# Patient Record
Sex: FEMALE | Race: WHITE | Employment: OTHER | URBAN - NONMETROPOLITAN AREA
[De-identification: names, ages, dates, MRNs, and addresses within clinical notes are randomized per-mention and may not be internally consistent; named-entity substitution may affect disease eponyms.]

---

## 2017-01-03 ENCOUNTER — OFFICE VISIT (OUTPATIENT)
Dept: ONCOLOGY | Age: 82
End: 2017-01-03

## 2017-01-03 VITALS
TEMPERATURE: 98.6 F | OXYGEN SATURATION: 94 % | HEIGHT: 57 IN | HEART RATE: 110 BPM | RESPIRATION RATE: 18 BRPM | SYSTOLIC BLOOD PRESSURE: 140 MMHG | DIASTOLIC BLOOD PRESSURE: 65 MMHG | BODY MASS INDEX: 30.94 KG/M2 | WEIGHT: 143.4 LBS

## 2017-01-03 DIAGNOSIS — C78.00 BLADDER CANCER METASTASIZED TO LUNG (HCC): Primary | ICD-10-CM

## 2017-01-03 DIAGNOSIS — Z51.11 ENCOUNTER FOR CHEMOTHERAPY MANAGEMENT: ICD-10-CM

## 2017-01-03 DIAGNOSIS — C67.9 BLADDER CANCER METASTASIZED TO LUNG (HCC): Primary | ICD-10-CM

## 2017-01-03 DIAGNOSIS — R30.9 PAINFUL URINATION: ICD-10-CM

## 2017-01-03 DIAGNOSIS — D50.0 IRON DEFICIENCY ANEMIA DUE TO CHRONIC BLOOD LOSS: ICD-10-CM

## 2017-01-03 PROCEDURE — 99214 OFFICE O/P EST MOD 30 MIN: CPT | Performed by: INTERNAL MEDICINE

## 2017-01-04 ENCOUNTER — TELEPHONE (OUTPATIENT)
Dept: FAMILY MEDICINE CLINIC | Age: 82
End: 2017-01-04

## 2017-01-04 RX ORDER — ESCITALOPRAM OXALATE 5 MG/1
5 TABLET ORAL DAILY
Qty: 30 TABLET | Refills: 2 | Status: SHIPPED | OUTPATIENT
Start: 2017-01-04 | End: 2017-04-11 | Stop reason: SDUPTHER

## 2017-01-09 DIAGNOSIS — E87.1 HYPONATREMIA: ICD-10-CM

## 2017-01-09 DIAGNOSIS — C67.9 BLADDER CANCER METASTASIZED TO LUNG (HCC): ICD-10-CM

## 2017-01-09 DIAGNOSIS — C78.00 BLADDER CANCER METASTASIZED TO LUNG (HCC): ICD-10-CM

## 2017-01-10 RX ORDER — ONDANSETRON 4 MG/1
TABLET, FILM COATED ORAL
Qty: 30 TABLET | Refills: 3 | Status: SHIPPED | OUTPATIENT
Start: 2017-01-10 | End: 2017-08-22 | Stop reason: SDUPTHER

## 2017-01-12 ENCOUNTER — PROCEDURE VISIT (OUTPATIENT)
Dept: UROLOGY | Age: 82
End: 2017-01-12

## 2017-01-12 VITALS
HEIGHT: 58 IN | DIASTOLIC BLOOD PRESSURE: 68 MMHG | SYSTOLIC BLOOD PRESSURE: 140 MMHG | BODY MASS INDEX: 29.6 KG/M2 | WEIGHT: 141 LBS

## 2017-01-12 DIAGNOSIS — C67.9 MALIGNANT NEOPLASM OF URINARY BLADDER, UNSPECIFIED SITE (HCC): Primary | ICD-10-CM

## 2017-01-12 LAB
BILIRUBIN URINE: NEGATIVE
BLOOD URINE, POC: ABNORMAL
CHARACTER, URINE: CLEAR
COLOR, URINE: YELLOW
GLUCOSE URINE: NEGATIVE MG/DL
KETONES, URINE: NEGATIVE
LEUKOCYTE CLUMPS, URINE: ABNORMAL
NITRITE, URINE: NEGATIVE
PH, URINE: 6
PROTEIN, URINE: 30 MG/DL
SPECIFIC GRAVITY, URINE: 1.01 (ref 1–1.03)
UROBILINOGEN, URINE: 0.2 EU/DL

## 2017-01-12 PROCEDURE — 52310 CYSTOSCOPY AND TREATMENT: CPT | Performed by: UROLOGY

## 2017-01-12 PROCEDURE — 99999 PR OFFICE/OUTPT VISIT,PROCEDURE ONLY: CPT | Performed by: UROLOGY

## 2017-01-12 PROCEDURE — 81003 URINALYSIS AUTO W/O SCOPE: CPT | Performed by: UROLOGY

## 2017-01-16 ENCOUNTER — CARE COORDINATION (OUTPATIENT)
Dept: CARE COORDINATION | Age: 82
End: 2017-01-16

## 2017-01-16 ENCOUNTER — TELEPHONE (OUTPATIENT)
Dept: FAMILY MEDICINE CLINIC | Age: 82
End: 2017-01-16

## 2017-01-16 RX ORDER — FERROUS SULFATE 325(65) MG
TABLET ORAL
Qty: 30 TABLET | Refills: 3 | Status: SHIPPED | OUTPATIENT
Start: 2017-01-16 | End: 2017-05-10 | Stop reason: SDUPTHER

## 2017-01-17 DIAGNOSIS — C78.00 BLADDER CANCER METASTASIZED TO LUNG (HCC): ICD-10-CM

## 2017-01-17 DIAGNOSIS — M15.9 GENERALIZED OA: ICD-10-CM

## 2017-01-17 DIAGNOSIS — C67.9 BLADDER CANCER METASTASIZED TO LUNG (HCC): ICD-10-CM

## 2017-01-17 DIAGNOSIS — M16.9 OSTEOARTHROSIS, HIP: ICD-10-CM

## 2017-01-17 RX ORDER — TRAMADOL HYDROCHLORIDE 50 MG/1
50 TABLET ORAL EVERY 6 HOURS PRN
Qty: 120 TABLET | Refills: 5 | Status: SHIPPED | OUTPATIENT
Start: 2017-01-17 | End: 2017-04-17 | Stop reason: ALTCHOICE

## 2017-01-17 RX ORDER — HYDROCODONE BITARTRATE AND ACETAMINOPHEN 5; 325 MG/1; MG/1
1 TABLET ORAL EVERY 6 HOURS PRN
Qty: 120 TABLET | Refills: 0 | Status: SHIPPED | OUTPATIENT
Start: 2017-01-17 | End: 2017-01-30 | Stop reason: SDUPTHER

## 2017-01-23 ENCOUNTER — TELEPHONE (OUTPATIENT)
Dept: FAMILY MEDICINE CLINIC | Age: 82
End: 2017-01-23

## 2017-01-23 DIAGNOSIS — S62.101S CLOSED FRACTURE OF RIGHT WRIST, SEQUELA: ICD-10-CM

## 2017-01-23 DIAGNOSIS — R53.81 PHYSICAL DECONDITIONING: Primary | ICD-10-CM

## 2017-01-23 PROBLEM — S62.101A CLOSED FRACTURE OF RIGHT WRIST: Status: ACTIVE | Noted: 2017-01-23

## 2017-01-30 DIAGNOSIS — C67.9 BLADDER CANCER METASTASIZED TO LUNG (HCC): ICD-10-CM

## 2017-01-30 DIAGNOSIS — C78.00 BLADDER CANCER METASTASIZED TO LUNG (HCC): ICD-10-CM

## 2017-01-30 DIAGNOSIS — M15.9 GENERALIZED OA: ICD-10-CM

## 2017-01-30 DIAGNOSIS — M16.9 OSTEOARTHROSIS, HIP: ICD-10-CM

## 2017-01-30 RX ORDER — HYDROCODONE BITARTRATE AND ACETAMINOPHEN 5; 325 MG/1; MG/1
1 TABLET ORAL EVERY 6 HOURS PRN
Qty: 120 TABLET | Refills: 0 | Status: SHIPPED | OUTPATIENT
Start: 2017-01-30 | End: 2017-03-20 | Stop reason: SDUPTHER

## 2017-02-06 ENCOUNTER — PROCEDURE VISIT (OUTPATIENT)
Dept: UROLOGY | Age: 82
End: 2017-02-06

## 2017-02-06 VITALS
SYSTOLIC BLOOD PRESSURE: 110 MMHG | WEIGHT: 141 LBS | DIASTOLIC BLOOD PRESSURE: 64 MMHG | HEIGHT: 57 IN | BODY MASS INDEX: 30.42 KG/M2

## 2017-02-06 DIAGNOSIS — Z85.51 HISTORY OF BLADDER CANCER: Primary | ICD-10-CM

## 2017-02-06 LAB
BILIRUBIN URINE: NEGATIVE
BLOOD URINE, POC: NORMAL
CHARACTER, URINE: CLEAR
COLOR, URINE: YELLOW
GLUCOSE URINE: NEGATIVE MG/DL
KETONES, URINE: NEGATIVE
LEUKOCYTE CLUMPS, URINE: NORMAL
NITRITE, URINE: NEGATIVE
PH, URINE: 6.5
PROTEIN, URINE: NEGATIVE MG/DL
SPECIFIC GRAVITY, URINE: 1.01 (ref 1–1.03)
UROBILINOGEN, URINE: 0.2 EU/DL

## 2017-02-06 PROCEDURE — 81003 URINALYSIS AUTO W/O SCOPE: CPT | Performed by: UROLOGY

## 2017-02-06 PROCEDURE — 52310 CYSTOSCOPY AND TREATMENT: CPT | Performed by: UROLOGY

## 2017-02-06 PROCEDURE — 99999 PR OFFICE/OUTPT VISIT,PROCEDURE ONLY: CPT | Performed by: UROLOGY

## 2017-02-06 RX ORDER — DIPHENOXYLATE HYDROCHLORIDE AND ATROPINE SULFATE 2.5; .025 MG/1; MG/1
TABLET ORAL
Qty: 30 TABLET | Refills: 0 | Status: SHIPPED | OUTPATIENT
Start: 2017-02-06 | End: 2017-08-02 | Stop reason: SDUPTHER

## 2017-02-06 RX ORDER — CEPHALEXIN 500 MG/1
CAPSULE ORAL
COMMUNITY
Start: 2017-01-31 | End: 2017-02-09

## 2017-02-09 ENCOUNTER — OFFICE VISIT (OUTPATIENT)
Dept: CARDIOLOGY | Age: 82
End: 2017-02-09

## 2017-02-09 VITALS
SYSTOLIC BLOOD PRESSURE: 140 MMHG | HEIGHT: 57 IN | HEART RATE: 64 BPM | BODY MASS INDEX: 29.86 KG/M2 | DIASTOLIC BLOOD PRESSURE: 70 MMHG | WEIGHT: 138.4 LBS

## 2017-02-09 DIAGNOSIS — I25.10 CORONARY ARTERY DISEASE INVOLVING NATIVE CORONARY ARTERY OF NATIVE HEART WITHOUT ANGINA PECTORIS: ICD-10-CM

## 2017-02-09 DIAGNOSIS — I50.22 CHRONIC SYSTOLIC CHF (CONGESTIVE HEART FAILURE) (HCC): ICD-10-CM

## 2017-02-09 DIAGNOSIS — I10 ESSENTIAL HYPERTENSION: ICD-10-CM

## 2017-02-09 DIAGNOSIS — E78.00 HYPERCHOLESTEREMIA: ICD-10-CM

## 2017-02-09 DIAGNOSIS — I42.9 CARDIOMYOPATHY (HCC): Primary | ICD-10-CM

## 2017-02-09 DIAGNOSIS — E78.5 DYSLIPIDEMIA: ICD-10-CM

## 2017-02-09 DIAGNOSIS — Z95.820 S/P ANGIOPLASTY WITH STENT: ICD-10-CM

## 2017-02-09 PROCEDURE — 99213 OFFICE O/P EST LOW 20 MIN: CPT | Performed by: INTERNAL MEDICINE

## 2017-02-10 ENCOUNTER — TELEPHONE (OUTPATIENT)
Dept: CARDIOLOGY | Age: 82
End: 2017-02-10

## 2017-02-15 ENCOUNTER — CARE COORDINATION (OUTPATIENT)
Dept: CARE COORDINATION | Age: 82
End: 2017-02-15

## 2017-03-03 DIAGNOSIS — G25.81 RESTLESS LEGS SYNDROME (RLS): ICD-10-CM

## 2017-03-03 RX ORDER — ROPINIROLE 1 MG/1
TABLET, FILM COATED ORAL
Qty: 120 TABLET | Refills: 3 | Status: SHIPPED | OUTPATIENT
Start: 2017-03-03 | End: 2017-04-17 | Stop reason: SDUPTHER

## 2017-03-09 DIAGNOSIS — G25.81 RESTLESS LEGS SYNDROME (RLS): ICD-10-CM

## 2017-03-09 RX ORDER — ROPINIROLE 0.5 MG/1
TABLET, FILM COATED ORAL
Qty: 180 TABLET | Refills: 2 | Status: SHIPPED | OUTPATIENT
Start: 2017-03-09 | End: 2017-04-17

## 2017-03-20 DIAGNOSIS — C78.00 BLADDER CANCER METASTASIZED TO LUNG (HCC): ICD-10-CM

## 2017-03-20 DIAGNOSIS — C67.9 BLADDER CANCER METASTASIZED TO LUNG (HCC): ICD-10-CM

## 2017-03-20 DIAGNOSIS — M15.9 GENERALIZED OA: ICD-10-CM

## 2017-03-20 DIAGNOSIS — M16.9 OSTEOARTHROSIS, HIP: ICD-10-CM

## 2017-03-20 RX ORDER — HYDROCODONE BITARTRATE AND ACETAMINOPHEN 5; 325 MG/1; MG/1
1 TABLET ORAL EVERY 6 HOURS PRN
Qty: 120 TABLET | Refills: 0 | Status: SHIPPED | OUTPATIENT
Start: 2017-03-20 | End: 2017-04-21 | Stop reason: SDUPTHER

## 2017-03-22 ENCOUNTER — CARE COORDINATION (OUTPATIENT)
Dept: CARE COORDINATION | Age: 82
End: 2017-03-22

## 2017-03-24 ENCOUNTER — OFFICE VISIT (OUTPATIENT)
Dept: FAMILY MEDICINE CLINIC | Age: 82
End: 2017-03-24

## 2017-03-24 VITALS
HEIGHT: 57 IN | DIASTOLIC BLOOD PRESSURE: 60 MMHG | TEMPERATURE: 98 F | RESPIRATION RATE: 14 BRPM | SYSTOLIC BLOOD PRESSURE: 118 MMHG | HEART RATE: 86 BPM | BODY MASS INDEX: 31.8 KG/M2 | WEIGHT: 147.4 LBS | OXYGEN SATURATION: 98 %

## 2017-03-24 DIAGNOSIS — G25.81 RESTLESS LEGS SYNDROME (RLS): ICD-10-CM

## 2017-03-24 DIAGNOSIS — S62.101D CLOSED FRACTURE OF RIGHT WRIST, WITH ROUTINE HEALING, SUBSEQUENT ENCOUNTER: ICD-10-CM

## 2017-03-24 DIAGNOSIS — I25.118 CORONARY ARTERY DISEASE OF NATIVE ARTERY OF NATIVE HEART WITH STABLE ANGINA PECTORIS (HCC): ICD-10-CM

## 2017-03-24 DIAGNOSIS — J30.1 SEASONAL ALLERGIC RHINITIS DUE TO POLLEN: ICD-10-CM

## 2017-03-24 DIAGNOSIS — F41.9 ANXIETY: ICD-10-CM

## 2017-03-24 DIAGNOSIS — E87.1 HYPONATREMIA: ICD-10-CM

## 2017-03-24 DIAGNOSIS — K26.9 DUODENAL ULCER: ICD-10-CM

## 2017-03-24 DIAGNOSIS — I50.22 CHRONIC SYSTOLIC CHF (CONGESTIVE HEART FAILURE) (HCC): ICD-10-CM

## 2017-03-24 DIAGNOSIS — H81.13 BPV (BENIGN POSITIONAL VERTIGO), BILATERAL: ICD-10-CM

## 2017-03-24 DIAGNOSIS — E22.2 SIADH (SYNDROME OF INAPPROPRIATE ADH PRODUCTION) (HCC): Primary | ICD-10-CM

## 2017-03-24 DIAGNOSIS — Z51.81 MEDICATION MONITORING ENCOUNTER: ICD-10-CM

## 2017-03-24 DIAGNOSIS — I10 ESSENTIAL HYPERTENSION: ICD-10-CM

## 2017-03-24 DIAGNOSIS — M15.9 GENERALIZED OA: ICD-10-CM

## 2017-03-24 PROCEDURE — 3288F FALL RISK ASSESSMENT DOCD: CPT | Performed by: FAMILY MEDICINE

## 2017-03-24 PROCEDURE — G8510 SCR DEP NEG, NO PLAN REQD: HCPCS | Performed by: FAMILY MEDICINE

## 2017-03-24 PROCEDURE — 99214 OFFICE O/P EST MOD 30 MIN: CPT | Performed by: FAMILY MEDICINE

## 2017-03-24 RX ORDER — ZAFIRLUKAST 20 MG/1
TABLET, FILM COATED ORAL
Qty: 180 TABLET | Refills: 2 | Status: SHIPPED | OUTPATIENT
Start: 2017-03-24 | End: 2017-10-02 | Stop reason: SDUPTHER

## 2017-03-24 RX ORDER — MECLIZINE HYDROCHLORIDE 25 MG/1
25 TABLET ORAL 3 TIMES DAILY PRN
Qty: 270 TABLET | Refills: 3 | Status: SHIPPED | OUTPATIENT
Start: 2017-03-24 | End: 2018-01-01 | Stop reason: SDUPTHER

## 2017-03-24 RX ORDER — PANTOPRAZOLE SODIUM 40 MG/1
TABLET, DELAYED RELEASE ORAL
Qty: 60 TABLET | Refills: 11 | Status: SHIPPED | OUTPATIENT
Start: 2017-03-24 | End: 2018-01-01 | Stop reason: SDUPTHER

## 2017-03-24 ASSESSMENT — ENCOUNTER SYMPTOMS
RESPIRATORY NEGATIVE: 1
NAUSEA: 0
BACK PAIN: 1
ABDOMINAL DISTENTION: 0
EYES NEGATIVE: 1
COUGH: 0
ABDOMINAL PAIN: 0
SHORTNESS OF BREATH: 0
GASTROINTESTINAL NEGATIVE: 1

## 2017-03-24 ASSESSMENT — PATIENT HEALTH QUESTIONNAIRE - PHQ9
1. LITTLE INTEREST OR PLEASURE IN DOING THINGS: 0
2. FEELING DOWN, DEPRESSED OR HOPELESS: 0
SUM OF ALL RESPONSES TO PHQ QUESTIONS 1-9: 0
SUM OF ALL RESPONSES TO PHQ9 QUESTIONS 1 & 2: 0

## 2017-03-29 ENCOUNTER — TELEPHONE (OUTPATIENT)
Dept: UROLOGY | Age: 82
End: 2017-03-29

## 2017-03-29 DIAGNOSIS — Z01.818 PRE-OP TESTING: ICD-10-CM

## 2017-03-29 DIAGNOSIS — I42.9 CARDIOMYOPATHY (HCC): ICD-10-CM

## 2017-03-29 DIAGNOSIS — I25.10 CORONARY ARTERY DISEASE INVOLVING NATIVE CORONARY ARTERY OF NATIVE HEART WITHOUT ANGINA PECTORIS: ICD-10-CM

## 2017-03-29 DIAGNOSIS — I50.43 ACUTE ON CHRONIC COMBINED SYSTOLIC AND DIASTOLIC CONGESTIVE HEART FAILURE (HCC): Primary | ICD-10-CM

## 2017-04-04 ENCOUNTER — OFFICE VISIT (OUTPATIENT)
Dept: ONCOLOGY | Age: 82
End: 2017-04-04

## 2017-04-04 VITALS
RESPIRATION RATE: 20 BRPM | SYSTOLIC BLOOD PRESSURE: 142 MMHG | BODY MASS INDEX: 31.37 KG/M2 | WEIGHT: 145.4 LBS | OXYGEN SATURATION: 93 % | TEMPERATURE: 97.7 F | HEART RATE: 90 BPM | DIASTOLIC BLOOD PRESSURE: 67 MMHG | HEIGHT: 57 IN

## 2017-04-04 DIAGNOSIS — C67.0 MALIGNANT NEOPLASM OF TRIGONE OF URINARY BLADDER (HCC): ICD-10-CM

## 2017-04-04 DIAGNOSIS — C67.9 BLADDER CANCER METASTASIZED TO LUNG (HCC): Primary | ICD-10-CM

## 2017-04-04 DIAGNOSIS — C78.00 BLADDER CANCER METASTASIZED TO LUNG (HCC): Primary | ICD-10-CM

## 2017-04-04 PROCEDURE — 99214 OFFICE O/P EST MOD 30 MIN: CPT | Performed by: INTERNAL MEDICINE

## 2017-04-05 RX ORDER — ZAFIRLUKAST 20 MG/1
TABLET, FILM COATED ORAL
Qty: 180 TABLET | Refills: 1 | Status: ON HOLD | OUTPATIENT
Start: 2017-04-05 | End: 2017-06-27 | Stop reason: HOSPADM

## 2017-04-07 ENCOUNTER — OFFICE VISIT (OUTPATIENT)
Dept: CARDIOLOGY | Age: 82
End: 2017-04-07

## 2017-04-07 VITALS
SYSTOLIC BLOOD PRESSURE: 138 MMHG | BODY MASS INDEX: 31.32 KG/M2 | DIASTOLIC BLOOD PRESSURE: 68 MMHG | HEART RATE: 80 BPM | WEIGHT: 145.2 LBS | HEIGHT: 57 IN

## 2017-04-07 DIAGNOSIS — I42.9 CARDIOMYOPATHY (HCC): ICD-10-CM

## 2017-04-07 DIAGNOSIS — I50.22 CHRONIC SYSTOLIC CHF (CONGESTIVE HEART FAILURE) (HCC): ICD-10-CM

## 2017-04-07 DIAGNOSIS — I25.10 CORONARY ARTERY DISEASE INVOLVING NATIVE CORONARY ARTERY OF NATIVE HEART WITHOUT ANGINA PECTORIS: ICD-10-CM

## 2017-04-07 DIAGNOSIS — Z95.820 S/P ANGIOPLASTY WITH STENT: ICD-10-CM

## 2017-04-07 DIAGNOSIS — I10 ESSENTIAL HYPERTENSION: ICD-10-CM

## 2017-04-07 DIAGNOSIS — Z01.818 PRE-OP EVALUATION: Primary | ICD-10-CM

## 2017-04-07 DIAGNOSIS — R06.02 SOB (SHORTNESS OF BREATH) ON EXERTION: ICD-10-CM

## 2017-04-07 PROCEDURE — 99214 OFFICE O/P EST MOD 30 MIN: CPT | Performed by: INTERNAL MEDICINE

## 2017-04-11 ENCOUNTER — TELEPHONE (OUTPATIENT)
Dept: FAMILY MEDICINE CLINIC | Age: 82
End: 2017-04-11

## 2017-04-11 RX ORDER — ESCITALOPRAM OXALATE 5 MG/1
5 TABLET ORAL DAILY
Qty: 30 TABLET | Refills: 2 | Status: SHIPPED | OUTPATIENT
Start: 2017-04-11 | End: 2017-04-26 | Stop reason: DRUGHIGH

## 2017-04-13 ENCOUNTER — TELEPHONE (OUTPATIENT)
Dept: UROLOGY | Age: 82
End: 2017-04-13

## 2017-04-17 ENCOUNTER — TELEPHONE (OUTPATIENT)
Dept: FAMILY MEDICINE CLINIC | Age: 82
End: 2017-04-17

## 2017-04-17 ENCOUNTER — NURSE TRIAGE (OUTPATIENT)
Dept: ADMINISTRATIVE | Age: 82
End: 2017-04-17

## 2017-04-17 DIAGNOSIS — G25.81 RESTLESS LEGS SYNDROME (RLS): ICD-10-CM

## 2017-04-17 RX ORDER — ROPINIROLE 4 MG/1
4 TABLET, FILM COATED ORAL 2 TIMES DAILY
Qty: 60 TABLET | Refills: 5 | Status: SHIPPED | OUTPATIENT
Start: 2017-04-17 | End: 2017-10-15 | Stop reason: SDUPTHER

## 2017-04-20 ENCOUNTER — TELEPHONE (OUTPATIENT)
Dept: UROLOGY | Age: 82
End: 2017-04-20

## 2017-04-20 ENCOUNTER — NURSE ONLY (OUTPATIENT)
Dept: UROLOGY | Age: 82
End: 2017-04-20

## 2017-04-20 DIAGNOSIS — N13.30 BILATERAL HYDRONEPHROSIS: ICD-10-CM

## 2017-04-20 DIAGNOSIS — R33.9 RETENTION, URINE: Primary | ICD-10-CM

## 2017-04-20 DIAGNOSIS — N13.5 URETERAL OBSTRUCTION: Primary | ICD-10-CM

## 2017-04-20 LAB — POST VOID RESIDUAL (PVR): >999 ML

## 2017-04-20 PROCEDURE — 51702 INSERT TEMP BLADDER CATH: CPT | Performed by: NURSE PRACTITIONER

## 2017-04-20 PROCEDURE — 51798 US URINE CAPACITY MEASURE: CPT | Performed by: NURSE PRACTITIONER

## 2017-04-21 DIAGNOSIS — C78.00 BLADDER CANCER METASTASIZED TO LUNG (HCC): ICD-10-CM

## 2017-04-21 DIAGNOSIS — C67.9 BLADDER CANCER METASTASIZED TO LUNG (HCC): ICD-10-CM

## 2017-04-21 DIAGNOSIS — M15.9 GENERALIZED OA: ICD-10-CM

## 2017-04-21 DIAGNOSIS — M16.9 OSTEOARTHROSIS, HIP: ICD-10-CM

## 2017-04-21 RX ORDER — HYDROCODONE BITARTRATE AND ACETAMINOPHEN 5; 325 MG/1; MG/1
1 TABLET ORAL EVERY 6 HOURS PRN
Qty: 120 TABLET | Refills: 0 | Status: SHIPPED | OUTPATIENT
Start: 2017-04-21 | End: 2017-05-19 | Stop reason: SDUPTHER

## 2017-04-24 ENCOUNTER — NURSE ONLY (OUTPATIENT)
Dept: UROLOGY | Age: 82
End: 2017-04-24

## 2017-04-24 VITALS — WEIGHT: 147 LBS | BODY MASS INDEX: 30.86 KG/M2 | HEIGHT: 58 IN

## 2017-04-24 DIAGNOSIS — R33.9 URINARY RETENTION: Primary | ICD-10-CM

## 2017-04-24 PROCEDURE — 99999 PR OFFICE/OUTPT VISIT,PROCEDURE ONLY: CPT | Performed by: UROLOGY

## 2017-04-25 ENCOUNTER — TELEPHONE (OUTPATIENT)
Dept: UROLOGY | Age: 82
End: 2017-04-25

## 2017-04-25 ENCOUNTER — NURSE ONLY (OUTPATIENT)
Dept: UROLOGY | Age: 82
End: 2017-04-25

## 2017-04-25 DIAGNOSIS — R33.9 RETENTION OF URINE: Primary | ICD-10-CM

## 2017-04-25 PROCEDURE — 51701 INSERT BLADDER CATHETER: CPT | Performed by: NURSE PRACTITIONER

## 2017-04-26 ENCOUNTER — CARE COORDINATION (OUTPATIENT)
Dept: CARE COORDINATION | Age: 82
End: 2017-04-26

## 2017-04-26 ENCOUNTER — TELEPHONE (OUTPATIENT)
Dept: FAMILY MEDICINE CLINIC | Age: 82
End: 2017-04-26

## 2017-04-26 DIAGNOSIS — F32.9 REACTIVE DEPRESSION: ICD-10-CM

## 2017-04-26 DIAGNOSIS — F41.9 ANXIETY: ICD-10-CM

## 2017-04-26 RX ORDER — ESCITALOPRAM OXALATE 10 MG/1
10 TABLET ORAL DAILY
Qty: 30 TABLET | Refills: 1 | Status: SHIPPED | OUTPATIENT
Start: 2017-04-26 | End: 2017-06-18 | Stop reason: SDUPTHER

## 2017-04-26 RX ORDER — ESCITALOPRAM OXALATE 5 MG/1
10 TABLET ORAL DAILY
Qty: 30 TABLET | Refills: 2 | Status: SHIPPED
Start: 2017-04-26 | End: 2017-04-26 | Stop reason: DRUGHIGH

## 2017-04-26 RX ORDER — LORAZEPAM 0.5 MG/1
0.5 TABLET ORAL EVERY 8 HOURS PRN
Qty: 90 TABLET | Refills: 5 | Status: SHIPPED | OUTPATIENT
Start: 2017-04-26 | End: 2017-09-29 | Stop reason: SDUPTHER

## 2017-05-08 RX ORDER — BUMETANIDE 0.5 MG/1
TABLET ORAL
Qty: 48 TABLET | Refills: 1 | Status: SHIPPED | OUTPATIENT
Start: 2017-05-08 | End: 2017-11-03 | Stop reason: SDUPTHER

## 2017-05-10 ENCOUNTER — OFFICE VISIT (OUTPATIENT)
Dept: UROLOGY | Age: 82
End: 2017-05-10

## 2017-05-10 VITALS
BODY MASS INDEX: 31.28 KG/M2 | DIASTOLIC BLOOD PRESSURE: 62 MMHG | WEIGHT: 145 LBS | HEIGHT: 57 IN | SYSTOLIC BLOOD PRESSURE: 102 MMHG

## 2017-05-10 DIAGNOSIS — C67.9 BLADDER CANCER METASTASIZED TO LUNG (HCC): Primary | ICD-10-CM

## 2017-05-10 DIAGNOSIS — C78.00 BLADDER CANCER METASTASIZED TO LUNG (HCC): Primary | ICD-10-CM

## 2017-05-10 LAB
BILIRUBIN URINE: NEGATIVE
BLOOD URINE, POC: ABNORMAL
CHARACTER, URINE: ABNORMAL
COLOR, URINE: YELLOW
GLUCOSE URINE: NEGATIVE MG/DL
KETONES, URINE: NEGATIVE
LEUKOCYTE CLUMPS, URINE: ABNORMAL
NITRITE, URINE: NEGATIVE
PH, URINE: 6
PROTEIN, URINE: 100 MG/DL
SPECIFIC GRAVITY, URINE: 1.01 (ref 1–1.03)
UROBILINOGEN, URINE: 0.2 EU/DL

## 2017-05-10 PROCEDURE — 99213 OFFICE O/P EST LOW 20 MIN: CPT | Performed by: NURSE PRACTITIONER

## 2017-05-10 PROCEDURE — 81003 URINALYSIS AUTO W/O SCOPE: CPT | Performed by: NURSE PRACTITIONER

## 2017-05-10 RX ORDER — FERROUS SULFATE 325(65) MG
TABLET ORAL
Qty: 30 TABLET | Refills: 3 | Status: SHIPPED | OUTPATIENT
Start: 2017-05-10 | End: 2017-07-07 | Stop reason: SDUPTHER

## 2017-05-10 ASSESSMENT — ENCOUNTER SYMPTOMS
ABDOMINAL PAIN: 0
NAUSEA: 0
VOMITING: 0

## 2017-05-18 ENCOUNTER — TELEPHONE (OUTPATIENT)
Dept: FAMILY MEDICINE CLINIC | Age: 82
End: 2017-05-18

## 2017-05-19 DIAGNOSIS — M15.9 GENERALIZED OA: ICD-10-CM

## 2017-05-19 DIAGNOSIS — C67.9 BLADDER CANCER METASTASIZED TO LUNG (HCC): ICD-10-CM

## 2017-05-19 DIAGNOSIS — M16.9 OSTEOARTHROSIS, HIP: ICD-10-CM

## 2017-05-19 DIAGNOSIS — C78.00 BLADDER CANCER METASTASIZED TO LUNG (HCC): ICD-10-CM

## 2017-05-19 RX ORDER — HYDROCODONE BITARTRATE AND ACETAMINOPHEN 5; 325 MG/1; MG/1
1 TABLET ORAL EVERY 6 HOURS PRN
Qty: 120 TABLET | Refills: 0 | Status: SHIPPED | OUTPATIENT
Start: 2017-05-19 | End: 2017-05-23 | Stop reason: SDUPTHER

## 2017-05-23 ENCOUNTER — TELEPHONE (OUTPATIENT)
Dept: FAMILY MEDICINE CLINIC | Age: 82
End: 2017-05-23

## 2017-05-23 DIAGNOSIS — C78.00 BLADDER CANCER METASTASIZED TO LUNG (HCC): ICD-10-CM

## 2017-05-23 DIAGNOSIS — M15.9 GENERALIZED OA: ICD-10-CM

## 2017-05-23 DIAGNOSIS — M16.9 OSTEOARTHROSIS, HIP: ICD-10-CM

## 2017-05-23 DIAGNOSIS — C67.9 BLADDER CANCER METASTASIZED TO LUNG (HCC): ICD-10-CM

## 2017-05-23 RX ORDER — HYDROCODONE BITARTRATE AND ACETAMINOPHEN 5; 325 MG/1; MG/1
1 TABLET ORAL EVERY 6 HOURS PRN
Qty: 120 TABLET | Refills: 0 | Status: SHIPPED | OUTPATIENT
Start: 2017-05-23 | End: 2017-06-28 | Stop reason: SDUPTHER

## 2017-06-01 ENCOUNTER — CARE COORDINATION (OUTPATIENT)
Dept: CARE COORDINATION | Age: 82
End: 2017-06-01

## 2017-06-01 ASSESSMENT — ENCOUNTER SYMPTOMS: DYSPNEA ASSOCIATED WITH: EXERTION

## 2017-06-06 ENCOUNTER — TELEPHONE (OUTPATIENT)
Dept: CARDIOLOGY | Age: 82
End: 2017-06-06

## 2017-06-08 RX ORDER — POTASSIUM CHLORIDE 750 MG/1
TABLET, FILM COATED, EXTENDED RELEASE ORAL
Qty: 48 TABLET | Refills: 0 | Status: SHIPPED | OUTPATIENT
Start: 2017-06-08 | End: 2017-09-06 | Stop reason: SDUPTHER

## 2017-06-08 RX ORDER — ISOSORBIDE MONONITRATE 60 MG/1
TABLET, EXTENDED RELEASE ORAL
Qty: 30 TABLET | Refills: 0 | Status: SHIPPED | OUTPATIENT
Start: 2017-06-08 | End: 2017-07-09 | Stop reason: SDUPTHER

## 2017-06-12 ENCOUNTER — TELEPHONE (OUTPATIENT)
Dept: UROLOGY | Age: 82
End: 2017-06-12

## 2017-06-12 DIAGNOSIS — R10.9 RIGHT FLANK PAIN: Primary | ICD-10-CM

## 2017-06-15 ENCOUNTER — OFFICE VISIT (OUTPATIENT)
Dept: CARDIOLOGY | Age: 82
End: 2017-06-15

## 2017-06-15 VITALS
DIASTOLIC BLOOD PRESSURE: 70 MMHG | HEIGHT: 57 IN | HEART RATE: 96 BPM | BODY MASS INDEX: 30.59 KG/M2 | SYSTOLIC BLOOD PRESSURE: 118 MMHG | WEIGHT: 141.8 LBS

## 2017-06-15 DIAGNOSIS — I10 ESSENTIAL HYPERTENSION: ICD-10-CM

## 2017-06-15 DIAGNOSIS — I50.22 CHRONIC SYSTOLIC CHF (CONGESTIVE HEART FAILURE) (HCC): ICD-10-CM

## 2017-06-15 DIAGNOSIS — R06.02 SOB (SHORTNESS OF BREATH) ON EXERTION: ICD-10-CM

## 2017-06-15 DIAGNOSIS — Z95.820 S/P ANGIOPLASTY WITH STENT: ICD-10-CM

## 2017-06-15 DIAGNOSIS — I42.9 CARDIOMYOPATHY (HCC): Primary | ICD-10-CM

## 2017-06-15 DIAGNOSIS — I25.10 CORONARY ARTERY DISEASE INVOLVING NATIVE CORONARY ARTERY OF NATIVE HEART WITHOUT ANGINA PECTORIS: ICD-10-CM

## 2017-06-15 DIAGNOSIS — E78.5 DYSLIPIDEMIA: ICD-10-CM

## 2017-06-15 PROCEDURE — 99214 OFFICE O/P EST MOD 30 MIN: CPT | Performed by: INTERNAL MEDICINE

## 2017-06-16 ENCOUNTER — TELEPHONE (OUTPATIENT)
Dept: UROLOGY | Age: 82
End: 2017-06-16

## 2017-06-16 ENCOUNTER — TELEPHONE (OUTPATIENT)
Dept: FAMILY MEDICINE CLINIC | Age: 82
End: 2017-06-16

## 2017-06-16 DIAGNOSIS — R39.198 DIFFICULTY URINATING: Primary | ICD-10-CM

## 2017-06-18 DIAGNOSIS — F32.9 REACTIVE DEPRESSION: ICD-10-CM

## 2017-06-19 RX ORDER — ESCITALOPRAM OXALATE 10 MG/1
TABLET ORAL
Qty: 30 TABLET | Refills: 1 | Status: SHIPPED | OUTPATIENT
Start: 2017-06-19 | End: 2017-08-12 | Stop reason: SDUPTHER

## 2017-06-23 ENCOUNTER — TELEPHONE (OUTPATIENT)
Dept: UROLOGY | Age: 82
End: 2017-06-23

## 2017-06-23 DIAGNOSIS — N13.30 HYDRONEPHROSIS, RIGHT: Primary | ICD-10-CM

## 2017-06-23 PROBLEM — N81.10 BLADDER PROLAPSE, FEMALE, ACQUIRED: Chronic | Status: ACTIVE | Noted: 2017-06-23

## 2017-06-23 PROBLEM — R79.89 PRERENAL AZOTEMIA: Status: ACTIVE | Noted: 2017-06-23

## 2017-06-23 RX ORDER — DOXYCYCLINE HYCLATE 100 MG/1
100 CAPSULE ORAL 2 TIMES DAILY
Qty: 14 CAPSULE | Refills: 0 | Status: ON HOLD | OUTPATIENT
Start: 2017-06-23 | End: 2017-06-27 | Stop reason: HOSPADM

## 2017-06-27 ENCOUNTER — TELEPHONE (OUTPATIENT)
Dept: FAMILY MEDICINE CLINIC | Age: 82
End: 2017-06-27

## 2017-06-28 DIAGNOSIS — M15.9 GENERALIZED OA: ICD-10-CM

## 2017-06-28 DIAGNOSIS — C78.00 BLADDER CANCER METASTASIZED TO LUNG (HCC): ICD-10-CM

## 2017-06-28 DIAGNOSIS — C67.9 BLADDER CANCER METASTASIZED TO LUNG (HCC): ICD-10-CM

## 2017-06-28 DIAGNOSIS — M16.9 OSTEOARTHROSIS, HIP: ICD-10-CM

## 2017-06-28 RX ORDER — HYDROCODONE BITARTRATE AND ACETAMINOPHEN 5; 325 MG/1; MG/1
1 TABLET ORAL EVERY 6 HOURS PRN
Qty: 120 TABLET | Refills: 0 | Status: SHIPPED | OUTPATIENT
Start: 2017-06-28 | End: 2017-07-25 | Stop reason: SDUPTHER

## 2017-06-29 ENCOUNTER — TELEPHONE (OUTPATIENT)
Dept: PHARMACY | Facility: CLINIC | Age: 82
End: 2017-06-29

## 2017-07-03 ENCOUNTER — TELEPHONE (OUTPATIENT)
Dept: FAMILY MEDICINE CLINIC | Age: 82
End: 2017-07-03

## 2017-07-03 ENCOUNTER — OFFICE VISIT (OUTPATIENT)
Dept: FAMILY MEDICINE CLINIC | Age: 82
End: 2017-07-03

## 2017-07-03 VITALS
HEIGHT: 57 IN | HEART RATE: 90 BPM | OXYGEN SATURATION: 97 % | TEMPERATURE: 97.9 F | DIASTOLIC BLOOD PRESSURE: 76 MMHG | WEIGHT: 140 LBS | SYSTOLIC BLOOD PRESSURE: 118 MMHG | BODY MASS INDEX: 30.2 KG/M2 | RESPIRATION RATE: 14 BRPM

## 2017-07-03 DIAGNOSIS — N81.10 BLADDER PROLAPSE, FEMALE, ACQUIRED: Chronic | ICD-10-CM

## 2017-07-03 DIAGNOSIS — R53.81 PHYSICAL DECONDITIONING: ICD-10-CM

## 2017-07-03 DIAGNOSIS — K59.04 CHRONIC IDIOPATHIC CONSTIPATION: ICD-10-CM

## 2017-07-03 DIAGNOSIS — I42.9 CARDIOMYOPATHY (HCC): ICD-10-CM

## 2017-07-03 DIAGNOSIS — N13.30 HYDRONEPHROSIS, RIGHT: Primary | ICD-10-CM

## 2017-07-03 DIAGNOSIS — G56.21 ENTRAPMENT OF RIGHT ULNAR NERVE AT WRIST: ICD-10-CM

## 2017-07-03 DIAGNOSIS — Z85.51 HISTORY OF BLADDER CANCER: ICD-10-CM

## 2017-07-03 DIAGNOSIS — N30.00 ACUTE CYSTITIS WITHOUT HEMATURIA: ICD-10-CM

## 2017-07-03 DIAGNOSIS — K59.03 DRUG-INDUCED CONSTIPATION: ICD-10-CM

## 2017-07-03 PROCEDURE — 99495 TRANSJ CARE MGMT MOD F2F 14D: CPT | Performed by: NURSE PRACTITIONER

## 2017-07-03 RX ORDER — TRAMADOL HYDROCHLORIDE 50 MG/1
50 TABLET ORAL EVERY 6 HOURS PRN
Qty: 120 TABLET | Refills: 0 | Status: SHIPPED | OUTPATIENT
Start: 2017-07-03 | End: 2018-01-01 | Stop reason: SDUPTHER

## 2017-07-03 ASSESSMENT — ENCOUNTER SYMPTOMS
GASTROINTESTINAL NEGATIVE: 1
NAUSEA: 0
COUGH: 0
BACK PAIN: 1
RESPIRATORY NEGATIVE: 1
SHORTNESS OF BREATH: 0
ABDOMINAL PAIN: 0
EYES NEGATIVE: 1
ABDOMINAL DISTENTION: 0

## 2017-07-05 ENCOUNTER — OFFICE VISIT (OUTPATIENT)
Dept: UROLOGY | Age: 82
End: 2017-07-05

## 2017-07-05 ENCOUNTER — TELEPHONE (OUTPATIENT)
Dept: UROLOGY | Age: 82
End: 2017-07-05

## 2017-07-05 VITALS
WEIGHT: 140 LBS | DIASTOLIC BLOOD PRESSURE: 62 MMHG | BODY MASS INDEX: 29.39 KG/M2 | SYSTOLIC BLOOD PRESSURE: 118 MMHG | HEIGHT: 58 IN

## 2017-07-05 DIAGNOSIS — R35.0 URINARY FREQUENCY: Primary | ICD-10-CM

## 2017-07-05 LAB
BILIRUBIN URINE: NEGATIVE
BLOOD URINE, POC: ABNORMAL
CHARACTER, URINE: CLEAR
COLOR, URINE: ABNORMAL
GLUCOSE URINE: NEGATIVE MG/DL
KETONES, URINE: NEGATIVE
LEUKOCYTE CLUMPS, URINE: ABNORMAL
NITRITE, URINE: NEGATIVE
PH, URINE: 5.5
POST VOID RESIDUAL (PVR): 207 ML
PROTEIN, URINE: 30 MG/DL
SPECIFIC GRAVITY, URINE: <= 1.005 (ref 1–1.03)
UROBILINOGEN, URINE: 0.2 EU/DL

## 2017-07-05 PROCEDURE — 99213 OFFICE O/P EST LOW 20 MIN: CPT | Performed by: NURSE PRACTITIONER

## 2017-07-05 PROCEDURE — 51798 US URINE CAPACITY MEASURE: CPT | Performed by: NURSE PRACTITIONER

## 2017-07-05 PROCEDURE — 51701 INSERT BLADDER CATHETER: CPT | Performed by: NURSE PRACTITIONER

## 2017-07-05 PROCEDURE — 81003 URINALYSIS AUTO W/O SCOPE: CPT | Performed by: NURSE PRACTITIONER

## 2017-07-05 ASSESSMENT — ENCOUNTER SYMPTOMS
VOMITING: 0
ABDOMINAL PAIN: 0
NAUSEA: 0

## 2017-07-07 RX ORDER — FERROUS SULFATE 325(65) MG
TABLET ORAL
Qty: 30 TABLET | Refills: 3 | Status: SHIPPED | OUTPATIENT
Start: 2017-07-07 | End: 2017-09-11 | Stop reason: SDUPTHER

## 2017-07-10 RX ORDER — ISOSORBIDE MONONITRATE 60 MG/1
TABLET, EXTENDED RELEASE ORAL
Qty: 30 TABLET | Refills: 3 | Status: SHIPPED | OUTPATIENT
Start: 2017-07-10 | End: 2017-10-29 | Stop reason: SDUPTHER

## 2017-07-20 ENCOUNTER — CARE COORDINATION (OUTPATIENT)
Dept: CARE COORDINATION | Age: 82
End: 2017-07-20

## 2017-07-20 ASSESSMENT — ENCOUNTER SYMPTOMS: DYSPNEA ASSOCIATED WITH: EXERTION

## 2017-07-25 DIAGNOSIS — M16.9 OSTEOARTHROSIS, HIP: ICD-10-CM

## 2017-07-25 DIAGNOSIS — C78.00 BLADDER CANCER METASTASIZED TO LUNG (HCC): ICD-10-CM

## 2017-07-25 DIAGNOSIS — M15.9 GENERALIZED OA: ICD-10-CM

## 2017-07-25 DIAGNOSIS — C67.9 BLADDER CANCER METASTASIZED TO LUNG (HCC): ICD-10-CM

## 2017-07-25 RX ORDER — HYDROCODONE BITARTRATE AND ACETAMINOPHEN 5; 325 MG/1; MG/1
1 TABLET ORAL EVERY 6 HOURS PRN
Qty: 120 TABLET | Refills: 0 | Status: SHIPPED | OUTPATIENT
Start: 2017-07-25 | End: 2017-08-30 | Stop reason: SDUPTHER

## 2017-08-02 RX ORDER — DIPHENOXYLATE HYDROCHLORIDE AND ATROPINE SULFATE 2.5; .025 MG/1; MG/1
1 TABLET ORAL 4 TIMES DAILY PRN
Qty: 30 TABLET | Refills: 3 | Status: SHIPPED | OUTPATIENT
Start: 2017-08-02 | End: 2018-01-01 | Stop reason: SDUPTHER

## 2017-08-08 ENCOUNTER — TELEPHONE (OUTPATIENT)
Dept: UROLOGY | Age: 82
End: 2017-08-08

## 2017-08-08 DIAGNOSIS — R30.0 DYSURIA: Primary | ICD-10-CM

## 2017-08-08 RX ORDER — DOXYCYCLINE HYCLATE 100 MG/1
100 CAPSULE ORAL 2 TIMES DAILY
Qty: 14 CAPSULE | Refills: 0 | Status: SHIPPED | OUTPATIENT
Start: 2017-08-08 | End: 2017-08-11 | Stop reason: ALTCHOICE

## 2017-08-09 ENCOUNTER — HOSPITAL ENCOUNTER (OUTPATIENT)
Age: 82
Setting detail: SPECIMEN
Discharge: HOME | End: 2017-08-09
Payer: MEDICARE

## 2017-08-09 PROCEDURE — 87086 URINE CULTURE/COLONY COUNT: CPT

## 2017-08-09 PROCEDURE — 87077 CULTURE AEROBIC IDENTIFY: CPT

## 2017-08-09 PROCEDURE — 87186 SC STD MICRODIL/AGAR DIL: CPT

## 2017-08-10 ENCOUNTER — TELEPHONE (OUTPATIENT)
Dept: UROLOGY | Age: 82
End: 2017-08-10

## 2017-08-10 LAB
ORGANISM: ABNORMAL
URINE CULTURE, ROUTINE: ABNORMAL

## 2017-08-11 ENCOUNTER — TELEPHONE (OUTPATIENT)
Dept: UROLOGY | Age: 82
End: 2017-08-11

## 2017-08-11 RX ORDER — NITROFURANTOIN 25; 75 MG/1; MG/1
100 CAPSULE ORAL 2 TIMES DAILY
Qty: 20 CAPSULE | Refills: 0 | Status: SHIPPED | OUTPATIENT
Start: 2017-08-11 | End: 2017-08-20

## 2017-08-12 DIAGNOSIS — F32.9 REACTIVE DEPRESSION: ICD-10-CM

## 2017-08-14 RX ORDER — ESCITALOPRAM OXALATE 10 MG/1
TABLET ORAL
Qty: 30 TABLET | Refills: 1 | Status: SHIPPED | OUTPATIENT
Start: 2017-08-14 | End: 2017-09-28 | Stop reason: SDUPTHER

## 2017-08-18 ENCOUNTER — OFFICE VISIT (OUTPATIENT)
Dept: UROLOGY | Age: 82
End: 2017-08-18
Payer: MEDICARE

## 2017-08-18 ENCOUNTER — TELEPHONE (OUTPATIENT)
Dept: UROLOGY | Age: 82
End: 2017-08-18

## 2017-08-18 VITALS
DIASTOLIC BLOOD PRESSURE: 62 MMHG | HEIGHT: 58 IN | WEIGHT: 140 LBS | BODY MASS INDEX: 29.39 KG/M2 | SYSTOLIC BLOOD PRESSURE: 112 MMHG

## 2017-08-18 DIAGNOSIS — R35.0 URINARY FREQUENCY: Primary | ICD-10-CM

## 2017-08-18 DIAGNOSIS — C78.00 BLADDER CANCER METASTASIZED TO LUNG (HCC): ICD-10-CM

## 2017-08-18 DIAGNOSIS — C67.9 BLADDER CANCER METASTASIZED TO LUNG (HCC): ICD-10-CM

## 2017-08-18 LAB
BILIRUBIN URINE: NEGATIVE
BLOOD URINE, POC: ABNORMAL
CHARACTER, URINE: ABNORMAL
COLOR, URINE: YELLOW
GLUCOSE URINE: NEGATIVE MG/DL
KETONES, URINE: NEGATIVE
LEUKOCYTE CLUMPS, URINE: ABNORMAL
NITRITE, URINE: NEGATIVE
PH, URINE: 6
POST VOID RESIDUAL (PVR): 349 ML
PROTEIN, URINE: 30 MG/DL
SPECIFIC GRAVITY, URINE: 1.01 (ref 1–1.03)
UROBILINOGEN, URINE: 0.2 EU/DL

## 2017-08-18 PROCEDURE — 99214 OFFICE O/P EST MOD 30 MIN: CPT | Performed by: NURSE PRACTITIONER

## 2017-08-18 PROCEDURE — 51798 US URINE CAPACITY MEASURE: CPT | Performed by: NURSE PRACTITIONER

## 2017-08-18 PROCEDURE — 81003 URINALYSIS AUTO W/O SCOPE: CPT | Performed by: NURSE PRACTITIONER

## 2017-08-18 ASSESSMENT — ENCOUNTER SYMPTOMS
ABDOMINAL PAIN: 0
CONSTIPATION: 0
VOMITING: 0
NAUSEA: 0
DIARRHEA: 0

## 2017-08-20 ENCOUNTER — HOSPITAL ENCOUNTER (EMERGENCY)
Age: 82
Discharge: HOME | End: 2017-08-20
Attending: EMERGENCY MEDICINE
Payer: MEDICARE

## 2017-08-20 ENCOUNTER — TELEPHONE (OUTPATIENT)
Dept: UROLOGY | Age: 82
End: 2017-08-20

## 2017-08-20 VITALS
RESPIRATION RATE: 15 BRPM | HEART RATE: 65 BPM | DIASTOLIC BLOOD PRESSURE: 49 MMHG | HEIGHT: 57 IN | SYSTOLIC BLOOD PRESSURE: 108 MMHG | TEMPERATURE: 98.6 F | OXYGEN SATURATION: 97 % | BODY MASS INDEX: 30.85 KG/M2 | WEIGHT: 143 LBS

## 2017-08-20 DIAGNOSIS — R33.9 URINARY RETENTION: Primary | ICD-10-CM

## 2017-08-20 DIAGNOSIS — N39.0 URINARY TRACT INFECTION IN FEMALE: ICD-10-CM

## 2017-08-20 DIAGNOSIS — Z85.51 HISTORY OF BLADDER CANCER: ICD-10-CM

## 2017-08-20 LAB
AMORPHOUS: ABNORMAL
ANION GAP SERPL CALCULATED.3IONS-SCNC: 16 MEQ/L (ref 8–16)
BACTERIA: ABNORMAL /HPF
BASOPHILS # BLD: 0.6 %
BASOPHILS ABSOLUTE: 0.1 THOU/MM3 (ref 0–0.1)
BILIRUBIN URINE: NEGATIVE
BLOOD, URINE: ABNORMAL
BUN BLDV-MCNC: 40 MG/DL (ref 7–22)
CALCIUM SERPL-MCNC: 8.8 MG/DL (ref 8.5–10.5)
CASTS UA: ABNORMAL /LPF
CHARACTER, URINE: ABNORMAL
CHLORIDE BLD-SCNC: 95 MEQ/L (ref 98–111)
CO2: 24 MEQ/L (ref 23–33)
COLOR: YELLOW
CREAT SERPL-MCNC: 1.5 MG/DL (ref 0.4–1.2)
CRYSTALS, UA: ABNORMAL
EOSINOPHIL # BLD: 0.8 %
EOSINOPHILS ABSOLUTE: 0.1 THOU/MM3 (ref 0–0.4)
EPITHELIAL CELLS, UA: ABNORMAL /HPF
GFR SERPL CREATININE-BSD FRML MDRD: 33 ML/MIN/1.73M2
GLUCOSE BLD-MCNC: 107 MG/DL (ref 70–108)
GLUCOSE URINE: NEGATIVE MG/DL
HCT VFR BLD CALC: 30.4 % (ref 37–47)
HEMOGLOBIN: 10.3 GM/DL (ref 12–16)
KETONES, URINE: NEGATIVE
LEUKOCYTE ESTERASE, URINE: ABNORMAL
LYMPHOCYTES # BLD: 8.1 %
LYMPHOCYTES ABSOLUTE: 1.1 THOU/MM3 (ref 1–4.8)
MCH RBC QN AUTO: 32.9 PG (ref 27–31)
MCHC RBC AUTO-ENTMCNC: 33.9 GM/DL (ref 33–37)
MCV RBC AUTO: 97 FL (ref 81–99)
MONOCYTES # BLD: 6.1 %
MONOCYTES ABSOLUTE: 0.8 THOU/MM3 (ref 0.4–1.3)
NITRITE, URINE: NEGATIVE
NUCLEATED RED BLOOD CELLS: 0 /100 WBC
ORGANISM: ABNORMAL
OSMOLALITY CALCULATION: 280.3 MOSMOL/KG (ref 275–300)
PDW BLD-RTO: 14.2 % (ref 11.5–14.5)
PH UA: 6
PLATELET # BLD: 365 THOU/MM3 (ref 130–400)
PMV BLD AUTO: 7 MCM (ref 7.4–10.4)
POTASSIUM SERPL-SCNC: 5.1 MEQ/L (ref 3.5–5.2)
PROTEIN UA: 30
RBC # BLD: 3.14 MILL/MM3 (ref 4.2–5.4)
RBC # BLD: NORMAL 10*6/UL
RBC URINE: ABNORMAL /HPF
RENAL EPITHELIAL, UA: ABNORMAL
SEG NEUTROPHILS: 84.4 %
SEGMENTED NEUTROPHILS ABSOLUTE COUNT: 11.2 THOU/MM3 (ref 1.8–7.7)
SODIUM BLD-SCNC: 135 MEQ/L (ref 135–145)
SPECIFIC GRAVITY, URINE: 1.01 (ref 1–1.03)
URINE CULTURE, ROUTINE: ABNORMAL
UROBILINOGEN, URINE: 0.2 EU/DL
WBC # BLD: 13.3 THOU/MM3 (ref 4.8–10.8)
WBC UA: > 200 /HPF
YEAST: ABNORMAL

## 2017-08-20 PROCEDURE — 51702 INSERT TEMP BLADDER CATH: CPT

## 2017-08-20 PROCEDURE — 99283 EMERGENCY DEPT VISIT LOW MDM: CPT

## 2017-08-20 PROCEDURE — 36415 COLL VENOUS BLD VENIPUNCTURE: CPT

## 2017-08-20 PROCEDURE — 87086 URINE CULTURE/COLONY COUNT: CPT

## 2017-08-20 PROCEDURE — 85025 COMPLETE CBC W/AUTO DIFF WBC: CPT

## 2017-08-20 PROCEDURE — 80048 BASIC METABOLIC PNL TOTAL CA: CPT

## 2017-08-20 PROCEDURE — 81001 URINALYSIS AUTO W/SCOPE: CPT

## 2017-08-20 RX ORDER — CIPROFLOXACIN 500 MG/1
500 TABLET, FILM COATED ORAL 2 TIMES DAILY
Qty: 20 TABLET | Refills: 0 | Status: SHIPPED | OUTPATIENT
Start: 2017-08-20 | End: 2017-08-30

## 2017-08-20 ASSESSMENT — ENCOUNTER SYMPTOMS
EYE ITCHING: 0
ABDOMINAL DISTENTION: 0
EYE DISCHARGE: 0
VOMITING: 0
NAUSEA: 0
WHEEZING: 0
SHORTNESS OF BREATH: 0
DIARRHEA: 0
COUGH: 0
RHINORRHEA: 0
ABDOMINAL PAIN: 0

## 2017-08-21 ENCOUNTER — TELEPHONE (OUTPATIENT)
Dept: UROLOGY | Age: 82
End: 2017-08-21

## 2017-08-22 ENCOUNTER — OFFICE VISIT (OUTPATIENT)
Dept: FAMILY MEDICINE CLINIC | Age: 82
End: 2017-08-22
Payer: MEDICARE

## 2017-08-22 VITALS
TEMPERATURE: 99.1 F | HEIGHT: 58 IN | HEART RATE: 84 BPM | BODY MASS INDEX: 29.47 KG/M2 | OXYGEN SATURATION: 97 % | DIASTOLIC BLOOD PRESSURE: 56 MMHG | SYSTOLIC BLOOD PRESSURE: 124 MMHG | WEIGHT: 140.4 LBS | RESPIRATION RATE: 24 BRPM

## 2017-08-22 DIAGNOSIS — Z01.818 PREOP EXAMINATION: Primary | ICD-10-CM

## 2017-08-22 DIAGNOSIS — R33.9 URINARY RETENTION: ICD-10-CM

## 2017-08-22 DIAGNOSIS — I42.9 CARDIOMYOPATHY, UNSPECIFIED TYPE (HCC): ICD-10-CM

## 2017-08-22 DIAGNOSIS — E78.5 DYSLIPIDEMIA: ICD-10-CM

## 2017-08-22 DIAGNOSIS — C67.9 MALIGNANT NEOPLASM OF URINARY BLADDER, UNSPECIFIED SITE (HCC): ICD-10-CM

## 2017-08-22 DIAGNOSIS — R11.0 NAUSEA: ICD-10-CM

## 2017-08-22 DIAGNOSIS — I25.10 CORONARY ARTERY DISEASE INVOLVING NATIVE CORONARY ARTERY OF NATIVE HEART WITHOUT ANGINA PECTORIS: ICD-10-CM

## 2017-08-22 DIAGNOSIS — I50.22 CHRONIC SYSTOLIC CHF (CONGESTIVE HEART FAILURE) (HCC): ICD-10-CM

## 2017-08-22 DIAGNOSIS — I10 ESSENTIAL HYPERTENSION: ICD-10-CM

## 2017-08-22 DIAGNOSIS — D50.0 IRON DEFICIENCY ANEMIA DUE TO CHRONIC BLOOD LOSS: ICD-10-CM

## 2017-08-22 LAB — URINE CULTURE REFLEX: NORMAL

## 2017-08-22 PROCEDURE — 99213 OFFICE O/P EST LOW 20 MIN: CPT | Performed by: NURSE PRACTITIONER

## 2017-08-22 RX ORDER — ONDANSETRON 4 MG/1
TABLET, FILM COATED ORAL
Qty: 30 TABLET | Refills: 3 | Status: SHIPPED | OUTPATIENT
Start: 2017-08-22 | End: 2018-01-01 | Stop reason: SDUPTHER

## 2017-08-22 ASSESSMENT — ENCOUNTER SYMPTOMS
SHORTNESS OF BREATH: 1
COUGH: 0
NAUSEA: 0
ABDOMINAL PAIN: 0

## 2017-08-23 ENCOUNTER — APPOINTMENT (OUTPATIENT)
Dept: GENERAL RADIOLOGY | Age: 82
End: 2017-08-23
Attending: UROLOGY
Payer: MEDICARE

## 2017-08-23 ENCOUNTER — ANESTHESIA EVENT (OUTPATIENT)
Dept: OPERATING ROOM | Age: 82
End: 2017-08-23
Payer: MEDICARE

## 2017-08-23 ENCOUNTER — CARE COORDINATION (OUTPATIENT)
Dept: FAMILY MEDICINE CLINIC | Age: 82
End: 2017-08-23

## 2017-08-23 ENCOUNTER — HOSPITAL ENCOUNTER (OUTPATIENT)
Age: 82
Setting detail: OUTPATIENT SURGERY
Discharge: HOME | End: 2017-08-23
Attending: UROLOGY | Admitting: UROLOGY
Payer: MEDICARE

## 2017-08-23 ENCOUNTER — ANESTHESIA (OUTPATIENT)
Dept: OPERATING ROOM | Age: 82
End: 2017-08-23
Payer: MEDICARE

## 2017-08-23 VITALS
RESPIRATION RATE: 2 BRPM | SYSTOLIC BLOOD PRESSURE: 124 MMHG | OXYGEN SATURATION: 100 % | DIASTOLIC BLOOD PRESSURE: 65 MMHG

## 2017-08-23 VITALS
HEART RATE: 97 BPM | WEIGHT: 140.9 LBS | RESPIRATION RATE: 20 BRPM | DIASTOLIC BLOOD PRESSURE: 67 MMHG | OXYGEN SATURATION: 98 % | BODY MASS INDEX: 29.58 KG/M2 | TEMPERATURE: 98 F | SYSTOLIC BLOOD PRESSURE: 152 MMHG | HEIGHT: 58 IN

## 2017-08-23 LAB — POTASSIUM SERPL-SCNC: 4.8 MEQ/L (ref 3.5–5.2)

## 2017-08-23 PROCEDURE — C1773 RET DEV, INSERTABLE: HCPCS | Performed by: UROLOGY

## 2017-08-23 PROCEDURE — 2580000003 HC RX 258: Performed by: UROLOGY

## 2017-08-23 PROCEDURE — 7100000011 HC PHASE II RECOVERY - ADDTL 15 MIN: Performed by: UROLOGY

## 2017-08-23 PROCEDURE — 2720000010 HC SURG SUPPLY STERILE: Performed by: UROLOGY

## 2017-08-23 PROCEDURE — 3600000013 HC SURGERY LEVEL 3 ADDTL 15MIN: Performed by: UROLOGY

## 2017-08-23 PROCEDURE — 36415 COLL VENOUS BLD VENIPUNCTURE: CPT

## 2017-08-23 PROCEDURE — 6370000000 HC RX 637 (ALT 250 FOR IP): Performed by: NURSE PRACTITIONER

## 2017-08-23 PROCEDURE — 74420 UROGRAPHY RTRGR +-KUB: CPT

## 2017-08-23 PROCEDURE — 6360000002 HC RX W HCPCS: Performed by: ANESTHESIOLOGY

## 2017-08-23 PROCEDURE — 84132 ASSAY OF SERUM POTASSIUM: CPT

## 2017-08-23 PROCEDURE — 6360000004 HC RX CONTRAST MEDICATION: Performed by: UROLOGY

## 2017-08-23 PROCEDURE — 3700000000 HC ANESTHESIA ATTENDED CARE: Performed by: UROLOGY

## 2017-08-23 PROCEDURE — 52332 CYSTOSCOPY AND TREATMENT: CPT | Performed by: UROLOGY

## 2017-08-23 PROCEDURE — 74420 UROGRAPHY RTRGR +-KUB: CPT | Performed by: UROLOGY

## 2017-08-23 PROCEDURE — 7100000010 HC PHASE II RECOVERY - FIRST 15 MIN: Performed by: UROLOGY

## 2017-08-23 PROCEDURE — 6370000000 HC RX 637 (ALT 250 FOR IP)

## 2017-08-23 PROCEDURE — 2500000003 HC RX 250 WO HCPCS: Performed by: ANESTHESIOLOGY

## 2017-08-23 PROCEDURE — 6360000002 HC RX W HCPCS

## 2017-08-23 PROCEDURE — 7100000001 HC PACU RECOVERY - ADDTL 15 MIN: Performed by: UROLOGY

## 2017-08-23 PROCEDURE — 3600000003 HC SURGERY LEVEL 3 BASE: Performed by: UROLOGY

## 2017-08-23 PROCEDURE — C1894 INTRO/SHEATH, NON-LASER: HCPCS | Performed by: UROLOGY

## 2017-08-23 PROCEDURE — 6360000002 HC RX W HCPCS: Performed by: UROLOGY

## 2017-08-23 PROCEDURE — 6370000000 HC RX 637 (ALT 250 FOR IP): Performed by: UROLOGY

## 2017-08-23 PROCEDURE — C1769 GUIDE WIRE: HCPCS | Performed by: UROLOGY

## 2017-08-23 PROCEDURE — C1726 CATH, BAL DIL, NON-VASCULAR: HCPCS | Performed by: UROLOGY

## 2017-08-23 PROCEDURE — 3700000001 HC ADD 15 MINUTES (ANESTHESIA): Performed by: UROLOGY

## 2017-08-23 PROCEDURE — 7100000000 HC PACU RECOVERY - FIRST 15 MIN: Performed by: UROLOGY

## 2017-08-23 PROCEDURE — 52344 CYSTO/URETERO STRICTURE TX: CPT | Performed by: UROLOGY

## 2017-08-23 PROCEDURE — C2617 STENT, NON-COR, TEM W/O DEL: HCPCS | Performed by: UROLOGY

## 2017-08-23 DEVICE — URETERAL STENT
Type: IMPLANTABLE DEVICE | Site: URETER | Status: FUNCTIONAL
Brand: CONTOUR™

## 2017-08-23 RX ORDER — FENTANYL CITRATE 50 UG/ML
25 INJECTION, SOLUTION INTRAMUSCULAR; INTRAVENOUS EVERY 5 MIN PRN
Status: COMPLETED | OUTPATIENT
Start: 2017-08-23 | End: 2017-08-23

## 2017-08-23 RX ORDER — CIPROFLOXACIN 2 MG/ML
INJECTION, SOLUTION INTRAVENOUS
Status: DISCONTINUED
Start: 2017-08-23 | End: 2017-08-23 | Stop reason: HOSPADM

## 2017-08-23 RX ORDER — CIPROFLOXACIN 2 MG/ML
400 INJECTION, SOLUTION INTRAVENOUS ONCE
Status: COMPLETED | OUTPATIENT
Start: 2017-08-23 | End: 2017-08-23

## 2017-08-23 RX ORDER — ONDANSETRON 2 MG/ML
4 INJECTION INTRAMUSCULAR; INTRAVENOUS EVERY 6 HOURS PRN
Status: DISCONTINUED | OUTPATIENT
Start: 2017-08-23 | End: 2017-08-23 | Stop reason: HOSPADM

## 2017-08-23 RX ORDER — LABETALOL HYDROCHLORIDE 5 MG/ML
10 INJECTION, SOLUTION INTRAVENOUS EVERY 10 MIN PRN
Status: DISCONTINUED | OUTPATIENT
Start: 2017-08-23 | End: 2017-08-23 | Stop reason: HOSPADM

## 2017-08-23 RX ORDER — FENTANYL CITRATE 50 UG/ML
INJECTION, SOLUTION INTRAMUSCULAR; INTRAVENOUS PRN
Status: DISCONTINUED | OUTPATIENT
Start: 2017-08-23 | End: 2017-08-23 | Stop reason: SDUPTHER

## 2017-08-23 RX ORDER — SODIUM CHLORIDE 9 MG/ML
INJECTION, SOLUTION INTRAVENOUS CONTINUOUS
Status: DISCONTINUED | OUTPATIENT
Start: 2017-08-23 | End: 2017-08-23 | Stop reason: HOSPADM

## 2017-08-23 RX ORDER — PROMETHAZINE HYDROCHLORIDE 25 MG/ML
12.5 INJECTION, SOLUTION INTRAMUSCULAR; INTRAVENOUS
Status: DISCONTINUED | OUTPATIENT
Start: 2017-08-23 | End: 2017-08-23 | Stop reason: HOSPADM

## 2017-08-23 RX ORDER — EPHEDRINE SULFATE 50 MG/ML
INJECTION INTRAVENOUS PRN
Status: DISCONTINUED | OUTPATIENT
Start: 2017-08-23 | End: 2017-08-23 | Stop reason: SDUPTHER

## 2017-08-23 RX ORDER — ATROPA BELLADONNA AND OPIUM 16.2; 3 MG/1; MG/1
30 SUPPOSITORY RECTAL ONCE
Status: COMPLETED | OUTPATIENT
Start: 2017-08-23 | End: 2017-08-23

## 2017-08-23 RX ORDER — HYDROCODONE BITARTRATE AND ACETAMINOPHEN 5; 325 MG/1; MG/1
2 TABLET ORAL EVERY 4 HOURS PRN
Status: DISCONTINUED | OUTPATIENT
Start: 2017-08-23 | End: 2017-08-23 | Stop reason: HOSPADM

## 2017-08-23 RX ORDER — SUCCINYLCHOLINE CHLORIDE 20 MG/ML
INJECTION INTRAMUSCULAR; INTRAVENOUS PRN
Status: DISCONTINUED | OUTPATIENT
Start: 2017-08-23 | End: 2017-08-23 | Stop reason: SDUPTHER

## 2017-08-23 RX ORDER — HYDROCODONE BITARTRATE AND ACETAMINOPHEN 5; 325 MG/1; MG/1
1 TABLET ORAL EVERY 4 HOURS PRN
Status: DISCONTINUED | OUTPATIENT
Start: 2017-08-23 | End: 2017-08-23 | Stop reason: HOSPADM

## 2017-08-23 RX ORDER — PROPOFOL 10 MG/ML
INJECTION, EMULSION INTRAVENOUS PRN
Status: DISCONTINUED | OUTPATIENT
Start: 2017-08-23 | End: 2017-08-23 | Stop reason: SDUPTHER

## 2017-08-23 RX ORDER — ATROPA BELLADONNA AND OPIUM 16.2; 3 MG/1; MG/1
SUPPOSITORY RECTAL
Status: DISCONTINUED
Start: 2017-08-23 | End: 2017-08-23 | Stop reason: HOSPADM

## 2017-08-23 RX ORDER — ATROPA BELLADONNA AND OPIUM 16.2; 3 MG/1; MG/1
30 SUPPOSITORY RECTAL EVERY 8 HOURS PRN
Status: DISCONTINUED | OUTPATIENT
Start: 2017-08-23 | End: 2017-08-23 | Stop reason: HOSPADM

## 2017-08-23 RX ORDER — DEXAMETHASONE SODIUM PHOSPHATE 4 MG/ML
INJECTION, SOLUTION INTRA-ARTICULAR; INTRALESIONAL; INTRAMUSCULAR; INTRAVENOUS; SOFT TISSUE PRN
Status: DISCONTINUED | OUTPATIENT
Start: 2017-08-23 | End: 2017-08-23 | Stop reason: SDUPTHER

## 2017-08-23 RX ORDER — ONDANSETRON 2 MG/ML
INJECTION INTRAMUSCULAR; INTRAVENOUS PRN
Status: DISCONTINUED | OUTPATIENT
Start: 2017-08-23 | End: 2017-08-23 | Stop reason: SDUPTHER

## 2017-08-23 RX ORDER — FENTANYL CITRATE 50 UG/ML
50 INJECTION, SOLUTION INTRAMUSCULAR; INTRAVENOUS EVERY 5 MIN PRN
Status: DISCONTINUED | OUTPATIENT
Start: 2017-08-23 | End: 2017-08-23 | Stop reason: HOSPADM

## 2017-08-23 RX ORDER — HYDRALAZINE HYDROCHLORIDE 20 MG/ML
5 INJECTION INTRAMUSCULAR; INTRAVENOUS EVERY 10 MIN PRN
Status: DISCONTINUED | OUTPATIENT
Start: 2017-08-23 | End: 2017-08-23 | Stop reason: HOSPADM

## 2017-08-23 RX ORDER — HYDRALAZINE HYDROCHLORIDE 20 MG/ML
INJECTION INTRAMUSCULAR; INTRAVENOUS
Status: COMPLETED
Start: 2017-08-23 | End: 2017-08-23

## 2017-08-23 RX ADMIN — LABETALOL HYDROCHLORIDE 10 MG: 5 INJECTION, SOLUTION INTRAVENOUS at 14:35

## 2017-08-23 RX ADMIN — PROPOFOL 50 MG: 10 INJECTION, EMULSION INTRAVENOUS at 12:58

## 2017-08-23 RX ADMIN — HYDROMORPHONE HYDROCHLORIDE 0.25 MG: 1 INJECTION, SOLUTION INTRAMUSCULAR; INTRAVENOUS; SUBCUTANEOUS at 15:08

## 2017-08-23 RX ADMIN — CIPROFLOXACIN 400 MG: 2 INJECTION, SOLUTION INTRAVENOUS at 12:44

## 2017-08-23 RX ADMIN — FENTANYL CITRATE 25 MCG: 50 INJECTION INTRAMUSCULAR; INTRAVENOUS at 14:19

## 2017-08-23 RX ADMIN — HYDROMORPHONE HYDROCHLORIDE 0.25 MG: 1 INJECTION, SOLUTION INTRAMUSCULAR; INTRAVENOUS; SUBCUTANEOUS at 15:38

## 2017-08-23 RX ADMIN — ATROPA BELLADONNA AND OPIUM 30 MG: 16.2; 3 SUPPOSITORY RECTAL at 14:04

## 2017-08-23 RX ADMIN — FENTANYL CITRATE 25 MCG: 50 INJECTION INTRAMUSCULAR; INTRAVENOUS at 14:09

## 2017-08-23 RX ADMIN — FENTANYL CITRATE 25 MCG: 50 INJECTION INTRAMUSCULAR; INTRAVENOUS at 14:04

## 2017-08-23 RX ADMIN — LABETALOL HYDROCHLORIDE 10 MG: 5 INJECTION, SOLUTION INTRAVENOUS at 14:21

## 2017-08-23 RX ADMIN — DEXAMETHASONE SODIUM PHOSPHATE 4 MG: 4 INJECTION, SOLUTION INTRAMUSCULAR; INTRAVENOUS at 13:00

## 2017-08-23 RX ADMIN — SODIUM CHLORIDE: 9 INJECTION, SOLUTION INTRAVENOUS at 12:29

## 2017-08-23 RX ADMIN — SUCCINYLCHOLINE CHLORIDE 120 MG: 20 INJECTION, SOLUTION INTRAMUSCULAR; INTRAVENOUS at 12:39

## 2017-08-23 RX ADMIN — FENTANYL CITRATE 25 MCG: 50 INJECTION INTRAMUSCULAR; INTRAVENOUS at 12:58

## 2017-08-23 RX ADMIN — FENTANYL CITRATE 50 MCG: 50 INJECTION INTRAMUSCULAR; INTRAVENOUS at 12:39

## 2017-08-23 RX ADMIN — HYDRALAZINE HYDROCHLORIDE 5 MG: 20 INJECTION INTRAMUSCULAR; INTRAVENOUS at 15:48

## 2017-08-23 RX ADMIN — HYDRALAZINE HYDROCHLORIDE 5 MG: 20 INJECTION INTRAMUSCULAR; INTRAVENOUS at 15:38

## 2017-08-23 RX ADMIN — HYDROMORPHONE HYDROCHLORIDE 0.25 MG: 1 INJECTION, SOLUTION INTRAMUSCULAR; INTRAVENOUS; SUBCUTANEOUS at 15:43

## 2017-08-23 RX ADMIN — EPHEDRINE SULFATE 15 MG: 50 INJECTION, SOLUTION INTRAVENOUS at 12:45

## 2017-08-23 RX ADMIN — PROPOFOL 150 MG: 10 INJECTION, EMULSION INTRAVENOUS at 12:39

## 2017-08-23 RX ADMIN — FENTANYL CITRATE 25 MCG: 50 INJECTION INTRAMUSCULAR; INTRAVENOUS at 14:14

## 2017-08-23 RX ADMIN — ONDANSETRON 4 MG: 2 INJECTION INTRAMUSCULAR; INTRAVENOUS at 13:08

## 2017-08-23 RX ADMIN — EPHEDRINE SULFATE 10 MG: 50 INJECTION, SOLUTION INTRAVENOUS at 12:56

## 2017-08-23 RX ADMIN — PROPOFOL 150 MG: 10 INJECTION, EMULSION INTRAVENOUS at 12:40

## 2017-08-23 RX ADMIN — EPHEDRINE SULFATE 25 MG: 50 INJECTION, SOLUTION INTRAVENOUS at 12:50

## 2017-08-23 ASSESSMENT — PULMONARY FUNCTION TESTS
PIF_VALUE: 19
PIF_VALUE: 27
PIF_VALUE: 24
PIF_VALUE: 16
PIF_VALUE: 22
PIF_VALUE: 19
PIF_VALUE: 18
PIF_VALUE: 2
PIF_VALUE: 19
PIF_VALUE: 17
PIF_VALUE: 2
PIF_VALUE: 18
PIF_VALUE: 2
PIF_VALUE: 22
PIF_VALUE: 1
PIF_VALUE: 0
PIF_VALUE: 0
PIF_VALUE: 19
PIF_VALUE: 0
PIF_VALUE: 1
PIF_VALUE: 17
PIF_VALUE: 16
PIF_VALUE: 0
PIF_VALUE: 3
PIF_VALUE: 17
PIF_VALUE: 18
PIF_VALUE: 1
PIF_VALUE: 19
PIF_VALUE: 30
PIF_VALUE: 8
PIF_VALUE: 21
PIF_VALUE: 19
PIF_VALUE: 17
PIF_VALUE: 26
PIF_VALUE: 26
PIF_VALUE: 16
PIF_VALUE: 2
PIF_VALUE: 1
PIF_VALUE: 8
PIF_VALUE: 17
PIF_VALUE: 18
PIF_VALUE: 16
PIF_VALUE: 16
PIF_VALUE: 19
PIF_VALUE: 22
PIF_VALUE: 19
PIF_VALUE: 22
PIF_VALUE: 22
PIF_VALUE: 16
PIF_VALUE: 5
PIF_VALUE: 19
PIF_VALUE: 0
PIF_VALUE: 19
PIF_VALUE: 31
PIF_VALUE: 19
PIF_VALUE: 0
PIF_VALUE: 16
PIF_VALUE: 7

## 2017-08-23 ASSESSMENT — PAIN SCALES - GENERAL
PAINLEVEL_OUTOF10: 6
PAINLEVEL_OUTOF10: 6
PAINLEVEL_OUTOF10: 5
PAINLEVEL_OUTOF10: 6

## 2017-08-23 ASSESSMENT — PAIN DESCRIPTION - PAIN TYPE
TYPE: SURGICAL PAIN
TYPE: SURGICAL PAIN

## 2017-08-23 ASSESSMENT — PAIN DESCRIPTION - LOCATION
LOCATION: PERINEUM
LOCATION: PERINEUM

## 2017-08-23 ASSESSMENT — PAIN - FUNCTIONAL ASSESSMENT: PAIN_FUNCTIONAL_ASSESSMENT: 0-10

## 2017-08-23 ASSESSMENT — PAIN SCALES - WONG BAKER
WONGBAKER_NUMERICALRESPONSE: 0
WONGBAKER_NUMERICALRESPONSE: 6
WONGBAKER_NUMERICALRESPONSE: 6

## 2017-08-23 ASSESSMENT — ENCOUNTER SYMPTOMS: SHORTNESS OF BREATH: 1

## 2017-08-23 ASSESSMENT — PAIN DESCRIPTION - ORIENTATION: ORIENTATION: RIGHT

## 2017-08-24 ENCOUNTER — TELEPHONE (OUTPATIENT)
Dept: UROLOGY | Age: 82
End: 2017-08-24

## 2017-08-25 ENCOUNTER — TELEPHONE (OUTPATIENT)
Dept: UROLOGY | Age: 82
End: 2017-08-25

## 2017-08-25 RX ORDER — ATORVASTATIN CALCIUM 20 MG/1
TABLET, FILM COATED ORAL
Qty: 90 TABLET | Refills: 0 | Status: SHIPPED | OUTPATIENT
Start: 2017-08-25 | End: 2017-08-28 | Stop reason: SDUPTHER

## 2017-08-25 RX ORDER — METOPROLOL SUCCINATE 50 MG/1
TABLET, EXTENDED RELEASE ORAL
Qty: 90 TABLET | Refills: 0 | Status: SHIPPED | OUTPATIENT
Start: 2017-08-25 | End: 2017-08-28 | Stop reason: SDUPTHER

## 2017-08-25 RX ORDER — LISINOPRIL 2.5 MG/1
TABLET ORAL
Qty: 90 TABLET | Refills: 0 | Status: SHIPPED | OUTPATIENT
Start: 2017-08-25 | End: 2017-11-22 | Stop reason: SDUPTHER

## 2017-08-28 RX ORDER — FLAVOXATE HYDROCHLORIDE 100 MG/1
100 TABLET ORAL 3 TIMES DAILY PRN
Qty: 90 TABLET | Refills: 1 | Status: SHIPPED | OUTPATIENT
Start: 2017-08-28 | End: 2017-09-07

## 2017-08-29 ENCOUNTER — TELEPHONE (OUTPATIENT)
Dept: UROLOGY | Age: 82
End: 2017-08-29

## 2017-08-29 RX ORDER — METOPROLOL SUCCINATE 50 MG/1
TABLET, EXTENDED RELEASE ORAL
Qty: 90 TABLET | Refills: 0 | Status: SHIPPED | OUTPATIENT
Start: 2017-08-29 | End: 2018-01-28 | Stop reason: SDUPTHER

## 2017-08-29 RX ORDER — ATORVASTATIN CALCIUM 20 MG/1
TABLET, FILM COATED ORAL
Qty: 90 TABLET | Refills: 0 | Status: SHIPPED | OUTPATIENT
Start: 2017-08-29 | End: 2018-01-28 | Stop reason: SDUPTHER

## 2017-08-30 ENCOUNTER — CARE COORDINATION (OUTPATIENT)
Dept: CARE COORDINATION | Age: 82
End: 2017-08-30

## 2017-08-30 DIAGNOSIS — C67.9 BLADDER CANCER METASTASIZED TO LUNG (HCC): ICD-10-CM

## 2017-08-30 DIAGNOSIS — M15.9 GENERALIZED OA: ICD-10-CM

## 2017-08-30 DIAGNOSIS — C78.00 BLADDER CANCER METASTASIZED TO LUNG (HCC): ICD-10-CM

## 2017-08-30 DIAGNOSIS — M16.9 OSTEOARTHROSIS, HIP: ICD-10-CM

## 2017-08-30 RX ORDER — HYDROCODONE BITARTRATE AND ACETAMINOPHEN 5; 325 MG/1; MG/1
1 TABLET ORAL EVERY 6 HOURS PRN
Qty: 120 TABLET | Refills: 0 | Status: SHIPPED | OUTPATIENT
Start: 2017-08-30 | End: 2017-09-28 | Stop reason: SDUPTHER

## 2017-08-30 ASSESSMENT — ENCOUNTER SYMPTOMS: DYSPNEA ASSOCIATED WITH: EXERTION

## 2017-09-06 RX ORDER — POTASSIUM CHLORIDE 750 MG/1
TABLET, FILM COATED, EXTENDED RELEASE ORAL
Qty: 48 TABLET | Refills: 1 | Status: SHIPPED | OUTPATIENT
Start: 2017-09-06 | End: 2018-01-01 | Stop reason: SDUPTHER

## 2017-09-07 ENCOUNTER — OFFICE VISIT (OUTPATIENT)
Dept: UROLOGY | Age: 82
End: 2017-09-07
Payer: MEDICARE

## 2017-09-07 VITALS
SYSTOLIC BLOOD PRESSURE: 118 MMHG | BODY MASS INDEX: 29.66 KG/M2 | DIASTOLIC BLOOD PRESSURE: 74 MMHG | WEIGHT: 141.3 LBS | HEIGHT: 58 IN

## 2017-09-07 DIAGNOSIS — R33.9 URINARY RETENTION: Primary | ICD-10-CM

## 2017-09-07 LAB — POST VOID RESIDUAL (PVR): NORMAL ML

## 2017-09-07 PROCEDURE — 51798 US URINE CAPACITY MEASURE: CPT | Performed by: NURSE PRACTITIONER

## 2017-09-07 PROCEDURE — 99214 OFFICE O/P EST MOD 30 MIN: CPT | Performed by: NURSE PRACTITIONER

## 2017-09-07 RX ORDER — TAMSULOSIN HYDROCHLORIDE 0.4 MG/1
0.4 CAPSULE ORAL DAILY
Qty: 90 CAPSULE | Refills: 3 | Status: SHIPPED | OUTPATIENT
Start: 2017-09-07 | End: 2018-01-01

## 2017-09-07 RX ORDER — BETHANECHOL CHLORIDE 5 MG
5 TABLET ORAL 3 TIMES DAILY
Qty: 270 TABLET | Refills: 3 | Status: SHIPPED | OUTPATIENT
Start: 2017-09-07 | End: 2017-11-01 | Stop reason: SDUPTHER

## 2017-09-07 ASSESSMENT — ENCOUNTER SYMPTOMS
ABDOMINAL PAIN: 0
VOMITING: 0
NAUSEA: 0

## 2017-09-11 RX ORDER — FERROUS SULFATE 325(65) MG
TABLET ORAL
Qty: 30 TABLET | Refills: 3 | Status: SHIPPED | OUTPATIENT
Start: 2017-09-11 | End: 2017-11-02 | Stop reason: SDUPTHER

## 2017-09-28 ENCOUNTER — TELEPHONE (OUTPATIENT)
Dept: UROLOGY | Age: 82
End: 2017-09-28

## 2017-09-28 DIAGNOSIS — M15.9 GENERALIZED OA: ICD-10-CM

## 2017-09-28 DIAGNOSIS — M16.9 OSTEOARTHROSIS, HIP: ICD-10-CM

## 2017-09-28 DIAGNOSIS — C78.00 BLADDER CANCER METASTASIZED TO LUNG (HCC): ICD-10-CM

## 2017-09-28 DIAGNOSIS — C67.9 BLADDER CANCER METASTASIZED TO LUNG (HCC): ICD-10-CM

## 2017-09-28 DIAGNOSIS — F32.9 REACTIVE DEPRESSION: ICD-10-CM

## 2017-09-28 RX ORDER — HYDROCODONE BITARTRATE AND ACETAMINOPHEN 5; 325 MG/1; MG/1
1 TABLET ORAL EVERY 6 HOURS PRN
Qty: 120 TABLET | Refills: 0 | Status: SHIPPED | OUTPATIENT
Start: 2017-09-28 | End: 2017-11-06 | Stop reason: SDUPTHER

## 2017-09-28 RX ORDER — ESCITALOPRAM OXALATE 10 MG/1
TABLET ORAL
Qty: 30 TABLET | Refills: 1 | Status: SHIPPED | OUTPATIENT
Start: 2017-09-28 | End: 2017-10-02 | Stop reason: SDUPTHER

## 2017-09-28 NOTE — TELEPHONE ENCOUNTER
9/28/17  Granddaughter Helga Frias (on hipaa), requesting the form from [address] [place] [address] for catheters  that was to be faxed to office this am, be returned by Stuart Plascencia today for shipment.   Thanks/blm

## 2017-09-29 ENCOUNTER — OFFICE VISIT (OUTPATIENT)
Dept: FAMILY MEDICINE CLINIC | Age: 82
End: 2017-09-29
Payer: MEDICARE

## 2017-09-29 VITALS
HEART RATE: 88 BPM | BODY MASS INDEX: 29.96 KG/M2 | SYSTOLIC BLOOD PRESSURE: 114 MMHG | RESPIRATION RATE: 20 BRPM | DIASTOLIC BLOOD PRESSURE: 60 MMHG | HEIGHT: 58 IN | WEIGHT: 142.7 LBS

## 2017-09-29 DIAGNOSIS — R53.81 PHYSICAL DECONDITIONING: ICD-10-CM

## 2017-09-29 DIAGNOSIS — H81.13 BPV (BENIGN POSITIONAL VERTIGO), BILATERAL: ICD-10-CM

## 2017-09-29 DIAGNOSIS — F41.9 ANXIETY: ICD-10-CM

## 2017-09-29 DIAGNOSIS — I25.118 CORONARY ARTERY DISEASE OF NATIVE ARTERY OF NATIVE HEART WITH STABLE ANGINA PECTORIS (HCC): ICD-10-CM

## 2017-09-29 DIAGNOSIS — I10 ESSENTIAL HYPERTENSION: Primary | ICD-10-CM

## 2017-09-29 DIAGNOSIS — R06.02 SOB (SHORTNESS OF BREATH) ON EXERTION: ICD-10-CM

## 2017-09-29 DIAGNOSIS — Z23 NEED FOR VACCINATION WITH 13-POLYVALENT PNEUMOCOCCAL CONJUGATE VACCINE: ICD-10-CM

## 2017-09-29 DIAGNOSIS — I42.9 CARDIOMYOPATHY, UNSPECIFIED TYPE (HCC): ICD-10-CM

## 2017-09-29 DIAGNOSIS — F32.9 REACTIVE DEPRESSION: ICD-10-CM

## 2017-09-29 DIAGNOSIS — J04.0 LARYNGITIS: ICD-10-CM

## 2017-09-29 DIAGNOSIS — C67.9 MALIGNANT NEOPLASM OF URINARY BLADDER, UNSPECIFIED SITE (HCC): ICD-10-CM

## 2017-09-29 PROCEDURE — G0009 ADMIN PNEUMOCOCCAL VACCINE: HCPCS | Performed by: FAMILY MEDICINE

## 2017-09-29 PROCEDURE — 90670 PCV13 VACCINE IM: CPT | Performed by: FAMILY MEDICINE

## 2017-09-29 PROCEDURE — 99214 OFFICE O/P EST MOD 30 MIN: CPT | Performed by: FAMILY MEDICINE

## 2017-09-29 RX ORDER — LORAZEPAM 1 MG/1
1 TABLET ORAL EVERY 8 HOURS PRN
Qty: 90 TABLET | Refills: 3 | Status: SHIPPED | OUTPATIENT
Start: 2017-09-29 | End: 2018-01-01 | Stop reason: SDUPTHER

## 2017-09-29 RX ORDER — CEPHALEXIN 500 MG/1
CAPSULE ORAL
COMMUNITY
Start: 2017-09-24 | End: 2018-01-10 | Stop reason: ALTCHOICE

## 2017-09-29 ASSESSMENT — ENCOUNTER SYMPTOMS
SHORTNESS OF BREATH: 0
ALLERGIC/IMMUNOLOGIC NEGATIVE: 1
GASTROINTESTINAL NEGATIVE: 1
RESPIRATORY NEGATIVE: 1
BACK PAIN: 1
SORE THROAT: 1
COUGH: 0
WHEEZING: 0

## 2017-10-02 DIAGNOSIS — J30.1 SEASONAL ALLERGIC RHINITIS DUE TO POLLEN: ICD-10-CM

## 2017-10-02 DIAGNOSIS — F32.9 REACTIVE DEPRESSION: ICD-10-CM

## 2017-10-02 RX ORDER — ESCITALOPRAM OXALATE 10 MG/1
TABLET ORAL
Qty: 90 TABLET | Refills: 1 | Status: SHIPPED | OUTPATIENT
Start: 2017-10-02 | End: 2018-01-01 | Stop reason: SDUPTHER

## 2017-10-02 RX ORDER — ZAFIRLUKAST 20 MG/1
TABLET, FILM COATED ORAL
Qty: 180 TABLET | Refills: 1 | Status: SHIPPED | OUTPATIENT
Start: 2017-10-02 | End: 2018-01-01 | Stop reason: SDUPTHER

## 2017-10-03 ENCOUNTER — TELEPHONE (OUTPATIENT)
Dept: FAMILY MEDICINE CLINIC | Age: 82
End: 2017-10-03

## 2017-10-03 RX ORDER — LEVOFLOXACIN 500 MG/1
500 TABLET, FILM COATED ORAL DAILY
Qty: 10 TABLET | Refills: 0 | Status: SHIPPED | OUTPATIENT
Start: 2017-10-03 | End: 2017-10-13

## 2017-10-05 ENCOUNTER — CARE COORDINATION (OUTPATIENT)
Dept: CARE COORDINATION | Age: 82
End: 2017-10-05

## 2017-10-12 ENCOUNTER — OFFICE VISIT (OUTPATIENT)
Dept: CARDIOLOGY CLINIC | Age: 82
End: 2017-10-12
Payer: MEDICARE

## 2017-10-12 VITALS
DIASTOLIC BLOOD PRESSURE: 58 MMHG | WEIGHT: 140.13 LBS | SYSTOLIC BLOOD PRESSURE: 103 MMHG | HEART RATE: 96 BPM | BODY MASS INDEX: 29.8 KG/M2

## 2017-10-12 DIAGNOSIS — I25.10 CORONARY ARTERY DISEASE INVOLVING NATIVE CORONARY ARTERY OF NATIVE HEART WITHOUT ANGINA PECTORIS: Primary | ICD-10-CM

## 2017-10-12 DIAGNOSIS — E78.5 DYSLIPIDEMIA: ICD-10-CM

## 2017-10-12 DIAGNOSIS — Z95.820 S/P ANGIOPLASTY WITH STENT: ICD-10-CM

## 2017-10-12 DIAGNOSIS — I10 ESSENTIAL HYPERTENSION: ICD-10-CM

## 2017-10-12 DIAGNOSIS — R06.02 SOB (SHORTNESS OF BREATH): ICD-10-CM

## 2017-10-12 DIAGNOSIS — I50.22 CHRONIC SYSTOLIC CHF (CONGESTIVE HEART FAILURE) (HCC): ICD-10-CM

## 2017-10-12 DIAGNOSIS — I42.0 DILATED CARDIOMYOPATHY (HCC): ICD-10-CM

## 2017-10-12 DIAGNOSIS — R06.02 SOB (SHORTNESS OF BREATH) ON EXERTION: ICD-10-CM

## 2017-10-12 PROCEDURE — 93000 ELECTROCARDIOGRAM COMPLETE: CPT | Performed by: INTERNAL MEDICINE

## 2017-10-12 PROCEDURE — 99214 OFFICE O/P EST MOD 30 MIN: CPT | Performed by: INTERNAL MEDICINE

## 2017-10-12 NOTE — PROGRESS NOTES
Chief Complaint   Patient presents with    Check-Up     cardiomyopathy   2 week follow up from hospital. For CHF new onset and new CMP  Had cath for low EF and severe CAD- LAD stent was done  And sent home  Had Hx hemmorrhiodal bleed    Pt here for 4 mo check up for CMP and CAD    Sob on exertion    ekg done today    Denies chest pain, edema, and palpitations    Has been dizzy a lot more lately    Has been having sinus infection     To have soon cervical surgery      Pt here for a hosp fu  And has Hx bladder cancer  completed chemo     Patient Seen, Chart, Consults notes, Labs, Radiology studies reviewed.         Patient Active Problem List   Diagnosis    Hypercholesteremia    AR (allergic rhinitis)    RAD (reactive airway disease)    History of bladder cancer    Generalized OA    Vertigo    Osteopenia    Anxiety, situational    Sinusitis    Osteoarthritis    Cancer ([facility] [address]    Restless legs syndrome (RLS)    Constipation    Bronchitis, acute    Medication monitoring encounter    Local reaction to bee sting    ETD (eustachian tube dysfunction)    Hyponatremia    SIADH (syndrome of inappropriate ADH production) ([facility])    Dizziness    Acute exacerbation of CHF (congestive heart failure) ([facility])    Essential hypertension    BPV (benign positional vertigo)    Urinary tract infection    RLS (restless legs syndrome)    Cardiomyopathy ([facility]) EF 30% LVG,  echo 35 to 40%- med RX    Acute systolic congestive heart failure ([facility])    Coronary artery disease involving native coronary artery of native heart s/p cath 10/14/15-LM-P, LAD MID 90%, ANUERYSMAL, % PROX, RCA MID 60% DISTLA 70%, EDP 20 , EF 30%-pci of LAD done& med Rx    Presence of stent in LAD coronary artery    Acute GI bleeding    Duodenal ulcer    Gastritis    Sigmoid ulcer    Esophagitis    Ischemic colon ([facility])    Physical deconditioning    Chronic systolic CHF (congestive heart failure) ([facility])    S/P angioplasty with stent [facility]INA in 10/2015    Dyslipidemia    SOB (shortness of breath) on exertion    Insomnia due to anxiety and fear    Chest pain, atypical    Bladder cancer metastasized to lung (HCC)    Hydronephrosis, right    Coronary artery disease of native artery of native heart with stable angina pectoris (HCC)    Hematuria, gross    Acute blood loss anemia    Hypotension due to drugs    Coronary artery disease involving native coronary artery of native heart    Hematuria    Acute cystitis without hematuria    Non morbid obesity    Encounter for chemotherapy management    Chronic diastolic congestive heart failure ([address]    Clot retention of urine    Anemia    Lesion of bladder    Ureteral obstruction    Iron deficiency anemia due to chronic blood loss    Closed fracture of right wrist    Pre-op evaluation    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     Bladder prolapse, female, acquired    Prerenal azotemia    Malignant neoplasm of urinary bladder ([address]       Past Surgical History:   Procedure Laterality Date   [address]    BLADDER SURGERY  10/2016    stent placed and removed a blood clot    CARDIAC SURGERY      stents    COLONOSCOPY      CYSTOSCOPY  11-    TUR and fulg BT  superficial noninvasive bladder ca    CYSTOSCOPY N/A 8/23/2017    CYSTOSCOPY, RIGHT URETEROSCOPY, RIGHT STENT EXCHANGE retrograde right and right ureteral dilitation performed by Alex Lee MD at [place] [id number] EKG 12-LEAD  10/14/2015         EKG 12-LEAD  10/17/2015         ENDOSCOPY, COLON, DIAGNOSTIC      JOINT REPLACEMENT      hip    MOUTH SURGERY      cheek; had benign lump removed    OTHER SURGICAL HISTORY Right 04/19/2017    Cystoscopy, Attempted Right Ureteral Access     TOOTH EXTRACTION  03/2017    TOTAL HIP ARTHROPLASTY  3-9-12    Rt       Allergies   Allergen Reactions    Cleocin [Clindamycin Hcl] Diarrhea (KLOR-CON 10) 10 MEQ extended release tablet TAKE 1 TABLET BY MOUTH 4 TIMES A WEEK. 48 tablet 1    atorvastatin (LIPITOR) 20 MG tablet TAKE 1 TABLET BY MOUTH NIGHTLY 90 tablet 0    metoprolol succinate (TOPROL XL) 50 MG extended release tablet TAKE 1 TABLET BY MOUTH DAILY 90 tablet 0    lisinopril (PRINIVIL;ZESTRIL) 2.5 MG tablet TAKE 1 TABLET BY MOUTH DAILY 90 tablet 0    ondansetron (ZOFRAN) 4 MG tablet TAKE 1 TABLET BY MOUTH EVERY 8 HOURS AS NEEDED FOR NAUSEA OR VOMITING 30 tablet 3    diphenoxylate-atropine (LOMOTIL) 2.5-0.025 MG per tablet Take 1 tablet by mouth 4 times daily as needed for Diarrhea 30 tablet 3    isosorbide mononitrate (IMDUR) 60 MG extended release tablet TAKE 1 TABLET BY MOUTH DAILY 30 tablet 3    traMADol (ULTRAM) 50 MG tablet Take 1 tablet by mouth every 6 hours as needed for Pain 120 tablet 0    bumetanide (BUMEX) 0.5 MG tablet TAKE 1 TABLET BY MOUTH 4 TIMES A WEEK 48 tablet 1    aspirin 81 MG tablet Take 81 mg by mouth daily      docusate sodium (COLACE) 100 MG capsule Take 1 capsule by mouth daily as needed for Constipation 30 capsule 1    BIOTIN FORTE PO Take by mouth daily      rOPINIRole (REQUIP) 4 MG tablet Take 1 tablet by mouth 2 times daily 60 tablet 5    pantoprazole (PROTONIX) 40 MG tablet TAKE 1 TABLET BY MOUTH 2 TIMES DAILY (BEFORE MEALS) 60 tablet 11    meclizine (ANTIVERT) 25 MG tablet Take 1 tablet by mouth 3 times daily as needed for Dizziness 270 tablet 3    acetaminophen (TYLENOL) 325 MG tablet Take 2 tablets by mouth every 4 hours as needed for Pain or Fever 120 tablet 3    sodium chloride (OCEAN) 0.65 % nasal spray 1 spray by Nasal route as needed for Congestion      cetirizine (ZYRTEC) 10 MG tablet Take 10 mg by mouth daily      Handicap Placard MISC by Does not apply route. 1 each 0     No current facility-administered medications for this visit.         Review of Systems -     General ROS: negative  Psychological ROS: negative  Hematological and Lymphatic ROS: No history of blood clots or bleeding disorder. Respiratory ROS: no cough, shortness of breath, or wheezing  Cardiovascular ROS: no chest pain or dyspnea on exertion  Gastrointestinal ROS: negative  Genito-Urinary ROS: no dysuria, trouble voiding, or hematuria  Musculoskeletal ROS: negative  Neurological ROS: no TIA or stroke symptoms  Dermatological ROS: negative      Blood pressure (!) 103/58, pulse 96, weight 140 lb 2 oz (63.6 kg), not currently breastfeeding. Physical Examination:    General appearance - alert, well appearing, and in no distress  Mental status - alert, oriented to person, place, and time  Neck - supple, no significant adenopathy, no JVD, or carotid bruits  Chest - clear to auscultation, no wheezes, rales or rhonchi, symmetric air entry  Heart - normal rate, regular rhythm, normal S1, S2, no murmurs, rubs, clicks or gallops  Abdomen - soft, nontender, nondistended, no masses or organomegaly  Neurological - alert, oriented, normal speech, no focal findings or movement disorder noted  Musculoskeletal - no joint tenderness, deformity or swelling  Extremities - peripheral pulses normal, no pedal edema, no clubbing or cyanosis  Skin - normal coloration and turgor, no rashes, no suspicious skin lesions noted    Lab  No results for input(s): CKTOTAL, CKMB, CKMBINDEX, TROPONINI in the last 72 hours.   CBC:   Lab Results   Component Value Date    WBC 13.3 08/20/2017    RBC 3.14 08/20/2017    HGB 10.3 08/20/2017    HCT 30.4 08/20/2017    MCV 97.0 08/20/2017    MCH 32.9 08/20/2017    MCHC 33.9 08/20/2017    RDW 14.2 08/20/2017     08/20/2017    MPV 7.0 08/20/2017     BMP:    Lab Results   Component Value Date     08/20/2017    K 4.8 08/23/2017    CL 95 08/20/2017    CO2 24 08/20/2017    BUN 40 08/20/2017    LABALBU 3.2 06/23/2017    CREATININE 1.5 08/20/2017    CALCIUM 8.8 08/20/2017    LABGLOM 33 08/20/2017    GLUCOSE 107 08/20/2017     Hepatic Function Panel:    Lab stable s/p EGD and colonoscopy- finding is gastritis and 1.5 cm duodenal ulcer with clear base . Also sigmoid ulcer ( ischemic )- no complication - tolerated PO intake . On PPI - follow Bx result   GI Bleed probably lower-rectal or anal  Small blood noted on the stool surface today ( stool color normal per pat and attendant)  This suggest the bleed is from lower rectum or anal area- hemmorrhiods, versus rectal ulcer versus ischemia. Probably more of [id number] will leave to eval of GI   S/p Angioplasty with stent of the LAD  Triple vessel CAD  Admission for dizziness - likely vertigo  Acute systolic and diastolic chf- improved  CMP-ischemic newly Dxed-Ef 35-40 with grade I DDfx per echo 10/9/15  BY LVG ef 30%  Mod MR  UTI- treated with levaquin      PLAN:  Meds and labs reviewed    Pre op eval for neck and hand surgery  METS< 4  nuc stress neg  Echo  EF 45%'  May proceed for the intended surgery with mod to high risk with age    Current  meds and labs reviewed      Continue the current treatment and with constant vigilance to changes in symptoms and also any potential side effects. Return for care or seek medical attention immediately if symptoms got worse and/or develop new symptoms. Coronary artery disease, seems to be stable. Denies angina or change in breathing pattern  Had PCI of the LAD successfully   cont  imdur 60 and  protonix   NTG prn    Cardiomyopathy: improving, no CHF symptoms, no change in clinical condition. Will need periodic echocardiograms depending on symptoms. CMP ischemic  Very limited physically   Cont  toprol X 50 po BID  Cont  lisinopril 2.5  Po qd    Congestive heart failure: no evidence of fluid overload today, no recent hospitalization for CHF  NO  Leg edema   Cont  Bumex 0.5 po dq prn- 4 x/ week  KCL 10 qd 4/x/wk  BMP next visit    Hypertension, on medical treatment. Seems to be under good control. Patient is compliant with medical treatment.       Hyperlipidemia: on statins, followed periodically. Patient need periodic lipid and liver profile.     RTC in 3 months    Melody [facility]

## 2017-10-13 ENCOUNTER — TELEPHONE (OUTPATIENT)
Dept: FAMILY MEDICINE CLINIC | Age: 82
End: 2017-10-13

## 2017-10-15 DIAGNOSIS — G25.81 RESTLESS LEGS SYNDROME (RLS): ICD-10-CM

## 2017-10-16 RX ORDER — ROPINIROLE 4 MG/1
4 TABLET, FILM COATED ORAL 2 TIMES DAILY
Qty: 60 TABLET | Refills: 5 | Status: SHIPPED | OUTPATIENT
Start: 2017-10-16 | End: 2018-01-01 | Stop reason: SDUPTHER

## 2017-10-25 ENCOUNTER — TELEPHONE (OUTPATIENT)
Dept: FAMILY MEDICINE CLINIC | Age: 82
End: 2017-10-25

## 2017-10-25 ENCOUNTER — CARE COORDINATOR VISIT (OUTPATIENT)
Dept: CARE COORDINATION | Age: 82
End: 2017-10-25

## 2017-10-25 ENCOUNTER — OFFICE VISIT (OUTPATIENT)
Dept: FAMILY MEDICINE CLINIC | Age: 82
End: 2017-10-25
Payer: MEDICARE

## 2017-10-25 VITALS
BODY MASS INDEX: 30.34 KG/M2 | SYSTOLIC BLOOD PRESSURE: 116 MMHG | HEART RATE: 81 BPM | HEIGHT: 57 IN | DIASTOLIC BLOOD PRESSURE: 60 MMHG | RESPIRATION RATE: 16 BRPM | TEMPERATURE: 97.7 F | OXYGEN SATURATION: 97 % | WEIGHT: 140.6 LBS

## 2017-10-25 DIAGNOSIS — H93.11 TINNITUS OF RIGHT EAR: ICD-10-CM

## 2017-10-25 DIAGNOSIS — M89.8X7 PAIN IN METATARSUS OF RIGHT FOOT: ICD-10-CM

## 2017-10-25 DIAGNOSIS — H90.6 MIXED CONDUCTIVE AND SENSORINEURAL HEARING LOSS OF BOTH EARS: ICD-10-CM

## 2017-10-25 DIAGNOSIS — F32.5 MAJOR DEPRESSION, SINGLE EPISODE, IN COMPLETE REMISSION (HCC): ICD-10-CM

## 2017-10-25 PROCEDURE — 99213 OFFICE O/P EST LOW 20 MIN: CPT | Performed by: FAMILY MEDICINE

## 2017-10-25 ASSESSMENT — ENCOUNTER SYMPTOMS
RESPIRATORY NEGATIVE: 1
DYSPNEA ASSOCIATED WITH: EXERTION
COUGH: 0

## 2017-10-25 NOTE — PROGRESS NOTES
Subjective:      Patient ID: Jonas Rodriguez is a 80 y.o. female. HPI  Encounter Diagnoses   Name Primary?  Tinnitus of right ear     Pain in metatarsus of right foot     Mixed conductive and sensorineural hearing loss of both ears      Patient comes in with complaints of crackling sounds and noises in her right ear which have been present for the last several weeks. She has had episodes of tinnitus in the past and her current complaints are consistent with that. The benign nature of it was discussed but it is distressing to her so I recommended she see an audiologist for further evaluation. She also complaining of pain in the metatarsal arch of her right foot which comes and goes. It's not necessarily a weightbearing pain so I recommended application of Aspercreme with lidocaine to the area of the painful joint 3-4 times a day and night time. The rest of this patient's conditions are stable. Past medical and surgical hx reviewed. Past Medical History:   Diagnosis Date    Allergic rhinitis     Anxiety     Bilateral hydronephrosis 8/10/2016    Bladder cancer ([facility] [address] 2008    Dr. Ty Beauchamp Blood circulation, collateral     CHF (congestive heart failure) (HCC)     Constipation     attributed to Ultram    Coronary artery disease involving native coronary artery of native heart s/p cath 10/14/15-LM-P, LAD MID 90%, ANUERYSMAL, % PROX, RCA MID 60% DISTLA 70%, EDP 20 , EF 30%-NEED REVASCULARIZATION 10/14/2015    GERD (gastroesophageal reflux disease)     [place] (hard of hearing)     Hyperglycemia     Hyperlipidemia     Hypertension     Obesity (BMI 30-39. 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  4/26/2017     Past Surgical History:   Procedure Laterality Date    ABDOMEN SURGERY      ABDOMINAL EXPLORATION SURGERY  1954    BLADDER SURGERY  10/2016    stent placed and removed a blood clot    CARDIAC SURGERY      stents  COLONOSCOPY      CYSTOSCOPY  11-    TUR and fulg BT  superficial noninvasive bladder ca    CYSTOSCOPY N/A 8/23/2017    CYSTOSCOPY, RIGHT URETEROSCOPY, RIGHT STENT EXCHANGE retrograde right and right ureteral dilitation performed by Keysha Feliciano MD at [place] [address] EKG 12-LEAD  10/14/2015         EKG 12-LEAD  10/17/2015         ENDOSCOPY, COLON, DIAGNOSTIC      JOINT REPLACEMENT      hip    MOUTH SURGERY      cheek; had benign lump removed    OTHER SURGICAL HISTORY Right 04/19/2017    Cystoscopy, Attempted Right Ureteral Access     TOOTH EXTRACTION  03/2017    TOTAL HIP ARTHROPLASTY  3-9-12    Rt     Portions of this note were completed with a voice recording program.  Efforts were made to edit the dictations but occasionally words are mis-transcribed. Review of Systems   HENT: Positive for tinnitus. Respiratory: Negative. Negative for cough. Cardiovascular: Negative. Musculoskeletal: Positive for arthralgias. All other systems reviewed and are negative. Objective:   Physical Exam   Constitutional: She appears well-developed and well-nourished. HENT:   Right Ear: Tympanic membrane, external ear and ear canal normal.   Left Ear: Tympanic membrane, external ear and ear canal normal.   Nose: Nose normal.   Mouth/Throat: Oropharynx is clear and moist.   Neck: No thyromegaly present. Musculoskeletal:        Right foot: There is tenderness. Feet:    Lymphadenopathy:     She has no cervical adenopathy. Nursing note and vitals reviewed. Assessment:      1. Tinnitus of right ear  External Referral To Audiology   2. Pain in metatarsus of right foot     3.  Mixed conductive and sensorineural hearing loss of both ears  External Referral To Audiology           Plan:      Orders Placed This Encounter   Procedures    External Referral To Audiology     Referral Priority:   Routine     Referral Type:   Consult for Advice and for Pain 120 tablet 0    bumetanide (BUMEX) 0.5 MG tablet TAKE 1 TABLET BY MOUTH 4 TIMES A WEEK 48 tablet 1    aspirin 81 MG tablet Take 81 mg by mouth daily      docusate sodium (COLACE) 100 MG capsule Take 1 capsule by mouth daily as needed for Constipation 30 capsule 1    BIOTIN FORTE PO Take by mouth daily      pantoprazole (PROTONIX) 40 MG tablet TAKE 1 TABLET BY MOUTH 2 TIMES DAILY (BEFORE MEALS) 60 tablet 11    meclizine (ANTIVERT) 25 MG tablet Take 1 tablet by mouth 3 times daily as needed for Dizziness 270 tablet 3    acetaminophen (TYLENOL) 325 MG tablet Take 2 tablets by mouth every 4 hours as needed for Pain or Fever 120 tablet 3    sodium chloride (OCEAN) 0.65 % nasal spray 1 spray by Nasal route as needed for Congestion      cetirizine (ZYRTEC) 10 MG tablet Take 10 mg by mouth daily      Handicap Placard MISC by Does not apply route. 1 each 0     No current facility-administered medications for this visit. Make appointment with Dr Saturnino Villalpando for hearing evaluation. Apply Aspercreme with lidocaine to right foot as needed to control pain. Continue current medications. Keep next regular appointment.

## 2017-10-25 NOTE — PATIENT INSTRUCTIONS
You may receive a survey about your visit with us today. The feedback from our patients helps us identify what is working well and where the service to all patients can be enhanced. Thank you! Make appointment with Dr Ori Sow for hearing evaluation. Apply Aspercreme with lidocaine to right foot as needed to control pain. Continue current medications. Keep next regular appointment.

## 2017-10-25 NOTE — TELEPHONE ENCOUNTER
LM for Dr. Jasmyne Cuellar office to call to place referral for patient. Order in nurse bin. Patient requesting late am or afternoon appointment.

## 2017-10-25 NOTE — TELEPHONE ENCOUNTER
Laura with Dr. Carlos Escobar office returned call. Asking to fax referral they will call patient to schedule. Office information faxed to [phone].

## 2017-10-31 RX ORDER — ISOSORBIDE MONONITRATE 60 MG/1
TABLET, EXTENDED RELEASE ORAL
Qty: 30 TABLET | Refills: 5 | Status: SHIPPED | OUTPATIENT
Start: 2017-10-31 | End: 2018-01-01

## 2017-11-01 ENCOUNTER — TELEPHONE (OUTPATIENT)
Dept: UROLOGY | Age: 82
End: 2017-11-01

## 2017-11-01 DIAGNOSIS — R82.998 DARK URINE: Primary | ICD-10-CM

## 2017-11-01 RX ORDER — BETHANECHOL CHLORIDE 10 MG/1
10 TABLET ORAL 3 TIMES DAILY
Qty: 270 TABLET | Refills: 3 | Status: ON HOLD | OUTPATIENT
Start: 2017-11-01 | End: 2018-01-31

## 2017-11-01 NOTE — TELEPHONE ENCOUNTER
CARMEN Arndt Talk to Trino couch advised her of medication change. She advised that she thinks the patient has a UTI. Symptoms of dark urine, frequency, urgency. Ordered a UA with reflux culture.

## 2017-11-02 ENCOUNTER — TELEPHONE (OUTPATIENT)
Dept: UROLOGY | Age: 82
End: 2017-11-02

## 2017-11-02 RX ORDER — FERROUS SULFATE 325(65) MG
TABLET ORAL
Qty: 30 TABLET | Refills: 3 | Status: ON HOLD | OUTPATIENT
Start: 2017-11-02 | End: 2019-01-01 | Stop reason: HOSPADM

## 2017-11-03 ENCOUNTER — HOSPITAL ENCOUNTER (OUTPATIENT)
Age: 82
Setting detail: SPECIMEN
Discharge: HOME | End: 2017-11-03
Payer: MEDICARE

## 2017-11-03 DIAGNOSIS — R82.998 DARK URINE: ICD-10-CM

## 2017-11-03 LAB
BACTERIA: ABNORMAL /HPF
BILIRUBIN URINE: NEGATIVE
BLOOD, URINE: ABNORMAL
CASTS 2: ABNORMAL /LPF
CASTS UA: ABNORMAL /LPF
CHARACTER, URINE: ABNORMAL
COLOR: YELLOW
CRYSTALS, UA: ABNORMAL
EPITHELIAL CELLS, UA: ABNORMAL /HPF
GLUCOSE URINE: NEGATIVE MG/DL
KETONES, URINE: NEGATIVE
LEUKOCYTE ESTERASE, URINE: ABNORMAL
MISCELLANEOUS 2: ABNORMAL
NITRITE, URINE: POSITIVE
PH UA: 6
PROTEIN UA: 30
RBC URINE: ABNORMAL /HPF
RENAL EPITHELIAL, UA: ABNORMAL
SPECIFIC GRAVITY, URINE: 1.01 (ref 1–1.03)
UROBILINOGEN, URINE: 0.2 EU/DL
WBC UA: ABNORMAL /HPF
YEAST: ABNORMAL

## 2017-11-03 PROCEDURE — 87077 CULTURE AEROBIC IDENTIFY: CPT

## 2017-11-03 PROCEDURE — 81001 URINALYSIS AUTO W/SCOPE: CPT

## 2017-11-03 PROCEDURE — 87086 URINE CULTURE/COLONY COUNT: CPT

## 2017-11-03 PROCEDURE — 87186 SC STD MICRODIL/AGAR DIL: CPT

## 2017-11-05 ENCOUNTER — TELEPHONE (OUTPATIENT)
Dept: UROLOGY | Age: 82
End: 2017-11-05

## 2017-11-05 LAB
ORGANISM: ABNORMAL
URINE CULTURE REFLEX: ABNORMAL

## 2017-11-05 RX ORDER — NITROFURANTOIN 25; 75 MG/1; MG/1
100 CAPSULE ORAL 2 TIMES DAILY
Qty: 14 CAPSULE | Refills: 0 | Status: SHIPPED | OUTPATIENT
Start: 2017-11-05 | End: 2017-11-12

## 2017-11-05 RX ORDER — BUMETANIDE 0.5 MG/1
TABLET ORAL
Qty: 48 TABLET | Refills: 2 | Status: SHIPPED | OUTPATIENT
Start: 2017-11-05 | End: 2018-01-24 | Stop reason: DRUGHIGH

## 2017-11-06 DIAGNOSIS — M16.9 OSTEOARTHROSIS, HIP: ICD-10-CM

## 2017-11-06 DIAGNOSIS — M15.9 GENERALIZED OA: ICD-10-CM

## 2017-11-06 DIAGNOSIS — C67.9 BLADDER CANCER METASTASIZED TO LUNG (HCC): ICD-10-CM

## 2017-11-06 DIAGNOSIS — C78.00 BLADDER CANCER METASTASIZED TO LUNG (HCC): ICD-10-CM

## 2017-11-06 RX ORDER — HYDROCODONE BITARTRATE AND ACETAMINOPHEN 5; 325 MG/1; MG/1
1 TABLET ORAL EVERY 6 HOURS PRN
Qty: 120 TABLET | Refills: 0 | Status: SHIPPED | OUTPATIENT
Start: 2017-11-06 | End: 2017-12-07 | Stop reason: SDUPTHER

## 2017-11-22 ENCOUNTER — CARE COORDINATION (OUTPATIENT)
Dept: CARE COORDINATION | Age: 82
End: 2017-11-22

## 2017-11-22 RX ORDER — LISINOPRIL 2.5 MG/1
TABLET ORAL
Qty: 90 TABLET | Refills: 0 | Status: SHIPPED | OUTPATIENT
Start: 2017-11-22 | End: 2018-01-01 | Stop reason: SDUPTHER

## 2017-12-07 DIAGNOSIS — M16.9 OSTEOARTHROSIS, HIP: ICD-10-CM

## 2017-12-07 DIAGNOSIS — M15.9 GENERALIZED OA: ICD-10-CM

## 2017-12-07 DIAGNOSIS — C67.9 BLADDER CANCER METASTASIZED TO LUNG (HCC): ICD-10-CM

## 2017-12-07 DIAGNOSIS — C78.00 BLADDER CANCER METASTASIZED TO LUNG (HCC): ICD-10-CM

## 2017-12-07 RX ORDER — HYDROCODONE BITARTRATE AND ACETAMINOPHEN 5; 325 MG/1; MG/1
1 TABLET ORAL EVERY 6 HOURS PRN
Qty: 120 TABLET | Refills: 0 | Status: SHIPPED | OUTPATIENT
Start: 2017-12-07 | End: 2018-01-04 | Stop reason: SDUPTHER

## 2017-12-07 NOTE — TELEPHONE ENCOUNTER
Kaylene Nixon called requesting a refill on the following medications:  Requested Prescriptions     Pending Prescriptions Disp Refills    HYDROcodone-acetaminophen (NORCO) 5-325 MG per tablet 120 tablet 0     Sig: Take 1 tablet by mouth every 6 hours as needed for Pain  Fill on 5/23/17. Harles Old      Pharmacy verified:  [email or web address]      Date of last visit: 10/25/2017  Date of next visit (if applicable): 5/2/2302

## 2017-12-11 RX ORDER — FERROUS SULFATE 325(65) MG
TABLET ORAL
Qty: 30 TABLET | Refills: 3 | Status: ON HOLD | OUTPATIENT
Start: 2017-12-11 | End: 2018-01-31 | Stop reason: SDUPTHER

## 2017-12-21 ENCOUNTER — TELEPHONE (OUTPATIENT)
Dept: UROLOGY | Age: 82
End: 2017-12-21

## 2017-12-21 ENCOUNTER — CARE COORDINATION (OUTPATIENT)
Dept: CARE COORDINATION | Age: 82
End: 2017-12-21

## 2017-12-21 ASSESSMENT — ENCOUNTER SYMPTOMS: DYSPNEA ASSOCIATED WITH: EXERTION

## 2017-12-22 ENCOUNTER — OFFICE VISIT (OUTPATIENT)
Dept: FAMILY MEDICINE CLINIC | Age: 82
End: 2017-12-22
Payer: MEDICARE

## 2017-12-22 ENCOUNTER — HOSPITAL ENCOUNTER (OUTPATIENT)
Age: 82
Discharge: HOME | End: 2017-12-22
Payer: MEDICARE

## 2017-12-22 VITALS
RESPIRATION RATE: 14 BRPM | HEART RATE: 76 BPM | SYSTOLIC BLOOD PRESSURE: 118 MMHG | HEIGHT: 57 IN | DIASTOLIC BLOOD PRESSURE: 70 MMHG | OXYGEN SATURATION: 96 % | WEIGHT: 141.4 LBS | BODY MASS INDEX: 30.51 KG/M2

## 2017-12-22 DIAGNOSIS — R60.0 LOWER LEG EDEMA: ICD-10-CM

## 2017-12-22 DIAGNOSIS — I87.2 VENOUS STASIS DERMATITIS OF RIGHT LOWER EXTREMITY: ICD-10-CM

## 2017-12-22 DIAGNOSIS — R60.0 LOWER LEG EDEMA: Primary | ICD-10-CM

## 2017-12-22 LAB
ANION GAP SERPL CALCULATED.3IONS-SCNC: 13 MEQ/L (ref 8–16)
BUN BLDV-MCNC: 31 MG/DL (ref 7–22)
CALCIUM SERPL-MCNC: 9.1 MG/DL (ref 8.5–10.5)
CHLORIDE BLD-SCNC: 104 MEQ/L (ref 98–111)
CO2: 26 MEQ/L (ref 23–33)
CREAT SERPL-MCNC: 1.3 MG/DL (ref 0.4–1.2)
GFR SERPL CREATININE-BSD FRML MDRD: 39 ML/MIN/1.73M2
GLUCOSE BLD-MCNC: 96 MG/DL (ref 70–108)
POTASSIUM SERPL-SCNC: 4.3 MEQ/L (ref 3.5–5.2)
SODIUM BLD-SCNC: 143 MEQ/L (ref 135–145)

## 2017-12-22 PROCEDURE — 99213 OFFICE O/P EST LOW 20 MIN: CPT | Performed by: FAMILY MEDICINE

## 2017-12-22 PROCEDURE — 80048 BASIC METABOLIC PNL TOTAL CA: CPT

## 2017-12-22 PROCEDURE — 36415 COLL VENOUS BLD VENIPUNCTURE: CPT

## 2017-12-22 RX ORDER — FLUTICASONE PROPIONATE 50 MCG
2 SPRAY, SUSPENSION (ML) NASAL 2 TIMES DAILY
COMMUNITY

## 2017-12-22 ASSESSMENT — ENCOUNTER SYMPTOMS
GASTROINTESTINAL NEGATIVE: 1
COLOR CHANGE: 1
RESPIRATORY NEGATIVE: 1

## 2017-12-22 NOTE — PROGRESS NOTES
Subjective:      Patient ID: Michelle Bob is a 80 y.o. female. HPI:    Chief Complaint   Patient presents with    Edema     bilateral legs, ankle, right side positive for redness, warm to touch      Pt here with caregiver for LE edema and redness for the last 2 weeks. Warm to touch. Believes that she hurt the foot on her bed. Able to bear weight fine. Currently on Bumex 4 times per week. Has compression stockings at home, has not used them yet.     Patient Active Problem List   Diagnosis    Hypercholesteremia    AR (allergic rhinitis)    RAD (reactive airway disease)    History of bladder cancer    Generalized OA    Vertigo    Osteopenia    Anxiety, situational    Sinusitis    Osteoarthritis    Cancer ([facility] [address]    Restless legs syndrome (RLS)    Constipation    Bronchitis, acute    Medication monitoring encounter    Local reaction to bee sting    ETD (eustachian tube dysfunction)    Hyponatremia    SIADH (syndrome of inappropriate ADH production) ([facility]    Dizziness    Acute exacerbation of CHF (congestive heart failure) ([facility])    Essential hypertension    BPV (benign positional vertigo)    Urinary tract infection    RLS (restless legs syndrome)    Cardiomyopathy ([facility]) EF 30% LVG,  echo 35 to 40%- med RX    Acute systolic congestive heart failure ([facility])    Coronary artery disease involving native coronary artery of native heart s/p cath 10/14/15-LM-P, LAD MID 90%, ANUERYSMAL, % PROX, RCA MID 60% DISTLA 70%, EDP 20 , EF 30%-pci of LAD done& med Rx    Presence of stent in LAD coronary artery    Acute GI bleeding    Duodenal ulcer    Gastritis    Sigmoid ulcer    Esophagitis    Ischemic colon ([facility])    Physical deconditioning    Chronic systolic CHF (congestive heart failure) ([facility])    S/P angioplasty with stent TINA in 10/2015    Dyslipidemia    SOB (shortness of breath) on exertion    Insomnia due to anxiety and fear    Chest pain, atypical    Bladder cancer metastasized to lung [facility])    Hydronephrosis, right    Coronary artery disease of native artery of native heart with stable angina pectoris (HCC)    Hematuria, gross    Acute blood loss anemia    Hypotension due to drugs    Coronary artery disease involving native coronary artery of native heart    Hematuria    Acute cystitis without hematuria    Non morbid obesity    Encounter for chemotherapy management    Chronic diastolic congestive heart failure (HCC)    Clot retention of urine    Anemia    Lesion of bladder    Ureteral obstruction    Iron deficiency anemia due to chronic blood loss    Closed fracture of right wrist    Pre-op evaluation    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  Bladder prolapse, female, acquired    Prerenal azotemia    Malignant neoplasm of urinary bladder (HCC)    Tinnitus of right ear    Pain in metatarsus of right foot    Mixed conductive and sensorineural hearing loss of both ears     Past Surgical History:   Procedure Laterality Date    ABDOMEN SURGERY      ABDOMINAL EXPLORATION SURGERY  1954    BLADDER SURGERY  10/2016    stent placed and removed a blood clot    CARDIAC SURGERY      stents    COLONOSCOPY      CYSTOSCOPY  11-    TUR and fulg BT  superficial noninvasive bladder ca    CYSTOSCOPY N/A 8/23/2017    CYSTOSCOPY, RIGHT URETEROSCOPY, RIGHT STENT EXCHANGE retrograde right and right ureteral dilitation performed by Humberto Zayas MD at [place] [address] EKG 12-LEAD  10/14/2015         EKG 12-LEAD  10/17/2015         ENDOSCOPY, COLON, DIAGNOSTIC      JOINT REPLACEMENT      hip    MOUTH SURGERY      cheek; had benign lump removed    OTHER SURGICAL HISTORY Right 04/19/2017    Cystoscopy, Attempted Right Ureteral Access     TOOTH EXTRACTION  03/2017    TOTAL HIP ARTHROPLASTY  3-9-12    Rt     Prior to Admission medications    Medication Sig Start Date End Date Taking?  Authorizing Provider   ferrous sulfate 325 (65 Fe) MG tablet TAKE 1 TABLET BY MOUTH 2 TIMES DAILY (WITH MEALS) 12/11/17  Yes Steve Hinkle MD   HYDROcodone-acetaminophen [facility]) 5-325 MG per tablet Take 1 tablet by mouth every 6 hours as needed for Pain  Fill on 5/23/17. . 12/7/17  Yes Steve Hinkle MD   lisinopril (PRINIVIL;ZESTRIL) 2.5 MG tablet TAKE 1 TABLET BY MOUTH DAILY 11/22/17  Yes Ana M Aguirre CNP   bumetanide (BUMEX) 0.5 MG tablet TAKE 1 TABLET BY MOUTH 4 TIMES A WEEK 11/5/17  Yes Michelle Chairez CNP   ferrous sulfate 325 (65 Fe) MG tablet TAKE 1 TABLET BY MOUTH 2 TIMES DAILY (WITH MEALS) 11/2/17  Yes Steve Hinkle MD   bethanechol (URECHOLINE) 10 MG tablet Take 1 tablet by mouth 3 times daily 11/1/17 11/1/18 Yes Justin Correa NP   isosorbide mononitrate (IMDUR) 60 MG extended release tablet TAKE 1 TABLET BY MOUTH DAILY 10/31/17  Yes Placido Loya MD   rOPINIRole (REQUIP) 4 MG tablet TAKE 1 TABLET BY MOUTH 2 TIMES DAILY 10/16/17  Yes Steve Hinkle MD   zafirlukast (ACCOLATE) 20 MG tablet TAKE 1 TABLET TWICE A DAY 10/2/17  Yes Steve Hinkle MD   escitalopram (LEXAPRO) 10 MG tablet TAKE 1 TABLET BY MOUTH DAILY 10/2/17  Yes Steve Hinkle MD   cephALEXin [facility]) 500 MG capsule  9/24/17  Yes Arjun Provider, MD   LORazepam (ATIVAN) 1 MG tablet Take 1 tablet by mouth every 8 hours as needed for Anxiety 9/29/17  Yes Steve Hinkle MD   tamsulosin [facility]) 0.4 MG capsule Take 1 capsule by mouth daily 9/7/17  Yes Carlos Stanley NP   potassium chloride (KLOR-CON 10) 10 MEQ extended release tablet TAKE 1 TABLET BY MOUTH 4 TIMES A WEEK. 9/6/17  Yes Placido Loya MD   atorvastatin (LIPITOR) 20 MG tablet TAKE 1 TABLET BY MOUTH NIGHTLY 8/29/17  Yes Placido Loya MD   metoprolol succinate (TOPROL XL) 50 MG extended release tablet TAKE 1 TABLET BY MOUTH DAILY 8/29/17  Yes Placido Loya MD   ondansetron (ZOFRAN) 4 MG tablet TAKE 1 TABLET BY MOUTH EVERY 8 HOURS AS NEEDED FOR NAUSEA OR VOMITING 8/22/17  Yes Naida Heller NP   diphenoxylate-atropine (LOMOTIL) 2.5-0.025 MG per tablet Take 1 tablet by mouth 4 times daily as needed for Diarrhea 8/2/17  Yes Yola Ramirez MD   traMADol [facility]) 50 MG tablet Take 1 tablet by mouth every 6 hours as needed for Pain 7/3/17  Yes Naida Heller NP   aspirin 81 MG tablet Take 81 mg by mouth daily   Yes Historical Provider, MD   docusate sodium (COLACE) 100 MG capsule Take 1 capsule by mouth daily as needed for Constipation 4/19/17  Yes Roger Escalante MD   BIOTIN FORTE PO Take by mouth daily   Yes Historical Provider, MD   pantoprazole (PROTONIX) 40 MG tablet TAKE 1 TABLET BY MOUTH 2 TIMES DAILY (BEFORE MEALS) 3/24/17  Yes Yola Ramirez MD   meclizine (ANTIVERT) 25 MG tablet Take 1 tablet by mouth 3 times daily as needed for Dizziness 3/24/17  Yes Yola Ramirez MD   acetaminophen (TYLENOL) 325 MG tablet Take 2 tablets by mouth every 4 hours as needed for Pain or Fever 10/31/16  Yes Earnest Ruiz MD   sodium chloride (OCEAN) 0.65 % nasal spray 1 spray by Nasal route as needed for Congestion   Yes Historical Provider, MD   cetirizine (ZYRTEC) 10 MG tablet Take 10 mg by mouth daily   Yes Historical Provider, MD   Handicap Placard MISC by Does not apply route. 2/28/14  Yes Yola Ramirez MD         Review of Systems   Constitutional: Negative. HENT: Negative. Respiratory: Negative. Cardiovascular: Positive for leg swelling. Gastrointestinal: Negative. Musculoskeletal: Negative. Skin: Positive for color change (right leg red, warm). All other systems reviewed and are negative. Objective:   Physical Exam   Constitutional: She is oriented to person, place, and time. She appears well-developed and well-nourished. HENT:   Head: Normocephalic and atraumatic.    Right Ear: Tympanic membrane normal.   Left Ear: Tympanic membrane normal.   Mouth/Throat: Oropharynx is clear and moist and mucous membranes are normal.   Cardiovascular: Normal rate, regular rhythm and normal heart sounds. No murmur heard. Pulmonary/Chest: Effort normal and breath sounds normal.   Abdominal: Soft. Bowel sounds are normal.   Musculoskeletal: She exhibits edema (trace LE edema bl). Neurological: She is alert and oriented to person, place, and time. Skin: Skin is warm and dry. Psychiatric: She has a normal mood and affect. Her behavior is normal.   Nursing note and vitals reviewed. Assessment:      1. Lower leg edema  Basic Metabolic Panel    Compression Stocking    Elastic Bandages & Supports (MEDICAL COMPRESSION STOCKINGS) MISC   2.  Venous stasis dermatitis of right lower extremity  Elastic Bandages & Supports (MEDICAL COMPRESSION STOCKINGS) MISC           Plan:      -  LE elevation  -  Compression hose  -  Continue Bumex 4 times daily  -  Check BMP at grand-daughters request  -  RTO if worsening symptoms

## 2017-12-29 ENCOUNTER — OFFICE VISIT (OUTPATIENT)
Dept: UROLOGY | Age: 82
End: 2017-12-29
Payer: MEDICARE

## 2017-12-29 VITALS — HEIGHT: 57 IN | BODY MASS INDEX: 30.63 KG/M2 | WEIGHT: 142 LBS

## 2017-12-29 DIAGNOSIS — R33.9 URINARY RETENTION: ICD-10-CM

## 2017-12-29 DIAGNOSIS — C67.9 BLADDER CANCER METASTASIZED TO LUNG (HCC): Primary | ICD-10-CM

## 2017-12-29 DIAGNOSIS — C78.00 BLADDER CANCER METASTASIZED TO LUNG (HCC): Primary | ICD-10-CM

## 2017-12-29 DIAGNOSIS — N81.10 BLADDER PROLAPSE, FEMALE, ACQUIRED: Chronic | ICD-10-CM

## 2017-12-29 LAB — POST VOID RESIDUAL (PVR): 235 ML

## 2017-12-29 PROCEDURE — 99214 OFFICE O/P EST MOD 30 MIN: CPT | Performed by: NURSE PRACTITIONER

## 2017-12-29 PROCEDURE — 51798 US URINE CAPACITY MEASURE: CPT | Performed by: NURSE PRACTITIONER

## 2017-12-29 PROCEDURE — 51702 INSERT TEMP BLADDER CATH: CPT | Performed by: NURSE PRACTITIONER

## 2017-12-29 RX ORDER — OXYBUTYNIN CHLORIDE 5 MG/1
5 TABLET ORAL 3 TIMES DAILY PRN
Qty: 90 TABLET | Refills: 1 | Status: SHIPPED | OUTPATIENT
Start: 2017-12-29 | End: 2018-01-01 | Stop reason: ALTCHOICE

## 2017-12-29 ASSESSMENT — ENCOUNTER SYMPTOMS
NAUSEA: 0
ABDOMINAL PAIN: 0
VOMITING: 0

## 2017-12-29 NOTE — PROGRESS NOTES
recent surveillance cystoscopy on 2/6/17 was negative. Historically, she underwent cystoscopy with clot evacuation, TURBT medium, bilateral ureteroscopy with bilateral ureteral stent placement per Dr. Sina Baumgarten on 10/25/16. Review of Systems   Constitutional: Negative for chills and fatigue. Gastrointestinal: Negative for abdominal pain, nausea and vomiting. Genitourinary: Positive for difficulty urinating. Negative for flank pain and hematuria. Objective:   Physical Exam   Constitutional: She is oriented to person, place, and time. She appears well-developed and well-nourished. HENT:   Head: Normocephalic and atraumatic. Right Ear: External ear normal.   Left Ear: External ear normal.   Nose: Nose normal.   Eyes: Conjunctivae are normal.   Pulmonary/Chest: Effort normal.   Abdominal: Soft. Neurological: She is alert and oriented to person, place, and time. Skin: Skin is warm and dry. Psychiatric: She has a normal mood and affect. Her behavior is normal. Judgment and thought content normal.       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 Residuals obtained range from 200-700 cc. See charts scanned under media. This is becoming very difficult for patient to go anywhere as she becomes very anxious and even a few minutes after being straight catheterized she becomes anxious about needing drained again.  Her granddaughter lives with her and is hardly able to run to the grocery store and home without Saleem Guadalupe calling her to request catheterization even though she was just drained minutes prior. The patient is VERY anxious regarding urination. Her Ativan was increased by PCP but they are unable to increase it any further due to age. Continue Flomax & Urecholine. Discussed with patient option of having indwelling forman catheter placed with a stopper so patient can drain the catheter every few hours as needed. This would prevent multiple straight catheterizations daily and make it possible for patient and her granddaughter to leave the house. Patient has agreed to this. She is very adamant about not having a bag attached to her leg. Discussed with her the importance of draining the catheter every 2-3 hours during the day and using overnight bag while she sleeps. She and granddaughter voiced understanding. She is due for ureteral stent exchange. She will be scheduled for  cystoscopy with right retrograde pyelogram, right ureteroscopy and replacement of right ureteral stent per Dr. Umer Chavez in [address] [place] [address] in the next month.

## 2018-01-01 ENCOUNTER — CARE COORDINATION (OUTPATIENT)
Dept: CARE COORDINATION | Age: 83
End: 2018-01-01

## 2018-01-01 ENCOUNTER — HOSPITAL ENCOUNTER (OUTPATIENT)
Age: 83
Setting detail: SPECIMEN
Discharge: HOME | End: 2018-08-16
Payer: MEDICARE

## 2018-01-01 ENCOUNTER — APPOINTMENT (OUTPATIENT)
Dept: GENERAL RADIOLOGY | Age: 83
DRG: 481 | End: 2018-01-01
Payer: MEDICARE

## 2018-01-01 ENCOUNTER — APPOINTMENT (OUTPATIENT)
Dept: GENERAL RADIOLOGY | Age: 83
DRG: 698 | End: 2018-01-01
Payer: MEDICARE

## 2018-01-01 ENCOUNTER — OFFICE VISIT (OUTPATIENT)
Dept: FAMILY MEDICINE CLINIC | Age: 83
End: 2018-01-01
Payer: MEDICARE

## 2018-01-01 ENCOUNTER — TELEPHONE (OUTPATIENT)
Dept: FAMILY MEDICINE CLINIC | Age: 83
End: 2018-01-01

## 2018-01-01 ENCOUNTER — TELEPHONE (OUTPATIENT)
Dept: UROLOGY | Age: 83
End: 2018-01-01

## 2018-01-01 ENCOUNTER — HOSPITAL ENCOUNTER (INPATIENT)
Age: 83
LOS: 29 days | Discharge: HOME HEALTH | DRG: 560 | End: 2018-07-14
Attending: FAMILY MEDICINE | Admitting: FAMILY MEDICINE
Payer: MEDICARE

## 2018-01-01 ENCOUNTER — HOSPITAL ENCOUNTER (INPATIENT)
Age: 83
LOS: 5 days | Discharge: NURSING, REHAB OR LONG-TERM CARE | DRG: 481 | End: 2018-06-15
Attending: INTERNAL MEDICINE | Admitting: INTERNAL MEDICINE
Payer: MEDICARE

## 2018-01-01 ENCOUNTER — APPOINTMENT (OUTPATIENT)
Dept: GENERAL RADIOLOGY | Age: 83
DRG: 699 | End: 2018-01-01
Payer: MEDICARE

## 2018-01-01 ENCOUNTER — APPOINTMENT (OUTPATIENT)
Dept: CT IMAGING | Age: 83
DRG: 690 | End: 2018-01-01
Payer: MEDICARE

## 2018-01-01 ENCOUNTER — APPOINTMENT (OUTPATIENT)
Dept: CT IMAGING | Age: 83
DRG: 698 | End: 2018-01-01
Payer: MEDICARE

## 2018-01-01 ENCOUNTER — OFFICE VISIT (OUTPATIENT)
Dept: CARDIOLOGY CLINIC | Age: 83
End: 2018-01-01
Payer: MEDICARE

## 2018-01-01 ENCOUNTER — APPOINTMENT (OUTPATIENT)
Dept: GENERAL RADIOLOGY | Age: 83
DRG: 690 | End: 2018-01-01
Payer: MEDICARE

## 2018-01-01 ENCOUNTER — CARE COORDINATION (OUTPATIENT)
Dept: CASE MANAGEMENT | Age: 83
End: 2018-01-01

## 2018-01-01 ENCOUNTER — NURSE ONLY (OUTPATIENT)
Dept: UROLOGY | Age: 83
End: 2018-01-01
Payer: MEDICARE

## 2018-01-01 ENCOUNTER — APPOINTMENT (OUTPATIENT)
Dept: INTERVENTIONAL RADIOLOGY/VASCULAR | Age: 83
DRG: 690 | End: 2018-01-01
Payer: MEDICARE

## 2018-01-01 ENCOUNTER — HOSPITAL ENCOUNTER (OUTPATIENT)
Age: 83
Setting detail: OUTPATIENT SURGERY
Discharge: HOME | End: 2018-08-03
Attending: UROLOGY | Admitting: UROLOGY
Payer: MEDICARE

## 2018-01-01 ENCOUNTER — APPOINTMENT (OUTPATIENT)
Dept: ULTRASOUND IMAGING | Age: 83
DRG: 698 | End: 2018-01-01
Payer: MEDICARE

## 2018-01-01 ENCOUNTER — TELEPHONE (OUTPATIENT)
Dept: NEPHROLOGY | Age: 83
End: 2018-01-01

## 2018-01-01 ENCOUNTER — TELEPHONE (OUTPATIENT)
Dept: PHARMACY | Facility: CLINIC | Age: 83
End: 2018-01-01

## 2018-01-01 ENCOUNTER — HOSPITAL ENCOUNTER (OUTPATIENT)
Age: 83
Discharge: HOME | End: 2018-11-05
Payer: MEDICARE

## 2018-01-01 ENCOUNTER — APPOINTMENT (OUTPATIENT)
Dept: NUCLEAR MEDICINE | Age: 83
DRG: 682 | End: 2018-01-01
Payer: MEDICARE

## 2018-01-01 ENCOUNTER — HOSPITAL ENCOUNTER (INPATIENT)
Age: 83
LOS: 9 days | Discharge: NURSING, REHAB OR LONG-TERM CARE | DRG: 682 | End: 2018-11-17
Attending: FAMILY MEDICINE | Admitting: INTERNAL MEDICINE
Payer: MEDICARE

## 2018-01-01 ENCOUNTER — HOSPITAL ENCOUNTER (OUTPATIENT)
Age: 83
Setting detail: SPECIMEN
Discharge: HOME | End: 2018-07-25
Payer: MEDICARE

## 2018-01-01 ENCOUNTER — HOSPITAL ENCOUNTER (INPATIENT)
Age: 83
LOS: 5 days | Discharge: HOME HEALTH | DRG: 698 | End: 2018-10-12
Attending: EMERGENCY MEDICINE | Admitting: INTERNAL MEDICINE
Payer: MEDICARE

## 2018-01-01 ENCOUNTER — HOSPITAL ENCOUNTER (INPATIENT)
Age: 83
LOS: 4 days | Discharge: HOME | DRG: 690 | End: 2018-05-02
Attending: FAMILY MEDICINE | Admitting: INTERNAL MEDICINE
Payer: MEDICARE

## 2018-01-01 ENCOUNTER — OFFICE VISIT (OUTPATIENT)
Dept: NEPHROLOGY | Age: 83
End: 2018-01-01
Payer: MEDICARE

## 2018-01-01 ENCOUNTER — APPOINTMENT (OUTPATIENT)
Dept: CT IMAGING | Age: 83
DRG: 699 | End: 2018-01-01
Payer: MEDICARE

## 2018-01-01 ENCOUNTER — OFFICE VISIT (OUTPATIENT)
Dept: UROLOGY | Age: 83
End: 2018-01-01
Payer: MEDICARE

## 2018-01-01 ENCOUNTER — HOSPITAL ENCOUNTER (OUTPATIENT)
Age: 83
Setting detail: SPECIMEN
Discharge: HOME | End: 2018-10-25
Payer: MEDICARE

## 2018-01-01 ENCOUNTER — TELEPHONE (OUTPATIENT)
Dept: CARDIOLOGY CLINIC | Age: 83
End: 2018-01-01

## 2018-01-01 ENCOUNTER — APPOINTMENT (OUTPATIENT)
Dept: GENERAL RADIOLOGY | Age: 83
DRG: 682 | End: 2018-01-01
Payer: MEDICARE

## 2018-01-01 ENCOUNTER — APPOINTMENT (OUTPATIENT)
Dept: CT IMAGING | Age: 83
DRG: 481 | End: 2018-01-01
Payer: MEDICARE

## 2018-01-01 ENCOUNTER — HOSPITAL ENCOUNTER (OUTPATIENT)
Age: 83
Setting detail: SPECIMEN
Discharge: HOME | End: 2018-08-29
Payer: MEDICARE

## 2018-01-01 ENCOUNTER — HOSPITAL ENCOUNTER (OUTPATIENT)
Dept: NURSING | Age: 83
Discharge: HOME | End: 2018-03-30
Payer: MEDICARE

## 2018-01-01 ENCOUNTER — APPOINTMENT (OUTPATIENT)
Dept: GENERAL RADIOLOGY | Age: 83
DRG: 560 | End: 2018-01-01
Attending: FAMILY MEDICINE
Payer: MEDICARE

## 2018-01-01 ENCOUNTER — APPOINTMENT (OUTPATIENT)
Dept: MRI IMAGING | Age: 83
DRG: 682 | End: 2018-01-01
Payer: MEDICARE

## 2018-01-01 ENCOUNTER — PROCEDURE VISIT (OUTPATIENT)
Dept: UROLOGY | Age: 83
End: 2018-01-01
Payer: MEDICARE

## 2018-01-01 ENCOUNTER — CLINICAL DOCUMENTATION (OUTPATIENT)
Dept: FAMILY MEDICINE CLINIC | Age: 83
End: 2018-01-01

## 2018-01-01 ENCOUNTER — HOSPITAL ENCOUNTER (OUTPATIENT)
Age: 83
Setting detail: SPECIMEN
Discharge: HOME | End: 2018-09-21
Payer: MEDICARE

## 2018-01-01 ENCOUNTER — ANESTHESIA (OUTPATIENT)
Dept: OPERATING ROOM | Age: 83
DRG: 481 | End: 2018-01-01
Payer: MEDICARE

## 2018-01-01 ENCOUNTER — HOSPITAL ENCOUNTER (OUTPATIENT)
Age: 83
Discharge: HOME | End: 2018-03-30
Payer: MEDICARE

## 2018-01-01 ENCOUNTER — ANESTHESIA EVENT (OUTPATIENT)
Dept: OPERATING ROOM | Age: 83
End: 2018-01-01
Payer: MEDICARE

## 2018-01-01 ENCOUNTER — TELEPHONE (OUTPATIENT)
Dept: ADMINISTRATIVE | Age: 83
End: 2018-01-01

## 2018-01-01 ENCOUNTER — HOSPITAL ENCOUNTER (INPATIENT)
Age: 83
LOS: 5 days | Discharge: HOME HEALTH | DRG: 698 | End: 2018-09-16
Attending: FAMILY MEDICINE | Admitting: HOSPITALIST
Payer: MEDICARE

## 2018-01-01 ENCOUNTER — ANESTHESIA EVENT (OUTPATIENT)
Dept: OPERATING ROOM | Age: 83
DRG: 481 | End: 2018-01-01
Payer: MEDICARE

## 2018-01-01 ENCOUNTER — APPOINTMENT (OUTPATIENT)
Dept: CT IMAGING | Age: 83
DRG: 682 | End: 2018-01-01
Payer: MEDICARE

## 2018-01-01 ENCOUNTER — HOSPITAL ENCOUNTER (INPATIENT)
Age: 83
LOS: 3 days | Discharge: HOME HEALTH | DRG: 699 | End: 2018-12-20
Attending: FAMILY MEDICINE
Payer: MEDICARE

## 2018-01-01 ENCOUNTER — HOSPITAL ENCOUNTER (OUTPATIENT)
Age: 83
Setting detail: SPECIMEN
Discharge: HOME | End: 2018-07-23
Payer: MEDICARE

## 2018-01-01 ENCOUNTER — CARE COORDINATION (OUTPATIENT)
Dept: NEPHROLOGY | Age: 83
End: 2018-01-01

## 2018-01-01 ENCOUNTER — ANESTHESIA (OUTPATIENT)
Dept: OPERATING ROOM | Age: 83
End: 2018-01-01
Payer: MEDICARE

## 2018-01-01 VITALS
TEMPERATURE: 98 F | OXYGEN SATURATION: 98 % | SYSTOLIC BLOOD PRESSURE: 128 MMHG | RESPIRATION RATE: 20 BRPM | DIASTOLIC BLOOD PRESSURE: 67 MMHG | HEART RATE: 82 BPM

## 2018-01-01 VITALS
RESPIRATION RATE: 16 BRPM | SYSTOLIC BLOOD PRESSURE: 128 MMHG | HEIGHT: 57 IN | HEART RATE: 81 BPM | TEMPERATURE: 98.4 F | DIASTOLIC BLOOD PRESSURE: 66 MMHG | WEIGHT: 129 LBS | BODY MASS INDEX: 27.83 KG/M2

## 2018-01-01 VITALS
HEART RATE: 60 BPM | WEIGHT: 141.9 LBS | HEIGHT: 58 IN | BODY MASS INDEX: 29.78 KG/M2 | RESPIRATION RATE: 12 BRPM | SYSTOLIC BLOOD PRESSURE: 124 MMHG | DIASTOLIC BLOOD PRESSURE: 68 MMHG

## 2018-01-01 VITALS
HEIGHT: 57 IN | SYSTOLIC BLOOD PRESSURE: 132 MMHG | TEMPERATURE: 98.5 F | RESPIRATION RATE: 14 BRPM | BODY MASS INDEX: 30.42 KG/M2 | HEART RATE: 96 BPM | WEIGHT: 141 LBS | OXYGEN SATURATION: 98 % | DIASTOLIC BLOOD PRESSURE: 62 MMHG

## 2018-01-01 VITALS
RESPIRATION RATE: 20 BRPM | HEIGHT: 57 IN | BODY MASS INDEX: 29.4 KG/M2 | SYSTOLIC BLOOD PRESSURE: 149 MMHG | OXYGEN SATURATION: 97 % | WEIGHT: 136.3 LBS | DIASTOLIC BLOOD PRESSURE: 66 MMHG | HEART RATE: 82 BPM | TEMPERATURE: 96.8 F

## 2018-01-01 VITALS
OXYGEN SATURATION: 96 % | TEMPERATURE: 98.9 F | RESPIRATION RATE: 20 BRPM | DIASTOLIC BLOOD PRESSURE: 58 MMHG | HEART RATE: 77 BPM | HEIGHT: 57 IN | WEIGHT: 127.2 LBS | BODY MASS INDEX: 27.44 KG/M2 | SYSTOLIC BLOOD PRESSURE: 121 MMHG

## 2018-01-01 VITALS
RESPIRATION RATE: 18 BRPM | DIASTOLIC BLOOD PRESSURE: 60 MMHG | HEIGHT: 57 IN | WEIGHT: 134.7 LBS | SYSTOLIC BLOOD PRESSURE: 131 MMHG | HEART RATE: 89 BPM | BODY MASS INDEX: 29.06 KG/M2 | TEMPERATURE: 98.6 F | OXYGEN SATURATION: 92 %

## 2018-01-01 VITALS
SYSTOLIC BLOOD PRESSURE: 108 MMHG | DIASTOLIC BLOOD PRESSURE: 64 MMHG | HEART RATE: 68 BPM | BODY MASS INDEX: 26.75 KG/M2 | HEIGHT: 57 IN | RESPIRATION RATE: 16 BRPM | WEIGHT: 124 LBS

## 2018-01-01 VITALS
SYSTOLIC BLOOD PRESSURE: 110 MMHG | OXYGEN SATURATION: 98 % | WEIGHT: 126 LBS | DIASTOLIC BLOOD PRESSURE: 58 MMHG | HEART RATE: 84 BPM | BODY MASS INDEX: 27.18 KG/M2 | HEIGHT: 57 IN

## 2018-01-01 VITALS
HEIGHT: 57 IN | BODY MASS INDEX: 29.51 KG/M2 | HEART RATE: 88 BPM | SYSTOLIC BLOOD PRESSURE: 120 MMHG | RESPIRATION RATE: 20 BRPM | WEIGHT: 136.8 LBS | DIASTOLIC BLOOD PRESSURE: 66 MMHG

## 2018-01-01 VITALS
DIASTOLIC BLOOD PRESSURE: 62 MMHG | SYSTOLIC BLOOD PRESSURE: 136 MMHG | TEMPERATURE: 98.6 F | OXYGEN SATURATION: 100 % | RESPIRATION RATE: 5 BRPM

## 2018-01-01 VITALS
HEART RATE: 84 BPM | TEMPERATURE: 98.2 F | WEIGHT: 132.28 LBS | RESPIRATION RATE: 16 BRPM | HEIGHT: 57 IN | OXYGEN SATURATION: 95 % | BODY MASS INDEX: 28.54 KG/M2 | SYSTOLIC BLOOD PRESSURE: 122 MMHG | DIASTOLIC BLOOD PRESSURE: 57 MMHG

## 2018-01-01 VITALS
SYSTOLIC BLOOD PRESSURE: 128 MMHG | WEIGHT: 134 LBS | BODY MASS INDEX: 28.13 KG/M2 | DIASTOLIC BLOOD PRESSURE: 80 MMHG | HEIGHT: 58 IN

## 2018-01-01 VITALS
HEIGHT: 57 IN | SYSTOLIC BLOOD PRESSURE: 92 MMHG | WEIGHT: 130 LBS | DIASTOLIC BLOOD PRESSURE: 60 MMHG | BODY MASS INDEX: 28.05 KG/M2

## 2018-01-01 VITALS
HEIGHT: 58 IN | HEART RATE: 78 BPM | SYSTOLIC BLOOD PRESSURE: 108 MMHG | BODY MASS INDEX: 26.01 KG/M2 | DIASTOLIC BLOOD PRESSURE: 60 MMHG | WEIGHT: 123.9 LBS

## 2018-01-01 VITALS
OXYGEN SATURATION: 95 % | WEIGHT: 136 LBS | SYSTOLIC BLOOD PRESSURE: 118 MMHG | HEIGHT: 58 IN | BODY MASS INDEX: 28.55 KG/M2 | HEART RATE: 98 BPM | DIASTOLIC BLOOD PRESSURE: 76 MMHG

## 2018-01-01 VITALS
BODY MASS INDEX: 28.06 KG/M2 | HEART RATE: 88 BPM | SYSTOLIC BLOOD PRESSURE: 126 MMHG | HEIGHT: 57 IN | WEIGHT: 130.07 LBS | DIASTOLIC BLOOD PRESSURE: 66 MMHG

## 2018-01-01 VITALS
RESPIRATION RATE: 16 BRPM | SYSTOLIC BLOOD PRESSURE: 112 MMHG | HEART RATE: 88 BPM | HEIGHT: 56 IN | BODY MASS INDEX: 28.82 KG/M2 | OXYGEN SATURATION: 98 % | TEMPERATURE: 98.7 F | WEIGHT: 128.1 LBS | DIASTOLIC BLOOD PRESSURE: 68 MMHG

## 2018-01-01 VITALS
SYSTOLIC BLOOD PRESSURE: 135 MMHG | BODY MASS INDEX: 28.05 KG/M2 | RESPIRATION RATE: 16 BRPM | OXYGEN SATURATION: 95 % | HEART RATE: 74 BPM | WEIGHT: 130 LBS | DIASTOLIC BLOOD PRESSURE: 62 MMHG | TEMPERATURE: 97.2 F | HEIGHT: 57 IN

## 2018-01-01 VITALS
HEART RATE: 84 BPM | BODY MASS INDEX: 25.31 KG/M2 | TEMPERATURE: 98.1 F | OXYGEN SATURATION: 95 % | DIASTOLIC BLOOD PRESSURE: 53 MMHG | HEIGHT: 57 IN | RESPIRATION RATE: 18 BRPM | SYSTOLIC BLOOD PRESSURE: 108 MMHG | WEIGHT: 117.3 LBS

## 2018-01-01 VITALS
OXYGEN SATURATION: 97 % | RESPIRATION RATE: 18 BRPM | DIASTOLIC BLOOD PRESSURE: 63 MMHG | SYSTOLIC BLOOD PRESSURE: 133 MMHG | HEART RATE: 85 BPM | HEIGHT: 57 IN | WEIGHT: 138.9 LBS | BODY MASS INDEX: 29.96 KG/M2 | TEMPERATURE: 99.4 F

## 2018-01-01 VITALS
SYSTOLIC BLOOD PRESSURE: 128 MMHG | HEART RATE: 76 BPM | RESPIRATION RATE: 20 BRPM | DIASTOLIC BLOOD PRESSURE: 70 MMHG | OXYGEN SATURATION: 93 %

## 2018-01-01 VITALS
BODY MASS INDEX: 29.51 KG/M2 | HEIGHT: 57 IN | SYSTOLIC BLOOD PRESSURE: 132 MMHG | WEIGHT: 136.8 LBS | DIASTOLIC BLOOD PRESSURE: 64 MMHG

## 2018-01-01 VITALS
OXYGEN SATURATION: 93 % | SYSTOLIC BLOOD PRESSURE: 106 MMHG | HEIGHT: 58 IN | BODY MASS INDEX: 27.29 KG/M2 | HEART RATE: 84 BPM | WEIGHT: 130 LBS | DIASTOLIC BLOOD PRESSURE: 60 MMHG

## 2018-01-01 VITALS
BODY MASS INDEX: 30.02 KG/M2 | DIASTOLIC BLOOD PRESSURE: 73 MMHG | HEIGHT: 58 IN | WEIGHT: 143 LBS | SYSTOLIC BLOOD PRESSURE: 145 MMHG | HEART RATE: 88 BPM

## 2018-01-01 VITALS
HEIGHT: 57 IN | OXYGEN SATURATION: 97 % | BODY MASS INDEX: 27.79 KG/M2 | WEIGHT: 128.8 LBS | TEMPERATURE: 98 F | RESPIRATION RATE: 16 BRPM | HEART RATE: 89 BPM | DIASTOLIC BLOOD PRESSURE: 64 MMHG | SYSTOLIC BLOOD PRESSURE: 138 MMHG

## 2018-01-01 VITALS
WEIGHT: 138.9 LBS | HEART RATE: 96 BPM | BODY MASS INDEX: 29.15 KG/M2 | SYSTOLIC BLOOD PRESSURE: 128 MMHG | HEIGHT: 58 IN | DIASTOLIC BLOOD PRESSURE: 64 MMHG

## 2018-01-01 VITALS
SYSTOLIC BLOOD PRESSURE: 102 MMHG | BODY MASS INDEX: 29.6 KG/M2 | HEIGHT: 58 IN | DIASTOLIC BLOOD PRESSURE: 72 MMHG | WEIGHT: 141 LBS

## 2018-01-01 VITALS
SYSTOLIC BLOOD PRESSURE: 119 MMHG | RESPIRATION RATE: 14 BRPM | OXYGEN SATURATION: 100 % | DIASTOLIC BLOOD PRESSURE: 57 MMHG

## 2018-01-01 DIAGNOSIS — F40.9 INSOMNIA DUE TO ANXIETY AND FEAR: ICD-10-CM

## 2018-01-01 DIAGNOSIS — N20.0 NEPHROLITHIASIS: ICD-10-CM

## 2018-01-01 DIAGNOSIS — K55.9 ISCHEMIC COLON (HCC): ICD-10-CM

## 2018-01-01 DIAGNOSIS — N30.90 CYSTITIS: Primary | ICD-10-CM

## 2018-01-01 DIAGNOSIS — E78.5 DYSLIPIDEMIA: ICD-10-CM

## 2018-01-01 DIAGNOSIS — N30.90 RECURRENT CYSTITIS: ICD-10-CM

## 2018-01-01 DIAGNOSIS — G25.81 RESTLESS LEGS SYNDROME (RLS): ICD-10-CM

## 2018-01-01 DIAGNOSIS — I25.10 CORONARY ARTERY DISEASE INVOLVING NATIVE CORONARY ARTERY OF NATIVE HEART WITHOUT ANGINA PECTORIS: ICD-10-CM

## 2018-01-01 DIAGNOSIS — Z85.51 HISTORY OF BLADDER CANCER: ICD-10-CM

## 2018-01-01 DIAGNOSIS — I95.9 HYPOTENSION, UNSPECIFIED HYPOTENSION TYPE: ICD-10-CM

## 2018-01-01 DIAGNOSIS — I25.118 CORONARY ARTERY DISEASE OF NATIVE ARTERY OF NATIVE HEART WITH STABLE ANGINA PECTORIS (HCC): ICD-10-CM

## 2018-01-01 DIAGNOSIS — F32.5 MAJOR DEPRESSIVE DISORDER, SINGLE EPISODE, IN FULL REMISSION (HCC): ICD-10-CM

## 2018-01-01 DIAGNOSIS — I10 ESSENTIAL HYPERTENSION: ICD-10-CM

## 2018-01-01 DIAGNOSIS — C67.9 MALIGNANT NEOPLASM OF URINARY BLADDER, UNSPECIFIED SITE (HCC): Primary | ICD-10-CM

## 2018-01-01 DIAGNOSIS — R10.30 LOWER ABDOMINAL PAIN: ICD-10-CM

## 2018-01-01 DIAGNOSIS — N13.5 URETERAL OBSTRUCTION: Primary | ICD-10-CM

## 2018-01-01 DIAGNOSIS — S72.112A CLOSED DISPLACED FRACTURE OF GREATER TROCHANTER OF LEFT FEMUR, INITIAL ENCOUNTER (HCC): Primary | ICD-10-CM

## 2018-01-01 DIAGNOSIS — R31.0 GROSS HEMATURIA: ICD-10-CM

## 2018-01-01 DIAGNOSIS — M15.9 PRIMARY OSTEOARTHRITIS INVOLVING MULTIPLE JOINTS: ICD-10-CM

## 2018-01-01 DIAGNOSIS — R53.81 PHYSICAL DECONDITIONING: Primary | ICD-10-CM

## 2018-01-01 DIAGNOSIS — F32.9 REACTIVE DEPRESSION: ICD-10-CM

## 2018-01-01 DIAGNOSIS — E78.00 HYPERCHOLESTEREMIA: ICD-10-CM

## 2018-01-01 DIAGNOSIS — I50.22 CHRONIC SYSTOLIC CHF (CONGESTIVE HEART FAILURE) (HCC): ICD-10-CM

## 2018-01-01 DIAGNOSIS — K26.9 DUODENAL ULCER: ICD-10-CM

## 2018-01-01 DIAGNOSIS — Z51.81 MEDICATION MONITORING ENCOUNTER: ICD-10-CM

## 2018-01-01 DIAGNOSIS — R33.9 URINARY RETENTION: ICD-10-CM

## 2018-01-01 DIAGNOSIS — N13.5 URETER OBSTRUCTION: Primary | ICD-10-CM

## 2018-01-01 DIAGNOSIS — N32.89 BLADDER SPASMS: ICD-10-CM

## 2018-01-01 DIAGNOSIS — I50.9 CONGESTIVE HEART FAILURE, UNSPECIFIED HF CHRONICITY, UNSPECIFIED HEART FAILURE TYPE (HCC): Primary | ICD-10-CM

## 2018-01-01 DIAGNOSIS — E87.8 LOW BICARBONATE: ICD-10-CM

## 2018-01-01 DIAGNOSIS — N18.30 CHRONIC KIDNEY DISEASE, STAGE 3 (HCC): ICD-10-CM

## 2018-01-01 DIAGNOSIS — Z95.5 PRESENCE OF STENT IN LAD CORONARY ARTERY: ICD-10-CM

## 2018-01-01 DIAGNOSIS — C80.1 CANCER (HCC): ICD-10-CM

## 2018-01-01 DIAGNOSIS — N13.30 HYDRONEPHROSIS OF RIGHT KIDNEY: Primary | ICD-10-CM

## 2018-01-01 DIAGNOSIS — R06.02 SOB (SHORTNESS OF BREATH) ON EXERTION: ICD-10-CM

## 2018-01-01 DIAGNOSIS — R31.0 GROSS HEMATURIA: Primary | ICD-10-CM

## 2018-01-01 DIAGNOSIS — S72.112S CLOSED DISPLACED FRACTURE OF GREATER TROCHANTER OF LEFT FEMUR, SEQUELA: ICD-10-CM

## 2018-01-01 DIAGNOSIS — I10 ESSENTIAL HYPERTENSION: Primary | ICD-10-CM

## 2018-01-01 DIAGNOSIS — N17.9 ACUTE KIDNEY INJURY (HCC): ICD-10-CM

## 2018-01-01 DIAGNOSIS — R33.8 CLOT RETENTION OF URINE: ICD-10-CM

## 2018-01-01 DIAGNOSIS — N30.01 ACUTE CYSTITIS WITH HEMATURIA: ICD-10-CM

## 2018-01-01 DIAGNOSIS — N18.4 CKD (CHRONIC KIDNEY DISEASE) STAGE 4, GFR 15-29 ML/MIN (HCC): ICD-10-CM

## 2018-01-01 DIAGNOSIS — J45.20 MILD INTERMITTENT REACTIVE AIRWAY DISEASE WITHOUT COMPLICATION: ICD-10-CM

## 2018-01-01 DIAGNOSIS — R77.8 ELEVATED TROPONIN: ICD-10-CM

## 2018-01-01 DIAGNOSIS — C79.51 BLADDER CANCER METASTASIZED TO BONE (HCC): ICD-10-CM

## 2018-01-01 DIAGNOSIS — E87.5 HYPERKALEMIA: ICD-10-CM

## 2018-01-01 DIAGNOSIS — K21.9 GASTROESOPHAGEAL REFLUX DISEASE, ESOPHAGITIS PRESENCE NOT SPECIFIED: ICD-10-CM

## 2018-01-01 DIAGNOSIS — I25.118 CORONARY ARTERY DISEASE OF NATIVE ARTERY OF NATIVE HEART WITH STABLE ANGINA PECTORIS (HCC): Primary | ICD-10-CM

## 2018-01-01 DIAGNOSIS — N13.30 HYDRONEPHROSIS OF RIGHT KIDNEY: ICD-10-CM

## 2018-01-01 DIAGNOSIS — M16.9 OSTEOARTHROSIS, HIP: ICD-10-CM

## 2018-01-01 DIAGNOSIS — Z09 HOSPITAL DISCHARGE FOLLOW-UP: Primary | ICD-10-CM

## 2018-01-01 DIAGNOSIS — I25.10 ASCVD (ARTERIOSCLEROTIC CARDIOVASCULAR DISEASE): ICD-10-CM

## 2018-01-01 DIAGNOSIS — R10.9 ABDOMINAL PAIN, UNSPECIFIED ABDOMINAL LOCATION: ICD-10-CM

## 2018-01-01 DIAGNOSIS — N81.10 BLADDER PROLAPSE, FEMALE, ACQUIRED: Chronic | ICD-10-CM

## 2018-01-01 DIAGNOSIS — F41.9 ANXIETY: Primary | ICD-10-CM

## 2018-01-01 DIAGNOSIS — I50.21 ACUTE SYSTOLIC CONGESTIVE HEART FAILURE (HCC): Primary | ICD-10-CM

## 2018-01-01 DIAGNOSIS — N13.30 HYDRONEPHROSIS, RIGHT: Primary | ICD-10-CM

## 2018-01-01 DIAGNOSIS — B37.0 THRUSH: Primary | ICD-10-CM

## 2018-01-01 DIAGNOSIS — Z97.8 FOLEY CATHETER IN PLACE ON ADMISSION: ICD-10-CM

## 2018-01-01 DIAGNOSIS — Z95.820 S/P ANGIOPLASTY WITH STENT: ICD-10-CM

## 2018-01-01 DIAGNOSIS — M15.9 GENERALIZED OA: ICD-10-CM

## 2018-01-01 DIAGNOSIS — T14.8XXA HEMATOMA: ICD-10-CM

## 2018-01-01 DIAGNOSIS — R53.81 PHYSICAL DECONDITIONING: ICD-10-CM

## 2018-01-01 DIAGNOSIS — Z91.81 AT HIGH RISK FOR FALLS: ICD-10-CM

## 2018-01-01 DIAGNOSIS — E87.20 METABOLIC ACIDOSIS: ICD-10-CM

## 2018-01-01 DIAGNOSIS — N17.9 AKI (ACUTE KIDNEY INJURY) (HCC): ICD-10-CM

## 2018-01-01 DIAGNOSIS — C78.00 BLADDER CANCER METASTASIZED TO LUNG (HCC): ICD-10-CM

## 2018-01-01 DIAGNOSIS — N30.90 RECURRENT CYSTITIS: Primary | ICD-10-CM

## 2018-01-01 DIAGNOSIS — N18.4 CHRONIC KIDNEY DISEASE, STAGE 4 (SEVERE) (HCC): ICD-10-CM

## 2018-01-01 DIAGNOSIS — N18.30 CHRONIC KIDNEY DISEASE, STAGE 3 (HCC): Primary | ICD-10-CM

## 2018-01-01 DIAGNOSIS — I50.32 CHRONIC DIASTOLIC HEART FAILURE (HCC): ICD-10-CM

## 2018-01-01 DIAGNOSIS — R30.0 DYSURIA: ICD-10-CM

## 2018-01-01 DIAGNOSIS — R89.9 ABNORMAL LABORATORY TEST RESULT: Primary | ICD-10-CM

## 2018-01-01 DIAGNOSIS — C67.9 BLADDER CANCER METASTASIZED TO LUNG (HCC): ICD-10-CM

## 2018-01-01 DIAGNOSIS — Z95.5 HISTORY OF PLACEMENT OF STENT IN LAD CORONARY ARTERY: ICD-10-CM

## 2018-01-01 DIAGNOSIS — F41.9 ANXIETY: ICD-10-CM

## 2018-01-01 DIAGNOSIS — N18.4 CKD (CHRONIC KIDNEY DISEASE), STAGE IV (HCC): ICD-10-CM

## 2018-01-01 DIAGNOSIS — F51.05 INSOMNIA DUE TO ANXIETY AND FEAR: ICD-10-CM

## 2018-01-01 DIAGNOSIS — J96.01 ACUTE RESPIRATORY FAILURE WITH HYPOXIA (HCC): Primary | ICD-10-CM

## 2018-01-01 DIAGNOSIS — I50.32 CHRONIC DIASTOLIC CONGESTIVE HEART FAILURE (HCC): Primary | ICD-10-CM

## 2018-01-01 DIAGNOSIS — C79.51 PAIN DUE TO MALIGNANT NEOPLASM METASTATIC TO BONE (HCC): ICD-10-CM

## 2018-01-01 DIAGNOSIS — J30.1 ACUTE SEASONAL ALLERGIC RHINITIS DUE TO POLLEN: ICD-10-CM

## 2018-01-01 DIAGNOSIS — N13.4 HYDROURETER ON LEFT: ICD-10-CM

## 2018-01-01 DIAGNOSIS — I50.32 CHRONIC DIASTOLIC CONGESTIVE HEART FAILURE (HCC): ICD-10-CM

## 2018-01-01 DIAGNOSIS — R31.9 HEMATURIA, UNSPECIFIED TYPE: Primary | ICD-10-CM

## 2018-01-01 DIAGNOSIS — C67.9 BLADDER CANCER METASTASIZED TO BONE (HCC): ICD-10-CM

## 2018-01-01 DIAGNOSIS — D72.829 LEUKOCYTOSIS, UNSPECIFIED TYPE: ICD-10-CM

## 2018-01-01 DIAGNOSIS — N13.30 HYDRONEPHROSIS, UNSPECIFIED HYDRONEPHROSIS TYPE: ICD-10-CM

## 2018-01-01 DIAGNOSIS — R41.0 CONFUSION: ICD-10-CM

## 2018-01-01 DIAGNOSIS — N32.89 BLOOD CLOT IN BLADDER: ICD-10-CM

## 2018-01-01 DIAGNOSIS — R55 SYNCOPE AND COLLAPSE: ICD-10-CM

## 2018-01-01 DIAGNOSIS — E78.49 OTHER HYPERLIPIDEMIA: ICD-10-CM

## 2018-01-01 DIAGNOSIS — G25.81 RLS (RESTLESS LEGS SYNDROME): ICD-10-CM

## 2018-01-01 DIAGNOSIS — R79.89 ELEVATED BRAIN NATRIURETIC PEPTIDE (BNP) LEVEL: ICD-10-CM

## 2018-01-01 DIAGNOSIS — M89.8X9 METABOLIC BONE DISEASE: Primary | ICD-10-CM

## 2018-01-01 DIAGNOSIS — E44.0 MODERATE MALNUTRITION (HCC): ICD-10-CM

## 2018-01-01 DIAGNOSIS — N18.9 ACUTE KIDNEY INJURY SUPERIMPOSED ON CHRONIC KIDNEY DISEASE (HCC): ICD-10-CM

## 2018-01-01 DIAGNOSIS — J30.1 SEASONAL ALLERGIC RHINITIS DUE TO POLLEN: ICD-10-CM

## 2018-01-01 DIAGNOSIS — S22.32XS CLOSED FRACTURE OF ONE RIB OF LEFT SIDE, SEQUELA: ICD-10-CM

## 2018-01-01 DIAGNOSIS — G89.3 PAIN DUE TO MALIGNANT NEOPLASM METASTATIC TO BONE (HCC): ICD-10-CM

## 2018-01-01 DIAGNOSIS — H81.13 BPV (BENIGN POSITIONAL VERTIGO), BILATERAL: ICD-10-CM

## 2018-01-01 DIAGNOSIS — R93.89 ABNORMAL CT SCAN: ICD-10-CM

## 2018-01-01 DIAGNOSIS — R33.8 CLOT RETENTION OF URINE: Primary | ICD-10-CM

## 2018-01-01 DIAGNOSIS — I50.32 CHF (CONGESTIVE HEART FAILURE), NYHA CLASS III, CHRONIC, DIASTOLIC (HCC): Primary | ICD-10-CM

## 2018-01-01 DIAGNOSIS — I95.0 IDIOPATHIC HYPOTENSION: ICD-10-CM

## 2018-01-01 DIAGNOSIS — J30.89 NON-SEASONAL ALLERGIC RHINITIS, UNSPECIFIED TRIGGER: ICD-10-CM

## 2018-01-01 DIAGNOSIS — I50.33 ACUTE ON CHRONIC DIASTOLIC (CONGESTIVE) HEART FAILURE (HCC): Primary | ICD-10-CM

## 2018-01-01 DIAGNOSIS — N18.4 STAGE 4 CHRONIC KIDNEY DISEASE (HCC): ICD-10-CM

## 2018-01-01 DIAGNOSIS — Q62.10 URETERAL STENOSIS: Primary | ICD-10-CM

## 2018-01-01 DIAGNOSIS — S72.092A: ICD-10-CM

## 2018-01-01 DIAGNOSIS — N39.0 COMPLICATED UTI (URINARY TRACT INFECTION): ICD-10-CM

## 2018-01-01 DIAGNOSIS — Z23 NEED FOR PROPHYLACTIC VACCINATION AGAINST STREPTOCOCCUS PNEUMONIAE (PNEUMOCOCCUS): ICD-10-CM

## 2018-01-01 DIAGNOSIS — I25.10 CORONARY ARTERY DISEASE INVOLVING NATIVE CORONARY ARTERY OF NATIVE HEART WITHOUT ANGINA PECTORIS: Primary | ICD-10-CM

## 2018-01-01 DIAGNOSIS — Q62.10 URETERAL STENOSIS: ICD-10-CM

## 2018-01-01 DIAGNOSIS — N17.9 ACUTE KIDNEY INJURY SUPERIMPOSED ON CHRONIC KIDNEY DISEASE (HCC): ICD-10-CM

## 2018-01-01 DIAGNOSIS — S22.32XA CLOSED FRACTURE OF ONE RIB OF LEFT SIDE, INITIAL ENCOUNTER: ICD-10-CM

## 2018-01-01 DIAGNOSIS — C67.9 MALIGNANT NEOPLASM OF URINARY BLADDER, UNSPECIFIED SITE (HCC): ICD-10-CM

## 2018-01-01 DIAGNOSIS — S72.092A: Primary | ICD-10-CM

## 2018-01-01 DIAGNOSIS — K52.9 CHRONIC DIARRHEA: ICD-10-CM
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
## 2018-01-01 DIAGNOSIS — M15.9 PRIMARY OSTEOARTHRITIS INVOLVING MULTIPLE JOINTS: Primary | ICD-10-CM

## 2018-01-01 DIAGNOSIS — I50.30 HEART FAILURE WITH PRESERVED EJECTION FRACTION (HCC): ICD-10-CM

## 2018-01-01 DIAGNOSIS — N18.4 CKD (CHRONIC KIDNEY DISEASE), STAGE IV (HCC): Primary | ICD-10-CM

## 2018-01-01 DIAGNOSIS — I42.0 DILATED CARDIOMYOPATHY (HCC): ICD-10-CM

## 2018-01-01 DIAGNOSIS — F41.8 ANXIETY WITH DEPRESSION: ICD-10-CM

## 2018-01-01 LAB
ABO: NORMAL
ABO: NORMAL
ACETAMINOPHEN LEVEL: < 5 UG/ML (ref 0–20)
ACINETOBACTER BAUMANNII FILM ARRAY: NOT DETECTED
ADENOVIRUS F 40 41 PCR: NOT DETECTED
ALBUMIN SERPL-MCNC: 2.8 G/DL (ref 3.5–5.1)
ALBUMIN SERPL-MCNC: 2.9 G/DL (ref 3.5–5.1)
ALBUMIN SERPL-MCNC: 3 G/DL (ref 3.5–5.1)
ALBUMIN SERPL-MCNC: 3.1 G/DL (ref 3.5–5.1)
ALBUMIN SERPL-MCNC: 3.2 G/DL (ref 3.5–5.1)
ALBUMIN SERPL-MCNC: 3.3 G/DL (ref 3.5–5.1)
ALBUMIN SERPL-MCNC: 3.4 G/DL (ref 3.5–5.1)
ALBUMIN SERPL-MCNC: 3.7 G/DL (ref 3.5–5.1)
ALBUMIN SERPL-MCNC: 3.7 G/DL (ref 3.5–5.1)
ALBUMIN SERPL-MCNC: 3.9 G/DL (ref 3.5–5.1)
ALBUMIN SERPL-MCNC: 3.9 G/DL (ref 3.5–5.1)
ALBUMIN SERPL-MCNC: 4 G/DL (ref 3.5–5.1)
ALP BLD-CCNC: 109 U/L (ref 38–126)
ALP BLD-CCNC: 118 U/L (ref 38–126)
ALP BLD-CCNC: 141 U/L (ref 38–126)
ALP BLD-CCNC: 153 U/L (ref 38–126)
ALP BLD-CCNC: 153 U/L (ref 38–126)
ALP BLD-CCNC: 163 U/L (ref 38–126)
ALP BLD-CCNC: 163 U/L (ref 38–126)
ALP BLD-CCNC: 168 U/L (ref 38–126)
ALP BLD-CCNC: 183 U/L (ref 38–126)
ALP BLD-CCNC: 185 U/L (ref 38–126)
ALP BLD-CCNC: 83 U/L (ref 38–126)
ALP BLD-CCNC: 87 U/L (ref 38–126)
ALP BLD-CCNC: 95 U/L (ref 38–126)
ALPHA-1 ANTITRYPSIN: 170 MG/DL (ref 90–200)
ALT SERPL-CCNC: 13 U/L (ref 11–66)
ALT SERPL-CCNC: 7 U/L (ref 11–66)
ALT SERPL-CCNC: 8 U/L (ref 11–66)
ALT SERPL-CCNC: 9 U/L (ref 11–66)
ALT SERPL-CCNC: 9 U/L (ref 11–66)
AMMONIA: 28 UMOL/L (ref 11–60)
AMMONIA: 34 UMOL/L (ref 11–60)
AMMONIA: 36 UMOL/L (ref 11–60)
AMORPHOUS: ABNORMAL
AMORPHOUS: ABNORMAL
AMPHETAMINE+METHAMPHETAMINE URINE SCREEN: NEGATIVE
ANA SCREEN, REFLEXED: < 1
ANA SCREEN: DETECTED
ANION GAP SERPL CALCULATED.3IONS-SCNC: 10 MEQ/L (ref 8–16)
ANION GAP SERPL CALCULATED.3IONS-SCNC: 10 MEQ/L (ref 8–16)
ANION GAP SERPL CALCULATED.3IONS-SCNC: 11 MEQ/L (ref 8–16)
ANION GAP SERPL CALCULATED.3IONS-SCNC: 12 MEQ/L (ref 8–16)
ANION GAP SERPL CALCULATED.3IONS-SCNC: 13 MEQ/L (ref 8–16)
ANION GAP SERPL CALCULATED.3IONS-SCNC: 14 MEQ/L (ref 8–16)
ANION GAP SERPL CALCULATED.3IONS-SCNC: 15 MEQ/L (ref 8–16)
ANION GAP SERPL CALCULATED.3IONS-SCNC: 16 MEQ/L (ref 8–16)
ANION GAP SERPL CALCULATED.3IONS-SCNC: 17 MEQ/L (ref 8–16)
ANION GAP SERPL CALCULATED.3IONS-SCNC: 19 MEQ/L (ref 8–16)
ANION GAP SERPL CALCULATED.3IONS-SCNC: 9 MEQ/L (ref 8–16)
ANISOCYTOSIS: ABNORMAL
ANTIBODY SCREEN: NORMAL
ANTIBODY SCREEN: NORMAL
APTT: 22.5 SECONDS (ref 22–38)
APTT: 27.9 SECONDS (ref 22–38)
AST SERPL-CCNC: 10 U/L (ref 5–40)
AST SERPL-CCNC: 11 U/L (ref 5–40)
AST SERPL-CCNC: 12 U/L (ref 5–40)
AST SERPL-CCNC: 13 U/L (ref 5–40)
AST SERPL-CCNC: 13 U/L (ref 5–40)
AST SERPL-CCNC: 14 U/L (ref 5–40)
AST SERPL-CCNC: 15 U/L (ref 5–40)
AST SERPL-CCNC: 16 U/L (ref 5–40)
AST SERPL-CCNC: 17 U/L (ref 5–40)
AST SERPL-CCNC: 20 U/L (ref 5–40)
AST SERPL-CCNC: > 7000 U/L (ref 5–40)
ASTROVIRUS PCR: NOT DETECTED
BACTERIA: ABNORMAL
BACTERIA: ABNORMAL
BACTERIA: ABNORMAL /HPF
BARBITURATE QUANTITATIVE URINE: NEGATIVE
BASE EXCESS (CALCULATED): -9.5 MMOL/L (ref -2.5–2.5)
BASE EXCESS MIXED: 0.6 MMOL/L (ref -2–3)
BASOPHILS # BLD: 0.2 %
BASOPHILS # BLD: 0.3 %
BASOPHILS # BLD: 0.4 %
BASOPHILS # BLD: 0.5 %
BASOPHILS # BLD: 0.6 %
BASOPHILS # BLD: 0.6 %
BASOPHILS # BLD: 0.8 %
BASOPHILS # BLD: 0.9 %
BASOPHILS # BLD: 1.3 %
BASOPHILS ABSOLUTE: 0 THOU/MM3 (ref 0–0.1)
BASOPHILS ABSOLUTE: 0.1 THOU/MM3 (ref 0–0.1)
BASOPHILS ABSOLUTE: ABNORMAL /ΜL
BASOPHILS ABSOLUTE: ABNORMAL /ΜL
BASOPHILS RELATIVE PERCENT: ABNORMAL %
BASOPHILS RELATIVE PERCENT: ABNORMAL %
BENZODIAZEPINE QUANTITATIVE URINE: NEGATIVE
BILIRUB SERPL-MCNC: 0.2 MG/DL (ref 0.3–1.2)
BILIRUB SERPL-MCNC: 0.2 MG/DL (ref 0.3–1.2)
BILIRUB SERPL-MCNC: 0.3 MG/DL (ref 0.3–1.2)
BILIRUB SERPL-MCNC: 0.4 MG/DL (ref 0.3–1.2)
BILIRUBIN DIRECT: < 0.2 MG/DL (ref 0–0.3)
BILIRUBIN URINE: ABNORMAL
BILIRUBIN URINE: NEGATIVE
BLOOD CULTURE, ROUTINE: ABNORMAL
BLOOD CULTURE, ROUTINE: ABNORMAL
BLOOD CULTURE, ROUTINE: NORMAL
BLOOD, URINE: ABNORMAL
BOTTLE TYPE: ABNORMAL
BUN BLDV-MCNC: 16 MG/DL (ref 7–22)
BUN BLDV-MCNC: 17 MG/DL (ref 7–22)
BUN BLDV-MCNC: 18 MG/DL (ref 7–22)
BUN BLDV-MCNC: 19 MG/DL (ref 7–22)
BUN BLDV-MCNC: 20 MG/DL (ref 7–22)
BUN BLDV-MCNC: 21 MG/DL (ref 7–22)
BUN BLDV-MCNC: 23 MG/DL (ref 7–22)
BUN BLDV-MCNC: 23 MG/DL (ref 7–22)
BUN BLDV-MCNC: 24 MG/DL (ref 7–22)
BUN BLDV-MCNC: 25 MG/DL (ref 7–22)
BUN BLDV-MCNC: 26 MG/DL (ref 7–22)
BUN BLDV-MCNC: 26 MG/DL (ref 7–22)
BUN BLDV-MCNC: 27 MG/DL (ref 7–22)
BUN BLDV-MCNC: 28 MG/DL (ref 7–22)
BUN BLDV-MCNC: 29 MG/DL (ref 7–22)
BUN BLDV-MCNC: 29 MG/DL (ref 7–22)
BUN BLDV-MCNC: 30 MG/DL (ref 7–22)
BUN BLDV-MCNC: 31 MG/DL (ref 7–22)
BUN BLDV-MCNC: 31 MG/DL (ref 7–22)
BUN BLDV-MCNC: 32 MG/DL (ref 7–22)
BUN BLDV-MCNC: 32 MG/DL (ref 7–22)
BUN BLDV-MCNC: 33 MG/DL (ref 7–22)
BUN BLDV-MCNC: 33 MG/DL (ref 7–22)
BUN BLDV-MCNC: 36 MG/DL (ref 7–22)
BUN BLDV-MCNC: 38 MG/DL (ref 7–22)
BUN BLDV-MCNC: 39 MG/DL (ref 7–22)
BUN BLDV-MCNC: 40 MG/DL (ref 7–22)
BUN BLDV-MCNC: 41 MG/DL (ref 7–22)
BUN BLDV-MCNC: 42 MG/DL (ref 7–22)
BUN BLDV-MCNC: 45 MG/DL (ref 7–22)
BUN BLDV-MCNC: 46 MG/DL (ref 7–22)
BUN BLDV-MCNC: 47 MG/DL (ref 7–22)
BUN BLDV-MCNC: 48 MG/DL (ref 7–22)
BUN BLDV-MCNC: 48 MG/DL (ref 7–22)
BUN BLDV-MCNC: 51 MG/DL (ref 7–22)
BUN BLDV-MCNC: 52 MG/DL (ref 7–22)
BUN BLDV-MCNC: 56 MG/DL (ref 7–22)
BUN BLDV-MCNC: 56 MG/DL (ref 7–22)
BUN BLDV-MCNC: 57 MG/DL (ref 7–22)
CALCIUM IONIZED: 1.09 MMOL/L (ref 1.12–1.32)
CALCIUM SERPL-MCNC: 7.6 MG/DL (ref 8.5–10.5)
CALCIUM SERPL-MCNC: 7.6 MG/DL (ref 8.5–10.5)
CALCIUM SERPL-MCNC: 7.8 MG/DL (ref 8.5–10.5)
CALCIUM SERPL-MCNC: 7.8 MG/DL (ref 8.5–10.5)
CALCIUM SERPL-MCNC: 8 MG/DL (ref 8.5–10.5)
CALCIUM SERPL-MCNC: 8 MG/DL (ref 8.5–10.5)
CALCIUM SERPL-MCNC: 8.1 MG/DL (ref 8.5–10.5)
CALCIUM SERPL-MCNC: 8.2 MG/DL (ref 8.5–10.5)
CALCIUM SERPL-MCNC: 8.3 MG/DL (ref 8.5–10.5)
CALCIUM SERPL-MCNC: 8.4 MG/DL (ref 8.5–10.5)
CALCIUM SERPL-MCNC: 8.5 MG/DL (ref 8.5–10.5)
CALCIUM SERPL-MCNC: 8.6 MG/DL (ref 8.5–10.5)
CALCIUM SERPL-MCNC: 8.7 MG/DL (ref 8.5–10.5)
CALCIUM SERPL-MCNC: 8.7 MG/DL (ref 8.5–10.5)
CALCIUM SERPL-MCNC: 8.8 MG/DL (ref 8.5–10.5)
CALCIUM SERPL-MCNC: 8.9 MG/DL (ref 8.5–10.5)
CALCIUM SERPL-MCNC: 9 MG/DL (ref 8.5–10.5)
CALCIUM SERPL-MCNC: 9.1 MG/DL (ref 8.5–10.5)
CALCIUM SERPL-MCNC: 9.2 MG/DL (ref 8.5–10.5)
CALCIUM SERPL-MCNC: 9.3 MG/DL (ref 8.5–10.5)
CALCIUM SERPL-MCNC: 9.5 MG/DL (ref 8.5–10.5)
CALCIUM SERPL-MCNC: 9.6 MG/DL (ref 8.5–10.5)
CAMPYLOBACTER PCR: NOT DETECTED
CANDIDA ALBICANS FILM ARRAY: NOT DETECTED
CANDIDA GLABRATA FILM ARRAY: NOT DETECTED
CANDIDA KRUSEI FILM ARRAY: NOT DETECTED
CANDIDA PARAPSILOSIS FILM ARRAY: NOT DETECTED
CANDIDA TROPICALIS FILM ARRAY: NOT DETECTED
CANNABINOID QUANTITATIVE URINE: NEGATIVE
CARBAPENEM RESITANT FILM ARRAY: ABNORMAL
CASTS 2: ABNORMAL /LPF
CASTS UA: ABNORMAL /LPF
CASTS: ABNORMAL /LPF
CERULOPLASMIN: 30 MG/DL (ref 17–54)
CHARACTER, URINE: ABNORMAL
CHARACTER, URINE: CLEAR
CHARACTER, URINE: CLEAR
CHLORIDE BLD-SCNC: 100 MEQ/L (ref 98–111)
CHLORIDE BLD-SCNC: 101 MEQ/L (ref 98–111)
CHLORIDE BLD-SCNC: 101 MEQ/L (ref 98–111)
CHLORIDE BLD-SCNC: 102 MEQ/L (ref 98–111)
CHLORIDE BLD-SCNC: 103 MEQ/L (ref 98–111)
CHLORIDE BLD-SCNC: 104 MEQ/L (ref 98–111)
CHLORIDE BLD-SCNC: 105 MEQ/L (ref 98–111)
CHLORIDE BLD-SCNC: 106 MEQ/L (ref 98–111)
CHLORIDE BLD-SCNC: 107 MEQ/L (ref 98–111)
CHLORIDE BLD-SCNC: 108 MEQ/L (ref 98–111)
CHLORIDE BLD-SCNC: 108 MEQ/L (ref 98–111)
CHLORIDE BLD-SCNC: 109 MEQ/L (ref 98–111)
CHLORIDE BLD-SCNC: 110 MEQ/L (ref 98–111)
CHLORIDE BLD-SCNC: 110 MEQ/L (ref 98–111)
CHLORIDE BLD-SCNC: 111 MEQ/L (ref 98–111)
CHLORIDE BLD-SCNC: 113 MEQ/L (ref 98–111)
CHLORIDE BLD-SCNC: 114 MEQ/L (ref 98–111)
CHLORIDE BLD-SCNC: 116 MEQ/L (ref 98–111)
CHLORIDE BLD-SCNC: 95 MEQ/L (ref 98–111)
CHLORIDE BLD-SCNC: 96 MEQ/L (ref 98–111)
CHLORIDE BLD-SCNC: 97 MEQ/L (ref 98–111)
CHLORIDE BLD-SCNC: 98 MEQ/L (ref 98–111)
CHLORIDE BLD-SCNC: 99 MEQ/L (ref 98–111)
CHLORIDE, URINE: 46 MEQ/L
CHLORIDE, URINE: 81.5 MEQ/L
CHLORIDE, URINE: < 20 MEQ/L
CHOLESTEROL, TOTAL: 108 MG/DL
CHOLESTEROL/HDL RATIO: NORMAL
CLOSTRIDIUM DIFFICILE, PCR: DETECTED
CO2: 13 MEQ/L (ref 23–33)
CO2: 13 MEQ/L (ref 23–33)
CO2: 15 MEQ/L (ref 23–33)
CO2: 17 MEQ/L (ref 23–33)
CO2: 19 MEQ/L (ref 23–33)
CO2: 20 MEQ/L (ref 23–33)
CO2: 21 MEQ/L (ref 23–33)
CO2: 22 MEQ/L (ref 23–33)
CO2: 23 MEQ/L (ref 23–33)
CO2: 24 MEQ/L (ref 23–33)
CO2: 25 MEQ/L (ref 23–33)
CO2: 26 MEQ/L (ref 23–33)
CO2: 27 MEQ/L (ref 23–33)
CO2: 28 MEQ/L (ref 23–33)
COCAINE METABOLITE QUANTITATIVE URINE: NEGATIVE
COLLECTED BY:: ABNORMAL
COLLECTED BY:: ABNORMAL
COLOR: ABNORMAL
COLOR: YELLOW
CORTISOL COLLECTION INFO: NORMAL
CORTISOL: 3.37 UG/DL
CREAT SERPL-MCNC: 1.2 MG/DL (ref 0.4–1.2)
CREAT SERPL-MCNC: 1.3 MG/DL (ref 0.4–1.2)
CREAT SERPL-MCNC: 1.3 MG/DL (ref 0.4–1.2)
CREAT SERPL-MCNC: 1.4 MG/DL (ref 0.4–1.2)
CREAT SERPL-MCNC: 1.5 MG/DL (ref 0.4–1.2)
CREAT SERPL-MCNC: 1.6 MG/DL (ref 0.4–1.2)
CREAT SERPL-MCNC: 1.7 MG/DL (ref 0.4–1.2)
CREAT SERPL-MCNC: 1.8 MG/DL (ref 0.4–1.2)
CREAT SERPL-MCNC: 1.9 MG/DL (ref 0.4–1.2)
CREAT SERPL-MCNC: 2 MG/DL (ref 0.4–1.2)
CREAT SERPL-MCNC: 2.1 MG/DL (ref 0.4–1.2)
CREAT SERPL-MCNC: 2.3 MG/DL (ref 0.4–1.2)
CREATININE URINE: 36.8 MG/DL
CRYPTOSPORIDIUM PCR: NOT DETECTED
CRYSTALS, UA: ABNORMAL
CRYSTALS: ABNORMAL
CRYSTALS: ABNORMAL
CYCLOSPORA CAYETANENSIS PCR: NOT DETECTED
DEVICE: ABNORMAL
E COLI 0157 PCR: ABNORMAL
E COLI ENTEROAGGREGATIVE PCR: NOT DETECTED
E COLI ENTEROPATHOGENIC PCR: NOT DETECTED
E COLI ENTEROTOXIGENIC PCR: NOT DETECTED
E COLI SHIGA LIKE TOXIN PCR: NOT DETECTED
E COLI SHIGELLA/ENTEROINVASIVE PCR: NOT DETECTED
E HISTOLYTICA GI FILM ARRAY: NOT DETECTED
EKG ATRIAL RATE: 103 BPM
EKG ATRIAL RATE: 113 BPM
EKG ATRIAL RATE: 78 BPM
EKG ATRIAL RATE: 81 BPM
EKG ATRIAL RATE: 84 BPM
EKG ATRIAL RATE: 87 BPM
EKG ATRIAL RATE: 88 BPM
EKG ATRIAL RATE: 97 BPM
EKG P AXIS: 30 DEGREES
EKG P AXIS: 63 DEGREES
EKG P AXIS: 69 DEGREES
EKG P AXIS: 69 DEGREES
EKG P AXIS: 71 DEGREES
EKG P AXIS: 74 DEGREES
EKG P AXIS: 81 DEGREES
EKG P AXIS: 81 DEGREES
EKG P-R INTERVAL: 160 MS
EKG P-R INTERVAL: 164 MS
EKG P-R INTERVAL: 166 MS
EKG P-R INTERVAL: 168 MS
EKG P-R INTERVAL: 186 MS
EKG P-R INTERVAL: 206 MS
EKG Q-T INTERVAL: 326 MS
EKG Q-T INTERVAL: 346 MS
EKG Q-T INTERVAL: 362 MS
EKG Q-T INTERVAL: 370 MS
EKG Q-T INTERVAL: 372 MS
EKG Q-T INTERVAL: 384 MS
EKG Q-T INTERVAL: 392 MS
EKG Q-T INTERVAL: 398 MS
EKG QRS DURATION: 74 MS
EKG QRS DURATION: 80 MS
EKG QRS DURATION: 82 MS
EKG QRS DURATION: 84 MS
EKG QRS DURATION: 86 MS
EKG QRS DURATION: 86 MS
EKG QTC CALCULATION (BAZETT): 446 MS
EKG QTC CALCULATION (BAZETT): 446 MS
EKG QTC CALCULATION (BAZETT): 447 MS
EKG QTC CALCULATION (BAZETT): 453 MS
EKG QTC CALCULATION (BAZETT): 459 MS
EKG QTC CALCULATION (BAZETT): 470 MS
EKG R AXIS: -7 DEGREES
EKG R AXIS: 41 DEGREES
EKG R AXIS: 44 DEGREES
EKG R AXIS: 48 DEGREES
EKG R AXIS: 49 DEGREES
EKG R AXIS: 50 DEGREES
EKG R AXIS: 54 DEGREES
EKG R AXIS: 7 DEGREES
EKG T AXIS: 24 DEGREES
EKG T AXIS: 62 DEGREES
EKG T AXIS: 65 DEGREES
EKG T AXIS: 66 DEGREES
EKG T AXIS: 68 DEGREES
EKG T AXIS: 77 DEGREES
EKG T AXIS: 83 DEGREES
EKG T AXIS: 84 DEGREES
EKG VENTRICULAR RATE: 103 BPM
EKG VENTRICULAR RATE: 113 BPM
EKG VENTRICULAR RATE: 78 BPM
EKG VENTRICULAR RATE: 81 BPM
EKG VENTRICULAR RATE: 84 BPM
EKG VENTRICULAR RATE: 87 BPM
EKG VENTRICULAR RATE: 88 BPM
EKG VENTRICULAR RATE: 97 BPM
ENTERBACTER CLOACAE FILM ARRAY: NOT DETECTED
ENTERBACTERIACEAE FILM ARRAY: NOT DETECTED
ENTEROCOCCUS FILM ARRAY: NOT DETECTED
EOSINOPHIL # BLD: 1 %
EOSINOPHIL # BLD: 1.2 %
EOSINOPHIL # BLD: 1.3 %
EOSINOPHIL # BLD: 1.4 %
EOSINOPHIL # BLD: 1.6 %
EOSINOPHIL # BLD: 2 %
EOSINOPHIL # BLD: 3 %
EOSINOPHIL # BLD: 3.3 %
EOSINOPHIL # BLD: 4 %
EOSINOPHIL # BLD: 4 %
EOSINOPHIL # BLD: 4.3 %
EOSINOPHIL # BLD: 4.4 %
EOSINOPHIL # BLD: 4.5 %
EOSINOPHIL # BLD: 4.5 %
EOSINOPHIL # BLD: 4.6 %
EOSINOPHIL # BLD: 4.9 %
EOSINOPHIL # BLD: 5.3 %
EOSINOPHIL # BLD: 5.4 %
EOSINOPHIL # BLD: 5.5 %
EOSINOPHIL # BLD: 5.6 %
EOSINOPHIL # BLD: 5.7 %
EOSINOPHIL # BLD: 6.9 %
EOSINOPHILS ABSOLUTE: 0.1 THOU/MM3 (ref 0–0.4)
EOSINOPHILS ABSOLUTE: 0.2 THOU/MM3 (ref 0–0.4)
EOSINOPHILS ABSOLUTE: 0.3 THOU/MM3 (ref 0–0.4)
EOSINOPHILS ABSOLUTE: 0.4 THOU/MM3 (ref 0–0.4)
EOSINOPHILS ABSOLUTE: 0.5 THOU/MM3 (ref 0–0.4)
EOSINOPHILS ABSOLUTE: 0.6 THOU/MM3 (ref 0–0.4)
EOSINOPHILS ABSOLUTE: ABNORMAL /ΜL
EOSINOPHILS ABSOLUTE: ABNORMAL /ΜL
EOSINOPHILS RELATIVE PERCENT: ABNORMAL %
EOSINOPHILS RELATIVE PERCENT: ABNORMAL %
EPITHELIAL CELLS, UA: ABNORMAL /HPF
ERYTHROCYTE [DISTWIDTH] IN BLOOD BY AUTOMATED COUNT: 13.6 % (ref 11.5–14.5)
ERYTHROCYTE [DISTWIDTH] IN BLOOD BY AUTOMATED COUNT: 13.8 % (ref 11.5–14.5)
ERYTHROCYTE [DISTWIDTH] IN BLOOD BY AUTOMATED COUNT: 13.9 % (ref 11.5–14.5)
ERYTHROCYTE [DISTWIDTH] IN BLOOD BY AUTOMATED COUNT: 14.1 % (ref 11.5–14.5)
ERYTHROCYTE [DISTWIDTH] IN BLOOD BY AUTOMATED COUNT: 14.2 % (ref 11.5–14.5)
ERYTHROCYTE [DISTWIDTH] IN BLOOD BY AUTOMATED COUNT: 14.2 % (ref 11.5–14.5)
ERYTHROCYTE [DISTWIDTH] IN BLOOD BY AUTOMATED COUNT: 14.3 % (ref 11.5–14.5)
ERYTHROCYTE [DISTWIDTH] IN BLOOD BY AUTOMATED COUNT: 14.4 % (ref 11.5–14.5)
ERYTHROCYTE [DISTWIDTH] IN BLOOD BY AUTOMATED COUNT: 14.6 % (ref 11.5–14.5)
ERYTHROCYTE [DISTWIDTH] IN BLOOD BY AUTOMATED COUNT: 14.6 % (ref 11.5–14.5)
ERYTHROCYTE [DISTWIDTH] IN BLOOD BY AUTOMATED COUNT: 14.7 % (ref 11.5–14.5)
ERYTHROCYTE [DISTWIDTH] IN BLOOD BY AUTOMATED COUNT: 14.7 % (ref 11.5–14.5)
ERYTHROCYTE [DISTWIDTH] IN BLOOD BY AUTOMATED COUNT: 14.9 % (ref 11.5–14.5)
ERYTHROCYTE [DISTWIDTH] IN BLOOD BY AUTOMATED COUNT: 14.9 % (ref 11.5–14.5)
ERYTHROCYTE [DISTWIDTH] IN BLOOD BY AUTOMATED COUNT: 15.2 % (ref 11.5–14.5)
ERYTHROCYTE [DISTWIDTH] IN BLOOD BY AUTOMATED COUNT: 15.5 % (ref 11.5–14.5)
ERYTHROCYTE [DISTWIDTH] IN BLOOD BY AUTOMATED COUNT: 15.6 % (ref 11.5–14.5)
ERYTHROCYTE [DISTWIDTH] IN BLOOD BY AUTOMATED COUNT: 49.6 FL (ref 35–45)
ERYTHROCYTE [DISTWIDTH] IN BLOOD BY AUTOMATED COUNT: 49.8 FL (ref 35–45)
ERYTHROCYTE [DISTWIDTH] IN BLOOD BY AUTOMATED COUNT: 50.3 FL (ref 35–45)
ERYTHROCYTE [DISTWIDTH] IN BLOOD BY AUTOMATED COUNT: 50.4 FL (ref 35–45)
ERYTHROCYTE [DISTWIDTH] IN BLOOD BY AUTOMATED COUNT: 50.8 FL (ref 35–45)
ERYTHROCYTE [DISTWIDTH] IN BLOOD BY AUTOMATED COUNT: 51 FL (ref 35–45)
ERYTHROCYTE [DISTWIDTH] IN BLOOD BY AUTOMATED COUNT: 51.9 FL (ref 35–45)
ERYTHROCYTE [DISTWIDTH] IN BLOOD BY AUTOMATED COUNT: 52 FL (ref 35–45)
ERYTHROCYTE [DISTWIDTH] IN BLOOD BY AUTOMATED COUNT: 52.5 FL (ref 35–45)
ERYTHROCYTE [DISTWIDTH] IN BLOOD BY AUTOMATED COUNT: 52.6 FL (ref 35–45)
ERYTHROCYTE [DISTWIDTH] IN BLOOD BY AUTOMATED COUNT: 52.7 FL (ref 35–45)
ERYTHROCYTE [DISTWIDTH] IN BLOOD BY AUTOMATED COUNT: 52.7 FL (ref 35–45)
ERYTHROCYTE [DISTWIDTH] IN BLOOD BY AUTOMATED COUNT: 53.6 FL (ref 35–45)
ERYTHROCYTE [DISTWIDTH] IN BLOOD BY AUTOMATED COUNT: 53.9 FL (ref 35–45)
ERYTHROCYTE [DISTWIDTH] IN BLOOD BY AUTOMATED COUNT: 54.7 FL (ref 35–45)
ERYTHROCYTE [DISTWIDTH] IN BLOOD BY AUTOMATED COUNT: 54.7 FL (ref 35–45)
ERYTHROCYTE [DISTWIDTH] IN BLOOD BY AUTOMATED COUNT: 54.9 FL (ref 35–45)
ERYTHROCYTE [DISTWIDTH] IN BLOOD BY AUTOMATED COUNT: 56.3 FL (ref 35–45)
ERYTHROCYTE [DISTWIDTH] IN BLOOD BY AUTOMATED COUNT: 56.9 FL (ref 35–45)
ERYTHROCYTE [DISTWIDTH] IN BLOOD BY AUTOMATED COUNT: 57 FL (ref 35–45)
ERYTHROCYTE [DISTWIDTH] IN BLOOD BY AUTOMATED COUNT: 58.2 FL (ref 35–45)
ESCHERICHIA COLI FILM ARRAY: NOT DETECTED
F-ACTIN AB IGG: 7 UNITS (ref 0–19)
FOLATE: 13.9 NG/ML (ref 4.8–24.2)
GFR SERPL CREATININE-BSD FRML MDRD: 20 ML/MIN/1.73M2
GFR SERPL CREATININE-BSD FRML MDRD: 22 ML/MIN/1.73M2
GFR SERPL CREATININE-BSD FRML MDRD: 24 ML/MIN/1.73M2
GFR SERPL CREATININE-BSD FRML MDRD: 25 ML/MIN/1.73M2
GFR SERPL CREATININE-BSD FRML MDRD: 27 ML/MIN/1.73M2
GFR SERPL CREATININE-BSD FRML MDRD: 28 ML/MIN/1.73M2
GFR SERPL CREATININE-BSD FRML MDRD: 30 ML/MIN/1.73M2
GFR SERPL CREATININE-BSD FRML MDRD: 33 ML/MIN/1.73M2
GFR SERPL CREATININE-BSD FRML MDRD: 36 ML/MIN/1.73M2
GFR SERPL CREATININE-BSD FRML MDRD: 39 ML/MIN/1.73M2
GFR SERPL CREATININE-BSD FRML MDRD: 39 ML/MIN/1.73M2
GFR SERPL CREATININE-BSD FRML MDRD: 42 ML/MIN/1.73M2
GIARDIA LAMBLIA PCR: NOT DETECTED
GLUCOSE BLD-MCNC: 100 MG/DL (ref 70–108)
GLUCOSE BLD-MCNC: 100 MG/DL (ref 70–108)
GLUCOSE BLD-MCNC: 101 MG/DL (ref 70–108)
GLUCOSE BLD-MCNC: 101 MG/DL (ref 70–108)
GLUCOSE BLD-MCNC: 102 MG/DL (ref 70–108)
GLUCOSE BLD-MCNC: 102 MG/DL (ref 70–108)
GLUCOSE BLD-MCNC: 103 MG/DL (ref 70–108)
GLUCOSE BLD-MCNC: 103 MG/DL (ref 70–108)
GLUCOSE BLD-MCNC: 104 MG/DL (ref 70–108)
GLUCOSE BLD-MCNC: 106 MG/DL (ref 70–108)
GLUCOSE BLD-MCNC: 108 MG/DL (ref 70–108)
GLUCOSE BLD-MCNC: 108 MG/DL (ref 70–108)
GLUCOSE BLD-MCNC: 109 MG/DL (ref 70–108)
GLUCOSE BLD-MCNC: 109 MG/DL (ref 70–108)
GLUCOSE BLD-MCNC: 111 MG/DL
GLUCOSE BLD-MCNC: 111 MG/DL (ref 70–108)
GLUCOSE BLD-MCNC: 112 MG/DL (ref 70–108)
GLUCOSE BLD-MCNC: 124 MG/DL (ref 70–108)
GLUCOSE BLD-MCNC: 133 MG/DL (ref 70–108)
GLUCOSE BLD-MCNC: 135 MG/DL (ref 70–108)
GLUCOSE BLD-MCNC: 138 MG/DL (ref 70–108)
GLUCOSE BLD-MCNC: 143 MG/DL (ref 70–108)
GLUCOSE BLD-MCNC: 143 MG/DL (ref 70–108)
GLUCOSE BLD-MCNC: 159 MG/DL (ref 70–108)
GLUCOSE BLD-MCNC: 183 MG/DL (ref 70–108)
GLUCOSE BLD-MCNC: 60 MG/DL (ref 70–108)
GLUCOSE BLD-MCNC: 73 MG/DL (ref 70–108)
GLUCOSE BLD-MCNC: 79 MG/DL (ref 70–108)
GLUCOSE BLD-MCNC: 82 MG/DL (ref 70–108)
GLUCOSE BLD-MCNC: 87 MG/DL (ref 70–108)
GLUCOSE BLD-MCNC: 88 MG/DL (ref 70–108)
GLUCOSE BLD-MCNC: 89 MG/DL (ref 70–108)
GLUCOSE BLD-MCNC: 92 MG/DL (ref 70–108)
GLUCOSE BLD-MCNC: 93 MG/DL (ref 70–108)
GLUCOSE BLD-MCNC: 94 MG/DL (ref 70–108)
GLUCOSE BLD-MCNC: 95 MG/DL (ref 70–108)
GLUCOSE BLD-MCNC: 95 MG/DL (ref 70–108)
GLUCOSE BLD-MCNC: 97 MG/DL (ref 70–108)
GLUCOSE BLD-MCNC: 98 MG/DL (ref 70–108)
GLUCOSE BLD-MCNC: 98 MG/DL (ref 70–108)
GLUCOSE BLD-MCNC: 99 MG/DL (ref 70–108)
GLUCOSE BLD-MCNC: 99 MG/DL (ref 70–108)
GLUCOSE URINE: 100 MG/DL
GLUCOSE URINE: ABNORMAL MG/DL
GLUCOSE URINE: NEGATIVE MG/DL
GLUCOSE, URINE: NEGATIVE MG/DL
GLUCOSE, URINE: NEGATIVE MG/DL
HAEMOPHILUS INFLUENZA FILM ARRAY: NOT DETECTED
HAV AB SERPL IA-ACNC: POSITIVE
HAV IGM SER IA-ACNC: NEGATIVE
HBV SURFACE AB TITR SER: POSITIVE {TITER}
HCO3, MIXED: 25 MMOL/L (ref 23–28)
HCO3: 14 MMOL/L (ref 23–28)
HCT VFR BLD CALC: 21.4 % (ref 37–47)
HCT VFR BLD CALC: 25 % (ref 37–47)
HCT VFR BLD CALC: 25.1 % (ref 37–47)
HCT VFR BLD CALC: 26 % (ref 37–47)
HCT VFR BLD CALC: 26.2 % (ref 37–47)
HCT VFR BLD CALC: 26.4 % (ref 37–47)
HCT VFR BLD CALC: 26.9 % (ref 37–47)
HCT VFR BLD CALC: 27 % (ref 37–47)
HCT VFR BLD CALC: 27.9 % (ref 37–47)
HCT VFR BLD CALC: 27.9 % (ref 37–47)
HCT VFR BLD CALC: 28.3 % (ref 37–47)
HCT VFR BLD CALC: 28.3 % (ref 37–47)
HCT VFR BLD CALC: 28.4 % (ref 37–47)
HCT VFR BLD CALC: 28.6 % (ref 37–47)
HCT VFR BLD CALC: 28.7 % (ref 36–46)
HCT VFR BLD CALC: 29.1 % (ref 37–47)
HCT VFR BLD CALC: 29.1 % (ref 37–47)
HCT VFR BLD CALC: 29.2 % (ref 37–47)
HCT VFR BLD CALC: 29.6 % (ref 37–47)
HCT VFR BLD CALC: 29.8 % (ref 37–47)
HCT VFR BLD CALC: 30.1 % (ref 37–47)
HCT VFR BLD CALC: 30.1 % (ref 37–47)
HCT VFR BLD CALC: 30.2 % (ref 36–46)
HCT VFR BLD CALC: 30.3 % (ref 37–47)
HCT VFR BLD CALC: 30.3 % (ref 37–47)
HCT VFR BLD CALC: 30.7 % (ref 37–47)
HCT VFR BLD CALC: 30.8 % (ref 37–47)
HCT VFR BLD CALC: 31.1 % (ref 37–47)
HCT VFR BLD CALC: 31.2 % (ref 37–47)
HCT VFR BLD CALC: 31.3 % (ref 37–47)
HCT VFR BLD CALC: 32 % (ref 37–47)
HCT VFR BLD CALC: 32.4 % (ref 37–47)
HCT VFR BLD CALC: 32.4 % (ref 37–47)
HCT VFR BLD CALC: 32.8 % (ref 37–47)
HCT VFR BLD CALC: 33.4 % (ref 37–47)
HCT VFR BLD CALC: 34.5 % (ref 37–47)
HCT VFR BLD CALC: 36.1 % (ref 37–47)
HCT VFR BLD CALC: 36.4 % (ref 37–47)
HDLC SERPL-MCNC: 51 MG/DL (ref 35–70)
HEMOGLOBIN: 10.1 GM/DL (ref 12–16)
HEMOGLOBIN: 10.1 GM/DL (ref 12–16)
HEMOGLOBIN: 10.3 GM/DL (ref 12–16)
HEMOGLOBIN: 10.6 GM/DL (ref 12–16)
HEMOGLOBIN: 10.8 GM/DL (ref 12–16)
HEMOGLOBIN: 10.8 GM/DL (ref 12–16)
HEMOGLOBIN: 10.9 GM/DL (ref 12–16)
HEMOGLOBIN: 11 GM/DL (ref 12–16)
HEMOGLOBIN: 11.1 GM/DL (ref 12–16)
HEMOGLOBIN: 11.5 GM/DL (ref 12–16)
HEMOGLOBIN: 7.2 GM/DL (ref 12–16)
HEMOGLOBIN: 8 GM/DL (ref 12–16)
HEMOGLOBIN: 8.1 GM/DL (ref 12–16)
HEMOGLOBIN: 8.2 GM/DL (ref 12–16)
HEMOGLOBIN: 8.4 GM/DL (ref 12–16)
HEMOGLOBIN: 8.5 GM/DL (ref 12–16)
HEMOGLOBIN: 8.6 GM/DL (ref 12–16)
HEMOGLOBIN: 8.7 GM/DL (ref 12–16)
HEMOGLOBIN: 8.7 GM/DL (ref 12–16)
HEMOGLOBIN: 8.8 GM/DL (ref 12–16)
HEMOGLOBIN: 8.8 GM/DL (ref 12–16)
HEMOGLOBIN: 8.9 GM/DL (ref 12–16)
HEMOGLOBIN: 9 G/DL (ref 12–16)
HEMOGLOBIN: 9.1 GM/DL (ref 12–16)
HEMOGLOBIN: 9.2 GM/DL (ref 12–16)
HEMOGLOBIN: 9.2 GM/DL (ref 12–16)
HEMOGLOBIN: 9.3 GM/DL (ref 12–16)
HEMOGLOBIN: 9.4 GM/DL (ref 12–16)
HEMOGLOBIN: 9.7 G/DL (ref 12–16)
HEMOGLOBIN: 9.7 GM/DL (ref 12–16)
HEMOGLOBIN: 9.8 GM/DL (ref 12–16)
HEPATITIS B CORE IGM ANTIBODY: NEGATIVE
HEPATITIS B CORE TOTAL ANTIBODY: NEGATIVE
HEPATITIS B SURFACE ANTIGEN: NEGATIVE
HEPATITIS C ANTIBODY: NEGATIVE
ICTOTEST: NEGATIVE
ICTOTEST: NEGATIVE
IGG: 953 MG/DL (ref 700–1600)
IMMATURE GRANS (ABS): 0.02 THOU/MM3 (ref 0–0.07)
IMMATURE GRANS (ABS): 0.02 THOU/MM3 (ref 0–0.07)
IMMATURE GRANS (ABS): 0.03 THOU/MM3 (ref 0–0.07)
IMMATURE GRANS (ABS): 0.04 THOU/MM3 (ref 0–0.07)
IMMATURE GRANS (ABS): 0.05 THOU/MM3 (ref 0–0.07)
IMMATURE GRANS (ABS): 0.06 THOU/MM3 (ref 0–0.07)
IMMATURE GRANULOCYTES: 0.2 %
IMMATURE GRANULOCYTES: 0.2 %
IMMATURE GRANULOCYTES: 0.3 %
IMMATURE GRANULOCYTES: 0.3 %
IMMATURE GRANULOCYTES: 0.4 %
IMMATURE GRANULOCYTES: 0.5 %
INR BLD: 1.13 (ref 0.85–1.13)
INR BLD: 1.19 (ref 0.85–1.13)
INR BLD: 1.2 (ref 0.85–1.13)
INR BLD: 1.33 (ref 0.85–1.13)
KETONES, URINE: ABNORMAL
KETONES, URINE: ABNORMAL
KETONES, URINE: NEGATIVE
KLEBSIELLA OXYTOCA FILM ARRAY: NOT DETECTED
KLEBSIELLA PNEUMONIAE FILM ARRAY: NOT DETECTED
LACTIC ACID: 0.6 MMOL/L (ref 0.5–2.2)
LACTIC ACID: 0.9 MMOL/L (ref 0.5–2.2)
LACTIC ACID: 1 MMOL/L (ref 0.5–2.2)
LACTIC ACID: 1.1 MMOL/L (ref 0.5–2.2)
LDL CHOLESTEROL CALCULATED: 41 MG/DL (ref 0–160)
LEGIONELLA URINARY AG: NEGATIVE
LEUKOCYTE EST, POC: ABNORMAL
LEUKOCYTE ESTERASE, URINE: ABNORMAL
LIPASE: 20.4 U/L (ref 5.6–51.3)
LIPASE: 25.1 U/L (ref 5.6–51.3)
LIPASE: 26.2 U/L (ref 5.6–51.3)
LIPASE: 39.8 U/L (ref 5.6–51.3)
LISTERIA MONOCYTOGENES FILM ARRAY: NOT DETECTED
LV EF: 55 %
LVEF MODALITY: NORMAL
LYMPHOCYTES # BLD: 10.1 %
LYMPHOCYTES # BLD: 10.4 %
LYMPHOCYTES # BLD: 10.9 %
LYMPHOCYTES # BLD: 11 %
LYMPHOCYTES # BLD: 12.4 %
LYMPHOCYTES # BLD: 13.3 %
LYMPHOCYTES # BLD: 14.8 %
LYMPHOCYTES # BLD: 14.8 %
LYMPHOCYTES # BLD: 16.3 %
LYMPHOCYTES # BLD: 17.2 %
LYMPHOCYTES # BLD: 18 %
LYMPHOCYTES # BLD: 18 %
LYMPHOCYTES # BLD: 19.2 %
LYMPHOCYTES # BLD: 20.5 %
LYMPHOCYTES # BLD: 20.9 %
LYMPHOCYTES # BLD: 21 %
LYMPHOCYTES # BLD: 25 %
LYMPHOCYTES # BLD: 26.2 %
LYMPHOCYTES # BLD: 7.4 %
LYMPHOCYTES # BLD: 7.9 %
LYMPHOCYTES # BLD: 8.1 %
LYMPHOCYTES # BLD: 8.7 %
LYMPHOCYTES # BLD: 8.9 %
LYMPHOCYTES # BLD: 9.4 %
LYMPHOCYTES ABSOLUTE: 0.8 THOU/MM3 (ref 1–4.8)
LYMPHOCYTES ABSOLUTE: 0.9 THOU/MM3 (ref 1–4.8)
LYMPHOCYTES ABSOLUTE: 0.9 THOU/MM3 (ref 1–4.8)
LYMPHOCYTES ABSOLUTE: 1.1 THOU/MM3 (ref 1–4.8)
LYMPHOCYTES ABSOLUTE: 1.2 THOU/MM3 (ref 1–4.8)
LYMPHOCYTES ABSOLUTE: 1.3 THOU/MM3 (ref 1–4.8)
LYMPHOCYTES ABSOLUTE: 1.3 THOU/MM3 (ref 1–4.8)
LYMPHOCYTES ABSOLUTE: 1.4 THOU/MM3 (ref 1–4.8)
LYMPHOCYTES ABSOLUTE: 1.4 THOU/MM3 (ref 1–4.8)
LYMPHOCYTES ABSOLUTE: 1.5 THOU/MM3 (ref 1–4.8)
LYMPHOCYTES ABSOLUTE: 1.5 THOU/MM3 (ref 1–4.8)
LYMPHOCYTES ABSOLUTE: 1.6 THOU/MM3 (ref 1–4.8)
LYMPHOCYTES ABSOLUTE: 1.7 THOU/MM3 (ref 1–4.8)
LYMPHOCYTES ABSOLUTE: 1.7 THOU/MM3 (ref 1–4.8)
LYMPHOCYTES ABSOLUTE: 1.8 THOU/MM3 (ref 1–4.8)
LYMPHOCYTES ABSOLUTE: 1.8 THOU/MM3 (ref 1–4.8)
LYMPHOCYTES ABSOLUTE: 1.9 THOU/MM3 (ref 1–4.8)
LYMPHOCYTES ABSOLUTE: 2 THOU/MM3 (ref 1–4.8)
LYMPHOCYTES ABSOLUTE: 2.1 THOU/MM3 (ref 1–4.8)
LYMPHOCYTES ABSOLUTE: 2.6 THOU/MM3 (ref 1–4.8)
LYMPHOCYTES ABSOLUTE: ABNORMAL /ΜL
LYMPHOCYTES ABSOLUTE: ABNORMAL /ΜL
LYMPHOCYTES RELATIVE PERCENT: ABNORMAL %
LYMPHOCYTES RELATIVE PERCENT: ABNORMAL %
MAGNESIUM: 1.6 MG/DL (ref 1.6–2.4)
MAGNESIUM: 1.7 MG/DL (ref 1.6–2.4)
MAGNESIUM: 1.8 MG/DL (ref 1.6–2.4)
MAGNESIUM: 2 MG/DL (ref 1.6–2.4)
MAGNESIUM: 2 MG/DL (ref 1.6–2.4)
MCH RBC QN AUTO: 30.3 PG (ref 27–31)
MCH RBC QN AUTO: 30.7 PG (ref 26–33)
MCH RBC QN AUTO: 30.8 PG (ref 27–31)
MCH RBC QN AUTO: 30.8 PG (ref 27–31)
MCH RBC QN AUTO: 31 PG
MCH RBC QN AUTO: 31 PG (ref 26–33)
MCH RBC QN AUTO: 31 PG (ref 26–33)
MCH RBC QN AUTO: 31.2 PG (ref 26–33)
MCH RBC QN AUTO: 31.3 PG (ref 26–33)
MCH RBC QN AUTO: 31.3 PG (ref 26–33)
MCH RBC QN AUTO: 31.3 PG (ref 27–31)
MCH RBC QN AUTO: 31.4 PG (ref 26–33)
MCH RBC QN AUTO: 31.5 PG (ref 26–33)
MCH RBC QN AUTO: 31.6 PG (ref 26–33)
MCH RBC QN AUTO: 31.7 PG (ref 26–33)
MCH RBC QN AUTO: 31.7 PG (ref 27–31)
MCH RBC QN AUTO: 31.8 PG (ref 26–33)
MCH RBC QN AUTO: 31.8 PG (ref 26–33)
MCH RBC QN AUTO: 31.8 PG (ref 27–31)
MCH RBC QN AUTO: 31.9 PG (ref 26–33)
MCH RBC QN AUTO: 32.1 PG (ref 26–33)
MCH RBC QN AUTO: 32.1 PG (ref 27–31)
MCH RBC QN AUTO: 32.2 PG (ref 27–31)
MCH RBC QN AUTO: 32.4 PG (ref 27–31)
MCH RBC QN AUTO: 32.5 PG (ref 27–31)
MCH RBC QN AUTO: 32.5 PG (ref 27–31)
MCH RBC QN AUTO: ABNORMAL PG
MCHC RBC AUTO-ENTMCNC: 29.9 GM/DL (ref 32.2–35.5)
MCHC RBC AUTO-ENTMCNC: 30.2 GM/DL (ref 32.2–35.5)
MCHC RBC AUTO-ENTMCNC: 30.3 GM/DL (ref 32.2–35.5)
MCHC RBC AUTO-ENTMCNC: 30.7 GM/DL (ref 32.2–35.5)
MCHC RBC AUTO-ENTMCNC: 30.7 GM/DL (ref 32.2–35.5)
MCHC RBC AUTO-ENTMCNC: 30.8 GM/DL (ref 32.2–35.5)
MCHC RBC AUTO-ENTMCNC: 30.9 GM/DL (ref 32.2–35.5)
MCHC RBC AUTO-ENTMCNC: 31 GM/DL (ref 32.2–35.5)
MCHC RBC AUTO-ENTMCNC: 31 GM/DL (ref 32.2–35.5)
MCHC RBC AUTO-ENTMCNC: 31.1 GM/DL (ref 32.2–35.5)
MCHC RBC AUTO-ENTMCNC: 31.1 GM/DL (ref 32.2–35.5)
MCHC RBC AUTO-ENTMCNC: 31.2 GM/DL (ref 32.2–35.5)
MCHC RBC AUTO-ENTMCNC: 31.3 GM/DL (ref 32.2–35.5)
MCHC RBC AUTO-ENTMCNC: 31.3 GM/DL (ref 32.2–35.5)
MCHC RBC AUTO-ENTMCNC: 31.6 GM/DL (ref 32.2–35.5)
MCHC RBC AUTO-ENTMCNC: 31.8 GM/DL (ref 32.2–35.5)
MCHC RBC AUTO-ENTMCNC: 31.9 GM/DL (ref 32.2–35.5)
MCHC RBC AUTO-ENTMCNC: 32.1 G/DL
MCHC RBC AUTO-ENTMCNC: 32.2 GM/DL (ref 32.2–35.5)
MCHC RBC AUTO-ENTMCNC: 32.2 GM/DL (ref 32.2–35.5)
MCHC RBC AUTO-ENTMCNC: 32.3 GM/DL (ref 32.2–35.5)
MCHC RBC AUTO-ENTMCNC: 32.3 GM/DL (ref 33–37)
MCHC RBC AUTO-ENTMCNC: 32.4 GM/DL (ref 32.2–35.5)
MCHC RBC AUTO-ENTMCNC: 32.5 GM/DL (ref 33–37)
MCHC RBC AUTO-ENTMCNC: 32.7 GM/DL (ref 33–37)
MCHC RBC AUTO-ENTMCNC: 32.8 GM/DL (ref 33–37)
MCHC RBC AUTO-ENTMCNC: 33.1 GM/DL (ref 33–37)
MCHC RBC AUTO-ENTMCNC: 33.1 GM/DL (ref 33–37)
MCHC RBC AUTO-ENTMCNC: 33.2 GM/DL (ref 33–37)
MCHC RBC AUTO-ENTMCNC: 33.3 GM/DL (ref 33–37)
MCHC RBC AUTO-ENTMCNC: 33.3 GM/DL (ref 33–37)
MCHC RBC AUTO-ENTMCNC: 33.5 GM/DL (ref 33–37)
MCHC RBC AUTO-ENTMCNC: 33.7 GM/DL (ref 33–37)
MCHC RBC AUTO-ENTMCNC: ABNORMAL G/DL
MCV RBC AUTO: 100.3 FL (ref 81–99)
MCV RBC AUTO: 100.3 FL (ref 81–99)
MCV RBC AUTO: 100.8 FL (ref 81–99)
MCV RBC AUTO: 101 FL (ref 81–99)
MCV RBC AUTO: 102.4 FL (ref 81–99)
MCV RBC AUTO: 102.5 FL (ref 81–99)
MCV RBC AUTO: 102.6 FL (ref 81–99)
MCV RBC AUTO: 102.7 FL (ref 81–99)
MCV RBC AUTO: 102.9 FL (ref 81–99)
MCV RBC AUTO: 103.1 FL (ref 81–99)
MCV RBC AUTO: 103.4 FL (ref 81–99)
MCV RBC AUTO: 103.4 FL (ref 81–99)
MCV RBC AUTO: 103.7 FL (ref 81–99)
MCV RBC AUTO: 91.3 FL (ref 81–99)
MCV RBC AUTO: 92.4 FL (ref 81–99)
MCV RBC AUTO: 94.2 FL (ref 81–99)
MCV RBC AUTO: 95.3 FL (ref 81–99)
MCV RBC AUTO: 95.4 FL (ref 81–99)
MCV RBC AUTO: 95.7 FL (ref 81–99)
MCV RBC AUTO: 97 FL (ref 81–99)
MCV RBC AUTO: 97.1 FL (ref 81–99)
MCV RBC AUTO: 97.3 FL (ref 81–99)
MCV RBC AUTO: 97.3 FL (ref 81–99)
MCV RBC AUTO: 97.8 FL
MCV RBC AUTO: 98 FL (ref 81–99)
MCV RBC AUTO: 98 FL (ref 81–99)
MCV RBC AUTO: 98.1 FL (ref 81–99)
MCV RBC AUTO: 98.4 FL (ref 81–99)
MCV RBC AUTO: 98.4 FL (ref 81–99)
MCV RBC AUTO: 98.5 FL (ref 81–99)
MCV RBC AUTO: 98.6 FL (ref 81–99)
MCV RBC AUTO: 99.4 FL (ref 81–99)
MCV RBC AUTO: 99.6 FL (ref 81–99)
MCV RBC AUTO: ABNORMAL FL
METHICILLIN RESISTANT FILM ARRAY: DETECTED
MISCELLANEOUS 2: ABNORMAL
MISCELLANEOUS LAB TEST RESULT: ABNORMAL
MITOCHONDRIAL ANTIBODY: 2.9 UNITS (ref 0–20)
MONOCYTES # BLD: 10.1 %
MONOCYTES # BLD: 10.4 %
MONOCYTES # BLD: 10.8 %
MONOCYTES # BLD: 10.8 %
MONOCYTES # BLD: 11.2 %
MONOCYTES # BLD: 4.6 %
MONOCYTES # BLD: 5.2 %
MONOCYTES # BLD: 5.2 %
MONOCYTES # BLD: 5.3 %
MONOCYTES # BLD: 5.4 %
MONOCYTES # BLD: 5.8 %
MONOCYTES # BLD: 7.5 %
MONOCYTES # BLD: 8 %
MONOCYTES # BLD: 8 %
MONOCYTES # BLD: 8.1 %
MONOCYTES # BLD: 8.1 %
MONOCYTES # BLD: 8.2 %
MONOCYTES # BLD: 8.3 %
MONOCYTES # BLD: 8.4 %
MONOCYTES # BLD: 8.8 %
MONOCYTES # BLD: 9 %
MONOCYTES # BLD: 9.2 %
MONOCYTES # BLD: 9.2 %
MONOCYTES # BLD: 9.5 %
MONOCYTES ABSOLUTE: 0.6 THOU/MM3 (ref 0.4–1.3)
MONOCYTES ABSOLUTE: 0.7 THOU/MM3 (ref 0.4–1.3)
MONOCYTES ABSOLUTE: 0.7 THOU/MM3 (ref 0.4–1.3)
MONOCYTES ABSOLUTE: 0.8 THOU/MM3 (ref 0.4–1.3)
MONOCYTES ABSOLUTE: 0.9 THOU/MM3 (ref 0.4–1.3)
MONOCYTES ABSOLUTE: 1 THOU/MM3 (ref 0.4–1.3)
MONOCYTES ABSOLUTE: ABNORMAL /ΜL
MONOCYTES ABSOLUTE: ABNORMAL /ΜL
MONOCYTES RELATIVE PERCENT: ABNORMAL %
MONOCYTES RELATIVE PERCENT: ABNORMAL %
MRSA SCREEN RT-PCR: NEGATIVE
MRSA SCREEN RT-PCR: POSITIVE
MRSA SCREEN: ABNORMAL
MUCUS: ABNORMAL
MUCUS: ABNORMAL
NEISSERIA MENIGITIDIS FILM ARRAY: NOT DETECTED
NEUTROPHIL CYTOPLASMIC AB IGG: < 1
NEUTROPHILS ABSOLUTE: ABNORMAL /ΜL
NEUTROPHILS ABSOLUTE: ABNORMAL /ΜL
NEUTROPHILS RELATIVE PERCENT: ABNORMAL %
NEUTROPHILS RELATIVE PERCENT: ABNORMAL %
NITRITE, URINE: ABNORMAL
NITRITE, URINE: NEGATIVE
NITRITE, URINE: POSITIVE
NOROVIRUS GI GII PCR: NOT DETECTED
NUCLEATED RED BLOOD CELLS: 0 /100 WBC
O2 SAT, MIXED: 85 %
O2 SATURATION: 63 %
OPIATES, URINE: POSITIVE
ORGANISM: ABNORMAL
OSMOLALITY CALCULATION: 283.2 MOSMOL/KG (ref 275–300)
OSMOLALITY CALCULATION: 284.5 MOSMOL/KG (ref 275–300)
OSMOLALITY CALCULATION: 285.1 MOSMOL/KG (ref 275–300)
OSMOLALITY CALCULATION: 288.6 MOSMOL/KG (ref 275–300)
OSMOLALITY CALCULATION: 290.2 MOSMOL/KG (ref 275–300)
OSMOLALITY CALCULATION: 295.8 MOSMOL/KG (ref 275–300)
OSMOLALITY URINE: 248 MOSMOL/KG (ref 250–750)
OSMOLALITY: 288 MOSMOL/KG (ref 275–295)
OXYCODONE: NEGATIVE
PCO2, MIXED VENOUS: 39 MMHG (ref 41–51)
PCO2: 23 MMHG (ref 35–45)
PDW BLD-RTO: 13.7 % (ref 11.5–14.5)
PDW BLD-RTO: 13.7 % (ref 11.5–14.5)
PDW BLD-RTO: 13.8 % (ref 11.5–14.5)
PDW BLD-RTO: 13.8 % (ref 11.5–14.5)
PDW BLD-RTO: 14 % (ref 11.5–14.5)
PDW BLD-RTO: 14.3 % (ref 11.5–14.5)
PDW BLD-RTO: 14.4 % (ref 11.5–14.5)
PDW BLD-RTO: 14.8 % (ref 11.5–14.5)
PDW BLD-RTO: 15 % (ref 11.5–14.5)
PDW BLD-RTO: 16.4 % (ref 11.5–14.5)
PDW BLD-RTO: 16.6 % (ref 11.5–14.5)
PH BLOOD GAS: 7.39 (ref 7.35–7.45)
PH UA: 5
PH UA: 5.5
PH UA: 6
PH UA: 7
PH UA: 7.5
PH UA: ABNORMAL
PH VENOUS: 7.38 (ref 7.31–7.41)
PH, MIXED: 7.42 (ref 7.31–7.41)
PHENCYCLIDINE QUANTITATIVE URINE: NEGATIVE
PHOSPHORUS: 3.1 MG/DL (ref 2.4–4.7)
PHOSPHORUS: 3.1 MG/DL (ref 2.4–4.7)
PHOSPHORUS: 3.2 MG/DL (ref 2.4–4.7)
PHOSPHORUS: 3.3 MG/DL (ref 2.4–4.7)
PHOSPHORUS: 3.3 MG/DL (ref 2.4–4.7)
PHOSPHORUS: 3.4 MG/DL (ref 2.4–4.7)
PHOSPHORUS: 3.4 MG/DL (ref 2.4–4.7)
PLATELET # BLD: 239 THOU/MM3 (ref 130–400)
PLATELET # BLD: 248 THOU/MM3 (ref 130–400)
PLATELET # BLD: 250 THOU/MM3 (ref 130–400)
PLATELET # BLD: 253 THOU/MM3 (ref 130–400)
PLATELET # BLD: 257 THOU/MM3 (ref 130–400)
PLATELET # BLD: 266 THOU/MM3 (ref 130–400)
PLATELET # BLD: 267 THOU/MM3 (ref 130–400)
PLATELET # BLD: 268 THOU/MM3 (ref 130–400)
PLATELET # BLD: 270 THOU/MM3 (ref 130–400)
PLATELET # BLD: 286 THOU/MM3 (ref 130–400)
PLATELET # BLD: 291 THOU/MM3 (ref 130–400)
PLATELET # BLD: 292 THOU/MM3 (ref 130–400)
PLATELET # BLD: 301 THOU/MM3 (ref 130–400)
PLATELET # BLD: 303 THOU/MM3 (ref 130–400)
PLATELET # BLD: 308 THOU/MM3 (ref 130–400)
PLATELET # BLD: 308 THOU/MM3 (ref 130–400)
PLATELET # BLD: 313 THOU/MM3 (ref 130–400)
PLATELET # BLD: 314 THOU/MM3 (ref 130–400)
PLATELET # BLD: 324 THOU/MM3 (ref 130–400)
PLATELET # BLD: 325 THOU/MM3 (ref 130–400)
PLATELET # BLD: 330 THOU/MM3 (ref 130–400)
PLATELET # BLD: 345 THOU/MM3 (ref 130–400)
PLATELET # BLD: 346 THOU/MM3 (ref 130–400)
PLATELET # BLD: 349 THOU/MM3 (ref 130–400)
PLATELET # BLD: 358 THOU/MM3 (ref 130–400)
PLATELET # BLD: 382 THOU/MM3 (ref 130–400)
PLATELET # BLD: 382 THOU/MM3 (ref 130–400)
PLATELET # BLD: 386 THOU/MM3 (ref 130–400)
PLATELET # BLD: 389 K/ΜL
PLATELET # BLD: 398 THOU/MM3 (ref 130–400)
PLATELET # BLD: 401 THOU/MM3 (ref 130–400)
PLATELET # BLD: 441 THOU/MM3 (ref 130–400)
PLATELET # BLD: 473 K/ΜL
PLATELET # BLD: 477 THOU/MM3 (ref 130–400)
PLESIOMONAS SHIGELLOIDES PCR: NOT DETECTED
PMV BLD AUTO: 6.4 FL
PMV BLD AUTO: 7.4 FL (ref 7.4–10.4)
PMV BLD AUTO: 7.5 FL (ref 7.4–10.4)
PMV BLD AUTO: 7.6 FL (ref 7.4–10.4)
PMV BLD AUTO: 7.8 FL (ref 7.4–10.4)
PMV BLD AUTO: 7.9 FL (ref 7.4–10.4)
PMV BLD AUTO: 8.1 FL (ref 7.4–10.4)
PMV BLD AUTO: 8.1 FL (ref 7.4–10.4)
PMV BLD AUTO: 8.2 FL (ref 7.4–10.4)
PMV BLD AUTO: 8.4 FL (ref 7.4–10.4)
PMV BLD AUTO: 8.8 FL (ref 9.4–12.4)
PMV BLD AUTO: 9 FL (ref 9.4–12.4)
PMV BLD AUTO: 9.1 FL (ref 9.4–12.4)
PMV BLD AUTO: 9.2 FL (ref 9.4–12.4)
PMV BLD AUTO: 9.3 FL (ref 9.4–12.4)
PMV BLD AUTO: 9.3 FL (ref 9.4–12.4)
PMV BLD AUTO: 9.4 FL (ref 9.4–12.4)
PMV BLD AUTO: 9.4 FL (ref 9.4–12.4)
PMV BLD AUTO: 9.5 FL (ref 9.4–12.4)
PMV BLD AUTO: 9.6 FL (ref 9.4–12.4)
PMV BLD AUTO: 9.9 FL (ref 9.4–12.4)
PMV BLD AUTO: ABNORMAL FL
PO2 MIXED: 49 MMHG (ref 25–40)
PO2: 32 MMHG (ref 71–104)
POTASSIUM REFLEX MAGNESIUM: 4 MEQ/L (ref 3.5–5.2)
POTASSIUM REFLEX MAGNESIUM: 4.3 MEQ/L (ref 3.5–5.2)
POTASSIUM REFLEX MAGNESIUM: 4.4 MEQ/L (ref 3.5–5.2)
POTASSIUM REFLEX MAGNESIUM: 5.4 MEQ/L (ref 3.5–5.2)
POTASSIUM SERPL-SCNC: 3.5 MEQ/L (ref 3.5–5.2)
POTASSIUM SERPL-SCNC: 3.8 MEQ/L (ref 3.5–5.2)
POTASSIUM SERPL-SCNC: 3.9 MEQ/L (ref 3.5–5.2)
POTASSIUM SERPL-SCNC: 4 MEQ/L (ref 3.5–5.2)
POTASSIUM SERPL-SCNC: 4.1 MEQ/L (ref 3.5–5.2)
POTASSIUM SERPL-SCNC: 4.2 MEQ/L (ref 3.5–5.2)
POTASSIUM SERPL-SCNC: 4.3 MEQ/L (ref 3.5–5.2)
POTASSIUM SERPL-SCNC: 4.4 MEQ/L (ref 3.5–5.2)
POTASSIUM SERPL-SCNC: 4.5 MEQ/L (ref 3.5–5.2)
POTASSIUM SERPL-SCNC: 4.6 MEQ/L (ref 3.5–5.2)
POTASSIUM SERPL-SCNC: 4.6 MEQ/L (ref 3.5–5.2)
POTASSIUM SERPL-SCNC: 4.7 MEQ/L (ref 3.5–5.2)
POTASSIUM SERPL-SCNC: 4.8 MEQ/L (ref 3.5–5.2)
POTASSIUM SERPL-SCNC: 4.9 MEQ/L (ref 3.5–5.2)
POTASSIUM SERPL-SCNC: 4.9 MEQ/L (ref 3.5–5.2)
POTASSIUM SERPL-SCNC: 5 MEQ/L (ref 3.5–5.2)
POTASSIUM SERPL-SCNC: 5.3 MEQ/L (ref 3.5–5.2)
POTASSIUM SERPL-SCNC: 5.3 MEQ/L (ref 3.5–5.2)
POTASSIUM SERPL-SCNC: 5.4 MEQ/L (ref 3.5–5.2)
POTASSIUM SERPL-SCNC: 5.5 MEQ/L (ref 3.5–5.2)
POTASSIUM SERPL-SCNC: 5.7 MEQ/L (ref 3.5–5.2)
POTASSIUM SERPL-SCNC: 5.8 MEQ/L (ref 3.5–5.2)
POTASSIUM SERPL-SCNC: 6.1 MEQ/L (ref 3.5–5.2)
POTASSIUM, URINE: 15.4 MEQ/L
POTASSIUM, URINE: 22.6 MEQ/L
POTASSIUM, URINE: 23.7 MEQ/L
PRO-BNP: 3852 PG/ML (ref 0–1800)
PRO-BNP: 5064 PG/ML (ref 0–1800)
PRO-BNP: 5181 PG/ML (ref 0–1800)
PRO-BNP: ABNORMAL PG/ML (ref 0–1800)
PROCALCITONIN: 0.4 NG/ML (ref 0.01–0.09)
PROCALCITONIN: 0.46 NG/ML (ref 0.01–0.09)
PROCALCITONIN: 0.71 NG/ML (ref 0.01–0.09)
PROTEIN UA: 100
PROTEIN UA: 100
PROTEIN UA: 100 MG/DL
PROTEIN UA: 30
PROTEIN UA: 300
PROTEIN UA: 300
PROTEIN UA: >= 1000
PROTEIN UA: ABNORMAL
PROTEIN UA: NEGATIVE
PROTEIN UA: NEGATIVE MG/DL
PROTEUS FILM ARRAY: NOT DETECTED
PSEUDOMONAS AERUGINOSA FILM ARRAY: NOT DETECTED
RBC # BLD: 2.28 MILL/MM3 (ref 4.2–5.4)
RBC # BLD: 2.57 MILL/MM3 (ref 4.2–5.4)
RBC # BLD: 2.58 MILL/MM3 (ref 4.2–5.4)
RBC # BLD: 2.62 MILL/MM3 (ref 4.2–5.4)
RBC # BLD: 2.63 MILL/MM3 (ref 4.2–5.4)
RBC # BLD: 2.74 MILL/MM3 (ref 4.2–5.4)
RBC # BLD: 2.74 MILL/MM3 (ref 4.2–5.4)
RBC # BLD: 2.75 MILL/MM3 (ref 4.2–5.4)
RBC # BLD: 2.76 MILL/MM3 (ref 4.2–5.4)
RBC # BLD: 2.76 MILL/MM3 (ref 4.2–5.4)
RBC # BLD: 2.9 10^6/ΜL
RBC # BLD: 2.9 MILL/MM3 (ref 4.2–5.4)
RBC # BLD: 2.92 MILL/MM3 (ref 4.2–5.4)
RBC # BLD: 2.93 MILL/MM3 (ref 4.2–5.4)
RBC # BLD: 2.96 MILL/MM3 (ref 4.2–5.4)
RBC # BLD: 2.96 MILL/MM3 (ref 4.2–5.4)
RBC # BLD: 2.99 MILL/MM3 (ref 4.2–5.4)
RBC # BLD: 3.01 MILL/MM3 (ref 4.2–5.4)
RBC # BLD: 3.05 MILL/MM3 (ref 4.2–5.4)
RBC # BLD: 3.1 10^6/ΜL
RBC # BLD: 3.13 MILL/MM3 (ref 4.2–5.4)
RBC # BLD: 3.17 MILL/MM3 (ref 4.2–5.4)
RBC # BLD: 3.18 MILL/MM3 (ref 4.2–5.4)
RBC # BLD: 3.23 MILL/MM3 (ref 4.2–5.4)
RBC # BLD: 3.37 MILL/MM3 (ref 4.2–5.4)
RBC # BLD: 3.5 MILL/MM3 (ref 4.2–5.4)
RBC # BLD: 3.52 MILL/MM3 (ref 4.2–5.4)
RBC # BLD: 3.52 MILL/MM3 (ref 4.2–5.4)
RBC # BLD: 3.63 MILL/MM3 (ref 4.2–5.4)
RBC # BLD: 3.66 MILL/MM3 (ref 4.2–5.4)
RBC URINE: > 100 /HPF
RBC URINE: > 200 /HPF
RBC URINE: ABNORMAL /HPF
RENAL EPITHELIAL, UA: ABNORMAL
RH FACTOR: NORMAL
RH FACTOR: NORMAL
ROTAVIRUS A PCR: NOT DETECTED
SALMONELLA PCR: NOT DETECTED
SAPOVIRUS PCR: NOT DETECTED
SCAN OF BLOOD SMEAR: NORMAL
SEG NEUTROPHILS: 57.1 %
SEG NEUTROPHILS: 59 %
SEG NEUTROPHILS: 62 %
SEG NEUTROPHILS: 62.5 %
SEG NEUTROPHILS: 64.9 %
SEG NEUTROPHILS: 65.3 %
SEG NEUTROPHILS: 66.1 %
SEG NEUTROPHILS: 67.1 %
SEG NEUTROPHILS: 68.6 %
SEG NEUTROPHILS: 68.8 %
SEG NEUTROPHILS: 69.2 %
SEG NEUTROPHILS: 72.1 %
SEG NEUTROPHILS: 73.7 %
SEG NEUTROPHILS: 74.8 %
SEG NEUTROPHILS: 75 %
SEG NEUTROPHILS: 78.2 %
SEG NEUTROPHILS: 78.7 %
SEG NEUTROPHILS: 80.4 %
SEG NEUTROPHILS: 81.2 %
SEG NEUTROPHILS: 81.5 %
SEG NEUTROPHILS: 82.1 %
SEG NEUTROPHILS: 84.3 %
SEG NEUTROPHILS: 84.8 %
SEG NEUTROPHILS: 84.9 %
SEGMENTED NEUTROPHILS ABSOLUTE COUNT: 10 THOU/MM3 (ref 1.8–7.7)
SEGMENTED NEUTROPHILS ABSOLUTE COUNT: 10.3 THOU/MM3 (ref 1.8–7.7)
SEGMENTED NEUTROPHILS ABSOLUTE COUNT: 11 THOU/MM3 (ref 1.8–7.7)
SEGMENTED NEUTROPHILS ABSOLUTE COUNT: 11.3 THOU/MM3 (ref 1.8–7.7)
SEGMENTED NEUTROPHILS ABSOLUTE COUNT: 4.6 THOU/MM3 (ref 1.8–7.7)
SEGMENTED NEUTROPHILS ABSOLUTE COUNT: 4.9 THOU/MM3 (ref 1.8–7.7)
SEGMENTED NEUTROPHILS ABSOLUTE COUNT: 5.3 THOU/MM3 (ref 1.8–7.7)
SEGMENTED NEUTROPHILS ABSOLUTE COUNT: 5.3 THOU/MM3 (ref 1.8–7.7)
SEGMENTED NEUTROPHILS ABSOLUTE COUNT: 5.4 THOU/MM3 (ref 1.8–7.7)
SEGMENTED NEUTROPHILS ABSOLUTE COUNT: 6.1 THOU/MM3 (ref 1.8–7.7)
SEGMENTED NEUTROPHILS ABSOLUTE COUNT: 6.1 THOU/MM3 (ref 1.8–7.7)
SEGMENTED NEUTROPHILS ABSOLUTE COUNT: 6.4 THOU/MM3 (ref 1.8–7.7)
SEGMENTED NEUTROPHILS ABSOLUTE COUNT: 6.7 THOU/MM3 (ref 1.8–7.7)
SEGMENTED NEUTROPHILS ABSOLUTE COUNT: 7.1 THOU/MM3 (ref 1.8–7.7)
SEGMENTED NEUTROPHILS ABSOLUTE COUNT: 7.3 THOU/MM3 (ref 1.8–7.7)
SEGMENTED NEUTROPHILS ABSOLUTE COUNT: 7.6 THOU/MM3 (ref 1.8–7.7)
SEGMENTED NEUTROPHILS ABSOLUTE COUNT: 8.1 THOU/MM3 (ref 1.8–7.7)
SEGMENTED NEUTROPHILS ABSOLUTE COUNT: 8.1 THOU/MM3 (ref 1.8–7.7)
SEGMENTED NEUTROPHILS ABSOLUTE COUNT: 8.3 THOU/MM3 (ref 1.8–7.7)
SEGMENTED NEUTROPHILS ABSOLUTE COUNT: 8.4 THOU/MM3 (ref 1.8–7.7)
SEGMENTED NEUTROPHILS ABSOLUTE COUNT: 8.5 THOU/MM3 (ref 1.8–7.7)
SEGMENTED NEUTROPHILS ABSOLUTE COUNT: 8.7 THOU/MM3 (ref 1.8–7.7)
SEGMENTED NEUTROPHILS ABSOLUTE COUNT: 8.8 THOU/MM3 (ref 1.8–7.7)
SEGMENTED NEUTROPHILS ABSOLUTE COUNT: 9.7 THOU/MM3 (ref 1.8–7.7)
SERRATIA MARCESCENS FILM ARRAY: NOT DETECTED
SODIUM BLD-SCNC: 135 MEQ/L (ref 135–145)
SODIUM BLD-SCNC: 135 MEQ/L (ref 135–145)
SODIUM BLD-SCNC: 136 MEQ/L (ref 135–145)
SODIUM BLD-SCNC: 137 MEQ/L (ref 135–145)
SODIUM BLD-SCNC: 137 MEQ/L (ref 135–145)
SODIUM BLD-SCNC: 138 MEQ/L (ref 135–145)
SODIUM BLD-SCNC: 139 MEQ/L (ref 135–145)
SODIUM BLD-SCNC: 140 MEQ/L (ref 135–145)
SODIUM BLD-SCNC: 141 MEQ/L (ref 135–145)
SODIUM BLD-SCNC: 142 MEQ/L (ref 135–145)
SODIUM BLD-SCNC: 143 MEQ/L (ref 135–145)
SODIUM BLD-SCNC: 145 MEQ/L (ref 135–145)
SODIUM URINE: 104 MEQ/L
SODIUM URINE: 59 MEQ/L
SODIUM URINE: 75 MEQ/L
SOURCE OF BLOOD CULTURE: ABNORMAL
SOURCE, BLOOD GAS: ABNORMAL
SPECIFIC GRAVITY UA: 1.02 (ref 1–1.03)
SPECIFIC GRAVITY UA: < 1.005 (ref 1–1.03)
SPECIFIC GRAVITY, URINE: 1.01 (ref 1–1.03)
SPECIFIC GRAVITY, URINE: 1.02 (ref 1–1.03)
SPECIFIC GRAVITY, URINE: 1.02 (ref 1–1.03)
SPECIFIC GRAVITY, URINE: < 1.005 (ref 1–1.03)
SPECIFIC GRAVITY, URINE: ABNORMAL (ref 1–1.03)
STAPH AUREUS FILM ARRAY: NOT DETECTED
STAPHYLOCOCCUS FILM ARRAY: DETECTED
STREP AGALACTIAE FILM ARRAY: NOT DETECTED
STREP PNEUMONIAE FILM ARRAY: NOT DETECTED
STREP PYOCGENES FILM ARRAY: NOT DETECTED
STREPTOCOCCUS FILM ARRAY: NOT DETECTED
TOTAL CK: 124 U/L (ref 30–135)
TOTAL CK: 58 U/L (ref 30–135)
TOTAL CK: 63 U/L (ref 30–135)
TOTAL PROTEIN: 5.7 G/DL (ref 6.1–8)
TOTAL PROTEIN: 5.9 G/DL (ref 6.1–8)
TOTAL PROTEIN: 6 G/DL (ref 6.1–8)
TOTAL PROTEIN: 6.1 G/DL (ref 6.1–8)
TOTAL PROTEIN: 6.2 G/DL (ref 6.1–8)
TOTAL PROTEIN: 6.2 G/DL (ref 6.1–8)
TOTAL PROTEIN: 6.7 G/DL (ref 6.1–8)
TOTAL PROTEIN: 6.7 G/DL (ref 6.1–8)
TOTAL PROTEIN: 6.8 G/DL (ref 6.1–8)
TOTAL PROTEIN: 6.9 G/DL (ref 6.1–8)
TOTAL PROTEIN: 7 G/DL (ref 6.1–8)
TOTAL PROTEIN: 7.3 G/DL (ref 6.1–8)
TOTAL PROTEIN: 7.5 G/DL (ref 6.1–8)
TRIGL SERPL-MCNC: 79 MG/DL
TROPONIN T: 0.04 NG/ML
TROPONIN T: 0.05 NG/ML
TROPONIN T: 0.07 NG/ML
TROPONIN T: 0.08 NG/ML
TROPONIN T: 0.08 NG/ML
TROPONIN T: 0.1 NG/ML
TROPONIN T: 0.12 NG/ML
TROPONIN T: 0.13 NG/ML
TSH SERPL DL<=0.05 MIU/L-ACNC: 1.1 UIU/ML (ref 0.4–4.2)
TSH SERPL DL<=0.05 MIU/L-ACNC: 1.86 UIU/ML (ref 0.4–4.2)
UREA NITROGEN, UR: 187 MG/DL
URINE CULTURE REFLEX: ABNORMAL
URINE CULTURE, ROUTINE: ABNORMAL
UROBILINOGEN, URINE: 0.2 EU/DL
UROBILINOGEN, URINE: ABNORMAL EU/DL
VANCOMYCIN RESISTANT ENTEROCOCCUS: NEGATIVE
VANCOMYCIN RESISTANT FILM ARRAY: ABNORMAL
VIBRIO CHOLERAE PCR: NOT DETECTED
VIBRIO PCR: NOT DETECTED
VITAMIN B-12: 657 PG/ML (ref 211–911)
VLDLC SERPL CALC-MCNC: 16 MG/DL
VRE CULTURE: NORMAL
WBC # BLD: 10.1 THOU/MM3 (ref 4.8–10.8)
WBC # BLD: 10.3 THOU/MM3 (ref 4.8–10.8)
WBC # BLD: 10.3 THOU/MM3 (ref 4.8–10.8)
WBC # BLD: 10.4 THOU/MM3 (ref 4.8–10.8)
WBC # BLD: 10.6 THOU/MM3 (ref 4.8–10.8)
WBC # BLD: 10.7 THOU/MM3 (ref 4.8–10.8)
WBC # BLD: 11 THOU/MM3 (ref 4.8–10.8)
WBC # BLD: 11.3 10^3/ML
WBC # BLD: 11.6 THOU/MM3 (ref 4.8–10.8)
WBC # BLD: 11.8 THOU/MM3 (ref 4.8–10.8)
WBC # BLD: 12.1 THOU/MM3 (ref 4.8–10.8)
WBC # BLD: 12.2 10^3/ML
WBC # BLD: 12.7 THOU/MM3 (ref 4.8–10.8)
WBC # BLD: 13 THOU/MM3 (ref 4.8–10.8)
WBC # BLD: 13.4 THOU/MM3 (ref 4.8–10.8)
WBC # BLD: 13.8 THOU/MM3 (ref 4.8–10.8)
WBC # BLD: 7.8 THOU/MM3 (ref 4.8–10.8)
WBC # BLD: 8.1 THOU/MM3 (ref 4.8–10.8)
WBC # BLD: 8.5 THOU/MM3 (ref 4.8–10.8)
WBC # BLD: 9 THOU/MM3 (ref 4.8–10.8)
WBC # BLD: 9.2 THOU/MM3 (ref 4.8–10.8)
WBC # BLD: 9.3 THOU/MM3 (ref 4.8–10.8)
WBC # BLD: 9.4 THOU/MM3 (ref 4.8–10.8)
WBC # BLD: 9.7 THOU/MM3 (ref 4.8–10.8)
WBC # BLD: 9.8 THOU/MM3 (ref 4.8–10.8)
WBC # BLD: 9.9 THOU/MM3 (ref 4.8–10.8)
WBC UA: > 100 /HPF
WBC UA: > 100 /HPF
WBC UA: > 200 /HPF
WBC UA: ABNORMAL /HPF
YEAST: ABNORMAL
YERSINIA ENTEROCOLITICA PCR: NOT DETECTED

## 2018-01-01 PROCEDURE — 80048 BASIC METABOLIC PNL TOTAL CA: CPT

## 2018-01-01 PROCEDURE — 51702 INSERT TEMP BLADDER CATH: CPT | Performed by: NURSE PRACTITIONER

## 2018-01-01 PROCEDURE — 87186 SC STD MICRODIL/AGAR DIL: CPT

## 2018-01-01 PROCEDURE — 97116 GAIT TRAINING THERAPY: CPT

## 2018-01-01 PROCEDURE — G8978 MOBILITY CURRENT STATUS: HCPCS

## 2018-01-01 PROCEDURE — 6370000000 HC RX 637 (ALT 250 FOR IP): Performed by: HOSPITALIST

## 2018-01-01 PROCEDURE — 99214 OFFICE O/P EST MOD 30 MIN: CPT | Performed by: FAMILY MEDICINE

## 2018-01-01 PROCEDURE — 1290000000 HC SEMI PRIVATE OTHER R&B

## 2018-01-01 PROCEDURE — 6360000002 HC RX W HCPCS: Performed by: HOSPITALIST

## 2018-01-01 PROCEDURE — 97535 SELF CARE MNGMENT TRAINING: CPT

## 2018-01-01 PROCEDURE — 84100 ASSAY OF PHOSPHORUS: CPT

## 2018-01-01 PROCEDURE — 6360000002 HC RX W HCPCS: Performed by: INTERNAL MEDICINE

## 2018-01-01 PROCEDURE — 84132 ASSAY OF SERUM POTASSIUM: CPT

## 2018-01-01 PROCEDURE — 83735 ASSAY OF MAGNESIUM: CPT

## 2018-01-01 PROCEDURE — 6370000000 HC RX 637 (ALT 250 FOR IP): Performed by: INTERNAL MEDICINE

## 2018-01-01 PROCEDURE — 80053 COMPREHEN METABOLIC PANEL: CPT

## 2018-01-01 PROCEDURE — 99232 SBSQ HOSP IP/OBS MODERATE 35: CPT | Performed by: INTERNAL MEDICINE

## 2018-01-01 PROCEDURE — 6370000000 HC RX 637 (ALT 250 FOR IP): Performed by: FAMILY MEDICINE

## 2018-01-01 PROCEDURE — 6360000002 HC RX W HCPCS: Performed by: NURSE ANESTHETIST, CERTIFIED REGISTERED

## 2018-01-01 PROCEDURE — 86709 HEPATITIS A IGM ANTIBODY: CPT

## 2018-01-01 PROCEDURE — 97166 OT EVAL MOD COMPLEX 45 MIN: CPT

## 2018-01-01 PROCEDURE — 36415 COLL VENOUS BLD VENIPUNCTURE: CPT

## 2018-01-01 PROCEDURE — 97530 THERAPEUTIC ACTIVITIES: CPT

## 2018-01-01 PROCEDURE — 99221 1ST HOSP IP/OBS SF/LOW 40: CPT | Performed by: NURSE PRACTITIONER

## 2018-01-01 PROCEDURE — 97110 THERAPEUTIC EXERCISES: CPT

## 2018-01-01 PROCEDURE — 99232 SBSQ HOSP IP/OBS MODERATE 35: CPT | Performed by: NURSE PRACTITIONER

## 2018-01-01 PROCEDURE — G8987 SELF CARE CURRENT STATUS: HCPCS

## 2018-01-01 PROCEDURE — 86255 FLUORESCENT ANTIBODY SCREEN: CPT

## 2018-01-01 PROCEDURE — 71045 X-RAY EXAM CHEST 1 VIEW: CPT

## 2018-01-01 PROCEDURE — 2580000003 HC RX 258: Performed by: FAMILY MEDICINE

## 2018-01-01 PROCEDURE — 6370000000 HC RX 637 (ALT 250 FOR IP): Performed by: NURSE PRACTITIONER

## 2018-01-01 PROCEDURE — 51702 INSERT TEMP BLADDER CATH: CPT

## 2018-01-01 PROCEDURE — 90732 PPSV23 VACC 2 YRS+ SUBQ/IM: CPT | Performed by: FAMILY MEDICINE

## 2018-01-01 PROCEDURE — 1200000000 HC SEMI PRIVATE

## 2018-01-01 PROCEDURE — 81001 URINALYSIS AUTO W/SCOPE: CPT

## 2018-01-01 PROCEDURE — 3209999900 FLUORO FOR SURGICAL PROCEDURES

## 2018-01-01 PROCEDURE — 2580000003 HC RX 258: Performed by: HOSPITALIST

## 2018-01-01 PROCEDURE — 87077 CULTURE AEROBIC IDENTIFY: CPT

## 2018-01-01 PROCEDURE — 87086 URINE CULTURE/COLONY COUNT: CPT

## 2018-01-01 PROCEDURE — 6360000002 HC RX W HCPCS: Performed by: PHARMACIST

## 2018-01-01 PROCEDURE — 74176 CT ABD & PELVIS W/O CONTRAST: CPT

## 2018-01-01 PROCEDURE — 80069 RENAL FUNCTION PANEL: CPT

## 2018-01-01 PROCEDURE — 85014 HEMATOCRIT: CPT

## 2018-01-01 PROCEDURE — 99232 SBSQ HOSP IP/OBS MODERATE 35: CPT | Performed by: HOSPITALIST

## 2018-01-01 PROCEDURE — 3600000013 HC SURGERY LEVEL 3 ADDTL 15MIN: Performed by: UROLOGY

## 2018-01-01 PROCEDURE — G8988 SELF CARE GOAL STATUS: HCPCS

## 2018-01-01 PROCEDURE — 86705 HEP B CORE ANTIBODY IGM: CPT

## 2018-01-01 PROCEDURE — 99285 EMERGENCY DEPT VISIT HI MDM: CPT

## 2018-01-01 PROCEDURE — 93005 ELECTROCARDIOGRAM TRACING: CPT | Performed by: FAMILY MEDICINE

## 2018-01-01 PROCEDURE — P9016 RBC LEUKOCYTES REDUCED: HCPCS

## 2018-01-01 PROCEDURE — 85025 COMPLETE CBC W/AUTO DIFF WBC: CPT

## 2018-01-01 PROCEDURE — 6360000002 HC RX W HCPCS

## 2018-01-01 PROCEDURE — 2580000003 HC RX 258: Performed by: INTERNAL MEDICINE

## 2018-01-01 PROCEDURE — 83690 ASSAY OF LIPASE: CPT

## 2018-01-01 PROCEDURE — 93010 ELECTROCARDIOGRAM REPORT: CPT | Performed by: INTERNAL MEDICINE

## 2018-01-01 PROCEDURE — 82746 ASSAY OF FOLIC ACID SERUM: CPT

## 2018-01-01 PROCEDURE — 2709999900 HC NON-CHARGEABLE SUPPLY

## 2018-01-01 PROCEDURE — 86923 COMPATIBILITY TEST ELECTRIC: CPT

## 2018-01-01 PROCEDURE — 86706 HEP B SURFACE ANTIBODY: CPT

## 2018-01-01 PROCEDURE — 0QS706Z REPOSITION LEFT UPPER FEMUR WITH INTRAMEDULLARY INTERNAL FIXATION DEVICE, OPEN APPROACH: ICD-10-PCS | Performed by: ORTHOPAEDIC SURGERY

## 2018-01-01 PROCEDURE — APPSS30 APP SPLIT SHARED TIME 16-30 MINUTES: Performed by: NURSE PRACTITIONER

## 2018-01-01 PROCEDURE — 84300 ASSAY OF URINE SODIUM: CPT

## 2018-01-01 PROCEDURE — 2580000003 HC RX 258: Performed by: NURSE PRACTITIONER

## 2018-01-01 PROCEDURE — 82436 ASSAY OF URINE CHLORIDE: CPT

## 2018-01-01 PROCEDURE — 99222 1ST HOSP IP/OBS MODERATE 55: CPT | Performed by: INTERNAL MEDICINE

## 2018-01-01 PROCEDURE — 93971 EXTREMITY STUDY: CPT

## 2018-01-01 PROCEDURE — 99999 PR OFFICE/OUTPT VISIT,PROCEDURE ONLY: CPT | Performed by: NURSE PRACTITIONER

## 2018-01-01 PROCEDURE — 78306 BONE IMAGING WHOLE BODY: CPT

## 2018-01-01 PROCEDURE — C1894 INTRO/SHEATH, NON-LASER: HCPCS | Performed by: UROLOGY

## 2018-01-01 PROCEDURE — 73700 CT LOWER EXTREMITY W/O DYE: CPT

## 2018-01-01 PROCEDURE — 93005 ELECTROCARDIOGRAM TRACING: CPT | Performed by: PHYSICIAN ASSISTANT

## 2018-01-01 PROCEDURE — 99211 OFF/OP EST MAY X REQ PHY/QHP: CPT

## 2018-01-01 PROCEDURE — 84133 ASSAY OF URINE POTASSIUM: CPT

## 2018-01-01 PROCEDURE — 6370000000 HC RX 637 (ALT 250 FOR IP): Performed by: EMERGENCY MEDICINE

## 2018-01-01 PROCEDURE — 97162 PT EVAL MOD COMPLEX 30 MIN: CPT

## 2018-01-01 PROCEDURE — 87184 SC STD DISK METHOD PER PLATE: CPT

## 2018-01-01 PROCEDURE — 1200000003 HC TELEMETRY R&B

## 2018-01-01 PROCEDURE — 82140 ASSAY OF AMMONIA: CPT

## 2018-01-01 PROCEDURE — 6360000004 HC RX CONTRAST MEDICATION: Performed by: FAMILY MEDICINE

## 2018-01-01 PROCEDURE — 99203 OFFICE O/P NEW LOW 30 MIN: CPT | Performed by: NURSE PRACTITIONER

## 2018-01-01 PROCEDURE — 7100000000 HC PACU RECOVERY - FIRST 15 MIN: Performed by: UROLOGY

## 2018-01-01 PROCEDURE — 71250 CT THORAX DX C-: CPT

## 2018-01-01 PROCEDURE — 36430 TRANSFUSION BLD/BLD COMPNT: CPT

## 2018-01-01 PROCEDURE — 83880 ASSAY OF NATRIURETIC PEPTIDE: CPT

## 2018-01-01 PROCEDURE — 97163 PT EVAL HIGH COMPLEX 45 MIN: CPT

## 2018-01-01 PROCEDURE — 6370000000 HC RX 637 (ALT 250 FOR IP): Performed by: PHARMACIST

## 2018-01-01 PROCEDURE — 99233 SBSQ HOSP IP/OBS HIGH 50: CPT | Performed by: INTERNAL MEDICINE

## 2018-01-01 PROCEDURE — 99214 OFFICE O/P EST MOD 30 MIN: CPT | Performed by: NURSE PRACTITIONER

## 2018-01-01 PROCEDURE — 96375 TX/PRO/DX INJ NEW DRUG ADDON: CPT

## 2018-01-01 PROCEDURE — 52000 CYSTOURETHROSCOPY: CPT | Performed by: UROLOGY

## 2018-01-01 PROCEDURE — 2060000000 HC ICU INTERMEDIATE R&B

## 2018-01-01 PROCEDURE — 85027 COMPLETE CBC AUTOMATED: CPT

## 2018-01-01 PROCEDURE — 87147 CULTURE TYPE IMMUNOLOGIC: CPT

## 2018-01-01 PROCEDURE — 86803 HEPATITIS C AB TEST: CPT

## 2018-01-01 PROCEDURE — 70551 MRI BRAIN STEM W/O DYE: CPT

## 2018-01-01 PROCEDURE — C2617 STENT, NON-COR, TEM W/O DEL: HCPCS | Performed by: UROLOGY

## 2018-01-01 PROCEDURE — 83605 ASSAY OF LACTIC ACID: CPT

## 2018-01-01 PROCEDURE — 0220000000 HC SKILLED NURSING FACILITY

## 2018-01-01 PROCEDURE — 2500000003 HC RX 250 WO HCPCS: Performed by: INTERNAL MEDICINE

## 2018-01-01 PROCEDURE — G8979 MOBILITY GOAL STATUS: HCPCS

## 2018-01-01 PROCEDURE — 96376 TX/PRO/DX INJ SAME DRUG ADON: CPT

## 2018-01-01 PROCEDURE — 7100000011 HC PHASE II RECOVERY - ADDTL 15 MIN: Performed by: UROLOGY

## 2018-01-01 PROCEDURE — 82248 BILIRUBIN DIRECT: CPT

## 2018-01-01 PROCEDURE — 99999 PR OFFICE/OUTPT VISIT,PROCEDURE ONLY: CPT | Performed by: UROLOGY

## 2018-01-01 PROCEDURE — 70450 CT HEAD/BRAIN W/O DYE: CPT

## 2018-01-01 PROCEDURE — 99223 1ST HOSP IP/OBS HIGH 75: CPT | Performed by: HOSPITALIST

## 2018-01-01 PROCEDURE — 99223 1ST HOSP IP/OBS HIGH 75: CPT | Performed by: PHYSICAL MEDICINE & REHABILITATION

## 2018-01-01 PROCEDURE — 94640 AIRWAY INHALATION TREATMENT: CPT

## 2018-01-01 PROCEDURE — 93000 ELECTROCARDIOGRAM COMPLETE: CPT | Performed by: INTERNAL MEDICINE

## 2018-01-01 PROCEDURE — 87641 MR-STAPH DNA AMP PROBE: CPT

## 2018-01-01 PROCEDURE — 84443 ASSAY THYROID STIM HORMONE: CPT

## 2018-01-01 PROCEDURE — C1758 CATHETER, URETERAL: HCPCS | Performed by: UROLOGY

## 2018-01-01 PROCEDURE — 99495 TRANSJ CARE MGMT MOD F2F 14D: CPT | Performed by: NURSE PRACTITIONER

## 2018-01-01 PROCEDURE — 87801 DETECT AGNT MULT DNA AMPLI: CPT

## 2018-01-01 PROCEDURE — 84484 ASSAY OF TROPONIN QUANT: CPT

## 2018-01-01 PROCEDURE — 97165 OT EVAL LOW COMPLEX 30 MIN: CPT

## 2018-01-01 PROCEDURE — 86850 RBC ANTIBODY SCREEN: CPT

## 2018-01-01 PROCEDURE — 7100000010 HC PHASE II RECOVERY - FIRST 15 MIN: Performed by: UROLOGY

## 2018-01-01 PROCEDURE — 84145 PROCALCITONIN (PCT): CPT

## 2018-01-01 PROCEDURE — 82948 REAGENT STRIP/BLOOD GLUCOSE: CPT

## 2018-01-01 PROCEDURE — 82803 BLOOD GASES ANY COMBINATION: CPT

## 2018-01-01 PROCEDURE — 97112 NEUROMUSCULAR REEDUCATION: CPT

## 2018-01-01 PROCEDURE — 6360000002 HC RX W HCPCS: Performed by: FAMILY MEDICINE

## 2018-01-01 PROCEDURE — 99213 OFFICE O/P EST LOW 20 MIN: CPT | Performed by: INTERNAL MEDICINE

## 2018-01-01 PROCEDURE — 97167 OT EVAL HIGH COMPLEX 60 MIN: CPT

## 2018-01-01 PROCEDURE — 85018 HEMOGLOBIN: CPT

## 2018-01-01 PROCEDURE — 99221 1ST HOSP IP/OBS SF/LOW 40: CPT | Performed by: INTERNAL MEDICINE

## 2018-01-01 PROCEDURE — 85610 PROTHROMBIN TIME: CPT

## 2018-01-01 PROCEDURE — 99231 SBSQ HOSP IP/OBS SF/LOW 25: CPT | Performed by: UROLOGY

## 2018-01-01 PROCEDURE — 93010 ELECTROCARDIOGRAM REPORT: CPT | Performed by: NUCLEAR MEDICINE

## 2018-01-01 PROCEDURE — 87040 BLOOD CULTURE FOR BACTERIA: CPT

## 2018-01-01 PROCEDURE — A9503 TC99M MEDRONATE: HCPCS | Performed by: INTERNAL MEDICINE

## 2018-01-01 PROCEDURE — 83516 IMMUNOASSAY NONANTIBODY: CPT

## 2018-01-01 PROCEDURE — 2500000003 HC RX 250 WO HCPCS: Performed by: HOSPITALIST

## 2018-01-01 PROCEDURE — 87449 NOS EACH ORGANISM AG IA: CPT

## 2018-01-01 PROCEDURE — APPSS180 APP SPLIT SHARED TIME > 60 MINUTES: Performed by: NURSE PRACTITIONER

## 2018-01-01 PROCEDURE — 96374 THER/PROPH/DIAG INJ IV PUSH: CPT

## 2018-01-01 PROCEDURE — 85730 THROMBOPLASTIN TIME PARTIAL: CPT

## 2018-01-01 PROCEDURE — 86039 ANTINUCLEAR ANTIBODIES (ANA): CPT

## 2018-01-01 PROCEDURE — C1769 GUIDE WIRE: HCPCS | Performed by: UROLOGY

## 2018-01-01 PROCEDURE — 93005 ELECTROCARDIOGRAM TRACING: CPT | Performed by: INTERNAL MEDICINE

## 2018-01-01 PROCEDURE — 86900 BLOOD TYPING SEROLOGIC ABO: CPT

## 2018-01-01 PROCEDURE — 99239 HOSP IP/OBS DSCHRG MGMT >30: CPT | Performed by: INTERNAL MEDICINE

## 2018-01-01 PROCEDURE — 73502 X-RAY EXAM HIP UNI 2-3 VIEWS: CPT

## 2018-01-01 PROCEDURE — 99214 OFFICE O/P EST MOD 30 MIN: CPT | Performed by: INTERNAL MEDICINE

## 2018-01-01 PROCEDURE — 87500 VANOMYCIN DNA AMP PROBE: CPT

## 2018-01-01 PROCEDURE — 94760 N-INVAS EAR/PLS OXIMETRY 1: CPT

## 2018-01-01 PROCEDURE — 2720000010 HC SURG SUPPLY STERILE: Performed by: ORTHOPAEDIC SURGERY

## 2018-01-01 PROCEDURE — 36600 WITHDRAWAL OF ARTERIAL BLOOD: CPT

## 2018-01-01 PROCEDURE — 6820000001 HC L2 TRAUMA SURGERY EVALUATION

## 2018-01-01 PROCEDURE — 76770 US EXAM ABDO BACK WALL COMP: CPT

## 2018-01-01 PROCEDURE — 93005 ELECTROCARDIOGRAM TRACING: CPT | Performed by: EMERGENCY MEDICINE

## 2018-01-01 PROCEDURE — 7100000000 HC PACU RECOVERY - FIRST 15 MIN: Performed by: ORTHOPAEDIC SURGERY

## 2018-01-01 PROCEDURE — 2500000003 HC RX 250 WO HCPCS: Performed by: NURSE ANESTHETIST, CERTIFIED REGISTERED

## 2018-01-01 PROCEDURE — 6370000000 HC RX 637 (ALT 250 FOR IP)

## 2018-01-01 PROCEDURE — 99231 SBSQ HOSP IP/OBS SF/LOW 25: CPT | Performed by: PHYSICIAN ASSISTANT

## 2018-01-01 PROCEDURE — 97161 PT EVAL LOW COMPLEX 20 MIN: CPT

## 2018-01-01 PROCEDURE — 76376 3D RENDER W/INTRP POSTPROCES: CPT

## 2018-01-01 PROCEDURE — 6360000002 HC RX W HCPCS: Performed by: EMERGENCY MEDICINE

## 2018-01-01 PROCEDURE — G0480 DRUG TEST DEF 1-7 CLASSES: HCPCS

## 2018-01-01 PROCEDURE — 80307 DRUG TEST PRSMV CHEM ANLYZR: CPT

## 2018-01-01 PROCEDURE — 99231 SBSQ HOSP IP/OBS SF/LOW 25: CPT | Performed by: NURSE PRACTITIONER

## 2018-01-01 PROCEDURE — 3700000000 HC ANESTHESIA ATTENDED CARE: Performed by: UROLOGY

## 2018-01-01 PROCEDURE — 6360000002 HC RX W HCPCS: Performed by: ANESTHESIOLOGY

## 2018-01-01 PROCEDURE — 86038 ANTINUCLEAR ANTIBODIES: CPT

## 2018-01-01 PROCEDURE — 2709999900 HC NON-CHARGEABLE SUPPLY: Performed by: UROLOGY

## 2018-01-01 PROCEDURE — G0009 ADMIN PNEUMOCOCCAL VACCINE: HCPCS | Performed by: FAMILY MEDICINE

## 2018-01-01 PROCEDURE — 99223 1ST HOSP IP/OBS HIGH 75: CPT | Performed by: INTERNAL MEDICINE

## 2018-01-01 PROCEDURE — G8510 SCR DEP NEG, NO PLAN REQD: HCPCS | Performed by: FAMILY MEDICINE

## 2018-01-01 PROCEDURE — 82533 TOTAL CORTISOL: CPT

## 2018-01-01 PROCEDURE — 83935 ASSAY OF URINE OSMOLALITY: CPT

## 2018-01-01 PROCEDURE — 2700000000 HC OXYGEN THERAPY PER DAY

## 2018-01-01 PROCEDURE — 82330 ASSAY OF CALCIUM: CPT

## 2018-01-01 PROCEDURE — 3600000004 HC SURGERY LEVEL 4 BASE: Performed by: ORTHOPAEDIC SURGERY

## 2018-01-01 PROCEDURE — 87081 CULTURE SCREEN ONLY: CPT

## 2018-01-01 PROCEDURE — 3700000001 HC ADD 15 MINUTES (ANESTHESIA): Performed by: UROLOGY

## 2018-01-01 PROCEDURE — 82390 ASSAY OF CERULOPLASMIN: CPT

## 2018-01-01 PROCEDURE — 7100000001 HC PACU RECOVERY - ADDTL 15 MIN: Performed by: UROLOGY

## 2018-01-01 PROCEDURE — 3430000000 HC RX DIAGNOSTIC RADIOPHARMACEUTICAL: Performed by: INTERNAL MEDICINE

## 2018-01-01 PROCEDURE — 71046 X-RAY EXAM CHEST 2 VIEWS: CPT

## 2018-01-01 PROCEDURE — 84540 ASSAY OF URINE/UREA-N: CPT

## 2018-01-01 PROCEDURE — 2580000003 HC RX 258: Performed by: PHYSICIAN ASSISTANT

## 2018-01-01 PROCEDURE — 73552 X-RAY EXAM OF FEMUR 2/>: CPT

## 2018-01-01 PROCEDURE — A5120 SKIN BARRIER, WIPE OR SWAB: HCPCS

## 2018-01-01 PROCEDURE — 86704 HEP B CORE ANTIBODY TOTAL: CPT

## 2018-01-01 PROCEDURE — 2000000000 HC ICU R&B

## 2018-01-01 PROCEDURE — 99239 HOSP IP/OBS DSCHRG MGMT >30: CPT | Performed by: HOSPITALIST

## 2018-01-01 PROCEDURE — 99213 OFFICE O/P EST LOW 20 MIN: CPT | Performed by: NURSE PRACTITIONER

## 2018-01-01 PROCEDURE — 99223 1ST HOSP IP/OBS HIGH 75: CPT | Performed by: FAMILY MEDICINE

## 2018-01-01 PROCEDURE — 82570 ASSAY OF URINE CREATININE: CPT

## 2018-01-01 PROCEDURE — 82550 ASSAY OF CK (CPK): CPT

## 2018-01-01 PROCEDURE — 51700 IRRIGATION OF BLADDER: CPT

## 2018-01-01 PROCEDURE — 87340 HEPATITIS B SURFACE AG IA: CPT

## 2018-01-01 PROCEDURE — 99222 1ST HOSP IP/OBS MODERATE 55: CPT | Performed by: NURSE PRACTITIONER

## 2018-01-01 PROCEDURE — 51700 IRRIGATION OF BLADDER: CPT | Performed by: NURSE PRACTITIONER

## 2018-01-01 PROCEDURE — 2580000003 HC RX 258: Performed by: NURSE ANESTHETIST, CERTIFIED REGISTERED

## 2018-01-01 PROCEDURE — 3600000014 HC SURGERY LEVEL 4 ADDTL 15MIN: Performed by: ORTHOPAEDIC SURGERY

## 2018-01-01 PROCEDURE — 51798 US URINE CAPACITY MEASURE: CPT

## 2018-01-01 PROCEDURE — 52344 CYSTO/URETERO STRICTURE TX: CPT | Performed by: UROLOGY

## 2018-01-01 PROCEDURE — 83930 ASSAY OF BLOOD OSMOLALITY: CPT

## 2018-01-01 PROCEDURE — 6360000002 HC RX W HCPCS: Performed by: NURSE PRACTITIONER

## 2018-01-01 PROCEDURE — G0008 ADMIN INFLUENZA VIRUS VAC: HCPCS | Performed by: FAMILY MEDICINE

## 2018-01-01 PROCEDURE — 82607 VITAMIN B-12: CPT

## 2018-01-01 PROCEDURE — 3700000000 HC ANESTHESIA ATTENDED CARE: Performed by: ORTHOPAEDIC SURGERY

## 2018-01-01 PROCEDURE — 3600000003 HC SURGERY LEVEL 3 BASE: Performed by: UROLOGY

## 2018-01-01 PROCEDURE — 7100000001 HC PACU RECOVERY - ADDTL 15 MIN: Performed by: ORTHOPAEDIC SURGERY

## 2018-01-01 PROCEDURE — 80076 HEPATIC FUNCTION PANEL: CPT

## 2018-01-01 PROCEDURE — 82800 BLOOD PH: CPT

## 2018-01-01 PROCEDURE — 3700000001 HC ADD 15 MINUTES (ANESTHESIA): Performed by: ORTHOPAEDIC SURGERY

## 2018-01-01 PROCEDURE — 87507 IADNA-DNA/RNA PROBE TQ 12-25: CPT

## 2018-01-01 PROCEDURE — C1713 ANCHOR/SCREW BN/BN,TIS/BN: HCPCS | Performed by: ORTHOPAEDIC SURGERY

## 2018-01-01 PROCEDURE — 82784 ASSAY IGA/IGD/IGG/IGM EACH: CPT

## 2018-01-01 PROCEDURE — 74177 CT ABD & PELVIS W/CONTRAST: CPT

## 2018-01-01 PROCEDURE — 99213 OFFICE O/P EST LOW 20 MIN: CPT | Performed by: PHYSICIAN ASSISTANT

## 2018-01-01 PROCEDURE — 6370000000 HC RX 637 (ALT 250 FOR IP): Performed by: PHYSICIAN ASSISTANT

## 2018-01-01 PROCEDURE — 99215 OFFICE O/P EST HI 40 MIN: CPT | Performed by: NURSE PRACTITIONER

## 2018-01-01 PROCEDURE — 52332 CYSTOSCOPY AND TREATMENT: CPT | Performed by: UROLOGY

## 2018-01-01 PROCEDURE — 82103 ALPHA-1-ANTITRYPSIN TOTAL: CPT

## 2018-01-01 PROCEDURE — A6402 STERILE GAUZE <= 16 SQ IN: HCPCS | Performed by: ORTHOPAEDIC SURGERY

## 2018-01-01 PROCEDURE — 93306 TTE W/DOPPLER COMPLETE: CPT

## 2018-01-01 PROCEDURE — 90662 IIV NO PRSV INCREASED AG IM: CPT | Performed by: FAMILY MEDICINE

## 2018-01-01 PROCEDURE — 86901 BLOOD TYPING SEROLOGIC RH(D): CPT

## 2018-01-01 PROCEDURE — 1111F DSCHRG MED/CURRENT MED MERGE: CPT | Performed by: FAMILY MEDICINE

## 2018-01-01 PROCEDURE — 86708 HEPATITIS A ANTIBODY: CPT

## 2018-01-01 PROCEDURE — 99232 SBSQ HOSP IP/OBS MODERATE 35: CPT | Performed by: PHYSICIAN ASSISTANT

## 2018-01-01 PROCEDURE — 72125 CT NECK SPINE W/O DYE: CPT

## 2018-01-01 DEVICE — INTERTAN LAG/COMPRESSION SCREW KIT                                    80MM / 75MM
Type: IMPLANTABLE DEVICE | Status: FUNCTIONAL
Brand: TRIGEN

## 2018-01-01 DEVICE — TRIGEN INTERTAN 1.5 11.5MM X 34CM                                    125DEGREE LEFT
Type: IMPLANTABLE DEVICE | Status: FUNCTIONAL
Brand: TRIGEN

## 2018-01-01 DEVICE — URETERAL STENT
Type: IMPLANTABLE DEVICE | Site: URETER | Status: FUNCTIONAL
Brand: CONTOUR™

## 2018-01-01 DEVICE — TRIGEN LOW PROFILE SCREW 5.0MM X 32.5MM
Type: IMPLANTABLE DEVICE | Status: FUNCTIONAL
Brand: TRIGEN

## 2018-01-01 RX ORDER — ISOSORBIDE MONONITRATE 120 MG/1
120 TABLET, EXTENDED RELEASE ORAL DAILY
Qty: 90 TABLET | Refills: 1 | Status: ON HOLD | OUTPATIENT
Start: 2018-01-01 | End: 2018-01-01 | Stop reason: HOSPADM

## 2018-01-01 RX ORDER — 0.9 % SODIUM CHLORIDE 0.9 %
500 INTRAVENOUS SOLUTION INTRAVENOUS ONCE
Status: COMPLETED | OUTPATIENT
Start: 2018-01-01 | End: 2018-01-01

## 2018-01-01 RX ORDER — ZAFIRLUKAST 20 MG/1
20 TABLET, FILM COATED ORAL
Qty: 60 TABLET | Refills: 0 | Status: ON HOLD
Start: 2018-01-01 | End: 2019-01-01 | Stop reason: HOSPADM

## 2018-01-01 RX ORDER — SENNA PLUS 8.6 MG/1
1 TABLET ORAL NIGHTLY PRN
Status: DISCONTINUED | OUTPATIENT
Start: 2018-01-01 | End: 2018-01-01 | Stop reason: HOSPADM

## 2018-01-01 RX ORDER — ISOSORBIDE MONONITRATE 60 MG/1
60 TABLET, EXTENDED RELEASE ORAL DAILY
Status: DISCONTINUED | OUTPATIENT
Start: 2018-01-01 | End: 2018-01-01 | Stop reason: HOSPADM

## 2018-01-01 RX ORDER — DEXTROSE MONOHYDRATE 50 MG/ML
100 INJECTION, SOLUTION INTRAVENOUS PRN
Status: DISCONTINUED | OUTPATIENT
Start: 2018-01-01 | End: 2018-01-01

## 2018-01-01 RX ORDER — GUAIFENESIN 600 MG/1
600 TABLET, EXTENDED RELEASE ORAL 2 TIMES DAILY
Qty: 20 TABLET | Refills: 0 | Status: ON HOLD | OUTPATIENT
Start: 2018-01-01 | End: 2018-01-01 | Stop reason: HOSPADM

## 2018-01-01 RX ORDER — SODIUM CHLORIDE 0.9 % (FLUSH) 0.9 %
10 SYRINGE (ML) INJECTION EVERY 12 HOURS SCHEDULED
Status: DISCONTINUED | OUTPATIENT
Start: 2018-01-01 | End: 2018-01-01 | Stop reason: HOSPADM

## 2018-01-01 RX ORDER — 0.9 % SODIUM CHLORIDE 0.9 %
250 INTRAVENOUS SOLUTION INTRAVENOUS ONCE
Status: COMPLETED | OUTPATIENT
Start: 2018-01-01 | End: 2018-01-01

## 2018-01-01 RX ORDER — DEXTROSE MONOHYDRATE 25 G/50ML
12.5 INJECTION, SOLUTION INTRAVENOUS PRN
Status: DISCONTINUED | OUTPATIENT
Start: 2018-01-01 | End: 2018-01-01

## 2018-01-01 RX ORDER — LORAZEPAM 2 MG/1
2 TABLET ORAL NIGHTLY
Status: ON HOLD | COMMUNITY
End: 2019-01-01 | Stop reason: HOSPADM

## 2018-01-01 RX ORDER — LINEZOLID 600 MG/1
600 TABLET, FILM COATED ORAL 2 TIMES DAILY
Qty: 20 TABLET | Refills: 0 | Status: SHIPPED | OUTPATIENT
Start: 2018-01-01 | End: 2018-01-01

## 2018-01-01 RX ORDER — ACETAMINOPHEN 325 MG/1
650 TABLET ORAL EVERY 4 HOURS PRN
Status: DISCONTINUED | OUTPATIENT
Start: 2018-01-01 | End: 2018-01-01 | Stop reason: HOSPADM

## 2018-01-01 RX ORDER — ZAFIRLUKAST 20 MG/1
20 TABLET, FILM COATED ORAL 2 TIMES DAILY
Status: DISCONTINUED | OUTPATIENT
Start: 2018-01-01 | End: 2018-01-01 | Stop reason: HOSPADM

## 2018-01-01 RX ORDER — ZAFIRLUKAST 20 MG/1
20 TABLET, FILM COATED ORAL 2 TIMES DAILY
Status: DISCONTINUED | OUTPATIENT
Start: 2018-01-01 | End: 2018-01-01

## 2018-01-01 RX ORDER — DEXTROSE MONOHYDRATE 25 G/50ML
25 INJECTION, SOLUTION INTRAVENOUS ONCE
Status: DISCONTINUED | OUTPATIENT
Start: 2018-01-01 | End: 2018-01-01

## 2018-01-01 RX ORDER — BUMETANIDE 1 MG/1
0.5 TABLET ORAL
Status: CANCELLED | OUTPATIENT
Start: 2018-01-01

## 2018-01-01 RX ORDER — SODIUM BICARBONATE 650 MG/1
650 TABLET ORAL 2 TIMES DAILY
Qty: 60 TABLET | Refills: 0 | Status: SHIPPED | OUTPATIENT
Start: 2018-01-01 | End: 2018-01-01 | Stop reason: SDUPTHER

## 2018-01-01 RX ORDER — TIZANIDINE 4 MG/1
2 TABLET ORAL EVERY 8 HOURS PRN
Status: DISCONTINUED | OUTPATIENT
Start: 2018-01-01 | End: 2018-01-01

## 2018-01-01 RX ORDER — OXYCODONE HYDROCHLORIDE 5 MG/1
5 TABLET ORAL EVERY 6 HOURS PRN
Qty: 10 TABLET | Refills: 0 | Status: SHIPPED | OUTPATIENT
Start: 2018-01-01 | End: 2018-01-01 | Stop reason: ALTCHOICE

## 2018-01-01 RX ORDER — HYDROCODONE BITARTRATE AND ACETAMINOPHEN 5; 325 MG/1; MG/1
2 TABLET ORAL EVERY 6 HOURS PRN
Status: DISCONTINUED | OUTPATIENT
Start: 2018-01-01 | End: 2018-01-01

## 2018-01-01 RX ORDER — SODIUM CHLORIDE 9 MG/ML
INJECTION, SOLUTION INTRAVENOUS CONTINUOUS
Status: DISCONTINUED | OUTPATIENT
Start: 2018-01-01 | End: 2018-01-01 | Stop reason: HOSPADM

## 2018-01-01 RX ORDER — SPIRONOLACTONE 25 MG/1
12.5 TABLET ORAL DAILY
Qty: 30 TABLET | Refills: 3 | Status: ON HOLD | OUTPATIENT
Start: 2018-01-01 | End: 2018-01-01 | Stop reason: HOSPADM

## 2018-01-01 RX ORDER — DEXAMETHASONE SODIUM PHOSPHATE 4 MG/ML
INJECTION, SOLUTION INTRA-ARTICULAR; INTRALESIONAL; INTRAMUSCULAR; INTRAVENOUS; SOFT TISSUE PRN
Status: DISCONTINUED | OUTPATIENT
Start: 2018-01-01 | End: 2018-01-01 | Stop reason: SDUPTHER

## 2018-01-01 RX ORDER — POTASSIUM CHLORIDE 20 MEQ/1
40 TABLET, EXTENDED RELEASE ORAL PRN
Status: DISCONTINUED | OUTPATIENT
Start: 2018-01-01 | End: 2018-01-01 | Stop reason: HOSPADM

## 2018-01-01 RX ORDER — PANTOPRAZOLE SODIUM 40 MG/1
40 TABLET, DELAYED RELEASE ORAL
Status: DISCONTINUED | OUTPATIENT
Start: 2018-01-01 | End: 2018-01-01 | Stop reason: HOSPADM

## 2018-01-01 RX ORDER — HYDROCODONE BITARTRATE AND ACETAMINOPHEN 10; 325 MG/1; MG/1
TABLET ORAL
Status: COMPLETED
Start: 2018-01-01 | End: 2018-01-01

## 2018-01-01 RX ORDER — ESCITALOPRAM OXALATE 10 MG/1
10 TABLET ORAL DAILY
Status: DISCONTINUED | OUTPATIENT
Start: 2018-01-01 | End: 2018-01-01 | Stop reason: HOSPADM

## 2018-01-01 RX ORDER — TC 99M MEDRONATE 20 MG/10ML
27.3 INJECTION, POWDER, LYOPHILIZED, FOR SOLUTION INTRAVENOUS
Status: COMPLETED | OUTPATIENT
Start: 2018-01-01 | End: 2018-01-01

## 2018-01-01 RX ORDER — FENTANYL CITRATE 50 UG/ML
25 INJECTION, SOLUTION INTRAMUSCULAR; INTRAVENOUS
Status: DISCONTINUED | OUTPATIENT
Start: 2018-01-01 | End: 2018-01-01 | Stop reason: HOSPADM

## 2018-01-01 RX ORDER — DIPHENOXYLATE HYDROCHLORIDE AND ATROPINE SULFATE 2.5; .025 MG/1; MG/1
1 TABLET ORAL 4 TIMES DAILY PRN
Qty: 120 TABLET | Refills: 3 | Status: ON HOLD | OUTPATIENT
Start: 2018-01-01 | End: 2018-01-01 | Stop reason: HOSPADM

## 2018-01-01 RX ORDER — ONDANSETRON 4 MG/1
TABLET, FILM COATED ORAL
Qty: 30 TABLET | Refills: 3 | Status: SHIPPED | OUTPATIENT
Start: 2018-01-01

## 2018-01-01 RX ORDER — HYDRALAZINE HYDROCHLORIDE 25 MG/1
25 TABLET, FILM COATED ORAL EVERY 8 HOURS SCHEDULED
Qty: 90 TABLET | Refills: 0 | Status: SHIPPED | OUTPATIENT
Start: 2018-01-01 | End: 2018-01-01 | Stop reason: HOSPADM

## 2018-01-01 RX ORDER — DIPHENOXYLATE HYDROCHLORIDE AND ATROPINE SULFATE 2.5; .025 MG/1; MG/1
1 TABLET ORAL 4 TIMES DAILY PRN
Status: DISCONTINUED | OUTPATIENT
Start: 2018-01-01 | End: 2018-01-01 | Stop reason: HOSPADM

## 2018-01-01 RX ORDER — PANTOPRAZOLE SODIUM 40 MG/1
40 TABLET, DELAYED RELEASE ORAL
Status: DISCONTINUED | OUTPATIENT
Start: 2018-01-01 | End: 2018-01-01

## 2018-01-01 RX ORDER — BUMETANIDE 1 MG/1
0.5 TABLET ORAL
Status: DISCONTINUED | OUTPATIENT
Start: 2018-01-01 | End: 2018-01-01

## 2018-01-01 RX ORDER — PHENAZOPYRIDINE HYDROCHLORIDE 100 MG/1
100 TABLET, FILM COATED ORAL 3 TIMES DAILY PRN
Status: DISCONTINUED | OUTPATIENT
Start: 2018-01-01 | End: 2018-01-01 | Stop reason: HOSPADM

## 2018-01-01 RX ORDER — CETIRIZINE HYDROCHLORIDE 10 MG/1
10 TABLET ORAL DAILY
Status: DISCONTINUED | OUTPATIENT
Start: 2018-01-01 | End: 2018-01-01 | Stop reason: HOSPADM

## 2018-01-01 RX ORDER — HYDROCODONE BITARTRATE AND ACETAMINOPHEN 5; 325 MG/1; MG/1
2 TABLET ORAL EVERY 4 HOURS PRN
Status: DISCONTINUED | OUTPATIENT
Start: 2018-01-01 | End: 2018-01-01 | Stop reason: HOSPADM

## 2018-01-01 RX ORDER — ISOSORBIDE MONONITRATE 60 MG/1
60 TABLET, EXTENDED RELEASE ORAL DAILY
Qty: 90 TABLET | Refills: 1 | Status: ON HOLD | OUTPATIENT
Start: 2018-01-01 | End: 2019-01-01 | Stop reason: HOSPADM

## 2018-01-01 RX ORDER — LORAZEPAM 2 MG/1
2 TABLET ORAL NIGHTLY
Status: DISCONTINUED | OUTPATIENT
Start: 2018-01-01 | End: 2018-01-01 | Stop reason: HOSPADM

## 2018-01-01 RX ORDER — ISOSORBIDE MONONITRATE 120 MG/1
120 TABLET, EXTENDED RELEASE ORAL DAILY
Qty: 30 TABLET | Refills: 5 | Status: SHIPPED | OUTPATIENT
Start: 2018-01-01 | End: 2018-01-01 | Stop reason: SDUPTHER

## 2018-01-01 RX ORDER — SODIUM BICARBONATE 650 MG/1
650 TABLET ORAL 2 TIMES DAILY
Status: DISCONTINUED | OUTPATIENT
Start: 2018-01-01 | End: 2018-01-01 | Stop reason: HOSPADM

## 2018-01-01 RX ORDER — BUMETANIDE 1 MG/1
1 TABLET ORAL DAILY
Qty: 30 TABLET | Refills: 3 | Status: SHIPPED | OUTPATIENT
Start: 2018-01-01 | End: 2018-01-01

## 2018-01-01 RX ORDER — LIDOCAINE 4 G/G
1 PATCH TOPICAL DAILY
Status: DISCONTINUED | OUTPATIENT
Start: 2018-01-01 | End: 2018-01-01 | Stop reason: HOSPADM

## 2018-01-01 RX ORDER — LEVOFLOXACIN 250 MG/1
250 TABLET ORAL DAILY
Status: DISCONTINUED | OUTPATIENT
Start: 2018-01-01 | End: 2018-01-01 | Stop reason: HOSPADM

## 2018-01-01 RX ORDER — ONDANSETRON 4 MG/1
4 TABLET, ORALLY DISINTEGRATING ORAL
Status: DISCONTINUED | OUTPATIENT
Start: 2018-01-01 | End: 2018-01-01 | Stop reason: HOSPADM

## 2018-01-01 RX ORDER — ZAFIRLUKAST 20 MG/1
20 TABLET, FILM COATED ORAL 2 TIMES DAILY
Status: CANCELLED | OUTPATIENT
Start: 2018-01-01

## 2018-01-01 RX ORDER — BUMETANIDE 1 MG/1
0.5 TABLET ORAL
Status: DISCONTINUED | OUTPATIENT
Start: 2018-01-01 | End: 2018-01-01 | Stop reason: HOSPADM

## 2018-01-01 RX ORDER — SODIUM CHLORIDE 9 MG/ML
INJECTION, SOLUTION INTRAVENOUS CONTINUOUS
Status: DISCONTINUED | OUTPATIENT
Start: 2018-01-01 | End: 2018-01-01

## 2018-01-01 RX ORDER — DEXTROSE MONOHYDRATE 50 MG/ML
100 INJECTION, SOLUTION INTRAVENOUS PRN
Status: DISCONTINUED | OUTPATIENT
Start: 2018-01-01 | End: 2018-01-01 | Stop reason: HOSPADM

## 2018-01-01 RX ORDER — FERROUS SULFATE 325(65) MG
325 TABLET ORAL 2 TIMES DAILY WITH MEALS
Status: DISCONTINUED | OUTPATIENT
Start: 2018-01-01 | End: 2018-01-01 | Stop reason: HOSPADM

## 2018-01-01 RX ORDER — ONDANSETRON 2 MG/ML
4 INJECTION INTRAMUSCULAR; INTRAVENOUS ONCE
Status: COMPLETED | OUTPATIENT
Start: 2018-01-01 | End: 2018-01-01

## 2018-01-01 RX ORDER — TRAMADOL HYDROCHLORIDE 50 MG/1
50 TABLET ORAL EVERY 6 HOURS PRN
Status: DISCONTINUED | OUTPATIENT
Start: 2018-01-01 | End: 2018-01-01 | Stop reason: HOSPADM

## 2018-01-01 RX ORDER — TIZANIDINE 4 MG/1
2 TABLET ORAL EVERY 8 HOURS PRN
Status: DISCONTINUED | OUTPATIENT
Start: 2018-01-01 | End: 2018-01-01 | Stop reason: HOSPADM

## 2018-01-01 RX ORDER — ATORVASTATIN CALCIUM 20 MG/1
20 TABLET, FILM COATED ORAL NIGHTLY
Status: DISCONTINUED | OUTPATIENT
Start: 2018-01-01 | End: 2018-01-01 | Stop reason: HOSPADM

## 2018-01-01 RX ORDER — LORAZEPAM 1 MG/1
TABLET ORAL
Qty: 10 TABLET | Refills: 0 | Status: SHIPPED | OUTPATIENT
Start: 2018-01-01 | End: 2018-01-01

## 2018-01-01 RX ORDER — HYDROCODONE BITARTRATE AND ACETAMINOPHEN 5; 325 MG/1; MG/1
2 TABLET ORAL EVERY 4 HOURS PRN
Status: DISCONTINUED | OUTPATIENT
Start: 2018-01-01 | End: 2018-01-01

## 2018-01-01 RX ORDER — DOCUSATE SODIUM 100 MG/1
100 CAPSULE, LIQUID FILLED ORAL DAILY PRN
Status: DISCONTINUED | OUTPATIENT
Start: 2018-01-01 | End: 2018-01-01 | Stop reason: HOSPADM

## 2018-01-01 RX ORDER — FERROUS SULFATE 325(65) MG
325 TABLET ORAL 2 TIMES DAILY WITH MEALS
Status: CANCELLED | OUTPATIENT
Start: 2018-01-01

## 2018-01-01 RX ORDER — ISOSORBIDE MONONITRATE 60 MG/1
60 TABLET, EXTENDED RELEASE ORAL DAILY
Qty: 30 TABLET | Refills: 3 | Status: SHIPPED | OUTPATIENT
Start: 2018-01-01 | End: 2018-01-01 | Stop reason: SDUPTHER

## 2018-01-01 RX ORDER — NICOTINE POLACRILEX 4 MG
15 LOZENGE BUCCAL PRN
Status: DISCONTINUED | OUTPATIENT
Start: 2018-01-01 | End: 2018-01-01

## 2018-01-01 RX ORDER — HYDROCODONE BITARTRATE AND ACETAMINOPHEN 7.5; 325 MG/1; MG/1
1 TABLET ORAL EVERY 6 HOURS PRN
Status: DISCONTINUED | OUTPATIENT
Start: 2018-01-01 | End: 2018-01-01 | Stop reason: HOSPADM

## 2018-01-01 RX ORDER — TRAMADOL HYDROCHLORIDE 50 MG/1
50 TABLET ORAL 3 TIMES DAILY
Status: DISCONTINUED | OUTPATIENT
Start: 2018-01-01 | End: 2018-01-01

## 2018-01-01 RX ORDER — CEPHALEXIN 250 MG/1
250 CAPSULE ORAL 2 TIMES DAILY
Qty: 14 CAPSULE | Refills: 0 | Status: SHIPPED | OUTPATIENT
Start: 2018-01-01 | End: 2018-01-01

## 2018-01-01 RX ORDER — HYDROCODONE BITARTRATE AND ACETAMINOPHEN 7.5; 325 MG/1; MG/1
1 TABLET ORAL EVERY 6 HOURS PRN
Status: DISCONTINUED | OUTPATIENT
Start: 2018-01-01 | End: 2018-01-01

## 2018-01-01 RX ORDER — ATORVASTATIN CALCIUM 20 MG/1
TABLET, FILM COATED ORAL
Qty: 90 TABLET | Refills: 0 | Status: SHIPPED | OUTPATIENT
Start: 2018-01-01 | End: 2018-01-01 | Stop reason: SDUPTHER

## 2018-01-01 RX ORDER — FERROUS SULFATE 325(65) MG
325 TABLET ORAL
Status: DISCONTINUED | OUTPATIENT
Start: 2018-01-01 | End: 2018-01-01

## 2018-01-01 RX ORDER — CEFAZOLIN SODIUM 1 G/3ML
INJECTION, POWDER, FOR SOLUTION INTRAMUSCULAR; INTRAVENOUS PRN
Status: DISCONTINUED | OUTPATIENT
Start: 2018-01-01 | End: 2018-01-01 | Stop reason: SDUPTHER

## 2018-01-01 RX ORDER — OXYBUTYNIN CHLORIDE 5 MG/1
5 TABLET ORAL 3 TIMES DAILY PRN
Status: CANCELLED | OUTPATIENT
Start: 2018-01-01

## 2018-01-01 RX ORDER — SODIUM BICARBONATE 650 MG/1
TABLET ORAL
Qty: 60 TABLET | Refills: 0 | Status: SHIPPED | OUTPATIENT
Start: 2018-01-01 | End: 2018-01-01 | Stop reason: ALTCHOICE

## 2018-01-01 RX ORDER — SODIUM CHLORIDE 9 MG/ML
INJECTION, SOLUTION INTRAVENOUS CONTINUOUS PRN
Status: DISCONTINUED | OUTPATIENT
Start: 2018-01-01 | End: 2018-01-01 | Stop reason: SDUPTHER

## 2018-01-01 RX ORDER — MEGESTROL ACETATE 40 MG/1
40 TABLET ORAL DAILY
Qty: 30 TABLET | Refills: 0 | Status: SHIPPED | OUTPATIENT
Start: 2018-01-01 | End: 2018-01-01 | Stop reason: SDUPTHER

## 2018-01-01 RX ORDER — OFLOXACIN 3 MG/ML
2 SOLUTION/ DROPS OPHTHALMIC 4 TIMES DAILY
Status: DISCONTINUED | OUTPATIENT
Start: 2018-01-01 | End: 2018-01-01 | Stop reason: HOSPADM

## 2018-01-01 RX ORDER — ISOSORBIDE MONONITRATE 60 MG/1
60 TABLET, EXTENDED RELEASE ORAL DAILY
Qty: 30 TABLET | Refills: 5 | Status: ON HOLD | OUTPATIENT
Start: 2018-01-01 | End: 2018-01-01 | Stop reason: HOSPADM

## 2018-01-01 RX ORDER — LORAZEPAM 1 MG/1
1 TABLET ORAL EVERY 8 HOURS PRN
Qty: 90 TABLET | Refills: 3 | Status: SHIPPED | OUTPATIENT
Start: 2018-01-01 | End: 2018-01-01

## 2018-01-01 RX ORDER — TRAMADOL HYDROCHLORIDE 50 MG/1
50 TABLET ORAL EVERY 6 HOURS PRN
Status: DISCONTINUED | OUTPATIENT
Start: 2018-01-01 | End: 2018-01-01

## 2018-01-01 RX ORDER — FLAVOXATE HYDROCHLORIDE 100 MG/1
100 TABLET ORAL 3 TIMES DAILY PRN
Qty: 90 TABLET | Refills: 0 | Status: SHIPPED | OUTPATIENT
Start: 2018-01-01 | End: 2019-01-01 | Stop reason: SDUPTHER

## 2018-01-01 RX ORDER — FUROSEMIDE 10 MG/ML
40 INJECTION INTRAMUSCULAR; INTRAVENOUS ONCE
Status: DISCONTINUED | OUTPATIENT
Start: 2018-01-01 | End: 2018-01-01

## 2018-01-01 RX ORDER — FENTANYL CITRATE 50 UG/ML
25 INJECTION, SOLUTION INTRAMUSCULAR; INTRAVENOUS
Status: COMPLETED | OUTPATIENT
Start: 2018-01-01 | End: 2018-01-01

## 2018-01-01 RX ORDER — OXYCODONE HYDROCHLORIDE AND ACETAMINOPHEN 5; 325 MG/1; MG/1
1 TABLET ORAL EVERY 8 HOURS PRN
Qty: 90 TABLET | Refills: 0 | Status: ON HOLD | OUTPATIENT
Start: 2018-01-01 | End: 2018-01-01

## 2018-01-01 RX ORDER — MECLIZINE HCL 12.5 MG/1
25 TABLET ORAL 3 TIMES DAILY PRN
Status: DISCONTINUED | OUTPATIENT
Start: 2018-01-01 | End: 2018-01-01 | Stop reason: HOSPADM

## 2018-01-01 RX ORDER — LORAZEPAM 1 MG/1
1 TABLET ORAL 2 TIMES DAILY PRN
Status: DISCONTINUED | OUTPATIENT
Start: 2018-01-01 | End: 2018-01-01

## 2018-01-01 RX ORDER — MAGNESIUM HYDROXIDE 1200 MG/15ML
100 LIQUID ORAL
Qty: 100 ML | Refills: 24 | Status: ON HOLD | OUTPATIENT
Start: 2018-01-01 | End: 2019-01-01 | Stop reason: HOSPADM

## 2018-01-01 RX ORDER — CEFAZOLIN SODIUM 1 G/50ML
1 INJECTION, SOLUTION INTRAVENOUS ONCE
Status: DISCONTINUED | OUTPATIENT
Start: 2018-01-01 | End: 2018-01-01

## 2018-01-01 RX ORDER — HYDROCODONE BITARTRATE AND ACETAMINOPHEN 5; 325 MG/1; MG/1
1 TABLET ORAL EVERY 6 HOURS PRN
Status: DISCONTINUED | OUTPATIENT
Start: 2018-01-01 | End: 2018-01-01

## 2018-01-01 RX ORDER — LORAZEPAM 1 MG/1
1 TABLET ORAL 2 TIMES DAILY PRN
COMMUNITY
End: 2018-01-01 | Stop reason: SDUPTHER

## 2018-01-01 RX ORDER — SODIUM CHLORIDE 0.9 % (FLUSH) 0.9 %
10 SYRINGE (ML) INJECTION PRN
Status: DISCONTINUED | OUTPATIENT
Start: 2018-01-01 | End: 2018-01-01 | Stop reason: HOSPADM

## 2018-01-01 RX ORDER — TIZANIDINE 2 MG/1
2 TABLET ORAL NIGHTLY
Qty: 180 TABLET | Refills: 2 | Status: SHIPPED | OUTPATIENT
Start: 2018-01-01

## 2018-01-01 RX ORDER — CETIRIZINE HYDROCHLORIDE 10 MG/1
5 TABLET ORAL DAILY
Status: DISCONTINUED | OUTPATIENT
Start: 2018-01-01 | End: 2018-01-01 | Stop reason: HOSPADM

## 2018-01-01 RX ORDER — ISOSORBIDE MONONITRATE 60 MG/1
60 TABLET, EXTENDED RELEASE ORAL DAILY
Qty: 30 TABLET | Refills: 0 | Status: SHIPPED | OUTPATIENT
Start: 2018-01-01 | End: 2018-01-01 | Stop reason: SDUPTHER

## 2018-01-01 RX ORDER — LACTOBACILLUS RHAMNOSUS GG 10B CELL
1 CAPSULE ORAL 2 TIMES DAILY WITH MEALS
Status: DISCONTINUED | OUTPATIENT
Start: 2018-01-01 | End: 2018-01-01 | Stop reason: HOSPADM

## 2018-01-01 RX ORDER — LORAZEPAM 1 MG/1
1 TABLET ORAL 3 TIMES DAILY PRN
Status: DISCONTINUED | OUTPATIENT
Start: 2018-01-01 | End: 2018-01-01

## 2018-01-01 RX ORDER — POTASSIUM CHLORIDE 7.45 MG/ML
10 INJECTION INTRAVENOUS PRN
Status: DISCONTINUED | OUTPATIENT
Start: 2018-01-01 | End: 2018-01-01 | Stop reason: HOSPADM

## 2018-01-01 RX ORDER — FLAVOXATE HYDROCHLORIDE 100 MG/1
100 TABLET ORAL 3 TIMES DAILY PRN
Status: DISCONTINUED | OUTPATIENT
Start: 2018-01-01 | End: 2018-01-01 | Stop reason: HOSPADM

## 2018-01-01 RX ORDER — METOPROLOL SUCCINATE 25 MG/1
25 TABLET, EXTENDED RELEASE ORAL DAILY
Status: DISCONTINUED | OUTPATIENT
Start: 2018-01-01 | End: 2018-01-01 | Stop reason: HOSPADM

## 2018-01-01 RX ORDER — HYDROCODONE BITARTRATE AND ACETAMINOPHEN 5; 325 MG/1; MG/1
1 TABLET ORAL ONCE
Status: COMPLETED | OUTPATIENT
Start: 2018-01-01 | End: 2018-01-01

## 2018-01-01 RX ORDER — HYDROCODONE BITARTRATE AND ACETAMINOPHEN 5; 325 MG/1; MG/1
1 TABLET ORAL EVERY 6 HOURS PRN
Status: CANCELLED | OUTPATIENT
Start: 2018-01-01

## 2018-01-01 RX ORDER — FLUTICASONE PROPIONATE 50 MCG
2 SPRAY, SUSPENSION (ML) NASAL 2 TIMES DAILY
Status: CANCELLED | OUTPATIENT
Start: 2018-01-01

## 2018-01-01 RX ORDER — LIDOCAINE 50 MG/G
1 PATCH TOPICAL DAILY
Status: CANCELLED | OUTPATIENT
Start: 2018-01-01

## 2018-01-01 RX ORDER — LISINOPRIL 2.5 MG/1
2.5 TABLET ORAL DAILY
Status: DISCONTINUED | OUTPATIENT
Start: 2018-01-01 | End: 2018-01-01

## 2018-01-01 RX ORDER — ZAFIRLUKAST 20 MG/1
10 TABLET, FILM COATED ORAL
Status: DISCONTINUED | OUTPATIENT
Start: 2018-01-01 | End: 2018-01-01 | Stop reason: HOSPADM

## 2018-01-01 RX ORDER — GUAIFENESIN 100 MG/5ML
600 SOLUTION ORAL EVERY 12 HOURS PRN
Status: DISCONTINUED | OUTPATIENT
Start: 2018-01-01 | End: 2018-01-01

## 2018-01-01 RX ORDER — TIZANIDINE 4 MG/1
2 TABLET ORAL EVERY 8 HOURS PRN
Status: CANCELLED | OUTPATIENT
Start: 2018-01-01

## 2018-01-01 RX ORDER — ONDANSETRON 2 MG/ML
4 INJECTION INTRAMUSCULAR; INTRAVENOUS EVERY 4 HOURS PRN
Status: DISCONTINUED | OUTPATIENT
Start: 2018-01-01 | End: 2018-01-01 | Stop reason: HOSPADM

## 2018-01-01 RX ORDER — LISINOPRIL 2.5 MG/1
TABLET ORAL
Qty: 90 TABLET | Refills: 0 | Status: ON HOLD | OUTPATIENT
Start: 2018-01-01 | End: 2018-01-01 | Stop reason: HOSPADM

## 2018-01-01 RX ORDER — ROPINIROLE 1 MG/1
4 TABLET, FILM COATED ORAL 2 TIMES DAILY
Status: DISCONTINUED | OUTPATIENT
Start: 2018-01-01 | End: 2018-01-01 | Stop reason: HOSPADM

## 2018-01-01 RX ORDER — METOPROLOL SUCCINATE 50 MG/1
TABLET, EXTENDED RELEASE ORAL
Qty: 90 TABLET | Refills: 0 | Status: SHIPPED | OUTPATIENT
Start: 2018-01-01 | End: 2018-01-01 | Stop reason: SDUPTHER

## 2018-01-01 RX ORDER — LORAZEPAM 1 MG/1
1 TABLET ORAL 3 TIMES DAILY PRN
Status: DISCONTINUED | OUTPATIENT
Start: 2018-01-01 | End: 2018-01-01 | Stop reason: HOSPADM

## 2018-01-01 RX ORDER — OXYBUTYNIN CHLORIDE 5 MG/1
5 TABLET ORAL 3 TIMES DAILY
Qty: 90 TABLET | Refills: 0 | Status: SHIPPED | OUTPATIENT
Start: 2018-01-01 | End: 2018-01-01 | Stop reason: ALTCHOICE

## 2018-01-01 RX ORDER — FLAVOXATE HYDROCHLORIDE 100 MG/1
100 TABLET ORAL 3 TIMES DAILY PRN
Qty: 90 TABLET | Refills: 0 | Status: SHIPPED | OUTPATIENT
Start: 2018-01-01 | End: 2018-01-01 | Stop reason: SDUPTHER

## 2018-01-01 RX ORDER — LORAZEPAM 1 MG/1
1 TABLET ORAL 2 TIMES DAILY
Status: DISCONTINUED | OUTPATIENT
Start: 2018-01-01 | End: 2018-01-01 | Stop reason: HOSPADM

## 2018-01-01 RX ORDER — CYCLOBENZAPRINE HCL 10 MG
10 TABLET ORAL 3 TIMES DAILY PRN
Status: DISCONTINUED | OUTPATIENT
Start: 2018-01-01 | End: 2018-01-01

## 2018-01-01 RX ORDER — FENTANYL CITRATE 50 UG/ML
INJECTION, SOLUTION INTRAMUSCULAR; INTRAVENOUS
Status: DISCONTINUED
Start: 2018-01-01 | End: 2018-01-01 | Stop reason: WASHOUT

## 2018-01-01 RX ORDER — LEVOFLOXACIN 500 MG/1
500 TABLET, FILM COATED ORAL DAILY
Status: COMPLETED | OUTPATIENT
Start: 2018-01-01 | End: 2018-01-01

## 2018-01-01 RX ORDER — MEGESTROL ACETATE 40 MG/1
40 TABLET ORAL 2 TIMES DAILY
Qty: 60 TABLET | Refills: 0 | Status: SHIPPED | OUTPATIENT
Start: 2018-01-01 | End: 2018-01-01

## 2018-01-01 RX ORDER — ESCITALOPRAM OXALATE 10 MG/1
TABLET ORAL
Qty: 90 TABLET | Refills: 1 | Status: SHIPPED | OUTPATIENT
Start: 2018-01-01 | End: 2018-01-01 | Stop reason: SDUPTHER

## 2018-01-01 RX ORDER — LORAZEPAM 1 MG/1
1 TABLET ORAL 2 TIMES DAILY
Qty: 60 TABLET | Refills: 0 | Status: SHIPPED | OUTPATIENT
Start: 2018-01-01 | End: 2018-01-01 | Stop reason: SDUPTHER

## 2018-01-01 RX ORDER — HYDRALAZINE HYDROCHLORIDE 10 MG/1
10 TABLET, FILM COATED ORAL EVERY 8 HOURS SCHEDULED
Status: DISCONTINUED | OUTPATIENT
Start: 2018-01-01 | End: 2018-01-01

## 2018-01-01 RX ORDER — ISOSORBIDE MONONITRATE 60 MG/1
60 TABLET, EXTENDED RELEASE ORAL DAILY
Status: CANCELLED | OUTPATIENT
Start: 2018-01-01

## 2018-01-01 RX ORDER — FLAVOXATE HYDROCHLORIDE 100 MG/1
100 TABLET ORAL 3 TIMES DAILY
COMMUNITY
End: 2018-01-01 | Stop reason: SDUPTHER

## 2018-01-01 RX ORDER — SODIUM BICARBONATE 650 MG/1
1300 TABLET ORAL 4 TIMES DAILY
Status: DISCONTINUED | OUTPATIENT
Start: 2018-01-01 | End: 2018-01-01

## 2018-01-01 RX ORDER — DEXTROSE MONOHYDRATE 25 G/50ML
25 INJECTION, SOLUTION INTRAVENOUS ONCE
Status: COMPLETED | OUTPATIENT
Start: 2018-01-01 | End: 2018-01-01

## 2018-01-01 RX ORDER — GUAIFENESIN 600 MG/1
600 TABLET, EXTENDED RELEASE ORAL 2 TIMES DAILY
Status: DISCONTINUED | OUTPATIENT
Start: 2018-01-01 | End: 2018-01-01 | Stop reason: HOSPADM

## 2018-01-01 RX ORDER — LORAZEPAM 1 MG/1
1 TABLET ORAL EVERY 8 HOURS PRN
COMMUNITY
End: 2018-01-01

## 2018-01-01 RX ORDER — METOPROLOL SUCCINATE 50 MG/1
50 TABLET, EXTENDED RELEASE ORAL DAILY
Status: DISCONTINUED | OUTPATIENT
Start: 2018-01-01 | End: 2018-01-01 | Stop reason: HOSPADM

## 2018-01-01 RX ORDER — OXYBUTYNIN CHLORIDE 5 MG/1
5 TABLET ORAL 3 TIMES DAILY PRN
Status: DISCONTINUED | OUTPATIENT
Start: 2018-01-01 | End: 2018-01-01 | Stop reason: HOSPADM

## 2018-01-01 RX ORDER — HYDROCODONE BITARTRATE AND ACETAMINOPHEN 5; 325 MG/1; MG/1
1 TABLET ORAL EVERY 4 HOURS PRN
Status: DISCONTINUED | OUTPATIENT
Start: 2018-01-01 | End: 2018-01-01

## 2018-01-01 RX ORDER — MORPHINE SULFATE 2 MG/ML
4 INJECTION, SOLUTION INTRAMUSCULAR; INTRAVENOUS
Status: DISCONTINUED | OUTPATIENT
Start: 2018-01-01 | End: 2018-01-01 | Stop reason: HOSPADM

## 2018-01-01 RX ORDER — FUROSEMIDE 10 MG/ML
40 INJECTION INTRAMUSCULAR; INTRAVENOUS ONCE
Status: COMPLETED | OUTPATIENT
Start: 2018-01-01 | End: 2018-01-01

## 2018-01-01 RX ORDER — TIZANIDINE 2 MG/1
2 TABLET ORAL NIGHTLY
COMMUNITY
End: 2018-01-01 | Stop reason: SDUPTHER

## 2018-01-01 RX ORDER — SODIUM POLYSTYRENE SULFONATE 4.1 MEQ/G
30 POWDER, FOR SUSPENSION ORAL; RECTAL ONCE
Status: COMPLETED | OUTPATIENT
Start: 2018-01-01 | End: 2018-01-01

## 2018-01-01 RX ORDER — LEVOFLOXACIN 500 MG/1
500 TABLET, FILM COATED ORAL DAILY
Status: DISCONTINUED | OUTPATIENT
Start: 2018-01-01 | End: 2018-01-01

## 2018-01-01 RX ORDER — LORAZEPAM 1 MG/1
TABLET ORAL
Qty: 90 TABLET | Refills: 5 | Status: ON HOLD | OUTPATIENT
Start: 2018-01-01 | End: 2018-01-01

## 2018-01-01 RX ORDER — SODIUM POLYSTYRENE SULFONATE 15 G/60ML
30 SUSPENSION ORAL; RECTAL ONCE
Status: COMPLETED | OUTPATIENT
Start: 2018-01-01 | End: 2018-01-01

## 2018-01-01 RX ORDER — NITROFURANTOIN 25; 75 MG/1; MG/1
100 CAPSULE ORAL 2 TIMES DAILY
Qty: 20 CAPSULE | Refills: 0 | Status: SHIPPED | OUTPATIENT
Start: 2018-01-01 | End: 2018-01-01

## 2018-01-01 RX ORDER — CLONIDINE HYDROCHLORIDE 0.1 MG/1
0.1 TABLET ORAL EVERY 4 HOURS PRN
Status: DISCONTINUED | OUTPATIENT
Start: 2018-01-01 | End: 2018-01-01 | Stop reason: HOSPADM

## 2018-01-01 RX ORDER — POTASSIUM CHLORIDE 20MEQ/15ML
40 LIQUID (ML) ORAL PRN
Status: DISCONTINUED | OUTPATIENT
Start: 2018-01-01 | End: 2018-01-01 | Stop reason: HOSPADM

## 2018-01-01 RX ORDER — MECLIZINE HYDROCHLORIDE 25 MG/1
25 TABLET ORAL 3 TIMES DAILY PRN
Qty: 270 TABLET | Refills: 3 | Status: SHIPPED | OUTPATIENT
Start: 2018-01-01

## 2018-01-01 RX ORDER — METOPROLOL SUCCINATE 25 MG/1
25 TABLET, EXTENDED RELEASE ORAL DAILY
Status: CANCELLED | OUTPATIENT
Start: 2018-01-01

## 2018-01-01 RX ORDER — ONDANSETRON 2 MG/ML
4 INJECTION INTRAMUSCULAR; INTRAVENOUS EVERY 6 HOURS PRN
Status: DISCONTINUED | OUTPATIENT
Start: 2018-01-01 | End: 2018-01-01 | Stop reason: HOSPADM

## 2018-01-01 RX ORDER — MAGNESIUM HYDROXIDE 1200 MG/15ML
1000 LIQUID ORAL CONTINUOUS
Status: DISCONTINUED | OUTPATIENT
Start: 2018-01-01 | End: 2018-01-01 | Stop reason: HOSPADM

## 2018-01-01 RX ORDER — ALPRAZOLAM 0.5 MG/1
0.5 TABLET ORAL 2 TIMES DAILY
Status: DISCONTINUED | OUTPATIENT
Start: 2018-01-01 | End: 2018-01-01 | Stop reason: HOSPADM

## 2018-01-01 RX ORDER — OXYCODONE HYDROCHLORIDE AND ACETAMINOPHEN 5; 325 MG/1; MG/1
1 TABLET ORAL EVERY 6 HOURS PRN
Status: DISCONTINUED | OUTPATIENT
Start: 2018-01-01 | End: 2018-01-01 | Stop reason: HOSPADM

## 2018-01-01 RX ORDER — ATORVASTATIN CALCIUM 20 MG/1
20 TABLET, FILM COATED ORAL DAILY
Status: DISCONTINUED | OUTPATIENT
Start: 2018-01-01 | End: 2018-01-01

## 2018-01-01 RX ORDER — METOPROLOL SUCCINATE 50 MG/1
TABLET, EXTENDED RELEASE ORAL
Qty: 90 TABLET | Refills: 3 | Status: SHIPPED | OUTPATIENT
Start: 2018-01-01 | End: 2018-01-01 | Stop reason: SDUPTHER

## 2018-01-01 RX ORDER — METOPROLOL SUCCINATE 50 MG/1
25 TABLET, EXTENDED RELEASE ORAL DAILY
COMMUNITY
Start: 2018-01-01 | End: 2018-01-01 | Stop reason: DRUGHIGH

## 2018-01-01 RX ORDER — AMLODIPINE BESYLATE 5 MG/1
5 TABLET ORAL DAILY
Qty: 30 TABLET | Refills: 0 | Status: SHIPPED | OUTPATIENT
Start: 2018-01-01 | End: 2018-01-01

## 2018-01-01 RX ORDER — ISOSORBIDE MONONITRATE 60 MG/1
120 TABLET, EXTENDED RELEASE ORAL DAILY
Status: DISCONTINUED | OUTPATIENT
Start: 2018-01-01 | End: 2018-01-01 | Stop reason: HOSPADM

## 2018-01-01 RX ORDER — CIPROFLOXACIN 2 MG/ML
400 INJECTION, SOLUTION INTRAVENOUS
Status: COMPLETED | OUTPATIENT
Start: 2018-01-01 | End: 2018-01-01

## 2018-01-01 RX ORDER — HYDROCODONE BITARTRATE AND ACETAMINOPHEN 5; 325 MG/1; MG/1
1 TABLET ORAL EVERY 6 HOURS PRN
Qty: 120 TABLET | Refills: 0 | Status: SHIPPED | OUTPATIENT
Start: 2018-01-01 | End: 2018-01-01

## 2018-01-01 RX ORDER — METOPROLOL SUCCINATE 50 MG/1
TABLET, EXTENDED RELEASE ORAL
Qty: 90 TABLET | Refills: 3 | Status: ON HOLD | OUTPATIENT
Start: 2018-01-01 | End: 2019-01-01 | Stop reason: HOSPADM

## 2018-01-01 RX ORDER — FENTANYL CITRATE 50 UG/ML
25 INJECTION, SOLUTION INTRAMUSCULAR; INTRAVENOUS ONCE
Status: COMPLETED | OUTPATIENT
Start: 2018-01-01 | End: 2018-01-01

## 2018-01-01 RX ORDER — ESCITALOPRAM OXALATE 10 MG/1
10 TABLET ORAL DAILY
Qty: 30 TABLET | Refills: 3 | Status: SHIPPED | OUTPATIENT
Start: 2018-01-01 | End: 2018-01-01 | Stop reason: SDUPTHER

## 2018-01-01 RX ORDER — NITROFURANTOIN 25; 75 MG/1; MG/1
100 CAPSULE ORAL 2 TIMES DAILY
Qty: 10 CAPSULE | Refills: 0 | Status: SHIPPED | OUTPATIENT
Start: 2018-01-01 | End: 2018-01-01

## 2018-01-01 RX ORDER — OFLOXACIN 3 MG/ML
1 SOLUTION/ DROPS OPHTHALMIC 2 TIMES DAILY
COMMUNITY
Start: 2018-01-01 | End: 2018-01-01

## 2018-01-01 RX ORDER — PHENAZOPYRIDINE HYDROCHLORIDE 100 MG/1
100 TABLET, FILM COATED ORAL 3 TIMES DAILY PRN
COMMUNITY
End: 2018-01-01 | Stop reason: SDUPTHER

## 2018-01-01 RX ORDER — ACETAMINOPHEN 325 MG/1
650 TABLET ORAL EVERY 4 HOURS PRN
Status: DISCONTINUED | OUTPATIENT
Start: 2018-01-01 | End: 2018-01-01

## 2018-01-01 RX ORDER — LINEZOLID 2 MG/ML
600 INJECTION, SOLUTION INTRAVENOUS EVERY 12 HOURS
Status: DISCONTINUED | OUTPATIENT
Start: 2018-01-01 | End: 2018-01-01 | Stop reason: HOSPADM

## 2018-01-01 RX ORDER — MEGESTROL ACETATE 40 MG/1
40 TABLET ORAL DAILY
Qty: 30 TABLET | Refills: 0 | Status: ON HOLD | OUTPATIENT
Start: 2018-01-01 | End: 2018-01-01 | Stop reason: HOSPADM

## 2018-01-01 RX ORDER — TRAMADOL HYDROCHLORIDE 50 MG/1
50 TABLET ORAL EVERY 8 HOURS PRN
Qty: 21 TABLET | Refills: 0 | Status: SHIPPED | OUTPATIENT
Start: 2018-01-01 | End: 2018-01-01

## 2018-01-01 RX ORDER — NEOSTIGMINE METHYLSULFATE 1 MG/ML
INJECTION, SOLUTION INTRAVENOUS PRN
Status: DISCONTINUED | OUTPATIENT
Start: 2018-01-01 | End: 2018-01-01 | Stop reason: SDUPTHER

## 2018-01-01 RX ORDER — LORAZEPAM 1 MG/1
1 TABLET ORAL DAILY
Status: DISCONTINUED | OUTPATIENT
Start: 2018-01-01 | End: 2018-01-01 | Stop reason: HOSPADM

## 2018-01-01 RX ORDER — ATORVASTATIN CALCIUM 20 MG/1
20 TABLET, FILM COATED ORAL DAILY
Qty: 90 TABLET | Refills: 3 | Status: ON HOLD | OUTPATIENT
Start: 2018-01-01 | End: 2019-01-01 | Stop reason: HOSPADM

## 2018-01-01 RX ORDER — PROPOFOL 10 MG/ML
INJECTION, EMULSION INTRAVENOUS PRN
Status: DISCONTINUED | OUTPATIENT
Start: 2018-01-01 | End: 2018-01-01 | Stop reason: SDUPTHER

## 2018-01-01 RX ORDER — 0.9 % SODIUM CHLORIDE 0.9 %
1000 INTRAVENOUS SOLUTION INTRAVENOUS ONCE
Status: COMPLETED | OUTPATIENT
Start: 2018-01-01 | End: 2018-01-01

## 2018-01-01 RX ORDER — ESCITALOPRAM OXALATE 10 MG/1
TABLET ORAL
Qty: 90 TABLET | Refills: 1 | Status: ON HOLD | OUTPATIENT
Start: 2018-01-01 | End: 2018-01-01 | Stop reason: HOSPADM

## 2018-01-01 RX ORDER — LEVOFLOXACIN 250 MG/1
250 TABLET ORAL DAILY
Status: ON HOLD | COMMUNITY
End: 2018-01-01

## 2018-01-01 RX ORDER — ESCITALOPRAM OXALATE 10 MG/1
10 TABLET ORAL DAILY
Status: CANCELLED | OUTPATIENT
Start: 2018-01-01

## 2018-01-01 RX ORDER — ZAFIRLUKAST 20 MG/1
20 TABLET, FILM COATED ORAL
Status: DISCONTINUED | OUTPATIENT
Start: 2018-01-01 | End: 2018-01-01 | Stop reason: HOSPADM

## 2018-01-01 RX ORDER — TRAMADOL HYDROCHLORIDE 50 MG/1
50 TABLET ORAL EVERY 6 HOURS PRN
Status: ON HOLD | COMMUNITY
End: 2018-01-01 | Stop reason: HOSPADM

## 2018-01-01 RX ORDER — OFLOXACIN 3 MG/ML
1 SOLUTION/ DROPS OPHTHALMIC 2 TIMES DAILY
Status: DISCONTINUED | OUTPATIENT
Start: 2018-01-01 | End: 2018-01-01 | Stop reason: HOSPADM

## 2018-01-01 RX ORDER — CETIRIZINE HYDROCHLORIDE 10 MG/1
10 TABLET ORAL DAILY
Status: DISCONTINUED | OUTPATIENT
Start: 2018-01-01 | End: 2018-01-01

## 2018-01-01 RX ORDER — FLUTICASONE PROPIONATE 50 MCG
2 SPRAY, SUSPENSION (ML) NASAL 2 TIMES DAILY
Status: DISCONTINUED | OUTPATIENT
Start: 2018-01-01 | End: 2018-01-01 | Stop reason: HOSPADM

## 2018-01-01 RX ORDER — HEPARIN SODIUM 5000 [USP'U]/ML
5000 INJECTION, SOLUTION INTRAVENOUS; SUBCUTANEOUS EVERY 8 HOURS SCHEDULED
Status: DISCONTINUED | OUTPATIENT
Start: 2018-01-01 | End: 2018-01-01

## 2018-01-01 RX ORDER — HYDRALAZINE HYDROCHLORIDE 25 MG/1
25 TABLET, FILM COATED ORAL EVERY 8 HOURS SCHEDULED
Status: DISCONTINUED | OUTPATIENT
Start: 2018-01-01 | End: 2018-01-01

## 2018-01-01 RX ORDER — ISOSORBIDE MONONITRATE 60 MG/1
120 TABLET, EXTENDED RELEASE ORAL DAILY
Status: DISCONTINUED | OUTPATIENT
Start: 2018-01-01 | End: 2018-01-01

## 2018-01-01 RX ORDER — BUMETANIDE 0.5 MG/1
0.5 TABLET ORAL DAILY
Qty: 90 TABLET | Refills: 0 | Status: ON HOLD | OUTPATIENT
Start: 2018-01-01 | End: 2019-01-01 | Stop reason: HOSPADM

## 2018-01-01 RX ORDER — FENTANYL CITRATE 50 UG/ML
50 INJECTION, SOLUTION INTRAMUSCULAR; INTRAVENOUS
Status: COMPLETED | OUTPATIENT
Start: 2018-01-01 | End: 2018-01-01

## 2018-01-01 RX ORDER — FLAVOXATE HYDROCHLORIDE 100 MG/1
100 TABLET ORAL 3 TIMES DAILY
Qty: 90 TABLET | Refills: 0
Start: 2018-01-01 | End: 2018-01-01 | Stop reason: SDUPTHER

## 2018-01-01 RX ORDER — ROCURONIUM BROMIDE 10 MG/ML
INJECTION, SOLUTION INTRAVENOUS PRN
Status: DISCONTINUED | OUTPATIENT
Start: 2018-01-01 | End: 2018-01-01 | Stop reason: SDUPTHER

## 2018-01-01 RX ORDER — IPRATROPIUM BROMIDE AND ALBUTEROL SULFATE 2.5; .5 MG/3ML; MG/3ML
1 SOLUTION RESPIRATORY (INHALATION) ONCE
Status: COMPLETED | OUTPATIENT
Start: 2018-01-01 | End: 2018-01-01

## 2018-01-01 RX ORDER — LIDOCAINE 50 MG/G
1 PATCH TOPICAL DAILY
Status: DISCONTINUED | OUTPATIENT
Start: 2018-01-01 | End: 2018-01-01 | Stop reason: HOSPADM

## 2018-01-01 RX ORDER — ROPINIROLE 1 MG/1
4 TABLET, FILM COATED ORAL 2 TIMES DAILY
Status: CANCELLED | OUTPATIENT
Start: 2018-01-01

## 2018-01-01 RX ORDER — GLYCOPYRROLATE 1 MG/5 ML
SYRINGE (ML) INTRAVENOUS PRN
Status: DISCONTINUED | OUTPATIENT
Start: 2018-01-01 | End: 2018-01-01 | Stop reason: SDUPTHER

## 2018-01-01 RX ORDER — METOPROLOL SUCCINATE 50 MG/1
TABLET, EXTENDED RELEASE ORAL
Qty: 90 TABLET | Refills: 0 | Status: ON HOLD | OUTPATIENT
Start: 2018-01-01 | End: 2018-01-01 | Stop reason: HOSPADM

## 2018-01-01 RX ORDER — HYDROCODONE BITARTRATE AND ACETAMINOPHEN 5; 325 MG/1; MG/1
1 TABLET ORAL 3 TIMES DAILY
Status: DISCONTINUED | OUTPATIENT
Start: 2018-01-01 | End: 2018-01-01

## 2018-01-01 RX ORDER — ACETAMINOPHEN 325 MG/1
650 TABLET ORAL EVERY 4 HOURS PRN
Status: DISCONTINUED | OUTPATIENT
Start: 2018-01-01 | End: 2018-01-01 | Stop reason: SDUPTHER

## 2018-01-01 RX ORDER — DIPHENOXYLATE HYDROCHLORIDE AND ATROPINE SULFATE 2.5; .025 MG/1; MG/1
1 TABLET ORAL 4 TIMES DAILY PRN
Status: DISCONTINUED | OUTPATIENT
Start: 2018-01-01 | End: 2018-01-01

## 2018-01-01 RX ORDER — MAGNESIUM HYDROXIDE 1200 MG/15ML
1000 LIQUID ORAL CONTINUOUS
Status: DISCONTINUED | OUTPATIENT
Start: 2018-01-01 | End: 2018-01-01

## 2018-01-01 RX ORDER — HYDROCODONE BITARTRATE AND ACETAMINOPHEN 5; 325 MG/1; MG/1
1 TABLET ORAL EVERY 6 HOURS PRN
Status: DISCONTINUED | OUTPATIENT
Start: 2018-01-01 | End: 2018-01-01 | Stop reason: HOSPADM

## 2018-01-01 RX ORDER — SODIUM BICARBONATE 650 MG/1
1300 TABLET ORAL 2 TIMES DAILY
Status: DISCONTINUED | OUTPATIENT
Start: 2018-01-01 | End: 2018-01-01

## 2018-01-01 RX ORDER — ROPINIROLE 4 MG/1
4 TABLET, FILM COATED ORAL 2 TIMES DAILY
Qty: 60 TABLET | Refills: 5 | Status: SHIPPED | OUTPATIENT
Start: 2018-01-01 | End: 2018-01-01 | Stop reason: SDUPTHER

## 2018-01-01 RX ORDER — ZAFIRLUKAST 10 MG/1
10 TABLET, FILM COATED ORAL
Qty: 60 TABLET | Refills: 3 | Status: SHIPPED | OUTPATIENT
Start: 2018-01-01 | End: 2018-01-01 | Stop reason: DRUGHIGH

## 2018-01-01 RX ORDER — ROPINIROLE 4 MG/1
4 TABLET, FILM COATED ORAL 2 TIMES DAILY
Qty: 180 TABLET | Refills: 3 | Status: SHIPPED | OUTPATIENT
Start: 2018-01-01

## 2018-01-01 RX ORDER — CETIRIZINE HYDROCHLORIDE 10 MG/1
10 TABLET ORAL DAILY
Status: CANCELLED | OUTPATIENT
Start: 2018-01-01

## 2018-01-01 RX ORDER — HYDROCODONE BITARTRATE AND ACETAMINOPHEN 5; 325 MG/1; MG/1
1 TABLET ORAL EVERY 6 HOURS PRN
Qty: 120 TABLET | Refills: 0 | Status: ON HOLD | OUTPATIENT
Start: 2018-01-01 | End: 2018-01-01 | Stop reason: HOSPADM

## 2018-01-01 RX ORDER — ROPINIROLE 4 MG/1
4 TABLET, FILM COATED ORAL 2 TIMES DAILY
Qty: 180 TABLET | Refills: 3 | Status: SHIPPED | OUTPATIENT
Start: 2018-01-01 | End: 2018-01-01 | Stop reason: SDUPTHER

## 2018-01-01 RX ORDER — TRAMADOL HYDROCHLORIDE 50 MG/1
50 TABLET ORAL EVERY 6 HOURS PRN
Qty: 120 TABLET | Refills: 2 | Status: SHIPPED | OUTPATIENT
Start: 2018-01-01 | End: 2018-01-01

## 2018-01-01 RX ORDER — MULTIVITAMIN WITH FOLIC ACID 400 MCG
1 TABLET ORAL DAILY
Status: DISCONTINUED | OUTPATIENT
Start: 2018-01-01 | End: 2018-01-01 | Stop reason: HOSPADM

## 2018-01-01 RX ORDER — BUMETANIDE 0.5 MG/1
0.5 TABLET ORAL DAILY
Qty: 60 TABLET | Refills: 3 | Status: SHIPPED | OUTPATIENT
Start: 2018-01-01 | End: 2018-01-01 | Stop reason: SDUPTHER

## 2018-01-01 RX ORDER — PANTOPRAZOLE SODIUM 40 MG/1
40 TABLET, DELAYED RELEASE ORAL
Status: CANCELLED | OUTPATIENT
Start: 2018-01-01

## 2018-01-01 RX ORDER — AMLODIPINE BESYLATE 5 MG/1
5 TABLET ORAL DAILY
Status: DISCONTINUED | OUTPATIENT
Start: 2018-01-01 | End: 2018-01-01 | Stop reason: HOSPADM

## 2018-01-01 RX ORDER — ATORVASTATIN CALCIUM 20 MG/1
20 TABLET, FILM COATED ORAL DAILY
Status: DISCONTINUED | OUTPATIENT
Start: 2018-01-01 | End: 2018-01-01 | Stop reason: HOSPADM

## 2018-01-01 RX ORDER — OXYCODONE HYDROCHLORIDE AND ACETAMINOPHEN 5; 325 MG/1; MG/1
1 TABLET ORAL EVERY 8 HOURS PRN
Status: DISCONTINUED | OUTPATIENT
Start: 2018-01-01 | End: 2018-01-01

## 2018-01-01 RX ORDER — BUMETANIDE 0.5 MG/1
TABLET ORAL
Qty: 48 TABLET | Refills: 2 | Status: SHIPPED | OUTPATIENT
Start: 2018-01-01 | End: 2018-01-01 | Stop reason: HOSPADM

## 2018-01-01 RX ORDER — HYDROCODONE BITARTRATE AND ACETAMINOPHEN 5; 325 MG/1; MG/1
1 TABLET ORAL ONCE
Status: DISCONTINUED | OUTPATIENT
Start: 2018-01-01 | End: 2018-01-01

## 2018-01-01 RX ORDER — POLYETHYLENE GLYCOL 3350 17 G/17G
17 POWDER, FOR SOLUTION ORAL DAILY PRN
Status: DISCONTINUED | OUTPATIENT
Start: 2018-01-01 | End: 2018-01-01 | Stop reason: HOSPADM

## 2018-01-01 RX ORDER — LABETALOL HYDROCHLORIDE 5 MG/ML
10 INJECTION, SOLUTION INTRAVENOUS EVERY 4 HOURS PRN
Status: DISCONTINUED | OUTPATIENT
Start: 2018-01-01 | End: 2018-01-01 | Stop reason: HOSPADM

## 2018-01-01 RX ORDER — MORPHINE SULFATE 2 MG/ML
2 INJECTION, SOLUTION INTRAMUSCULAR; INTRAVENOUS
Status: COMPLETED | OUTPATIENT
Start: 2018-01-01 | End: 2018-01-01

## 2018-01-01 RX ORDER — OXYCODONE HYDROCHLORIDE AND ACETAMINOPHEN 5; 325 MG/1; MG/1
1 TABLET ORAL EVERY 8 HOURS PRN
Qty: 15 TABLET | Refills: 0 | Status: SHIPPED | OUTPATIENT
Start: 2018-01-01 | End: 2018-01-01

## 2018-01-01 RX ORDER — MEGESTROL ACETATE 40 MG/1
40 TABLET ORAL DAILY
Status: DISCONTINUED | OUTPATIENT
Start: 2018-01-01 | End: 2018-01-01

## 2018-01-01 RX ORDER — OXYCODONE HYDROCHLORIDE AND ACETAMINOPHEN 5; 325 MG/1; MG/1
1 TABLET ORAL ONCE
Status: COMPLETED | OUTPATIENT
Start: 2018-01-01 | End: 2018-01-01

## 2018-01-01 RX ORDER — ALPRAZOLAM 0.5 MG/1
0.5 TABLET ORAL 2 TIMES DAILY
Status: CANCELLED | OUTPATIENT
Start: 2018-01-01

## 2018-01-01 RX ORDER — POTASSIUM CHLORIDE 750 MG/1
TABLET, FILM COATED, EXTENDED RELEASE ORAL
Qty: 48 TABLET | Refills: 0 | Status: ON HOLD | OUTPATIENT
Start: 2018-01-01 | End: 2018-01-01 | Stop reason: HOSPADM

## 2018-01-01 RX ORDER — FERROUS SULFATE 325(65) MG
TABLET ORAL
Qty: 30 TABLET | Refills: 11 | Status: ON HOLD | OUTPATIENT
Start: 2018-01-01 | End: 2018-01-01 | Stop reason: HOSPADM

## 2018-01-01 RX ORDER — LORAZEPAM 1 MG/1
1 TABLET ORAL 2 TIMES DAILY PRN
Qty: 90 TABLET | Refills: 0 | Status: ON HOLD | OUTPATIENT
Start: 2018-01-01 | End: 2018-01-01 | Stop reason: HOSPADM

## 2018-01-01 RX ORDER — PREDNISOLONE ACETATE 10 MG/ML
1 SUSPENSION/ DROPS OPHTHALMIC 2 TIMES DAILY
COMMUNITY
Start: 2018-01-01 | End: 2018-01-01

## 2018-01-01 RX ORDER — OXYCODONE HYDROCHLORIDE 5 MG/1
5 TABLET ORAL EVERY 6 HOURS PRN
Status: DISCONTINUED | OUTPATIENT
Start: 2018-01-01 | End: 2018-01-01 | Stop reason: HOSPADM

## 2018-01-01 RX ORDER — DOCUSATE SODIUM 100 MG/1
100 CAPSULE, LIQUID FILLED ORAL DAILY PRN
Status: CANCELLED | OUTPATIENT
Start: 2018-01-01

## 2018-01-01 RX ORDER — GRANULES FOR ORAL 3 G/1
3 POWDER ORAL ONCE
Status: COMPLETED | OUTPATIENT
Start: 2018-01-01 | End: 2018-01-01

## 2018-01-01 RX ORDER — GUAIFENESIN 600 MG/1
600 TABLET, EXTENDED RELEASE ORAL 2 TIMES DAILY
Status: DISCONTINUED | OUTPATIENT
Start: 2018-01-01 | End: 2018-01-01

## 2018-01-01 RX ORDER — SODIUM BICARBONATE 650 MG/1
650 TABLET ORAL 2 TIMES DAILY
Status: DISCONTINUED | OUTPATIENT
Start: 2018-01-01 | End: 2018-01-01

## 2018-01-01 RX ORDER — FENTANYL CITRATE 50 UG/ML
50 INJECTION, SOLUTION INTRAMUSCULAR; INTRAVENOUS EVERY 5 MIN PRN
Status: DISCONTINUED | OUTPATIENT
Start: 2018-01-01 | End: 2018-01-01 | Stop reason: HOSPADM

## 2018-01-01 RX ORDER — TIZANIDINE 4 MG/1
2 TABLET ORAL NIGHTLY
Status: DISCONTINUED | OUTPATIENT
Start: 2018-01-01 | End: 2018-01-01 | Stop reason: HOSPADM

## 2018-01-01 RX ORDER — ISOSORBIDE MONONITRATE 60 MG/1
TABLET, EXTENDED RELEASE ORAL
Qty: 30 TABLET | Refills: 0 | Status: SHIPPED | OUTPATIENT
Start: 2018-01-01 | End: 2018-01-01 | Stop reason: SDUPTHER

## 2018-01-01 RX ORDER — CIPROFLOXACIN 500 MG/1
500 TABLET, FILM COATED ORAL 2 TIMES DAILY
Qty: 10 TABLET | Refills: 0 | Status: SHIPPED | OUTPATIENT
Start: 2018-01-01 | End: 2018-01-01

## 2018-01-01 RX ORDER — LEVOFLOXACIN 250 MG/1
250 TABLET ORAL DAILY
Qty: 4 TABLET | Refills: 0 | Status: SHIPPED | OUTPATIENT
Start: 2018-01-01 | End: 2018-01-01

## 2018-01-01 RX ORDER — ZAFIRLUKAST 20 MG/1
TABLET, FILM COATED ORAL
Qty: 180 TABLET | Refills: 1 | Status: ON HOLD | OUTPATIENT
Start: 2018-01-01 | End: 2018-01-01 | Stop reason: HOSPADM

## 2018-01-01 RX ORDER — LORAZEPAM 1 MG/1
TABLET ORAL
Qty: 90 TABLET | Refills: 0 | Status: SHIPPED | OUTPATIENT
Start: 2018-01-01 | End: 2018-01-01 | Stop reason: SDUPTHER

## 2018-01-01 RX ORDER — ZAFIRLUKAST 20 MG/1
TABLET, FILM COATED ORAL
Qty: 180 TABLET | Refills: 1 | Status: SHIPPED | OUTPATIENT
Start: 2018-01-01 | End: 2018-01-01 | Stop reason: SDUPTHER

## 2018-01-01 RX ORDER — AMOXICILLIN 500 MG/1
500 CAPSULE ORAL EVERY 12 HOURS SCHEDULED
Status: COMPLETED | OUTPATIENT
Start: 2018-01-01 | End: 2018-01-01

## 2018-01-01 RX ORDER — BUMETANIDE 1 MG/1
0.5 TABLET ORAL DAILY
Status: DISCONTINUED | OUTPATIENT
Start: 2018-01-01 | End: 2018-01-01 | Stop reason: HOSPADM

## 2018-01-01 RX ORDER — NICOTINE POLACRILEX 4 MG
15 LOZENGE BUCCAL PRN
Status: DISCONTINUED | OUTPATIENT
Start: 2018-01-01 | End: 2018-01-01 | Stop reason: HOSPADM

## 2018-01-01 RX ORDER — HYDROCODONE BITARTRATE AND ACETAMINOPHEN 5; 325 MG/1; MG/1
1-2 TABLET ORAL EVERY 6 HOURS PRN
Qty: 50 TABLET | Refills: 0 | Status: ON HOLD | OUTPATIENT
Start: 2018-01-01 | End: 2018-01-01 | Stop reason: HOSPADM

## 2018-01-01 RX ORDER — HYDROCODONE BITARTRATE AND ACETAMINOPHEN 5; 325 MG/1; MG/1
1 TABLET ORAL EVERY 6 HOURS PRN
Status: ON HOLD | COMMUNITY
End: 2018-01-01 | Stop reason: HOSPADM

## 2018-01-01 RX ORDER — ATROPA BELLADONNA AND OPIUM 16.2; 3 MG/1; MG/1
30 SUPPOSITORY RECTAL EVERY 8 HOURS PRN
Status: DISCONTINUED | OUTPATIENT
Start: 2018-01-01 | End: 2018-01-01 | Stop reason: HOSPADM

## 2018-01-01 RX ORDER — MECLIZINE HYDROCHLORIDE 25 MG/1
25 TABLET ORAL 3 TIMES DAILY PRN
Qty: 270 TABLET | Refills: 3 | Status: SHIPPED | OUTPATIENT
Start: 2018-01-01 | End: 2018-01-01 | Stop reason: SDUPTHER

## 2018-01-01 RX ORDER — ESCITALOPRAM OXALATE 10 MG/1
10 TABLET ORAL DAILY
Qty: 90 TABLET | Refills: 1 | Status: SHIPPED | OUTPATIENT
Start: 2018-01-01

## 2018-01-01 RX ORDER — HYDRALAZINE HYDROCHLORIDE 25 MG/1
25 TABLET, FILM COATED ORAL EVERY 8 HOURS SCHEDULED
Qty: 90 TABLET | Refills: 0 | Status: SHIPPED | OUTPATIENT
Start: 2018-01-01 | End: 2018-01-01 | Stop reason: SDUPTHER

## 2018-01-01 RX ORDER — FENTANYL CITRATE 50 UG/ML
INJECTION, SOLUTION INTRAMUSCULAR; INTRAVENOUS PRN
Status: DISCONTINUED | OUTPATIENT
Start: 2018-01-01 | End: 2018-01-01 | Stop reason: SDUPTHER

## 2018-01-01 RX ORDER — ATORVASTATIN CALCIUM 20 MG/1
20 TABLET, FILM COATED ORAL NIGHTLY
Status: CANCELLED | OUTPATIENT
Start: 2018-01-01

## 2018-01-01 RX ORDER — OFLOXACIN 3 MG/ML
3 SOLUTION/ DROPS OPHTHALMIC 4 TIMES DAILY
Status: ON HOLD | COMMUNITY
End: 2018-01-01

## 2018-01-01 RX ORDER — LORAZEPAM 1 MG/1
1 TABLET ORAL EVERY MORNING
Status: ON HOLD | COMMUNITY
End: 2019-01-01 | Stop reason: HOSPADM

## 2018-01-01 RX ORDER — MECLIZINE HCL 12.5 MG/1
25 TABLET ORAL 3 TIMES DAILY PRN
Status: CANCELLED | OUTPATIENT
Start: 2018-01-01

## 2018-01-01 RX ORDER — BUMETANIDE 1 MG/1
1 TABLET ORAL DAILY
Status: DISCONTINUED | OUTPATIENT
Start: 2018-01-01 | End: 2018-01-01 | Stop reason: HOSPADM

## 2018-01-01 RX ORDER — HYDROCODONE BITARTRATE AND ACETAMINOPHEN 5; 325 MG/1; MG/1
2 TABLET ORAL EVERY 6 HOURS PRN
Status: CANCELLED | OUTPATIENT
Start: 2018-01-01

## 2018-01-01 RX ORDER — DEXTROSE MONOHYDRATE 25 G/50ML
12.5 INJECTION, SOLUTION INTRAVENOUS PRN
Status: DISCONTINUED | OUTPATIENT
Start: 2018-01-01 | End: 2018-01-01 | Stop reason: HOSPADM

## 2018-01-01 RX ORDER — ONDANSETRON 4 MG/1
4 TABLET, FILM COATED ORAL EVERY 8 HOURS PRN
Status: DISCONTINUED | OUTPATIENT
Start: 2018-01-01 | End: 2018-01-01 | Stop reason: HOSPADM

## 2018-01-01 RX ORDER — HYDROCODONE BITARTRATE AND ACETAMINOPHEN 5; 325 MG/1; MG/1
1 TABLET ORAL EVERY 4 HOURS PRN
Status: DISCONTINUED | OUTPATIENT
Start: 2018-01-01 | End: 2018-01-01 | Stop reason: HOSPADM

## 2018-01-01 RX ORDER — FUROSEMIDE 10 MG/ML
20 INJECTION INTRAMUSCULAR; INTRAVENOUS DAILY
Status: DISCONTINUED | OUTPATIENT
Start: 2018-01-01 | End: 2018-01-01

## 2018-01-01 RX ORDER — POTASSIUM CHLORIDE 750 MG/1
10 TABLET, FILM COATED, EXTENDED RELEASE ORAL
Status: DISCONTINUED | OUTPATIENT
Start: 2018-01-01 | End: 2018-01-01 | Stop reason: HOSPADM

## 2018-01-01 RX ORDER — HYDROCODONE BITARTRATE AND ACETAMINOPHEN 5; 325 MG/1; MG/1
1 TABLET ORAL EVERY 6 HOURS PRN
Qty: 120 TABLET | Refills: 0 | Status: SHIPPED | OUTPATIENT
Start: 2018-01-01 | End: 2018-01-01 | Stop reason: SDUPTHER

## 2018-01-01 RX ORDER — PREDNISOLONE ACETATE 10 MG/ML
1 SUSPENSION/ DROPS OPHTHALMIC 2 TIMES DAILY
Status: DISCONTINUED | OUTPATIENT
Start: 2018-01-01 | End: 2018-01-01 | Stop reason: HOSPADM

## 2018-01-01 RX ORDER — TRAMADOL HYDROCHLORIDE 50 MG/1
50 TABLET ORAL 3 TIMES DAILY
Status: DISCONTINUED | OUTPATIENT
Start: 2018-01-01 | End: 2018-01-01 | Stop reason: HOSPADM

## 2018-01-01 RX ORDER — SUCCINYLCHOLINE CHLORIDE 20 MG/ML
INJECTION INTRAMUSCULAR; INTRAVENOUS PRN
Status: DISCONTINUED | OUTPATIENT
Start: 2018-01-01 | End: 2018-01-01 | Stop reason: SDUPTHER

## 2018-01-01 RX ORDER — LEVOFLOXACIN 250 MG/1
250 TABLET ORAL DAILY
Status: DISCONTINUED | OUTPATIENT
Start: 2018-01-01 | End: 2018-01-01

## 2018-01-01 RX ORDER — METOPROLOL SUCCINATE 50 MG/1
50 TABLET, EXTENDED RELEASE ORAL DAILY
Status: DISCONTINUED | OUTPATIENT
Start: 2018-01-01 | End: 2018-01-01

## 2018-01-01 RX ORDER — HYDROCODONE BITARTRATE AND ACETAMINOPHEN 7.5; 325 MG/1; MG/1
1 TABLET ORAL EVERY 6 HOURS PRN
Status: CANCELLED | OUTPATIENT
Start: 2018-01-01

## 2018-01-01 RX ORDER — DIPHENOXYLATE HYDROCHLORIDE AND ATROPINE SULFATE 2.5; .025 MG/1; MG/1
1 TABLET ORAL 4 TIMES DAILY PRN
Status: CANCELLED | OUTPATIENT
Start: 2018-01-01

## 2018-01-01 RX ORDER — PANTOPRAZOLE SODIUM 40 MG/1
TABLET, DELAYED RELEASE ORAL
Qty: 180 TABLET | Refills: 3 | Status: SHIPPED | OUTPATIENT
Start: 2018-01-01

## 2018-01-01 RX ORDER — LINEZOLID 600 MG/1
600 TABLET, FILM COATED ORAL 2 TIMES DAILY
COMMUNITY
End: 2018-01-01 | Stop reason: ALTCHOICE

## 2018-01-01 RX ORDER — POTASSIUM CHLORIDE 750 MG/1
TABLET, FILM COATED, EXTENDED RELEASE ORAL
Qty: 48 TABLET | Refills: 0 | Status: SHIPPED | OUTPATIENT
Start: 2018-01-01 | End: 2018-01-01 | Stop reason: SDUPTHER

## 2018-01-01 RX ORDER — MORPHINE SULFATE 2 MG/ML
2 INJECTION, SOLUTION INTRAMUSCULAR; INTRAVENOUS
Status: DISCONTINUED | OUTPATIENT
Start: 2018-01-01 | End: 2018-01-01 | Stop reason: HOSPADM

## 2018-01-01 RX ORDER — SODIUM CHLORIDE 9 MG/ML
INJECTION, SOLUTION INTRAVENOUS CONTINUOUS
Status: ACTIVE | OUTPATIENT
Start: 2018-01-01 | End: 2018-01-01

## 2018-01-01 RX ORDER — NITROFURANTOIN 25; 75 MG/1; MG/1
100 CAPSULE ORAL EVERY 12 HOURS SCHEDULED
Status: DISCONTINUED | OUTPATIENT
Start: 2018-01-01 | End: 2018-01-01

## 2018-01-01 RX ORDER — LORAZEPAM 1 MG/1
1 TABLET ORAL 2 TIMES DAILY
Status: DISCONTINUED | OUTPATIENT
Start: 2018-01-01 | End: 2018-01-01

## 2018-01-01 RX ORDER — LORAZEPAM 1 MG/1
1 TABLET ORAL EVERY MORNING
Status: DISCONTINUED | OUTPATIENT
Start: 2018-01-01 | End: 2018-01-01 | Stop reason: HOSPADM

## 2018-01-01 RX ORDER — SODIUM BICARBONATE 650 MG/1
650 TABLET ORAL 2 TIMES DAILY
Qty: 60 TABLET | Refills: 0 | Status: ON HOLD | OUTPATIENT
Start: 2018-01-01 | End: 2019-01-01 | Stop reason: HOSPADM

## 2018-01-01 RX ORDER — LACTOBACILLUS RHAMNOSUS GG 10B CELL
1 CAPSULE ORAL 2 TIMES DAILY WITH MEALS
Qty: 30 CAPSULE | Refills: 0 | Status: ON HOLD | OUTPATIENT
Start: 2018-01-01 | End: 2019-01-01 | Stop reason: HOSPADM

## 2018-01-01 RX ORDER — PHENAZOPYRIDINE HYDROCHLORIDE 100 MG/1
100 TABLET, FILM COATED ORAL 3 TIMES DAILY PRN
Qty: 90 TABLET | Refills: 1 | Status: SHIPPED | OUTPATIENT
Start: 2018-01-01

## 2018-01-01 RX ORDER — BUMETANIDE 1 MG/1
0.5 TABLET ORAL DAILY
Status: DISCONTINUED | OUTPATIENT
Start: 2018-01-01 | End: 2018-01-01

## 2018-01-01 RX ORDER — OXYCODONE HYDROCHLORIDE AND ACETAMINOPHEN 5; 325 MG/1; MG/1
1 TABLET ORAL EVERY 6 HOURS PRN
Qty: 120 TABLET | Refills: 0 | Status: ON HOLD | OUTPATIENT
Start: 2018-01-01 | End: 2019-01-01 | Stop reason: HOSPADM

## 2018-01-01 RX ORDER — FERROUS SULFATE 325(65) MG
325 TABLET ORAL
Status: DISCONTINUED | OUTPATIENT
Start: 2018-01-01 | End: 2018-01-01 | Stop reason: HOSPADM

## 2018-01-01 RX ORDER — HEPARIN SODIUM 5000 [USP'U]/ML
5000 INJECTION, SOLUTION INTRAVENOUS; SUBCUTANEOUS EVERY 8 HOURS
Status: DISCONTINUED | OUTPATIENT
Start: 2018-01-01 | End: 2018-01-01 | Stop reason: HOSPADM

## 2018-01-01 RX ORDER — MECLIZINE HYDROCHLORIDE CHEWABLE TABLETS 25 MG/1
25 TABLET, CHEWABLE ORAL 3 TIMES DAILY PRN
Status: DISCONTINUED | OUTPATIENT
Start: 2018-01-01 | End: 2018-01-01 | Stop reason: HOSPADM

## 2018-01-01 RX ORDER — ONDANSETRON 2 MG/ML
INJECTION INTRAMUSCULAR; INTRAVENOUS PRN
Status: DISCONTINUED | OUTPATIENT
Start: 2018-01-01 | End: 2018-01-01 | Stop reason: SDUPTHER

## 2018-01-01 RX ORDER — HYDRALAZINE HYDROCHLORIDE 25 MG/1
25 TABLET, FILM COATED ORAL EVERY 8 HOURS SCHEDULED
Qty: 90 TABLET | Refills: 0 | Status: ON HOLD | OUTPATIENT
Start: 2018-01-01 | End: 2018-01-01 | Stop reason: SDUPTHER

## 2018-01-01 RX ORDER — HYDROCODONE BITARTRATE AND ACETAMINOPHEN 5; 325 MG/1; MG/1
2 TABLET ORAL EVERY 6 HOURS PRN
Status: DISCONTINUED | OUTPATIENT
Start: 2018-01-01 | End: 2018-01-01 | Stop reason: HOSPADM

## 2018-01-01 RX ORDER — POTASSIUM CHLORIDE 20 MEQ/1
40 TABLET, EXTENDED RELEASE ORAL PRN
Qty: 60 TABLET | Refills: 3 | Status: SHIPPED | OUTPATIENT
Start: 2018-01-01 | End: 2018-01-01 | Stop reason: HOSPADM

## 2018-01-01 RX ORDER — HYDROCODONE BITARTRATE AND ACETAMINOPHEN 5; 325 MG/1; MG/1
1 TABLET ORAL EVERY 6 HOURS PRN
Qty: 10 TABLET | Refills: 0 | Status: SHIPPED | OUTPATIENT
Start: 2018-01-01 | End: 2018-01-01

## 2018-01-01 RX ORDER — LIDOCAINE HYDROCHLORIDE 20 MG/ML
INJECTION, SOLUTION INFILTRATION; PERINEURAL PRN
Status: DISCONTINUED | OUTPATIENT
Start: 2018-01-01 | End: 2018-01-01 | Stop reason: SDUPTHER

## 2018-01-01 RX ORDER — PHENAZOPYRIDINE HYDROCHLORIDE 100 MG/1
100 TABLET, FILM COATED ORAL 3 TIMES DAILY PRN
Status: DISCONTINUED | OUTPATIENT
Start: 2018-01-01 | End: 2018-01-01

## 2018-01-01 RX ORDER — FLUCONAZOLE 100 MG/1
100 TABLET ORAL DAILY
Status: COMPLETED | OUTPATIENT
Start: 2018-01-01 | End: 2018-01-01

## 2018-01-01 RX ORDER — HYDROCODONE BITARTRATE AND ACETAMINOPHEN 5; 325 MG/1; MG/1
TABLET ORAL
Status: COMPLETED
Start: 2018-01-01 | End: 2018-01-01

## 2018-01-01 RX ORDER — FENTANYL CITRATE 50 UG/ML
25 INJECTION, SOLUTION INTRAMUSCULAR; INTRAVENOUS
Status: DISCONTINUED | OUTPATIENT
Start: 2018-01-01 | End: 2018-01-01

## 2018-01-01 RX ORDER — HYDRALAZINE HYDROCHLORIDE 25 MG/1
25 TABLET, FILM COATED ORAL EVERY 8 HOURS SCHEDULED
Status: DISCONTINUED | OUTPATIENT
Start: 2018-01-01 | End: 2018-01-01 | Stop reason: HOSPADM

## 2018-01-01 RX ORDER — PANTOPRAZOLE SODIUM 40 MG/1
TABLET, DELAYED RELEASE ORAL
Qty: 60 TABLET | Refills: 11 | Status: SHIPPED | OUTPATIENT
Start: 2018-01-01 | End: 2018-01-01 | Stop reason: SDUPTHER

## 2018-01-01 RX ORDER — FENTANYL CITRATE 50 UG/ML
INJECTION, SOLUTION INTRAMUSCULAR; INTRAVENOUS
Status: COMPLETED
Start: 2018-01-01 | End: 2018-01-01

## 2018-01-01 RX ORDER — VANCOMYCIN HYDROCHLORIDE 125 MG/1
125 CAPSULE ORAL 4 TIMES DAILY
Status: DISCONTINUED | OUTPATIENT
Start: 2018-01-01 | End: 2018-01-01 | Stop reason: CLARIF

## 2018-01-01 RX ORDER — BUMETANIDE 0.25 MG/ML
1 INJECTION, SOLUTION INTRAMUSCULAR; INTRAVENOUS DAILY
Status: DISCONTINUED | OUTPATIENT
Start: 2018-01-01 | End: 2018-01-01

## 2018-01-01 RX ORDER — FERROUS SULFATE 325(65) MG
TABLET ORAL
Qty: 30 TABLET | Refills: 11 | Status: SHIPPED | OUTPATIENT
Start: 2018-01-01 | End: 2018-01-01 | Stop reason: SDUPTHER

## 2018-01-01 RX ADMIN — FUROSEMIDE 20 MG: 10 INJECTION, SOLUTION INTRAMUSCULAR; INTRAVENOUS at 22:20

## 2018-01-01 RX ADMIN — ALPRAZOLAM 0.5 MG: 0.5 TABLET ORAL at 21:45

## 2018-01-01 RX ADMIN — SODIUM BICARBONATE 650 MG: 650 TABLET ORAL at 22:54

## 2018-01-01 RX ADMIN — ROPINIROLE HYDROCHLORIDE 4 MG: 1 TABLET, FILM COATED ORAL at 08:23

## 2018-01-01 RX ADMIN — TRAMADOL HYDROCHLORIDE 50 MG: 50 TABLET, FILM COATED ORAL at 02:56

## 2018-01-01 RX ADMIN — ESCITALOPRAM OXALATE 10 MG: 10 TABLET, FILM COATED ORAL at 10:05

## 2018-01-01 RX ADMIN — ZAFIRLUKAST 20 MG: 20 TABLET, FILM COATED ORAL at 20:52

## 2018-01-01 RX ADMIN — ZAFIRLUKAST 20 MG: 20 TABLET, COATED ORAL at 08:19

## 2018-01-01 RX ADMIN — ROPINIROLE HYDROCHLORIDE 4 MG: 1 TABLET, FILM COATED ORAL at 18:20

## 2018-01-01 RX ADMIN — ATORVASTATIN CALCIUM 20 MG: 20 TABLET, FILM COATED ORAL at 23:12

## 2018-01-01 RX ADMIN — AMLODIPINE BESYLATE 5 MG: 5 TABLET ORAL at 21:39

## 2018-01-01 RX ADMIN — DICLOFENAC 2 G: 10 GEL TOPICAL at 17:51

## 2018-01-01 RX ADMIN — Medication 10 ML: at 08:38

## 2018-01-01 RX ADMIN — FERROUS SULFATE TAB 325 MG (65 MG ELEMENTAL FE) 325 MG: 325 (65 FE) TAB at 17:08

## 2018-01-01 RX ADMIN — METOPROLOL SUCCINATE 50 MG: 50 TABLET, FILM COATED, EXTENDED RELEASE ORAL at 09:00

## 2018-01-01 RX ADMIN — FLUTICASONE PROPIONATE 2 SPRAY: 50 SPRAY, METERED NASAL at 21:54

## 2018-01-01 RX ADMIN — FLUTICASONE PROPIONATE 2 SPRAY: 50 SPRAY, METERED NASAL at 09:26

## 2018-01-01 RX ADMIN — PANTOPRAZOLE SODIUM 40 MG: 40 TABLET, DELAYED RELEASE ORAL at 04:21

## 2018-01-01 RX ADMIN — FLUTICASONE PROPIONATE 2 SPRAY: 50 SPRAY, METERED NASAL at 08:50

## 2018-01-01 RX ADMIN — SODIUM BICARBONATE 650 MG: 650 TABLET ORAL at 09:49

## 2018-01-01 RX ADMIN — Medication 10 ML: at 09:45

## 2018-01-01 RX ADMIN — ISOSORBIDE MONONITRATE 120 MG: 60 TABLET ORAL at 09:00

## 2018-01-01 RX ADMIN — ZAFIRLUKAST 20 MG: 20 TABLET, COATED ORAL at 21:12

## 2018-01-01 RX ADMIN — ATORVASTATIN CALCIUM 20 MG: 20 TABLET, FILM COATED ORAL at 20:00

## 2018-01-01 RX ADMIN — ZAFIRLUKAST 20 MG: 20 TABLET, COATED ORAL at 20:51

## 2018-01-01 RX ADMIN — HYDROCODONE BITARTRATE AND ACETAMINOPHEN 1 TABLET: 5; 325 TABLET ORAL at 17:03

## 2018-01-01 RX ADMIN — DICLOFENAC 2 G: 10 GEL TOPICAL at 18:48

## 2018-01-01 RX ADMIN — ALBUTEROL SULFATE 10 MG: 2.5 SOLUTION RESPIRATORY (INHALATION) at 15:21

## 2018-01-01 RX ADMIN — ROPINIROLE HYDROCHLORIDE 4 MG: 1 TABLET, FILM COATED ORAL at 17:49

## 2018-01-01 RX ADMIN — ROPINIROLE HYDROCHLORIDE 4 MG: 1 TABLET, FILM COATED ORAL at 11:15

## 2018-01-01 RX ADMIN — PANTOPRAZOLE SODIUM 40 MG: 40 TABLET, DELAYED RELEASE ORAL at 06:17

## 2018-01-01 RX ADMIN — TRAMADOL HYDROCHLORIDE 50 MG: 50 TABLET, FILM COATED ORAL at 10:48

## 2018-01-01 RX ADMIN — AMLODIPINE BESYLATE 5 MG: 5 TABLET ORAL at 09:20

## 2018-01-01 RX ADMIN — CEFTRIAXONE SODIUM 1 G: 1 INJECTION, POWDER, FOR SOLUTION INTRAMUSCULAR; INTRAVENOUS at 16:04

## 2018-01-01 RX ADMIN — AMPICILLIN SODIUM 1 G: 1 INJECTION, POWDER, FOR SOLUTION INTRAMUSCULAR; INTRAVENOUS at 10:54

## 2018-01-01 RX ADMIN — ESCITALOPRAM OXALATE 10 MG: 10 TABLET, FILM COATED ORAL at 10:23

## 2018-01-01 RX ADMIN — ROPINIROLE HYDROCHLORIDE 4 MG: 1 TABLET, FILM COATED ORAL at 20:28

## 2018-01-01 RX ADMIN — Medication 10 ML: at 21:53

## 2018-01-01 RX ADMIN — PANTOPRAZOLE SODIUM 40 MG: 40 TABLET, DELAYED RELEASE ORAL at 17:06

## 2018-01-01 RX ADMIN — CETIRIZINE HYDROCHLORIDE 10 MG: 10 TABLET ORAL at 09:23

## 2018-01-01 RX ADMIN — ROPINIROLE HYDROCHLORIDE 4 MG: 1 TABLET, FILM COATED ORAL at 16:23

## 2018-01-01 RX ADMIN — HYDROCODONE BITARTRATE AND ACETAMINOPHEN 1 TABLET: 5; 325 TABLET ORAL at 21:10

## 2018-01-01 RX ADMIN — ROPINIROLE HYDROCHLORIDE 4 MG: 1 TABLET, FILM COATED ORAL at 17:35

## 2018-01-01 RX ADMIN — SODIUM CHLORIDE: 9 INJECTION, SOLUTION INTRAVENOUS at 09:09

## 2018-01-01 RX ADMIN — METOPROLOL SUCCINATE 50 MG: 50 TABLET, FILM COATED, EXTENDED RELEASE ORAL at 09:38

## 2018-01-01 RX ADMIN — TIZANIDINE 2 MG: 4 TABLET ORAL at 11:00

## 2018-01-01 RX ADMIN — OFLOXACIN 2 DROP: 3 SOLUTION OPHTHALMIC at 19:38

## 2018-01-01 RX ADMIN — OFLOXACIN 2 DROP: 3 SOLUTION OPHTHALMIC at 17:50

## 2018-01-01 RX ADMIN — LORAZEPAM 1 MG: 1 TABLET ORAL at 08:33

## 2018-01-01 RX ADMIN — Medication 10 ML: at 20:56

## 2018-01-01 RX ADMIN — ONDANSETRON 4 MG: 2 INJECTION INTRAMUSCULAR; INTRAVENOUS at 09:55

## 2018-01-01 RX ADMIN — FLUTICASONE PROPIONATE 2 SPRAY: 50 SPRAY, METERED NASAL at 20:09

## 2018-01-01 RX ADMIN — IOPAMIDOL 80 ML: 755 INJECTION, SOLUTION INTRAVENOUS at 15:02

## 2018-01-01 RX ADMIN — ATORVASTATIN CALCIUM 20 MG: 20 TABLET, FILM COATED ORAL at 21:35

## 2018-01-01 RX ADMIN — FERROUS SULFATE TAB 325 MG (65 MG ELEMENTAL FE) 325 MG: 325 (65 FE) TAB at 17:29

## 2018-01-01 RX ADMIN — HYDROCODONE BITARTRATE AND ACETAMINOPHEN 1 TABLET: 5; 325 TABLET ORAL at 14:48

## 2018-01-01 RX ADMIN — ESCITALOPRAM 10 MG: 10 TABLET, FILM COATED ORAL at 08:54

## 2018-01-01 RX ADMIN — PANTOPRAZOLE SODIUM 40 MG: 40 TABLET, DELAYED RELEASE ORAL at 17:47

## 2018-01-01 RX ADMIN — HYDROCODONE BITARTRATE AND ACETAMINOPHEN 1 TABLET: 5; 325 TABLET ORAL at 17:57

## 2018-01-01 RX ADMIN — TRAMADOL HYDROCHLORIDE 50 MG: 50 TABLET, FILM COATED ORAL at 00:37

## 2018-01-01 RX ADMIN — LORAZEPAM 1 MG: 1 TABLET ORAL at 09:13

## 2018-01-01 RX ADMIN — ALPRAZOLAM 0.5 MG: 0.5 TABLET ORAL at 20:53

## 2018-01-01 RX ADMIN — OXYCODONE AND ACETAMINOPHEN 1 TABLET: 5; 325 TABLET ORAL at 16:38

## 2018-01-01 RX ADMIN — HYDROCODONE BITARTRATE AND ACETAMINOPHEN 1 TABLET: 5; 325 TABLET ORAL at 17:02

## 2018-01-01 RX ADMIN — ENOXAPARIN SODIUM 30 MG: 30 INJECTION SUBCUTANEOUS at 08:38

## 2018-01-01 RX ADMIN — Medication 1 CAPSULE: at 18:21

## 2018-01-01 RX ADMIN — FLUTICASONE PROPIONATE 2 SPRAY: 50 SPRAY, METERED NASAL at 20:21

## 2018-01-01 RX ADMIN — SODIUM CHLORIDE 500 ML: 9 INJECTION, SOLUTION INTRAVENOUS at 03:30

## 2018-01-01 RX ADMIN — DOCUSATE SODIUM 100 MG: 100 CAPSULE, LIQUID FILLED ORAL at 09:18

## 2018-01-01 RX ADMIN — ROPINIROLE HYDROCHLORIDE 4 MG: 1 TABLET, FILM COATED ORAL at 08:50

## 2018-01-01 RX ADMIN — TRAMADOL HYDROCHLORIDE 50 MG: 50 TABLET, FILM COATED ORAL at 11:11

## 2018-01-01 RX ADMIN — ALPRAZOLAM 0.5 MG: 0.5 TABLET ORAL at 21:54

## 2018-01-01 RX ADMIN — DICLOFENAC 2 G: 10 GEL TOPICAL at 20:43

## 2018-01-01 RX ADMIN — ISOSORBIDE MONONITRATE 120 MG: 60 TABLET ORAL at 08:37

## 2018-01-01 RX ADMIN — ATORVASTATIN CALCIUM 20 MG: 20 TABLET, FILM COATED ORAL at 09:19

## 2018-01-01 RX ADMIN — FERROUS SULFATE TAB 325 MG (65 MG ELEMENTAL FE) 325 MG: 325 (65 FE) TAB at 17:01

## 2018-01-01 RX ADMIN — ROPINIROLE HYDROCHLORIDE 4 MG: 1 TABLET, FILM COATED ORAL at 09:23

## 2018-01-01 RX ADMIN — ESCITALOPRAM OXALATE 10 MG: 10 TABLET, FILM COATED ORAL at 21:17

## 2018-01-01 RX ADMIN — FERROUS SULFATE TAB 325 MG (65 MG ELEMENTAL FE) 325 MG: 325 (65 FE) TAB at 08:14

## 2018-01-01 RX ADMIN — ROPINIROLE HYDROCHLORIDE 4 MG: 1 TABLET, FILM COATED ORAL at 18:04

## 2018-01-01 RX ADMIN — FLUTICASONE PROPIONATE 2 SPRAY: 50 SPRAY, METERED NASAL at 10:00

## 2018-01-01 RX ADMIN — ENOXAPARIN SODIUM 30 MG: 30 INJECTION SUBCUTANEOUS at 10:29

## 2018-01-01 RX ADMIN — HEPARIN SODIUM 5000 UNITS: 5000 INJECTION INTRAVENOUS; SUBCUTANEOUS at 18:27

## 2018-01-01 RX ADMIN — OXYCODONE AND ACETAMINOPHEN 1 TABLET: 5; 325 TABLET ORAL at 18:07

## 2018-01-01 RX ADMIN — ROPINIROLE HYDROCHLORIDE 4 MG: 1 TABLET, FILM COATED ORAL at 09:19

## 2018-01-01 RX ADMIN — ACETAMINOPHEN 650 MG: 325 TABLET ORAL at 09:16

## 2018-01-01 RX ADMIN — DICLOFENAC 2 G: 10 GEL TOPICAL at 08:21

## 2018-01-01 RX ADMIN — ALPRAZOLAM 0.5 MG: 0.5 TABLET ORAL at 08:13

## 2018-01-01 RX ADMIN — ROPINIROLE HYDROCHLORIDE 4 MG: 1 TABLET, FILM COATED ORAL at 21:48

## 2018-01-01 RX ADMIN — FERROUS SULFATE TAB 325 MG (65 MG ELEMENTAL FE) 325 MG: 325 (65 FE) TAB at 09:00

## 2018-01-01 RX ADMIN — ALPRAZOLAM 0.5 MG: 0.5 TABLET ORAL at 09:17

## 2018-01-01 RX ADMIN — HYDROCODONE BITARTRATE AND ACETAMINOPHEN 1 TABLET: 7.5; 325 TABLET ORAL at 20:56

## 2018-01-01 RX ADMIN — CETIRIZINE HYDROCHLORIDE 10 MG: 10 TABLET ORAL at 10:58

## 2018-01-01 RX ADMIN — FERROUS SULFATE TAB 325 MG (65 MG ELEMENTAL FE) 325 MG: 325 (65 FE) TAB at 10:28

## 2018-01-01 RX ADMIN — PANTOPRAZOLE SODIUM 40 MG: 40 TABLET, DELAYED RELEASE ORAL at 05:06

## 2018-01-01 RX ADMIN — LORAZEPAM 2 MG: 2 TABLET ORAL at 22:54

## 2018-01-01 RX ADMIN — FLUTICASONE PROPIONATE 2 SPRAY: 50 SPRAY, METERED NASAL at 10:48

## 2018-01-01 RX ADMIN — ALPRAZOLAM 0.5 MG: 0.5 TABLET ORAL at 20:55

## 2018-01-01 RX ADMIN — FLUTICASONE PROPIONATE 2 SPRAY: 50 SPRAY, METERED NASAL at 20:17

## 2018-01-01 RX ADMIN — PANTOPRAZOLE SODIUM 40 MG: 40 TABLET, DELAYED RELEASE ORAL at 15:59

## 2018-01-01 RX ADMIN — FERROUS SULFATE TAB 325 MG (65 MG ELEMENTAL FE) 325 MG: 325 (65 FE) TAB at 16:54

## 2018-01-01 RX ADMIN — HYDROCODONE BITARTRATE AND ACETAMINOPHEN 1 TABLET: 5; 325 TABLET ORAL at 23:06

## 2018-01-01 RX ADMIN — ISOSORBIDE MONONITRATE 60 MG: 60 TABLET ORAL at 09:14

## 2018-01-01 RX ADMIN — SODIUM BICARBONATE 650 MG: 650 TABLET ORAL at 20:58

## 2018-01-01 RX ADMIN — HYDROCODONE BITARTRATE AND ACETAMINOPHEN 1 TABLET: 5; 325 TABLET ORAL at 04:07

## 2018-01-01 RX ADMIN — METOPROLOL SUCCINATE 50 MG: 50 TABLET, FILM COATED, EXTENDED RELEASE ORAL at 08:15

## 2018-01-01 RX ADMIN — FLUTICASONE PROPIONATE 2 SPRAY: 50 SPRAY, METERED NASAL at 08:23

## 2018-01-01 RX ADMIN — ISOSORBIDE MONONITRATE 60 MG: 60 TABLET ORAL at 09:59

## 2018-01-01 RX ADMIN — TRAMADOL HYDROCHLORIDE 50 MG: 50 TABLET, FILM COATED ORAL at 10:44

## 2018-01-01 RX ADMIN — OFLOXACIN 1 DROP: 3 SOLUTION OPHTHALMIC at 20:52

## 2018-01-01 RX ADMIN — ISOSORBIDE MONONITRATE 60 MG: 60 TABLET ORAL at 10:06

## 2018-01-01 RX ADMIN — PANTOPRAZOLE SODIUM 40 MG: 40 TABLET, DELAYED RELEASE ORAL at 15:54

## 2018-01-01 RX ADMIN — LORAZEPAM 1 MG: 1 TABLET ORAL at 09:50

## 2018-01-01 RX ADMIN — CETIRIZINE HYDROCHLORIDE 10 MG: 10 TABLET, FILM COATED ORAL at 09:11

## 2018-01-01 RX ADMIN — SENNA 8.6 MG: 8.6 TABLET, COATED ORAL at 20:53

## 2018-01-01 RX ADMIN — FENTANYL CITRATE 25 MCG: 50 INJECTION, SOLUTION INTRAMUSCULAR; INTRAVENOUS at 15:23

## 2018-01-01 RX ADMIN — ROPINIROLE HYDROCHLORIDE 4 MG: 1 TABLET, FILM COATED ORAL at 09:12

## 2018-01-01 RX ADMIN — OXYCODONE HYDROCHLORIDE 5 MG: 5 TABLET ORAL at 05:39

## 2018-01-01 RX ADMIN — DICLOFENAC 2 G: 10 GEL TOPICAL at 21:47

## 2018-01-01 RX ADMIN — ISOSORBIDE MONONITRATE 60 MG: 60 TABLET ORAL at 10:02

## 2018-01-01 RX ADMIN — METOPROLOL SUCCINATE 25 MG: 25 TABLET, FILM COATED, EXTENDED RELEASE ORAL at 08:29

## 2018-01-01 RX ADMIN — FERROUS SULFATE TAB 325 MG (65 MG ELEMENTAL FE) 325 MG: 325 (65 FE) TAB at 08:42

## 2018-01-01 RX ADMIN — HEPARIN SODIUM 5000 UNITS: 5000 INJECTION INTRAVENOUS; SUBCUTANEOUS at 18:59

## 2018-01-01 RX ADMIN — BUMETANIDE 0.5 MG: 1 TABLET ORAL at 10:11

## 2018-01-01 RX ADMIN — HEPARIN SODIUM 5000 UNITS: 5000 INJECTION INTRAVENOUS; SUBCUTANEOUS at 02:51

## 2018-01-01 RX ADMIN — OXYCODONE AND ACETAMINOPHEN 1 TABLET: 5; 325 TABLET ORAL at 02:18

## 2018-01-01 RX ADMIN — ISOSORBIDE MONONITRATE 60 MG: 60 TABLET ORAL at 09:15

## 2018-01-01 RX ADMIN — ATORVASTATIN CALCIUM 20 MG: 20 TABLET, FILM COATED ORAL at 21:17

## 2018-01-01 RX ADMIN — ZOLEDRONIC ACID 2 MG: 4 INJECTION, SOLUTION, CONCENTRATE INTRAVENOUS at 01:54

## 2018-01-01 RX ADMIN — TIZANIDINE 2 MG: 4 TABLET ORAL at 18:17

## 2018-01-01 RX ADMIN — AMLODIPINE BESYLATE 5 MG: 5 TABLET ORAL at 09:15

## 2018-01-01 RX ADMIN — ROPINIROLE HYDROCHLORIDE 4 MG: 1 TABLET, FILM COATED ORAL at 12:55

## 2018-01-01 RX ADMIN — ACETAMINOPHEN 650 MG: 325 TABLET ORAL at 21:00

## 2018-01-01 RX ADMIN — METOPROLOL SUCCINATE 50 MG: 50 TABLET, FILM COATED, EXTENDED RELEASE ORAL at 08:12

## 2018-01-01 RX ADMIN — FLUTICASONE PROPIONATE 2 SPRAY: 50 SPRAY, METERED NASAL at 08:13

## 2018-01-01 RX ADMIN — HYDROCODONE BITARTRATE AND ACETAMINOPHEN 1 TABLET: 5; 325 TABLET ORAL at 19:00

## 2018-01-01 RX ADMIN — DICLOFENAC 2 G: 10 GEL TOPICAL at 11:36

## 2018-01-01 RX ADMIN — HYDROCODONE BITARTRATE AND ACETAMINOPHEN 1 TABLET: 5; 325 TABLET ORAL at 03:57

## 2018-01-01 RX ADMIN — ALPRAZOLAM 0.5 MG: 0.5 TABLET ORAL at 07:41

## 2018-01-01 RX ADMIN — TIZANIDINE 2 MG: 4 TABLET ORAL at 22:19

## 2018-01-01 RX ADMIN — ATORVASTATIN CALCIUM 20 MG: 20 TABLET, FILM COATED ORAL at 21:50

## 2018-01-01 RX ADMIN — OXYCODONE AND ACETAMINOPHEN 1 TABLET: 5; 325 TABLET ORAL at 10:52

## 2018-01-01 RX ADMIN — Medication 1 CAPSULE: at 09:13

## 2018-01-01 RX ADMIN — HYDROCODONE BITARTRATE AND ACETAMINOPHEN 1 TABLET: 5; 325 TABLET ORAL at 23:08

## 2018-01-01 RX ADMIN — FLUTICASONE PROPIONATE 2 SPRAY: 50 SPRAY, METERED NASAL at 21:45

## 2018-01-01 RX ADMIN — FERROUS SULFATE TAB 325 MG (65 MG ELEMENTAL FE) 325 MG: 325 (65 FE) TAB at 09:17

## 2018-01-01 RX ADMIN — PANTOPRAZOLE SODIUM 40 MG: 40 TABLET, DELAYED RELEASE ORAL at 19:03

## 2018-01-01 RX ADMIN — LIDOCAINE HYDROCHLORIDE 1 EACH: 20 JELLY TOPICAL at 14:57

## 2018-01-01 RX ADMIN — SODIUM BICARBONATE 1300 MG: 650 TABLET ORAL at 21:11

## 2018-01-01 RX ADMIN — ROPINIROLE HYDROCHLORIDE 4 MG: 1 TABLET, FILM COATED ORAL at 09:10

## 2018-01-01 RX ADMIN — DICLOFENAC 2 G: 10 GEL TOPICAL at 15:24

## 2018-01-01 RX ADMIN — ALPRAZOLAM 0.5 MG: 0.5 TABLET ORAL at 20:51

## 2018-01-01 RX ADMIN — Medication 1 CAPSULE: at 08:26

## 2018-01-01 RX ADMIN — ATORVASTATIN CALCIUM 20 MG: 20 TABLET, FILM COATED ORAL at 20:53

## 2018-01-01 RX ADMIN — DOCUSATE SODIUM 100 MG: 100 CAPSULE, LIQUID FILLED ORAL at 20:53

## 2018-01-01 RX ADMIN — ATORVASTATIN CALCIUM 20 MG: 20 TABLET, FILM COATED ORAL at 20:49

## 2018-01-01 RX ADMIN — ROPINIROLE HYDROCHLORIDE 4 MG: 1 TABLET, FILM COATED ORAL at 08:42

## 2018-01-01 RX ADMIN — ISOSORBIDE MONONITRATE 60 MG: 60 TABLET ORAL at 08:42

## 2018-01-01 RX ADMIN — SODIUM BICARBONATE: 84 INJECTION, SOLUTION INTRAVENOUS at 14:42

## 2018-01-01 RX ADMIN — ROPINIROLE HYDROCHLORIDE 4 MG: 1 TABLET, FILM COATED ORAL at 18:07

## 2018-01-01 RX ADMIN — DICLOFENAC 2 G: 10 GEL TOPICAL at 09:18

## 2018-01-01 RX ADMIN — ATORVASTATIN CALCIUM 20 MG: 20 TABLET, FILM COATED ORAL at 20:18

## 2018-01-01 RX ADMIN — ATORVASTATIN CALCIUM 20 MG: 20 TABLET, FILM COATED ORAL at 21:26

## 2018-01-01 RX ADMIN — DICLOFENAC 2 G: 10 GEL TOPICAL at 22:54

## 2018-01-01 RX ADMIN — PANTOPRAZOLE SODIUM 40 MG: 40 TABLET, DELAYED RELEASE ORAL at 10:30

## 2018-01-01 RX ADMIN — ATORVASTATIN CALCIUM 20 MG: 20 TABLET, FILM COATED ORAL at 22:54

## 2018-01-01 RX ADMIN — ZAFIRLUKAST 20 MG: 20 TABLET, FILM COATED ORAL at 22:50

## 2018-01-01 RX ADMIN — FLUTICASONE PROPIONATE 2 SPRAY: 50 SPRAY, METERED NASAL at 09:18

## 2018-01-01 RX ADMIN — ROPINIROLE HYDROCHLORIDE 4 MG: 1 TABLET, FILM COATED ORAL at 19:00

## 2018-01-01 RX ADMIN — OXYCODONE HYDROCHLORIDE 5 MG: 5 TABLET ORAL at 21:56

## 2018-01-01 RX ADMIN — OXYCODONE HYDROCHLORIDE 5 MG: 5 TABLET ORAL at 11:31

## 2018-01-01 RX ADMIN — FLUTICASONE PROPIONATE 2 SPRAY: 50 SPRAY, METERED NASAL at 21:56

## 2018-01-01 RX ADMIN — OXYCODONE AND ACETAMINOPHEN 1 TABLET: 5; 325 TABLET ORAL at 21:25

## 2018-01-01 RX ADMIN — Medication 10 ML: at 21:14

## 2018-01-01 RX ADMIN — ESCITALOPRAM OXALATE 10 MG: 10 TABLET, FILM COATED ORAL at 09:21

## 2018-01-01 RX ADMIN — FLAVOXATE HYDROCHLORIDE 100 MG: 100 TABLET, FILM COATED ORAL at 21:26

## 2018-01-01 RX ADMIN — DICLOFENAC 2 G: 10 GEL TOPICAL at 14:22

## 2018-01-01 RX ADMIN — ALPRAZOLAM 0.5 MG: 0.5 TABLET ORAL at 21:10

## 2018-01-01 RX ADMIN — HYDROCODONE BITARTRATE AND ACETAMINOPHEN 1 TABLET: 5; 325 TABLET ORAL at 08:23

## 2018-01-01 RX ADMIN — HYDROCODONE BITARTRATE AND ACETAMINOPHEN 1 TABLET: 5; 325 TABLET ORAL at 10:20

## 2018-01-01 RX ADMIN — METOPROLOL SUCCINATE 25 MG: 25 TABLET, FILM COATED, EXTENDED RELEASE ORAL at 08:14

## 2018-01-01 RX ADMIN — PANTOPRAZOLE SODIUM 40 MG: 40 TABLET, DELAYED RELEASE ORAL at 09:58

## 2018-01-01 RX ADMIN — ESCITALOPRAM OXALATE 10 MG: 10 TABLET, FILM COATED ORAL at 08:23

## 2018-01-01 RX ADMIN — FLUTICASONE PROPIONATE 2 SPRAY: 50 SPRAY, METERED NASAL at 09:02

## 2018-01-01 RX ADMIN — HYDROCODONE BITARTRATE AND ACETAMINOPHEN 1 TABLET: 5; 325 TABLET ORAL at 12:38

## 2018-01-01 RX ADMIN — Medication 1 CAPSULE: at 17:30

## 2018-01-01 RX ADMIN — CETIRIZINE HYDROCHLORIDE 10 MG: 10 TABLET ORAL at 07:41

## 2018-01-01 RX ADMIN — TIZANIDINE 2 MG: 4 TABLET ORAL at 22:54

## 2018-01-01 RX ADMIN — ZAFIRLUKAST 20 MG: 20 TABLET, FILM COATED ORAL at 09:28

## 2018-01-01 RX ADMIN — FLUTICASONE PROPIONATE 2 SPRAY: 50 SPRAY, METERED NASAL at 20:11

## 2018-01-01 RX ADMIN — PANTOPRAZOLE SODIUM 40 MG: 40 TABLET, DELAYED RELEASE ORAL at 06:08

## 2018-01-01 RX ADMIN — ATORVASTATIN CALCIUM 20 MG: 20 TABLET, FILM COATED ORAL at 20:55

## 2018-01-01 RX ADMIN — FERROUS SULFATE TAB 325 MG (65 MG ELEMENTAL FE) 325 MG: 325 (65 FE) TAB at 17:38

## 2018-01-01 RX ADMIN — HYDROCODONE BITARTRATE AND ACETAMINOPHEN 1 TABLET: 5; 325 TABLET ORAL at 06:31

## 2018-01-01 RX ADMIN — TRAMADOL HYDROCHLORIDE 50 MG: 50 TABLET, FILM COATED ORAL at 04:37

## 2018-01-01 RX ADMIN — ROCURONIUM BROMIDE 30 MG: 10 INJECTION, SOLUTION INTRAVENOUS at 14:21

## 2018-01-01 RX ADMIN — ROPINIROLE HYDROCHLORIDE 4 MG: 1 TABLET, FILM COATED ORAL at 09:38

## 2018-01-01 RX ADMIN — FLUTICASONE PROPIONATE 2 SPRAY: 50 SPRAY, METERED NASAL at 09:24

## 2018-01-01 RX ADMIN — ALPRAZOLAM 0.5 MG: 0.5 TABLET ORAL at 10:49

## 2018-01-01 RX ADMIN — ATORVASTATIN CALCIUM 20 MG: 20 TABLET, FILM COATED ORAL at 21:45

## 2018-01-01 RX ADMIN — ISOSORBIDE MONONITRATE 60 MG: 60 TABLET ORAL at 08:23

## 2018-01-01 RX ADMIN — LORAZEPAM 2 MG: 2 TABLET ORAL at 21:25

## 2018-01-01 RX ADMIN — LORAZEPAM 1 MG: 1 TABLET ORAL at 10:02

## 2018-01-01 RX ADMIN — DICLOFENAC 2 G: 10 GEL TOPICAL at 23:51

## 2018-01-01 RX ADMIN — SODIUM BICARBONATE 650 MG: 650 TABLET ORAL at 10:09

## 2018-01-01 RX ADMIN — ROPINIROLE HYDROCHLORIDE 4 MG: 1 TABLET, FILM COATED ORAL at 18:17

## 2018-01-01 RX ADMIN — FLUTICASONE PROPIONATE 2 SPRAY: 50 SPRAY, METERED NASAL at 10:06

## 2018-01-01 RX ADMIN — PANTOPRAZOLE SODIUM 40 MG: 40 TABLET, DELAYED RELEASE ORAL at 05:41

## 2018-01-01 RX ADMIN — PANTOPRAZOLE SODIUM 40 MG: 40 TABLET, DELAYED RELEASE ORAL at 17:50

## 2018-01-01 RX ADMIN — Medication 1 CAPSULE: at 18:07

## 2018-01-01 RX ADMIN — ONDANSETRON 4 MG: 2 INJECTION INTRAMUSCULAR; INTRAVENOUS at 06:53

## 2018-01-01 RX ADMIN — PANTOPRAZOLE SODIUM 40 MG: 40 TABLET, DELAYED RELEASE ORAL at 06:07

## 2018-01-01 RX ADMIN — HYDROCODONE BITARTRATE AND ACETAMINOPHEN 1 TABLET: 5; 325 TABLET ORAL at 11:53

## 2018-01-01 RX ADMIN — CETIRIZINE HYDROCHLORIDE 5 MG: 10 TABLET ORAL at 09:26

## 2018-01-01 RX ADMIN — HYDROCODONE BITARTRATE AND ACETAMINOPHEN 1 TABLET: 5; 325 TABLET ORAL at 11:20

## 2018-01-01 RX ADMIN — Medication 10 ML: at 10:34

## 2018-01-01 RX ADMIN — FERROUS SULFATE TAB 325 MG (65 MG ELEMENTAL FE) 325 MG: 325 (65 FE) TAB at 10:05

## 2018-01-01 RX ADMIN — FLUTICASONE PROPIONATE 2 SPRAY: 50 SPRAY, METERED NASAL at 08:45

## 2018-01-01 RX ADMIN — CETIRIZINE HYDROCHLORIDE 10 MG: 10 TABLET, FILM COATED ORAL at 10:11

## 2018-01-01 RX ADMIN — ATORVASTATIN CALCIUM 20 MG: 20 TABLET, FILM COATED ORAL at 20:31

## 2018-01-01 RX ADMIN — FERROUS SULFATE TAB 325 MG (65 MG ELEMENTAL FE) 325 MG: 325 (65 FE) TAB at 09:38

## 2018-01-01 RX ADMIN — FLUTICASONE PROPIONATE 2 SPRAY: 50 SPRAY, METERED NASAL at 20:23

## 2018-01-01 RX ADMIN — Medication 10 ML: at 09:19

## 2018-01-01 RX ADMIN — PANTOPRAZOLE SODIUM 40 MG: 40 TABLET, DELAYED RELEASE ORAL at 18:21

## 2018-01-01 RX ADMIN — ENOXAPARIN SODIUM 30 MG: 30 INJECTION SUBCUTANEOUS at 12:36

## 2018-01-01 RX ADMIN — CETIRIZINE HYDROCHLORIDE 10 MG: 10 TABLET ORAL at 09:21

## 2018-01-01 RX ADMIN — FERROUS SULFATE TAB 325 MG (65 MG ELEMENTAL FE) 325 MG: 325 (65 FE) TAB at 17:42

## 2018-01-01 RX ADMIN — ESCITALOPRAM OXALATE 10 MG: 10 TABLET, FILM COATED ORAL at 10:28

## 2018-01-01 RX ADMIN — FLUTICASONE PROPIONATE 2 SPRAY: 50 SPRAY, METERED NASAL at 10:58

## 2018-01-01 RX ADMIN — Medication 10 ML: at 08:34

## 2018-01-01 RX ADMIN — ISOSORBIDE MONONITRATE 60 MG: 60 TABLET ORAL at 10:13

## 2018-01-01 RX ADMIN — FLUTICASONE PROPIONATE 2 SPRAY: 50 SPRAY, METERED NASAL at 09:20

## 2018-01-01 RX ADMIN — FLUTICASONE PROPIONATE 2 SPRAY: 50 SPRAY, METERED NASAL at 20:51

## 2018-01-01 RX ADMIN — ALPRAZOLAM 0.5 MG: 0.5 TABLET ORAL at 22:13

## 2018-01-01 RX ADMIN — HYDROCODONE BITARTRATE AND ACETAMINOPHEN 1 TABLET: 5; 325 TABLET ORAL at 14:21

## 2018-01-01 RX ADMIN — ALPRAZOLAM 0.5 MG: 0.5 TABLET ORAL at 20:16

## 2018-01-01 RX ADMIN — ALPRAZOLAM 0.5 MG: 0.5 TABLET ORAL at 08:29

## 2018-01-01 RX ADMIN — ISOSORBIDE MONONITRATE 120 MG: 60 TABLET ORAL at 09:23

## 2018-01-01 RX ADMIN — HYDROCODONE BITARTRATE AND ACETAMINOPHEN 1 TABLET: 5; 325 TABLET ORAL at 18:09

## 2018-01-01 RX ADMIN — SODIUM CHLORIDE: 9 INJECTION, SOLUTION INTRAVENOUS at 18:15

## 2018-01-01 RX ADMIN — PANTOPRAZOLE SODIUM 40 MG: 40 TABLET, DELAYED RELEASE ORAL at 16:27

## 2018-01-01 RX ADMIN — ESCITALOPRAM OXALATE 10 MG: 10 TABLET, FILM COATED ORAL at 09:23

## 2018-01-01 RX ADMIN — METOPROLOL SUCCINATE 50 MG: 50 TABLET, FILM COATED, EXTENDED RELEASE ORAL at 08:37

## 2018-01-01 RX ADMIN — SODIUM BICARBONATE 1300 MG: 650 TABLET ORAL at 08:23

## 2018-01-01 RX ADMIN — ENOXAPARIN SODIUM 30 MG: 30 INJECTION SUBCUTANEOUS at 09:20

## 2018-01-01 RX ADMIN — FERROUS SULFATE TAB 325 MG (65 MG ELEMENTAL FE) 325 MG: 325 (65 FE) TAB at 18:03

## 2018-01-01 RX ADMIN — FERROUS SULFATE TAB 325 MG (65 MG ELEMENTAL FE) 325 MG: 325 (65 FE) TAB at 17:02

## 2018-01-01 RX ADMIN — PANTOPRAZOLE SODIUM 40 MG: 40 TABLET, DELAYED RELEASE ORAL at 17:29

## 2018-01-01 RX ADMIN — ESCITALOPRAM OXALATE 10 MG: 10 TABLET, FILM COATED ORAL at 08:53

## 2018-01-01 RX ADMIN — DICLOFENAC 2 G: 10 GEL TOPICAL at 22:13

## 2018-01-01 RX ADMIN — OXYCODONE HYDROCHLORIDE 5 MG: 5 TABLET ORAL at 11:36

## 2018-01-01 RX ADMIN — CETIRIZINE HYDROCHLORIDE 10 MG: 10 TABLET ORAL at 09:24

## 2018-01-01 RX ADMIN — METOPROLOL SUCCINATE 50 MG: 50 TABLET, FILM COATED, EXTENDED RELEASE ORAL at 12:56

## 2018-01-01 RX ADMIN — FERROUS SULFATE TAB 325 MG (65 MG ELEMENTAL FE) 325 MG: 325 (65 FE) TAB at 17:40

## 2018-01-01 RX ADMIN — DICLOFENAC 2 G: 10 GEL TOPICAL at 10:51

## 2018-01-01 RX ADMIN — ISOSORBIDE MONONITRATE 60 MG: 60 TABLET ORAL at 09:02

## 2018-01-01 RX ADMIN — ALPRAZOLAM 0.5 MG: 0.5 TABLET ORAL at 09:58

## 2018-01-01 RX ADMIN — TRAMADOL HYDROCHLORIDE 50 MG: 50 TABLET, FILM COATED ORAL at 22:51

## 2018-01-01 RX ADMIN — TC 99M MEDRONATE 27.3 MILLICURIE: 20 INJECTION, POWDER, LYOPHILIZED, FOR SOLUTION INTRAVENOUS at 08:15

## 2018-01-01 RX ADMIN — DICLOFENAC 2 G: 10 GEL TOPICAL at 12:26

## 2018-01-01 RX ADMIN — METOPROLOL SUCCINATE 25 MG: 25 TABLET, FILM COATED, EXTENDED RELEASE ORAL at 10:58

## 2018-01-01 RX ADMIN — HYDROCODONE BITARTRATE AND ACETAMINOPHEN 1 TABLET: 5; 325 TABLET ORAL at 23:53

## 2018-01-01 RX ADMIN — METOPROLOL SUCCINATE 50 MG: 50 TABLET, FILM COATED, EXTENDED RELEASE ORAL at 09:23

## 2018-01-01 RX ADMIN — PANTOPRAZOLE SODIUM 40 MG: 40 TABLET, DELAYED RELEASE ORAL at 05:22

## 2018-01-01 RX ADMIN — TRAMADOL HYDROCHLORIDE 50 MG: 50 TABLET, FILM COATED ORAL at 22:21

## 2018-01-01 RX ADMIN — ALPRAZOLAM 0.5 MG: 0.5 TABLET ORAL at 08:41

## 2018-01-01 RX ADMIN — FENTANYL CITRATE 50 MCG: 50 INJECTION, SOLUTION INTRAMUSCULAR; INTRAVENOUS at 15:15

## 2018-01-01 RX ADMIN — HYDROCODONE BITARTRATE AND ACETAMINOPHEN 1 TABLET: 5; 325 TABLET ORAL at 05:29

## 2018-01-01 RX ADMIN — FLUTICASONE PROPIONATE 2 SPRAY: 50 SPRAY, METERED NASAL at 08:37

## 2018-01-01 RX ADMIN — ROPINIROLE HYDROCHLORIDE 4 MG: 1 TABLET, FILM COATED ORAL at 08:29

## 2018-01-01 RX ADMIN — LORAZEPAM 1 MG: 1 TABLET ORAL at 09:26

## 2018-01-01 RX ADMIN — SODIUM CHLORIDE 250 ML: 9 INJECTION, SOLUTION INTRAVENOUS at 09:08

## 2018-01-01 RX ADMIN — OXYCODONE HYDROCHLORIDE 5 MG: 5 TABLET ORAL at 05:20

## 2018-01-01 RX ADMIN — ENOXAPARIN SODIUM 30 MG: 30 INJECTION SUBCUTANEOUS at 08:46

## 2018-01-01 RX ADMIN — ROPINIROLE HYDROCHLORIDE 4 MG: 1 TABLET, FILM COATED ORAL at 16:57

## 2018-01-01 RX ADMIN — FERROUS SULFATE TAB 325 MG (65 MG ELEMENTAL FE) 325 MG: 325 (65 FE) TAB at 18:54

## 2018-01-01 RX ADMIN — ESCITALOPRAM OXALATE 10 MG: 10 TABLET, FILM COATED ORAL at 09:10

## 2018-01-01 RX ADMIN — PANTOPRAZOLE SODIUM 40 MG: 40 TABLET, DELAYED RELEASE ORAL at 10:10

## 2018-01-01 RX ADMIN — ZAFIRLUKAST 20 MG: 20 TABLET, FILM COATED ORAL at 10:06

## 2018-01-01 RX ADMIN — ISOSORBIDE MONONITRATE 60 MG: 60 TABLET ORAL at 08:33

## 2018-01-01 RX ADMIN — ZAFIRLUKAST 20 MG: 20 TABLET, FILM COATED ORAL at 22:22

## 2018-01-01 RX ADMIN — FERROUS SULFATE TAB 325 MG (65 MG ELEMENTAL FE) 325 MG: 325 (65 FE) TAB at 17:30

## 2018-01-01 RX ADMIN — ROPINIROLE HYDROCHLORIDE 4 MG: 1 TABLET, FILM COATED ORAL at 07:40

## 2018-01-01 RX ADMIN — CETIRIZINE HYDROCHLORIDE 10 MG: 10 TABLET, FILM COATED ORAL at 09:12

## 2018-01-01 RX ADMIN — PANTOPRAZOLE SODIUM 40 MG: 40 TABLET, DELAYED RELEASE ORAL at 06:52

## 2018-01-01 RX ADMIN — FLAVOXATE HYDROCHLORIDE 100 MG: 100 TABLET, FILM COATED ORAL at 15:05

## 2018-01-01 RX ADMIN — ATORVASTATIN CALCIUM 20 MG: 20 TABLET, FILM COATED ORAL at 21:54

## 2018-01-01 RX ADMIN — AMOXICILLIN 500 MG: 500 CAPSULE ORAL at 21:54

## 2018-01-01 RX ADMIN — CEFTRIAXONE SODIUM 1 G: 1 INJECTION, POWDER, FOR SOLUTION INTRAMUSCULAR; INTRAVENOUS at 14:05

## 2018-01-01 RX ADMIN — OXYCODONE AND ACETAMINOPHEN 1 TABLET: 5; 325 TABLET ORAL at 04:48

## 2018-01-01 RX ADMIN — MEGESTROL ACETATE 40 MG: 40 TABLET ORAL at 09:11

## 2018-01-01 RX ADMIN — ACETAMINOPHEN 650 MG: 325 TABLET ORAL at 04:40

## 2018-01-01 RX ADMIN — OFLOXACIN 2 DROP: 3 SOLUTION OPHTHALMIC at 20:51

## 2018-01-01 RX ADMIN — ROPINIROLE HYDROCHLORIDE 4 MG: 1 TABLET, FILM COATED ORAL at 08:38

## 2018-01-01 RX ADMIN — METOPROLOL SUCCINATE 50 MG: 50 TABLET, FILM COATED, EXTENDED RELEASE ORAL at 08:24

## 2018-01-01 RX ADMIN — POLYETHYLENE GLYCOL (3350) 17 G: 17 POWDER, FOR SOLUTION ORAL at 09:02

## 2018-01-01 RX ADMIN — ROPINIROLE HYDROCHLORIDE 4 MG: 1 TABLET, FILM COATED ORAL at 09:00

## 2018-01-01 RX ADMIN — TRAMADOL HYDROCHLORIDE 50 MG: 50 TABLET, FILM COATED ORAL at 17:40

## 2018-01-01 RX ADMIN — METOPROLOL SUCCINATE 25 MG: 25 TABLET, FILM COATED, EXTENDED RELEASE ORAL at 09:26

## 2018-01-01 RX ADMIN — FLUTICASONE PROPIONATE 2 SPRAY: 50 SPRAY, METERED NASAL at 21:17

## 2018-01-01 RX ADMIN — DEXAMETHASONE SODIUM PHOSPHATE 10 MG: 4 INJECTION, SOLUTION INTRAMUSCULAR; INTRAVENOUS at 15:16

## 2018-01-01 RX ADMIN — TIZANIDINE 2 MG: 4 TABLET ORAL at 21:58

## 2018-01-01 RX ADMIN — OXYCODONE HYDROCHLORIDE 5 MG: 5 TABLET ORAL at 04:21

## 2018-01-01 RX ADMIN — FERROUS SULFATE TAB 325 MG (65 MG ELEMENTAL FE) 325 MG: 325 (65 FE) TAB at 09:02

## 2018-01-01 RX ADMIN — Medication 0.8 MG: at 14:53

## 2018-01-01 RX ADMIN — HYDROCODONE BITARTRATE AND ACETAMINOPHEN 1 TABLET: 5; 325 TABLET ORAL at 16:23

## 2018-01-01 RX ADMIN — LORAZEPAM 1 MG: 1 TABLET ORAL at 08:37

## 2018-01-01 RX ADMIN — LORAZEPAM 1 MG: 1 TABLET ORAL at 09:28

## 2018-01-01 RX ADMIN — LORAZEPAM 1 MG: 1 TABLET ORAL at 14:08

## 2018-01-01 RX ADMIN — ROPINIROLE HYDROCHLORIDE 4 MG: 1 TABLET, FILM COATED ORAL at 22:56

## 2018-01-01 RX ADMIN — ESCITALOPRAM OXALATE 10 MG: 10 TABLET, FILM COATED ORAL at 09:19

## 2018-01-01 RX ADMIN — ISOSORBIDE MONONITRATE 60 MG: 60 TABLET ORAL at 10:28

## 2018-01-01 RX ADMIN — ISOSORBIDE MONONITRATE 120 MG: 60 TABLET ORAL at 08:51

## 2018-01-01 RX ADMIN — LINEZOLID 600 MG: 600 INJECTION, SOLUTION INTRAVENOUS at 14:09

## 2018-01-01 RX ADMIN — HYDROCODONE BITARTRATE AND ACETAMINOPHEN 1 TABLET: 5; 325 TABLET ORAL at 22:45

## 2018-01-01 RX ADMIN — OFLOXACIN 1 DROP: 3 SOLUTION OPHTHALMIC at 20:08

## 2018-01-01 RX ADMIN — ATORVASTATIN CALCIUM 20 MG: 20 TABLET, FILM COATED ORAL at 20:19

## 2018-01-01 RX ADMIN — PANTOPRAZOLE SODIUM 40 MG: 40 TABLET, DELAYED RELEASE ORAL at 05:16

## 2018-01-01 RX ADMIN — ROPINIROLE HYDROCHLORIDE 4 MG: 1 TABLET, FILM COATED ORAL at 09:13

## 2018-01-01 RX ADMIN — ISOSORBIDE MONONITRATE 60 MG: 60 TABLET ORAL at 09:19

## 2018-01-01 RX ADMIN — ALPRAZOLAM 0.5 MG: 0.5 TABLET ORAL at 12:00

## 2018-01-01 RX ADMIN — FERROUS SULFATE TAB 325 MG (65 MG ELEMENTAL FE) 325 MG: 325 (65 FE) TAB at 09:32

## 2018-01-01 RX ADMIN — PANTOPRAZOLE SODIUM 40 MG: 40 TABLET, DELAYED RELEASE ORAL at 05:39

## 2018-01-01 RX ADMIN — DICLOFENAC 2 G: 10 GEL TOPICAL at 09:19

## 2018-01-01 RX ADMIN — ISOSORBIDE MONONITRATE 60 MG: 60 TABLET ORAL at 09:23

## 2018-01-01 RX ADMIN — ISOSORBIDE MONONITRATE 60 MG: 60 TABLET ORAL at 08:32

## 2018-01-01 RX ADMIN — OXYCODONE AND ACETAMINOPHEN 1 TABLET: 5; 325 TABLET ORAL at 14:57

## 2018-01-01 RX ADMIN — DICLOFENAC 2 G: 10 GEL TOPICAL at 02:08

## 2018-01-01 RX ADMIN — PANTOPRAZOLE SODIUM 40 MG: 40 TABLET, DELAYED RELEASE ORAL at 06:03

## 2018-01-01 RX ADMIN — Medication 250 MG: at 09:33

## 2018-01-01 RX ADMIN — ENOXAPARIN SODIUM 30 MG: 30 INJECTION SUBCUTANEOUS at 09:26

## 2018-01-01 RX ADMIN — ZAFIRLUKAST 20 MG: 20 TABLET, FILM COATED ORAL at 11:00

## 2018-01-01 RX ADMIN — CETIRIZINE HYDROCHLORIDE 10 MG: 10 TABLET ORAL at 09:31

## 2018-01-01 RX ADMIN — VANCOMYCIN HYDROCHLORIDE 1000 MG: 1 INJECTION, POWDER, LYOPHILIZED, FOR SOLUTION INTRAVENOUS at 22:00

## 2018-01-01 RX ADMIN — FLUTICASONE PROPIONATE 2 SPRAY: 50 SPRAY, METERED NASAL at 22:08

## 2018-01-01 RX ADMIN — PANTOPRAZOLE SODIUM 40 MG: 40 TABLET, DELAYED RELEASE ORAL at 06:16

## 2018-01-01 RX ADMIN — ROPINIROLE HYDROCHLORIDE 4 MG: 1 TABLET, FILM COATED ORAL at 08:51

## 2018-01-01 RX ADMIN — ATORVASTATIN CALCIUM 20 MG: 20 TABLET, FILM COATED ORAL at 09:59

## 2018-01-01 RX ADMIN — ZAFIRLUKAST 20 MG: 20 TABLET, FILM COATED ORAL at 21:50

## 2018-01-01 RX ADMIN — HYDROCODONE BITARTRATE AND ACETAMINOPHEN 2 TABLET: 5; 325 TABLET ORAL at 12:42

## 2018-01-01 RX ADMIN — ALPRAZOLAM 0.5 MG: 0.5 TABLET ORAL at 10:29

## 2018-01-01 RX ADMIN — METOPROLOL SUCCINATE 50 MG: 50 TABLET, FILM COATED, EXTENDED RELEASE ORAL at 07:43

## 2018-01-01 RX ADMIN — TRAMADOL HYDROCHLORIDE 50 MG: 50 TABLET, FILM COATED ORAL at 17:29

## 2018-01-01 RX ADMIN — PANTOPRAZOLE SODIUM 40 MG: 40 TABLET, DELAYED RELEASE ORAL at 09:18

## 2018-01-01 RX ADMIN — PANTOPRAZOLE SODIUM 40 MG: 40 TABLET, DELAYED RELEASE ORAL at 18:18

## 2018-01-01 RX ADMIN — DICLOFENAC 2 G: 10 GEL TOPICAL at 10:30

## 2018-01-01 RX ADMIN — PANTOPRAZOLE SODIUM 40 MG: 40 TABLET, DELAYED RELEASE ORAL at 16:06

## 2018-01-01 RX ADMIN — OFLOXACIN 2 DROP: 3 SOLUTION OPHTHALMIC at 20:17

## 2018-01-01 RX ADMIN — LORAZEPAM 1 MG: 1 TABLET ORAL at 22:43

## 2018-01-01 RX ADMIN — ZAFIRLUKAST 20 MG: 20 TABLET, FILM COATED ORAL at 20:09

## 2018-01-01 RX ADMIN — FLUTICASONE PROPIONATE 2 SPRAY: 50 SPRAY, METERED NASAL at 08:43

## 2018-01-01 RX ADMIN — FLUTICASONE PROPIONATE 2 SPRAY: 50 SPRAY, METERED NASAL at 09:10

## 2018-01-01 RX ADMIN — Medication 10 ML: at 22:00

## 2018-01-01 RX ADMIN — ATORVASTATIN CALCIUM 20 MG: 20 TABLET, FILM COATED ORAL at 21:43

## 2018-01-01 RX ADMIN — OXYBUTYNIN CHLORIDE 5 MG: 5 TABLET ORAL at 22:22

## 2018-01-01 RX ADMIN — FERROUS SULFATE TAB 325 MG (65 MG ELEMENTAL FE) 325 MG: 325 (65 FE) TAB at 07:43

## 2018-01-01 RX ADMIN — HYDRALAZINE HYDROCHLORIDE 25 MG: 25 TABLET, FILM COATED ORAL at 13:29

## 2018-01-01 RX ADMIN — ZAFIRLUKAST 20 MG: 20 TABLET, FILM COATED ORAL at 21:18

## 2018-01-01 RX ADMIN — ROPINIROLE HYDROCHLORIDE 4 MG: 1 TABLET, FILM COATED ORAL at 09:21

## 2018-01-01 RX ADMIN — CETIRIZINE HYDROCHLORIDE 10 MG: 10 TABLET ORAL at 08:41

## 2018-01-01 RX ADMIN — PANTOPRAZOLE SODIUM 40 MG: 40 TABLET, DELAYED RELEASE ORAL at 16:23

## 2018-01-01 RX ADMIN — CEFTRIAXONE SODIUM 1 G: 1 INJECTION, POWDER, FOR SOLUTION INTRAMUSCULAR; INTRAVENOUS at 10:33

## 2018-01-01 RX ADMIN — ISOSORBIDE MONONITRATE 60 MG: 60 TABLET ORAL at 09:21

## 2018-01-01 RX ADMIN — METOPROLOL SUCCINATE 50 MG: 50 TABLET, FILM COATED, EXTENDED RELEASE ORAL at 09:26

## 2018-01-01 RX ADMIN — ESCITALOPRAM OXALATE 10 MG: 10 TABLET, FILM COATED ORAL at 09:14

## 2018-01-01 RX ADMIN — LORAZEPAM 1 MG: 1 TABLET ORAL at 08:48

## 2018-01-01 RX ADMIN — ZAFIRLUKAST 10 MG: 20 TABLET, COATED ORAL at 21:14

## 2018-01-01 RX ADMIN — ALPRAZOLAM 0.5 MG: 0.5 TABLET ORAL at 09:16

## 2018-01-01 RX ADMIN — ALPRAZOLAM 0.5 MG: 0.5 TABLET ORAL at 21:59

## 2018-01-01 RX ADMIN — TRAMADOL HYDROCHLORIDE 50 MG: 50 TABLET, FILM COATED ORAL at 18:04

## 2018-01-01 RX ADMIN — PREDNISOLONE ACETATE 1 DROP: 10 SUSPENSION/ DROPS OPHTHALMIC at 21:17

## 2018-01-01 RX ADMIN — ENOXAPARIN SODIUM 30 MG: 30 INJECTION SUBCUTANEOUS at 09:51

## 2018-01-01 RX ADMIN — HYDROCODONE BITARTRATE AND ACETAMINOPHEN 1 TABLET: 5; 325 TABLET ORAL at 03:10

## 2018-01-01 RX ADMIN — FLUTICASONE PROPIONATE 2 SPRAY: 50 SPRAY, METERED NASAL at 21:03

## 2018-01-01 RX ADMIN — ESCITALOPRAM OXALATE 10 MG: 10 TABLET, FILM COATED ORAL at 08:51

## 2018-01-01 RX ADMIN — FERROUS SULFATE TAB 325 MG (65 MG ELEMENTAL FE) 325 MG: 325 (65 FE) TAB at 18:20

## 2018-01-01 RX ADMIN — PANTOPRAZOLE SODIUM 40 MG: 40 TABLET, DELAYED RELEASE ORAL at 18:03

## 2018-01-01 RX ADMIN — CETIRIZINE HYDROCHLORIDE 10 MG: 10 TABLET, FILM COATED ORAL at 08:24

## 2018-01-01 RX ADMIN — AMLODIPINE BESYLATE 5 MG: 5 TABLET ORAL at 10:28

## 2018-01-01 RX ADMIN — ESCITALOPRAM OXALATE 10 MG: 10 TABLET, FILM COATED ORAL at 08:12

## 2018-01-01 RX ADMIN — Medication 10 ML: at 20:24

## 2018-01-01 RX ADMIN — HYDRALAZINE HYDROCHLORIDE 25 MG: 25 TABLET, FILM COATED ORAL at 09:00

## 2018-01-01 RX ADMIN — FERROUS SULFATE TAB 325 MG (65 MG ELEMENTAL FE) 325 MG: 325 (65 FE) TAB at 07:41

## 2018-01-01 RX ADMIN — ESCITALOPRAM OXALATE 10 MG: 10 TABLET, FILM COATED ORAL at 08:42

## 2018-01-01 RX ADMIN — PANTOPRAZOLE SODIUM 40 MG: 40 TABLET, DELAYED RELEASE ORAL at 18:02

## 2018-01-01 RX ADMIN — ZAFIRLUKAST 20 MG: 20 TABLET, FILM COATED ORAL at 09:21

## 2018-01-01 RX ADMIN — HEPARIN SODIUM 5000 UNITS: 5000 INJECTION INTRAVENOUS; SUBCUTANEOUS at 11:05

## 2018-01-01 RX ADMIN — ZAFIRLUKAST 20 MG: 20 TABLET, FILM COATED ORAL at 08:29

## 2018-01-01 RX ADMIN — FENTANYL CITRATE 25 MCG: 50 INJECTION, SOLUTION INTRAMUSCULAR; INTRAVENOUS at 09:26

## 2018-01-01 RX ADMIN — ISOSORBIDE MONONITRATE 120 MG: 60 TABLET ORAL at 18:43

## 2018-01-01 RX ADMIN — LORAZEPAM 1 MG: 1 TABLET ORAL at 20:10

## 2018-01-01 RX ADMIN — AMLODIPINE BESYLATE 5 MG: 5 TABLET ORAL at 09:17

## 2018-01-01 RX ADMIN — FERROUS SULFATE TAB 325 MG (65 MG ELEMENTAL FE) 325 MG: 325 (65 FE) TAB at 11:52

## 2018-01-01 RX ADMIN — FLUTICASONE PROPIONATE 2 SPRAY: 50 SPRAY, METERED NASAL at 08:00

## 2018-01-01 RX ADMIN — OXYCODONE AND ACETAMINOPHEN 1 TABLET: 5; 325 TABLET ORAL at 16:12

## 2018-01-01 RX ADMIN — CETIRIZINE HYDROCHLORIDE 10 MG: 10 TABLET ORAL at 09:17

## 2018-01-01 RX ADMIN — ZAFIRLUKAST 20 MG: 20 TABLET, FILM COATED ORAL at 22:01

## 2018-01-01 RX ADMIN — FERROUS SULFATE TAB 325 MG (65 MG ELEMENTAL FE) 325 MG: 325 (65 FE) TAB at 17:27

## 2018-01-01 RX ADMIN — ROPINIROLE HYDROCHLORIDE 4 MG: 1 TABLET, FILM COATED ORAL at 09:17

## 2018-01-01 RX ADMIN — ISOSORBIDE MONONITRATE 60 MG: 60 TABLET ORAL at 10:59

## 2018-01-01 RX ADMIN — LORAZEPAM 1 MG: 1 TABLET ORAL at 21:14

## 2018-01-01 RX ADMIN — LORAZEPAM 1 MG: 1 TABLET ORAL at 08:15

## 2018-01-01 RX ADMIN — ISOSORBIDE MONONITRATE 60 MG: 60 TABLET ORAL at 12:57

## 2018-01-01 RX ADMIN — SODIUM BICARBONATE 650 MG: 650 TABLET ORAL at 10:12

## 2018-01-01 RX ADMIN — FLUTICASONE PROPIONATE 2 SPRAY: 50 SPRAY, METERED NASAL at 09:23

## 2018-01-01 RX ADMIN — TIZANIDINE 2 MG: 4 TABLET ORAL at 05:22

## 2018-01-01 RX ADMIN — ROPINIROLE HYDROCHLORIDE 4 MG: 1 TABLET, FILM COATED ORAL at 10:14

## 2018-01-01 RX ADMIN — ZAFIRLUKAST 20 MG: 20 TABLET, FILM COATED ORAL at 21:45

## 2018-01-01 RX ADMIN — FLUTICASONE PROPIONATE 2 SPRAY: 50 SPRAY, METERED NASAL at 20:32

## 2018-01-01 RX ADMIN — SODIUM CHLORIDE 1000 ML: 9 INJECTION, SOLUTION INTRAVENOUS at 14:46

## 2018-01-01 RX ADMIN — POLYETHYLENE GLYCOL (3350) 17 G: 17 POWDER, FOR SOLUTION ORAL at 09:16

## 2018-01-01 RX ADMIN — ENOXAPARIN SODIUM 30 MG: 30 INJECTION SUBCUTANEOUS at 09:18

## 2018-01-01 RX ADMIN — ZAFIRLUKAST 20 MG: 20 TABLET, COATED ORAL at 19:01

## 2018-01-01 RX ADMIN — FLAVOXATE HYDROCHLORIDE 100 MG: 100 TABLET, FILM COATED ORAL at 00:17

## 2018-01-01 RX ADMIN — METOPROLOL SUCCINATE 25 MG: 25 TABLET, FILM COATED, EXTENDED RELEASE ORAL at 10:06

## 2018-01-01 RX ADMIN — HYDROCODONE BITARTRATE AND ACETAMINOPHEN 1 TABLET: 5; 325 TABLET ORAL at 00:17

## 2018-01-01 RX ADMIN — SODIUM BICARBONATE 650 MG: 650 TABLET ORAL at 20:10

## 2018-01-01 RX ADMIN — CEFTRIAXONE SODIUM 1 G: 1 INJECTION, POWDER, FOR SOLUTION INTRAMUSCULAR; INTRAVENOUS at 05:18

## 2018-01-01 RX ADMIN — AMOXICILLIN 500 MG: 500 CAPSULE ORAL at 20:55

## 2018-01-01 RX ADMIN — HYDROCODONE BITARTRATE AND ACETAMINOPHEN 1 TABLET: 5; 325 TABLET ORAL at 18:02

## 2018-01-01 RX ADMIN — ALPRAZOLAM 0.5 MG: 0.5 TABLET ORAL at 09:19

## 2018-01-01 RX ADMIN — ALPRAZOLAM 0.5 MG: 0.5 TABLET ORAL at 21:56

## 2018-01-01 RX ADMIN — ENOXAPARIN SODIUM 30 MG: 30 INJECTION SUBCUTANEOUS at 10:06

## 2018-01-01 RX ADMIN — ALPRAZOLAM 0.5 MG: 0.5 TABLET ORAL at 09:20

## 2018-01-01 RX ADMIN — LORAZEPAM 1 MG: 1 TABLET ORAL at 11:31

## 2018-01-01 RX ADMIN — MAGNESIUM HYDROXIDE 30 ML: 400 SUSPENSION ORAL at 05:22

## 2018-01-01 RX ADMIN — PANTOPRAZOLE SODIUM 40 MG: 40 TABLET, DELAYED RELEASE ORAL at 08:50

## 2018-01-01 RX ADMIN — ZAFIRLUKAST 20 MG: 20 TABLET, FILM COATED ORAL at 09:57

## 2018-01-01 RX ADMIN — OXYCODONE HYDROCHLORIDE 5 MG: 5 TABLET ORAL at 20:12

## 2018-01-01 RX ADMIN — TRAMADOL HYDROCHLORIDE 50 MG: 50 TABLET, FILM COATED ORAL at 23:03

## 2018-01-01 RX ADMIN — ROPINIROLE HYDROCHLORIDE 4 MG: 1 TABLET, FILM COATED ORAL at 07:44

## 2018-01-01 RX ADMIN — ZAFIRLUKAST 20 MG: 20 TABLET, FILM COATED ORAL at 08:51

## 2018-01-01 RX ADMIN — ISOSORBIDE MONONITRATE 60 MG: 60 TABLET ORAL at 07:41

## 2018-01-01 RX ADMIN — OXYCODONE AND ACETAMINOPHEN 1 TABLET: 5; 325 TABLET ORAL at 21:58

## 2018-01-01 RX ADMIN — TIZANIDINE 2 MG: 4 TABLET ORAL at 09:56

## 2018-01-01 RX ADMIN — ISOSORBIDE MONONITRATE 60 MG: 60 TABLET ORAL at 09:38

## 2018-01-01 RX ADMIN — ALPRAZOLAM 0.5 MG: 0.5 TABLET ORAL at 21:02

## 2018-01-01 RX ADMIN — FLUTICASONE PROPIONATE 2 SPRAY: 50 SPRAY, METERED NASAL at 09:49

## 2018-01-01 RX ADMIN — HYDROCODONE BITARTRATE AND ACETAMINOPHEN 1 TABLET: 5; 325 TABLET ORAL at 22:28

## 2018-01-01 RX ADMIN — ISOSORBIDE MONONITRATE 60 MG: 60 TABLET ORAL at 08:29

## 2018-01-01 RX ADMIN — TRAMADOL HYDROCHLORIDE 50 MG: 50 TABLET, FILM COATED ORAL at 21:40

## 2018-01-01 RX ADMIN — OXYBUTYNIN CHLORIDE 5 MG: 5 TABLET ORAL at 05:26

## 2018-01-01 RX ADMIN — FERROUS SULFATE TAB 325 MG (65 MG ELEMENTAL FE) 325 MG: 325 (65 FE) TAB at 09:23

## 2018-01-01 RX ADMIN — HYDROCODONE BITARTRATE AND ACETAMINOPHEN 1 TABLET: 5; 325 TABLET ORAL at 22:40

## 2018-01-01 RX ADMIN — FENTANYL CITRATE 50 MCG: 50 INJECTION INTRAMUSCULAR; INTRAVENOUS at 14:42

## 2018-01-01 RX ADMIN — ISOSORBIDE MONONITRATE 60 MG: 60 TABLET ORAL at 08:15

## 2018-01-01 RX ADMIN — FLUTICASONE PROPIONATE 2 SPRAY: 50 SPRAY, METERED NASAL at 08:25

## 2018-01-01 RX ADMIN — ROPINIROLE HYDROCHLORIDE 4 MG: 1 TABLET, FILM COATED ORAL at 17:32

## 2018-01-01 RX ADMIN — AMLODIPINE BESYLATE 5 MG: 5 TABLET ORAL at 08:41

## 2018-01-01 RX ADMIN — ESCITALOPRAM OXALATE 10 MG: 10 TABLET, FILM COATED ORAL at 09:32

## 2018-01-01 RX ADMIN — METOPROLOL SUCCINATE 25 MG: 25 TABLET, FILM COATED, EXTENDED RELEASE ORAL at 10:23

## 2018-01-01 RX ADMIN — OXYBUTYNIN CHLORIDE 5 MG: 5 TABLET ORAL at 19:19

## 2018-01-01 RX ADMIN — OXYCODONE AND ACETAMINOPHEN 1 TABLET: 5; 325 TABLET ORAL at 23:12

## 2018-01-01 RX ADMIN — CETIRIZINE HYDROCHLORIDE 10 MG: 10 TABLET ORAL at 08:20

## 2018-01-01 RX ADMIN — DICLOFENAC 2 G: 10 GEL TOPICAL at 23:07

## 2018-01-01 RX ADMIN — MEROPENEM 500 MG: 500 INJECTION, POWDER, FOR SOLUTION INTRAVENOUS at 23:26

## 2018-01-01 RX ADMIN — LORAZEPAM 1 MG: 1 TABLET ORAL at 11:36

## 2018-01-01 RX ADMIN — FERROUS SULFATE TAB 325 MG (65 MG ELEMENTAL FE) 325 MG: 325 (65 FE) TAB at 18:32

## 2018-01-01 RX ADMIN — METOPROLOL SUCCINATE 50 MG: 50 TABLET, FILM COATED, EXTENDED RELEASE ORAL at 10:12

## 2018-01-01 RX ADMIN — PANTOPRAZOLE SODIUM 40 MG: 40 TABLET, DELAYED RELEASE ORAL at 06:06

## 2018-01-01 RX ADMIN — AMOXICILLIN 500 MG: 500 CAPSULE ORAL at 11:55

## 2018-01-01 RX ADMIN — TRAMADOL HYDROCHLORIDE 50 MG: 50 TABLET, FILM COATED ORAL at 00:14

## 2018-01-01 RX ADMIN — FLUTICASONE PROPIONATE 2 SPRAY: 50 SPRAY, METERED NASAL at 20:27

## 2018-01-01 RX ADMIN — DICLOFENAC 2 G: 10 GEL TOPICAL at 20:24

## 2018-01-01 RX ADMIN — ZAFIRLUKAST 20 MG: 20 TABLET, FILM COATED ORAL at 20:00

## 2018-01-01 RX ADMIN — HYDROCODONE BITARTRATE AND ACETAMINOPHEN 1 TABLET: 5; 325 TABLET ORAL at 09:38

## 2018-01-01 RX ADMIN — FLUTICASONE PROPIONATE 2 SPRAY: 50 SPRAY, METERED NASAL at 08:51

## 2018-01-01 RX ADMIN — LORAZEPAM 2 MG: 2 TABLET ORAL at 21:02

## 2018-01-01 RX ADMIN — TRAMADOL HYDROCHLORIDE 50 MG: 50 TABLET, FILM COATED ORAL at 06:44

## 2018-01-01 RX ADMIN — ESCITALOPRAM 10 MG: 10 TABLET, FILM COATED ORAL at 09:00

## 2018-01-01 RX ADMIN — PANTOPRAZOLE SODIUM 40 MG: 40 TABLET, DELAYED RELEASE ORAL at 18:08

## 2018-01-01 RX ADMIN — ESCITALOPRAM OXALATE 10 MG: 10 TABLET, FILM COATED ORAL at 09:20

## 2018-01-01 RX ADMIN — ALPRAZOLAM 0.5 MG: 0.5 TABLET ORAL at 08:42

## 2018-01-01 RX ADMIN — METOPROLOL SUCCINATE 25 MG: 25 TABLET, FILM COATED, EXTENDED RELEASE ORAL at 10:13

## 2018-01-01 RX ADMIN — TRAMADOL HYDROCHLORIDE 50 MG: 50 TABLET, FILM COATED ORAL at 17:50

## 2018-01-01 RX ADMIN — Medication 125 MG: at 17:31

## 2018-01-01 RX ADMIN — LORAZEPAM 1 MG: 1 TABLET ORAL at 22:56

## 2018-01-01 RX ADMIN — DICLOFENAC 2 G: 10 GEL TOPICAL at 09:50

## 2018-01-01 RX ADMIN — PANTOPRAZOLE SODIUM 40 MG: 40 TABLET, DELAYED RELEASE ORAL at 17:28

## 2018-01-01 RX ADMIN — Medication 125 MG: at 07:44

## 2018-01-01 RX ADMIN — ROPINIROLE HYDROCHLORIDE 4 MG: 1 TABLET, FILM COATED ORAL at 18:32

## 2018-01-01 RX ADMIN — LIDOCAINE HYDROCHLORIDE 1 DROP: 20 JELLY TOPICAL at 21:18

## 2018-01-01 RX ADMIN — FLUTICASONE PROPIONATE 2 SPRAY: 50 SPRAY, METERED NASAL at 10:12

## 2018-01-01 RX ADMIN — HYDROCODONE BITARTRATE AND ACETAMINOPHEN 1 TABLET: 5; 325 TABLET ORAL at 05:32

## 2018-01-01 RX ADMIN — FERROUS SULFATE TAB 325 MG (65 MG ELEMENTAL FE) 325 MG: 325 (65 FE) TAB at 08:50

## 2018-01-01 RX ADMIN — ENOXAPARIN SODIUM 30 MG: 30 INJECTION SUBCUTANEOUS at 09:11

## 2018-01-01 RX ADMIN — HYDROCODONE BITARTRATE AND ACETAMINOPHEN 2 TABLET: 5; 325 TABLET ORAL at 10:40

## 2018-01-01 RX ADMIN — CETIRIZINE HYDROCHLORIDE 10 MG: 10 TABLET, FILM COATED ORAL at 09:38

## 2018-01-01 RX ADMIN — HYDROCODONE BITARTRATE AND ACETAMINOPHEN 1 TABLET: 5; 325 TABLET ORAL at 17:32

## 2018-01-01 RX ADMIN — ISOSORBIDE MONONITRATE 60 MG: 60 TABLET ORAL at 10:22

## 2018-01-01 RX ADMIN — ALPRAZOLAM 0.5 MG: 0.5 TABLET ORAL at 20:27

## 2018-01-01 RX ADMIN — LINEZOLID 600 MG: 600 INJECTION, SOLUTION INTRAVENOUS at 00:41

## 2018-01-01 RX ADMIN — FERROUS SULFATE TAB 325 MG (65 MG ELEMENTAL FE) 325 MG: 325 (65 FE) TAB at 09:26

## 2018-01-01 RX ADMIN — HYDROCODONE BITARTRATE AND ACETAMINOPHEN 1 TABLET: 5; 325 TABLET ORAL at 13:04

## 2018-01-01 RX ADMIN — OXYBUTYNIN CHLORIDE 5 MG: 5 TABLET ORAL at 08:14

## 2018-01-01 RX ADMIN — METOPROLOL SUCCINATE 25 MG: 25 TABLET, FILM COATED, EXTENDED RELEASE ORAL at 09:02

## 2018-01-01 RX ADMIN — Medication 30 G: at 22:40

## 2018-01-01 RX ADMIN — ZAFIRLUKAST 20 MG: 20 TABLET, FILM COATED ORAL at 21:35

## 2018-01-01 RX ADMIN — DEXTROSE MONOHYDRATE 25 G: 500 INJECTION PARENTERAL at 15:38

## 2018-01-01 RX ADMIN — PANTOPRAZOLE SODIUM 40 MG: 40 TABLET, DELAYED RELEASE ORAL at 06:35

## 2018-01-01 RX ADMIN — ATORVASTATIN CALCIUM 20 MG: 20 TABLET, FILM COATED ORAL at 20:20

## 2018-01-01 RX ADMIN — FERROUS SULFATE TAB 325 MG (65 MG ELEMENTAL FE) 325 MG: 325 (65 FE) TAB at 18:43

## 2018-01-01 RX ADMIN — OFLOXACIN 1 DROP: 3 SOLUTION OPHTHALMIC at 08:51

## 2018-01-01 RX ADMIN — HYDROCODONE BITARTRATE AND ACETAMINOPHEN 1 TABLET: 5; 325 TABLET ORAL at 22:42

## 2018-01-01 RX ADMIN — OXYCODONE AND ACETAMINOPHEN 1 TABLET: 5; 325 TABLET ORAL at 19:03

## 2018-01-01 RX ADMIN — PANTOPRAZOLE SODIUM 40 MG: 40 TABLET, DELAYED RELEASE ORAL at 05:02

## 2018-01-01 RX ADMIN — TRAMADOL HYDROCHLORIDE 50 MG: 50 TABLET, FILM COATED ORAL at 10:45

## 2018-01-01 RX ADMIN — SODIUM CHLORIDE: 9 INJECTION, SOLUTION INTRAVENOUS at 14:18

## 2018-01-01 RX ADMIN — FERROUS SULFATE TAB 325 MG (65 MG ELEMENTAL FE) 325 MG: 325 (65 FE) TAB at 09:14

## 2018-01-01 RX ADMIN — PANTOPRAZOLE SODIUM 40 MG: 40 TABLET, DELAYED RELEASE ORAL at 14:47

## 2018-01-01 RX ADMIN — TRAMADOL HYDROCHLORIDE 50 MG: 50 TABLET, FILM COATED ORAL at 17:27

## 2018-01-01 RX ADMIN — ALPRAZOLAM 0.5 MG: 0.5 TABLET ORAL at 20:52

## 2018-01-01 RX ADMIN — DOCUSATE SODIUM 100 MG: 100 CAPSULE, LIQUID FILLED ORAL at 08:50

## 2018-01-01 RX ADMIN — FLAVOXATE HYDROCHLORIDE 100 MG: 100 TABLET, FILM COATED ORAL at 20:57

## 2018-01-01 RX ADMIN — METOPROLOL SUCCINATE 25 MG: 25 TABLET, FILM COATED, EXTENDED RELEASE ORAL at 09:18

## 2018-01-01 RX ADMIN — FERROUS SULFATE TAB 325 MG (65 MG ELEMENTAL FE) 325 MG: 325 (65 FE) TAB at 09:50

## 2018-01-01 RX ADMIN — TRAMADOL HYDROCHLORIDE 50 MG: 50 TABLET, FILM COATED ORAL at 21:50

## 2018-01-01 RX ADMIN — OXYCODONE AND ACETAMINOPHEN 1 TABLET: 5; 325 TABLET ORAL at 10:26

## 2018-01-01 RX ADMIN — OXYCODONE AND ACETAMINOPHEN 1 TABLET: 5; 325 TABLET ORAL at 03:51

## 2018-01-01 RX ADMIN — FLUTICASONE PROPIONATE 2 SPRAY: 50 SPRAY, METERED NASAL at 20:45

## 2018-01-01 RX ADMIN — ROPINIROLE HYDROCHLORIDE 4 MG: 1 TABLET, FILM COATED ORAL at 08:19

## 2018-01-01 RX ADMIN — DICLOFENAC 2 G: 10 GEL TOPICAL at 08:37

## 2018-01-01 RX ADMIN — ROPINIROLE HYDROCHLORIDE 4 MG: 1 TABLET, FILM COATED ORAL at 17:41

## 2018-01-01 RX ADMIN — LORAZEPAM 1 MG: 1 TABLET ORAL at 21:50

## 2018-01-01 RX ADMIN — METOPROLOL SUCCINATE 25 MG: 25 TABLET, FILM COATED, EXTENDED RELEASE ORAL at 08:37

## 2018-01-01 RX ADMIN — DEXAMETHASONE SODIUM PHOSPHATE 6 MG: 4 INJECTION, SOLUTION INTRAMUSCULAR; INTRAVENOUS at 14:38

## 2018-01-01 RX ADMIN — HYDROCODONE BITARTRATE AND ACETAMINOPHEN 2 TABLET: 5; 325 TABLET ORAL at 08:20

## 2018-01-01 RX ADMIN — FLUTICASONE PROPIONATE 2 SPRAY: 50 SPRAY, METERED NASAL at 21:39

## 2018-01-01 RX ADMIN — FERROUS SULFATE TAB 325 MG (65 MG ELEMENTAL FE) 325 MG: 325 (65 FE) TAB at 09:18

## 2018-01-01 RX ADMIN — FERROUS SULFATE TAB 325 MG (65 MG ELEMENTAL FE) 325 MG: 325 (65 FE) TAB at 08:51

## 2018-01-01 RX ADMIN — ATORVASTATIN CALCIUM 20 MG: 20 TABLET, FILM COATED ORAL at 21:02

## 2018-01-01 RX ADMIN — FENTANYL CITRATE 25 MCG: 50 INJECTION, SOLUTION INTRAMUSCULAR; INTRAVENOUS at 06:53

## 2018-01-01 RX ADMIN — ONDANSETRON HYDROCHLORIDE 4 MG: 4 INJECTION, SOLUTION INTRAMUSCULAR; INTRAVENOUS at 14:52

## 2018-01-01 RX ADMIN — DICLOFENAC 2 G: 10 GEL TOPICAL at 12:12

## 2018-01-01 RX ADMIN — FLUTICASONE PROPIONATE 2 SPRAY: 50 SPRAY, METERED NASAL at 08:26

## 2018-01-01 RX ADMIN — METOPROLOL SUCCINATE 50 MG: 50 TABLET, FILM COATED, EXTENDED RELEASE ORAL at 09:24

## 2018-01-01 RX ADMIN — Medication 10 ML: at 20:53

## 2018-01-01 RX ADMIN — ROPINIROLE HYDROCHLORIDE 4 MG: 1 TABLET, FILM COATED ORAL at 22:27

## 2018-01-01 RX ADMIN — PANTOPRAZOLE SODIUM 40 MG: 40 TABLET, DELAYED RELEASE ORAL at 15:47

## 2018-01-01 RX ADMIN — METOPROLOL SUCCINATE 25 MG: 25 TABLET, FILM COATED, EXTENDED RELEASE ORAL at 09:19

## 2018-01-01 RX ADMIN — CETIRIZINE HYDROCHLORIDE 10 MG: 10 TABLET ORAL at 09:58

## 2018-01-01 RX ADMIN — FLAVOXATE HYDROCHLORIDE 100 MG: 100 TABLET, FILM COATED ORAL at 02:56

## 2018-01-01 RX ADMIN — FENTANYL CITRATE 50 MCG: 50 INJECTION INTRAMUSCULAR; INTRAVENOUS at 14:21

## 2018-01-01 RX ADMIN — ATORVASTATIN CALCIUM 20 MG: 20 TABLET, FILM COATED ORAL at 22:01

## 2018-01-01 RX ADMIN — FERROUS SULFATE TAB 325 MG (65 MG ELEMENTAL FE) 325 MG: 325 (65 FE) TAB at 17:28

## 2018-01-01 RX ADMIN — FLUTICASONE PROPIONATE 2 SPRAY: 50 SPRAY, METERED NASAL at 09:32

## 2018-01-01 RX ADMIN — ALPRAZOLAM 0.5 MG: 0.5 TABLET ORAL at 08:33

## 2018-01-01 RX ADMIN — ROPINIROLE HYDROCHLORIDE 4 MG: 1 TABLET, FILM COATED ORAL at 09:25

## 2018-01-01 RX ADMIN — ROPINIROLE HYDROCHLORIDE 4 MG: 1 TABLET, FILM COATED ORAL at 09:46

## 2018-01-01 RX ADMIN — FERROUS SULFATE TAB 325 MG (65 MG ELEMENTAL FE) 325 MG: 325 (65 FE) TAB at 08:29

## 2018-01-01 RX ADMIN — Medication 10 ML: at 08:25

## 2018-01-01 RX ADMIN — FLUTICASONE PROPIONATE 2 SPRAY: 50 SPRAY, METERED NASAL at 08:19

## 2018-01-01 RX ADMIN — ZAFIRLUKAST 20 MG: 20 TABLET, FILM COATED ORAL at 21:02

## 2018-01-01 RX ADMIN — HYDROCODONE BITARTRATE AND ACETAMINOPHEN 2 TABLET: 5; 325 TABLET ORAL at 15:36

## 2018-01-01 RX ADMIN — CETIRIZINE HYDROCHLORIDE 10 MG: 10 TABLET ORAL at 08:50

## 2018-01-01 RX ADMIN — AMLODIPINE BESYLATE 5 MG: 5 TABLET ORAL at 08:51

## 2018-01-01 RX ADMIN — PANTOPRAZOLE SODIUM 40 MG: 40 TABLET, DELAYED RELEASE ORAL at 03:48

## 2018-01-01 RX ADMIN — FERROUS SULFATE TAB 325 MG (65 MG ELEMENTAL FE) 325 MG: 325 (65 FE) TAB at 17:49

## 2018-01-01 RX ADMIN — ATORVASTATIN CALCIUM 20 MG: 20 TABLET, FILM COATED ORAL at 08:26

## 2018-01-01 RX ADMIN — HYDROCODONE BITARTRATE AND ACETAMINOPHEN 1 TABLET: 5; 325 TABLET ORAL at 11:03

## 2018-01-01 RX ADMIN — PANTOPRAZOLE SODIUM 40 MG: 40 TABLET, DELAYED RELEASE ORAL at 09:19

## 2018-01-01 RX ADMIN — FLUTICASONE PROPIONATE 2 SPRAY: 50 SPRAY, METERED NASAL at 21:35

## 2018-01-01 RX ADMIN — FERROUS SULFATE TAB 325 MG (65 MG ELEMENTAL FE) 325 MG: 325 (65 FE) TAB at 09:49

## 2018-01-01 RX ADMIN — OXYCODONE AND ACETAMINOPHEN 1 TABLET: 5; 325 TABLET ORAL at 15:09

## 2018-01-01 RX ADMIN — ISOSORBIDE MONONITRATE 60 MG: 60 TABLET ORAL at 08:25

## 2018-01-01 RX ADMIN — DOCUSATE SODIUM 100 MG: 100 CAPSULE, LIQUID FILLED ORAL at 08:23

## 2018-01-01 RX ADMIN — FLAVOXATE HYDROCHLORIDE 100 MG: 100 TABLET, FILM COATED ORAL at 09:49

## 2018-01-01 RX ADMIN — FERROUS SULFATE TAB 325 MG (65 MG ELEMENTAL FE) 325 MG: 325 (65 FE) TAB at 10:23

## 2018-01-01 RX ADMIN — ESCITALOPRAM OXALATE 10 MG: 10 TABLET, FILM COATED ORAL at 09:15

## 2018-01-01 RX ADMIN — FERROUS SULFATE TAB 325 MG (65 MG ELEMENTAL FE) 325 MG: 325 (65 FE) TAB at 17:35

## 2018-01-01 RX ADMIN — ISOSORBIDE MONONITRATE 60 MG: 60 TABLET ORAL at 09:26

## 2018-01-01 RX ADMIN — METOPROLOL SUCCINATE 25 MG: 25 TABLET, FILM COATED, EXTENDED RELEASE ORAL at 10:50

## 2018-01-01 RX ADMIN — METOPROLOL SUCCINATE 25 MG: 25 TABLET, FILM COATED, EXTENDED RELEASE ORAL at 07:41

## 2018-01-01 RX ADMIN — ATORVASTATIN CALCIUM 20 MG: 20 TABLET, FILM COATED ORAL at 20:51

## 2018-01-01 RX ADMIN — Medication 125 MG: at 00:43

## 2018-01-01 RX ADMIN — FERROUS SULFATE TAB 325 MG (65 MG ELEMENTAL FE) 325 MG: 325 (65 FE) TAB at 10:58

## 2018-01-01 RX ADMIN — AMPICILLIN SODIUM 1 G: 1 INJECTION, POWDER, FOR SOLUTION INTRAMUSCULAR; INTRAVENOUS at 09:06

## 2018-01-01 RX ADMIN — LORAZEPAM 2 MG: 2 TABLET ORAL at 21:58

## 2018-01-01 RX ADMIN — ZAFIRLUKAST 10 MG: 20 TABLET, COATED ORAL at 16:24

## 2018-01-01 RX ADMIN — BUMETANIDE 0.5 MG: 1 TABLET ORAL at 09:26

## 2018-01-01 RX ADMIN — PANTOPRAZOLE SODIUM 40 MG: 40 TABLET, DELAYED RELEASE ORAL at 17:45

## 2018-01-01 RX ADMIN — FERROUS SULFATE TAB 325 MG (65 MG ELEMENTAL FE) 325 MG: 325 (65 FE) TAB at 15:47

## 2018-01-01 RX ADMIN — HYDROCODONE BITARTRATE AND ACETAMINOPHEN 2 TABLET: 5; 325 TABLET ORAL at 08:49

## 2018-01-01 RX ADMIN — FLUTICASONE PROPIONATE 2 SPRAY: 50 SPRAY, METERED NASAL at 20:19

## 2018-01-01 RX ADMIN — ALPRAZOLAM 0.5 MG: 0.5 TABLET ORAL at 20:09

## 2018-01-01 RX ADMIN — ENOXAPARIN SODIUM 30 MG: 30 INJECTION SUBCUTANEOUS at 08:43

## 2018-01-01 RX ADMIN — Medication 10 ML: at 20:57

## 2018-01-01 RX ADMIN — ZAFIRLUKAST 20 MG: 20 TABLET, FILM COATED ORAL at 07:42

## 2018-01-01 RX ADMIN — ENOXAPARIN SODIUM 30 MG: 30 INJECTION SUBCUTANEOUS at 08:33

## 2018-01-01 RX ADMIN — FERROUS SULFATE TAB 325 MG (65 MG ELEMENTAL FE) 325 MG: 325 (65 FE) TAB at 17:45

## 2018-01-01 RX ADMIN — SODIUM CHLORIDE: 9 INJECTION, SOLUTION INTRAVENOUS at 20:27

## 2018-01-01 RX ADMIN — ROPINIROLE HYDROCHLORIDE 4 MG: 1 TABLET, FILM COATED ORAL at 17:06

## 2018-01-01 RX ADMIN — FLUTICASONE PROPIONATE 2 SPRAY: 50 SPRAY, METERED NASAL at 09:19

## 2018-01-01 RX ADMIN — FLUTICASONE PROPIONATE 2 SPRAY: 50 SPRAY, METERED NASAL at 20:55

## 2018-01-01 RX ADMIN — CETIRIZINE HYDROCHLORIDE 10 MG: 10 TABLET ORAL at 10:28

## 2018-01-01 RX ADMIN — ZAFIRLUKAST 20 MG: 20 TABLET, FILM COATED ORAL at 09:17

## 2018-01-01 RX ADMIN — TRAMADOL HYDROCHLORIDE 50 MG: 50 TABLET, FILM COATED ORAL at 11:15

## 2018-01-01 RX ADMIN — HYDROCODONE BITARTRATE AND ACETAMINOPHEN 1 TABLET: 5; 325 TABLET ORAL at 21:50

## 2018-01-01 RX ADMIN — FERROUS SULFATE TAB 325 MG (65 MG ELEMENTAL FE) 325 MG: 325 (65 FE) TAB at 18:30

## 2018-01-01 RX ADMIN — METOPROLOL SUCCINATE 50 MG: 50 TABLET, FILM COATED, EXTENDED RELEASE ORAL at 09:13

## 2018-01-01 RX ADMIN — ESCITALOPRAM OXALATE 10 MG: 10 TABLET, FILM COATED ORAL at 10:13

## 2018-01-01 RX ADMIN — ROPINIROLE HYDROCHLORIDE 4 MG: 1 TABLET, FILM COATED ORAL at 09:15

## 2018-01-01 RX ADMIN — BUMETANIDE 0.5 MG: 1 TABLET ORAL at 12:57

## 2018-01-01 RX ADMIN — FERROUS SULFATE TAB 325 MG (65 MG ELEMENTAL FE) 325 MG: 325 (65 FE) TAB at 09:21

## 2018-01-01 RX ADMIN — ESCITALOPRAM OXALATE 10 MG: 10 TABLET, FILM COATED ORAL at 09:26

## 2018-01-01 RX ADMIN — SODIUM BICARBONATE 1300 MG: 650 TABLET ORAL at 12:12

## 2018-01-01 RX ADMIN — ATORVASTATIN CALCIUM 20 MG: 20 TABLET, FILM COATED ORAL at 22:51

## 2018-01-01 RX ADMIN — OXYCODONE AND ACETAMINOPHEN 1 TABLET: 5; 325 TABLET ORAL at 10:09

## 2018-01-01 RX ADMIN — LORAZEPAM 1 MG: 1 TABLET ORAL at 20:20

## 2018-01-01 RX ADMIN — FLAVOXATE HYDROCHLORIDE 100 MG: 100 TABLET, FILM COATED ORAL at 08:24

## 2018-01-01 RX ADMIN — FERROUS SULFATE TAB 325 MG (65 MG ELEMENTAL FE) 325 MG: 325 (65 FE) TAB at 18:02

## 2018-01-01 RX ADMIN — FLAVOXATE HYDROCHLORIDE 100 MG: 100 TABLET, FILM COATED ORAL at 08:06

## 2018-01-01 RX ADMIN — ESCITALOPRAM 10 MG: 10 TABLET, FILM COATED ORAL at 09:24

## 2018-01-01 RX ADMIN — FERROUS SULFATE TAB 325 MG (65 MG ELEMENTAL FE) 325 MG: 325 (65 FE) TAB at 19:37

## 2018-01-01 RX ADMIN — METOPROLOL SUCCINATE 25 MG: 25 TABLET, FILM COATED, EXTENDED RELEASE ORAL at 08:42

## 2018-01-01 RX ADMIN — DICLOFENAC 2 G: 10 GEL TOPICAL at 09:23

## 2018-01-01 RX ADMIN — ISOSORBIDE MONONITRATE 60 MG: 60 TABLET ORAL at 09:11

## 2018-01-01 RX ADMIN — LORAZEPAM 1 MG: 1 TABLET ORAL at 10:09

## 2018-01-01 RX ADMIN — HYDROMORPHONE HYDROCHLORIDE 0.5 MG: 1 INJECTION, SOLUTION INTRAMUSCULAR; INTRAVENOUS; SUBCUTANEOUS at 15:30

## 2018-01-01 RX ADMIN — ZAFIRLUKAST 20 MG: 20 TABLET, FILM COATED ORAL at 21:10

## 2018-01-01 RX ADMIN — GUAIFENESIN 600 MG: 600 TABLET, EXTENDED RELEASE ORAL at 14:47

## 2018-01-01 RX ADMIN — ESCITALOPRAM OXALATE 10 MG: 10 TABLET, FILM COATED ORAL at 10:12

## 2018-01-01 RX ADMIN — LORAZEPAM 1 MG: 1 TABLET ORAL at 20:54

## 2018-01-01 RX ADMIN — PREDNISOLONE ACETATE 1 DROP: 10 SUSPENSION/ DROPS OPHTHALMIC at 20:07

## 2018-01-01 RX ADMIN — ISOSORBIDE MONONITRATE 60 MG: 60 TABLET ORAL at 08:04

## 2018-01-01 RX ADMIN — LORAZEPAM 1 MG: 1 TABLET ORAL at 20:51

## 2018-01-01 RX ADMIN — PANTOPRAZOLE SODIUM 40 MG: 40 TABLET, DELAYED RELEASE ORAL at 17:44

## 2018-01-01 RX ADMIN — SODIUM BICARBONATE 650 MG: 650 TABLET ORAL at 10:02

## 2018-01-01 RX ADMIN — ESCITALOPRAM 10 MG: 10 TABLET, FILM COATED ORAL at 08:20

## 2018-01-01 RX ADMIN — Medication 10 ML: at 21:13

## 2018-01-01 RX ADMIN — ROPINIROLE HYDROCHLORIDE 4 MG: 1 TABLET, FILM COATED ORAL at 17:43

## 2018-01-01 RX ADMIN — ZAFIRLUKAST 20 MG: 20 TABLET, FILM COATED ORAL at 09:27

## 2018-01-01 RX ADMIN — TRAMADOL HYDROCHLORIDE 50 MG: 50 TABLET, FILM COATED ORAL at 23:19

## 2018-01-01 RX ADMIN — ROPINIROLE HYDROCHLORIDE 4 MG: 1 TABLET, FILM COATED ORAL at 08:52

## 2018-01-01 RX ADMIN — PANTOPRAZOLE SODIUM 40 MG: 40 TABLET, DELAYED RELEASE ORAL at 06:31

## 2018-01-01 RX ADMIN — ZAFIRLUKAST 10 MG: 20 TABLET, COATED ORAL at 17:28

## 2018-01-01 RX ADMIN — TRAMADOL HYDROCHLORIDE 50 MG: 50 TABLET, FILM COATED ORAL at 16:54

## 2018-01-01 RX ADMIN — FLAVOXATE HYDROCHLORIDE 100 MG: 100 TABLET, FILM COATED ORAL at 23:12

## 2018-01-01 RX ADMIN — PANTOPRAZOLE SODIUM 40 MG: 40 TABLET, DELAYED RELEASE ORAL at 17:30

## 2018-01-01 RX ADMIN — ESCITALOPRAM OXALATE 10 MG: 10 TABLET, FILM COATED ORAL at 08:15

## 2018-01-01 RX ADMIN — FLUTICASONE PROPIONATE 2 SPRAY: 50 SPRAY, METERED NASAL at 09:17

## 2018-01-01 RX ADMIN — HYDROCODONE BITARTRATE AND ACETAMINOPHEN 1 TABLET: 5; 325 TABLET ORAL at 04:46

## 2018-01-01 RX ADMIN — Medication 5 UNITS: at 12:31

## 2018-01-01 RX ADMIN — METOPROLOL SUCCINATE 25 MG: 25 TABLET, FILM COATED, EXTENDED RELEASE ORAL at 09:50

## 2018-01-01 RX ADMIN — ROPINIROLE HYDROCHLORIDE 4 MG: 1 TABLET, FILM COATED ORAL at 17:03

## 2018-01-01 RX ADMIN — DICLOFENAC 2 G: 10 GEL TOPICAL at 21:12

## 2018-01-01 RX ADMIN — Medication 1 CAPSULE: at 08:15

## 2018-01-01 RX ADMIN — PANTOPRAZOLE SODIUM 40 MG: 40 TABLET, DELAYED RELEASE ORAL at 08:38

## 2018-01-01 RX ADMIN — OXYCODONE AND ACETAMINOPHEN 1 TABLET: 5; 325 TABLET ORAL at 06:41

## 2018-01-01 RX ADMIN — ZAFIRLUKAST 20 MG: 20 TABLET, FILM COATED ORAL at 08:37

## 2018-01-01 RX ADMIN — CETIRIZINE HYDROCHLORIDE 10 MG: 10 TABLET ORAL at 10:13

## 2018-01-01 RX ADMIN — ROPINIROLE HYDROCHLORIDE 4 MG: 1 TABLET, FILM COATED ORAL at 10:28

## 2018-01-01 RX ADMIN — FLUTICASONE PROPIONATE 2 SPRAY: 50 SPRAY, METERED NASAL at 09:38

## 2018-01-01 RX ADMIN — LORAZEPAM 1 MG: 1 TABLET ORAL at 20:44

## 2018-01-01 RX ADMIN — Medication 30 G: at 18:48

## 2018-01-01 RX ADMIN — HYDRALAZINE HYDROCHLORIDE 25 MG: 25 TABLET, FILM COATED ORAL at 05:24

## 2018-01-01 RX ADMIN — TRAMADOL HYDROCHLORIDE 50 MG: 50 TABLET, FILM COATED ORAL at 22:13

## 2018-01-01 RX ADMIN — FLAVOXATE HYDROCHLORIDE 100 MG: 100 TABLET, FILM COATED ORAL at 13:28

## 2018-01-01 RX ADMIN — CETIRIZINE HYDROCHLORIDE 5 MG: 10 TABLET ORAL at 08:26

## 2018-01-01 RX ADMIN — HYDROCODONE BITARTRATE AND ACETAMINOPHEN 1 TABLET: 5; 325 TABLET ORAL at 20:22

## 2018-01-01 RX ADMIN — SODIUM CHLORIDE: 9 INJECTION, SOLUTION INTRAVENOUS at 07:15

## 2018-01-01 RX ADMIN — MECLIZINE HCL 25 MG: 25 TABLET, CHEWABLE ORAL at 23:12

## 2018-01-01 RX ADMIN — HYDROCODONE BITARTRATE AND ACETAMINOPHEN 1 TABLET: 5; 325 TABLET ORAL at 11:24

## 2018-01-01 RX ADMIN — FLAVOXATE HYDROCHLORIDE 100 MG: 100 TABLET, FILM COATED ORAL at 16:14

## 2018-01-01 RX ADMIN — ROPINIROLE HYDROCHLORIDE 4 MG: 1 TABLET, FILM COATED ORAL at 17:45

## 2018-01-01 RX ADMIN — TRAMADOL HYDROCHLORIDE 50 MG: 50 TABLET, FILM COATED ORAL at 18:03

## 2018-01-01 RX ADMIN — HYDROMORPHONE HYDROCHLORIDE 0.5 MG: 1 INJECTION, SOLUTION INTRAMUSCULAR; INTRAVENOUS; SUBCUTANEOUS at 15:25

## 2018-01-01 RX ADMIN — DICLOFENAC 2 G: 10 GEL TOPICAL at 09:26

## 2018-01-01 RX ADMIN — ALPRAZOLAM 0.5 MG: 0.5 TABLET ORAL at 21:35

## 2018-01-01 RX ADMIN — DICLOFENAC 2 G: 10 GEL TOPICAL at 18:08

## 2018-01-01 RX ADMIN — OXYCODONE AND ACETAMINOPHEN 1 TABLET: 5; 325 TABLET ORAL at 15:06

## 2018-01-01 RX ADMIN — METOPROLOL SUCCINATE 50 MG: 50 TABLET, FILM COATED, EXTENDED RELEASE ORAL at 11:53

## 2018-01-01 RX ADMIN — CETIRIZINE HYDROCHLORIDE 10 MG: 10 TABLET ORAL at 09:26

## 2018-01-01 RX ADMIN — CETIRIZINE HYDROCHLORIDE 10 MG: 10 TABLET ORAL at 09:18

## 2018-01-01 RX ADMIN — BUMETANIDE 1 MG: 0.25 INJECTION INTRAMUSCULAR; INTRAVENOUS at 08:43

## 2018-01-01 RX ADMIN — ROPINIROLE HYDROCHLORIDE 4 MG: 1 TABLET, FILM COATED ORAL at 11:03

## 2018-01-01 RX ADMIN — ROPINIROLE HYDROCHLORIDE 4 MG: 1 TABLET, FILM COATED ORAL at 17:57

## 2018-01-01 RX ADMIN — ROPINIROLE HYDROCHLORIDE 4 MG: 1 TABLET, FILM COATED ORAL at 21:53

## 2018-01-01 RX ADMIN — Medication 10 ML: at 08:16

## 2018-01-01 RX ADMIN — BUMETANIDE 0.5 MG: 1 TABLET ORAL at 13:38

## 2018-01-01 RX ADMIN — Medication 10 ML: at 07:45

## 2018-01-01 RX ADMIN — ATORVASTATIN CALCIUM 20 MG: 20 TABLET, FILM COATED ORAL at 20:16

## 2018-01-01 RX ADMIN — Medication 1 CAPSULE: at 09:14

## 2018-01-01 RX ADMIN — MORPHINE SULFATE 2 MG: 2 INJECTION, SOLUTION INTRAMUSCULAR; INTRAVENOUS at 23:50

## 2018-01-01 RX ADMIN — CETIRIZINE HYDROCHLORIDE 10 MG: 10 TABLET ORAL at 08:14

## 2018-01-01 RX ADMIN — TRAMADOL HYDROCHLORIDE 50 MG: 50 TABLET, FILM COATED ORAL at 11:51

## 2018-01-01 RX ADMIN — SODIUM POLYSTYRENE SULFONATE 30 G: 15 SUSPENSION ORAL; RECTAL at 11:59

## 2018-01-01 RX ADMIN — HYDROCODONE BITARTRATE AND ACETAMINOPHEN 2 TABLET: 5; 325 TABLET ORAL at 15:09

## 2018-01-01 RX ADMIN — FERROUS SULFATE TAB 325 MG (65 MG ELEMENTAL FE) 325 MG: 325 (65 FE) TAB at 16:04

## 2018-01-01 RX ADMIN — SODIUM CHLORIDE: 9 INJECTION, SOLUTION INTRAVENOUS at 17:30

## 2018-01-01 RX ADMIN — ISOSORBIDE MONONITRATE 60 MG: 60 TABLET ORAL at 09:32

## 2018-01-01 RX ADMIN — HYDROCODONE BITARTRATE AND ACETAMINOPHEN 1 TABLET: 5; 325 TABLET ORAL at 05:24

## 2018-01-01 RX ADMIN — HYDRALAZINE HYDROCHLORIDE 10 MG: 10 TABLET, FILM COATED ORAL at 17:48

## 2018-01-01 RX ADMIN — OXYCODONE AND ACETAMINOPHEN 1 TABLET: 5; 325 TABLET ORAL at 10:11

## 2018-01-01 RX ADMIN — FLAVOXATE HYDROCHLORIDE 100 MG: 100 TABLET, FILM COATED ORAL at 16:38

## 2018-01-01 RX ADMIN — SODIUM BICARBONATE 650 MG: 650 TABLET ORAL at 23:12

## 2018-01-01 RX ADMIN — DICLOFENAC 2 G: 10 GEL TOPICAL at 08:25

## 2018-01-01 RX ADMIN — AMLODIPINE BESYLATE 5 MG: 5 TABLET ORAL at 09:50

## 2018-01-01 RX ADMIN — ISOSORBIDE MONONITRATE 60 MG: 60 TABLET ORAL at 10:49

## 2018-01-01 RX ADMIN — ROPINIROLE HYDROCHLORIDE 4 MG: 1 TABLET, FILM COATED ORAL at 17:08

## 2018-01-01 RX ADMIN — ATORVASTATIN CALCIUM 20 MG: 20 TABLET, FILM COATED ORAL at 20:27

## 2018-01-01 RX ADMIN — Medication 10 ML: at 09:16

## 2018-01-01 RX ADMIN — FLUCONAZOLE 100 MG: 100 TABLET ORAL at 22:55

## 2018-01-01 RX ADMIN — ISOSORBIDE MONONITRATE 120 MG: 60 TABLET ORAL at 09:24

## 2018-01-01 RX ADMIN — ZAFIRLUKAST 20 MG: 20 TABLET, FILM COATED ORAL at 22:55

## 2018-01-01 RX ADMIN — FERROUS SULFATE TAB 325 MG (65 MG ELEMENTAL FE) 325 MG: 325 (65 FE) TAB at 09:13

## 2018-01-01 RX ADMIN — THERA TABS 1 TABLET: TAB at 17:08

## 2018-01-01 RX ADMIN — ROPINIROLE HYDROCHLORIDE 4 MG: 1 TABLET, FILM COATED ORAL at 09:33

## 2018-01-01 RX ADMIN — SODIUM CHLORIDE: 9 INJECTION, SOLUTION INTRAVENOUS at 12:07

## 2018-01-01 RX ADMIN — CETIRIZINE HYDROCHLORIDE 5 MG: 10 TABLET, FILM COATED ORAL at 08:32

## 2018-01-01 RX ADMIN — PANTOPRAZOLE SODIUM 40 MG: 40 TABLET, DELAYED RELEASE ORAL at 10:59

## 2018-01-01 RX ADMIN — ATORVASTATIN CALCIUM 20 MG: 20 TABLET, FILM COATED ORAL at 22:49

## 2018-01-01 RX ADMIN — TRAMADOL HYDROCHLORIDE 50 MG: 50 TABLET, FILM COATED ORAL at 11:10

## 2018-01-01 RX ADMIN — ATORVASTATIN CALCIUM 20 MG: 20 TABLET, FILM COATED ORAL at 21:59

## 2018-01-01 RX ADMIN — ROPINIROLE HYDROCHLORIDE 4 MG: 1 TABLET, FILM COATED ORAL at 09:18

## 2018-01-01 RX ADMIN — LORAZEPAM 2 MG: 2 TABLET ORAL at 23:13

## 2018-01-01 RX ADMIN — AMLODIPINE BESYLATE 5 MG: 5 TABLET ORAL at 10:59

## 2018-01-01 RX ADMIN — METOPROLOL SUCCINATE 50 MG: 50 TABLET, FILM COATED, EXTENDED RELEASE ORAL at 11:15

## 2018-01-01 RX ADMIN — ZAFIRLUKAST 20 MG: 20 TABLET, FILM COATED ORAL at 09:00

## 2018-01-01 RX ADMIN — ROPINIROLE HYDROCHLORIDE 4 MG: 1 TABLET, FILM COATED ORAL at 08:06

## 2018-01-01 RX ADMIN — FERROUS SULFATE TAB 325 MG (65 MG ELEMENTAL FE) 325 MG: 325 (65 FE) TAB at 08:24

## 2018-01-01 RX ADMIN — CETIRIZINE HYDROCHLORIDE 10 MG: 10 TABLET ORAL at 10:49

## 2018-01-01 RX ADMIN — ROPINIROLE HYDROCHLORIDE 4 MG: 1 TABLET, FILM COATED ORAL at 09:26

## 2018-01-01 RX ADMIN — HYDROCODONE BITARTRATE AND ACETAMINOPHEN 1 TABLET: 5; 325 TABLET ORAL at 11:56

## 2018-01-01 RX ADMIN — FLUTICASONE PROPIONATE 2 SPRAY: 50 SPRAY, METERED NASAL at 08:32

## 2018-01-01 RX ADMIN — PANTOPRAZOLE SODIUM 40 MG: 40 TABLET, DELAYED RELEASE ORAL at 05:24

## 2018-01-01 RX ADMIN — PROPOFOL 80 MG: 10 INJECTION, EMULSION INTRAVENOUS at 14:21

## 2018-01-01 RX ADMIN — AMLODIPINE BESYLATE 5 MG: 5 TABLET ORAL at 09:23

## 2018-01-01 RX ADMIN — ALPRAZOLAM 0.5 MG: 0.5 TABLET ORAL at 13:38

## 2018-01-01 RX ADMIN — FLUTICASONE PROPIONATE 2 SPRAY: 50 SPRAY, METERED NASAL at 22:00

## 2018-01-01 RX ADMIN — POTASSIUM CHLORIDE 10 MEQ: 750 TABLET, FILM COATED, EXTENDED RELEASE ORAL at 11:53

## 2018-01-01 RX ADMIN — OXYBUTYNIN CHLORIDE 5 MG: 5 TABLET ORAL at 06:56

## 2018-01-01 RX ADMIN — Medication 1 CAPSULE: at 08:12

## 2018-01-01 RX ADMIN — BUMETANIDE 1 MG: 0.25 INJECTION INTRAMUSCULAR; INTRAVENOUS at 08:32

## 2018-01-01 RX ADMIN — HYDRALAZINE HYDROCHLORIDE 25 MG: 25 TABLET, FILM COATED ORAL at 05:05

## 2018-01-01 RX ADMIN — METOPROLOL SUCCINATE 50 MG: 50 TABLET, FILM COATED, EXTENDED RELEASE ORAL at 09:59

## 2018-01-01 RX ADMIN — ALPRAZOLAM 0.5 MG: 0.5 TABLET ORAL at 10:05

## 2018-01-01 RX ADMIN — ROPINIROLE HYDROCHLORIDE 4 MG: 1 TABLET, FILM COATED ORAL at 17:40

## 2018-01-01 RX ADMIN — PANTOPRAZOLE SODIUM 40 MG: 40 TABLET, DELAYED RELEASE ORAL at 17:08

## 2018-01-01 RX ADMIN — HYDROCODONE BITARTRATE AND ACETAMINOPHEN 2 TABLET: 5; 325 TABLET ORAL at 21:54

## 2018-01-01 RX ADMIN — TIZANIDINE 2 MG: 4 TABLET ORAL at 00:00

## 2018-01-01 RX ADMIN — FOSFOMYCIN TROMETHAMINE 1 PACKET: 3 POWDER ORAL at 17:49

## 2018-01-01 RX ADMIN — FERROUS SULFATE TAB 325 MG (65 MG ELEMENTAL FE) 325 MG: 325 (65 FE) TAB at 10:02

## 2018-01-01 RX ADMIN — TIZANIDINE 2 MG: 4 TABLET ORAL at 09:02

## 2018-01-01 RX ADMIN — ZAFIRLUKAST 20 MG: 20 TABLET, COATED ORAL at 16:18

## 2018-01-01 RX ADMIN — FERROUS SULFATE TAB 325 MG (65 MG ELEMENTAL FE) 325 MG: 325 (65 FE) TAB at 17:53

## 2018-01-01 RX ADMIN — ESCITALOPRAM OXALATE 10 MG: 10 TABLET, FILM COATED ORAL at 12:57

## 2018-01-01 RX ADMIN — OFLOXACIN 1 DROP: 3 SOLUTION OPHTHALMIC at 08:36

## 2018-01-01 RX ADMIN — THERA TABS 1 TABLET: TAB at 17:27

## 2018-01-01 RX ADMIN — THERA TABS 1 TABLET: TAB at 16:06

## 2018-01-01 RX ADMIN — PANTOPRAZOLE SODIUM 40 MG: 40 TABLET, DELAYED RELEASE ORAL at 06:21

## 2018-01-01 RX ADMIN — OXYBUTYNIN CHLORIDE 5 MG: 5 TABLET ORAL at 20:51

## 2018-01-01 RX ADMIN — METOPROLOL SUCCINATE 25 MG: 25 TABLET, FILM COATED, EXTENDED RELEASE ORAL at 08:19

## 2018-01-01 RX ADMIN — LINEZOLID 600 MG: 600 INJECTION, SOLUTION INTRAVENOUS at 01:35

## 2018-01-01 RX ADMIN — PANTOPRAZOLE SODIUM 40 MG: 40 TABLET, DELAYED RELEASE ORAL at 07:53

## 2018-01-01 RX ADMIN — CETIRIZINE HYDROCHLORIDE 10 MG: 10 TABLET, FILM COATED ORAL at 09:49

## 2018-01-01 RX ADMIN — ALPRAZOLAM 0.5 MG: 0.5 TABLET ORAL at 09:18

## 2018-01-01 RX ADMIN — FERROUS SULFATE TAB 325 MG (65 MG ELEMENTAL FE) 325 MG: 325 (65 FE) TAB at 18:27

## 2018-01-01 RX ADMIN — CEFTRIAXONE SODIUM 1 G: 1 INJECTION, POWDER, FOR SOLUTION INTRAMUSCULAR; INTRAVENOUS at 20:28

## 2018-01-01 RX ADMIN — FLUTICASONE PROPIONATE 2 SPRAY: 50 SPRAY, METERED NASAL at 21:14

## 2018-01-01 RX ADMIN — AMOXICILLIN 500 MG: 500 CAPSULE ORAL at 08:50

## 2018-01-01 RX ADMIN — SODIUM BICARBONATE 650 MG: 650 TABLET ORAL at 21:26

## 2018-01-01 RX ADMIN — FLUTICASONE PROPIONATE 2 SPRAY: 50 SPRAY, METERED NASAL at 20:52

## 2018-01-01 RX ADMIN — GUAIFENESIN 600 MG: 600 TABLET, EXTENDED RELEASE ORAL at 20:18

## 2018-01-01 RX ADMIN — METOPROLOL SUCCINATE 25 MG: 25 TABLET, FILM COATED, EXTENDED RELEASE ORAL at 09:17

## 2018-01-01 RX ADMIN — HYDROCODONE BITARTRATE AND ACETAMINOPHEN 1 TABLET: 5; 325 TABLET ORAL at 05:06

## 2018-01-01 RX ADMIN — FLUTICASONE PROPIONATE 2 SPRAY: 50 SPRAY, METERED NASAL at 20:07

## 2018-01-01 RX ADMIN — AMLODIPINE BESYLATE 5 MG: 5 TABLET ORAL at 10:13

## 2018-01-01 RX ADMIN — ALPRAZOLAM 0.5 MG: 0.5 TABLET ORAL at 22:25

## 2018-01-01 RX ADMIN — ENOXAPARIN SODIUM 30 MG: 30 INJECTION SUBCUTANEOUS at 09:19

## 2018-01-01 RX ADMIN — HYDROCODONE BITARTRATE AND ACETAMINOPHEN 1 TABLET: 5; 325 TABLET ORAL at 06:32

## 2018-01-01 RX ADMIN — PREDNISOLONE ACETATE 1 DROP: 10 SUSPENSION/ DROPS OPHTHALMIC at 08:36

## 2018-01-01 RX ADMIN — PANTOPRAZOLE SODIUM 40 MG: 40 TABLET, DELAYED RELEASE ORAL at 06:20

## 2018-01-01 RX ADMIN — FLUTICASONE PROPIONATE 2 SPRAY: 50 SPRAY, METERED NASAL at 10:29

## 2018-01-01 RX ADMIN — Medication 1 CAPSULE: at 18:32

## 2018-01-01 RX ADMIN — TRAMADOL HYDROCHLORIDE 50 MG: 50 TABLET, FILM COATED ORAL at 22:43

## 2018-01-01 RX ADMIN — ZAFIRLUKAST 20 MG: 20 TABLET, FILM COATED ORAL at 09:23

## 2018-01-01 RX ADMIN — PANTOPRAZOLE SODIUM 40 MG: 40 TABLET, DELAYED RELEASE ORAL at 08:13

## 2018-01-01 RX ADMIN — OXYCODONE AND ACETAMINOPHEN 1 TABLET: 5; 325 TABLET ORAL at 12:50

## 2018-01-01 RX ADMIN — DICLOFENAC 2 G: 10 GEL TOPICAL at 21:02

## 2018-01-01 RX ADMIN — DICLOFENAC 2 G: 10 GEL TOPICAL at 14:47

## 2018-01-01 RX ADMIN — FERROUS SULFATE TAB 325 MG (65 MG ELEMENTAL FE) 325 MG: 325 (65 FE) TAB at 08:25

## 2018-01-01 RX ADMIN — GUAIFENESIN 600 MG: 600 TABLET, EXTENDED RELEASE ORAL at 20:51

## 2018-01-01 RX ADMIN — HYDROCODONE BITARTRATE AND ACETAMINOPHEN 2 TABLET: 5; 325 TABLET ORAL at 17:41

## 2018-01-01 RX ADMIN — ESCITALOPRAM 10 MG: 10 TABLET, FILM COATED ORAL at 09:18

## 2018-01-01 RX ADMIN — Medication 1 CAPSULE: at 17:28

## 2018-01-01 RX ADMIN — ZAFIRLUKAST 20 MG: 20 TABLET, COATED ORAL at 21:48

## 2018-01-01 RX ADMIN — ISOSORBIDE MONONITRATE 60 MG: 60 TABLET ORAL at 09:17

## 2018-01-01 RX ADMIN — ALPRAZOLAM 0.5 MG: 0.5 TABLET ORAL at 21:39

## 2018-01-01 RX ADMIN — ROPINIROLE HYDROCHLORIDE 4 MG: 1 TABLET, FILM COATED ORAL at 19:47

## 2018-01-01 RX ADMIN — LORAZEPAM 1 MG: 1 TABLET ORAL at 09:15

## 2018-01-01 RX ADMIN — SODIUM BICARBONATE 650 MG: 650 TABLET ORAL at 20:23

## 2018-01-01 RX ADMIN — LORAZEPAM 1 MG: 1 TABLET ORAL at 23:02

## 2018-01-01 RX ADMIN — TRAMADOL HYDROCHLORIDE 50 MG: 50 TABLET, FILM COATED ORAL at 08:37

## 2018-01-01 RX ADMIN — BUMETANIDE 0.5 MG: 1 TABLET ORAL at 09:14

## 2018-01-01 RX ADMIN — Medication 10 ML: at 08:54

## 2018-01-01 RX ADMIN — Medication 10 ML: at 09:22

## 2018-01-01 RX ADMIN — HYDROCODONE BITARTRATE AND ACETAMINOPHEN 1 TABLET: 5; 325 TABLET ORAL at 11:33

## 2018-01-01 RX ADMIN — PANTOPRAZOLE SODIUM 40 MG: 40 TABLET, DELAYED RELEASE ORAL at 17:57

## 2018-01-01 RX ADMIN — ISOSORBIDE MONONITRATE 120 MG: 60 TABLET ORAL at 11:53

## 2018-01-01 RX ADMIN — ZAFIRLUKAST 20 MG: 20 TABLET, COATED ORAL at 16:27

## 2018-01-01 RX ADMIN — OXYBUTYNIN CHLORIDE 5 MG: 5 TABLET ORAL at 02:56

## 2018-01-01 RX ADMIN — HYDROCODONE BITARTRATE AND ACETAMINOPHEN 1 TABLET: 5; 325 TABLET ORAL at 06:02

## 2018-01-01 RX ADMIN — DICLOFENAC 2 G: 10 GEL TOPICAL at 23:25

## 2018-01-01 RX ADMIN — PANTOPRAZOLE SODIUM 40 MG: 40 TABLET, DELAYED RELEASE ORAL at 05:26

## 2018-01-01 RX ADMIN — ISOSORBIDE MONONITRATE 60 MG: 60 TABLET ORAL at 10:14

## 2018-01-01 RX ADMIN — ISOSORBIDE MONONITRATE 60 MG: 60 TABLET ORAL at 09:18

## 2018-01-01 RX ADMIN — ROPINIROLE HYDROCHLORIDE 4 MG: 1 TABLET, FILM COATED ORAL at 21:13

## 2018-01-01 RX ADMIN — SODIUM CHLORIDE: 9 INJECTION, SOLUTION INTRAVENOUS at 21:46

## 2018-01-01 RX ADMIN — TRAMADOL HYDROCHLORIDE 50 MG: 50 TABLET, FILM COATED ORAL at 17:45

## 2018-01-01 RX ADMIN — DICLOFENAC 2 G: 10 GEL TOPICAL at 10:29

## 2018-01-01 RX ADMIN — OXYCODONE AND ACETAMINOPHEN 1 TABLET: 5; 325 TABLET ORAL at 02:08

## 2018-01-01 RX ADMIN — PANTOPRAZOLE SODIUM 40 MG: 40 TABLET, DELAYED RELEASE ORAL at 13:29

## 2018-01-01 RX ADMIN — OXYCODONE HYDROCHLORIDE 5 MG: 5 TABLET ORAL at 20:55

## 2018-01-01 RX ADMIN — LORAZEPAM 1 MG: 1 TABLET ORAL at 20:27

## 2018-01-01 RX ADMIN — FERROUS SULFATE TAB 325 MG (65 MG ELEMENTAL FE) 325 MG: 325 (65 FE) TAB at 08:37

## 2018-01-01 RX ADMIN — OXYCODONE HYDROCHLORIDE 5 MG: 5 TABLET ORAL at 14:59

## 2018-01-01 RX ADMIN — ALPRAZOLAM 0.5 MG: 0.5 TABLET ORAL at 09:33

## 2018-01-01 RX ADMIN — TRAMADOL HYDROCHLORIDE 50 MG: 50 TABLET, FILM COATED ORAL at 22:00

## 2018-01-01 RX ADMIN — PANTOPRAZOLE SODIUM 40 MG: 40 TABLET, DELAYED RELEASE ORAL at 05:58

## 2018-01-01 RX ADMIN — FLUTICASONE PROPIONATE 2 SPRAY: 50 SPRAY, METERED NASAL at 20:00

## 2018-01-01 RX ADMIN — Medication 10 ML: at 21:48

## 2018-01-01 RX ADMIN — MEROPENEM 500 MG: 500 INJECTION, POWDER, FOR SOLUTION INTRAVENOUS at 12:25

## 2018-01-01 RX ADMIN — FERROUS SULFATE TAB 325 MG (65 MG ELEMENTAL FE) 325 MG: 325 (65 FE) TAB at 10:13

## 2018-01-01 RX ADMIN — TRAMADOL HYDROCHLORIDE 50 MG: 50 TABLET, FILM COATED ORAL at 05:05

## 2018-01-01 RX ADMIN — CETIRIZINE HYDROCHLORIDE 10 MG: 10 TABLET ORAL at 09:02

## 2018-01-01 RX ADMIN — ISOSORBIDE MONONITRATE 60 MG: 60 TABLET ORAL at 08:53

## 2018-01-01 RX ADMIN — FLUCONAZOLE 100 MG: 100 TABLET ORAL at 08:14

## 2018-01-01 RX ADMIN — TRAMADOL HYDROCHLORIDE 50 MG: 50 TABLET, FILM COATED ORAL at 23:23

## 2018-01-01 RX ADMIN — IPRATROPIUM BROMIDE AND ALBUTEROL SULFATE 1 AMPULE: .5; 3 SOLUTION RESPIRATORY (INHALATION) at 12:43

## 2018-01-01 RX ADMIN — FLUTICASONE PROPIONATE 2 SPRAY: 50 SPRAY, METERED NASAL at 09:15

## 2018-01-01 RX ADMIN — METOPROLOL SUCCINATE 50 MG: 50 TABLET, FILM COATED, EXTENDED RELEASE ORAL at 09:19

## 2018-01-01 RX ADMIN — FERROUS SULFATE TAB 325 MG (65 MG ELEMENTAL FE) 325 MG: 325 (65 FE) TAB at 08:20

## 2018-01-01 RX ADMIN — ZAFIRLUKAST 20 MG: 20 TABLET, FILM COATED ORAL at 08:43

## 2018-01-01 RX ADMIN — Medication 10 ML: at 11:36

## 2018-01-01 RX ADMIN — FERROUS SULFATE TAB 325 MG (65 MG ELEMENTAL FE) 325 MG: 325 (65 FE) TAB at 17:16

## 2018-01-01 RX ADMIN — FLAVOXATE HYDROCHLORIDE 100 MG: 100 TABLET, FILM COATED ORAL at 13:14

## 2018-01-01 RX ADMIN — ENOXAPARIN SODIUM 30 MG: 30 INJECTION SUBCUTANEOUS at 18:55

## 2018-01-01 RX ADMIN — ESCITALOPRAM OXALATE 10 MG: 10 TABLET, FILM COATED ORAL at 08:29

## 2018-01-01 RX ADMIN — ZAFIRLUKAST 20 MG: 20 TABLET, FILM COATED ORAL at 20:29

## 2018-01-01 RX ADMIN — LORAZEPAM 1 MG: 1 TABLET ORAL at 11:25

## 2018-01-01 RX ADMIN — FLUTICASONE PROPIONATE 2 SPRAY: 50 SPRAY, METERED NASAL at 20:49

## 2018-01-01 RX ADMIN — TIZANIDINE 2 MG: 4 TABLET ORAL at 06:25

## 2018-01-01 RX ADMIN — Medication 10 ML: at 10:59

## 2018-01-01 RX ADMIN — ALPRAZOLAM 0.5 MG: 0.5 TABLET ORAL at 21:50

## 2018-01-01 RX ADMIN — AMPICILLIN SODIUM 1 G: 1 INJECTION, POWDER, FOR SOLUTION INTRAMUSCULAR; INTRAVENOUS at 23:53

## 2018-01-01 RX ADMIN — ALPRAZOLAM 0.5 MG: 0.5 TABLET ORAL at 09:22

## 2018-01-01 RX ADMIN — OXYCODONE AND ACETAMINOPHEN 1 TABLET: 5; 325 TABLET ORAL at 20:50

## 2018-01-01 RX ADMIN — TRAMADOL HYDROCHLORIDE 50 MG: 50 TABLET, FILM COATED ORAL at 17:38

## 2018-01-01 RX ADMIN — TRAMADOL HYDROCHLORIDE 50 MG: 50 TABLET, FILM COATED ORAL at 22:54

## 2018-01-01 RX ADMIN — ROPINIROLE HYDROCHLORIDE 4 MG: 1 TABLET, FILM COATED ORAL at 18:01

## 2018-01-01 RX ADMIN — ACETAMINOPHEN 650 MG: 325 TABLET ORAL at 12:45

## 2018-01-01 RX ADMIN — CLONIDINE HYDROCHLORIDE 0.1 MG: 0.1 TABLET ORAL at 10:11

## 2018-01-01 RX ADMIN — SODIUM CHLORIDE: 9 INJECTION, SOLUTION INTRAVENOUS at 23:03

## 2018-01-01 RX ADMIN — THERA TABS 1 TABLET: TAB at 17:37

## 2018-01-01 RX ADMIN — GUAIFENESIN 600 MG: 600 TABLET, EXTENDED RELEASE ORAL at 09:32

## 2018-01-01 RX ADMIN — FERROUS SULFATE TAB 325 MG (65 MG ELEMENTAL FE) 325 MG: 325 (65 FE) TAB at 16:23

## 2018-01-01 RX ADMIN — ENOXAPARIN SODIUM 30 MG: 30 INJECTION SUBCUTANEOUS at 09:07

## 2018-01-01 RX ADMIN — DICLOFENAC 2 G: 10 GEL TOPICAL at 10:14

## 2018-01-01 RX ADMIN — LORAZEPAM 1 MG: 1 TABLET ORAL at 10:27

## 2018-01-01 RX ADMIN — ROPINIROLE HYDROCHLORIDE 4 MG: 1 TABLET, FILM COATED ORAL at 17:53

## 2018-01-01 RX ADMIN — HYDROCODONE BITARTRATE AND ACETAMINOPHEN 1 TABLET: 5; 325 TABLET ORAL at 22:56

## 2018-01-01 RX ADMIN — PANTOPRAZOLE SODIUM 40 MG: 40 TABLET, DELAYED RELEASE ORAL at 05:47

## 2018-01-01 RX ADMIN — ATORVASTATIN CALCIUM 20 MG: 20 TABLET, FILM COATED ORAL at 20:52

## 2018-01-01 RX ADMIN — HYDROCODONE BITARTRATE AND ACETAMINOPHEN 1 TABLET: 5; 325 TABLET ORAL at 12:46

## 2018-01-01 RX ADMIN — TRAMADOL HYDROCHLORIDE 50 MG: 50 TABLET, FILM COATED ORAL at 08:48

## 2018-01-01 RX ADMIN — TRAMADOL HYDROCHLORIDE 50 MG: 50 TABLET, FILM COATED ORAL at 13:33

## 2018-01-01 RX ADMIN — AMLODIPINE BESYLATE 5 MG: 5 TABLET ORAL at 09:47

## 2018-01-01 RX ADMIN — ROPINIROLE HYDROCHLORIDE 4 MG: 1 TABLET, FILM COATED ORAL at 08:25

## 2018-01-01 RX ADMIN — FLUTICASONE PROPIONATE 2 SPRAY: 50 SPRAY, METERED NASAL at 09:27

## 2018-01-01 RX ADMIN — OFLOXACIN 2 DROP: 3 SOLUTION OPHTHALMIC at 17:42

## 2018-01-01 RX ADMIN — ROPINIROLE HYDROCHLORIDE 4 MG: 1 TABLET, FILM COATED ORAL at 09:02

## 2018-01-01 RX ADMIN — BUMETANIDE 0.5 MG: 1 TABLET ORAL at 08:53

## 2018-01-01 RX ADMIN — METOPROLOL SUCCINATE 25 MG: 25 TABLET, FILM COATED, EXTENDED RELEASE ORAL at 09:21

## 2018-01-01 RX ADMIN — BUMETANIDE 1 MG: 0.25 INJECTION INTRAMUSCULAR; INTRAVENOUS at 11:25

## 2018-01-01 RX ADMIN — DICLOFENAC 2 G: 10 GEL TOPICAL at 12:41

## 2018-01-01 RX ADMIN — ATORVASTATIN CALCIUM 20 MG: 20 TABLET, FILM COATED ORAL at 21:51

## 2018-01-01 RX ADMIN — DICLOFENAC 2 G: 10 GEL TOPICAL at 20:10

## 2018-01-01 RX ADMIN — METOPROLOL SUCCINATE 25 MG: 25 TABLET, FILM COATED, EXTENDED RELEASE ORAL at 09:32

## 2018-01-01 RX ADMIN — ROPINIROLE HYDROCHLORIDE 4 MG: 1 TABLET, FILM COATED ORAL at 21:11

## 2018-01-01 RX ADMIN — FLUTICASONE PROPIONATE 2 SPRAY: 50 SPRAY, METERED NASAL at 20:53

## 2018-01-01 RX ADMIN — ESCITALOPRAM OXALATE 10 MG: 10 TABLET, FILM COATED ORAL at 09:18

## 2018-01-01 RX ADMIN — ISOSORBIDE MONONITRATE 60 MG: 60 TABLET ORAL at 10:12

## 2018-01-01 RX ADMIN — ZAFIRLUKAST 20 MG: 20 TABLET, FILM COATED ORAL at 20:27

## 2018-01-01 RX ADMIN — CETIRIZINE HYDROCHLORIDE 10 MG: 10 TABLET ORAL at 08:29

## 2018-01-01 RX ADMIN — Medication 10 ML: at 08:24

## 2018-01-01 RX ADMIN — ROPINIROLE HYDROCHLORIDE 4 MG: 1 TABLET, FILM COATED ORAL at 23:12

## 2018-01-01 RX ADMIN — HYDROCODONE BITARTRATE AND ACETAMINOPHEN 1 TABLET: 5; 325 TABLET ORAL at 18:48

## 2018-01-01 RX ADMIN — OXYCODONE AND ACETAMINOPHEN 1 TABLET: 5; 325 TABLET ORAL at 06:52

## 2018-01-01 RX ADMIN — FERROUS SULFATE TAB 325 MG (65 MG ELEMENTAL FE) 325 MG: 325 (65 FE) TAB at 09:58

## 2018-01-01 RX ADMIN — FLUTICASONE PROPIONATE 2 SPRAY: 50 SPRAY, METERED NASAL at 07:41

## 2018-01-01 RX ADMIN — METOPROLOL SUCCINATE 50 MG: 50 TABLET, FILM COATED, EXTENDED RELEASE ORAL at 08:05

## 2018-01-01 RX ADMIN — ROPINIROLE HYDROCHLORIDE 4 MG: 1 TABLET, FILM COATED ORAL at 09:59

## 2018-01-01 RX ADMIN — LORAZEPAM 1 MG: 1 TABLET ORAL at 06:55

## 2018-01-01 RX ADMIN — LINEZOLID 600 MG: 600 INJECTION, SOLUTION INTRAVENOUS at 13:10

## 2018-01-01 RX ADMIN — HYDROCODONE BITARTRATE AND ACETAMINOPHEN 1 TABLET: 5; 325 TABLET ORAL at 17:01

## 2018-01-01 RX ADMIN — ALPRAZOLAM 0.5 MG: 0.5 TABLET ORAL at 21:40

## 2018-01-01 RX ADMIN — CETIRIZINE HYDROCHLORIDE 10 MG: 10 TABLET ORAL at 09:32

## 2018-01-01 RX ADMIN — CETIRIZINE HYDROCHLORIDE 10 MG: 10 TABLET ORAL at 09:38

## 2018-01-01 RX ADMIN — ROPINIROLE HYDROCHLORIDE 4 MG: 1 TABLET, FILM COATED ORAL at 21:26

## 2018-01-01 RX ADMIN — ALPRAZOLAM 0.5 MG: 0.5 TABLET ORAL at 09:10

## 2018-01-01 RX ADMIN — PROPOFOL 100 MG: 10 INJECTION, EMULSION INTRAVENOUS at 15:16

## 2018-01-01 RX ADMIN — ESCITALOPRAM OXALATE 10 MG: 10 TABLET, FILM COATED ORAL at 08:49

## 2018-01-01 RX ADMIN — METOPROLOL SUCCINATE 25 MG: 25 TABLET, FILM COATED, EXTENDED RELEASE ORAL at 09:15

## 2018-01-01 RX ADMIN — OFLOXACIN 1 DROP: 3 SOLUTION OPHTHALMIC at 21:17

## 2018-01-01 RX ADMIN — ATORVASTATIN CALCIUM 20 MG: 20 TABLET, FILM COATED ORAL at 20:10

## 2018-01-01 RX ADMIN — ATORVASTATIN CALCIUM 20 MG: 20 TABLET, FILM COATED ORAL at 21:39

## 2018-01-01 RX ADMIN — SODIUM CHLORIDE: 9 INJECTION, SOLUTION INTRAVENOUS at 09:17

## 2018-01-01 RX ADMIN — HYDROCODONE BITARTRATE AND ACETAMINOPHEN 2 TABLET: 5; 325 TABLET ORAL at 23:58

## 2018-01-01 RX ADMIN — ISOSORBIDE MONONITRATE 60 MG: 60 TABLET ORAL at 08:20

## 2018-01-01 RX ADMIN — DICLOFENAC 2 G: 10 GEL TOPICAL at 23:02

## 2018-01-01 RX ADMIN — OFLOXACIN 1 DROP: 3 SOLUTION OPHTHALMIC at 20:19

## 2018-01-01 RX ADMIN — ALPRAZOLAM 0.5 MG: 0.5 TABLET ORAL at 09:23

## 2018-01-01 RX ADMIN — ROPINIROLE HYDROCHLORIDE 4 MG: 1 TABLET, FILM COATED ORAL at 17:17

## 2018-01-01 RX ADMIN — HEPARIN SODIUM 5000 UNITS: 5000 INJECTION INTRAVENOUS; SUBCUTANEOUS at 18:15

## 2018-01-01 RX ADMIN — LORAZEPAM 1 MG: 1 TABLET ORAL at 08:08

## 2018-01-01 RX ADMIN — ALPRAZOLAM 0.5 MG: 0.5 TABLET ORAL at 08:51

## 2018-01-01 RX ADMIN — CETIRIZINE HYDROCHLORIDE 5 MG: 10 TABLET, FILM COATED ORAL at 09:19

## 2018-01-01 RX ADMIN — FLUTICASONE PROPIONATE 2 SPRAY: 50 SPRAY, METERED NASAL at 21:10

## 2018-01-01 RX ADMIN — FERROUS SULFATE TAB 325 MG (65 MG ELEMENTAL FE) 325 MG: 325 (65 FE) TAB at 08:15

## 2018-01-01 RX ADMIN — PANTOPRAZOLE SODIUM 40 MG: 40 TABLET, DELAYED RELEASE ORAL at 09:27

## 2018-01-01 RX ADMIN — Medication 10 ML: at 22:22

## 2018-01-01 RX ADMIN — FERROUS SULFATE TAB 325 MG (65 MG ELEMENTAL FE) 325 MG: 325 (65 FE) TAB at 09:19

## 2018-01-01 RX ADMIN — ALPRAZOLAM 0.5 MG: 0.5 TABLET ORAL at 20:19

## 2018-01-01 RX ADMIN — HYDROCODONE BITARTRATE AND ACETAMINOPHEN 1 TABLET: 5; 325 TABLET ORAL at 23:45

## 2018-01-01 RX ADMIN — ESCITALOPRAM OXALATE 10 MG: 10 TABLET, FILM COATED ORAL at 11:15

## 2018-01-01 RX ADMIN — ATORVASTATIN CALCIUM 20 MG: 20 TABLET, FILM COATED ORAL at 21:58

## 2018-01-01 RX ADMIN — Medication 125 MG: at 13:02

## 2018-01-01 RX ADMIN — HYDROCODONE BITARTRATE AND ACETAMINOPHEN 1 TABLET: 5; 325 TABLET ORAL at 02:05

## 2018-01-01 RX ADMIN — AMOXICILLIN 500 MG: 500 CAPSULE ORAL at 20:51

## 2018-01-01 RX ADMIN — SODIUM BICARBONATE 1300 MG: 650 TABLET ORAL at 18:09

## 2018-01-01 RX ADMIN — FERROUS SULFATE TAB 325 MG (65 MG ELEMENTAL FE) 325 MG: 325 (65 FE) TAB at 16:16

## 2018-01-01 RX ADMIN — ROPINIROLE HYDROCHLORIDE 4 MG: 1 TABLET, FILM COATED ORAL at 17:50

## 2018-01-01 RX ADMIN — Medication 1 CAPSULE: at 15:50

## 2018-01-01 RX ADMIN — Medication 10 ML: at 20:32

## 2018-01-01 RX ADMIN — PANTOPRAZOLE SODIUM 40 MG: 40 TABLET, DELAYED RELEASE ORAL at 09:16

## 2018-01-01 RX ADMIN — VANCOMYCIN HYDROCHLORIDE 750 MG: 1 INJECTION, POWDER, LYOPHILIZED, FOR SOLUTION INTRAVENOUS at 18:09

## 2018-01-01 RX ADMIN — FERROUS SULFATE TAB 325 MG (65 MG ELEMENTAL FE) 325 MG: 325 (65 FE) TAB at 11:16

## 2018-01-01 RX ADMIN — SODIUM BICARBONATE 650 MG: 650 TABLET ORAL at 09:14

## 2018-01-01 RX ADMIN — PANTOPRAZOLE SODIUM 40 MG: 40 TABLET, DELAYED RELEASE ORAL at 17:41

## 2018-01-01 RX ADMIN — SODIUM BICARBONATE 650 MG: 650 TABLET ORAL at 11:15

## 2018-01-01 RX ADMIN — FLUTICASONE PROPIONATE 2 SPRAY: 50 SPRAY, METERED NASAL at 08:14

## 2018-01-01 RX ADMIN — OFLOXACIN 2 DROP: 3 SOLUTION OPHTHALMIC at 12:44

## 2018-01-01 RX ADMIN — HYDROCODONE BITARTRATE AND ACETAMINOPHEN 1 TABLET: 5; 325 TABLET ORAL at 16:48

## 2018-01-01 RX ADMIN — DOCUSATE SODIUM 100 MG: 100 CAPSULE, LIQUID FILLED ORAL at 23:16

## 2018-01-01 RX ADMIN — FERROUS SULFATE TAB 325 MG (65 MG ELEMENTAL FE) 325 MG: 325 (65 FE) TAB at 16:07

## 2018-01-01 RX ADMIN — CETIRIZINE HYDROCHLORIDE 10 MG: 10 TABLET ORAL at 09:50

## 2018-01-01 RX ADMIN — ACETAMINOPHEN 650 MG: 325 TABLET ORAL at 22:15

## 2018-01-01 RX ADMIN — Medication 1 CAPSULE: at 18:35

## 2018-01-01 RX ADMIN — Medication 1 CAPSULE: at 09:50

## 2018-01-01 RX ADMIN — Medication 10 ML: at 08:27

## 2018-01-01 RX ADMIN — DICLOFENAC 2 G: 10 GEL TOPICAL at 05:33

## 2018-01-01 RX ADMIN — ZAFIRLUKAST 20 MG: 20 TABLET, FILM COATED ORAL at 09:02

## 2018-01-01 RX ADMIN — METOPROLOL SUCCINATE 25 MG: 25 TABLET, FILM COATED, EXTENDED RELEASE ORAL at 09:58

## 2018-01-01 RX ADMIN — ZAFIRLUKAST 20 MG: 20 TABLET, FILM COATED ORAL at 20:53

## 2018-01-01 RX ADMIN — FERROUS SULFATE TAB 325 MG (65 MG ELEMENTAL FE) 325 MG: 325 (65 FE) TAB at 09:10

## 2018-01-01 RX ADMIN — FLUTICASONE PROPIONATE 2 SPRAY: 50 SPRAY, METERED NASAL at 22:20

## 2018-01-01 RX ADMIN — FERROUS SULFATE TAB 325 MG (65 MG ELEMENTAL FE) 325 MG: 325 (65 FE) TAB at 18:35

## 2018-01-01 RX ADMIN — DICLOFENAC 2 G: 10 GEL TOPICAL at 10:00

## 2018-01-01 RX ADMIN — METOPROLOL SUCCINATE 50 MG: 50 TABLET, FILM COATED, EXTENDED RELEASE ORAL at 08:51

## 2018-01-01 RX ADMIN — ROPINIROLE HYDROCHLORIDE 4 MG: 1 TABLET, FILM COATED ORAL at 08:33

## 2018-01-01 RX ADMIN — METOPROLOL SUCCINATE 50 MG: 50 TABLET, FILM COATED, EXTENDED RELEASE ORAL at 10:13

## 2018-01-01 RX ADMIN — TRAMADOL HYDROCHLORIDE 50 MG: 50 TABLET, FILM COATED ORAL at 17:07

## 2018-01-01 RX ADMIN — PANTOPRAZOLE SODIUM 40 MG: 40 TABLET, DELAYED RELEASE ORAL at 17:49

## 2018-01-01 RX ADMIN — OFLOXACIN 2 DROP: 3 SOLUTION OPHTHALMIC at 12:36

## 2018-01-01 RX ADMIN — DICLOFENAC 2 G: 10 GEL TOPICAL at 20:21

## 2018-01-01 RX ADMIN — DICLOFENAC 2 G: 10 GEL TOPICAL at 22:45

## 2018-01-01 RX ADMIN — BUMETANIDE 0.5 MG: 1 TABLET ORAL at 09:18

## 2018-01-01 RX ADMIN — MEROPENEM 500 MG: 500 INJECTION, POWDER, FOR SOLUTION INTRAVENOUS at 22:02

## 2018-01-01 RX ADMIN — ZAFIRLUKAST 20 MG: 20 TABLET, FILM COATED ORAL at 22:49

## 2018-01-01 RX ADMIN — ZAFIRLUKAST 10 MG: 20 TABLET, COATED ORAL at 05:02

## 2018-01-01 RX ADMIN — HYDROCODONE BITARTRATE AND ACETAMINOPHEN 1 TABLET: 5; 325 TABLET ORAL at 01:59

## 2018-01-01 RX ADMIN — Medication 10 ML: at 20:20

## 2018-01-01 RX ADMIN — ROPINIROLE HYDROCHLORIDE 4 MG: 1 TABLET, FILM COATED ORAL at 18:54

## 2018-01-01 RX ADMIN — FLUCONAZOLE 100 MG: 100 TABLET ORAL at 08:37

## 2018-01-01 RX ADMIN — ZAFIRLUKAST 20 MG: 20 TABLET, FILM COATED ORAL at 21:40

## 2018-01-01 RX ADMIN — TRAMADOL HYDROCHLORIDE 50 MG: 50 TABLET, FILM COATED ORAL at 13:10

## 2018-01-01 RX ADMIN — Medication 10 ML: at 20:10

## 2018-01-01 RX ADMIN — Medication 1 CAPSULE: at 18:27

## 2018-01-01 RX ADMIN — ESCITALOPRAM OXALATE 10 MG: 10 TABLET, FILM COATED ORAL at 07:44

## 2018-01-01 RX ADMIN — ROPINIROLE HYDROCHLORIDE 4 MG: 1 TABLET, FILM COATED ORAL at 17:29

## 2018-01-01 RX ADMIN — LORAZEPAM 1 MG: 1 TABLET ORAL at 20:19

## 2018-01-01 RX ADMIN — OXYBUTYNIN CHLORIDE 5 MG: 5 TABLET ORAL at 09:02

## 2018-01-01 RX ADMIN — CETIRIZINE HYDROCHLORIDE 10 MG: 10 TABLET ORAL at 08:53

## 2018-01-01 RX ADMIN — ROPINIROLE HYDROCHLORIDE 4 MG: 1 TABLET, FILM COATED ORAL at 10:23

## 2018-01-01 RX ADMIN — OXYCODONE HYDROCHLORIDE 5 MG: 5 TABLET ORAL at 20:27

## 2018-01-01 RX ADMIN — PREDNISOLONE ACETATE 1 DROP: 10 SUSPENSION/ DROPS OPHTHALMIC at 20:52

## 2018-01-01 RX ADMIN — BUMETANIDE 0.5 MG: 1 TABLET ORAL at 08:38

## 2018-01-01 RX ADMIN — FLUTICASONE PROPIONATE 2 SPRAY: 50 SPRAY, METERED NASAL at 09:14

## 2018-01-01 RX ADMIN — ZAFIRLUKAST 10 MG: 20 TABLET, COATED ORAL at 04:21

## 2018-01-01 RX ADMIN — GUAIFENESIN 600 MG: 600 TABLET, EXTENDED RELEASE ORAL at 11:53

## 2018-01-01 RX ADMIN — OXYCODONE AND ACETAMINOPHEN 1 TABLET: 5; 325 TABLET ORAL at 22:27

## 2018-01-01 RX ADMIN — FERROUS SULFATE TAB 325 MG (65 MG ELEMENTAL FE) 325 MG: 325 (65 FE) TAB at 08:32

## 2018-01-01 RX ADMIN — PANTOPRAZOLE SODIUM 40 MG: 40 TABLET, DELAYED RELEASE ORAL at 17:01

## 2018-01-01 RX ADMIN — DICLOFENAC 2 G: 10 GEL TOPICAL at 23:00

## 2018-01-01 RX ADMIN — LORAZEPAM 1 MG: 1 TABLET ORAL at 23:08

## 2018-01-01 RX ADMIN — TRAMADOL HYDROCHLORIDE 50 MG: 50 TABLET, FILM COATED ORAL at 13:05

## 2018-01-01 RX ADMIN — SODIUM BICARBONATE 650 MG: 650 TABLET ORAL at 10:30

## 2018-01-01 RX ADMIN — TRAMADOL HYDROCHLORIDE 50 MG: 50 TABLET, FILM COATED ORAL at 20:19

## 2018-01-01 RX ADMIN — FERROUS SULFATE TAB 325 MG (65 MG ELEMENTAL FE) 325 MG: 325 (65 FE) TAB at 08:41

## 2018-01-01 RX ADMIN — TRAMADOL HYDROCHLORIDE 50 MG: 50 TABLET, FILM COATED ORAL at 21:00

## 2018-01-01 RX ADMIN — CETIRIZINE HYDROCHLORIDE 10 MG: 10 TABLET ORAL at 08:42

## 2018-01-01 RX ADMIN — LORAZEPAM 1 MG: 1 TABLET ORAL at 21:53

## 2018-01-01 RX ADMIN — ENOXAPARIN SODIUM 30 MG: 30 INJECTION SUBCUTANEOUS at 09:38

## 2018-01-01 RX ADMIN — FERROUS SULFATE TAB 325 MG (65 MG ELEMENTAL FE) 325 MG: 325 (65 FE) TAB at 09:47

## 2018-01-01 RX ADMIN — FLUTICASONE PROPIONATE 2 SPRAY: 50 SPRAY, METERED NASAL at 08:49

## 2018-01-01 RX ADMIN — METOPROLOL SUCCINATE 50 MG: 50 TABLET, FILM COATED, EXTENDED RELEASE ORAL at 09:11

## 2018-01-01 RX ADMIN — Medication 1 CAPSULE: at 09:25

## 2018-01-01 RX ADMIN — OFLOXACIN 2 DROP: 3 SOLUTION OPHTHALMIC at 20:55

## 2018-01-01 RX ADMIN — ESCITALOPRAM OXALATE 10 MG: 10 TABLET, FILM COATED ORAL at 09:02

## 2018-01-01 RX ADMIN — FLUCONAZOLE 100 MG: 100 TABLET ORAL at 10:05

## 2018-01-01 RX ADMIN — LORAZEPAM 1 MG: 1 TABLET ORAL at 21:17

## 2018-01-01 RX ADMIN — FLUTICASONE PROPIONATE 2 SPRAY: 50 SPRAY, METERED NASAL at 22:28

## 2018-01-01 RX ADMIN — SODIUM BICARBONATE 650 MG: 650 TABLET ORAL at 08:33

## 2018-01-01 RX ADMIN — ALPRAZOLAM 0.5 MG: 0.5 TABLET ORAL at 22:54

## 2018-01-01 RX ADMIN — PANTOPRAZOLE SODIUM 40 MG: 40 TABLET, DELAYED RELEASE ORAL at 10:13

## 2018-01-01 RX ADMIN — MECLIZINE 25 MG: 12.5 TABLET ORAL at 08:48

## 2018-01-01 RX ADMIN — TRAMADOL HYDROCHLORIDE 50 MG: 50 TABLET, FILM COATED ORAL at 10:15

## 2018-01-01 RX ADMIN — PANTOPRAZOLE SODIUM 40 MG: 40 TABLET, DELAYED RELEASE ORAL at 18:31

## 2018-01-01 RX ADMIN — DICLOFENAC 2 G: 10 GEL TOPICAL at 10:49

## 2018-01-01 RX ADMIN — FERROUS SULFATE TAB 325 MG (65 MG ELEMENTAL FE) 325 MG: 325 (65 FE) TAB at 19:00

## 2018-01-01 RX ADMIN — SODIUM BICARBONATE 650 MG: 650 TABLET ORAL at 20:20

## 2018-01-01 RX ADMIN — ISOSORBIDE MONONITRATE 60 MG: 60 TABLET ORAL at 08:37

## 2018-01-01 RX ADMIN — LORAZEPAM 2 MG: 2 TABLET ORAL at 22:27

## 2018-01-01 RX ADMIN — FERROUS SULFATE TAB 325 MG (65 MG ELEMENTAL FE) 325 MG: 325 (65 FE) TAB at 17:57

## 2018-01-01 RX ADMIN — HYDROCODONE BITARTRATE AND ACETAMINOPHEN 1 TABLET: 5; 325 TABLET ORAL at 00:22

## 2018-01-01 RX ADMIN — TRAMADOL HYDROCHLORIDE 50 MG: 50 TABLET, FILM COATED ORAL at 17:06

## 2018-01-01 RX ADMIN — METOPROLOL SUCCINATE 25 MG: 25 TABLET, FILM COATED, EXTENDED RELEASE ORAL at 09:10

## 2018-01-01 RX ADMIN — Medication 10 ML: at 09:20

## 2018-01-01 RX ADMIN — METOPROLOL SUCCINATE 25 MG: 25 TABLET, FILM COATED, EXTENDED RELEASE ORAL at 09:38

## 2018-01-01 RX ADMIN — OXYBUTYNIN CHLORIDE 5 MG: 5 TABLET ORAL at 05:30

## 2018-01-01 RX ADMIN — POLYETHYLENE GLYCOL (3350) 17 G: 17 POWDER, FOR SOLUTION ORAL at 09:18

## 2018-01-01 RX ADMIN — ESCITALOPRAM OXALATE 10 MG: 10 TABLET, FILM COATED ORAL at 09:58

## 2018-01-01 RX ADMIN — ROPINIROLE HYDROCHLORIDE 4 MG: 1 TABLET, FILM COATED ORAL at 08:15

## 2018-01-01 RX ADMIN — Medication 1 CAPSULE: at 08:24

## 2018-01-01 RX ADMIN — FERROUS SULFATE TAB 325 MG (65 MG ELEMENTAL FE) 325 MG: 325 (65 FE) TAB at 09:11

## 2018-01-01 RX ADMIN — Medication 1 CAPSULE: at 16:00

## 2018-01-01 RX ADMIN — BUMETANIDE 0.5 MG: 1 TABLET ORAL at 21:35

## 2018-01-01 RX ADMIN — ALPRAZOLAM 0.5 MG: 0.5 TABLET ORAL at 20:32

## 2018-01-01 RX ADMIN — METOPROLOL SUCCINATE 25 MG: 25 TABLET, FILM COATED, EXTENDED RELEASE ORAL at 08:33

## 2018-01-01 RX ADMIN — ISOSORBIDE MONONITRATE 60 MG: 60 TABLET ORAL at 09:46

## 2018-01-01 RX ADMIN — BUMETANIDE 0.5 MG: 1 TABLET ORAL at 09:15

## 2018-01-01 RX ADMIN — METOPROLOL SUCCINATE 50 MG: 50 TABLET, FILM COATED, EXTENDED RELEASE ORAL at 18:27

## 2018-01-01 RX ADMIN — ZAFIRLUKAST 20 MG: 20 TABLET, COATED ORAL at 09:18

## 2018-01-01 RX ADMIN — BUMETANIDE 1 MG: 1 TABLET ORAL at 09:26

## 2018-01-01 RX ADMIN — ROPINIROLE HYDROCHLORIDE 4 MG: 1 TABLET, FILM COATED ORAL at 20:20

## 2018-01-01 RX ADMIN — HYDROCODONE BITARTRATE AND ACETAMINOPHEN 2 TABLET: 5; 325 TABLET ORAL at 04:27

## 2018-01-01 RX ADMIN — ESCITALOPRAM OXALATE 10 MG: 10 TABLET, FILM COATED ORAL at 09:38

## 2018-01-01 RX ADMIN — DICLOFENAC 2 G: 10 GEL TOPICAL at 21:58

## 2018-01-01 RX ADMIN — TIZANIDINE 2 MG: 4 TABLET ORAL at 09:33

## 2018-01-01 RX ADMIN — PANTOPRAZOLE SODIUM 40 MG: 40 TABLET, DELAYED RELEASE ORAL at 09:50

## 2018-01-01 RX ADMIN — HEPARIN SODIUM 5000 UNITS: 5000 INJECTION INTRAVENOUS; SUBCUTANEOUS at 09:15

## 2018-01-01 RX ADMIN — ENOXAPARIN SODIUM 30 MG: 30 INJECTION SUBCUTANEOUS at 07:41

## 2018-01-01 RX ADMIN — OXYCODONE AND ACETAMINOPHEN 1 TABLET: 5; 325 TABLET ORAL at 10:01

## 2018-01-01 RX ADMIN — ZAFIRLUKAST 20 MG: 20 TABLET, FILM COATED ORAL at 20:19

## 2018-01-01 RX ADMIN — ZAFIRLUKAST 20 MG: 20 TABLET, FILM COATED ORAL at 08:54

## 2018-01-01 RX ADMIN — ONDANSETRON 4 MG: 2 INJECTION INTRAMUSCULAR; INTRAVENOUS at 08:56

## 2018-01-01 RX ADMIN — FERROUS SULFATE TAB 325 MG (65 MG ELEMENTAL FE) 325 MG: 325 (65 FE) TAB at 17:43

## 2018-01-01 RX ADMIN — ZAFIRLUKAST 20 MG: 20 TABLET, FILM COATED ORAL at 10:12

## 2018-01-01 RX ADMIN — ZAFIRLUKAST 10 MG: 20 TABLET, COATED ORAL at 05:22

## 2018-01-01 RX ADMIN — ESCITALOPRAM OXALATE 10 MG: 10 TABLET, FILM COATED ORAL at 10:02

## 2018-01-01 RX ADMIN — HYDROCODONE BITARTRATE AND ACETAMINOPHEN 1 TABLET: 5; 325 TABLET ORAL at 04:25

## 2018-01-01 RX ADMIN — HYDRALAZINE HYDROCHLORIDE 25 MG: 25 TABLET, FILM COATED ORAL at 05:47

## 2018-01-01 RX ADMIN — DOCUSATE SODIUM 100 MG: 100 CAPSULE, LIQUID FILLED ORAL at 08:13

## 2018-01-01 RX ADMIN — SODIUM BICARBONATE 650 MG: 650 TABLET ORAL at 21:02

## 2018-01-01 RX ADMIN — PANTOPRAZOLE SODIUM 40 MG: 40 TABLET, DELAYED RELEASE ORAL at 17:27

## 2018-01-01 RX ADMIN — HYDROCODONE BITARTRATE AND ACETAMINOPHEN 1 TABLET: 10; 325 TABLET ORAL at 17:25

## 2018-01-01 RX ADMIN — ROPINIROLE HYDROCHLORIDE 4 MG: 1 TABLET, FILM COATED ORAL at 09:58

## 2018-01-01 RX ADMIN — HYDROCODONE BITARTRATE AND ACETAMINOPHEN 1 TABLET: 5; 325 TABLET ORAL at 18:10

## 2018-01-01 RX ADMIN — FLUTICASONE PROPIONATE 2 SPRAY: 50 SPRAY, METERED NASAL at 21:59

## 2018-01-01 RX ADMIN — ROPINIROLE HYDROCHLORIDE 4 MG: 1 TABLET, FILM COATED ORAL at 19:21

## 2018-01-01 RX ADMIN — LEVOFLOXACIN 500 MG: 500 TABLET, FILM COATED ORAL at 14:47

## 2018-01-01 RX ADMIN — CETIRIZINE HYDROCHLORIDE 10 MG: 10 TABLET ORAL at 09:20

## 2018-01-01 RX ADMIN — ESCITALOPRAM OXALATE 10 MG: 10 TABLET, FILM COATED ORAL at 09:17

## 2018-01-01 RX ADMIN — METOPROLOL SUCCINATE 50 MG: 50 TABLET, FILM COATED, EXTENDED RELEASE ORAL at 08:26

## 2018-01-01 RX ADMIN — CETIRIZINE HYDROCHLORIDE 10 MG: 10 TABLET, FILM COATED ORAL at 09:26

## 2018-01-01 RX ADMIN — ACETAMINOPHEN 650 MG: 325 TABLET ORAL at 15:33

## 2018-01-01 RX ADMIN — CETIRIZINE HYDROCHLORIDE 10 MG: 10 TABLET, FILM COATED ORAL at 07:44

## 2018-01-01 RX ADMIN — METOPROLOL SUCCINATE 50 MG: 50 TABLET, FILM COATED, EXTENDED RELEASE ORAL at 08:32

## 2018-01-01 RX ADMIN — HEPARIN SODIUM 5000 UNITS: 5000 INJECTION INTRAVENOUS; SUBCUTANEOUS at 04:33

## 2018-01-01 RX ADMIN — ESCITALOPRAM OXALATE 10 MG: 10 TABLET, FILM COATED ORAL at 08:04

## 2018-01-01 RX ADMIN — ENOXAPARIN SODIUM 30 MG: 30 INJECTION SUBCUTANEOUS at 09:58

## 2018-01-01 RX ADMIN — ISOSORBIDE MONONITRATE 60 MG: 60 TABLET ORAL at 07:44

## 2018-01-01 RX ADMIN — TRAMADOL HYDROCHLORIDE 50 MG: 50 TABLET, FILM COATED ORAL at 23:16

## 2018-01-01 RX ADMIN — METOPROLOL SUCCINATE 50 MG: 50 TABLET, FILM COATED, EXTENDED RELEASE ORAL at 10:02

## 2018-01-01 RX ADMIN — HYDROCODONE BITARTRATE AND ACETAMINOPHEN 1 TABLET: 5; 325 TABLET ORAL at 06:20

## 2018-01-01 RX ADMIN — DICLOFENAC 2 G: 10 GEL TOPICAL at 11:02

## 2018-01-01 RX ADMIN — Medication 10 ML: at 09:34

## 2018-01-01 RX ADMIN — ZAFIRLUKAST 20 MG: 20 TABLET, FILM COATED ORAL at 08:33

## 2018-01-01 RX ADMIN — ROPINIROLE HYDROCHLORIDE 4 MG: 1 TABLET, FILM COATED ORAL at 17:30

## 2018-01-01 RX ADMIN — TRAMADOL HYDROCHLORIDE 50 MG: 50 TABLET, FILM COATED ORAL at 17:16

## 2018-01-01 RX ADMIN — FLUTICASONE PROPIONATE 2 SPRAY: 50 SPRAY, METERED NASAL at 21:48

## 2018-01-01 RX ADMIN — Medication 10 ML: at 20:54

## 2018-01-01 RX ADMIN — PREDNISOLONE ACETATE 1 DROP: 10 SUSPENSION/ DROPS OPHTHALMIC at 20:19

## 2018-01-01 RX ADMIN — HYDROCODONE BITARTRATE AND ACETAMINOPHEN 1 TABLET: 5; 325 TABLET ORAL at 15:46

## 2018-01-01 RX ADMIN — TRAMADOL HYDROCHLORIDE 50 MG: 50 TABLET, FILM COATED ORAL at 22:49

## 2018-01-01 RX ADMIN — ROPINIROLE HYDROCHLORIDE 4 MG: 1 TABLET, FILM COATED ORAL at 22:19

## 2018-01-01 RX ADMIN — ROPINIROLE HYDROCHLORIDE 4 MG: 1 TABLET, FILM COATED ORAL at 10:02

## 2018-01-01 RX ADMIN — TRAMADOL HYDROCHLORIDE 50 MG: 50 TABLET, FILM COATED ORAL at 11:08

## 2018-01-01 RX ADMIN — ATORVASTATIN CALCIUM 20 MG: 20 TABLET, FILM COATED ORAL at 21:10

## 2018-01-01 RX ADMIN — ROPINIROLE HYDROCHLORIDE 4 MG: 1 TABLET, FILM COATED ORAL at 20:55

## 2018-01-01 RX ADMIN — Medication 10 ML: at 09:26

## 2018-01-01 RX ADMIN — FUROSEMIDE 40 MG: 10 INJECTION, SOLUTION INTRAMUSCULAR; INTRAVENOUS at 14:52

## 2018-01-01 RX ADMIN — TIZANIDINE 2 MG: 4 TABLET ORAL at 20:57

## 2018-01-01 RX ADMIN — SODIUM CHLORIDE: 9 INJECTION, SOLUTION INTRAVENOUS at 15:13

## 2018-01-01 RX ADMIN — ZAFIRLUKAST 20 MG: 20 TABLET, FILM COATED ORAL at 20:31

## 2018-01-01 RX ADMIN — ESCITALOPRAM 10 MG: 10 TABLET, FILM COATED ORAL at 11:53

## 2018-01-01 RX ADMIN — PANTOPRAZOLE SODIUM 40 MG: 40 TABLET, DELAYED RELEASE ORAL at 08:41

## 2018-01-01 RX ADMIN — LISINOPRIL 2.5 MG: 2.5 TABLET ORAL at 09:23

## 2018-01-01 RX ADMIN — ESCITALOPRAM OXALATE 10 MG: 10 TABLET, FILM COATED ORAL at 08:34

## 2018-01-01 RX ADMIN — ROPINIROLE HYDROCHLORIDE 4 MG: 1 TABLET, FILM COATED ORAL at 08:37

## 2018-01-01 RX ADMIN — PANTOPRAZOLE SODIUM 40 MG: 40 TABLET, DELAYED RELEASE ORAL at 08:33

## 2018-01-01 RX ADMIN — ZAFIRLUKAST 20 MG: 20 TABLET, FILM COATED ORAL at 21:39

## 2018-01-01 RX ADMIN — PREDNISOLONE ACETATE 1 DROP: 10 SUSPENSION/ DROPS OPHTHALMIC at 08:51

## 2018-01-01 RX ADMIN — FLAVOXATE HYDROCHLORIDE 100 MG: 100 TABLET, FILM COATED ORAL at 10:11

## 2018-01-01 RX ADMIN — FLUCONAZOLE 100 MG: 100 TABLET ORAL at 09:21

## 2018-01-01 RX ADMIN — ALPRAZOLAM 0.5 MG: 0.5 TABLET ORAL at 08:20

## 2018-01-01 RX ADMIN — ESCITALOPRAM OXALATE 10 MG: 10 TABLET, FILM COATED ORAL at 08:25

## 2018-01-01 RX ADMIN — PANTOPRAZOLE SODIUM 40 MG: 40 TABLET, DELAYED RELEASE ORAL at 16:24

## 2018-01-01 RX ADMIN — ESCITALOPRAM OXALATE 10 MG: 10 TABLET, FILM COATED ORAL at 08:32

## 2018-01-01 RX ADMIN — FERROUS SULFATE TAB 325 MG (65 MG ELEMENTAL FE) 325 MG: 325 (65 FE) TAB at 17:50

## 2018-01-01 RX ADMIN — OXYBUTYNIN CHLORIDE 5 MG: 5 TABLET ORAL at 22:39

## 2018-01-01 RX ADMIN — ESCITALOPRAM OXALATE 10 MG: 10 TABLET, FILM COATED ORAL at 09:50

## 2018-01-01 RX ADMIN — CETIRIZINE HYDROCHLORIDE 10 MG: 10 TABLET ORAL at 09:00

## 2018-01-01 RX ADMIN — OXYCODONE HYDROCHLORIDE 5 MG: 5 TABLET ORAL at 05:02

## 2018-01-01 RX ADMIN — MECLIZINE HCL 25 MG: 12.5 TABLET ORAL at 08:23

## 2018-01-01 RX ADMIN — ZAFIRLUKAST 20 MG: 20 TABLET, FILM COATED ORAL at 08:38

## 2018-01-01 RX ADMIN — BUMETANIDE 0.5 MG: 1 TABLET ORAL at 11:53

## 2018-01-01 RX ADMIN — HYDROCODONE BITARTRATE AND ACETAMINOPHEN 1 TABLET: 5; 325 TABLET ORAL at 15:59

## 2018-01-01 RX ADMIN — ISOSORBIDE MONONITRATE 60 MG: 60 TABLET ORAL at 09:16

## 2018-01-01 RX ADMIN — FERROUS SULFATE TAB 325 MG (65 MG ELEMENTAL FE) 325 MG: 325 (65 FE) TAB at 08:38

## 2018-01-01 RX ADMIN — DICLOFENAC 2 G: 10 GEL TOPICAL at 16:53

## 2018-01-01 RX ADMIN — ZAFIRLUKAST 20 MG: 20 TABLET, FILM COATED ORAL at 10:22

## 2018-01-01 RX ADMIN — METOPROLOL SUCCINATE 25 MG: 25 TABLET, FILM COATED, EXTENDED RELEASE ORAL at 08:52

## 2018-01-01 RX ADMIN — PANTOPRAZOLE SODIUM 40 MG: 40 TABLET, DELAYED RELEASE ORAL at 15:20

## 2018-01-01 RX ADMIN — TRAMADOL HYDROCHLORIDE 50 MG: 50 TABLET, FILM COATED ORAL at 13:45

## 2018-01-01 RX ADMIN — SODIUM BICARBONATE 650 MG: 650 TABLET ORAL at 08:15

## 2018-01-01 RX ADMIN — FLUTICASONE PROPIONATE 2 SPRAY: 50 SPRAY, METERED NASAL at 11:14

## 2018-01-01 RX ADMIN — DICLOFENAC 2 G: 10 GEL TOPICAL at 16:33

## 2018-01-01 RX ADMIN — FLUTICASONE PROPIONATE 2 SPRAY: 50 SPRAY, METERED NASAL at 21:51

## 2018-01-01 RX ADMIN — DICLOFENAC 2 G: 10 GEL TOPICAL at 22:20

## 2018-01-01 RX ADMIN — SODIUM BICARBONATE 650 MG: 650 TABLET ORAL at 09:19

## 2018-01-01 RX ADMIN — ROPINIROLE HYDROCHLORIDE 4 MG: 1 TABLET, FILM COATED ORAL at 06:52

## 2018-01-01 RX ADMIN — ATORVASTATIN CALCIUM 20 MG: 20 TABLET, FILM COATED ORAL at 19:51

## 2018-01-01 RX ADMIN — ALPRAZOLAM 0.5 MG: 0.5 TABLET ORAL at 09:51

## 2018-01-01 RX ADMIN — PANTOPRAZOLE SODIUM 40 MG: 40 TABLET, DELAYED RELEASE ORAL at 10:20

## 2018-01-01 RX ADMIN — THERA TABS 1 TABLET: TAB at 17:43

## 2018-01-01 RX ADMIN — FERROUS SULFATE TAB 325 MG (65 MG ELEMENTAL FE) 325 MG: 325 (65 FE) TAB at 17:06

## 2018-01-01 RX ADMIN — ROPINIROLE HYDROCHLORIDE 4 MG: 1 TABLET, FILM COATED ORAL at 17:27

## 2018-01-01 RX ADMIN — DICLOFENAC 2 G: 10 GEL TOPICAL at 20:32

## 2018-01-01 RX ADMIN — PANTOPRAZOLE SODIUM 40 MG: 40 TABLET, DELAYED RELEASE ORAL at 17:37

## 2018-01-01 RX ADMIN — HYDROCODONE BITARTRATE AND ACETAMINOPHEN 1 TABLET: 5; 325 TABLET ORAL at 12:08

## 2018-01-01 RX ADMIN — FERROUS SULFATE TAB 325 MG (65 MG ELEMENTAL FE) 325 MG: 325 (65 FE) TAB at 16:00

## 2018-01-01 RX ADMIN — HYDRALAZINE HYDROCHLORIDE 25 MG: 25 TABLET, FILM COATED ORAL at 12:47

## 2018-01-01 RX ADMIN — OXYCODONE AND ACETAMINOPHEN 1 TABLET: 5; 325 TABLET ORAL at 22:58

## 2018-01-01 RX ADMIN — FLUCONAZOLE 100 MG: 100 TABLET ORAL at 09:02

## 2018-01-01 RX ADMIN — ROPINIROLE HYDROCHLORIDE 4 MG: 1 TABLET, FILM COATED ORAL at 17:02

## 2018-01-01 RX ADMIN — TRAMADOL HYDROCHLORIDE 50 MG: 50 TABLET, FILM COATED ORAL at 09:47

## 2018-01-01 RX ADMIN — PIPERACILLIN SODIUM AND TAZOBACTAM SODIUM 3.38 G: 3; .375 INJECTION, POWDER, LYOPHILIZED, FOR SOLUTION INTRAVENOUS at 17:25

## 2018-01-01 RX ADMIN — ZAFIRLUKAST 10 MG: 20 TABLET, COATED ORAL at 05:36

## 2018-01-01 RX ADMIN — ENOXAPARIN SODIUM 30 MG: 30 INJECTION SUBCUTANEOUS at 08:13

## 2018-01-01 RX ADMIN — ISOSORBIDE MONONITRATE 60 MG: 60 TABLET ORAL at 09:10

## 2018-01-01 RX ADMIN — ZAFIRLUKAST 20 MG: 20 TABLET, FILM COATED ORAL at 20:51

## 2018-01-01 RX ADMIN — FLUTICASONE PROPIONATE 2 SPRAY: 50 SPRAY, METERED NASAL at 22:55

## 2018-01-01 RX ADMIN — ROPINIROLE HYDROCHLORIDE 4 MG: 1 TABLET, FILM COATED ORAL at 18:03

## 2018-01-01 RX ADMIN — ALPRAZOLAM 0.5 MG: 0.5 TABLET ORAL at 20:00

## 2018-01-01 RX ADMIN — FLAVOXATE HYDROCHLORIDE 100 MG: 100 TABLET, FILM COATED ORAL at 09:10

## 2018-01-01 RX ADMIN — CETIRIZINE HYDROCHLORIDE 10 MG: 10 TABLET, FILM COATED ORAL at 09:59

## 2018-01-01 RX ADMIN — ISOSORBIDE MONONITRATE 60 MG: 60 TABLET ORAL at 08:12

## 2018-01-01 RX ADMIN — ZAFIRLUKAST 20 MG: 20 TABLET, COATED ORAL at 10:00

## 2018-01-01 RX ADMIN — HYDROCODONE BITARTRATE AND ACETAMINOPHEN 1 TABLET: 5; 325 TABLET ORAL at 20:43

## 2018-01-01 RX ADMIN — OXYCODONE HYDROCHLORIDE 5 MG: 5 TABLET ORAL at 12:40

## 2018-01-01 RX ADMIN — FLAVOXATE HYDROCHLORIDE 100 MG: 100 TABLET, FILM COATED ORAL at 02:45

## 2018-01-01 RX ADMIN — ROPINIROLE HYDROCHLORIDE 4 MG: 1 TABLET, FILM COATED ORAL at 17:00

## 2018-01-01 RX ADMIN — PANTOPRAZOLE SODIUM 40 MG: 40 TABLET, DELAYED RELEASE ORAL at 05:29

## 2018-01-01 RX ADMIN — ZAFIRLUKAST 20 MG: 20 TABLET, COATED ORAL at 21:55

## 2018-01-01 RX ADMIN — ATORVASTATIN CALCIUM 20 MG: 20 TABLET, FILM COATED ORAL at 22:12

## 2018-01-01 RX ADMIN — ZAFIRLUKAST 20 MG: 20 TABLET, COATED ORAL at 11:55

## 2018-01-01 RX ADMIN — SODIUM BICARBONATE 1300 MG: 650 TABLET ORAL at 13:13

## 2018-01-01 RX ADMIN — SODIUM CHLORIDE 500 ML: 9 INJECTION, SOLUTION INTRAVENOUS at 17:53

## 2018-01-01 RX ADMIN — SODIUM BICARBONATE 650 MG: 650 TABLET ORAL at 10:11

## 2018-01-01 RX ADMIN — TIZANIDINE 2 MG: 4 TABLET ORAL at 09:45

## 2018-01-01 RX ADMIN — DICLOFENAC 2 G: 10 GEL TOPICAL at 22:26

## 2018-01-01 RX ADMIN — SODIUM BICARBONATE: 84 INJECTION, SOLUTION INTRAVENOUS at 09:13

## 2018-01-01 RX ADMIN — FLUTICASONE PROPIONATE 2 SPRAY: 50 SPRAY, METERED NASAL at 08:42

## 2018-01-01 RX ADMIN — PREDNISOLONE ACETATE 1 DROP: 10 SUSPENSION/ DROPS OPHTHALMIC at 09:34

## 2018-01-01 RX ADMIN — ESCITALOPRAM OXALATE 10 MG: 10 TABLET, FILM COATED ORAL at 08:24

## 2018-01-01 RX ADMIN — FERROUS SULFATE TAB 325 MG (65 MG ELEMENTAL FE) 325 MG: 325 (65 FE) TAB at 09:16

## 2018-01-01 RX ADMIN — PANTOPRAZOLE SODIUM 40 MG: 40 TABLET, DELAYED RELEASE ORAL at 18:32

## 2018-01-01 RX ADMIN — Medication 1 CAPSULE: at 09:11

## 2018-01-01 RX ADMIN — HYDRALAZINE HYDROCHLORIDE 25 MG: 25 TABLET, FILM COATED ORAL at 09:24

## 2018-01-01 RX ADMIN — DICLOFENAC 2 G: 10 GEL TOPICAL at 21:14

## 2018-01-01 RX ADMIN — HYDROCODONE BITARTRATE AND ACETAMINOPHEN 1 TABLET: 5; 325 TABLET ORAL at 08:11

## 2018-01-01 RX ADMIN — ZAFIRLUKAST 20 MG: 20 TABLET, FILM COATED ORAL at 08:13

## 2018-01-01 RX ADMIN — OFLOXACIN 2 DROP: 3 SOLUTION OPHTHALMIC at 09:19

## 2018-01-01 RX ADMIN — CETIRIZINE HYDROCHLORIDE 10 MG: 10 TABLET ORAL at 08:51

## 2018-01-01 RX ADMIN — CETIRIZINE HYDROCHLORIDE 10 MG: 10 TABLET ORAL at 09:47

## 2018-01-01 RX ADMIN — HEPARIN SODIUM 5000 UNITS: 5000 INJECTION INTRAVENOUS; SUBCUTANEOUS at 21:02

## 2018-01-01 RX ADMIN — DICLOFENAC 2 G: 10 GEL TOPICAL at 10:02

## 2018-01-01 RX ADMIN — Medication 1 CAPSULE: at 09:26

## 2018-01-01 RX ADMIN — CETIRIZINE HYDROCHLORIDE 10 MG: 10 TABLET ORAL at 08:37

## 2018-01-01 RX ADMIN — HYDROCODONE BITARTRATE AND ACETAMINOPHEN 1 TABLET: 5; 325 TABLET ORAL at 02:26

## 2018-01-01 RX ADMIN — PANTOPRAZOLE SODIUM 40 MG: 40 TABLET, DELAYED RELEASE ORAL at 15:56

## 2018-01-01 RX ADMIN — FERROUS SULFATE TAB 325 MG (65 MG ELEMENTAL FE) 325 MG: 325 (65 FE) TAB at 08:53

## 2018-01-01 RX ADMIN — HYDRALAZINE HYDROCHLORIDE 25 MG: 25 TABLET, FILM COATED ORAL at 20:51

## 2018-01-01 RX ADMIN — FENTANYL CITRATE 100 MCG: 50 INJECTION, SOLUTION INTRAMUSCULAR; INTRAVENOUS at 11:17

## 2018-01-01 RX ADMIN — ALPRAZOLAM 0.5 MG: 0.5 TABLET ORAL at 09:02

## 2018-01-01 RX ADMIN — POTASSIUM CHLORIDE 10 MEQ: 750 TABLET, FILM COATED, EXTENDED RELEASE ORAL at 09:18

## 2018-01-01 RX ADMIN — FERROUS SULFATE TAB 325 MG (65 MG ELEMENTAL FE) 325 MG: 325 (65 FE) TAB at 15:50

## 2018-01-01 RX ADMIN — ROPINIROLE HYDROCHLORIDE 4 MG: 1 TABLET, FILM COATED ORAL at 18:02

## 2018-01-01 RX ADMIN — OXYCODONE AND ACETAMINOPHEN 1 TABLET: 5; 325 TABLET ORAL at 17:31

## 2018-01-01 RX ADMIN — TRAMADOL HYDROCHLORIDE 50 MG: 50 TABLET, FILM COATED ORAL at 03:46

## 2018-01-01 RX ADMIN — ZAFIRLUKAST 20 MG: 20 TABLET, COATED ORAL at 08:55

## 2018-01-01 RX ADMIN — METOPROLOL SUCCINATE 25 MG: 25 TABLET, FILM COATED, EXTENDED RELEASE ORAL at 10:28

## 2018-01-01 RX ADMIN — FERROUS SULFATE TAB 325 MG (65 MG ELEMENTAL FE) 325 MG: 325 (65 FE) TAB at 15:46

## 2018-01-01 RX ADMIN — ZAFIRLUKAST 20 MG: 20 TABLET, FILM COATED ORAL at 20:49

## 2018-01-01 RX ADMIN — ESCITALOPRAM OXALATE 10 MG: 10 TABLET, FILM COATED ORAL at 09:46

## 2018-01-01 RX ADMIN — Medication 100 MG: at 15:16

## 2018-01-01 RX ADMIN — ROPINIROLE HYDROCHLORIDE 4 MG: 1 TABLET, FILM COATED ORAL at 09:50

## 2018-01-01 RX ADMIN — OXYBUTYNIN CHLORIDE 5 MG: 5 TABLET ORAL at 09:23

## 2018-01-01 RX ADMIN — HYDRALAZINE HYDROCHLORIDE 25 MG: 25 TABLET, FILM COATED ORAL at 00:20

## 2018-01-01 RX ADMIN — DICLOFENAC 2 G: 10 GEL TOPICAL at 12:47

## 2018-01-01 RX ADMIN — FERROUS SULFATE TAB 325 MG (65 MG ELEMENTAL FE) 325 MG: 325 (65 FE) TAB at 18:17

## 2018-01-01 RX ADMIN — ROPINIROLE HYDROCHLORIDE 4 MG: 1 TABLET, FILM COATED ORAL at 11:53

## 2018-01-01 RX ADMIN — FENTANYL CITRATE 25 MCG: 50 INJECTION, SOLUTION INTRAMUSCULAR; INTRAVENOUS at 20:13

## 2018-01-01 RX ADMIN — AMLODIPINE BESYLATE 5 MG: 5 TABLET ORAL at 10:23

## 2018-01-01 RX ADMIN — FERROUS SULFATE TAB 325 MG (65 MG ELEMENTAL FE) 325 MG: 325 (65 FE) TAB at 08:00

## 2018-01-01 RX ADMIN — ENOXAPARIN SODIUM 30 MG: 30 INJECTION SUBCUTANEOUS at 10:22

## 2018-01-01 RX ADMIN — ISOSORBIDE MONONITRATE 60 MG: 60 TABLET ORAL at 11:15

## 2018-01-01 RX ADMIN — CETIRIZINE HYDROCHLORIDE 10 MG: 10 TABLET ORAL at 11:53

## 2018-01-01 RX ADMIN — FERROUS SULFATE TAB 325 MG (65 MG ELEMENTAL FE) 325 MG: 325 (65 FE) TAB at 18:08

## 2018-01-01 RX ADMIN — TIZANIDINE 2 MG: 4 TABLET ORAL at 01:14

## 2018-01-01 RX ADMIN — PANTOPRAZOLE SODIUM 40 MG: 40 TABLET, DELAYED RELEASE ORAL at 06:29

## 2018-01-01 RX ADMIN — SODIUM CHLORIDE 1000 ML: 900 IRRIGANT IRRIGATION at 11:00

## 2018-01-01 RX ADMIN — ZAFIRLUKAST 20 MG: 20 TABLET, FILM COATED ORAL at 09:34

## 2018-01-01 RX ADMIN — FERROUS SULFATE TAB 325 MG (65 MG ELEMENTAL FE) 325 MG: 325 (65 FE) TAB at 08:04

## 2018-01-01 RX ADMIN — ZAFIRLUKAST 20 MG: 20 TABLET, FILM COATED ORAL at 09:49

## 2018-01-01 RX ADMIN — OFLOXACIN 2 DROP: 3 SOLUTION OPHTHALMIC at 09:23

## 2018-01-01 RX ADMIN — Medication 10 ML: at 20:44

## 2018-01-01 RX ADMIN — OXYCODONE AND ACETAMINOPHEN 1 TABLET: 5; 325 TABLET ORAL at 10:22

## 2018-01-01 RX ADMIN — HYDROCODONE BITARTRATE AND ACETAMINOPHEN 1 TABLET: 5; 325 TABLET ORAL at 09:26

## 2018-01-01 RX ADMIN — HEPARIN SODIUM 5000 UNITS: 5000 INJECTION INTRAVENOUS; SUBCUTANEOUS at 06:35

## 2018-01-01 RX ADMIN — HYDROCODONE BITARTRATE AND ACETAMINOPHEN 1 TABLET: 5; 325 TABLET ORAL at 00:50

## 2018-01-01 RX ADMIN — ROPINIROLE HYDROCHLORIDE 4 MG: 1 TABLET, FILM COATED ORAL at 18:30

## 2018-01-01 RX ADMIN — METOPROLOL SUCCINATE 50 MG: 50 TABLET, FILM COATED, EXTENDED RELEASE ORAL at 08:23

## 2018-01-01 RX ADMIN — FERROUS SULFATE TAB 325 MG (65 MG ELEMENTAL FE) 325 MG: 325 (65 FE) TAB at 10:49

## 2018-01-01 RX ADMIN — ACETAMINOPHEN 650 MG: 325 TABLET ORAL at 13:58

## 2018-01-01 RX ADMIN — FLAVOXATE HYDROCHLORIDE 100 MG: 100 TABLET, FILM COATED ORAL at 18:11

## 2018-01-01 RX ADMIN — HYDROCODONE BITARTRATE AND ACETAMINOPHEN 1 TABLET: 5; 325 TABLET ORAL at 22:07

## 2018-01-01 RX ADMIN — LORAZEPAM 1 MG: 1 TABLET ORAL at 20:56

## 2018-01-01 RX ADMIN — TIZANIDINE 2 MG: 4 TABLET ORAL at 18:30

## 2018-01-01 RX ADMIN — MEGESTROL ACETATE 40 MG: 40 TABLET ORAL at 08:04

## 2018-01-01 RX ADMIN — ZAFIRLUKAST 20 MG: 20 TABLET, FILM COATED ORAL at 10:30

## 2018-01-01 RX ADMIN — HYDRALAZINE HYDROCHLORIDE 25 MG: 25 TABLET, FILM COATED ORAL at 14:47

## 2018-01-01 RX ADMIN — ATORVASTATIN CALCIUM 20 MG: 20 TABLET, FILM COATED ORAL at 08:32

## 2018-01-01 RX ADMIN — OXYCODONE AND ACETAMINOPHEN 1 TABLET: 5; 325 TABLET ORAL at 17:38

## 2018-01-01 RX ADMIN — TRAMADOL HYDROCHLORIDE 50 MG: 50 TABLET, FILM COATED ORAL at 04:41

## 2018-01-01 RX ADMIN — FERROUS SULFATE TAB 325 MG (65 MG ELEMENTAL FE) 325 MG: 325 (65 FE) TAB at 21:24

## 2018-01-01 RX ADMIN — HEPARIN SODIUM 5000 UNITS: 5000 INJECTION INTRAVENOUS; SUBCUTANEOUS at 13:05

## 2018-01-01 RX ADMIN — ZAFIRLUKAST 10 MG: 20 TABLET, COATED ORAL at 18:54

## 2018-01-01 RX ADMIN — FERROUS SULFATE TAB 325 MG (65 MG ELEMENTAL FE) 325 MG: 325 (65 FE) TAB at 08:13

## 2018-01-01 RX ADMIN — ONDANSETRON 4 MG: 2 INJECTION INTRAMUSCULAR; INTRAVENOUS at 04:55

## 2018-01-01 RX ADMIN — Medication 10 ML: at 08:13

## 2018-01-01 RX ADMIN — ZAFIRLUKAST 20 MG: 20 TABLET, FILM COATED ORAL at 09:10

## 2018-01-01 RX ADMIN — TRAMADOL HYDROCHLORIDE 50 MG: 50 TABLET, FILM COATED ORAL at 05:31

## 2018-01-01 RX ADMIN — OFLOXACIN 2 DROP: 3 SOLUTION OPHTHALMIC at 08:48

## 2018-01-01 RX ADMIN — ENOXAPARIN SODIUM 30 MG: 30 INJECTION SUBCUTANEOUS at 09:25

## 2018-01-01 RX ADMIN — ROPINIROLE HYDROCHLORIDE 4 MG: 1 TABLET, FILM COATED ORAL at 15:48

## 2018-01-01 RX ADMIN — ZAFIRLUKAST 20 MG: 20 TABLET, COATED ORAL at 20:20

## 2018-01-01 RX ADMIN — HYDROCODONE BITARTRATE AND ACETAMINOPHEN 2 TABLET: 5; 325 TABLET ORAL at 01:02

## 2018-01-01 RX ADMIN — ESCITALOPRAM OXALATE 10 MG: 10 TABLET, FILM COATED ORAL at 10:58

## 2018-01-01 RX ADMIN — ESCITALOPRAM OXALATE 10 MG: 10 TABLET, FILM COATED ORAL at 08:37

## 2018-01-01 RX ADMIN — FLUTICASONE PROPIONATE 2 SPRAY: 50 SPRAY, METERED NASAL at 09:50

## 2018-01-01 RX ADMIN — Medication 10 ML: at 08:19

## 2018-01-01 RX ADMIN — Medication 1 CAPSULE: at 10:11

## 2018-01-01 RX ADMIN — NEOSTIGMINE METHYLSULFATE 4 MG: 1 INJECTION, SOLUTION INTRAVENOUS at 14:53

## 2018-01-01 RX ADMIN — HYDRALAZINE HYDROCHLORIDE 25 MG: 25 TABLET, FILM COATED ORAL at 01:30

## 2018-01-01 RX ADMIN — FERROUS SULFATE TAB 325 MG (65 MG ELEMENTAL FE) 325 MG: 325 (65 FE) TAB at 17:32

## 2018-01-01 RX ADMIN — ZAFIRLUKAST 20 MG: 20 TABLET, COATED ORAL at 08:33

## 2018-01-01 RX ADMIN — TRAMADOL HYDROCHLORIDE 50 MG: 50 TABLET, FILM COATED ORAL at 18:01

## 2018-01-01 RX ADMIN — HYDROCODONE BITARTRATE AND ACETAMINOPHEN 1 TABLET: 5; 325 TABLET ORAL at 10:00

## 2018-01-01 RX ADMIN — ZAFIRLUKAST 20 MG: 20 TABLET, FILM COATED ORAL at 10:50

## 2018-01-01 RX ADMIN — ZAFIRLUKAST 20 MG: 20 TABLET, COATED ORAL at 09:38

## 2018-01-01 RX ADMIN — PANTOPRAZOLE SODIUM 40 MG: 40 TABLET, DELAYED RELEASE ORAL at 06:38

## 2018-01-01 RX ADMIN — HYDROCODONE BITARTRATE AND ACETAMINOPHEN 1 TABLET: 5; 325 TABLET ORAL at 09:27

## 2018-01-01 RX ADMIN — ROPINIROLE HYDROCHLORIDE 4 MG: 1 TABLET, FILM COATED ORAL at 08:12

## 2018-01-01 RX ADMIN — ALPRAZOLAM 0.5 MG: 0.5 TABLET ORAL at 09:26

## 2018-01-01 RX ADMIN — ROPINIROLE HYDROCHLORIDE 4 MG: 1 TABLET, FILM COATED ORAL at 20:58

## 2018-01-01 RX ADMIN — ALPRAZOLAM 0.5 MG: 0.5 TABLET ORAL at 20:49

## 2018-01-01 RX ADMIN — ATORVASTATIN CALCIUM 20 MG: 20 TABLET, FILM COATED ORAL at 09:38

## 2018-01-01 RX ADMIN — ROPINIROLE HYDROCHLORIDE 4 MG: 1 TABLET, FILM COATED ORAL at 19:37

## 2018-01-01 RX ADMIN — ZAFIRLUKAST 20 MG: 20 TABLET, COATED ORAL at 20:55

## 2018-01-01 RX ADMIN — PANTOPRAZOLE SODIUM 40 MG: 40 TABLET, DELAYED RELEASE ORAL at 09:00

## 2018-01-01 RX ADMIN — BUMETANIDE 1 MG: 0.25 INJECTION INTRAMUSCULAR; INTRAVENOUS at 08:12

## 2018-01-01 RX ADMIN — ISOSORBIDE MONONITRATE 60 MG: 60 TABLET ORAL at 09:50

## 2018-01-01 RX ADMIN — PANTOPRAZOLE SODIUM 40 MG: 40 TABLET, DELAYED RELEASE ORAL at 17:17

## 2018-01-01 RX ADMIN — SODIUM CHLORIDE 500 ML: 9 INJECTION, SOLUTION INTRAVENOUS at 20:45

## 2018-01-01 RX ADMIN — PANTOPRAZOLE SODIUM 40 MG: 40 TABLET, DELAYED RELEASE ORAL at 04:48

## 2018-01-01 RX ADMIN — FERROUS SULFATE TAB 325 MG (65 MG ELEMENTAL FE) 325 MG: 325 (65 FE) TAB at 16:27

## 2018-01-01 RX ADMIN — ACETAMINOPHEN 650 MG: 325 TABLET ORAL at 20:32

## 2018-01-01 RX ADMIN — AMLODIPINE BESYLATE 5 MG: 5 TABLET ORAL at 08:29

## 2018-01-01 RX ADMIN — ROPINIROLE HYDROCHLORIDE 4 MG: 1 TABLET, FILM COATED ORAL at 17:38

## 2018-01-01 RX ADMIN — TRAMADOL HYDROCHLORIDE 50 MG: 50 TABLET, FILM COATED ORAL at 18:57

## 2018-01-01 RX ADMIN — FENTANYL CITRATE 25 MCG: 50 INJECTION, SOLUTION INTRAMUSCULAR; INTRAVENOUS at 07:31

## 2018-01-01 RX ADMIN — OXYCODONE HYDROCHLORIDE 5 MG: 5 TABLET ORAL at 17:40

## 2018-01-01 RX ADMIN — FERROUS SULFATE TAB 325 MG (65 MG ELEMENTAL FE) 325 MG: 325 (65 FE) TAB at 16:25

## 2018-01-01 RX ADMIN — DICLOFENAC 2 G: 10 GEL TOPICAL at 17:19

## 2018-01-01 RX ADMIN — Medication 1 CAPSULE: at 17:19

## 2018-01-01 RX ADMIN — PANTOPRAZOLE SODIUM 40 MG: 40 TABLET, DELAYED RELEASE ORAL at 05:31

## 2018-01-01 RX ADMIN — ROPINIROLE HYDROCHLORIDE 4 MG: 1 TABLET, FILM COATED ORAL at 21:02

## 2018-01-01 RX ADMIN — LIDOCAINE HYDROCHLORIDE 60 MG: 20 INJECTION, SOLUTION INFILTRATION; PERINEURAL at 15:16

## 2018-01-01 RX ADMIN — HYDROCODONE BITARTRATE AND ACETAMINOPHEN 1 TABLET: 5; 325 TABLET ORAL at 23:22

## 2018-01-01 RX ADMIN — ALPRAZOLAM 0.5 MG: 0.5 TABLET ORAL at 10:12

## 2018-01-01 RX ADMIN — OXYCODONE AND ACETAMINOPHEN 1 TABLET: 5; 325 TABLET ORAL at 01:50

## 2018-01-01 RX ADMIN — FLUCONAZOLE 100 MG: 100 TABLET ORAL at 09:57

## 2018-01-01 RX ADMIN — CETIRIZINE HYDROCHLORIDE 10 MG: 10 TABLET ORAL at 09:15

## 2018-01-01 RX ADMIN — DICLOFENAC 2 G: 10 GEL TOPICAL at 08:13

## 2018-01-01 RX ADMIN — SODIUM CHLORIDE 500 ML: 9 INJECTION, SOLUTION INTRAVENOUS at 15:38

## 2018-01-01 RX ADMIN — SODIUM BICARBONATE 650 MG: 650 TABLET ORAL at 20:45

## 2018-01-01 RX ADMIN — FERROUS SULFATE TAB 325 MG (65 MG ELEMENTAL FE) 325 MG: 325 (65 FE) TAB at 18:01

## 2018-01-01 RX ADMIN — AMLODIPINE BESYLATE 5 MG: 5 TABLET ORAL at 08:34

## 2018-01-01 RX ADMIN — TRAMADOL HYDROCHLORIDE 50 MG: 50 TABLET, FILM COATED ORAL at 10:29

## 2018-01-01 RX ADMIN — OFLOXACIN 2 DROP: 3 SOLUTION OPHTHALMIC at 17:56

## 2018-01-01 RX ADMIN — ROPINIROLE HYDROCHLORIDE 4 MG: 1 TABLET, FILM COATED ORAL at 18:08

## 2018-01-01 RX ADMIN — ROPINIROLE HYDROCHLORIDE 4 MG: 1 TABLET, FILM COATED ORAL at 17:16

## 2018-01-01 RX ADMIN — OXYCODONE HYDROCHLORIDE 5 MG: 5 TABLET ORAL at 04:52

## 2018-01-01 RX ADMIN — OXYCODONE AND ACETAMINOPHEN 1 TABLET: 5; 325 TABLET ORAL at 14:47

## 2018-01-01 RX ADMIN — ALPRAZOLAM 0.5 MG: 0.5 TABLET ORAL at 09:38

## 2018-01-01 RX ADMIN — OFLOXACIN 1 DROP: 3 SOLUTION OPHTHALMIC at 09:24

## 2018-01-01 RX ADMIN — ESCITALOPRAM OXALATE 10 MG: 10 TABLET, FILM COATED ORAL at 10:49

## 2018-01-01 RX ADMIN — CETIRIZINE HYDROCHLORIDE 10 MG: 10 TABLET ORAL at 18:43

## 2018-01-01 RX ADMIN — ATORVASTATIN CALCIUM 20 MG: 20 TABLET, FILM COATED ORAL at 20:09

## 2018-01-01 RX ADMIN — HYDROCODONE BITARTRATE AND ACETAMINOPHEN 1 TABLET: 5; 325 TABLET ORAL at 06:24

## 2018-01-01 RX ADMIN — ALPRAZOLAM 0.5 MG: 0.5 TABLET ORAL at 22:49

## 2018-01-01 RX ADMIN — HYDROCODONE BITARTRATE AND ACETAMINOPHEN 1 TABLET: 5; 325 TABLET ORAL at 13:37

## 2018-01-01 RX ADMIN — ROPINIROLE HYDROCHLORIDE 4 MG: 1 TABLET, FILM COATED ORAL at 18:29

## 2018-01-01 RX ADMIN — HYDROCODONE BITARTRATE AND ACETAMINOPHEN 1 TABLET: 5; 325 TABLET ORAL at 17:48

## 2018-01-01 RX ADMIN — CETIRIZINE HYDROCHLORIDE 10 MG: 10 TABLET, FILM COATED ORAL at 14:57

## 2018-01-01 RX ADMIN — Medication 10 ML: at 21:55

## 2018-01-01 RX ADMIN — HYDRALAZINE HYDROCHLORIDE 25 MG: 25 TABLET, FILM COATED ORAL at 20:19

## 2018-01-01 RX ADMIN — PANTOPRAZOLE SODIUM 40 MG: 40 TABLET, DELAYED RELEASE ORAL at 15:09

## 2018-01-01 RX ADMIN — FLUTICASONE PROPIONATE 2 SPRAY: 50 SPRAY, METERED NASAL at 22:47

## 2018-01-01 RX ADMIN — CETIRIZINE HYDROCHLORIDE 10 MG: 10 TABLET ORAL at 10:05

## 2018-01-01 RX ADMIN — ATORVASTATIN CALCIUM 20 MG: 20 TABLET, FILM COATED ORAL at 22:21

## 2018-01-01 RX ADMIN — ZAFIRLUKAST 20 MG: 20 TABLET, FILM COATED ORAL at 22:13

## 2018-01-01 RX ADMIN — TRAMADOL HYDROCHLORIDE 50 MG: 50 TABLET, FILM COATED ORAL at 12:23

## 2018-01-01 RX ADMIN — PANTOPRAZOLE SODIUM 40 MG: 40 TABLET, DELAYED RELEASE ORAL at 16:54

## 2018-01-01 RX ADMIN — TRAMADOL HYDROCHLORIDE 50 MG: 50 TABLET, FILM COATED ORAL at 21:59

## 2018-01-01 RX ADMIN — SODIUM BICARBONATE 650 MG: 650 TABLET ORAL at 22:26

## 2018-01-01 RX ADMIN — OFLOXACIN 2 DROP: 3 SOLUTION OPHTHALMIC at 08:19

## 2018-01-01 RX ADMIN — ZAFIRLUKAST 20 MG: 20 TABLET, COATED ORAL at 10:12

## 2018-01-01 RX ADMIN — PANTOPRAZOLE SODIUM 40 MG: 40 TABLET, DELAYED RELEASE ORAL at 16:16

## 2018-01-01 RX ADMIN — TRAMADOL HYDROCHLORIDE 50 MG: 50 TABLET, FILM COATED ORAL at 10:59

## 2018-01-01 RX ADMIN — METOPROLOL SUCCINATE 25 MG: 25 TABLET, FILM COATED, EXTENDED RELEASE ORAL at 09:23

## 2018-01-01 RX ADMIN — CETIRIZINE HYDROCHLORIDE 10 MG: 10 TABLET ORAL at 08:34

## 2018-01-01 RX ADMIN — ROPINIROLE HYDROCHLORIDE 4 MG: 1 TABLET, FILM COATED ORAL at 08:13

## 2018-01-01 RX ADMIN — AMLODIPINE BESYLATE 5 MG: 5 TABLET ORAL at 10:50

## 2018-01-01 RX ADMIN — Medication 10 ML: at 12:56

## 2018-01-01 RX ADMIN — SODIUM BICARBONATE 1300 MG: 650 TABLET ORAL at 10:00

## 2018-01-01 RX ADMIN — TRAMADOL HYDROCHLORIDE 50 MG: 50 TABLET, FILM COATED ORAL at 22:15

## 2018-01-01 RX ADMIN — POTASSIUM CHLORIDE 10 MEQ: 750 TABLET, FILM COATED, EXTENDED RELEASE ORAL at 09:27

## 2018-01-01 RX ADMIN — ROPINIROLE HYDROCHLORIDE 4 MG: 1 TABLET, FILM COATED ORAL at 10:06

## 2018-01-01 RX ADMIN — CETIRIZINE HYDROCHLORIDE 10 MG: 10 TABLET, FILM COATED ORAL at 11:30

## 2018-01-01 RX ADMIN — LORAZEPAM 1 MG: 1 TABLET ORAL at 10:11

## 2018-01-01 RX ADMIN — FLUTICASONE PROPIONATE 2 SPRAY: 50 SPRAY, METERED NASAL at 09:00

## 2018-01-01 RX ADMIN — SODIUM BICARBONATE 650 MG: 650 TABLET ORAL at 09:26

## 2018-01-01 RX ADMIN — TRAMADOL HYDROCHLORIDE 50 MG: 50 TABLET, FILM COATED ORAL at 13:09

## 2018-01-01 RX ADMIN — SODIUM BICARBONATE 1300 MG: 650 TABLET ORAL at 18:48

## 2018-01-01 RX ADMIN — ZAFIRLUKAST 20 MG: 20 TABLET, FILM COATED ORAL at 23:23

## 2018-01-01 RX ADMIN — ALPRAZOLAM 0.5 MG: 0.5 TABLET ORAL at 19:51

## 2018-01-01 RX ADMIN — FLUTICASONE PROPIONATE 2 SPRAY: 50 SPRAY, METERED NASAL at 20:18

## 2018-01-01 RX ADMIN — TRAMADOL HYDROCHLORIDE 50 MG: 50 TABLET, FILM COATED ORAL at 22:03

## 2018-01-01 RX ADMIN — Medication 10 UNITS: at 15:38

## 2018-01-01 RX ADMIN — Medication 1 CAPSULE: at 11:16

## 2018-01-01 RX ADMIN — DICLOFENAC 2 G: 10 GEL TOPICAL at 11:31

## 2018-01-01 RX ADMIN — ALPRAZOLAM 0.5 MG: 0.5 TABLET ORAL at 08:38

## 2018-01-01 RX ADMIN — PREDNISOLONE ACETATE 1 DROP: 10 SUSPENSION/ DROPS OPHTHALMIC at 09:24

## 2018-01-01 RX ADMIN — ROPINIROLE HYDROCHLORIDE 4 MG: 1 TABLET, FILM COATED ORAL at 09:34

## 2018-01-01 RX ADMIN — FERROUS SULFATE TAB 325 MG (65 MG ELEMENTAL FE) 325 MG: 325 (65 FE) TAB at 09:25

## 2018-01-01 RX ADMIN — FERROUS SULFATE TAB 325 MG (65 MG ELEMENTAL FE) 325 MG: 325 (65 FE) TAB at 17:19

## 2018-01-01 RX ADMIN — SODIUM BICARBONATE 650 MG: 650 TABLET ORAL at 21:58

## 2018-01-01 RX ADMIN — PANTOPRAZOLE SODIUM 40 MG: 40 TABLET, DELAYED RELEASE ORAL at 17:40

## 2018-01-01 RX ADMIN — ZAFIRLUKAST 20 MG: 20 TABLET, FILM COATED ORAL at 09:18

## 2018-01-01 RX ADMIN — ISOSORBIDE MONONITRATE 60 MG: 60 TABLET ORAL at 09:58

## 2018-01-01 RX ADMIN — DICLOFENAC 2 G: 10 GEL TOPICAL at 09:16

## 2018-01-01 RX ADMIN — ZAFIRLUKAST 20 MG: 20 TABLET, FILM COATED ORAL at 19:51

## 2018-01-01 RX ADMIN — CETIRIZINE HYDROCHLORIDE 10 MG: 10 TABLET, FILM COATED ORAL at 08:05

## 2018-01-01 RX ADMIN — ENOXAPARIN SODIUM 30 MG: 30 INJECTION SUBCUTANEOUS at 08:19

## 2018-01-01 RX ADMIN — Medication 10 ML: at 23:03

## 2018-01-01 RX ADMIN — HYDROCODONE BITARTRATE AND ACETAMINOPHEN 1 TABLET: 5; 325 TABLET ORAL at 16:06

## 2018-01-01 RX ADMIN — Medication 125 MG: at 19:01

## 2018-01-01 RX ADMIN — HYDROCODONE BITARTRATE AND ACETAMINOPHEN 1 TABLET: 5; 325 TABLET ORAL at 11:55

## 2018-01-01 RX ADMIN — HYDROCODONE BITARTRATE AND ACETAMINOPHEN 1 TABLET: 5; 325 TABLET ORAL at 23:23

## 2018-01-01 RX ADMIN — OFLOXACIN 2 DROP: 3 SOLUTION OPHTHALMIC at 22:00

## 2018-01-01 RX ADMIN — FLUTICASONE PROPIONATE 2 SPRAY: 50 SPRAY, METERED NASAL at 10:24

## 2018-01-01 RX ADMIN — TIZANIDINE 2 MG: 4 TABLET ORAL at 05:31

## 2018-01-01 RX ADMIN — TRAMADOL HYDROCHLORIDE 50 MG: 50 TABLET, FILM COATED ORAL at 19:19

## 2018-01-01 RX ADMIN — PHENAZOPYRIDINE HYDROCHLORIDE 100 MG: 100 TABLET ORAL at 20:58

## 2018-01-01 RX ADMIN — ROPINIROLE HYDROCHLORIDE 4 MG: 1 TABLET, FILM COATED ORAL at 19:51

## 2018-01-01 RX ADMIN — ZAFIRLUKAST 10 MG: 20 TABLET, COATED ORAL at 18:20

## 2018-01-01 RX ADMIN — ZAFIRLUKAST 20 MG: 20 TABLET, FILM COATED ORAL at 09:15

## 2018-01-01 RX ADMIN — HYDROCODONE BITARTRATE AND ACETAMINOPHEN 1 TABLET: 5; 325 TABLET ORAL at 23:59

## 2018-01-01 RX ADMIN — GUAIFENESIN 600 MG: 600 TABLET, EXTENDED RELEASE ORAL at 22:00

## 2018-01-01 RX ADMIN — METOPROLOL SUCCINATE 50 MG: 50 TABLET, FILM COATED, EXTENDED RELEASE ORAL at 09:14

## 2018-01-01 RX ADMIN — SODIUM BICARBONATE 650 MG: 650 TABLET ORAL at 09:12

## 2018-01-01 RX ADMIN — Medication 1 CAPSULE: at 07:44

## 2018-01-01 RX ADMIN — HYDROCODONE BITARTRATE AND ACETAMINOPHEN 1 TABLET: 5; 325 TABLET ORAL at 18:08

## 2018-01-01 RX ADMIN — OXYCODONE AND ACETAMINOPHEN 1 TABLET: 5; 325 TABLET ORAL at 16:09

## 2018-01-01 RX ADMIN — ATORVASTATIN CALCIUM 20 MG: 20 TABLET, FILM COATED ORAL at 22:26

## 2018-01-01 RX ADMIN — FERROUS SULFATE TAB 325 MG (65 MG ELEMENTAL FE) 325 MG: 325 (65 FE) TAB at 09:15

## 2018-01-01 RX ADMIN — PANTOPRAZOLE SODIUM 40 MG: 40 TABLET, DELAYED RELEASE ORAL at 08:20

## 2018-01-01 RX ADMIN — LORAZEPAM 1 MG: 1 TABLET ORAL at 09:30

## 2018-01-01 RX ADMIN — OFLOXACIN 2 DROP: 3 SOLUTION OPHTHALMIC at 14:39

## 2018-01-01 RX ADMIN — FENTANYL CITRATE 50 MCG: 50 INJECTION, SOLUTION INTRAMUSCULAR; INTRAVENOUS at 15:20

## 2018-01-01 RX ADMIN — FLUTICASONE PROPIONATE 2 SPRAY: 50 SPRAY, METERED NASAL at 19:50

## 2018-01-01 RX ADMIN — ZAFIRLUKAST 20 MG: 20 TABLET, COATED ORAL at 23:45

## 2018-01-01 RX ADMIN — FLUTICASONE PROPIONATE 2 SPRAY: 50 SPRAY, METERED NASAL at 21:12

## 2018-01-01 RX ADMIN — HEPARIN SODIUM 5000 UNITS: 5000 INJECTION INTRAVENOUS; SUBCUTANEOUS at 09:25

## 2018-01-01 RX ADMIN — HEPARIN SODIUM 5000 UNITS: 5000 INJECTION INTRAVENOUS; SUBCUTANEOUS at 17:52

## 2018-01-01 RX ADMIN — PANTOPRAZOLE SODIUM 40 MG: 40 TABLET, DELAYED RELEASE ORAL at 09:15

## 2018-01-01 RX ADMIN — HEPARIN SODIUM 5000 UNITS: 5000 INJECTION INTRAVENOUS; SUBCUTANEOUS at 02:18

## 2018-01-01 RX ADMIN — CETIRIZINE HYDROCHLORIDE 10 MG: 10 TABLET, FILM COATED ORAL at 08:13

## 2018-01-01 RX ADMIN — ZAFIRLUKAST 20 MG: 20 TABLET, FILM COATED ORAL at 23:03

## 2018-01-01 RX ADMIN — ALPRAZOLAM 0.5 MG: 0.5 TABLET ORAL at 20:29

## 2018-01-01 RX ADMIN — CETIRIZINE HYDROCHLORIDE 10 MG: 10 TABLET, FILM COATED ORAL at 09:14

## 2018-01-01 RX ADMIN — ENOXAPARIN SODIUM 30 MG: 30 INJECTION SUBCUTANEOUS at 11:00

## 2018-01-01 RX ADMIN — ATORVASTATIN CALCIUM 20 MG: 20 TABLET, FILM COATED ORAL at 20:54

## 2018-01-01 RX ADMIN — OXYCODONE AND ACETAMINOPHEN 1 TABLET: 5; 325 TABLET ORAL at 20:58

## 2018-01-01 RX ADMIN — BUMETANIDE 0.5 MG: 1 TABLET ORAL at 09:21

## 2018-01-01 RX ADMIN — METOPROLOL SUCCINATE 25 MG: 25 TABLET, FILM COATED, EXTENDED RELEASE ORAL at 09:46

## 2018-01-01 RX ADMIN — FERROUS SULFATE TAB 325 MG (65 MG ELEMENTAL FE) 325 MG: 325 (65 FE) TAB at 18:05

## 2018-01-01 RX ADMIN — ZAFIRLUKAST 20 MG: 20 TABLET, COATED ORAL at 08:24

## 2018-01-01 RX ADMIN — FERROUS SULFATE TAB 325 MG (65 MG ELEMENTAL FE) 325 MG: 325 (65 FE) TAB at 10:11

## 2018-01-01 RX ADMIN — ESCITALOPRAM OXALATE 10 MG: 10 TABLET, FILM COATED ORAL at 09:59

## 2018-01-01 RX ADMIN — OXYBUTYNIN CHLORIDE 5 MG: 5 TABLET ORAL at 18:28

## 2018-01-01 RX ADMIN — PHENYLEPHRINE HYDROCHLORIDE 100 MCG: 10 INJECTION INTRAVENOUS at 15:16

## 2018-01-01 RX ADMIN — ESCITALOPRAM OXALATE 10 MG: 10 TABLET, FILM COATED ORAL at 08:14

## 2018-01-01 RX ADMIN — ENOXAPARIN SODIUM 30 MG: 30 INJECTION SUBCUTANEOUS at 09:34

## 2018-01-01 RX ADMIN — DICLOFENAC 2 G: 10 GEL TOPICAL at 11:50

## 2018-01-01 RX ADMIN — ZAFIRLUKAST 20 MG: 20 TABLET, COATED ORAL at 20:45

## 2018-01-01 RX ADMIN — ATORVASTATIN CALCIUM 20 MG: 20 TABLET, FILM COATED ORAL at 20:29

## 2018-01-01 RX ADMIN — Medication 1 CAPSULE: at 16:16

## 2018-01-01 RX ADMIN — OFLOXACIN 1 DROP: 3 SOLUTION OPHTHALMIC at 09:33

## 2018-01-01 RX ADMIN — CEFAZOLIN 2000 MG: 1 INJECTION, POWDER, FOR SOLUTION INTRAMUSCULAR; INTRAVENOUS; PARENTERAL at 14:38

## 2018-01-01 RX ADMIN — ATORVASTATIN CALCIUM 20 MG: 20 TABLET, FILM COATED ORAL at 21:56

## 2018-01-01 RX ADMIN — TRAMADOL HYDROCHLORIDE 50 MG: 50 TABLET, FILM COATED ORAL at 21:35

## 2018-01-01 RX ADMIN — ESCITALOPRAM OXALATE 10 MG: 10 TABLET, FILM COATED ORAL at 09:31

## 2018-01-01 RX ADMIN — CEFTRIAXONE SODIUM 1 G: 1 INJECTION, POWDER, FOR SOLUTION INTRAMUSCULAR; INTRAVENOUS at 12:39

## 2018-01-01 RX ADMIN — LABETALOL HYDROCHLORIDE 10 MG: 5 INJECTION, SOLUTION INTRAVENOUS at 08:44

## 2018-01-01 RX ADMIN — PANTOPRAZOLE SODIUM 40 MG: 40 TABLET, DELAYED RELEASE ORAL at 06:23

## 2018-01-01 RX ADMIN — ROPINIROLE HYDROCHLORIDE 4 MG: 1 TABLET, FILM COATED ORAL at 10:50

## 2018-01-01 RX ADMIN — CETIRIZINE HYDROCHLORIDE 10 MG: 10 TABLET ORAL at 10:23

## 2018-01-01 RX ADMIN — METOPROLOL SUCCINATE 50 MG: 50 TABLET, FILM COATED, EXTENDED RELEASE ORAL at 10:33

## 2018-01-01 RX ADMIN — Medication 10 ML: at 12:12

## 2018-01-01 RX ADMIN — Medication 1 CAPSULE: at 08:04

## 2018-01-01 RX ADMIN — BUMETANIDE 0.5 MG: 1 TABLET ORAL at 10:28

## 2018-01-01 RX ADMIN — CIPROFLOXACIN 400 MG: 2 INJECTION, SOLUTION INTRAVENOUS at 15:23

## 2018-01-01 RX ADMIN — LORAZEPAM 1 MG: 1 TABLET ORAL at 09:33

## 2018-01-01 RX ADMIN — ATORVASTATIN CALCIUM 20 MG: 20 TABLET, FILM COATED ORAL at 21:13

## 2018-01-01 RX ADMIN — ZAFIRLUKAST 20 MG: 20 TABLET, FILM COATED ORAL at 22:00

## 2018-01-01 RX ADMIN — ATORVASTATIN CALCIUM 20 MG: 20 TABLET, FILM COATED ORAL at 20:06

## 2018-01-01 RX ADMIN — FLUTICASONE PROPIONATE 2 SPRAY: 50 SPRAY, METERED NASAL at 08:29

## 2018-01-01 RX ADMIN — AMOXICILLIN 500 MG: 500 CAPSULE ORAL at 09:18

## 2018-01-01 RX ADMIN — HYDROCODONE BITARTRATE AND ACETAMINOPHEN 1 TABLET: 5; 325 TABLET ORAL at 17:33

## 2018-01-01 RX ADMIN — ISOSORBIDE MONONITRATE 120 MG: 60 TABLET ORAL at 09:32

## 2018-01-01 RX ADMIN — GUAIFENESIN 600 MG: 600 TABLET, EXTENDED RELEASE ORAL at 09:00

## 2018-01-01 RX ADMIN — HYDROCODONE BITARTRATE AND ACETAMINOPHEN 2 TABLET: 5; 325 TABLET ORAL at 06:38

## 2018-01-01 RX ADMIN — AMLODIPINE BESYLATE 5 MG: 5 TABLET ORAL at 09:32

## 2018-01-01 RX ADMIN — ZAFIRLUKAST 20 MG: 20 TABLET, COATED ORAL at 10:02

## 2018-01-01 RX ADMIN — HYDROCODONE BITARTRATE AND ACETAMINOPHEN 1 TABLET: 5; 325 TABLET ORAL at 15:36

## 2018-01-01 RX ADMIN — HYDROCODONE BITARTRATE AND ACETAMINOPHEN 1 TABLET: 5; 325 TABLET ORAL at 11:26

## 2018-01-01 RX ADMIN — LORAZEPAM 1 MG: 1 TABLET ORAL at 07:43

## 2018-01-01 RX ADMIN — ZAFIRLUKAST 20 MG: 20 TABLET, FILM COATED ORAL at 20:16

## 2018-01-01 RX ADMIN — ROPINIROLE HYDROCHLORIDE 4 MG: 1 TABLET, FILM COATED ORAL at 10:12

## 2018-01-01 RX ADMIN — DICLOFENAC 2 G: 10 GEL TOPICAL at 13:11

## 2018-01-01 RX ADMIN — FLUTICASONE PROPIONATE 2 SPRAY: 50 SPRAY, METERED NASAL at 23:23

## 2018-01-01 RX ADMIN — ZAFIRLUKAST 10 MG: 20 TABLET, COATED ORAL at 05:00

## 2018-01-01 RX ADMIN — HYDROCODONE BITARTRATE AND ACETAMINOPHEN 1 TABLET: 5; 325 TABLET ORAL at 23:14

## 2018-01-01 RX ADMIN — HEPARIN SODIUM 5000 UNITS: 5000 INJECTION INTRAVENOUS; SUBCUTANEOUS at 18:48

## 2018-01-01 RX ADMIN — ALPRAZOLAM 0.5 MG: 0.5 TABLET ORAL at 10:20

## 2018-01-01 RX ADMIN — ESCITALOPRAM OXALATE 10 MG: 10 TABLET, FILM COATED ORAL at 07:41

## 2018-01-01 RX ADMIN — ISOSORBIDE MONONITRATE 60 MG: 60 TABLET ORAL at 08:13

## 2018-01-01 RX ADMIN — CLONIDINE HYDROCHLORIDE 0.1 MG: 0.1 TABLET ORAL at 09:49

## 2018-01-01 RX ADMIN — PANTOPRAZOLE SODIUM 40 MG: 40 TABLET, DELAYED RELEASE ORAL at 05:00

## 2018-01-01 RX ADMIN — METOPROLOL SUCCINATE 50 MG: 50 TABLET, FILM COATED, EXTENDED RELEASE ORAL at 09:33

## 2018-01-01 RX ADMIN — LORAZEPAM 1 MG: 1 TABLET ORAL at 15:34

## 2018-01-01 RX ADMIN — ENOXAPARIN SODIUM 30 MG: 30 INJECTION SUBCUTANEOUS at 08:50

## 2018-01-01 RX ADMIN — ZAFIRLUKAST 20 MG: 20 TABLET, FILM COATED ORAL at 21:56

## 2018-01-01 RX ADMIN — ZAFIRLUKAST 20 MG: 20 TABLET, COATED ORAL at 05:22

## 2018-01-01 RX ADMIN — TRAMADOL HYDROCHLORIDE 50 MG: 50 TABLET, FILM COATED ORAL at 11:41

## 2018-01-01 RX ADMIN — PANTOPRAZOLE SODIUM 40 MG: 40 TABLET, DELAYED RELEASE ORAL at 18:01

## 2018-01-01 RX ADMIN — ROPINIROLE HYDROCHLORIDE 4 MG: 1 TABLET, FILM COATED ORAL at 10:13

## 2018-01-01 RX ADMIN — OXYBUTYNIN CHLORIDE 5 MG: 5 TABLET ORAL at 15:48

## 2018-01-01 RX ADMIN — PANTOPRAZOLE SODIUM 40 MG: 40 TABLET, DELAYED RELEASE ORAL at 15:46

## 2018-01-01 RX ADMIN — FLUTICASONE PROPIONATE 2 SPRAY: 50 SPRAY, METERED NASAL at 22:52

## 2018-01-01 RX ADMIN — LORAZEPAM 2 MG: 2 TABLET ORAL at 20:58

## 2018-01-01 RX ADMIN — SODIUM BICARBONATE 1300 MG: 650 TABLET ORAL at 21:48

## 2018-01-01 RX ADMIN — FERROUS SULFATE TAB 325 MG (65 MG ELEMENTAL FE) 325 MG: 325 (65 FE) TAB at 17:03

## 2018-01-01 RX ADMIN — CETIRIZINE HYDROCHLORIDE 10 MG: 10 TABLET ORAL at 09:10

## 2018-01-01 RX ADMIN — TRAMADOL HYDROCHLORIDE 50 MG: 50 TABLET, FILM COATED ORAL at 11:33

## 2018-01-01 RX ADMIN — ALPRAZOLAM 0.5 MG: 0.5 TABLET ORAL at 22:51

## 2018-01-01 RX ADMIN — LISINOPRIL 2.5 MG: 2.5 TABLET ORAL at 18:28

## 2018-01-01 RX ADMIN — OXYCODONE AND ACETAMINOPHEN 1 TABLET: 5; 325 TABLET ORAL at 14:37

## 2018-01-01 ASSESSMENT — PAIN SCALES - GENERAL
PAINLEVEL_OUTOF10: 8
PAINLEVEL_OUTOF10: 6
PAINLEVEL_OUTOF10: 5
PAINLEVEL_OUTOF10: 0
PAINLEVEL_OUTOF10: 9
PAINLEVEL_OUTOF10: 7
PAINLEVEL_OUTOF10: 7
PAINLEVEL_OUTOF10: 8
PAINLEVEL_OUTOF10: 0
PAINLEVEL_OUTOF10: 3
PAINLEVEL_OUTOF10: 7
PAINLEVEL_OUTOF10: 9
PAINLEVEL_OUTOF10: 6
PAINLEVEL_OUTOF10: 6
PAINLEVEL_OUTOF10: 5
PAINLEVEL_OUTOF10: 0
PAINLEVEL_OUTOF10: 6
PAINLEVEL_OUTOF10: 7
PAINLEVEL_OUTOF10: 4
PAINLEVEL_OUTOF10: 0
PAINLEVEL_OUTOF10: 6
PAINLEVEL_OUTOF10: 3
PAINLEVEL_OUTOF10: 8
PAINLEVEL_OUTOF10: 0
PAINLEVEL_OUTOF10: 8
PAINLEVEL_OUTOF10: 9
PAINLEVEL_OUTOF10: 7
PAINLEVEL_OUTOF10: 0
PAINLEVEL_OUTOF10: 7
PAINLEVEL_OUTOF10: 5
PAINLEVEL_OUTOF10: 0
PAINLEVEL_OUTOF10: 7
PAINLEVEL_OUTOF10: 5
PAINLEVEL_OUTOF10: 6
PAINLEVEL_OUTOF10: 9
PAINLEVEL_OUTOF10: 6
PAINLEVEL_OUTOF10: 9
PAINLEVEL_OUTOF10: 2
PAINLEVEL_OUTOF10: 0
PAINLEVEL_OUTOF10: 5
PAINLEVEL_OUTOF10: 7
PAINLEVEL_OUTOF10: 9
PAINLEVEL_OUTOF10: 6
PAINLEVEL_OUTOF10: 5
PAINLEVEL_OUTOF10: 3
PAINLEVEL_OUTOF10: 5
PAINLEVEL_OUTOF10: 7
PAINLEVEL_OUTOF10: 8
PAINLEVEL_OUTOF10: 7
PAINLEVEL_OUTOF10: 5
PAINLEVEL_OUTOF10: 5
PAINLEVEL_OUTOF10: 3
PAINLEVEL_OUTOF10: 5
PAINLEVEL_OUTOF10: 5
PAINLEVEL_OUTOF10: 4
PAINLEVEL_OUTOF10: 0
PAINLEVEL_OUTOF10: 6
PAINLEVEL_OUTOF10: 6
PAINLEVEL_OUTOF10: 7
PAINLEVEL_OUTOF10: 1
PAINLEVEL_OUTOF10: 8
PAINLEVEL_OUTOF10: 9
PAINLEVEL_OUTOF10: 8
PAINLEVEL_OUTOF10: 3
PAINLEVEL_OUTOF10: 7
PAINLEVEL_OUTOF10: 4
PAINLEVEL_OUTOF10: 7
PAINLEVEL_OUTOF10: 4
PAINLEVEL_OUTOF10: 3
PAINLEVEL_OUTOF10: 6
PAINLEVEL_OUTOF10: 7
PAINLEVEL_OUTOF10: 6
PAINLEVEL_OUTOF10: 8
PAINLEVEL_OUTOF10: 4
PAINLEVEL_OUTOF10: 5
PAINLEVEL_OUTOF10: 10
PAINLEVEL_OUTOF10: 6
PAINLEVEL_OUTOF10: 7
PAINLEVEL_OUTOF10: 7
PAINLEVEL_OUTOF10: 4
PAINLEVEL_OUTOF10: 6
PAINLEVEL_OUTOF10: 5
PAINLEVEL_OUTOF10: 8
PAINLEVEL_OUTOF10: 8
PAINLEVEL_OUTOF10: 7
PAINLEVEL_OUTOF10: 7
PAINLEVEL_OUTOF10: 6
PAINLEVEL_OUTOF10: 8
PAINLEVEL_OUTOF10: 8
PAINLEVEL_OUTOF10: 6
PAINLEVEL_OUTOF10: 0
PAINLEVEL_OUTOF10: 3
PAINLEVEL_OUTOF10: 0
PAINLEVEL_OUTOF10: 8
PAINLEVEL_OUTOF10: 7
PAINLEVEL_OUTOF10: 3
PAINLEVEL_OUTOF10: 1
PAINLEVEL_OUTOF10: 0
PAINLEVEL_OUTOF10: 0
PAINLEVEL_OUTOF10: 7
PAINLEVEL_OUTOF10: 7
PAINLEVEL_OUTOF10: 9
PAINLEVEL_OUTOF10: 4
PAINLEVEL_OUTOF10: 7
PAINLEVEL_OUTOF10: 5
PAINLEVEL_OUTOF10: 8
PAINLEVEL_OUTOF10: 8
PAINLEVEL_OUTOF10: 0
PAINLEVEL_OUTOF10: 5
PAINLEVEL_OUTOF10: 6
PAINLEVEL_OUTOF10: 4
PAINLEVEL_OUTOF10: 7
PAINLEVEL_OUTOF10: 10
PAINLEVEL_OUTOF10: 4
PAINLEVEL_OUTOF10: 7
PAINLEVEL_OUTOF10: 8
PAINLEVEL_OUTOF10: 10
PAINLEVEL_OUTOF10: 5
PAINLEVEL_OUTOF10: 10
PAINLEVEL_OUTOF10: 0
PAINLEVEL_OUTOF10: 3
PAINLEVEL_OUTOF10: 7
PAINLEVEL_OUTOF10: 6
PAINLEVEL_OUTOF10: 8
PAINLEVEL_OUTOF10: 0
PAINLEVEL_OUTOF10: 8
PAINLEVEL_OUTOF10: 0
PAINLEVEL_OUTOF10: 7
PAINLEVEL_OUTOF10: 8
PAINLEVEL_OUTOF10: 8
PAINLEVEL_OUTOF10: 6
PAINLEVEL_OUTOF10: 5
PAINLEVEL_OUTOF10: 5
PAINLEVEL_OUTOF10: 7
PAINLEVEL_OUTOF10: 7
PAINLEVEL_OUTOF10: 5
PAINLEVEL_OUTOF10: 5
PAINLEVEL_OUTOF10: 7
PAINLEVEL_OUTOF10: 7
PAINLEVEL_OUTOF10: 5
PAINLEVEL_OUTOF10: 8
PAINLEVEL_OUTOF10: 0
PAINLEVEL_OUTOF10: 8
PAINLEVEL_OUTOF10: 9
PAINLEVEL_OUTOF10: 0
PAINLEVEL_OUTOF10: 5
PAINLEVEL_OUTOF10: 3
PAINLEVEL_OUTOF10: 8
PAINLEVEL_OUTOF10: 5
PAINLEVEL_OUTOF10: 7
PAINLEVEL_OUTOF10: 8
PAINLEVEL_OUTOF10: 6
PAINLEVEL_OUTOF10: 7
PAINLEVEL_OUTOF10: 7
PAINLEVEL_OUTOF10: 6
PAINLEVEL_OUTOF10: 7
PAINLEVEL_OUTOF10: 8
PAINLEVEL_OUTOF10: 5
PAINLEVEL_OUTOF10: 3
PAINLEVEL_OUTOF10: 8
PAINLEVEL_OUTOF10: 6
PAINLEVEL_OUTOF10: 5
PAINLEVEL_OUTOF10: 4
PAINLEVEL_OUTOF10: 7
PAINLEVEL_OUTOF10: 7
PAINLEVEL_OUTOF10: 5
PAINLEVEL_OUTOF10: 8
PAINLEVEL_OUTOF10: 5
PAINLEVEL_OUTOF10: 8
PAINLEVEL_OUTOF10: 7
PAINLEVEL_OUTOF10: 7
PAINLEVEL_OUTOF10: 4
PAINLEVEL_OUTOF10: 5
PAINLEVEL_OUTOF10: 7
PAINLEVEL_OUTOF10: 5
PAINLEVEL_OUTOF10: 9
PAINLEVEL_OUTOF10: 8
PAINLEVEL_OUTOF10: 7
PAINLEVEL_OUTOF10: 10
PAINLEVEL_OUTOF10: 7
PAINLEVEL_OUTOF10: 7
PAINLEVEL_OUTOF10: 4
PAINLEVEL_OUTOF10: 6
PAINLEVEL_OUTOF10: 0
PAINLEVEL_OUTOF10: 9
PAINLEVEL_OUTOF10: 9
PAINLEVEL_OUTOF10: 6
PAINLEVEL_OUTOF10: 8
PAINLEVEL_OUTOF10: 4
PAINLEVEL_OUTOF10: 7
PAINLEVEL_OUTOF10: 5
PAINLEVEL_OUTOF10: 9
PAINLEVEL_OUTOF10: 6
PAINLEVEL_OUTOF10: 8
PAINLEVEL_OUTOF10: 7
PAINLEVEL_OUTOF10: 8
PAINLEVEL_OUTOF10: 8
PAINLEVEL_OUTOF10: 7
PAINLEVEL_OUTOF10: 0
PAINLEVEL_OUTOF10: 5
PAINLEVEL_OUTOF10: 9
PAINLEVEL_OUTOF10: 7
PAINLEVEL_OUTOF10: 6
PAINLEVEL_OUTOF10: 0
PAINLEVEL_OUTOF10: 4
PAINLEVEL_OUTOF10: 4
PAINLEVEL_OUTOF10: 8
PAINLEVEL_OUTOF10: 0
PAINLEVEL_OUTOF10: 8
PAINLEVEL_OUTOF10: 0
PAINLEVEL_OUTOF10: 7
PAINLEVEL_OUTOF10: 7
PAINLEVEL_OUTOF10: 9
PAINLEVEL_OUTOF10: 8
PAINLEVEL_OUTOF10: 9
PAINLEVEL_OUTOF10: 9
PAINLEVEL_OUTOF10: 8
PAINLEVEL_OUTOF10: 6
PAINLEVEL_OUTOF10: 8
PAINLEVEL_OUTOF10: 9
PAINLEVEL_OUTOF10: 9
PAINLEVEL_OUTOF10: 6
PAINLEVEL_OUTOF10: 0
PAINLEVEL_OUTOF10: 0
PAINLEVEL_OUTOF10: 5
PAINLEVEL_OUTOF10: 6
PAINLEVEL_OUTOF10: 6
PAINLEVEL_OUTOF10: 0
PAINLEVEL_OUTOF10: 7
PAINLEVEL_OUTOF10: 4
PAINLEVEL_OUTOF10: 8
PAINLEVEL_OUTOF10: 4
PAINLEVEL_OUTOF10: 7
PAINLEVEL_OUTOF10: 0
PAINLEVEL_OUTOF10: 8
PAINLEVEL_OUTOF10: 8
PAINLEVEL_OUTOF10: 5
PAINLEVEL_OUTOF10: 7
PAINLEVEL_OUTOF10: 0
PAINLEVEL_OUTOF10: 7
PAINLEVEL_OUTOF10: 3
PAINLEVEL_OUTOF10: 0
PAINLEVEL_OUTOF10: 5
PAINLEVEL_OUTOF10: 6
PAINLEVEL_OUTOF10: 5
PAINLEVEL_OUTOF10: 0
PAINLEVEL_OUTOF10: 5
PAINLEVEL_OUTOF10: 9
PAINLEVEL_OUTOF10: 5
PAINLEVEL_OUTOF10: 5
PAINLEVEL_OUTOF10: 7
PAINLEVEL_OUTOF10: 8
PAINLEVEL_OUTOF10: 8
PAINLEVEL_OUTOF10: 9
PAINLEVEL_OUTOF10: 6
PAINLEVEL_OUTOF10: 0
PAINLEVEL_OUTOF10: 4
PAINLEVEL_OUTOF10: 7
PAINLEVEL_OUTOF10: 7
PAINLEVEL_OUTOF10: 6
PAINLEVEL_OUTOF10: 5
PAINLEVEL_OUTOF10: 8
PAINLEVEL_OUTOF10: 5
PAINLEVEL_OUTOF10: 8
PAINLEVEL_OUTOF10: 6
PAINLEVEL_OUTOF10: 9
PAINLEVEL_OUTOF10: 3
PAINLEVEL_OUTOF10: 5
PAINLEVEL_OUTOF10: 7
PAINLEVEL_OUTOF10: 7
PAINLEVEL_OUTOF10: 4
PAINLEVEL_OUTOF10: 5
PAINLEVEL_OUTOF10: 7
PAINLEVEL_OUTOF10: 6
PAINLEVEL_OUTOF10: 8
PAINLEVEL_OUTOF10: 8
PAINLEVEL_OUTOF10: 5
PAINLEVEL_OUTOF10: 5
PAINLEVEL_OUTOF10: 9
PAINLEVEL_OUTOF10: 0
PAINLEVEL_OUTOF10: 6
PAINLEVEL_OUTOF10: 9
PAINLEVEL_OUTOF10: 0
PAINLEVEL_OUTOF10: 9
PAINLEVEL_OUTOF10: 0
PAINLEVEL_OUTOF10: 8
PAINLEVEL_OUTOF10: 7
PAINLEVEL_OUTOF10: 8
PAINLEVEL_OUTOF10: 6
PAINLEVEL_OUTOF10: 7
PAINLEVEL_OUTOF10: 7
PAINLEVEL_OUTOF10: 9
PAINLEVEL_OUTOF10: 9
PAINLEVEL_OUTOF10: 8
PAINLEVEL_OUTOF10: 6
PAINLEVEL_OUTOF10: 7
PAINLEVEL_OUTOF10: 6
PAINLEVEL_OUTOF10: 9
PAINLEVEL_OUTOF10: 7
PAINLEVEL_OUTOF10: 8
PAINLEVEL_OUTOF10: 7
PAINLEVEL_OUTOF10: 4
PAINLEVEL_OUTOF10: 9
PAINLEVEL_OUTOF10: 8
PAINLEVEL_OUTOF10: 6
PAINLEVEL_OUTOF10: 2
PAINLEVEL_OUTOF10: 9
PAINLEVEL_OUTOF10: 9
PAINLEVEL_OUTOF10: 10
PAINLEVEL_OUTOF10: 0
PAINLEVEL_OUTOF10: 9
PAINLEVEL_OUTOF10: 4
PAINLEVEL_OUTOF10: 7
PAINLEVEL_OUTOF10: 6
PAINLEVEL_OUTOF10: 4
PAINLEVEL_OUTOF10: 8
PAINLEVEL_OUTOF10: 7
PAINLEVEL_OUTOF10: 6
PAINLEVEL_OUTOF10: 7
PAINLEVEL_OUTOF10: 7
PAINLEVEL_OUTOF10: 9
PAINLEVEL_OUTOF10: 3
PAINLEVEL_OUTOF10: 0
PAINLEVEL_OUTOF10: 7
PAINLEVEL_OUTOF10: 7
PAINLEVEL_OUTOF10: 0
PAINLEVEL_OUTOF10: 5
PAINLEVEL_OUTOF10: 7
PAINLEVEL_OUTOF10: 2
PAINLEVEL_OUTOF10: 3
PAINLEVEL_OUTOF10: 5
PAINLEVEL_OUTOF10: 7
PAINLEVEL_OUTOF10: 9
PAINLEVEL_OUTOF10: 6
PAINLEVEL_OUTOF10: 6
PAINLEVEL_OUTOF10: 0
PAINLEVEL_OUTOF10: 5
PAINLEVEL_OUTOF10: 5
PAINLEVEL_OUTOF10: 6
PAINLEVEL_OUTOF10: 4
PAINLEVEL_OUTOF10: 9
PAINLEVEL_OUTOF10: 7
PAINLEVEL_OUTOF10: 5
PAINLEVEL_OUTOF10: 8
PAINLEVEL_OUTOF10: 7
PAINLEVEL_OUTOF10: 8
PAINLEVEL_OUTOF10: 3
PAINLEVEL_OUTOF10: 6
PAINLEVEL_OUTOF10: 4
PAINLEVEL_OUTOF10: 7
PAINLEVEL_OUTOF10: 8
PAINLEVEL_OUTOF10: 8
PAINLEVEL_OUTOF10: 0
PAINLEVEL_OUTOF10: 7
PAINLEVEL_OUTOF10: 8
PAINLEVEL_OUTOF10: 9
PAINLEVEL_OUTOF10: 9
PAINLEVEL_OUTOF10: 0
PAINLEVEL_OUTOF10: 8
PAINLEVEL_OUTOF10: 9
PAINLEVEL_OUTOF10: 8
PAINLEVEL_OUTOF10: 5
PAINLEVEL_OUTOF10: 9
PAINLEVEL_OUTOF10: 8
PAINLEVEL_OUTOF10: 7
PAINLEVEL_OUTOF10: 9
PAINLEVEL_OUTOF10: 7
PAINLEVEL_OUTOF10: 8
PAINLEVEL_OUTOF10: 2
PAINLEVEL_OUTOF10: 4
PAINLEVEL_OUTOF10: 8
PAINLEVEL_OUTOF10: 5
PAINLEVEL_OUTOF10: 7
PAINLEVEL_OUTOF10: 3
PAINLEVEL_OUTOF10: 0
PAINLEVEL_OUTOF10: 8
PAINLEVEL_OUTOF10: 4
PAINLEVEL_OUTOF10: 9
PAINLEVEL_OUTOF10: 7
PAINLEVEL_OUTOF10: 8
PAINLEVEL_OUTOF10: 8
PAINLEVEL_OUTOF10: 5
PAINLEVEL_OUTOF10: 8
PAINLEVEL_OUTOF10: 7
PAINLEVEL_OUTOF10: 7
PAINLEVEL_OUTOF10: 6
PAINLEVEL_OUTOF10: 9
PAINLEVEL_OUTOF10: 8
PAINLEVEL_OUTOF10: 6
PAINLEVEL_OUTOF10: 7
PAINLEVEL_OUTOF10: 3
PAINLEVEL_OUTOF10: 7
PAINLEVEL_OUTOF10: 0
PAINLEVEL_OUTOF10: 3
PAINLEVEL_OUTOF10: 7
PAINLEVEL_OUTOF10: 4
PAINLEVEL_OUTOF10: 6
PAINLEVEL_OUTOF10: 4
PAINLEVEL_OUTOF10: 4
PAINLEVEL_OUTOF10: 6
PAINLEVEL_OUTOF10: 6
PAINLEVEL_OUTOF10: 10
PAINLEVEL_OUTOF10: 0
PAINLEVEL_OUTOF10: 3
PAINLEVEL_OUTOF10: 4
PAINLEVEL_OUTOF10: 0
PAINLEVEL_OUTOF10: 7
PAINLEVEL_OUTOF10: 8
PAINLEVEL_OUTOF10: 0
PAINLEVEL_OUTOF10: 5
PAINLEVEL_OUTOF10: 0
PAINLEVEL_OUTOF10: 8
PAINLEVEL_OUTOF10: 5
PAINLEVEL_OUTOF10: 10
PAINLEVEL_OUTOF10: 9
PAINLEVEL_OUTOF10: 6
PAINLEVEL_OUTOF10: 0
PAINLEVEL_OUTOF10: 9
PAINLEVEL_OUTOF10: 7
PAINLEVEL_OUTOF10: 0
PAINLEVEL_OUTOF10: 7
PAINLEVEL_OUTOF10: 5
PAINLEVEL_OUTOF10: 10
PAINLEVEL_OUTOF10: 8
PAINLEVEL_OUTOF10: 8
PAINLEVEL_OUTOF10: 9
PAINLEVEL_OUTOF10: 7
PAINLEVEL_OUTOF10: 5
PAINLEVEL_OUTOF10: 0
PAINLEVEL_OUTOF10: 8
PAINLEVEL_OUTOF10: 3
PAINLEVEL_OUTOF10: 8
PAINLEVEL_OUTOF10: 5
PAINLEVEL_OUTOF10: 5
PAINLEVEL_OUTOF10: 4
PAINLEVEL_OUTOF10: 8

## 2018-01-01 ASSESSMENT — PAIN DESCRIPTION - LOCATION
LOCATION: BACK
LOCATION: HIP
LOCATION: LEG
LOCATION: HIP
LOCATION: HEAD
LOCATION: ABDOMEN;LEG;BACK
LOCATION: HIP
LOCATION: FOOT
LOCATION: LEG
LOCATION: HIP
LOCATION: HIP
LOCATION: LEG
LOCATION: HIP
LOCATION: LEG
LOCATION: HIP
LOCATION: BACK;HIP
LOCATION: GENERALIZED
LOCATION: HEAD;NECK
LOCATION: HIP;NECK
LOCATION: LEG
LOCATION: BACK
LOCATION: OTHER (COMMENT)
LOCATION: HIP
LOCATION: ABDOMEN
LOCATION: LEG
LOCATION: HIP;LEG
LOCATION: HIP
LOCATION: LEG
LOCATION: FOOT
LOCATION: BACK
LOCATION: HIP
LOCATION: HEAD
LOCATION: HIP
LOCATION: HIP
LOCATION: FOOT
LOCATION: HIP
LOCATION: HIP
LOCATION: LEG
LOCATION: LEG
LOCATION: BACK;KNEE
LOCATION: HEAD
LOCATION: LEG
LOCATION: HIP
LOCATION: LEG
LOCATION: LEG;FOOT
LOCATION: HIP
LOCATION: HIP
LOCATION: BACK
LOCATION: LEG
LOCATION: KNEE
LOCATION: HIP
LOCATION: HIP
LOCATION: LEG
LOCATION: BACK;HIP
LOCATION: HIP
LOCATION: HIP
LOCATION: LEG;FOOT
LOCATION: HIP
LOCATION: HIP
LOCATION: LEG
LOCATION: ABDOMEN
LOCATION: KNEE
LOCATION: LEG
LOCATION: FOOT
LOCATION: HIP
LOCATION: HIP;LEG
LOCATION: FOOT
LOCATION: BACK;LEG
LOCATION: LEG
LOCATION: KNEE
LOCATION: LEG
LOCATION: HIP
LOCATION: HIP
LOCATION: LEG;HIP
LOCATION: HIP;LEG
LOCATION: LEG
LOCATION: HIP
LOCATION: FOOT
LOCATION: GENERALIZED
LOCATION: NECK
LOCATION: LEG;HIP
LOCATION: LEG
LOCATION: HIP;LEG
LOCATION: HIP
LOCATION: RIB CAGE
LOCATION: HIP
LOCATION: LEG;HIP
LOCATION: LEG
LOCATION: RIB CAGE
LOCATION: HIP
LOCATION: HEAD
LOCATION: HIP
LOCATION: GENERALIZED
LOCATION: HIP
LOCATION: RIB CAGE
LOCATION: LEG
LOCATION: HIP
LOCATION: HIP
LOCATION: FOOT
LOCATION: LEG
LOCATION: HIP;LEG;SHOULDER
LOCATION: NECK;SHOULDER
LOCATION: RIB CAGE
LOCATION: HIP
LOCATION: HIP
LOCATION: FOOT
LOCATION: HIP
LOCATION: LEG
LOCATION: HIP;LEG;BACK
LOCATION: BACK;HIP;NECK
LOCATION: HIP;KNEE;LEG
LOCATION: LEG
LOCATION: HIP
LOCATION: HIP
LOCATION: LEG

## 2018-01-01 ASSESSMENT — ENCOUNTER SYMPTOMS
CONSTIPATION: 0
BLOOD IN STOOL: 0
WHEEZING: 0
NAUSEA: 0
EYE REDNESS: 0
BACK PAIN: 0
SHORTNESS OF BREATH: 1
GASTROINTESTINAL NEGATIVE: 1
RHINORRHEA: 0
BACK PAIN: 1
DIARRHEA: 0
SHORTNESS OF BREATH: 1
BACK PAIN: 1
WHEEZING: 0
COUGH: 0
COLOR CHANGE: 0
DIARRHEA: 0
ABDOMINAL PAIN: 0
CONSTIPATION: 0
RHINORRHEA: 0
SHORTNESS OF BREATH: 0
ABDOMINAL DISTENTION: 0
GASTROINTESTINAL NEGATIVE: 1
RHINORRHEA: 0
WHEEZING: 0
COUGH: 0
CHEST TIGHTNESS: 0
GASTROINTESTINAL NEGATIVE: 1
BACK PAIN: 1
DIARRHEA: 1
WHEEZING: 0
COUGH: 0
WHEEZING: 0
RHINORRHEA: 0
BACK PAIN: 0
NAUSEA: 0
ABDOMINAL PAIN: 0
VOMITING: 0
SHORTNESS OF BREATH: 0
ABDOMINAL PAIN: 0
COUGH: 0
DYSPNEA ASSOCIATED WITH: MINIMAL EXERTION
BACK PAIN: 1
CONSTIPATION: 0
BACK PAIN: 0
COUGH: 0
ABDOMINAL DISTENTION: 0
NAUSEA: 0
COUGH: 0
GASTROINTESTINAL NEGATIVE: 1
EYE DISCHARGE: 0
ABDOMINAL PAIN: 0
SHORTNESS OF BREATH: 1
VOMITING: 0
GASTROINTESTINAL NEGATIVE: 1
VOMITING: 0
EYE PAIN: 0
BACK PAIN: 0
SHORTNESS OF BREATH: 0
BACK PAIN: 0
COUGH: 0
WHEEZING: 0
VOMITING: 0
SHORTNESS OF BREATH: 0
DYSPNEA ASSOCIATED WITH: EXERTION
SHORTNESS OF BREATH: 0
COLOR CHANGE: 0
ABDOMINAL PAIN: 0
WHEEZING: 0
ABDOMINAL DISTENTION: 0
BACK PAIN: 1
WHEEZING: 0
ALLERGIC/IMMUNOLOGIC NEGATIVE: 1
ABDOMINAL PAIN: 1
EYE REDNESS: 0
HEARTBURN: 0
SORE THROAT: 0
VOMITING: 0
NAUSEA: 0
ABDOMINAL PAIN: 0
DYSPNEA ASSOCIATED WITH: EXERTION
BACK PAIN: 1
SHORTNESS OF BREATH: 0
ABDOMINAL PAIN: 0
ALLERGIC/IMMUNOLOGIC NEGATIVE: 1
NAUSEA: 0
COUGH: 0
SHORTNESS OF BREATH: 0
NAUSEA: 0
NAUSEA: 0
ABDOMINAL PAIN: 0
DYSPNEA ASSOCIATED WITH: EXERTION
DYSPNEA ASSOCIATED WITH: MINIMAL EXERTION
EYE PAIN: 0
SORE THROAT: 0
ABDOMINAL PAIN: 0
EYE DISCHARGE: 0
WHEEZING: 0
DIARRHEA: 0
SORE THROAT: 0
SHORTNESS OF BREATH: 0
SHORTNESS OF BREATH: 0
EYE PAIN: 0
SORE THROAT: 0
HEARTBURN: 0
SORE THROAT: 0
DYSPNEA ASSOCIATED WITH: EXERTION
BACK PAIN: 1
SHORTNESS OF BREATH: 0
GASTROINTESTINAL NEGATIVE: 1
ABDOMINAL PAIN: 0
VOMITING: 0
BACK PAIN: 1
NAUSEA: 0
BACK PAIN: 0
WHEEZING: 0
EYE DISCHARGE: 0
SHORTNESS OF BREATH: 1
ABDOMINAL PAIN: 1
COUGH: 0
RHINORRHEA: 0
COUGH: 0
ABDOMINAL PAIN: 0
ABDOMINAL DISTENTION: 0
SORE THROAT: 0
COUGH: 0
VOMITING: 0
ABDOMINAL PAIN: 0
EYE DISCHARGE: 0
VOMITING: 0
NAUSEA: 0
SHORTNESS OF BREATH: 1
EYE PAIN: 0
VOMITING: 0
SHORTNESS OF BREATH: 0
COUGH: 0
DIARRHEA: 0
WHEEZING: 0
VOMITING: 0
COUGH: 0
EYES NEGATIVE: 1
NAUSEA: 0
SORE THROAT: 0
NAUSEA: 0

## 2018-01-01 ASSESSMENT — PAIN DESCRIPTION - ORIENTATION
ORIENTATION: LEFT
ORIENTATION: LEFT
ORIENTATION: LEFT;RIGHT
ORIENTATION: LEFT
ORIENTATION: LEFT
ORIENTATION: LEFT;RIGHT
ORIENTATION: LEFT
ORIENTATION: LOWER
ORIENTATION: LEFT
ORIENTATION: LEFT
ORIENTATION: RIGHT;LEFT
ORIENTATION: LEFT
ORIENTATION: MID
ORIENTATION: LEFT
ORIENTATION: RIGHT
ORIENTATION: RIGHT;LEFT
ORIENTATION: POSTERIOR;MID
ORIENTATION: LEFT
ORIENTATION: LEFT
ORIENTATION: LEFT;LOWER
ORIENTATION: LEFT
ORIENTATION: LEFT;UPPER;MID
ORIENTATION: LEFT
ORIENTATION: RIGHT;LEFT
ORIENTATION: LEFT
ORIENTATION: LEFT
ORIENTATION: RIGHT;LEFT
ORIENTATION: UPPER
ORIENTATION: MID
ORIENTATION: LEFT
ORIENTATION: LEFT
ORIENTATION: MID
ORIENTATION: LEFT
ORIENTATION: RIGHT;LEFT
ORIENTATION: LOWER
ORIENTATION: LEFT
ORIENTATION: RIGHT;LEFT
ORIENTATION: LEFT
ORIENTATION: LEFT;POSTERIOR
ORIENTATION: LEFT
ORIENTATION: RIGHT;LEFT
ORIENTATION: LEFT
ORIENTATION: LEFT
ORIENTATION: LEFT;RIGHT
ORIENTATION: LEFT
ORIENTATION: LEFT;RIGHT
ORIENTATION: LEFT
ORIENTATION: RIGHT;LEFT
ORIENTATION: LEFT
ORIENTATION: POSTERIOR
ORIENTATION: LEFT
ORIENTATION: MID
ORIENTATION: MID
ORIENTATION: LEFT
ORIENTATION: RIGHT;LEFT
ORIENTATION: MID
ORIENTATION: LEFT
ORIENTATION: MID
ORIENTATION: LEFT
ORIENTATION: RIGHT;LEFT
ORIENTATION: LEFT
ORIENTATION: LEFT;LOWER
ORIENTATION: LOWER
ORIENTATION: LOWER;MID

## 2018-01-01 ASSESSMENT — PULMONARY FUNCTION TESTS
PIF_VALUE: 20
PIF_VALUE: 18
PIF_VALUE: 5
PIF_VALUE: 1
PIF_VALUE: 19
PIF_VALUE: 19
PIF_VALUE: 2
PIF_VALUE: 18
PIF_VALUE: 3
PIF_VALUE: 19
PIF_VALUE: 3
PIF_VALUE: 1
PIF_VALUE: 18
PIF_VALUE: 18
PIF_VALUE: 19
PIF_VALUE: 16
PIF_VALUE: 20
PIF_VALUE: 19
PIF_VALUE: 19
PIF_VALUE: 17
PIF_VALUE: 20
PIF_VALUE: 1
PIF_VALUE: 19
PIF_VALUE: 18
PIF_VALUE: 19
PIF_VALUE: 1
PIF_VALUE: 19
PIF_VALUE: 20
PIF_VALUE: 19
PIF_VALUE: 18
PIF_VALUE: 18
PIF_VALUE: 2
PIF_VALUE: 3
PIF_VALUE: 18
PIF_VALUE: 3
PIF_VALUE: 18
PIF_VALUE: 18
PIF_VALUE: 3
PIF_VALUE: 23
PIF_VALUE: 18
PIF_VALUE: 18
PIF_VALUE: 27
PIF_VALUE: 5
PIF_VALUE: 1
PIF_VALUE: 18
PIF_VALUE: 19
PIF_VALUE: 19
PIF_VALUE: 20
PIF_VALUE: 1
PIF_VALUE: 1
PIF_VALUE: 19
PIF_VALUE: 19
PIF_VALUE: 4
PIF_VALUE: 20
PIF_VALUE: 19
PIF_VALUE: 18
PIF_VALUE: 20
PIF_VALUE: 26
PIF_VALUE: 20
PIF_VALUE: 19
PIF_VALUE: 18
PIF_VALUE: 18
PIF_VALUE: 20
PIF_VALUE: 18
PIF_VALUE: 1
PIF_VALUE: 19
PIF_VALUE: 19
PIF_VALUE: 1
PIF_VALUE: 1
PIF_VALUE: 18
PIF_VALUE: 22
PIF_VALUE: 18
PIF_VALUE: 18
PIF_VALUE: 19
PIF_VALUE: 19
PIF_VALUE: 15
PIF_VALUE: 18
PIF_VALUE: 4
PIF_VALUE: 18
PIF_VALUE: 1
PIF_VALUE: 19
PIF_VALUE: 2
PIF_VALUE: 19
PIF_VALUE: 1

## 2018-01-01 ASSESSMENT — PAIN DESCRIPTION - FREQUENCY
FREQUENCY: CONTINUOUS
FREQUENCY: INTERMITTENT
FREQUENCY: CONTINUOUS
FREQUENCY: INTERMITTENT
FREQUENCY: CONTINUOUS
FREQUENCY: INTERMITTENT
FREQUENCY: CONTINUOUS
FREQUENCY: INTERMITTENT
FREQUENCY: CONTINUOUS
FREQUENCY: INTERMITTENT
FREQUENCY: CONTINUOUS
FREQUENCY: INTERMITTENT
FREQUENCY: CONTINUOUS
FREQUENCY: INTERMITTENT
FREQUENCY: CONTINUOUS
FREQUENCY: INTERMITTENT
FREQUENCY: CONTINUOUS
FREQUENCY: INTERMITTENT
FREQUENCY: CONTINUOUS
FREQUENCY: CONTINUOUS
FREQUENCY: INTERMITTENT

## 2018-01-01 ASSESSMENT — PAIN DESCRIPTION - DESCRIPTORS
DESCRIPTORS: ACHING
DESCRIPTORS: SORE
DESCRIPTORS: ACHING
DESCRIPTORS: ACHING;SORE;CONSTANT
DESCRIPTORS: DISCOMFORT
DESCRIPTORS: ACHING
DESCRIPTORS: ACHING;CONSTANT;THROBBING
DESCRIPTORS: ACHING;SORE
DESCRIPTORS: ACHING
DESCRIPTORS: ACHING
DESCRIPTORS: NAGGING
DESCRIPTORS: SHARP;PRESSURE
DESCRIPTORS: NAGGING
DESCRIPTORS: SHARP
DESCRIPTORS: CRAMPING
DESCRIPTORS: ACHING;SORE;CONSTANT
DESCRIPTORS: ACHING
DESCRIPTORS: DISCOMFORT
DESCRIPTORS: ACHING
DESCRIPTORS: PRESSURE
DESCRIPTORS: SHARP
DESCRIPTORS: ACHING
DESCRIPTORS: ACHING
DESCRIPTORS: NAGGING
DESCRIPTORS: SHARP;SPASM;SHOOTING
DESCRIPTORS: ACHING
DESCRIPTORS: SHARP
DESCRIPTORS: DISCOMFORT
DESCRIPTORS: ACHING
DESCRIPTORS: ACHING;CONSTANT
DESCRIPTORS: PINS AND NEEDLES;ACHING
DESCRIPTORS: ACHING
DESCRIPTORS: DISCOMFORT;ACHING
DESCRIPTORS: ACHING;CONSTANT
DESCRIPTORS: ACHING;SORE
DESCRIPTORS: ACHING
DESCRIPTORS: NAGGING
DESCRIPTORS: NAGGING
DESCRIPTORS: ACHING
DESCRIPTORS: DISCOMFORT
DESCRIPTORS: ACHING
DESCRIPTORS: ACHING
DESCRIPTORS: ACHING;SORE
DESCRIPTORS: ACHING
DESCRIPTORS: HEADACHE
DESCRIPTORS: ACHING
DESCRIPTORS: NAGGING
DESCRIPTORS: ACHING;SHARP;STABBING
DESCRIPTORS: ACHING;CONSTANT
DESCRIPTORS: ACHING;SORE
DESCRIPTORS: DISCOMFORT
DESCRIPTORS: ACHING
DESCRIPTORS: ACHING

## 2018-01-01 ASSESSMENT — PAIN DESCRIPTION - PROGRESSION
CLINICAL_PROGRESSION: RAPIDLY IMPROVING
CLINICAL_PROGRESSION: NOT CHANGED
CLINICAL_PROGRESSION: GRADUALLY IMPROVING
CLINICAL_PROGRESSION: NOT CHANGED
CLINICAL_PROGRESSION: GRADUALLY IMPROVING
CLINICAL_PROGRESSION: NOT CHANGED
CLINICAL_PROGRESSION: GRADUALLY IMPROVING
CLINICAL_PROGRESSION: NOT CHANGED
CLINICAL_PROGRESSION: NOT CHANGED
CLINICAL_PROGRESSION: GRADUALLY IMPROVING
CLINICAL_PROGRESSION: GRADUALLY WORSENING
CLINICAL_PROGRESSION: NOT CHANGED
CLINICAL_PROGRESSION: NOT CHANGED
CLINICAL_PROGRESSION: GRADUALLY IMPROVING
CLINICAL_PROGRESSION: NOT CHANGED
CLINICAL_PROGRESSION: GRADUALLY IMPROVING
CLINICAL_PROGRESSION: GRADUALLY IMPROVING

## 2018-01-01 ASSESSMENT — PAIN DESCRIPTION - PAIN TYPE
TYPE: SURGICAL PAIN
TYPE: ACUTE PAIN
TYPE: SURGICAL PAIN
TYPE: CHRONIC PAIN
TYPE: ACUTE PAIN
TYPE: CHRONIC PAIN
TYPE: SURGICAL PAIN
TYPE: ACUTE PAIN;SURGICAL PAIN
TYPE: ACUTE PAIN
TYPE: CHRONIC PAIN
TYPE: SURGICAL PAIN
TYPE: ACUTE PAIN
TYPE: ACUTE PAIN
TYPE: CHRONIC PAIN
TYPE: SURGICAL PAIN
TYPE: SURGICAL PAIN
TYPE: CHRONIC PAIN
TYPE: SURGICAL PAIN
TYPE: ACUTE PAIN;SURGICAL PAIN
TYPE: ACUTE PAIN
TYPE: ACUTE PAIN
TYPE: SURGICAL PAIN
TYPE: ACUTE PAIN
TYPE: ACUTE PAIN
TYPE: CHRONIC PAIN
TYPE: ACUTE PAIN
TYPE: SURGICAL PAIN
TYPE: CHRONIC PAIN
TYPE: CHRONIC PAIN
TYPE: SURGICAL PAIN
TYPE: ACUTE PAIN
TYPE: ACUTE PAIN
TYPE: SURGICAL PAIN
TYPE: ACUTE PAIN
TYPE: CHRONIC PAIN
TYPE: SURGICAL PAIN
TYPE: SURGICAL PAIN
TYPE: CHRONIC PAIN
TYPE: CHRONIC PAIN
TYPE: SURGICAL PAIN
TYPE: CHRONIC PAIN
TYPE: ACUTE PAIN;SURGICAL PAIN
TYPE: CHRONIC PAIN
TYPE: ACUTE PAIN
TYPE: ACUTE PAIN
TYPE: SURGICAL PAIN
TYPE: ACUTE PAIN
TYPE: ACUTE PAIN
TYPE: CHRONIC PAIN
TYPE: CHRONIC PAIN
TYPE: ACUTE PAIN;CHRONIC PAIN
TYPE: CHRONIC PAIN
TYPE: ACUTE PAIN
TYPE: ACUTE PAIN;SURGICAL PAIN
TYPE: ACUTE PAIN
TYPE: SURGICAL PAIN
TYPE: CHRONIC PAIN
TYPE: SURGICAL PAIN
TYPE: ACUTE PAIN;SURGICAL PAIN
TYPE: ACUTE PAIN
TYPE: SURGICAL PAIN
TYPE: CHRONIC PAIN
TYPE: ACUTE PAIN
TYPE: CHRONIC PAIN
TYPE: SURGICAL PAIN
TYPE: SURGICAL PAIN
TYPE: SURGICAL PAIN;ACUTE PAIN
TYPE: ACUTE PAIN
TYPE: ACUTE PAIN
TYPE: ACUTE PAIN;SURGICAL PAIN
TYPE: SURGICAL PAIN
TYPE: ACUTE PAIN
TYPE: ACUTE PAIN;SURGICAL PAIN
TYPE: ACUTE PAIN
TYPE: ACUTE PAIN
TYPE: CHRONIC PAIN
TYPE: ACUTE PAIN
TYPE: ACUTE PAIN
TYPE: SURGICAL PAIN
TYPE: ACUTE PAIN
TYPE: SURGICAL PAIN
TYPE: CHRONIC PAIN
TYPE: CHRONIC PAIN
TYPE: SURGICAL PAIN
TYPE: ACUTE PAIN
TYPE: ACUTE PAIN;SURGICAL PAIN
TYPE: ACUTE PAIN
TYPE: SURGICAL PAIN
TYPE: SURGICAL PAIN;ACUTE PAIN
TYPE: ACUTE PAIN
TYPE: SURGICAL PAIN
TYPE: ACUTE PAIN

## 2018-01-01 ASSESSMENT — PAIN DESCRIPTION - ONSET
ONSET: ON-GOING
ONSET: GRADUAL
ONSET: ON-GOING

## 2018-01-01 ASSESSMENT — PATIENT HEALTH QUESTIONNAIRE - PHQ9
SUM OF ALL RESPONSES TO PHQ QUESTIONS 1-9: 0
SUM OF ALL RESPONSES TO PHQ9 QUESTIONS 1 & 2: 0
2. FEELING DOWN, DEPRESSED OR HOPELESS: 0
1. LITTLE INTEREST OR PLEASURE IN DOING THINGS: 0

## 2018-01-01 ASSESSMENT — PAIN - FUNCTIONAL ASSESSMENT: PAIN_FUNCTIONAL_ASSESSMENT: 0-10

## 2018-01-01 ASSESSMENT — LIFESTYLE VARIABLES: HISTORY_ALCOHOL_USE: NO

## 2018-01-02 ENCOUNTER — TELEPHONE (OUTPATIENT)
Dept: UROLOGY | Age: 83
End: 2018-01-02

## 2018-01-02 DIAGNOSIS — N13.30 HYDRONEPHROSIS, RIGHT: Primary | ICD-10-CM

## 2018-01-02 DIAGNOSIS — Z01.818 PRE-OP TESTING: ICD-10-CM

## 2018-01-02 NOTE — TELEPHONE ENCOUNTER
Marisa Mariscal was seen in the office on Friday and needs to be scheduled for her stent exchange. Dr. Lela Ratliff is requesting medical clearance. Her procedure hasn't been scheduled yet pending clearance. However, they would like this done within a month's time. I will put a clearance form in Epic for you to print off and give to the physician for clearance. Thank you.

## 2018-01-03 ENCOUNTER — TELEPHONE (OUTPATIENT)
Dept: UROLOGY | Age: 83
End: 2018-01-03

## 2018-01-04 DIAGNOSIS — M16.9 OSTEOARTHROSIS, HIP: ICD-10-CM

## 2018-01-04 DIAGNOSIS — C78.00 BLADDER CANCER METASTASIZED TO LUNG (HCC): ICD-10-CM

## 2018-01-04 DIAGNOSIS — M15.9 GENERALIZED OA: ICD-10-CM

## 2018-01-04 DIAGNOSIS — C67.9 BLADDER CANCER METASTASIZED TO LUNG (HCC): ICD-10-CM

## 2018-01-05 ENCOUNTER — HOSPITAL ENCOUNTER (OUTPATIENT)
Age: 83
Discharge: HOME | End: 2018-01-05
Payer: MEDICARE

## 2018-01-05 ENCOUNTER — OFFICE VISIT (OUTPATIENT)
Dept: FAMILY MEDICINE CLINIC | Age: 83
End: 2018-01-05
Payer: MEDICARE

## 2018-01-05 VITALS
BODY MASS INDEX: 30.63 KG/M2 | HEART RATE: 83 BPM | SYSTOLIC BLOOD PRESSURE: 118 MMHG | WEIGHT: 142 LBS | TEMPERATURE: 98.5 F | RESPIRATION RATE: 15 BRPM | HEIGHT: 57 IN | OXYGEN SATURATION: 98 % | DIASTOLIC BLOOD PRESSURE: 74 MMHG

## 2018-01-05 DIAGNOSIS — F32.9 REACTIVE DEPRESSION: ICD-10-CM

## 2018-01-05 DIAGNOSIS — E22.2 SYNDROME OF INAPPROPRIATE VASOPRESSIN SECRETION (HCC): ICD-10-CM

## 2018-01-05 DIAGNOSIS — I25.118 ATHSCL HEART DISEASE OF NATIVE COR ART W OTH ANG PCTRS (HCC): ICD-10-CM

## 2018-01-05 DIAGNOSIS — I25.10 CORONARY ARTERY DISEASE INVOLVING NATIVE CORONARY ARTERY OF NATIVE HEART WITHOUT ANGINA PECTORIS: ICD-10-CM

## 2018-01-05 DIAGNOSIS — C67.9 MALIGNANT NEOPLASM OF URINARY BLADDER, UNSPECIFIED SITE (HCC): ICD-10-CM

## 2018-01-05 DIAGNOSIS — I50.32 CHRONIC DIASTOLIC CONGESTIVE HEART FAILURE (HCC): ICD-10-CM

## 2018-01-05 DIAGNOSIS — N13.5 URETERAL OBSTRUCTION: ICD-10-CM

## 2018-01-05 DIAGNOSIS — Z01.818 PRE-OP EXAM: Primary | ICD-10-CM

## 2018-01-05 DIAGNOSIS — I42.9 FAMILIAL CARDIOMYOPATHY (HCC): ICD-10-CM

## 2018-01-05 DIAGNOSIS — N13.30 HYDRONEPHROSIS, RIGHT: ICD-10-CM

## 2018-01-05 DIAGNOSIS — I50.22 HEART FAILURE, SYSTOLIC, CHRONIC (HCC): ICD-10-CM

## 2018-01-05 DIAGNOSIS — I10 ESSENTIAL HYPERTENSION: ICD-10-CM

## 2018-01-05 DIAGNOSIS — Z01.818 PRE-OP TESTING: ICD-10-CM

## 2018-01-05 LAB
BASOPHILS # BLD: 0.6 %
BASOPHILS ABSOLUTE: 0.1 THOU/MM3 (ref 0–0.1)
EOSINOPHIL # BLD: 1.8 %
EOSINOPHILS ABSOLUTE: 0.2 THOU/MM3 (ref 0–0.4)
HCT VFR BLD CALC: 34.7 % (ref 37–47)
HEMOGLOBIN: 11.4 GM/DL (ref 12–16)
LYMPHOCYTES # BLD: 12.3 %
LYMPHOCYTES ABSOLUTE: 1.1 THOU/MM3 (ref 1–4.8)
MCH RBC QN AUTO: 32.1 PG (ref 27–31)
MCHC RBC AUTO-ENTMCNC: 32.7 GM/DL (ref 33–37)
MCV RBC AUTO: 98.2 FL (ref 81–99)
MONOCYTES # BLD: 6.4 %
MONOCYTES ABSOLUTE: 0.6 THOU/MM3 (ref 0.4–1.3)
NUCLEATED RED BLOOD CELLS: 0 /100 WBC
PDW BLD-RTO: 13.2 % (ref 11.5–14.5)
PLATELET # BLD: 363 THOU/MM3 (ref 130–400)
PMV BLD AUTO: 8 MCM (ref 7.4–10.4)
RBC # BLD: 3.53 MILL/MM3 (ref 4.2–5.4)
SEG NEUTROPHILS: 78.9 %
SEGMENTED NEUTROPHILS ABSOLUTE COUNT: 6.9 THOU/MM3 (ref 1.8–7.7)
WBC # BLD: 8.7 THOU/MM3 (ref 4.8–10.8)

## 2018-01-05 PROCEDURE — 85025 COMPLETE CBC W/AUTO DIFF WBC: CPT

## 2018-01-05 PROCEDURE — 36415 COLL VENOUS BLD VENIPUNCTURE: CPT

## 2018-01-05 PROCEDURE — 99214 OFFICE O/P EST MOD 30 MIN: CPT | Performed by: FAMILY MEDICINE

## 2018-01-05 RX ORDER — HYDROCODONE BITARTRATE AND ACETAMINOPHEN 5; 325 MG/1; MG/1
1 TABLET ORAL EVERY 6 HOURS PRN
Qty: 120 TABLET | Refills: 0 | Status: SHIPPED | OUTPATIENT
Start: 2018-01-05 | End: 2018-02-06 | Stop reason: SDUPTHER

## 2018-01-05 ASSESSMENT — ENCOUNTER SYMPTOMS
SHORTNESS OF BREATH: 0
GASTROINTESTINAL NEGATIVE: 1
WHEEZING: 0
ALLERGIC/IMMUNOLOGIC NEGATIVE: 1
COUGH: 0
RHINORRHEA: 0
SINUS PAIN: 0
BACK PAIN: 1

## 2018-01-05 NOTE — PROGRESS NOTES
(LOMOTIL) 2.5-0.025 MG per tablet Take 1 tablet by mouth 4 times daily as needed for Diarrhea 30 tablet 3    traMADol (ULTRAM) 50 MG tablet Take 1 tablet by mouth every 6 hours as needed for Pain 120 tablet 0    aspirin 81 MG tablet Take 81 mg by mouth daily      docusate sodium (COLACE) 100 MG capsule Take 1 capsule by mouth daily as needed for Constipation 30 capsule 1    BIOTIN FORTE PO Take by mouth daily      pantoprazole (PROTONIX) 40 MG tablet TAKE 1 TABLET BY MOUTH 2 TIMES DAILY (BEFORE MEALS) 60 tablet 11    meclizine (ANTIVERT) 25 MG tablet Take 1 tablet by mouth 3 times daily as needed for Dizziness 270 tablet 3    acetaminophen (TYLENOL) 325 MG tablet Take 2 tablets by mouth every 4 hours as needed for Pain or Fever 120 tablet 3    sodium chloride (OCEAN) 0.65 % nasal spray 1 spray by Nasal route as needed for Congestion      cetirizine (ZYRTEC) 10 MG tablet Take 10 mg by mouth daily      Handicap Placard MISC by Does not apply route. 1 each 0     No current facility-administered medications for this visit. Get CBC today. Clear for urologic procedure. Discussed use, benefit, and side effects of prescribed medications. Barriers to compliance discussed. All patient questions answered. Pt voiced understanding.

## 2018-01-05 NOTE — PATIENT INSTRUCTIONS
You may receive a survey about your visit with us today. The feedback from our patients helps us identify what is working well and where the service to all patients can be enhanced. Thank you! Get CBC today. Clear for urologic procedure.

## 2018-01-10 ENCOUNTER — TELEPHONE (OUTPATIENT)
Dept: UROLOGY | Age: 83
End: 2018-01-10

## 2018-01-10 ENCOUNTER — CARE COORDINATION (OUTPATIENT)
Dept: CARE COORDINATION | Age: 83
End: 2018-01-10

## 2018-01-10 ASSESSMENT — ENCOUNTER SYMPTOMS: DYSPNEA ASSOCIATED WITH: EXERTION

## 2018-01-10 NOTE — TELEPHONE ENCOUNTER
SURGERY [address] [place], [place] [address] [facility] [address] [facility], [address]      Phone *[phone]   Surgical Scheduling Direct Line Phone *[phone] Fax *[phone]      Ivette Hess 9/9/1930 female    [address] [place] [address]  Marital Status:          Home Phone: [phone]      Cell Phone:    Telephone Information:   Mobile [phone]          Surgeon: Dr. Ai Mercado   Surgery Date: 01/31/2018  Time: 10:00am    Procedure: cystoscopy with right retrograde pyelogram, right ureteroscopy and replacement of right ureteral stent    Diagnosis: bladder cancer, urinary retention, right hydronephrosis. Important Medical History: In [facility]    Special Inst/Equip:     CPT Codes:    35299, [id number]  Latex Allergy:  No     Cardiac Device:  No     Anesthesia:  Anesthesiologist (General/Spinal)          Admission Type:  Same Day                             Admit Prior to Day of Surgery: No    Case Location:  Main OR           Preadmission Testing:Phone Call              PAT Date and Time:______________________________________________________    PAT Confirmation #: ______________________________________________________    Post Op Visit: ___________________________________________________________    Need Preop Cardiac Clearance: No    Does Patient have Cardiologist/physician?      Dr. Prasanna Preston Confirmation #: __________________________________________________    Timmy Grit: ________________________   Date: __________________________     Insurance Company Name: [facility]

## 2018-01-10 NOTE — CARE COORDINATION
associated orthostatic hypotension: Neg, CHF associated PND: Neg, CHF associated shortness of breath: Neg, CHF associated weakness: Neg      Symptom course:  stable  Patient-reported weight (lb):  (Comment: not monitoring daily. encouraged to. )  Weight trend:  stable  Salt intake watch compared to last visit:  stable      and   COPD Assessment    Does the patient understand envrionmental exposure?:  Yes  Is the patient able to verbalize Rescue vs. Long Acting medications?:  Yes  Does the patient have a nebulizer?:  Yes  Does the patient use a space with inhaled medications?:  No            Symptoms:   None:  Yes      Symptom course:  stable  Breathlessness:  exertion  Increase use of rapid acting/rescue inhaled medications?:  Not Applicable  Change in chronic cough?:  No/At Baseline  Change in sputum?:  No/At Baseline  Sputum characteristics:  Unknown  Self Monitoring - SaO2:  No  Have you had a recent diagnosis of pneumonia either by PCP or at a hospital?:  No               Care Coordination Interventions    Program Enrollment:  Complex Care  Referral from Primary Care Provider:  Yes  Suggested Interventions and [address] [place] [address] Hwy:  Completed (Comment: pt receiving [facility] )  Meals on Wheels:  Declined  Medi Set or Pill Pack:  Completed  Occupational Therapy:  Completed (Comment: receiving [facility] OT)  Physical Therapy:  Completed (Comment: recieving [facility] PT)  Zone Management Tools:  Completed (Comment: CHF zone mgmt tool sheet mailed to pt. )         Goals Addressed             Most Recent     care coordination-self monitoring   Not on track (1/10/2018)             Care Coordination Goal:  Patient Self-Monitoring  Choose a Goal:      Daily weight:   No  I will notify my provider of any increase in weight by 3 or more pounds in 2 days. LINK TO DAILY WT TRACKER HERE.      Barriers to success: none  Plan for overcoming my barriers: N/A    Confidence: 5/10                          Conditions (ACCOLATE) 20 MG tablet TAKE 1 TABLET TWICE A DAY 10/2/17  Yes Mehran Mendoza MD   escitalopram (LEXAPRO) 10 MG tablet TAKE 1 TABLET BY MOUTH DAILY 10/2/17  Yes Mehran Mendoza MD   LORazepam (ATIVAN) 1 MG tablet Take 1 tablet by mouth every 8 hours as needed for Anxiety 9/29/17  Yes Mehran Mendoza MD   tamsulosin [facility]) 0.4 MG capsule Take 1 capsule by mouth daily 9/7/17  Yes Christelle Bello NP   potassium chloride (KLOR-CON 10) 10 MEQ extended release tablet TAKE 1 TABLET BY MOUTH 4 TIMES A WEEK. 9/6/17  Yes Abigail Foote MD   atorvastatin (LIPITOR) 20 MG tablet TAKE 1 TABLET BY MOUTH NIGHTLY 8/29/17  Yes Abigail Foote MD   metoprolol succinate (TOPROL XL) 50 MG extended release tablet TAKE 1 TABLET BY MOUTH DAILY 8/29/17  Yes Abigail Foote MD   traMADol [facility]) 50 MG tablet Take 1 tablet by mouth every 6 hours as needed for Pain 7/3/17  Yes Collins Maxwell NP   aspirin 81 MG tablet Take 81 mg by mouth daily   Yes Historical Provider, MD   docusate sodium (COLACE) 100 MG capsule Take 1 capsule by mouth daily as needed for Constipation 4/19/17  Yes Valerie Gamino MD   BIOTIN FORTE PO Take by mouth daily   Yes Historical Provider, MD   pantoprazole (PROTONIX) 40 MG tablet TAKE 1 TABLET BY MOUTH 2 TIMES DAILY (BEFORE MEALS) 3/24/17  Yes Mehran Mendoza MD   meclizine (ANTIVERT) 25 MG tablet Take 1 tablet by mouth 3 times daily as needed for Dizziness 3/24/17  Yes Mehran Mendoza MD   acetaminophen (TYLENOL) 325 MG tablet Take 2 tablets by mouth every 4 hours as needed for Pain or Fever 10/31/16  Yes Ryan Leal MD   sodium chloride (OCEAN) 0.65 % nasal spray 1 spray by Nasal route as needed for Congestion   Yes Historical Provider, MD   cetirizine (ZYRTEC) 10 MG tablet Take 10 mg by mouth daily   Yes Historical Provider, MD   Elastic Bandages & Supports (MEDICAL COMPRESSION STOCKINGS) MISC Knee-high, 20-30 mmHg.   Dx: LE edema 12/22/17   Gifty Arenas DO   ferrous sulfate 325 (65 Fe) MG tablet TAKE 1 TABLET BY MOUTH 2 TIMES DAILY (WITH MEALS) 12/11/17   Mina Mcdowell MD   ondansetron (ZOFRAN) 4 MG tablet TAKE 1 TABLET BY MOUTH EVERY 8 HOURS AS NEEDED FOR NAUSEA OR VOMITING 8/22/17   Betty Valdez NP   diphenoxylate-atropine (LOMOTIL) 2.5-0.025 MG per tablet Take 1 tablet by mouth 4 times daily as needed for Diarrhea 8/2/17   Mina Mcdowell MD   [facility] MISC by Does not apply route.  2/28/14   Mina Mcdowell MD       Future Appointments  Date Time Provider Kobi Careyi   3/15/2018 3:30 PM Xenia Horvath MD SRP [facility] [address]   4/6/2018 10:15 AM Mina Mcdowell MD [id number] [facility]

## 2018-01-10 NOTE — TELEPHONE ENCOUNTER
Patient has been scheduled for surgery at [address] [place] [address] on 01/31/2018 at 10:00am with an arrival of 8:30am.  Consent signed. Preop orders and instructions have been given.

## 2018-01-11 ENCOUNTER — SURG/PROC ORDERS (OUTPATIENT)
Dept: UROLOGY | Age: 83
End: 2018-01-11

## 2018-01-11 RX ORDER — CIPROFLOXACIN 2 MG/ML
400 INJECTION, SOLUTION INTRAVENOUS
Status: CANCELLED | OUTPATIENT
Start: 2018-01-31

## 2018-01-11 RX ORDER — SODIUM CHLORIDE 9 MG/ML
INJECTION, SOLUTION INTRAVENOUS CONTINUOUS
Status: CANCELLED | OUTPATIENT
Start: 2018-01-31

## 2018-01-21 ENCOUNTER — APPOINTMENT (OUTPATIENT)
Dept: CT IMAGING | Age: 83
End: 2018-01-21
Payer: MEDICARE

## 2018-01-21 ENCOUNTER — HOSPITAL ENCOUNTER (EMERGENCY)
Age: 83
Discharge: HOME | End: 2018-01-21
Payer: MEDICARE

## 2018-01-21 VITALS
DIASTOLIC BLOOD PRESSURE: 53 MMHG | SYSTOLIC BLOOD PRESSURE: 117 MMHG | OXYGEN SATURATION: 96 % | RESPIRATION RATE: 18 BRPM | WEIGHT: 150 LBS | HEART RATE: 74 BPM | TEMPERATURE: 98.8 F | BODY MASS INDEX: 32.46 KG/M2

## 2018-01-21 DIAGNOSIS — N39.0 URINARY TRACT INFECTION ASSOCIATED WITH INDWELLING URETHRAL CATHETER, INITIAL ENCOUNTER (HCC): Primary | ICD-10-CM

## 2018-01-21 DIAGNOSIS — N34.2 URETHRITIS: ICD-10-CM

## 2018-01-21 DIAGNOSIS — T83.511A URINARY TRACT INFECTION ASSOCIATED WITH INDWELLING URETHRAL CATHETER, INITIAL ENCOUNTER (HCC): Primary | ICD-10-CM

## 2018-01-21 LAB
AMORPHOUS: ABNORMAL
ANION GAP SERPL CALCULATED.3IONS-SCNC: 14 MEQ/L (ref 8–16)
APTT: 23.1 SECONDS (ref 22–38)
BACTERIA: ABNORMAL /HPF
BASOPHILS # BLD: 1.1 %
BASOPHILS ABSOLUTE: 0.1 THOU/MM3 (ref 0–0.1)
BILIRUBIN URINE: NEGATIVE
BLOOD, URINE: ABNORMAL
BUN BLDV-MCNC: 30 MG/DL (ref 7–22)
CALCIUM SERPL-MCNC: 8.9 MG/DL (ref 8.5–10.5)
CASTS UA: ABNORMAL /LPF
CHARACTER, URINE: ABNORMAL
CHLORIDE BLD-SCNC: 106 MEQ/L (ref 98–111)
CO2: 20 MEQ/L (ref 23–33)
COLOR: YELLOW
CREAT SERPL-MCNC: 1 MG/DL (ref 0.4–1.2)
CRYSTALS, UA: ABNORMAL
EOSINOPHIL # BLD: 3.3 %
EOSINOPHILS ABSOLUTE: 0.3 THOU/MM3 (ref 0–0.4)
EPITHELIAL CELLS, UA: ABNORMAL /HPF
GFR SERPL CREATININE-BSD FRML MDRD: 52 ML/MIN/1.73M2
GLUCOSE BLD-MCNC: 107 MG/DL (ref 70–108)
GLUCOSE URINE: NEGATIVE MG/DL
HCT VFR BLD CALC: 34.2 % (ref 37–47)
HEMOGLOBIN: 11 GM/DL (ref 12–16)
INR BLD: 1.08 (ref 0.85–1.13)
KETONES, URINE: NEGATIVE
LEUKOCYTE ESTERASE, URINE: ABNORMAL
LYMPHOCYTES # BLD: 14.2 %
LYMPHOCYTES ABSOLUTE: 1.4 THOU/MM3 (ref 1–4.8)
MACROCYTES: ABNORMAL
MCH RBC QN AUTO: 32.6 PG (ref 27–31)
MCHC RBC AUTO-ENTMCNC: 32.3 GM/DL (ref 33–37)
MCV RBC AUTO: 100.8 FL (ref 81–99)
MONOCYTES # BLD: 6.5 %
MONOCYTES ABSOLUTE: 0.6 THOU/MM3 (ref 0.4–1.3)
MUCUS: ABNORMAL
NITRITE, URINE: POSITIVE
NUCLEATED RED BLOOD CELLS: 0 /100 WBC
OSMOLALITY CALCULATION: 286.1 MOSMOL/KG (ref 275–300)
PDW BLD-RTO: 12.9 % (ref 11.5–14.5)
PH UA: 6
PLATELET # BLD: 273 THOU/MM3 (ref 130–400)
PLATELET ESTIMATE: ADEQUATE
PMV BLD AUTO: 8.4 MCM (ref 7.4–10.4)
POTASSIUM SERPL-SCNC: 4.5 MEQ/L (ref 3.5–5.2)
PROTEIN UA: 100
RBC # BLD: 3.39 MILL/MM3 (ref 4.2–5.4)
RBC URINE: ABNORMAL /HPF
SCAN OF BLOOD SMEAR: NORMAL
SEG NEUTROPHILS: 74.9 %
SEGMENTED NEUTROPHILS ABSOLUTE COUNT: 7.4 THOU/MM3 (ref 1.8–7.7)
SODIUM BLD-SCNC: 140 MEQ/L (ref 135–145)
SPECIFIC GRAVITY, URINE: 1.01 (ref 1–1.03)
UROBILINOGEN, URINE: 0.2 EU/DL
WBC # BLD: 9.9 THOU/MM3 (ref 4.8–10.8)
WBC UA: > 200 /HPF

## 2018-01-21 PROCEDURE — 87186 SC STD MICRODIL/AGAR DIL: CPT

## 2018-01-21 PROCEDURE — 74176 CT ABD & PELVIS W/O CONTRAST: CPT

## 2018-01-21 PROCEDURE — 81001 URINALYSIS AUTO W/SCOPE: CPT

## 2018-01-21 PROCEDURE — 85730 THROMBOPLASTIN TIME PARTIAL: CPT

## 2018-01-21 PROCEDURE — 85025 COMPLETE CBC W/AUTO DIFF WBC: CPT

## 2018-01-21 PROCEDURE — 80048 BASIC METABOLIC PNL TOTAL CA: CPT

## 2018-01-21 PROCEDURE — 36415 COLL VENOUS BLD VENIPUNCTURE: CPT

## 2018-01-21 PROCEDURE — 87077 CULTURE AEROBIC IDENTIFY: CPT

## 2018-01-21 PROCEDURE — 99284 EMERGENCY DEPT VISIT MOD MDM: CPT

## 2018-01-21 PROCEDURE — 87086 URINE CULTURE/COLONY COUNT: CPT

## 2018-01-21 PROCEDURE — 85610 PROTHROMBIN TIME: CPT

## 2018-01-21 RX ORDER — CIPROFLOXACIN 500 MG/1
500 TABLET, FILM COATED ORAL 2 TIMES DAILY
Qty: 14 TABLET | Refills: 0 | Status: SHIPPED | OUTPATIENT
Start: 2018-01-21 | End: 2018-01-28

## 2018-01-21 ASSESSMENT — ENCOUNTER SYMPTOMS
COUGH: 0
DIARRHEA: 0
SORE THROAT: 0
BACK PAIN: 0
WHEEZING: 0
EYE PAIN: 0
VOMITING: 0
SHORTNESS OF BREATH: 0
RHINORRHEA: 0
ABDOMINAL PAIN: 0
EYE DISCHARGE: 0
NAUSEA: 0

## 2018-01-21 NOTE — ED PROVIDER NOTES
[address]       Chief Complaint   Patient presents with    Other     bleeding from catheter       Nurses Notes reviewed and I agree except as noted in the HPI. HISTORY OF PRESENT ILLNESS    Tona Garduno is a 80 y.o. female who presents to the ED for evaluation of catheter problem. Patient's granddaughter reports that patient has been having bleeding around her capped-off forman catheter. Patient's granddaughter notes that this indwelling forman is not connected to a bag, and was originally cleared by Dr. Marli Mack in order for patient to be more independent to urinate. Patient's granddaughter states that patient is due to follow up with Dr. Marli Mack in a week in order to have ureteral stent replaced. Patient's granddaughter states that she is unsure if the bleeding is from around the catheter or from the catheter itself, stating that when she finds the patient with bleeding it is inside the toilet. Patient's granddaughter states that the catheter is capped off while not in use. Patient denies any fevers, chills, nausea, vomiting or diarrhea. Patient denies any chest pain or shortness of breath. Symptom description:  Onset: Past few days  Location: Home  Duration: Acute  Character: Bleeding from/around catheter  Radiation: None  Timing: Intermittent  Severity: moderate    Experienced previously: No    HPI was provided by the patient and patient's granddaughter     REVIEW OF SYSTEMS     Review of Systems   Constitutional: Negative for appetite change, chills, fatigue and fever. HENT: Negative for congestion, ear pain, rhinorrhea and sore throat. Eyes: Negative for pain, discharge and visual disturbance. Respiratory: Negative for cough, shortness of breath and wheezing. Cardiovascular: Negative for chest pain, palpitations and leg swelling. Gastrointestinal: Negative for abdominal pain, diarrhea, nausea and vomiting.    Genitourinary: Negative for decreased urine volume, difficulty urinating, dysuria, frequency, urgency and vaginal discharge. Bleeding from around/from catheter   Musculoskeletal: Negative for arthralgias, back pain, joint swelling and neck pain. Skin: Negative for pallor and rash. Neurological: Negative for dizziness, syncope, weakness, light-headedness and headaches. Hematological: Negative for adenopathy. Psychiatric/Behavioral: Negative for confusion, dysphoric mood and suicidal ideas. The patient is not nervous/anxious. PAST MEDICAL HISTORY    has a past medical history of Allergic rhinitis; Anxiety; Bilateral hydronephrosis; Bladder cancer ([facility] [address] Blood circulation, collateral; CHF (congestive heart failure) ([facility] [address] Constipation; Coronary artery disease involving native coronary artery of native heart s/p cath 10/14/15-LM-P, LAD MID 90%, ANUERYSMAL, % PROX, RCA MID 60% DISTLA 70%, EDP 20 , EF 30%-NEED REVASCULARIZATION; GERD (gastroesophageal reflux disease); [place] (hard of hearing); Hyperglycemia; Hyperlipidemia; Hypertension; Obesity (BMI 30-39.9); Osteoarthritis; Osteopenia; Pneumonia; and 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 .    SURGICAL HISTORY      has a past surgical history that includes Cystoscopy (11-); Dilation and curettage of uterus (1999); Abdominal exploration surgery (1954); Total hip arthroplasty (3-9-12); Mouth surgery; EKG 12 Lead (10/14/2015); EKG 12 Lead (10/17/2015); Colonoscopy; Abdomen surgery; Endoscopy, colon, diagnostic; joint replacement; Cardiac surgery; Bladder surgery (10/2016); other surgical history (Right, 04/19/2017); Cystoscopy (N/A, 8/23/2017); and Tooth Extraction (03/2017).     CURRENT MEDICATIONS       Discharge Medication List as of 1/21/2018  4:54 PM      CONTINUE these medications which have NOT CHANGED    Details   HYDROcodone-acetaminophen (NORCO) 5-325 MG per tablet Take 1 tablet by mouth every 6 hours as needed for Pain for up to 30 days Fill on

## 2018-01-21 NOTE — ED TRIAGE NOTES
Pt c/o bleeding at site of urinary catheter. States it started as light bleeding \"a couple days ago\" but increased today. Granddaughter states pt has a stint in her bladder that is scheduled to be replaced next week. Pt has forman catheter is not attached to a drainage bag, catheter is capped off.

## 2018-01-21 NOTE — ED NOTES
Pt resting in bed. Updated on plan of care. Will continue to monitor.       Weyman Severs, RN  01/21/18 [id number]

## 2018-01-22 ENCOUNTER — TELEPHONE (OUTPATIENT)
Dept: UROLOGY | Age: 83
End: 2018-01-22

## 2018-01-22 LAB
ORGANISM: ABNORMAL
URINE CULTURE REFLEX: ABNORMAL

## 2018-01-23 NOTE — TELEPHONE ENCOUNTER
Dr. Arley Santana is out of office this week so it's impossible to move up the surgery. Also, this way is better because we want uti treated before surgery anyway.

## 2018-01-24 ENCOUNTER — CARE COORDINATION (OUTPATIENT)
Dept: CARE COORDINATION | Age: 83
End: 2018-01-24

## 2018-01-24 RX ORDER — HYDROCODONE BITARTRATE AND ACETAMINOPHEN 5; 325 MG/1; MG/1
1 TABLET ORAL EVERY 6 HOURS PRN
COMMUNITY
End: 2018-01-24 | Stop reason: SDUPTHER

## 2018-01-24 RX ORDER — BUMETANIDE 0.5 MG/1
0.5 TABLET ORAL
Status: ON HOLD | COMMUNITY
End: 2018-01-01 | Stop reason: HOSPADM

## 2018-01-24 NOTE — PROGRESS NOTES
Pharmacy Note  ED Culture Follow-up    Criselda Dotson is a 80 y.o. female. Allergies: Cleocin [clindamycin hcl]; Metronidazole; Penicillins; Septra [bactrim]; and Adhesive tape     Labs:  Lab Results   Component Value Date    BUN 30 (H) 01/21/2018    CREATININE 1.0 01/21/2018    WBC 9.9 01/21/2018     CrCl cannot be calculated (Unknown ideal weight.). Current antimicrobials:   · cipro    ASSESSMENT:  Micro results:   Urine culture: positive for enterococcus      PLAN:  Need for intervention: Yes, but will defer to specialist  Discussed with: HARPAL Maldonado  Chosen treatment:    · Fax result to [address] [place] [address] urology for intervention; will call results to [address] [place] [address] urology as well     Patient response:   · No need to contact patient    Called/sent in prescription to: Not applicable    Please call with any questions.  Rubina Cornell, PharmD 7:30 PM 1/23/2018

## 2018-01-24 NOTE — PROGRESS NOTES
In preparation for their surgical procedure above patient was screened for Obstructive Sleep Apnea (RAOUL) using the STOP-Bang Questionnaire by the Pre-Admission Testing department. This is a pre-surgical screening tool for patient safety and serves as a recommendation, this WILL NOT cause cancellation of surgery. STOP-Bang Questionnaire  * Do you currently see a pulmonologist?  No     If yes STOP, do not complete. Patient follows with  .    1. Do you snore loudly (able to be heard in the next room)? No    2. Do you often feel tired or sleepy during the daytime? No       3. Has anyone ever told you that you stop breathing during your sleep? No    4. Do you have or are you being treated for high blood pressure? Yes      5. BMI more than 35? BMI (Calculated): 32.5        No    6. Age over 48 years? 80 y.o. Yes    7. Neck Circumference greater than 17 inches for male or 16 inches for female? Measured           (visits only)            Not Applicable    8. Gender Male? No      TOTAL SCORE: 2    RAOUL - Low Risk : Yes to 0 - 2 questions  RAOUL - Intermediate Risk : Yes to 3 - 4 questions  RAOUL - High Risk : Yes to 5 - 8 questions    Adapted from:   STOP Questionnaire: A Tool to Screen Patients for Obstructive Sleep Apnea   ISABELLA PhelpsC.P.C., RONALDO Guadarrama.B.B.S., Galen Cortes M.D., Ricky Busch. Brannon West, Ph.D., RONALDO Epstein.B.B.S., RONALDO Stoddard.Sc., Ethel Baires M.D., [facility]. KUMAR Green.P.C.    Anesthesiology 2008; 271:924-91 Copyright 2008, the [employer] of Anesthesiologists, [facility].   ----------------------------------------------------------------------------------------------------------------

## 2018-01-27 NOTE — H&P
nausea and vomiting. Genitourinary: Positive for difficulty urinating. Negative for dysuria, flank pain, frequency, hematuria and urgency.         Objective:   Physical Exam   Constitutional: She is oriented to person, place, and time. She appears well-developed and well-nourished. HENT:   Head: Normocephalic and atraumatic. Right Ear: External ear normal.   Left Ear: External ear normal.   Nose: Nose normal.   Eyes: Conjunctivae are normal.   Pulmonary/Chest: Effort normal.   Abdominal: Soft. Neurological: She is alert and oriented to person, place, and time. Skin: Skin is warm and dry. Psychiatric: She has a normal mood and affect. Her behavior is normal. Judgment and thought content normal.            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     Start Urecholine & Flomax. Instructed her to try to relax when urinating, use a stool, and lean forward to help bladder empty. She has a vaginal defect causing her bladder to fall which makes it difficult for her to empty.     Patient is very anxious and this is making the urinary issues much more difficult. They will discuss with PCP about increasing Ativan which she takes BID.     Continue straight cath regimen for now, until PVR consistently less than 100.     Follow-up in 4 months for H&P for stent exchange and surveillance cystoscopy in OR.

## 2018-01-29 ENCOUNTER — TELEPHONE (OUTPATIENT)
Dept: CARDIOLOGY CLINIC | Age: 83
End: 2018-01-29

## 2018-01-29 RX ORDER — ATORVASTATIN CALCIUM 20 MG/1
TABLET, FILM COATED ORAL
Qty: 90 TABLET | Refills: 0 | Status: SHIPPED | OUTPATIENT
Start: 2018-01-29 | End: 2018-01-01 | Stop reason: SDUPTHER

## 2018-01-29 RX ORDER — METOPROLOL SUCCINATE 50 MG/1
TABLET, EXTENDED RELEASE ORAL
Qty: 90 TABLET | Refills: 0 | Status: SHIPPED | OUTPATIENT
Start: 2018-01-29 | End: 2018-01-01 | Stop reason: SDUPTHER

## 2018-01-31 ENCOUNTER — TELEPHONE (OUTPATIENT)
Dept: FAMILY MEDICINE CLINIC | Age: 83
End: 2018-01-31

## 2018-01-31 ENCOUNTER — HOSPITAL ENCOUNTER (OUTPATIENT)
Age: 83
Setting detail: OUTPATIENT SURGERY
Discharge: HOME | End: 2018-01-31
Attending: UROLOGY | Admitting: UROLOGY
Payer: MEDICARE

## 2018-01-31 ENCOUNTER — ANESTHESIA EVENT (OUTPATIENT)
Dept: OPERATING ROOM | Age: 83
End: 2018-01-31
Payer: MEDICARE

## 2018-01-31 ENCOUNTER — ANESTHESIA (OUTPATIENT)
Dept: OPERATING ROOM | Age: 83
End: 2018-01-31
Payer: MEDICARE

## 2018-01-31 VITALS
WEIGHT: 140 LBS | BODY MASS INDEX: 30.2 KG/M2 | SYSTOLIC BLOOD PRESSURE: 131 MMHG | DIASTOLIC BLOOD PRESSURE: 68 MMHG | HEIGHT: 57 IN | TEMPERATURE: 97.8 F | OXYGEN SATURATION: 97 % | RESPIRATION RATE: 18 BRPM | HEART RATE: 82 BPM

## 2018-01-31 VITALS
OXYGEN SATURATION: 84 % | RESPIRATION RATE: 10 BRPM | SYSTOLIC BLOOD PRESSURE: 102 MMHG | DIASTOLIC BLOOD PRESSURE: 54 MMHG

## 2018-01-31 PROCEDURE — 7100000001 HC PACU RECOVERY - ADDTL 15 MIN: Performed by: UROLOGY

## 2018-01-31 PROCEDURE — 7100000000 HC PACU RECOVERY - FIRST 15 MIN: Performed by: UROLOGY

## 2018-01-31 PROCEDURE — 6370000000 HC RX 637 (ALT 250 FOR IP): Performed by: NURSE PRACTITIONER

## 2018-01-31 PROCEDURE — 52001 CYSTO W/IRRG&EVAC MLT CLOTS: CPT | Performed by: UROLOGY

## 2018-01-31 PROCEDURE — C2617 STENT, NON-COR, TEM W/O DEL: HCPCS | Performed by: UROLOGY

## 2018-01-31 PROCEDURE — 3700000000 HC ANESTHESIA ATTENDED CARE: Performed by: UROLOGY

## 2018-01-31 PROCEDURE — C1758 CATHETER, URETERAL: HCPCS | Performed by: UROLOGY

## 2018-01-31 PROCEDURE — 2500000003 HC RX 250 WO HCPCS: Performed by: ANESTHESIOLOGY

## 2018-01-31 PROCEDURE — 7100000010 HC PHASE II RECOVERY - FIRST 15 MIN: Performed by: UROLOGY

## 2018-01-31 PROCEDURE — 3700000001 HC ADD 15 MINUTES (ANESTHESIA): Performed by: UROLOGY

## 2018-01-31 PROCEDURE — 2580000003 HC RX 258

## 2018-01-31 PROCEDURE — 74420 UROGRAPHY RTRGR +-KUB: CPT | Performed by: UROLOGY

## 2018-01-31 PROCEDURE — 52344 CYSTO/URETERO STRICTURE TX: CPT | Performed by: UROLOGY

## 2018-01-31 PROCEDURE — 3600000013 HC SURGERY LEVEL 3 ADDTL 15MIN: Performed by: UROLOGY

## 2018-01-31 PROCEDURE — 52332 CYSTOSCOPY AND TREATMENT: CPT | Performed by: UROLOGY

## 2018-01-31 PROCEDURE — 7100000011 HC PHASE II RECOVERY - ADDTL 15 MIN: Performed by: UROLOGY

## 2018-01-31 PROCEDURE — 6360000004 HC RX CONTRAST MEDICATION: Performed by: UROLOGY

## 2018-01-31 PROCEDURE — 6360000002 HC RX W HCPCS: Performed by: ANESTHESIOLOGY

## 2018-01-31 PROCEDURE — 3600000003 HC SURGERY LEVEL 3 BASE: Performed by: UROLOGY

## 2018-01-31 DEVICE — URETERAL STENT
Type: IMPLANTABLE DEVICE | Site: URETER | Status: FUNCTIONAL
Brand: CONTOUR™

## 2018-01-31 RX ORDER — BETHANECHOL CHLORIDE 25 MG/1
25 TABLET ORAL 3 TIMES DAILY
Qty: 270 TABLET | Refills: 1 | Status: SHIPPED | OUTPATIENT
Start: 2018-01-31 | End: 2018-01-01

## 2018-01-31 RX ORDER — EPHEDRINE SULFATE 50 MG/ML
INJECTION INTRAVENOUS PRN
Status: DISCONTINUED | OUTPATIENT
Start: 2018-01-31 | End: 2018-01-31 | Stop reason: SDUPTHER

## 2018-01-31 RX ORDER — CIPROFLOXACIN 500 MG/1
500 TABLET, FILM COATED ORAL 2 TIMES DAILY
Qty: 20 TABLET | Refills: 0 | Status: SHIPPED | OUTPATIENT
Start: 2018-01-31 | End: 2018-02-10

## 2018-01-31 RX ORDER — SODIUM CHLORIDE 9 MG/ML
INJECTION, SOLUTION INTRAVENOUS CONTINUOUS
Status: DISCONTINUED | OUTPATIENT
Start: 2018-01-31 | End: 2018-01-31 | Stop reason: HOSPADM

## 2018-01-31 RX ORDER — ONDANSETRON 2 MG/ML
4 INJECTION INTRAMUSCULAR; INTRAVENOUS EVERY 6 HOURS PRN
Status: DISCONTINUED | OUTPATIENT
Start: 2018-01-31 | End: 2018-01-31 | Stop reason: HOSPADM

## 2018-01-31 RX ORDER — LIDOCAINE HYDROCHLORIDE 20 MG/ML
INJECTION, SOLUTION INFILTRATION; PERINEURAL PRN
Status: DISCONTINUED | OUTPATIENT
Start: 2018-01-31 | End: 2018-01-31 | Stop reason: SDUPTHER

## 2018-01-31 RX ORDER — ATROPA BELLADONNA AND OPIUM 16.2; 3 MG/1; MG/1
30 SUPPOSITORY RECTAL EVERY 8 HOURS PRN
Status: DISCONTINUED | OUTPATIENT
Start: 2018-01-31 | End: 2018-01-31 | Stop reason: HOSPADM

## 2018-01-31 RX ORDER — HYDROCODONE BITARTRATE AND ACETAMINOPHEN 5; 325 MG/1; MG/1
1 TABLET ORAL EVERY 4 HOURS PRN
Status: DISCONTINUED | OUTPATIENT
Start: 2018-01-31 | End: 2018-01-31 | Stop reason: HOSPADM

## 2018-01-31 RX ORDER — FENTANYL CITRATE 50 UG/ML
25 INJECTION, SOLUTION INTRAMUSCULAR; INTRAVENOUS EVERY 5 MIN PRN
Status: DISCONTINUED | OUTPATIENT
Start: 2018-01-31 | End: 2018-01-31 | Stop reason: HOSPADM

## 2018-01-31 RX ORDER — ONDANSETRON 2 MG/ML
INJECTION INTRAMUSCULAR; INTRAVENOUS PRN
Status: DISCONTINUED | OUTPATIENT
Start: 2018-01-31 | End: 2018-01-31 | Stop reason: SDUPTHER

## 2018-01-31 RX ORDER — FENTANYL CITRATE 50 UG/ML
INJECTION, SOLUTION INTRAMUSCULAR; INTRAVENOUS PRN
Status: DISCONTINUED | OUTPATIENT
Start: 2018-01-31 | End: 2018-01-31 | Stop reason: SDUPTHER

## 2018-01-31 RX ORDER — OXYBUTYNIN CHLORIDE 5 MG/1
5 TABLET ORAL 3 TIMES DAILY PRN
Status: DISCONTINUED | OUTPATIENT
Start: 2018-01-31 | End: 2018-01-31 | Stop reason: HOSPADM

## 2018-01-31 RX ORDER — CIPROFLOXACIN 2 MG/ML
400 INJECTION, SOLUTION INTRAVENOUS
Status: DISCONTINUED | OUTPATIENT
Start: 2018-01-31 | End: 2018-01-31 | Stop reason: HOSPADM

## 2018-01-31 RX ORDER — CIPROFLOXACIN 2 MG/ML
INJECTION, SOLUTION INTRAVENOUS
Status: DISCONTINUED
Start: 2018-01-31 | End: 2018-01-31 | Stop reason: HOSPADM

## 2018-01-31 RX ORDER — FENTANYL CITRATE 50 UG/ML
50 INJECTION, SOLUTION INTRAMUSCULAR; INTRAVENOUS EVERY 5 MIN PRN
Status: DISCONTINUED | OUTPATIENT
Start: 2018-01-31 | End: 2018-01-31 | Stop reason: HOSPADM

## 2018-01-31 RX ORDER — SUCCINYLCHOLINE CHLORIDE 20 MG/ML
INJECTION INTRAMUSCULAR; INTRAVENOUS PRN
Status: DISCONTINUED | OUTPATIENT
Start: 2018-01-31 | End: 2018-01-31 | Stop reason: SDUPTHER

## 2018-01-31 RX ORDER — CIPROFLOXACIN 2 MG/ML
INJECTION, SOLUTION INTRAVENOUS PRN
Status: DISCONTINUED | OUTPATIENT
Start: 2018-01-31 | End: 2018-01-31 | Stop reason: SDUPTHER

## 2018-01-31 RX ORDER — HYDROCODONE BITARTRATE AND ACETAMINOPHEN 5; 325 MG/1; MG/1
2 TABLET ORAL EVERY 4 HOURS PRN
Status: DISCONTINUED | OUTPATIENT
Start: 2018-01-31 | End: 2018-01-31 | Stop reason: HOSPADM

## 2018-01-31 RX ORDER — LABETALOL HYDROCHLORIDE 5 MG/ML
10 INJECTION, SOLUTION INTRAVENOUS EVERY 10 MIN PRN
Status: DISCONTINUED | OUTPATIENT
Start: 2018-01-31 | End: 2018-01-31 | Stop reason: HOSPADM

## 2018-01-31 RX ORDER — PROMETHAZINE HYDROCHLORIDE 25 MG/ML
12.5 INJECTION, SOLUTION INTRAMUSCULAR; INTRAVENOUS
Status: DISCONTINUED | OUTPATIENT
Start: 2018-01-31 | End: 2018-01-31 | Stop reason: HOSPADM

## 2018-01-31 RX ORDER — PROPOFOL 10 MG/ML
INJECTION, EMULSION INTRAVENOUS PRN
Status: DISCONTINUED | OUTPATIENT
Start: 2018-01-31 | End: 2018-01-31 | Stop reason: SDUPTHER

## 2018-01-31 RX ORDER — DEXAMETHASONE SODIUM PHOSPHATE 4 MG/ML
INJECTION, SOLUTION INTRA-ARTICULAR; INTRALESIONAL; INTRAMUSCULAR; INTRAVENOUS; SOFT TISSUE PRN
Status: DISCONTINUED | OUTPATIENT
Start: 2018-01-31 | End: 2018-01-31 | Stop reason: SDUPTHER

## 2018-01-31 RX ADMIN — DEXAMETHASONE SODIUM PHOSPHATE 8 MG: 4 INJECTION, SOLUTION INTRAMUSCULAR; INTRAVENOUS at 10:37

## 2018-01-31 RX ADMIN — FENTANYL CITRATE 25 MCG: 50 INJECTION INTRAMUSCULAR; INTRAVENOUS at 10:51

## 2018-01-31 RX ADMIN — EPHEDRINE SULFATE 20 MG: 50 INJECTION, SOLUTION INTRAVENOUS at 10:29

## 2018-01-31 RX ADMIN — FENTANYL CITRATE 25 MCG: 50 INJECTION INTRAMUSCULAR; INTRAVENOUS at 10:34

## 2018-01-31 RX ADMIN — SODIUM CHLORIDE: 9 INJECTION, SOLUTION INTRAVENOUS at 09:59

## 2018-01-31 RX ADMIN — OXYBUTYNIN CHLORIDE 5 MG: 5 TABLET ORAL at 12:13

## 2018-01-31 RX ADMIN — FENTANYL CITRATE 50 MCG: 50 INJECTION INTRAMUSCULAR; INTRAVENOUS at 10:22

## 2018-01-31 RX ADMIN — PROPOFOL 50 MG: 10 INJECTION, EMULSION INTRAVENOUS at 10:34

## 2018-01-31 RX ADMIN — ONDANSETRON 4 MG: 2 INJECTION INTRAMUSCULAR; INTRAVENOUS at 10:50

## 2018-01-31 RX ADMIN — PROPOFOL 120 MG: 10 INJECTION, EMULSION INTRAVENOUS at 10:23

## 2018-01-31 RX ADMIN — EPHEDRINE SULFATE 10 MG: 50 INJECTION, SOLUTION INTRAVENOUS at 10:27

## 2018-01-31 RX ADMIN — CIPROFLOXACIN 400 MG: 2 INJECTION, SOLUTION INTRAVENOUS at 10:27

## 2018-01-31 RX ADMIN — LIDOCAINE HYDROCHLORIDE 60 MG: 20 INJECTION, SOLUTION EPIDURAL; INFILTRATION; INTRACAUDAL; PERINEURAL at 10:22

## 2018-01-31 RX ADMIN — EPHEDRINE SULFATE 20 MG: 50 INJECTION, SOLUTION INTRAVENOUS at 10:31

## 2018-01-31 RX ADMIN — HYDROCODONE BITARTRATE AND ACETAMINOPHEN 1 TABLET: 5; 325 TABLET ORAL at 12:18

## 2018-01-31 RX ADMIN — Medication 100 MG: at 10:23

## 2018-01-31 ASSESSMENT — PULMONARY FUNCTION TESTS
PIF_VALUE: 8
PIF_VALUE: 8
PIF_VALUE: 20
PIF_VALUE: 15
PIF_VALUE: 15
PIF_VALUE: 8
PIF_VALUE: 15
PIF_VALUE: 9
PIF_VALUE: 4
PIF_VALUE: 0
PIF_VALUE: 18
PIF_VALUE: 8
PIF_VALUE: 2
PIF_VALUE: 8
PIF_VALUE: 7
PIF_VALUE: 25
PIF_VALUE: 8
PIF_VALUE: 9
PIF_VALUE: 8
PIF_VALUE: 18
PIF_VALUE: 3
PIF_VALUE: 3
PIF_VALUE: 8
PIF_VALUE: 2
PIF_VALUE: 9
PIF_VALUE: 26
PIF_VALUE: 3
PIF_VALUE: 15
PIF_VALUE: 7
PIF_VALUE: 2
PIF_VALUE: 14
PIF_VALUE: 6
PIF_VALUE: 8
PIF_VALUE: 1
PIF_VALUE: 8
PIF_VALUE: 3
PIF_VALUE: 3
PIF_VALUE: 0
PIF_VALUE: 0
PIF_VALUE: 8
PIF_VALUE: 0
PIF_VALUE: 8
PIF_VALUE: 8
PIF_VALUE: 2
PIF_VALUE: 15
PIF_VALUE: 14
PIF_VALUE: 0
PIF_VALUE: 3
PIF_VALUE: 9
PIF_VALUE: 15
PIF_VALUE: 8
PIF_VALUE: 7
PIF_VALUE: 0
PIF_VALUE: 3

## 2018-01-31 ASSESSMENT — PAIN DESCRIPTION - PAIN TYPE
TYPE: SURGICAL PAIN
TYPE: SURGICAL PAIN

## 2018-01-31 ASSESSMENT — PAIN DESCRIPTION - LOCATION: LOCATION: PERINEUM

## 2018-01-31 ASSESSMENT — PAIN DESCRIPTION - DESCRIPTORS: DESCRIPTORS: PRESSURE

## 2018-01-31 ASSESSMENT — PAIN SCALES - GENERAL
PAINLEVEL_OUTOF10: 7
PAINLEVEL_OUTOF10: 3
PAINLEVEL_OUTOF10: 6
PAINLEVEL_OUTOF10: 3

## 2018-01-31 ASSESSMENT — ENCOUNTER SYMPTOMS: SHORTNESS OF BREATH: 1

## 2018-01-31 NOTE — TELEPHONE ENCOUNTER
We received a fax from [employer] requesting a PA on Lorazepam. This was done through [facility] and it has been approved from 1/1/18-1/31/19. Phoned [address] [phone] and left a detailed msg regarding.

## 2018-01-31 NOTE — PROGRESS NOTES
DISCHARGE INSTRUCTIONS REVIEWED WITH PATIENT AND FAMILY. INSTRUCTED ON WALKER CARE. GRANDDAUGHTER COMFORTABLE WITH REMOVING NEXT WEEK. DISCHARGED BY WHEELCHAIR TO CAR.

## 2018-01-31 NOTE — ANESTHESIA POSTPROCEDURE EVALUATION
Department of Anesthesiology  Postprocedure Note    Patient: Maximo Chiu  MRN: [id number]  YOB: 1930  Date of evaluation: 1/31/2018  Time:  11:25 AM     Procedure Summary     Date:  01/31/18 Room / Location:  [facility]    Anesthesia Start:  1010 Anesthesia Stop:  2149    Procedures:       CYSTOSCOPY RIGHT RETROGRADE PYELOGRAM RIGHT URETEROSCOPY AND REPLACEMENT OF RIGHT URETERAL STENT (Right Bladder)      CYSTOSCOPY EVACUATION OF CLOTS (N/A Bladder) Diagnosis:  (BLADDER CANCER, URINALRY RETENTION, RIGHT HYDRONEPHROSIS)    Surgeon:  Yvette Gonzalez MD Responsible Provider:  Alba Mauricio DO    Anesthesia Type:  general ASA Status:  3          Anesthesia Type: general    Veto Phase I: Veto Score: 8    Veto Phase II:      Last vitals: Reviewed and per EMR flowsheets.        Anesthesia Post Evaluation    Patient location during evaluation: PACU  Patient participation: complete - patient participated  Level of consciousness: responsive to light touch  Airway patency: patent  Nausea & Vomiting: no vomiting and no nausea  Cardiovascular status: hemodynamically stable  Respiratory status: acceptable  Hydration status: stable

## 2018-01-31 NOTE — ANESTHESIA PRE PROCEDURE
controlled, PUD,           Endo/Other:                     Abdominal:           Vascular:                                        Anesthesia Plan      general     ASA 3       Induction: intravenous. MIPS: Postoperative opioids intended and Prophylactic antiemetics administered. Anesthetic plan and risks discussed with patient and child/children. Astrid Vora.  [facility],    1/31/2018

## 2018-02-01 NOTE — INTERVAL H&P NOTE
[facility]  History and Physical Update    Pt Name: Whit Tinoco  MRN: [id number]  YOB: 1930  Date of evaluation: 2/1/2018    [] I have examined the patient and reviewed the H&P/Consult and there are no changes to the patient or plans. [x] I have examined the patient and reviewed the H&P/Consult and have noted the following changes: No changes per patient.         Marifer Morris MD  Electronically signed 2/1/2018 at 2:47 PM

## 2018-02-01 NOTE — OP NOTE
[facility]  RECORD OF OPERATION     PATIENT NAME: Cooper Garcia               MEDICAL RECORD NO. [id number]                DATE OF PROCEDURE: 01/31/2018  SURGEON: Grayson Bumpers A. Fayette Rubenstein, MD  PRIMARY CARE PHYSICIAN: Katherine Tejeda MD        PREOPERATIVE DIAGNOSIS: right ureteral obstruction--distal, history of bladder cancer     POSTOPERATIVE DIAGNOSIS: same      PROCEDURE PERFORMED: cystoscopy with clot evacuation, right retrograde pyelogram, right ureteroscopy with dilation of the right distal ureter and replacement of right ureteral stent     SURGEON: Amanda Diallo MD    ASSISTANT(S): none     ANESTHESIA: general     BLOOD LOSS:  5 cc     SPECIMENS: none     COMPLICATIONS:  None immediately appreciated. DISCUSSION:  Sabrina Ochoa is a 80y.o.-year-old female who has a diagnosis of a right ureteral stent and right distal ureteral obstruction, along with a history of bladder cancer. After a history and physical examination was performed, potential diagnostic and therapeutic modalities were discussed with the patient. Right ureteroscopy with stent exchange and cystoscopy was recommended and a discussion of the risks, possible complications, possible side effects, along with the anticipated benefits were reviewed. She was given the opportunity to ask questions. Once answered, informed consent was obtained. She was brought to the operating on 01/31/2018 for this procedure. PROCEDURE: Sabrina Ochoa was taken to the OR and transferred to the operating room table. After initiation of general anesthesia the patient was placed in lithotomy position for cystoscopy. Her groin was prepped and draped in the standard fashion for cystoscopy. A 22 Romanian cystoscope was passed per urethra and multiple clots were found in the bladder. These were irrigated from the bladder over the first 25% of the procedure.   Eventually the bladder was able to be evaluated and, although there were signs of cystitis, there were no

## 2018-02-06 DIAGNOSIS — C67.9 BLADDER CANCER METASTASIZED TO LUNG (HCC): ICD-10-CM

## 2018-02-06 DIAGNOSIS — C78.00 BLADDER CANCER METASTASIZED TO LUNG (HCC): ICD-10-CM

## 2018-02-06 DIAGNOSIS — M15.9 GENERALIZED OA: ICD-10-CM

## 2018-02-06 DIAGNOSIS — M16.9 OSTEOARTHROSIS, HIP: ICD-10-CM

## 2018-02-06 RX ORDER — HYDROCODONE BITARTRATE AND ACETAMINOPHEN 5; 325 MG/1; MG/1
1 TABLET ORAL EVERY 6 HOURS PRN
Qty: 120 TABLET | Refills: 0 | Status: SHIPPED | OUTPATIENT
Start: 2018-02-06 | End: 2018-01-01 | Stop reason: SDUPTHER

## 2018-02-07 ENCOUNTER — OFFICE VISIT (OUTPATIENT)
Dept: UROLOGY | Age: 83
End: 2018-02-07
Payer: MEDICARE

## 2018-02-07 VITALS — HEIGHT: 58 IN | BODY MASS INDEX: 29.39 KG/M2 | WEIGHT: 140 LBS

## 2018-02-07 DIAGNOSIS — R33.9 RETENTION OF URINE: Primary | ICD-10-CM

## 2018-02-07 PROCEDURE — 99214 OFFICE O/P EST MOD 30 MIN: CPT | Performed by: NURSE PRACTITIONER

## 2018-02-07 ASSESSMENT — ENCOUNTER SYMPTOMS
VOMITING: 0
NAUSEA: 0
ABDOMINAL PAIN: 0

## 2018-02-07 NOTE — PROGRESS NOTES
Following Justyna Hines's plan of care. Inserted 14 Fr  Catheter without difficulty. Cleansed head of penis with betadine swab. 10 Fr regular forman was inserted without difficulty and inflated balloon with 10 ml of water. Forman Catheter was hooked up to leg bag with straps. Patient instructed on catheter care including draining catheter bag and keeping catheter bag above the knee to prevent pulling on catheter causing blood. Patient was instructed on overnight care with changing over to an overnight bag and patient accepted prescription.
well-nourished. HENT:   Head: Normocephalic and atraumatic. Right Ear: External ear normal.   Left Ear: External ear normal.   Nose: Nose normal.   Eyes: Conjunctivae are normal.   Pulmonary/Chest: Effort normal.   Abdominal: Soft. Musculoskeletal: Normal range of motion. Neurological: She is alert and oriented to person, place, and time. Skin: Skin is warm and dry. Psychiatric: She has a normal mood and affect. Her behavior is normal. Judgment and thought content normal.       Assessment:      History of Bladder Cancer  Right Hydronephrosis  Right Distal Ureteral Stricture  Right Ureteral Stent Indwelling  Urinary Retention---Indwelling forman. 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 Forman catheter was removed at 1030 and it's been four hours and patient has been unable to void despite having the urge to do so. She will be due for stent exchange in July 2018. Indwelling forman catheter inserted. Continue Urecholine & Flomax. Oxybutynin PRN for bladder spasms. Follow-up in 3 weeks for void trial.      Need to get Bladder CX at next visit per Dr. Lela Ratliff.

## 2018-02-08 ENCOUNTER — TELEPHONE (OUTPATIENT)
Dept: UROLOGY | Age: 83
End: 2018-02-08

## 2018-02-08 NOTE — TELEPHONE ENCOUNTER
Please call patient and/or her granddaughter and let them know I talked to Dr. Slick Diallo and the medication he was talking to them about was Urecholine and she is already on that. He increased the dose. We can try another void trial in a few weeks but if it is not successful then unfortunately there are no other options.

## 2018-02-23 NOTE — CARE COORDINATION
Dioni Mcghee MD   meclizine (ANTIVERT) 25 MG tablet Take 1 tablet by mouth 3 times daily as needed for Dizziness 3/24/17   Yaneth Wild MD   acetaminophen (TYLENOL) 325 MG tablet Take 2 tablets by mouth every 4 hours as needed for Pain or Fever 10/31/16   Claudetta Crane, MD   sodium chloride (OCEAN) 0.65 % nasal spray 1 spray by Nasal route as needed for Congestion    Historical Provider, MD   cetirizine (ZYRTEC) 10 MG tablet Take 10 mg by mouth daily    Historical Provider, MD   Handicap Placard MISC by Does not apply route.  2/28/14   Yaneth Wild MD       Future Appointments  Date Time Provider Kobi Careyi   2/28/2018 10:30 AM Suzanne Tavarez, [place] [address] [address] [facility] - [place] [address] [place] [address]   3/15/2018 3:30 PM Tinnie Favre, MD [facility] - [place] [address] [place] [address]   4/6/2018 10:15 AM Yaneth Wild MD [facility]   7/13/2018 11:30 AM JASON Rosas [address] [place] [address] [facility] - [place] [address] [place] [address]

## 2018-02-26 NOTE — TELEPHONE ENCOUNTER
I happened to be looking through upcoming charts and saw this. I never received it in my inbasket which is why I didn't respond. Anyway, has hematuria resolved now? Any fever or chills? Also, I saw previous messages and I talked with Dr. Geri Mims and he told me he meant Urecholine which she is already on. It is an older medication. I verified it with him.

## 2018-03-05 NOTE — TELEPHONE ENCOUNTER
Katy Dominguez called requesting a refill on the following medications:  Requested Prescriptions     Pending Prescriptions Disp Refills    HYDROcodone-acetaminophen (NORCO) 5-325 MG per tablet 120 tablet 0     Sig: Take 1 tablet by mouth every 6 hours as needed for Pain for up to 30 days Fill on 1/5/18. Hannah Medeiros      Pharmacy verified:  Saritha Burciaga       Date of last visit: 01-05-18   Date of next visit (if applicable): 6/1/3725

## 2018-03-15 NOTE — PROGRESS NOTES
, EF 30%-pci of LAD done& med Rx    Presence of stent in LAD coronary artery    Acute GI bleeding    Duodenal ulcer    Gastritis    Sigmoid ulcer    Esophagitis    Ischemic colon (HCC)    Physical deconditioning    Chronic systolic [facility]estive [facility] failure) [facility]    S/P angioplasty with stent TINA in 10/2015    Dyslipidemia    SOB (shortness of breath) on exertion    Insomnia due to anxiety and fear    Chest pain, atypical    Hydronephrosis, right    Coronary artery disease of native artery of native heart with stable angina pectoris ([facility])    Gross hematuria    Acute blood loss anemia    Hypotension due to drugs    Coronary artery disease involving native coronary artery of native heart    Hematuria    Acute cystitis without hematuria    Non morbid obesity    Encounter for chemotherapy management    Chronic diastolic congestive heart failure ([facility] [address]    Clot retention of urine    Anemia    Lesion of bladder    Ureteral stenosis    Iron deficiency anemia due to chronic blood loss    Closed fracture of right wrist    Pre-op evaluation    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     Bladder prolapse, female, acquired    Prerenal azotemia    Malignant neoplasm of urinary bladder ([facility])    Tinnitus of right ear    Pain in metatarsus of right foot    Mixed conductive and sensorineural hearing loss of both ears    Blood clot in bladder       Past Surgical History:   Procedure Laterality Date   [address]  10/2016    stent placed and removed a blood clot    CARDIAC SURGERY      stents    COLONOSCOPY      CYSTOSCOPY  11-    TUR and fulg BT  superficial noninvasive bladder ca    CYSTOSCOPY N/A 8/23/2017    CYSTOSCOPY, RIGHT URETEROSCOPY, RIGHT STENT EXCHANGE retrograde right and right ureteral dilitation performed by Payton Loving MD at [place] [address] N/A 1/31/2018    CYSTOSCOPY

## 2018-03-27 NOTE — CARE COORDINATION
Ambulatory Care Coordination Note  3/27/2018  CM Risk Score: 11  Debbie Mortality Risk Score: 74.37    ACC: Mohit Lee RN    Summary Note: spoke with Carissa Santana today. She states that she is doing ok. Continues to have forman catheter. Pt due for cath change 3/29. She continues to be frustrated with the catheter. States that she has blood in catheter off and on quite frequently . Reports that urine today is mabel in color. Pt also continues to be frustrated with the fact that she continues to have recurrent UTI's. Encouraged her to discuss this at urology appt this week. Reports that breathing is at baseline. Continues to monitor daily wts. Denies edema to ext's. Denies cough or congestion at present time. Pt recently had appt with Dr. Grant Vasquez on 3/15. Scheduled to have cataract removal 1st 2 wks in April. Reviewed CHF and COPD zone mgmt. Reinforced importance of early symptom recognition and reporting. Encouraged to contact office or myself with any new questions or concerns.     Congestive Heart Failure Assessment    Are you currently restricting fluids?:  No Restriction  Do you understand a low sodium diet?:  Yes  Do you understand how to read food labels?:  Yes  How many restaurant meals do you eat per week?:  0  Do you salt your food before tasting it?:  No         Symptoms:   CHF associated angina: Neg, CHF associated dyspnea on exertion: Pos, CHF associated fatigue: Neg, CHF associated leg swelling: Neg, CHF associated orthostatic hypotension: Neg, CHF associated PND: Neg, CHF associated shortness of breath: Neg, CHF associated weakness: Neg      Symptom course:  stable  Patient-reported weight (lb):  143  Weight trend:  stable  Salt intake watch compared to last visit:  stable     ,   COPD Assessment    Does the patient understand envrionmental exposure?:  Yes  Is the patient able to verbalize Rescue vs. Long Acting medications?:  Yes  Does the patient have a nebulizer?:  Yes  Does the patient Future Appointments  Date Time Provider Kobi Dyana   3/29/2018 12:45 PM JASON Ayers [place] [address] [place] [address] [facility] - [place] [address]   4/6/2018 10:15 AM Josr Lion MD [address] [facility]   7/13/2018 11:30 AM JASON AyersEIN [place] [facility] - [place] [address]   7/17/2018 3:15 PM Vijaya Chandler MD [facility] - [place] [address]

## 2018-03-29 NOTE — PROGRESS NOTES
Patient came into the office today for her 4 week catheter change. Patients night bag and tubing had dark red urine with clots in it. Patients granddaughter stated that is has been like this for weeks. I spoke with Becky Trinh CNP and she advised me to change the catheter and flush until clear. Following [address] of [facility]. 16 Fr catheter changed without difficulty. Once balloon was deflated, removed catheter without difficulty. Patient had several blood clots after catheter removal.  I proceeded to get Becky Trinh CNP to evaluate the patient. Per Hungkhushbu, I was to insert new catheter and irrigate with water until clear. Patient's urethra was cleansed with betadine swab. 16 Fr regular forman was inserted without difficulty. Catheter was irrigated with 350cc water insuring return until clear and inflated balloon with 10 ml of water. Total of 30 minutes. Forman Catheter was hooked up to patients night bag. As patient stood up her urine drained red into the night bag. I then flushed the patient again with 100cc of water until clear. Total of 15 minutes. As the patient stood up again her urine drained red into the bag. Patient instructed on catheter care including draining catheter bag and keeping catheter bag above the knee to prevent pulling on catheter causing blood. Patient is to have H&H lab work and urine culture tomorrow at a [address] [facility] is to call in an antibiotic for the patient. Advised the patients granddaughter we will call the patient with results when the lab work and urine culture are final.  Patients granddaughter voiced understanding. Patient brought her own supply of catheters.

## 2018-03-30 NOTE — PROGRESS NOTES
1340 Patient arrived to [facility] via wheelchair with family for urine culture from forman cath  1350 Forman cath clamped off for urine collection  1400 Bloody urine collected and sent to lab for further studies  1415 Patient discharged to home in stable condition via wheelchair   _m___ Safety:       (Environmental)  Roberts Schaumann to environment   Ensure ID band is correct and in place/ allergy band as needed   Assess for fall risk   Initiate fall precautions as applicable (fall band, side rails, etc.)   Call light within reach   Bed in low position/ wheels locked    _m___ Pain:        Assess pain level and characteristics   Administer analgesics as ordered   Assess effectiveness of pain management and report to MD as needed    _m___ Knowledge Deficit:   Assess baseline knowledge   Provide teaching at level of understanding   Provide teaching via preferred learning method   Evaluate teaching effectiveness    _m___ Hemodynamic/Respiratory Status:       (Pre and Post Procedure Monitoring)   Assess/Monitor vital signs and LOC   Assess Baseline SpO2 prior to any sedation   Obtain weight/height   Assess vital signs/ LOC until patient meets discharge criteria   Monitor procedure site and notify MD of any issues

## 2018-04-03 NOTE — CARE COORDINATION
----- Message from Maia Bellamy sent at 4/3/2018  1:59 PM CDT -----  Contact: Pt's sister Dory   Pt's sister is calling in regards to scheduling an appt for pt. Per sister pt foot has been in pain. The first available appt is 04/24.    Pt's sister would like a call so that pt can be seen sooner. Pt's sister can be reached at [phone].    Thank you    restricting fluids?:  No Restriction  Do you understand a low sodium diet?:  Yes  Do you understand how to read food labels?:  Yes  How many restaurant meals do you eat per week?:  0  Do you salt your food before tasting it?:  No         Symptoms:   CHF associated angina: Neg, CHF associated dyspnea on exertion: Pos, CHF associated fatigue: Neg, CHF associated leg swelling: Pos, CHF associated orthostatic hypotension: Neg, CHF associated PND: Neg, CHF associated shortness of breath: Neg, CHF associated weakness: Neg (Comment: mild left ankle edema. right ankle edema. )      Symptom course:  stable  Patient-reported weight (lb):  141  Weight trend:  stable  Salt intake watch compared to last visit:  stable     ,   COPD Assessment    Does the patient understand envrionmental exposure?:  Yes  Is the patient able to verbalize Rescue vs. Long Acting medications?:  Yes  Does the patient have a nebulizer?:  Yes  Does the patient use a space with inhaled medications?:  No            Symptoms:   None:  Yes      Symptom course:  stable  Breathlessness:  exertion  Increase use of rapid acting/rescue inhaled medications?:  No  Change in chronic cough?:  No/At Baseline  Change in sputum?:  No/At Baseline  Sputum characteristics:  Clear  Self Monitoring - SaO2:  No  Have you had a recent diagnosis of pneumonia either by PCP or at a hospital?:  No      and   General Assessment    Do you have any symptoms that are causing concern?:  Yes  Progression since Onset:  Unchanged  Reported Symptoms:  (Comment: ongoing urinary issues. grand daughter continues to straight cath pt. up to 4-5 x per day.   continues to update urology office. )               Care Coordination Interventions    Program Enrollment:  Complex Care  Referral from Primary Care Provider:  Yes  Suggested Interventions and [place]:  Completed (Comment: pt receiving SR  )  Meals on Wheels:  Declined  Medi Set or Pill Pack: Completed  Occupational Therapy:  Completed (Comment: receiving SR  OT)  Physical Therapy:  Completed (Comment: recieving SR HH PT)  Zone Management Tools:  Completed (Comment: CHF zone mgmt tool sheet mailed to pt. )         Goals Addressed             Most Recent     care coordination-self monitoring   On track (12/21/2017)             Care Coordination Goal:  Patient Self-Monitoring  Choose a Goal:      Daily weight:   No  I will notify my provider of any increase in weight by 3 or more pounds in 2 days. LINK TO DAILY WT TRACKER HERE. Barriers to success: none  Plan for overcoming my barriers: N/A    Confidence: 5/10                          Conditions and Symptoms   On track (12/21/2017)             I will schedule office visits, as directed by my provider. I will keep my appointment or reschedule if I have to cancel. I will notify my provider of any barriers to my plan of care. I will follow my Zone Management tool to seek urgent or emergent care. I will notify my provider of any symptoms that indicate a worsening of my condition. Barriers: overwhelmed by complexity of regimen  Plan for overcoming my barriers: grand daughter for support  Confidence: 8/10  Anticipated Goal Completion Date: 1.1.18              Prior to Admission medications    Medication Sig Start Date End Date Taking? Authorizing Provider   ferrous sulfate 325 (65 Fe) MG tablet TAKE 1 TABLET BY MOUTH 2 TIMES DAILY (WITH MEALS) 12/11/17  Yes Isai Hua MD   HYDROcodone-acetaminophen [facility]) 5-325 MG per tablet Take 1 tablet by mouth every 6 hours as needed for Pain  Fill on 5/23/17. . 12/7/17  Yes Isai Hua MD   lisinopril (PRINIVIL;ZESTRIL) 2.5 MG tablet TAKE 1 TABLET BY MOUTH DAILY 11/22/17  Yes Man Aguirre CNP   bumetanide (BUMEX) 0.5 MG tablet TAKE 1 TABLET BY MOUTH 4 TIMES A WEEK 11/5/17  Yes Samuel Carpenter CNP   ferrous sulfate 325 (65 Fe) MG tablet TAKE 1 TABLET BY MOUTH 2 TIMES DAILY (WITH MEALS) mouth 3 times daily as needed for Dizziness 3/24/17  Yes Steve Hinkle MD   acetaminophen (TYLENOL) 325 MG tablet Take 2 tablets by mouth every 4 hours as needed for Pain or Fever 10/31/16  Yes Bhavani Denton MD   sodium chloride (OCEAN) 0.65 % nasal spray 1 spray by Nasal route as needed for Congestion   Yes Historical Provider, MD   cetirizine (ZYRTEC) 10 MG tablet Take 10 mg by mouth daily   Yes Historical Provider, MD   ondansetron (ZOFRAN) 4 MG tablet TAKE 1 TABLET BY MOUTH EVERY 8 HOURS AS NEEDED FOR NAUSEA OR VOMITING 8/22/17   Low Dobbs NP   Handtorito Chairezard MISC by Does not apply route.  2/28/14   Steve Hinkle MD       Future Appointments  Date Time Provider Kobi Dyana   12/22/2017 9:45 AM Krissy David DO [address] [facility]   2/9/2018 12:30 PM Carlos Stanley NP [facility] Urology [facility]   3/15/2018 3:30 PM Placido Loya MD [facility]   4/6/2018 10:15 AM Steve Hinkle MD [address] [facility]

## 2018-04-29 PROBLEM — R77.8 ELEVATED TROPONIN: Status: ACTIVE | Noted: 2018-01-01

## 2018-04-29 PROBLEM — N13.4 HYDROURETER ON LEFT: Status: ACTIVE | Noted: 2018-01-01

## 2018-04-29 PROBLEM — N18.30 CHRONIC KIDNEY DISEASE, STAGE 3 (HCC): Status: ACTIVE | Noted: 2018-01-01

## 2018-04-30 PROBLEM — R78.81 BLOOD BACTERIAL CULTURE POSITIVE: Status: ACTIVE | Noted: 2018-01-01

## 2018-05-01 PROBLEM — R78.81 BLOOD BACTERIAL CULTURE POSITIVE: Status: RESOLVED | Noted: 2018-01-01 | Resolved: 2018-01-01

## 2018-05-01 PROBLEM — R77.8 ELEVATED TROPONIN: Status: RESOLVED | Noted: 2018-01-01 | Resolved: 2018-01-01

## 2018-05-08 PROBLEM — F32.5 MAJOR DEPRESSIVE DISORDER, SINGLE EPISODE, IN FULL REMISSION (HCC): Status: ACTIVE | Noted: 2018-01-01

## 2018-05-08 PROBLEM — Z09 HOSPITAL DISCHARGE FOLLOW-UP: Status: ACTIVE | Noted: 2018-01-01

## 2018-06-07 PROBLEM — Z09 HOSPITAL DISCHARGE FOLLOW-UP: Status: RESOLVED | Noted: 2018-01-01 | Resolved: 2018-01-01

## 2018-06-10 PROBLEM — S72.092A COMMINUTED FRACTURE OF HIP, LEFT, CLOSED, INITIAL ENCOUNTER (HCC): Status: ACTIVE | Noted: 2018-01-01

## 2018-06-15 PROBLEM — B37.0 THRUSH, ORAL: Status: ACTIVE | Noted: 2018-01-01

## 2018-06-15 PROBLEM — E66.3 OVERWEIGHT (BMI 25.0-29.9): Status: ACTIVE | Noted: 2018-01-01

## 2018-06-16 NOTE — PROGRESS NOTES
stents    COLONOSCOPY      CYSTOSCOPY  11-    TUR and fulg BT  superficial noninvasive bladder ca    CYSTOSCOPY N/A 8/23/2017    CYSTOSCOPY, RIGHT URETEROSCOPY, RIGHT STENT EXCHANGE retrograde right and right ureteral dilitation [facility] [address] [facility]    CYSTOSCOPY EVACUATION OF CLOTS performed by Xenia Balderas MD at [place] [address] EKG 12-LEAD  10/14/2015         EKG 12-LEAD  10/17/2015         ENDOSCOPY, COLON, DIAGNOSTIC      EYE SURGERY  04/2018    JOINT REPLACEMENT      hip    MOUTH SURGERY      cheek; had benign lump removed    OTHER SURGICAL HISTORY Right 04/19/2017    Cystoscopy, Attempted Right Ureteral Access     [place] CYSTOURETHROSCOPY,URETER CATHETER Right 1/31/2018    CYSTOSCOPY RIGHT RETROGRADE PYELOGRAM RIGHT URETEROSCOPY AND REPLACEMENT OF RIGHT URETERAL STENT performed by Xenia Balderas MD at [address] OFFICE/OUTPT [address] Left 6/11/2018    IM NAIL HENRY INSERTION, LEFT performed by Sameera Lopez MD at [place] [address]  03/2017   [address]  3-9-12    Rt       Restrictions/Precautions:  General Precautions, Fall Risk, Weight Bearing           Left Lower Extremity Weight Bearing: Weight Bearing As Tolerated                Subjective:  Chart Reviewed: Yes  Patient assessed for rehabilitation services?: Yes  Family / Caregiver Present: No  Subjective: Patient lying in bed upon PT arrival, agreeable to participating. States she is feeling fatigued this AM.    General:  Overall Orientation Status: Within Normal Limits  Follows Commands: Within Functional Limits    Vision: Impaired  Vision Exceptions: Wears glasses for reading    Hearing: Exceptions to [facility]  Hearing Exceptions: Hard of hearing/hearing concerns         Pain:  Yes.   Pain Assessment  Pain Level: 6  Pain Type: Surgical pain  Pain Location: Hip  Pain Orientation: Left therex    Equipment Recommendations: Other: continue to monitor    Safety:  Type of devices: All fall risk precautions in place, Left in chair, Gait belt, Call light within reach, Patient at risk for falls, Nurse notified, Chair alarm in place    Plan:  Times per week: 6x   Times per day: Daily  Current Treatment Recommendations: Strengthening, Functional Mobility Training, Transfer Training, Balance Training, Endurance Training, Gait Training, Safety Education & Training, Patient/Caregiver Education & Training, Equipment Evaluation, Education, & procurement, Stair training    Goals:  Patient goals : Go home  Short term goals  Time Frame for Short term goals: 1 week  Short term goal 1: Patient will complete bed mobility at [facility] in order to get into/out of bed. Short term goal 2: Patient will transfer sit <--> stand with CGA in order to get up to ambulate. Short term goal 3: Patient will ambulate 13' with RW and CGA in order to get to the bathroom. Short term goal 4: Patient will negotiate 4 steps with B hand rails and min A in order to get into/out of her home. Long term goals  Time Frame for Long term goals : 3 weeks  Long term goal 1: Patient will complete bed mobility at mod I in order to get into/out of bed. Long term goal 2: Patient will transfer sit <--> stand with mod I in order to get up to ambulate. Long term goal 3: Patient will ambulate 48' with RW and SBA in order to get around safely in her home. Long term goal 4: Patient will negotiate 4 steps with B hand rails and CGA in order to get into/out of her home. Evaluation Complexity: Based on the findings of patient history, examination, clinical presentation, and decision making during this evaluation, the evaluation of Echo Conte  is of medium complexity.     Patrick Walker, PT, DPT

## 2018-06-16 NOTE — H&P
past 24 hour(s))   Magnesium    Collection Time: 06/15/18  7:19 AM   Result Value Ref Range    Magnesium 1.6 1.6 - 2.4 mg/dL   CK    Collection Time: 06/15/18  7:19 AM   Result Value Ref Range    Total CK 63 30 - 135 U/L       Impression:  Active Hospital Problems    Diagnosis Date Noted    Restless legs syndrome (RLS) [G25.81] 03/12/2012     Priority: High    Overweight (BMI 25.0-29. 9) [E66.3] 06/15/2018    Thrush, oral [B37.0] 06/15/2018    Comminuted fracture of hip, left, closed, initial encounter ([facility] Flori Lantigua 06/10/2018    Chronic kidney disease, stage 3 [N18.3] 04/29/2018    Bladder prolapse, female, acquired [N81.10] 06/23/2017    [facility] [I50.32] 09/29/2016    Physical deconditioning [R53.81] 10/26/2015    Presence of stent in LAD coronary artery [Z95.5]     Chronic diastolic heart failure (HCC) [I50.32]     RLS (restless legs syndrome) [G25.81]     Essential hypertension [I10]     BPV (benign positional vertigo) [H81.10]     Osteoarthritis [M19.90]     Anxiety, situational [F41.9] 12/29/2011    AR (allergic rhinitis) [J30.9] 12/16/2011    Hypercholesteremia [E78.00] 12/16/2011    Generalized OA [M15.9] 12/16/2011    History of bladder cancer [Z85.51] 12/16/2011   ·      Plan:   · The patient demonstrates good potential to participate in a skilled rehabilitation program on the transitional care unit. Rehabilitation services will include PT and OT/RT in order to improve functional status prior to discharge. Family education and training will be completed. Equipment evaluations and recommendations will be completed as appropriate. · Nursing will be involved for bowel, bladder, skin, and pain management. Nursing will also provide education and training to patient and family. · Prophylaxis:  DVT: lovenox. GI: protonix. · Pain: norco, tylenol  · Nutrition:  Consultation to dietician for nutritional counseling and recommendations.

## 2018-06-17 NOTE — PROGRESS NOTES
Procedure Laterality Date    ABDOMEN SURGERY      ABDOMINAL EXPLORATION [facility]    BLADDER SURGERY  10/2016    stent placed and removed a blood clot    CARDIAC SURGERY      stents    COLONOSCOPY      CYSTOSCOPY  11-    TUR and fulg BT  superficial noninvasive bladder ca    CYSTOSCOPY N/A 8/23/2017    CYSTOSCOPY, RIGHT URETEROSCOPY, RIGHT STENT EXCHANGE retrograde right and right ureteral dilitation [facility] [address] [facility] 1/[facility]2018    CYSTOSCOPY EVACUATION OF CLOTS performed by Skylar Singh MD at [place] [address] EKG 12-LEAD  10/14/2015         EKG 12-LEAD  10/17/2015         ENDOSCOPY, COLON, DIAGNOSTIC      EYE SURGERY  04/2018    JOINT REPLACEMENT      hip    MOUTH SURGERY      cheek; had benign lump removed    OTHER SURGICAL HISTORY Right 04/19/2017    Cystoscopy, Attempted Right Ureteral Access     [place] CYSTOURETHROSCOPY,URETER CATHETER Right 1/31/2018    CYSTOSCOPY RIGHT RETROGRADE PYELOGRAM RIGHT URETEROSCOPY AND REPLACEMENT OF RIGHT URETERAL STENT performed by Skylar Singh MD at [address] [place] [address] OFFICE/OUTPT [address] [facility] [address] Left 6/11/2018    IM NAIL HENRY INSERTION, LEFT performed by Daisy Henson MD at [place] [address]  03/2017    TOTAL HIP ARTHROPLASTY  3-9-12    Rt           Subjective  Chart Reviewed: Yes (med chart review)  Patient assessed for rehabilitation services?: Yes  Family / Caregiver Present: No    Subjective: Pt supine in bed c/o pain in her feet, agreeable to t/f to [facility] and up to chair  Comments: RN ok'd and present for mobility    General:  Overall Orientation Status: Within Functional Limits    Vision: Impaired  Vision Exceptions: Wears glasses for reading    Hearing: Exceptions to [facility]  Hearing Exceptions: Hard of hearing/hearing concerns (pt is extremely [place], reports just received hearing aids but they need adjusted)         Pain:  Pain Assessment  Patient session, especially mobility         Sensation  Overall Sensation Status:  (reporting numbness to feet)    Posture: Poor (severely forward head and kyphotic posture, requires mod to max assist for sitting balance)    Observation/Palpation  Posture: Poor (severely forward head and kyphotic posture, requires mod to max assist for sitting balance)                 LUE AROM (degrees)  LUE General AROM: shoulder flex to approx 90, lacking elbow extension 10 degrees, remaining WFL          RUE AROM (degrees)  RUE General AROM: shoulder flex to approx 75, lacking elbow extension 10 degrees, remaining WFL              ADL  Grooming: Increased time to complete, Setup, Contact guard assistance (sig inc time for brushing teeth seated in chair, assist required for applying toothpaste to toothbrush)  LE Dressing: Maximum assistance (donning bilat shoes)  Toileting: Dependent/Total, Increased time to complete (pt required 2 person assist for standing in [facility] with RN completing all mor hygiene following BSC transfer)     Bed mobility  Supine to Sit: Maximum assistance (HOB minimally raised, assist to bring hand to bedrail, max cues)  Sit to Supine: Unable to assess  Scooting: Moderate assistance (with inc time to EOB)    Transfers  Sit to stand: Maximum assistance (x2)  Stand to sit: Maximum assistance (x2)  Toilet Transfers  Toilet - Technique:  ([facility])  Equipment Used: Extra wide bedside commode  Toilet Transfer: Maximum assistance (x2)    Balance  Sitting Balance:  Moderate assistance (sitting EOB  pt requires max cues for posture, pt almost laying down with severe posterior lean requiring OT assist to sit upright)  Standing Balance: Maximum assistance (x2 in [facility])     Time: x2 min  Activity: static  [facility] for hygiene     Functional Mobility  Functional - Mobility Device:  (chiqui [facility])  Activity: Other (bed to [facility] to chair)  Assist Level: Maximum assistance  Functional Mobility Comments: Pt requires

## 2018-06-18 NOTE — PROGRESS NOTES
Iz[facility] Rangel [facility]  INPATIENT OCCUPATIONAL THERAPY  STR TCU 8E  DAILY NOTE    Time:  Time In: 7231  Time Out: 5911  Timed Code Treatment Minutes: 62 Minutes  Minutes: 57          Date: 2018  Patient Name: Yoseph Santana,   Gender: female      Room: [facility]/068-A  MRN: [id number]  : 1930  (80 y.o.)  Referring Practitioner: ordering: Claudene Lobo, MD attending: Dr. Wiley Fontenot  Diagnosis: Comminuted fracture of Left hip  Additional Pertinent Hx: Per ED note, pt is a 80 y.o. female who has a past medical history of osteoarthritis, osteopenia, and a right total hip arthroplasty. Patient presents to the Emergency Department on 6/10/18 for the evaluation of left hip pain. Patient reports that she was walking to the bathroom early this morning when she reports that she thinks fell asleep while walking back to bed. Found to have a left intertrochanteric hip fx. OR: s/p open treatment  with cephalomedullary nail on . To TCU on 6/15/18    Past Medical History:   Diagnosis Date    Allergic rhinitis     Anxiety     Bilateral hydronephrosis 8/10/2016    [facility] ([facility]     Dr. Vinod Solano Blood circulation, collateral     CHF (congestive heart failure) (HCC)     Constipation     attributed to [employer]    Coronary artery disease involving native coronary artery of native heart s/p cath 10/14/15-LM-P, LAD MID 90%, ANUERYSMAL, % PROX, RCA MID 60% DISTLA 70%, EDP 20 , EF 30%-NEED REVASCULARIZATION 10/14/2015    GERD (gastroesophageal reflux disease)     History of blood transfusion     [place] (hard of hearing)     Hyperglycemia     Hyperlipidemia     Hypertension     Obesity (BMI 30-39. 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  2017     Past Surgical History:   Procedure Laterality Date    ABDOMEN SURGERY      ABDOMINAL EXPLORATION SURGERY      BLADDER SURGERY  10/2016    stent placed and removed a blood - Mobility Device:  Duane Rakers steady)  Activity: Other  Functional Mobility Comments: From EOB to bedside chair                  Activity Tolerance:  Activity Tolerance: Patient limited by pain; Patient limited by fatigue    Assessment:     Performance deficits / Impairments: Decreased functional mobility , Decreased ADL status, Decreased strength, Decreased endurance, Decreased safe awareness, Decreased balance, Decreased ROM  Prognosis: Guarded, Fair  Discharge Recommendations: Continue to assess pending progress    Patient Education:  Patient Education: safety with transfers using chiqui steady, bed mobility with use of log roll technique, ADL strategies- would benefit from education on use of LHAE  Barriers to Learning: anxiety    Equipment Recommendations: Other: cont to monitor    Safety:  Safety Devices in place: Yes  Type of devices: Call light within reach, Chair alarm in place, Left in chair, Gait belt    Plan:  Times per week: 6x  Current Treatment Recommendations: Strengthening, Balance Training, Functional Mobility Training, Endurance Training, Safety Education & Training, Patient/Caregiver Education & Training, Equipment Evaluation, Education, & procurement, Self-Care / ADL, ROM, Pain Management, Positioning, Home Management Training    Goals:  Patient goals : To be more independent    Short term goals  Time Frame for Short term goals: One week  Short term goal 1: Pt will complete LB ADL tasks using LHAE prn with no greater than mod A for inc ind with self cares  Short term goal 2: Pt will tolerate sitting EOB with no greater than CGA for greater than 4 mins for increased ind with seated ADLs  Short term goal 3: Pt to complete sit<>stand transfer on<>off a variety of surfaces with no greater than max A x1 for increased ind with toileting  Short term goal 4: Pt will complete BUE AAROM/AROM for increasing functional ROM required for BADL task completion  Long term goals  Time Frame for Long term goals :  Two weeks  Long term goal 1: Pt will tolerate standing greater than 2 mins with no greater than mod A x1 and safety cues in preparation for ADL tasks  Long term goal 2: Pt to complete functional transfers on a variety of surfaces with no greater than Mod A x1 for increased ind with simple ADL tasks

## 2018-06-18 NOTE — PLAN OF CARE
Problem: DISCHARGE BARRIERS  Goal: Patient's continuum of care needs are met    Intervention: INVOLVE PATIENT/S.O. IN DISCHARGE PLANNING PROCESS  The patient fell at home, and sustained a left hip fracture which required surgery. See progress note for more information. Care plan reviewed with patient's daughter/POA, and she verbalizes understanding of the plan of care and contributes to goal setting.   TANVIR Sher

## 2018-06-18 NOTE — PROGRESS NOTES
lovenox for anticoagulation, which is being managed by Primary Care Physician    Antibiotic Use:  Patient not on antibiotics    Psychotropic Medication Use:  Patient on xanax and lexapro     Oxygen Use: No    Home Medications Reconciled: N/A    Physical Therapy:   Bed Mobility  Scooting: Moderate assistance (scooting hips towards EOB)  Transfers  Sit to Stand: Moderate Assistance (cues for hand placement and technique)  Stand to sit: Minimal Assistance (cues for safety)  Bed to Chair: Moderate assistance (using walker)  Ambulation 1  Surface: level tile  Device: Rolling Walker  Assistance: Moderate assistance  Quality of Gait: pt demonstrates a posterior lean initially, flexed posture, short shuffling steps with decreased L LE stance time. Distance: 3 ft to chair  Comments: Verbal cues for safety and technique with the walker. PT Equipment Recommendations  Other: continue to monitor    Occupational  Therapy:   ADL  Feeding: Setup (for breakfast tray)  Grooming: Increased time to complete, Setup, Contact guard assistance (sig inc time for brushing teeth seated in chair, assist required for applying toothpaste to toothbrush)  UE Bathing: Minimal assistance (to wash LUE - CGA to SBA seated EOB for sitting balance (as fatigue/pain increases pt demonstrates with posterior lean))  LE Bathing: Maximum assistance (to wash mor area/bottom and BLE below knees including B feet. Able to wash B thighs and half way to shin area)  UE Dressing: Minimal assistance (to doff gown and don tshirt seated EOB)  LE Dressing: Maximum assistance (to don shorts, undergarment, shoes, and socks seated EOB.  Stood in [address] steady with BUE support - and therapist assisted with pulling up over hips)  Toileting: Dependent/Total, Increased time to complete (pt required 2 person assist for standing in [address] stedy with RN completing all mor hygiene following BSC transfer)  Additional Comments: Patient would benefit from education on use of LHAE

## 2018-06-18 NOTE — PROGRESS NOTES
[address] [address]  INPATIENT PHYSICAL THERAPY  DAILY NOTE  [facility] 8E - 8E-68/068-A    Time In: 6745  Time Out: 1558  Timed Code Treatment Minutes: 63 Minutes  Minutes: 63          Date: 2018  Patient Name: Dave Bermudez,  Gender:  female        MRN: [id number]  : 1930  (80 y.o.)  Referral Date : 06/15/18  Referring Practitioner: Tisha Ren MD  Diagnosis: Comminuted fracture of hip, left, closed  Additional Pertinent Hx: Per ED note, pt is a 80 y.o. female who has a past medical history of osteoarthritis, osteopenia, and a right total hip arthroplasty. Patient presents to the Emergency Department on 6/10/18 for the evaluation of left hip pain. Patient reports that she was walking to the bathroom early this morning when she reports that she thinks fell asleep while walking back to bed. Found to have a left intertrochanteric hip fx. OR: s/p open treatment  with cephalomedullary nail on . Past Medical History:   Diagnosis Date    Allergic rhinitis     Anxiety     Bilateral hydronephrosis 8/10/2016    Bladder cancer ([facility] [address]     Dr. Powers End Blood circulation, collateral     CHF (congestive heart failure) (HCC)     Constipation     attributed to Ultram    Coronary artery disease involving native coronary artery of native heart s/p cath 10/14/15-LM-P, LAD MID 90%, ANUERYSMAL, % PROX, RCA MID 60% DISTLA 70%, EDP 20 , EF 30%-NEED REVASCULARIZATION 10/14/2015    GERD (gastroesophageal reflux disease)     History of blood transfusion     [place] (hard of hearing)     Hyperglycemia     Hyperlipidemia     Hypertension     Obesity (BMI 30-39. 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  2017     Past Surgical History:   Procedure Laterality Date    ABDOMEN SURGERY      ABDOMINAL EXPLORATION SURGERY      BLADDER SURGERY  10/2016    stent placed and removed a blood clot    CARDIAC SURGERY stents    COLONOSCOPY      CYSTOSCOPY  11-    TUR and fulg BT  superficial noninvasive bladder ca    CYSTOSCOPY N/A 8/23/2017    CYSTOSCOPY, RIGHT URETEROSCOPY, RIGHT STENT EXCHANGE retrograde right and right ureteral dilitation performed by John Culver MD at [address] 1/31/2018    CYSTOSCOPY EVACUATION OF CLOTS performed by John Culver MD at [place] [address] EKG 12-LEAD  10/14/2015         EKG 12-LEAD  10/17/2015         ENDOSCOPY, COLON, DIAGNOSTIC      EYE SURGERY  04/2018    JOINT REPLACEMENT      hip    MOUTH SURGERY      cheek; had benign lump removed    OTHER SURGICAL HISTORY Right 04/19/2017    Cystoscopy, Attempted Right Ureteral Access     [place] CYSTOURETHROSCOPY,URETER CATHETER Right 1/31/2018    CYSTOSCOPY RIGHT RETROGRADE PYELOGRAM RIGHT URETEROSCOPY AND REPLACEMENT OF RIGHT URETERAL STENT performed by John Culver MD at [address] [place] [address] OFFICE/OUTPT [facility] Left 6/11/2018    IM NAIL HENRY INSERTION, LEFT performed by Alin Garcia MD at [place] [address]  03/2017    TOTAL HIP ARTHROPLASTY  3-9-12    Rt       Restrictions/Precautions:  General Precautions, Fall Risk, Weight Bearing           Left Lower Extremity Weight Bearing: Weight Bearing As Tolerated                Prior Level of Function:  ADL Assistance: Needs assistance  Homemaking Assistance:  (dependent on granddaughter)  Ambulation Assistance: Independent  Transfer Assistance: Independent  Additional Comments: Pt states in the last week or two she has been primarily using a walker, but prior to this she was using a cane. Pt reports relying heavily on granddaughter for most all ADL and IADL tasks. Pt with limited ROM and use of BUE however pt does report she is able to do more for herself, however it is easier and faster if her granddaughter helps therefore decreasing her ind with basic ADL tasks.     Subjective:  Response To Previous Treatment: Patient with no complaints from previous session. Family / Caregiver Present: No  Subjective: Patient lying in bed upon PT arrival, agreeable to participating. pt. very talkative,alot of vc's for activity    Pain:   .  Pain Assessment  Pain Level: 8  Pain Type: Acute pain;Surgical pain  Pain Location: Hip;Leg  Pain Orientation: Left  Pain Descriptors: Aching; Tamara Citizen; Constant;Tightness  Pain Frequency: Continuous  Pain Onset: On-going  Clinical Progression: Not changed       Social/Functional:  Lives With: Family (dtstephania Hicks)  Type of Home: House  Home Layout: One level  Home Access: Stairs to enter with rails  Entrance Stairs - Number of Steps: 4  Entrance Stairs - Rails: Both  Home Equipment: Cane, Rolling walker, Sock aid, Long-handled shoehorn (pt was using cane and went back to RW)     Objective:  Rolling to Right: Moderate assistance  Supine to Sit: Maximum assistance (max a with raiasing of trunk and to maneuver B LE'S to achieve sitting eob)  Sit to Supine: Unable to assess  Scooting: Moderate assistance;Maximal assistance (mod-> increasing to max a to scoot hips to eob)    Transfers  Sit to Stand: Moderate Assistance;Minimal Assistance (heavy mod a of 1 and min a of 1 from eob)  Stand to sit: Moderate Assistance (mod a of 2 pt. becoming confused on what to do next and was somewhat impulsive upon completion of stand pivot transfer from eob-> w/c)  Stand Pivot Transfers: Moderate Assistance (heavy mod a of 1 and min a of 1, increasing to mod a of 2 for turning to sit in w/c.  no AD used. pt was able to take small steps to assist with transfer)     Exercises:  Exercises  Comments: prior to oob pt. completed 10-12 reps each b le supine ap's, quad/glute sets, saq, heelslides and hip abd/add . A/AAROM with L LE hip abd/add, heelslides, all for strengthening/endurance and rom   Activity Tolerance:  Activity Tolerance: Patient limited by pain; Patient limited by cognitive status  Activity Tolerance: pt. required alot of vc's with completed activity    Assessment: Body structures, Functions, Activity limitations: Decreased functional mobility , Decreased endurance, Decreased balance, Decreased strength  Assessment: Patient tolerated PT session fairly well and is motivated to participate. She currently requires mod-max A to complete all mobility due to impairments in strength, balance and activity tolerance s/p hip surgery. She is in need of continued PT in order to address this and return to her prior level of function. Prognosis: Good  Discharge Recommendations: Continue to assess pending progress    Patient Education:  Patient Education: Role of PT, POC, transfers, ambulation, safety, bed mobility, LE therex    Equipment Recommendations: Other: continue to monitor    Safety:  Type of devices: All fall risk precautions in place, Left in chair, Gait belt, Chair alarm in place, Nurse notified, Patient at risk for falls (pt. seated in w/c at nurses station, w/c safety belt attached, nurses in near vicinity)    Plan:  Times per week: 6x   Times per day: Daily  Current Treatment Recommendations: Strengthening, Functional Mobility Training, Transfer Training, Balance Training, Endurance Training, Gait Training, Safety Education & Training, Patient/Caregiver Education & Training, Equipment Evaluation, Education, & procurement, Stair training    Goals:  Patient goals : Go home    Short term goals  Time Frame for Short term goals: 1 week  Short term goal 1: Patient will complete bed mobility at [facility] in order to get into/out of bed. Short term goal 2: Patient will transfer sit <--> stand with CGA in order to get up to ambulate. Short term goal 3: Patient will ambulate 13' with RW and CGA in order to get to the bathroom. Short term goal 4: Patient will negotiate 4 steps with B hand rails and min A in order to get into/out of her home.     Long term goals  Time Frame for Long term goals : 3 weeks  Long term goal 1: Patient will complete bed mobility at mod I in order to get into/out of bed. Long term goal 2: Patient will transfer sit <--> stand with mod I in order to get up to ambulate. Long term goal 3: Patient will ambulate 48' with RW and SBA in order to get around safely in her home. Long term goal 4: Patient will negotiate 4 steps with B hand rails and CGA in order to get into/out of her home.

## 2018-06-18 NOTE — CONSULTS
[facility]          [facility]        [address]        [address] [facility] Britton Lopez [facility]        [phone]           Inpatient Initial consult note         6/18/2018 6:39 PM    Patient Name:   Jim Rm:    9/9/1930  Primary Care Physician:  Butch Jacob MD     History Obtained From:  patient     Consultation requested by : Maggy Bautista MD    Consultation requested for  : Evaluation of  worsening renal function     History of presenting illness   Liss Preston is a 80 y.o.   female with Past Medical History as stated below presented with a chief complaint of fall on 6/10 to [facility] and subsequently transferred to rehab 6/15/2018 . Patient currently c/o pain, left hip, moderate severity, after she sustained a fall and sustained a fracture of the hip, movement makes the pain worse , sometimes radiates to the foot . She is currently not in distress. She has been getting some diuretics and her oral intake has been extremely poor as well. Past History      Past Medical History:   Diagnosis Date    Allergic rhinitis     Anxiety     Bilateral hydronephrosis 8/10/2016    Bladder cancer ([facility] [address] 2008    Dr. Margie Reynolds Blood circulation, collateral     CHF (congestive heart failure) (HCC)     Constipation     attributed to Ultram    Coronary artery disease involving native coronary artery of native heart s/p cath 10/14/15-LM-P, LAD MID 90%, ANUERYSMAL, % PROX, RCA MID 60% DISTLA 70%, EDP 20 , EF 30%-NEED REVASCULARIZATION 10/14/2015    GERD (gastroesophageal reflux disease)     History of blood transfusion     [place] (hard of hearing)     Hyperglycemia     Hyperlipidemia     Hypertension     Obesity (BMI 30-39. 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  4/26/2017     Past Surgical History:   Procedure Laterality Date   [address] [facility] MISC Large 2000 cc bedside drainage bag 2/28/18   Chandler Serve, APRN - CNP   Lidocaine 2 % GEL Apply 1 drop topically 4 times daily as needed (forman catheter irritation) 2/28/18   Chandler Serve, KALYN - CNP   bumetanide (BUMEX) 0.5 MG tablet Take 0.5 mg by mouth four times a week Takes on M,W,F&Sat. Historical Provider, MD   oxybutynin (DITROPAN) 5 MG tablet Take 1 tablet by mouth 3 times daily as needed (stent pain, bladder spasms) 12/29/17   Chandler Serve, APRN - CNP   fluticasone (FLONASE) 50 MCG/ACT nasal spray 2 sprays by Nasal route 2 times daily    Historical Provider, MD   Elastic Bandages & Supports (MEDICAL COMPRESSION STOCKINGS) MISC Knee-high, 20-30 mmHg. Dx: LE edema 12/22/17   Jitendra Segovia DO   ferrous sulfate 325 (65 Fe) MG tablet TAKE 1 TABLET BY MOUTH 2 TIMES DAILY (WITH MEALS) 11/2/17   Sharyle Settle, MD   ondansetron (ZOFRAN) 4 MG tablet TAKE 1 TABLET BY MOUTH EVERY 8 HOURS AS NEEDED FOR NAUSEA OR VOMITING 8/22/17   KALYN Pedro - CNP   diphenoxylate-atropine (LOMOTIL) 2.5-0.025 MG per tablet Take 1 tablet by mouth 4 times daily as needed for Diarrhea 8/2/17   Sharyle Settle, MD   docusate sodium (COLACE) 100 MG capsule Take 1 capsule by mouth daily as needed for Constipation 4/19/17   Cloteal MD Lori   BIOTIN FORTE PO Take by mouth daily    Historical Provider, MD   acetaminophen (TYLENOL) 325 MG tablet Take 2 tablets by mouth every 4 hours as needed for Pain or Fever 10/31/16   Latia Vigil MD   sodium chloride (OCEAN) 0.65 % nasal spray 1 spray by Nasal route as needed for Congestion    Historical Provider, MD   cetirizine (ZYRTEC) 10 MG tablet Take 10 mg by mouth daily    Historical Provider, MD   Handicap Placard MISC by Does not apply route. 2/28/14   Sharyle Settle, MD     Allergies: Adhesive tape; Cleocin [clindamycin hcl];  Metronidazole; Penicillins; and Septra [bactrim]  IP meds : Scheduled Meds:   ppd   Does not apply Weekly    tuberculin  5 Units Intradermal Weekly    enoxaparin  30 mg Subcutaneous Daily    ALPRAZolam  0.5 mg Oral BID    atorvastatin  20 mg Oral Nightly    bumetanide  0.5 mg Oral Once per day on Sun Tue Thu Sat    cetirizine  10 mg Oral Daily    escitalopram  10 mg Oral Daily    ferrous sulfate  325 mg Oral BID WC    fluticasone  2 spray Nasal BID    isosorbide mononitrate  60 mg Oral Daily    lidocaine  1 patch Transdermal Daily    metoprolol succinate  25 mg Oral Daily    pantoprazole  40 mg Oral BID AC    rOPINIRole  4 mg Oral BID    zafirlukast  20 mg Oral BID    fluconazole  100 mg Oral Daily     Continuous Infusions:   sodium chloride 50 mL/hr at 06/17/18 4561     Review of Systems Physical Exam   Review of Systems   Constitutional: Positive for malaise/fatigue. Negative for fever and weight loss. HENT: Negative. Negative for ear pain and sore throat. Eyes: Negative for pain. Respiratory: Negative for cough and shortness of breath. Cardiovascular: Negative for chest pain and leg swelling. Gastrointestinal: Negative for abdominal pain, heartburn, nausea and vomiting. Genitourinary: Negative for dysuria, frequency and hematuria. Musculoskeletal: Positive for joint pain. Negative for back pain and neck pain. Skin: Negative for itching and rash. Neurological: Positive for weakness. Negative for dizziness, focal weakness and headaches. Psychiatric/Behavioral: Negative for depression and substance abuse. Physical Exam   Constitutional: She is oriented to person, place, and time and well-developed, well-nourished, and in no distress. No distress. HENT:   Right Ear: External ear normal.   Left Ear: External ear normal.   Nose: Nose normal.   Mouth/Throat: Oropharynx is clear and moist.   Eyes: Conjunctivae are normal. Right eye exhibits no discharge. Left eye exhibits no discharge. No scleral icterus. Neck: Normal range of motion. Neck supple. No JVD present. No thyromegaly present.

## 2018-06-19 NOTE — PLAN OF CARE
Problem: Pain:  Goal: Control of acute pain  Control of acute pain   Outcome: Ongoing  Norco given PRN. Goal: Control of chronic pain  Control of chronic pain   Outcome: Ongoing  Pain medications used as needed. Problem: Falls - Risk of:  Goal: Absence of physical injury  Absence of physical injury   Outcome: Ongoing  Bed alarm and chair alarm in use. Non-skid socks on patient.

## 2018-06-19 NOTE — PROGRESS NOTES
clot    CARDIAC SURGERY      stents    COLONOSCOPY      CYSTOSCOPY  11-    TUR and fulg BT  superficial noninvasive bladder ca    CYSTOSCOPY N/A 8/23/2017    CYSTOSCOPY, RIGHT URETEROSCOPY, RIGHT STENT EXCHANGE retrograde right and right ureteral dilitation performed by Britton Tracy MD at [address] 1/31/2018    CYSTOSCOPY EVACUATION OF CLOTS performed by Britton Tracy MD at [place] [address] EKG 12-LEAD  10/14/2015         EKG 12-LEAD  10/17/2015         ENDOSCOPY, COLON, DIAGNOSTIC      EYE SURGERY  04/2018    JOINT REPLACEMENT      hip    MOUTH SURGERY      cheek; had benign lump removed    OTHER SURGICAL HISTORY Right 04/19/2017    Cystoscopy, Attempted Right Ureteral Access     [place] CYSTOURETHROSCOPY,URETER CATHETER Right 1/31/2018    CYSTOSCOPY RIGHT RETROGRADE PYELOGRAM RIGHT URETEROSCOPY AND REPLACEMENT OF RIGHT URETERAL STENT performed by Britton Tracy MD at [address] OFFICE/OUTPT [facility] Left 6/11/2018    IM NAIL HENRY INSERTION, LEFT performed by Kenna Hernandez MD at [place] [address]  03/2017    TOTAL HIP ARTHROPLASTY  3-9-12    Rt       Restrictions/Precautions:  General Precautions, Fall Risk, Weight Bearing      Left Lower Extremity Weight Bearing: Weight Bearing As Tolerated     Prior Level of Function:  ADL Assistance: Needs assistance  Homemaking Assistance:  (dependent on granddaughter)  Ambulation Assistance: Independent  Transfer Assistance: Independent  Additional Comments: Pt states in the last week or two she has been primarily using a walker, but prior to this she was using a cane. Pt reports relying heavily on granddaughter for most all ADL and IADL tasks. Pt with limited ROM and use of BUE however pt does report she is able to do more for herself, however it is easier and faster if her granddaughter helps therefore decreasing her ind with basic ADL tasks.     Subjective Subjective: Pt seated in chair and agreeable to OT treatment. Granddaughter present     Pain:  Pain Assessment  Patient Currently in Pain: Yes  Pain Assessment: 0-10  Pain Level: 9  Pain Type: Surgical pain  Pain Location: Hip  Pain Orientation: Left       Objective  Overall Cognitive Status: Exceptions  Following Commands: Follows one step commands with repetition  Attention Span: Difficulty dividing attention  Safety Judgement: Decreased awareness of need for assistance;Decreased awareness of need for safety  Problem Solving: Assistance required to identify errors made;Assistance required to implement solutions  Insights: Decreased awareness of deficits  Initiation: Requires cues for some  Sequencing: Requires cues for some    ADL  LE Dressing: Dependent/Total (doffing and donning undergarment)  Toileting: Dependent/Total;Increased time to complete     Bed mobility  Sit to Supine: Maximum assistance (with cues for technique)    Transfers  Sit to stand: Maximum assistance (to chiqui stedy with max cues for technique)  Stand to sit: Moderate assistance (from chiqui stedy)  Toilet Transfers  Toilet - Technique:  (chiqui stedy)  Equipment Used: Extra wide bedside commode  Toilet Transfer: Maximum assistance    Balance  Sitting Balance: Contact guard assistance  Standing Balance: Minimal assistance (chiqui stedy)     Additional Activities: Pt very fearful of falling and hesitant when completing transfers. Pt requires encouragement and step by step cues to complete tasks at this time. Pt's granddaughter present and expresses concerns about pt's inability to ambulate. Emotional support provided as well as education on POC. Pt expresses desire to ambulate and requires education on current limitations.     Activity Tolerance:  Activity Tolerance: Patient limited by fatigue    Assessment:     Performance deficits / Impairments: Decreased functional mobility , Decreased ADL status, Decreased strength, Decreased endurance, Decreased safe awareness, Decreased balance, Decreased ROM  Prognosis: Guarded, Fair  Discharge Recommendations: Continue to assess pending progress, Patient would benefit from continued therapy after discharge    Patient Education:  Patient Education: transfers, chiqui stedy,  bed mobility, ADLs  Barriers to Learning: anxiety    Equipment Recommendations: Other: cont to monitor    Safety:  Safety Devices in place: Yes  Type of devices: Call light within reach, Chair alarm in place, Gait belt, Left in bed, Bed alarm in place, Nurse notified, Patient at risk for falls, All fall risk precautions in place (granddaughter present)    Plan:  Times per week: 6x  Current Treatment Recommendations: Strengthening, Balance Training, Functional Mobility Training, Endurance Training, Safety Education & Training, Patient/Caregiver Education & Training, Equipment Evaluation, Education, & procurement, Self-Care / ADL, ROM, Pain Management, Positioning, Home Management Training    Goals:  Patient goals : To be more independent    Short term goals  Time Frame for Short term goals: One week  Short term goal 1: Pt will complete LB ADL tasks using LHAE prn with no greater than mod A for inc ind with self cares  Short term goal 2: Pt will tolerate sitting EOB with no greater than CGA for greater than 4 mins for increased ind with seated ADLs  Short term goal 3: Pt to complete sit<>stand transfer on<>off a variety of surfaces with no greater than max A x1 for increased ind with toileting  Short term goal 4: Pt will complete BUE AAROM/AROM for increasing functional ROM required for BADL task completion  Long term goals  Time Frame for Long term goals :  Two weeks  Long term goal 1: Pt will tolerate standing greater than 2 mins with no greater than mod A x1 and safety cues in preparation for ADL tasks  Long term goal 2: Pt to complete functional transfers on a variety of surfaces with no greater than Mod A x1 for increased ind with simple ADL tasks

## 2018-06-19 NOTE — PROGRESS NOTES
[address] [address]  INPATIENT PHYSICAL THERAPY  DAILY NOTE  [facility] T[facility] 8E - 8E-68/068-A    Time In: 1242  Time Out: 1153  Timed Code Treatment Minutes: 60 Minutes  Minutes: 60          Date: 2018  Patient Name: Elaina Roberts,  Gender:  female        MRN: [id number]  : 1930  (80 y.o.)  Referral Date : 06/15/18  Referring Practitioner: Mónica Coello MD  Diagnosis: Comminuted fracture of hip, left, closed  Additional Pertinent Hx: Per ED note, pt is a 80 y.o. female who has a past medical history of osteoarthritis, osteopenia, and a right total hip arthroplasty. Patient presents to the Emergency Department on 6/10/18 for the evaluation of left hip pain. Patient reports that she was walking to the bathroom early this morning when she reports that she thinks fell asleep while walking back to bed. Found to have a left intertrochanteric hip fx. OR: s/p open treatment  with cephalomedullary nail on . Past Medical History:   Diagnosis Date    Allergic rhinitis     Anxiety     Bilateral hydronephrosis 8/10/2016    Bladder cancer ([facility] [address]     Dr. Kobe Ortiz Blood circulation, collateral     CHF (congestive heart failure) (HCC)     Constipation     attributed to Ultram    Coronary artery disease involving native coronary artery of native heart s/p cath 10/14/15-LM-P, LAD MID 90%, ANUERYSMAL, % PROX, RCA MID 60% DISTLA 70%, EDP 20 , EF 30%-NEED REVASCULARIZATION 10/14/2015    GERD (gastroesophageal reflux disease)     History of blood transfusion     [place] (hard of hearing)     Hyperglycemia     Hyperlipidemia     Hypertension     Obesity (BMI 30-39. 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  2017     Past Surgical History:   Procedure Laterality Date    ABDOMEN SURGERY      ABDOMINAL EXPLORATION SURGERY      BLADDER SURGERY  10/2016    stent placed and removed a blood clot    CARDIAC SURGERY stents    COLONOSCOPY      CYSTOSCOPY  11-    TUR and fulg BT  superficial noninvasive bladder ca    CYSTOSCOPY N/A 8/23/2017    CYSTOSCOPY, RIGHT URETEROSCOPY, RIGHT STENT EXCHANGE retrograde right and right ureteral dil[facility] [address] [facility]    CYSTOSCOPY EVACUATION OF CLOTS performed by vL Brown MD at [address] [place] [address] EKG 12-LEAD  10/14/2015         EKG 12-LEAD  10/17/2015         ENDOSCOPY, COLON, DIAGNOSTIC      EYE SURGERY  04/2018    JOINT REPLACEMENT      hip    MOUTH SURGERY      cheek; had benign lump removed    OTHER SURGICAL HISTORY Right 04/19/2017    Cystoscopy, Attempted Right Ureteral Access     [place] CYSTOURETHROSCOPY,URETER CATHETER Right 1/31/2018    CYSTOSCOPY RIGHT RETROGRADE PYELOGRAM RIGHT URETEROSCOPY AND REPLACEMENT OF RIGHT URETERAL STENT performed by Lv Brown MD at [address] [place] [address] OFFICE/OUTPT [address] Left 6/11/2018    IM NAIL HENRY INSERTION, LEFT performed by Qamar Wong MD at [address] [address]  03/2017    TOTAL HIP ARTHROPLASTY  3-9-12    Rt       Restrictions/Precautions:  General Precautions, Fall Risk, Weight Bearing           Left Lower Extremity Weight Bearing: Weight Bearing As Tolerated                Prior Level of Function:  ADL Assistance: Needs assistance  Homemaking Assistance:  (dependent on granddaughter)  Ambulation Assistance: Independent  Transfer Assistance: Independent  Additional Comments: Pt states in the last week or two she has been primarily using a walker, but prior to this she was using a cane. Pt reports relying heavily on granddaughter for most all ADL and IADL tasks. Pt with limited ROM and use of BUE however pt does report she is able to do more for herself, however it is easier and faster if her granddaughter helps therefore decreasing her ind with basic ADL tasks.     Subjective:     Subjective: Patient laying in bed upon arrival. Patient agreeable to therapy.  and Dileep Watkins in to speak with patient at beginning of session. Pain:  Denies (denies at rest, 9/10 L hip pain at end of session). Social/Functional:  Lives With: Family (vivienne Pyle)  Type of Home: House  Home Layout: One level  Home Access: Stairs to enter with rails  Entrance Stairs - Number of Steps: 4  Entrance Stairs - Rails: Both  Home Equipment: Cane, Rolling walker, Sock aid, Long-handled shoehorn (pt was using cane and went back to RW)     Objective:  Supine to Sit: Moderate assistance (head of bed slightly elevated, assist to bring BLE off bed and elevate trunk, slow moving)  Scooting: Maximal assistance (back into chair)    Transfers  Sit to Stand: Moderate Assistance;Maximum Assistance (Mod to Max x1 from bed, heavy posterior lean, cues to shift weight forward with poor carry over , cues for hand placement)  Stand to sit: Moderate Assistance (Mod x1 to chair)  Stand Pivot Transfers: Moderate Assistance (heavy Mod x1 from bed to w/c, posterior lean, cues for hand placement )      Balance  Comments: Patient sat edge of bed while Dr and RAYMOND spoke with her, also to prepare for transfer, ~3min, with BUE support, CGA to min A, R side lean, cues for midline     Ambulation:  Rolling walker  Level surface  3ft to chair  Posterior lean, unsteady, cues for sequencing, forward flexed posture      Exercises:  Exercises  Comments: Performed seated BLE exercises: heel raises, marches, long arc quads x10 reps to increase strength for improved functional mobility. Activity Tolerance:  Activity Tolerance: Patient limited by pain; Patient limited by cognitive status; Patient limited by fatigue;Patient limited by endurance    Assessment: Body structures, Functions, Activity limitations: Decreased functional mobility , Decreased endurance, Decreased balance, Decreased strength  Assessment: Patient tolerated session fair.  Patient very anxious about falling. Patient demos poor carry over with weight shihft cues. Patient would benefit from continued skilled physical therapy to improve functional mobility. Prognosis: Good  Discharge Recommendations: Continue to assess pending progress    Patient Education:  Patient Education: transfers, therex, bed mobility, safety    Equipment Recommendations: Other: continue to monitor    Safety:  Type of devices: All fall risk precautions in place, Left in chair, Gait belt, Chair alarm in place, Nurse notified, Patient at risk for falls (pt. seated in w/c at nurses station, w/c safety belt attached, nurses in near vicinity)    Plan:  Times per week: 6x   Times per day: Daily  Current Treatment Recommendations: Strengthening, Functional Mobility Training, Transfer Training, Balance Training, Endurance Training, Gait Training, Safety Education & Training, Patient/Caregiver Education & Training, Equipment Evaluation, Education, & procurement, Stair training    Goals:  Patient goals : Go home    Short term goals  Time Frame for Short term goals: 1 week  Short term goal 1: Patient will complete bed mobility at [address] in order to get into/out of bed. Short term goal 2: Patient will transfer sit <--> stand with CGA in order to get up to ambulate. Short term goal 3: Patient will ambulate 13' with RW and CGA in order to get to the bathroom. Short term goal 4: Patient will negotiate 4 steps with B hand rails and min A in order to get into/out of her home. Long term goals  Time Frame for Long term goals : 3 weeks  Long term goal 1: Patient will complete bed mobility at mod I in order to get into/out of bed. Long term goal 2: Patient will transfer sit <--> stand with mod I in order to get up to ambulate. Long term goal 3: Patient will ambulate 48' with RW and SBA in order to get around safely in her home.   Long term goal 4: Patient will negotiate 4 steps with B hand rails and CGA in order to get into/out of her

## 2018-06-20 NOTE — PLAN OF CARE
Problem: DISCHARGE BARRIERS  Goal: Patient's continuum of care needs are met    Intervention: INVOLVE PATIENT/S.O. IN DISCHARGE PLANNING PROCESS  The TCU team meeting was held yesterday, 6/19. The patient is participating in therapies, however, progress is slow. She can benefit from a continued stay on TCU, and approval of additional skilled days will be requested through her insurance provider, [employer]. The patient has daily assistance at home available through her granddaughter, however, she is not functionally ambulating as yet, and this may impact caregiver's ability to manage. Following the meeting, the [facility] and  met with patient and provided update, No family present in room at this time. Patient reports she is trying hard to put forth her best effort to improve overall functioning. Insurance review submitted 6/19, but no response as yet. Plan to reassess progress at next meeting planned for 6/26.  TANVIR Inman

## 2018-06-20 NOTE — PROGRESS NOTES
[facility]  INPATIENT PHYSICAL THERAPY  DAILY NOTE  [facility] T[facility] 8E - 8E-68/068-A    Time In: 1140  Time Out: 1239  Timed Code Treatment Minutes: 61 Minutes  Minutes: 59          Date: 2018  Patient Name: Tomasa Damon,  Gender:  female        MRN: [id number]  : 1930  (80 y.o.)  Referral Date : 06/15/18  Referring Practitioner: Chacha Napoles MD  Diagnosis: Comminuted fracture of hip, left, closed  Additional Pertinent Hx: Per ED note, pt is a 80 y.o. female who has a past medical history of osteoarthritis, osteopenia, and a right total hip arthroplasty. Patient presents to the Emergency Department on 6/10/18 for the evaluation of left hip pain. Patient reports that she was walking to the bathroom early this morning when she reports that she thinks fell asleep while walking back to bed. Found to have a left intertrochanteric hip fx. OR: s/p open treatment  with cephalomedullary nail on . Past Medical History:   Diagnosis Date    Allergic rhinitis     Anxiety     Bilateral hydronephrosis 8/10/2016    Bladder cancer ([facility] [address]     Dr. Velma Deshpande Blood circulation, collateral     CHF (congestive heart failure) (HCC)     Constipation     attributed to Ultram    Coronary artery disease involving native coronary artery of native heart s/p cath 10/14/15-LM-P, LAD MID 90%, ANUERYSMAL, % PROX, RCA MID 60% DISTLA 70%, EDP 20 , EF 30%-NEED REVASCULARIZATION 10/14/2015    GERD (gastroesophageal reflux disease)     History of blood transfusion     [place] (hard of hearing)     Hyperglycemia     Hyperlipidemia     Hypertension     Obesity (BMI 30-39. 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  2017     Past Surgical History:   Procedure Laterality Date    ABDOMEN SURGERY      ABDOMINAL EXPLORATION SURGERY      BLADDER SURGERY  10/2016    stent placed and removed a blood clot    CARDIAC SURGERY stents    COLONOSCOPY      CYSTOSCOPY  11-    TUR and fulg BT  superficial noninvasive bladder ca    CYSTOSCOPY N/A 8/23/2017    CYSTOSCOPY, RIGHT URETEROSCOPY, RIGHT STENT EXCHANGE retrograde right and right ureteral dilitation [facility] [address] [facility]    CYSTOSCOPY EVACUATION OF CLOTS performed by Yifan Singleton MD at [address] [address] EKG 12-LEAD  10/14/2015         EKG 12-LEAD  10/17/2015         ENDOSCOPY, COLON, DIAGNOSTIC      EYE SURGERY  04/2018    JOINT REPLACEMENT      hip    MOUTH SURGERY      cheek; had benign lump removed    OTHER SURGICAL HISTORY Right 04/19/2017    Cystoscopy, Attempted Right Ureteral Access     [place] CYSTOURETHROSCOPY,URETER CATHETER Right 1/31/2018    CYSTOSCOPY RIGHT RETROGRADE PYELOGRAM RIGHT URETEROSCOPY AND REPLACEMENT OF RIGHT URETERAL STENT performed by Yifan Singleton MD at [address] [facility] OFFICE/OUTPT [address] [facility] Left 6/11/2018    IM NAIL HENRY INSERTION, LEFT performed by Little Araujo MD at [address] [address]  03/2017    TOTAL HIP ARTHROPLASTY  3-9-12    Rt       Restrictions/Precautions:  General Precautions, Fall Risk, Weight Bearing           Left Lower Extremity Weight Bearing: Weight Bearing As Tolerated                Prior Level of Function:  ADL Assistance: Needs assistance  Homemaking Assistance:  (dependent on granddaughter)  Ambulation Assistance: Independent  Transfer Assistance: Independent  Additional Comments: Pt states in the last week or two she has been primarily using a walker, but prior to this she was using a cane. Pt reports relying heavily on granddaughter for most all ADL and IADL tasks. Pt with limited ROM and use of BUE however pt does report she is able to do more for herself, however it is easier and faster if her granddaughter helps therefore decreasing her ind with basic ADL tasks.     Subjective:  Response To

## 2018-06-20 NOTE — PROGRESS NOTES
[address]  INPATIENT OCCUPATIONAL THERAPY  [facility] T[facility] 8E  DAILY NOTE    Time:  Time In: 7225  Time Out: 0840  Timed Code Treatment Minutes: 64 Minutes  Minutes: 56          Date: 2018  Patient Name: Marjorie Fonseca,   Gender: female      Room: -68/068-A  MRN: [id number]  : 1930  (80 y.o.)  Referring Practitioner: ordering: Tod Flores MD attending: Dr. Asya Mauro  Diagnosis: Comminuted fracture of Left hip  Additional Pertinent Hx: Per ED note, pt is a 80 y.o. female who has a past medical history of osteoarthritis, osteopenia, and a right total hip arthroplasty. Patient presents to the Emergency Department on 6/10/18 for the evaluation of left hip pain. Patient reports that she was walking to the bathroom early this morning when she reports that she thinks fell asleep while walking back to bed. Found to have a left intertrochanteric hip fx. OR: s/p open treatment  with cephalomedullary nail on . To TCU on 6/15/18    Past Medical History:   Diagnosis Date    Allergic rhinitis     Anxiety     Bilateral hydronephrosis 8/10/2016    Bladder cancer ([facility] [address]     Dr. Anjana Lin Blood circulation, collateral     CHF (congestive heart failure) (HCC)     Constipation     attributed to Ultram    Coronary artery disease involving native coronary artery of native heart s/p cath 10/14/15-LM-P, LAD MID 90%, ANUERYSMAL, % PROX, RCA MID 60% DISTLA 70%, EDP 20 , EF 30%-NEED REVASCULARIZATION 10/14/2015    GERD (gastroesophageal reflux disease)     History of blood transfusion     [place] (hard of hearing)     Hyperglycemia     Hyperlipidemia     Hypertension     Obesity (BMI 30-39. 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  2017     Past Surgical History:   Procedure Laterality Date    ABDOMEN SURGERY      ABDOMINAL EXPLORATION SURGERY      BLADDER SURGERY  10/2016    stent placed and removed a blood clot    CARDIAC SURGERY      stents    COLONOSCOPY      CYSTOSCOPY  11-    TUR and fulg BT  superficial noninvasive bladder ca    CYSTOSCOPY N/A 8/23/2017    CYSTOSCOPY, RIGHT URETEROSCOPY, RIGHT STENT EXCHANGE retrograde right and right ureteral dilitation performed by Mendel Macho, MD at [address] 1/31/2018    CYSTOSCOPY EVACUATION OF CLOTS performed by Mendel Macho, MD at [facility] EKG 12-LEAD  10/14/2015         EKG 12-LEAD  10/17/2015         ENDOSCOPY, COLON, DIAGNOSTIC      EYE SURGERY  04/2018    JOINT REPLACEMENT      hip    MOUTH SURGERY      cheek; had benign lump removed    OTHER SURGICAL HISTORY Right 04/19/2017    Cystoscopy, Attempted Right Ureteral Access     [place] CYSTOURETHROSCOPY,URETER CATHETER Right 1/31/2018    CYSTOSCOPY RIGHT RETROGRADE PYELOGRAM RIGHT URETEROSCOPY AND REPLACEMENT OF RIGHT URETERAL STENT performed by Mendel Macho, MD at [address] [place] [address] OFFICE/OUTPT [address] [facility] Left 6/11/2018    IM NAIL HENRY INSERTION, LEFT performed by Karolina Saucedo MD at [address] [place] [address]  03/2017    TOTAL HIP ARTHROPLASTY  3-9-12    Rt       Restrictions/Precautions:  General Precautions, Fall Risk, Weight Bearing           Left Lower Extremity Weight Bearing: Weight Bearing As Tolerated                Prior Level of Function:  ADL Assistance: Needs assistance  Homemaking Assistance:  (dependent on granddaughter)  Ambulation Assistance: Independent  Transfer Assistance: Independent  Additional Comments: Pt states in the last week or two she has been primarily using a walker, but prior to this she was using a cane. Pt reports relying heavily on granddaughter for most all ADL and IADL tasks.   Pt with limited ROM and use of BUE however pt does report she is able to do more for herself, however it is easier and faster if her granddaughter helps therefore decreasing her ind with basic ADL tasks.    Subjective       Subjective: Pt lying in bed upon arrival. Agreeable to OT session    Overall Orientation Status: Within Functional Limits         Pain:  Pain Assessment  Patient Currently in Pain: Yes  Pain Assessment: 0-10  Pain Level: 6  Pain Location: Hip  Pain Orientation: Left       Objective  Overall Cognitive Status: Exceptions  Attention Span: Difficulty dividing attention  Safety Judgement: Decreased awareness of need for assistance;Decreased awareness of need for safety  Insights: Decreased awareness of deficits  Initiation: Requires cues for some  Sequencing: Requires cues for some  Cognition Comment: Pt very talkative requiring cueing for redirection to task      ADL  Feeding: Setup (breakfast tray)  LE Dressing: Maximum assistance (to don B shoes seated EOB)        Bed mobility  Rolling to Right: Moderate assistance  Supine to Sit: Moderate assistance  Scooting: Minimal assistance    Transfers  Sit to stand: Maximum assistance (from EOB (multiple trials) with use of chiqui stedy requiring cueing for technique)  Stand to sit: Moderate assistance (to bedside chair and EOB with use of chiqui stedy)       Balance  Sitting Balance: Contact guard assistance  Standing Balance: Minimal assistance (Tammie-CGA in chiqui stedy)     Time: ~2 minutes 30 seconds x1 trial; ~2 minutes x1 trial; ~1 minute 20 seconds x1 trial; ~1 minute x1 trial  Activity: Static standing in chiqui stedy with BUE support on purple bar requiring Tammie - CGA for balance with no LOB noted. Encouraged pt to shift weight towards L side during standing. Seated rest break on purple flaps after each trial of standing.  Completed to help increase standing balance and activity tolerance required for ADLs            Type of ROM/Therapeutic Exercise: AAROM;AROM  Comment: Pt completed BUE AROM exercises with elbow flexion/extension, forearm pronation/supination, and wrist flexion/extension x15 reps x1 set and BUE AAROM exercises with shoulder flexion

## 2018-06-21 NOTE — PROGRESS NOTES
[facility]  INPATIENT PHYSICAL THERAPY  DAILY NOTE  [facility] TCU 8E - 8E-68/068-A    Time In: 6735  Time Out: 1143  Timed Code Treatment Minutes: 61 Minutes  Minutes: 59          Date: 2018  Patient Name: Dereje Moreau,  Gender:  female        MRN: [id number]  : 1930  (80 y.o.)  Referral Date : 06/15/18  Referring Practitioner: Alma Brandon MD  Diagnosis: Comminuted fracture of hip, left, closed  Additional Pertinent Hx: Per ED note, pt is a 80 y.o. female who has a past medical history of osteoarthritis, osteopenia, and a right total hip arthroplasty. Patient presents to the Emergency Department on 6/10/18 for the evaluation of left hip pain. Patient reports that she was walking to the bathroom early this morning when she reports that she thinks fell asleep while walking back to bed. Found to have a left intertrochanteric hip fx. OR: s/p open treatment  with cephalomedullary nail on . Past Medical History:   Diagnosis Date    Allergic rhinitis     Anxiety     Bilateral hydronephrosis 8/10/2016    Bladder cancer ([address]     Dr. Corey Varela Blood circulation, collateral     CHF (congestive heart failure) (HCC)     Constipation     attributed to Ultram    Coronary artery disease involving native coronary artery of native heart s/p cath 10/14/15-LM-P, LAD MID 90%, ANUERYSMAL, % PROX, RCA MID 60% DISTLA 70%, EDP 20 , EF 30%-NEED REVASCULARIZATION 10/14/2015    GERD (gastroesophageal reflux disease)     History of blood transfusion     [place] hearing[place]     Hyperglycemia     Hyperlipidemia     Hypertension     Obesity (BMI 30-39. 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  2017     Past Surgical History:   Procedure Laterality Date    ABDOMEN SURGERY      ABDOMINAL EXPLORATION SURGERY      BLADDER SURGERY  10/2016    stent placed and removed a blood clot    CARDIAC SURGERY stents    COLONOSCOPY      CYSTOSCOPY  11-    TUR and fulg BT  superficial noninvasive bladder ca    CYSTOSCOPY N/A 8/23/2017    CYSTOSCOPY, RIGHT URETEROSCOPY, RIGHT STENT EXCHANGE retrograde right and right ureteral dilitation performed by Britton Tracy MD at [address] 1/31/2018    CYSTOSCOPY EVACUATION OF CLOTS performed by Britton Tracy MD at [place] [address] EKG 12-LEAD  10/14/2015         EKG 12-LEAD  10/17/2015         ENDOSCOPY, COLON, DIAGNOSTIC      EYE SURGERY  04/2018    JOINT REPLACEMENT      hip    MOUTH SURGERY      cheek; had benign lump removed    OTHER SURGICAL HISTORY Right 04/19/2017    Cystoscopy, Attempted Right Ureteral Access     [place] CYSTOURETHROSCOPY,URETER CATHETER Right 1/31/2018    CYSTOSCOPY RIGHT RETROGRADE PYELOGRAM RIGHT URETEROSCOPY AND REPLACEMENT OF RIGHT URETERAL STENT performed by Britton Tracy MD at [address] [facility] OFFICE/OUTPT [facility] Left 6/11/2018    IM NAIL HENRY INSERTION, LEFT performed by Kenna Hernandez MD at [place] [address]  03/2017    TOTAL HIP ARTHROPLASTY  3-9-12    Rt       Restrictions/Precautions:  General Precautions, Fall Risk, Weight Bearing           Left Lower Extremity Weight Bearing: Weight Bearing As Tolerated       Prior Level of Function:  ADL Assistance: Needs assistance  Homemaking Assistance:  (dependent on granddaughter)  Ambulation Assistance: Independent  Transfer Assistance: Independent  Additional Comments: Pt states in the last week or two she has been primarily using a walker, but prior to this she was using a cane. Pt reports relying heavily on granddaughter for most all ADL and IADL tasks. Pt with limited ROM and use of BUE however pt does report she is able to do more for herself, however it is easier and faster if her granddaughter helps therefore decreasing her ind with basic ADL tasks.     Subjective:     Subjective: Patient in strength  Assessment: Patient tolerated session fair. Patient very anxious about falling during transfers and ambulation. Patient very sleepy during supine exercises. Patient would benefit from continued skilled physical therapy to improve functional mobility. Prognosis: Good  Discharge Recommendations: Continue to assess pending progress    Patient Education:  Patient Education: transfers, therex, bed mobility, gait    Equipment Recommendations: Other: continue to monitor    Safety:  Type of devices: All fall risk precautions in place, Call light within reach, Chair alarm in place, Gait belt, Patient at risk for falls, Left in chair    Plan:  Times per week: 6x   Times per day: Daily  Current Treatment Recommendations: Strengthening, Functional Mobility Training, Transfer Training, Balance Training, Endurance Training, Gait Training, Safety Education & Training, Patient/Caregiver Education & Training, Equipment Evaluation, Education, & procurement, Stair training    Goals:  Patient goals : Go home    Short term goals  Time Frame for Short term goals: 1 week  Short term goal 1: Patient will [facility] mobility [facility] in order to get into/out of bed. Short term goal 2: Patient will transfer sit <--> stand with CGA in order to get up to ambulate. Short term goal 3: Patient will ambulate 13' with RW and CGA in order to get to the bathroom. Short term goal 4: Patient will negotiate 4 steps with B hand rails and min A in order to get into/out of her home. Long term goals  Time Frame for Long term goals : 3 weeks  Long term goal 1: Patient will complete bed mobility at mod I in order to get into/out of bed. Long term goal 2: Patient will transfer sit <--> stand with mod I in order to get up to ambulate. Long term goal 3: Patient will ambulate 48' with RW and SBA in order to get around safely in her home.   Long term goal 4: Patient will negotiate 4 steps with B hand rails and CGA in order to get into/out of her home.

## 2018-06-21 NOTE — PROGRESS NOTES
clot    CARDIAC SURGERY      stents    COLONOSCOPY      CYSTOSCOPY  11-    TUR and fulg BT  superficial noninvasive bladder ca    CYSTOSCOPY N/A 8/23/2017    CYSTOSCOPY, RIGHT URETEROSCOPY, RIGHT STENT EXCHANGE retrograde right and right ureteral dilitation performed by Britton Tracy MD at [address] 1/31/2018    CYSTOSCOPY EVACUATION OF CLOTS performed by Britton Tracy MD at [place] [address] EKG 12-LEAD  10/14/2015         EKG 12-LEAD  10/17/2015         ENDOSCOPY, COLON, DIAGNOSTIC      EYE SURGERY  04/2018    JOINT REPLACEMENT      hip    MOUTH SURGERY      cheek; had benign lump removed    OTHER SURGICAL HISTORY Right 04/19/2017    Cystoscopy, Attempted Right Ureteral Access     [place] CYSTOURETHROSCOPY,URETER CATHETER Right 1/31/2018    CYSTOSCOPY RIGHT RETROGRADE PYELOGRAM RIGHT URETEROSCOPY AND REPLACEMENT OF RIGHT URETERAL STENT performed by Britton Tracy MD at [address] [place] [address] OFFICE/OUTPT [facility] Left 6/11/2018    IM NAIL HENRY INSERTION, LEFT performed by Kenna Hernandez MD at [place] [address]  03/2017    TOTAL HIP ARTHROPLASTY  3-9-12    Rt       Restrictions/Precautions:  General Precautions, Fall Risk, Weight Bearing           Left Lower Extremity Weight Bearing: Weight Bearing As Tolerated                Prior Level of Function:  ADL Assistance: Needs assistance  Homemaking Assistance:  (dependent on granddaughter)  Ambulation Assistance: Independent  Transfer Assistance: Independent  Additional Comments: Pt states in the last week or two she has been primarily using a walker, but prior to this she was using a cane. Pt reports relying heavily on granddaughter for most all ADL and IADL tasks.   Pt with limited ROM and use of BUE however pt does report she is able to do more for herself, however it is easier and faster if her granddaughter helps therefore decreasing her ind with basic ADL assistance  Standing Balance: Minimal assistance (with 1 UE release from purple bar of chiqui stedy; CGA with BUE support on purple bar of chiqui stedy)       Activity Tolerance:  Activity Tolerance: Patient limited by fatigue;Patient limited by pain    Assessment:     Performance deficits / Impairments: Decreased functional mobility , Decreased ADL status, Decreased strength, Decreased endurance, Decreased safe awareness, Decreased balance, Decreased ROM  Prognosis: Guarded, Fair  Discharge Recommendations: Continue to assess pending progress, Patient would benefit from continued therapy after discharge    Patient Education:  Patient Education: safety with transfers using chiqui stedy, bed mobility using log roll technique, ADL strategies with use of LHAE  Barriers to Learning: anxiety    Equipment Recommendations: Other: cont to monitor    Safety:  Safety Devices in place: Yes  Type of devices: Call light within reach, Chair alarm in place, Left in chair, Gait belt    Plan:  Times per week: 6x  Current Treatment Recommendations: Strengthening, Balance Training, Functional Mobility Training, Endurance Training, Safety Education & Training, Patient/Caregiver Education & Training, Equipment Evaluation, Education, & procurement, Self-Care / ADL, ROM, Pain Management, Positioning, Home Management Training    Goals:  Patient goals : To be more independent    Short term goals  Time Frame for Short term goals:  One week  Short term goal 1: Pt will complete LB ADL tasks using LHAE prn with no greater than mod A for inc ind with self cares  Short term goal 2: Pt will tolerate sitting EOB with no greater than CGA for greater than 4 mins for increased ind with seated ADLs  Short term goal 3: Pt to complete sit<>stand transfer on<>off a variety of surfaces with no greater than max A x1 for increased ind with toileting  Short term goal 4: Pt will complete BUE AAROM/AROM for increasing functional ROM required for BADL task completion  Long term goals  Time Frame for Long term goals :  Two weeks  Long term goal 1: Pt will tolerate standing greater than 2 mins with no greater than mod A x1 and safety cues in preparation for ADL tasks  Long term goal 2: Pt to complete functional transfers on a variety of surfaces with no greater than Mod A x1 for increased ind with simple ADL tasks

## 2018-06-21 NOTE — PROGRESS NOTES
[facility]    Renal inpatient Progress Note  6/21/2018 1:45 PM      Pt Name:   Tova Presume:   9/9/1930  Attending:   Oliverio Duarte MD     Chief Complaint:   Renaldo Hazel is a 80 y.o. female being followed by nephrology for BEREKET / CKD     Interval History : patient seen and examined by me. feels better. No cp or SOB. Hip pain is better. She is much more comfortable as well. Scheduled Medications :   HYDROcodone 5 mg - acetaminophen  1 tablet Oral TID    ppd   Does not apply Weekly    tuberculin  5 Units Intradermal Weekly    enoxaparin  30 mg Subcutaneous Daily    ALPRAZolam  0.5 mg Oral BID    atorvastatin  20 mg Oral Nightly    cetirizine  10 mg Oral Daily    escitalopram  10 mg Oral Daily    ferrous sulfate  325 mg Oral BID WC    fluticasone  2 spray Nasal BID    isosorbide mononitrate  60 mg Oral Daily    lidocaine  1 patch Transdermal Daily    metoprolol succinate  25 mg Oral Daily    pantoprazole  40 mg Oral BID AC    rOPINIRole  4 mg Oral BID    zafirlukast  20 mg Oral BID          Vitals :  BP (!) 142/63   Pulse 75   Temp 98 °F (36.7 °C) (Oral)   Resp 20   Ht 4' 9\" (1.448 m)   Wt 143 lb 11.2 oz (65.2 kg)   LMP  (Exact Date)   SpO2 97%   BMI 31.10 kg/m²     24HR INTAKE/OUTPUT:      Intake/Output Summary (Last 24 hours) at 06/21/18 1345  Last data filed at 06/21/18 0600   Gross per 24 hour   Intake              120 ml   Output              950 ml   Net             -830 ml     Last 3 weights  Wt Readings from Last 3 Encounters:   06/21/18 143 lb 11.2 oz (65.2 kg)   06/12/18 132 lb 4.4 oz (60 kg)   05/21/18 141 lb (64 kg)        Physical Exam :  General Appearance:  Well developed.  No distress  Mouth/Throat:  Oral mucosa moist  Neck:  Supple, no JVD  Lungs:  Breath sounds: clear  Heart[de-identified]  S1,S2 heard  Abdomen:  Soft, non - tender  Musculoskeletal:  Edema - none     Last 3 CBC  No results for input(s): WBC, RBC, HGB, HCT, PLT in the last 72

## 2018-06-21 NOTE — PLAN OF CARE
Problem: Falls - Risk of:  Goal: Will remain free from falls  Will remain free from falls   Outcome: Ongoing  Patient remains alert and oriented. Uses call light appropriately to request assistance. Aware of safety boundaries. Chair alarm and bed in use and functional for patient safety. Problem: Risk for Impaired Skin Integrity  Goal: Tissue integrity - skin and mucous membranes  Structural intactness and normal physiological function of skin and  mucous membranes. Outcome: Ongoing  Patient requires assistance with tuning in bed and relieving pressure while up in chair. Patients Ridge score at 13. Chair cushion remains in place. EPC application after BM this day. Buttocks/coccyx area is red and remains blanchable. Problem: Infection - Surgical Site:  Goal: Signs of wound healing will improve  Signs of wound healing will improve   Outcome: Ongoing  Left hip has 3 surgical incisions that are healing. Surgical glue remains in place. No redness. Old dried drainage around glue. No s/s of infection. Problem: ANXIETY  Goal: Anxiety is at manageable level  Outcome: Ongoing  Patient anxious with transfers. Has a fear of falling and sometimes will push back against staff with transferring. Problem: Bleeding:  Goal: Will show no signs and symptoms of excessive bleeding  Will show no signs and symptoms of excessive bleeding   Outcome: Ongoing  Red drainage noted in small amount in forman catheter bag. Patient remains on lovenox for DVT prophylaxis.

## 2018-06-21 NOTE — PLAN OF CARE
Problem: Urinary Elimination:  Goal: Signs and symptoms of infection will decrease  Signs and symptoms of infection will decrease   Outcome: Ongoing  Urinary drainage bag noted to have red flecks and sediment in tubing. Red in color. Catheter and bag changed 06/20/2018.

## 2018-06-21 NOTE — PROGRESS NOTES
[facility]    Renal inpatient Progress Note  6/20/2018 8:04 PM      Pt Name:   Denver Dustman:   9/9/1930  Attending:   Inocencio Calero MD     Chief Complaint:   Josiane Hardin is a 80 y.o. female being followed by nephrology for BEREKET / CKD     Interval History : patient seen and examined by me. feels better. No cp or SOB. Hip painis better. Some burning  Urination earlier     Scheduled Medications :   HYDROcodone 5 mg - acetaminophen  1 tablet Oral TID    ppd   Does not apply Weekly    tuberculin  5 Units Intradermal Weekly    enoxaparin  30 mg Subcutaneous Daily    ALPRAZolam  0.5 mg Oral BID    atorvastatin  20 mg Oral Nightly    cetirizine  10 mg Oral Daily    escitalopram  10 mg Oral Daily    ferrous sulfate  325 mg Oral BID WC    fluticasone  2 spray Nasal BID    isosorbide mononitrate  60 mg Oral Daily    lidocaine  1 patch Transdermal Daily    metoprolol succinate  25 mg Oral Daily    pantoprazole  40 mg Oral BID AC    rOPINIRole  4 mg Oral BID    zafirlukast  20 mg Oral BID    fluconazole  100 mg Oral Daily      sodium chloride 75 mL/hr at 06/18/18 2027        Vitals :  BP (!) 157/67   Pulse 75   Temp 97.9 °F (36.6 °C) (Oral)   Resp 17   Ht 4' 9\" (1.448 m)   Wt 144 lb 1.6 oz (65.4 kg)   LMP  (Exact Date)   SpO2 96%   BMI 31.18 kg/m²     24HR INTAKE/OUTPUT:      Intake/Output Summary (Last 24 hours) at 06/20/18 2004  Last data filed at 06/20/18 1800   Gross per 24 hour   Intake             1640 ml   Output              950 ml   Net              690 ml     Last 3 weights  Wt Readings from Last 3 Encounters:   06/20/18 144 lb 1.6 oz (65.4 kg)   06/12/18 132 lb 4.4 oz (60 kg)   05/21/18 141 lb (64 kg)        Physical Exam :  General Appearance:  Well developed.  No distress  Mouth/Throat:  Oral mucosa moist  Neck:  Supple, no JVD  Lungs:  Breath sounds: clear  Heart[de-identified]  S1,S2 heard  Abdomen:  Soft, non - tender  Musculoskeletal:  Edema - none Last 3 CBC  No results for input(s): WBC, RBC, HGB, HCT, PLT in the last 72 hours. Last 3 CMP  Recent Labs      06/18/18   0627  06/19/18   0528  06/20/18   0819   NA  137  139  139   K  5.5*  5.3*  5.4*   CL  99  102  101   CO2  25  25  24   BUN  57*  52*  47*   CREATININE  1.8*  1.5*  1.4*   CALCIUM  8.9  8.8  9.2        Assessment / Plan   Renal - BEREKET mostly prerenal from poor oral intake and use of diuretics  · Stop IVF. BMP in AM  · Creatinine at baseline           Hyperkalemia - 2/2 BEREKET and diet, low k diet and follow . She is getting dietary supplements  - one dose of kayexalate  Essential hypertension- stable. Fracture left hip - S/P ORIF 6/11  meds reviewed and D/W patient and RN    Dr. Lilia Maxwell M,RUTH.  [facility].

## 2018-06-22 NOTE — PROGRESS NOTES
stents    COLONOSCOPY      CYSTOSCOPY  11-    TUR and fulg BT  superficial noninvasive bladder ca    CYSTOSCOPY N/A 8/23/2017    CYSTOSCOPY, RIGHT URETEROSCOPY, RIGHT STENT EXCHANGE retrograde right and right ureteral dilitation [facility] [address] [facility] [facility]    CYSTOSCOPY EVACUATION OF CLOTS performed by Xenia Balderas MD at [place] [address] EKG 12-LEAD  10/14/2015         EKG 12-LEAD  10/17/2015         ENDOSCOPY, COLON, DIAGNOSTIC      EYE SURGERY  04/2018    JOINT REPLACEMENT      hip    MOUTH SURGERY      cheek; had benign lump removed    OTHER SURGICAL HISTORY Right 04/19/2017    Cystoscopy, Attempted Right Ureteral Access     [place] CYSTOURETHROSCOPY,URETER CATHETER Right 1/31/2018    CYSTOSCOPY RIGHT RETROGRADE PYELOGRAM RIGHT URETEROSCOPY AND REPLACEMENT OF RIGHT URETERAL STENT performed by Xenia Balderas MD at [address] OFFICE/OUTPT [address] [facility] Left 6/11/2018    IM NAIL HENRY INSERTION, LEFT performed by Sameera Lopez MD at [place] [address]  03/2017    TOTAL HIP ARTHROPLASTY  3-9-12    Rt       Restrictions/Precautions:  General Precautions, Fall Risk, Weight Bearing           Left Lower Extremity Weight Bearing: Weight Bearing As Tolerated                Prior Level of Function:  ADL Assistance: Needs assistance  Homemaking Assistance:  (dependent on granddaughter)  Ambulation Assistance: Independent  Transfer Assistance: Independent  Additional Comments: Pt states in the last week or two she has been primarily using a walker, but prior to this she was using a cane. Pt reports relying heavily on granddaughter for most all ADL and IADL tasks. Pt with limited ROM and use of BUE however pt does report she is able to do more for herself, however it is easier and faster if her granddaughter helps therefore decreasing her ind with basic ADL tasks.     Subjective:  Response To mobility at mod I in order to get into/out of bed. Long term goal 2: Patient will transfer sit <--> stand with mod I in order to get up to ambulate. Long term goal 3: Patient will ambulate 48' with RW and SBA in order to get around safely in her home. Long term goal 4: Patient will negotiate 4 steps with B hand rails and CGA in order to get into/out of her home.

## 2018-06-22 NOTE — PROGRESS NOTES
Nutrition Assessment    Type and Reason for Visit: Reassess (follow-up)    Nutrition Recommendations: Continue current diet and ONS. Malnutrition Assessment:  · Malnutrition Status: At risk for malnutrition    Nutrition Diagnosis:   · Problem: Increased nutrient needs  · Etiology: related to Increased demand for energy/nutrients due to (wound healing)     Signs and symptoms:  as evidenced by Presence of wounds    Nutrition Assessment:  · Subjective Assessment: Patient admitted with s/p hip OR 6/11. Patient seen (I woke her up) and reports a good appetite. Ensure at bedside and patient reports that she does like them. Note BUN: 46, Creatinine: 1.5. Per nursing notes: surgical wound healing. · Wound Type: Stage I, Surgical Wound (hip surgery 6/11)  · Current Nutrition Therapies:  · Oral Diet Orders: General   · Oral Diet intake: 26-50%, %  · Oral Nutrition Supplement (ONS) Orders: Standard High Calorie Oral Supplement (TID)  · ONS intake: 26-50%, %  · Anthropometric Measures:  · Ht: 4' 9\" (144.8 cm)   · Current Body Wt: 143 lb (64.9 kg) (6/22/18, bedscale, no edema noted)  · Admission Body Wt: 142 lb 11.2 oz (64.7 kg) (6/18, +1, trace edema)  · Usual Body Wt:  (per EMR 10/17: 140# 9.6 oz, daughter previously reported 140#)  · Ideal Body Wt: 93 lb (42.2 kg), BMI Classification: BMI 30.0 - 34.9 Obese Class I  · Comparative Standards (Estimated Nutrition Needs):  · Estimated Daily Total Kcal: 1720-8515 kcals  · Estimated Daily Protein (g): 58-72 gm (if renal function allows)    Estimated Intake vs Estimated Needs: Intake Improving    Nutrition Risk Level: Moderate    Nutrition Interventions:   Continue current diet, Continue current ONS  Continued Inpatient Monitoring, Education Initiated (6/18 Encouraged good nutrition, fluid intake, ONS at best efforts.   Encouraged foor from home as desired.)    Nutrition Evaluation:   · Evaluation: Progressing toward goals   · Goals: Pt. will consume 75% or more of meals to promote wound healing during LOS. · Monitoring: Meal Intake, Supplement Intake, Diet Tolerance, Wound Healing, Ascites/Edema, Mental Status/Confusion, Weight, Comparative Standards, Pertinent Labs    See Adult Nutrition Doc Flowsheet for more detail.      Electronically signed by Gabby Smith RD, KELBY on 6/22/18 at 12:09 PM    Contact Number: [phone]

## 2018-06-22 NOTE — PROGRESS NOTES
clot    CARDIAC SURGERY      stents    COLONOSCOPY      CYSTOSCOPY  11-    TUR and fulg BT  superficial noninvasive bladder ca    CYSTOSCOPY N/A 8/23/2017    CYSTOSCOPY, RIGHT URETEROSCOPY, RIGHT STENT EXCHANGE retrograde right and right ureteral dilitation [facility] [address] [facility]    CYSTOSCOPY EVACUATION OF CLOTS performed by Justine Bee MD at [place] [address] EKG 12-LEAD  10/14/2015         EKG 12-LEAD  10/17/2015         ENDOSCOPY, COLON, DIAGNOSTIC      EYE SURGERY  04/2018    JOINT REPLACEMENT      hip    MOUTH SURGERY      cheek; had benign lump removed    OTHER SURGICAL HISTORY Right 04/19/2017    Cystoscopy, Attempted Right Ureteral Access     [place] CYSTOURETHROSCOPY,URETER CATHETER Right 1/31/2018    CYSTOSCOPY RIGHT RETROGRADE PYELOGRAM RIGHT URETEROSCOPY AND REPLACEMENT OF RIGHT URETERAL STENT performed by Justine Bee MD at [address] [place] [address] OFFICE/OUTPT [address] Left 6/11/2018    IM NAIL HENRY INSERTION, LEFT performed by Danie Walsh MD at [place] [address]  03/2017    TOTAL HIP ARTHROPLASTY  3-9-12    Rt       Restrictions/Precautions:  General Precautions, Fall Risk, Weight Bearing           Left Lower Extremity Weight Bearing: Weight Bearing As Tolerated          Prior Level of Function:  ADL Assistance: Needs assistance  Homemaking Assistance:  (dependent on granddaughter)  Ambulation Assistance: Independent  Transfer Assistance: Independent  Additional Comments: Pt states in the last week or two she has been primarily using a walker, but prior to this she was using a cane. Pt reports relying heavily on granddaughter for most all ADL and IADL tasks.   Pt with limited ROM and use of BUE however pt does report she is able to do more for herself, however it is easier and faster if her granddaughter helps therefore decreasing her ind with basic ADL tasks.    Subjective     Subjective: Pt lying in bed upon arrival. Agreeable to OT session  Overall Orientation Status: Within Functional Limits         Pain:  Pain Assessment  Patient Currently in Pain: Yes  Pain Assessment: 0-10  Pain Level: 6  Pain Location: Hip  Pain Orientation: Left       Objective  Overall Cognitive Status: Exceptions  Safety Judgement: Decreased awareness of need for assistance;Decreased awareness of need for safety  Initiation: Requires cues for some  Sequencing: Requires cues for some  Cognition Comment: Pt very talkative requiring cueing for redirection to task         ADL  Feeding: Setup (breakfast tray)  Toileting: Dependent/Total;Increased time to complete (for hygiene after BM- pt able to assist minimally with pulling up pants over hips while standig in chiqui stedy with 1 UE release. 2 seated rest breaks required throughout d/t increase of fatigue)   *Patient noted to have blood in BSC pan and after completing hygiene. Blood noted around catheter area. Nurse Prentice Libman notified       Bed mobility  Rolling to Right: Moderate assistance (with cueing provided for technique)  Supine to Sit: Moderate assistance  Scooting: Minimal assistance    Transfers  Sit to stand: Maximum assistance (from [facility]; ModA from EOB)  Stand to sit: Moderate assistance (to [facility]; Camila to EOB, bedside chair)  Transfer Comments: All transfers completed with use of chiqui [employer] this AM  Toilet Transfers  Equipment Used: Extra wide bedside commode  Toilet Transfer: Maximum assistance    Balance  Sitting Balance: Stand by assistance  Standing Balance: Minimal assistance (-close SBA)     Time: ~4 minutes (toileting tasks); ~1 minute 30 seconds x2 trials  Activity: Dynamic standing/reaching task while standing in chiqui stedy with 1 UE release from purple bar requiring CGA for balance with no LOB noted. Tolerated standing for approx 1 minute 30 seconds x2 trials; Lengthy seated rest break required after each trial of standing. Completed to increase dynamic standing balance and activity tolerance required for ADLs       Activity Tolerance:  Activity Tolerance: Patient limited by fatigue;Patient limited by pain    Assessment:     Performance deficits / Impairments: Decreased functional mobility , Decreased ADL status, Decreased strength, Decreased endurance, Decreased safe awareness, Decreased balance, Decreased ROM  Prognosis: Guarded, Fair  Discharge Recommendations: Patient would benefit from continued therapy after discharge    Patient Education:  Patient Education: safety with transfers using chiqui stedy, bed mobility using log roll technique  Barriers to Learning: anxiety    Equipment Recommendations: Other: cont to monitor    Safety:  Safety Devices in place: Yes  Type of devices: Call light within reach, Chair alarm in place, Left in chair, Gait belt    Plan:  Times per week: 6x  Current Treatment Recommendations: Strengthening, Balance Training, Functional Mobility Training, Endurance Training, Safety Education & Training, Patient/Caregiver Education & Training, Equipment Evaluation, Education, & procurement, Self-Care / ADL, ROM, Pain Management, Positioning, Home Management Training    Goals:  Patient goals : To be more independent    Short term goals  Time Frame for Short term goals: One week  Short term goal 1: Pt will complete LB ADL tasks using LHAE prn with no greater than mod A for inc ind with self cares  Short term goal 2: Pt will tolerate sitting EOB with no greater than CGA for greater than 4 mins for increased ind with seated ADLs  Short term goal 3: Pt to complete sit<>stand transfer on<>off a variety of surfaces with no greater than max A x1 for increased ind with toileting  Short term goal 4: Pt will complete BUE AAROM/AROM for increasing functional ROM required for BADL task completion  Long term goals  Time Frame for Long term goals :  Two weeks  Long term goal 1: Pt will tolerate standing greater than 2 mins with no greater than mod A x1 and safety cues in preparation for ADL tasks  Long term goal 2: Pt to complete functional transfers on a variety of surfaces with no greater than Mod A x1 for increased ind with simple ADL tasks

## 2018-06-23 NOTE — PROGRESS NOTES
diphenoxylate-atropine, docusate sodium, lidocaine, meclizine, oxybutynin, sodium chloride, senna, polyethylene glycol, acetaminophen    Review of Systems  Pertinent items are noted in HPI. Objective:     Patient Vitals for the past 8 hrs:   BP Temp Temp src Pulse Resp SpO2   06/23/18 0938 (!) 128/55 98 °F (36.7 °C) Oral 78 19 97 %     I/O last 3 completed shifts: In: 5 [P.O.:420]  Out: 2050 [Urine:2050]  No intake/output data recorded. BP (!) 128/55   Pulse 78   Temp 98 °F (36.7 °C) (Oral)   Resp 19   Ht 4' 9\" (1.448 m)   Wt 143 lb 8 oz (65.1 kg)   LMP  (Exact Date)   SpO2 97%   BMI 31.05 kg/m²     BP (!) 128/55   Pulse 78   Temp 98 °F (36.7 °C) (Oral)   Resp 19   Ht 4' 9\" (1.448 m)   Wt 143 lb 8 oz (65.1 kg)   LMP  (Exact Date)   SpO2 97%   BMI 31.05 kg/m²   General appearance: alert, appears stated age and cooperative  Head: Normocephalic, without obvious abnormality, atraumatic  Lungs: clear to auscultation bilaterally  Heart: regular rate and rhythm, S1, S2 normal, no murmur, click, rub or gallop  Abdomen: soft, non-tender; bowel sounds normal; no masses,  no organomegaly  Extremities: extremities normal, atraumatic, no cyanosis or edema  Skin: Skin color, texture, turgor normal. No rashes or lesions  Neurologic: Grossly normal but weak    Data Review:   Results for Satish Martinez (MRN [id number]) as of 6/23/2018 17:13   Ref.  Range 6/20/2018 08:19 6/20/2018 19:00 6/21/2018 06:15   Sodium Latest Ref Range: 135 - 145 meq/L 139  139   Potassium Latest Ref Range: 3.5 - 5.2 meq/L 5.4 (H)  5.0   Chloride Latest Ref Range: 98 - 111 meq/L 101  106   CO2 Latest Ref Range: 23 - 33 meq/L 24  21 (L)   BUN Latest Ref Range: 7 - 22 mg/dL 47 (H)  46 (H)   Creatinine Latest Ref Range: 0.4 - 1.2 mg/dL 1.4 (H)  1.5 (H)   Anion Gap Latest Ref Range: 8.0 - 16.0 meq/L 14.0  12.0   Est, Glom Filt Rate Latest Units: ml/min/1.73m2 36 (A)  33 (A)   Glucose Latest Ref Range: 70 - 108 mg/dL 98  92   Calcium Latest Ref Range: 8.5 - 10.5 mg/dL 9.2  9.0   URINE CULTURE, REFLEXED Unknown  Rpt (A)    URINE WITH REFLEXED MICRO Unknown  Rpt (A)    Organism Unknown  Enterococcus faec... (A)    Color, UA Latest Ref Range: STRAW-YELL   RED (A)    Glucose, UA Latest Ref Range: NEGATIVE mg/dl  NEGATIVE    Bilirubin, Urine Latest Ref Range: NEGATIVE   NEGATIVE    Ketones, Urine Latest Ref Range: NEGATIVE   NEGATIVE    Blood, Urine Latest Ref Range: NEGATIVE   LARGE (A)    pH, UA Latest Ref Range: 5.0 - 9.0   7.0    Protein, UA Latest Ref Range: NEGATIVE   300 (A)    Urobilinogen, Urine Latest Ref Range: 0.0 - 1.0 eu/dl  0.2    Nitrite, Urine Latest Ref Range: NEGATIVE   NEGATIVE    Leukocyte Esterase, Urine Latest Ref Range: NEGATIVE   MODERATE (A)    Casts UA Latest Ref Range: NONE SEEN /lpf  NONE SEEN    WBC, UA Latest Ref Range: 0-4/hpf /hpf  2-4    RBC, UA Latest Ref Range: 0-2/hpf /hpf  > 200    Epi Cells Latest Ref Range: 3-5/hpf /hpf  NONE    Bacteria, UA Latest Ref Range: FEW/NONE S /hpf  FEW    Crystals Latest Ref Range: NONE SEEN   NONE SEEN    Character, Urine Latest Ref Range: CLEAR-SL C   CLEAR    Urine Culture Reflex Unknown  [place] count: >10. ..     Specific Gravity, Urine Latest Ref Range: 1.002 - 1.03   1.015        Electronically signed by Nathan Byrnes MD on 6/23/2018 at 5:08 PM

## 2018-06-23 NOTE — PROGRESS NOTES
[facility]    Renal inpatient Progress Note  6/23/2018 2:30 PM      Pt Name:   Fred Monson:   9/9/1930  Attending:   Juana Smith MD     Chief Complaint:   Tessie Griffith is a 80 y.o. female being followed by nephrology for BEREKET / CKD     Interval History : patient seen and examined by me. feels better. No cp or SOB. Hip pain is better. Eating and drinking ok     Scheduled Medications :   HYDROcodone 5 mg - acetaminophen  1 tablet Oral TID    ppd   Does not apply Weekly    tuberculin  5 Units Intradermal Weekly    enoxaparin  30 mg Subcutaneous Daily    ALPRAZolam  0.5 mg Oral BID    atorvastatin  20 mg Oral Nightly    cetirizine  10 mg Oral Daily    escitalopram  10 mg Oral Daily    ferrous sulfate  325 mg Oral BID WC    fluticasone  2 spray Nasal BID    isosorbide mononitrate  60 mg Oral Daily    lidocaine  1 patch Transdermal Daily    metoprolol succinate  25 mg Oral Daily    pantoprazole  40 mg Oral BID AC    rOPINIRole  4 mg Oral BID    zafirlukast  20 mg Oral BID          Vitals :  BP (!) 128/55   Pulse 78   Temp 97.7 °F (36.5 °C) (Oral)   Resp 14   Ht 4' 9\" (1.448 m)   Wt 143 lb 8 oz (65.1 kg)   LMP  (Exact Date)   SpO2 95%   BMI 31.05 kg/m²     24HR INTAKE/OUTPUT:      Intake/Output Summary (Last 24 hours) at 06/23/18 1430  Last data filed at 06/23/18 1300   Gross per 24 hour   Intake              420 ml   Output             1550 ml   Net            -1130 ml     Last 3 weights  Wt Readings from Last 3 Encounters:   06/22/18 143 lb 8 oz (65.1 kg)   06/12/18 132 lb 4.4 oz (60 kg)   05/21/18 141 lb (64 kg)        Physical Exam :  General Appearance:  Well developed. No distress  Mouth/Throat:  Oral mucosa moist  Neck:  Supple, no JVD  Lungs:  Breath sounds: clear  Heart[de-identified]  S1,S2 heard  Abdomen:  Soft, non - tender  Musculoskeletal:  Edema - none     Last 3 CBC  No results for input(s): WBC, RBC, HGB, HCT, PLT in the last 72 hours.   Last 3

## 2018-06-23 NOTE — PROGRESS NOTES
Granddaughter in to visit. Verbalized that she's upset that the nursing staff isn't communicating together. Informed nurse that she has brought in depends from home, reusable cloth bibs, cinnamon applesauce, and pop in her drawers. Stated that she would like patient to be able to wear her clothing more than once if it is not soiled and to \"take her cell phone away\" at night due to patient calling her at all hours of the night. She also was upset that the cough syrup that she brought in was not being used due to the order being changed. She also brought in some lactaid for patient to take when she eats ice cream. Dr David Davis notifed and received new orders for the lactaid and the mucinex.

## 2018-06-24 NOTE — PROGRESS NOTES
clot    CARDIAC SURGERY      stents    COLONOSCOPY      CYSTOSCOPY  11-    TUR and fulg BT  superficial noninvasive bladder ca    CYSTOSCOPY N/A 8/23/2017    CYSTOSCOPY, RIGHT URETEROSCOPY, RIGHT STENT EXCHANGE retrograde right and right ureteral dilitation performed by Matthew Caban MD at [address] 1/31/2018    CYSTOSCOPY EVACUATION OF CLOTS performed by Matthew Caban MD at [place] [address] EKG 12-LEAD  10/14/2015         EKG 12-LEAD  10/17/2015         ENDOSCOPY, COLON, DIAGNOSTIC      EYE SURGERY  04/2018    JOINT REPLACEMENT      hip    MOUTH SURGERY      cheek; had benign lump removed    OTHER SURGICAL HISTORY Right 04/19/2017    Cystoscopy, Attempted Right Ureteral Access     [place] CYSTOURETHROSCOPY,URETER CATHETER Right 1/31/2018    CYSTOSCOPY RIGHT RETROGRADE PYELOGRAM RIGHT URETEROSCOPY AND REPLACEMENT OF RIGHT URETERAL STENT performed by Matthew Caban MD at [address] [place] [address] OFFICE/OUTPT [address] [facility] Left 6/11/2018    IM NAIL HENRY INSERTION, LEFT performed by Jaime Hazel MD at [place] [address]  03/2017    TOTAL HIP ARTHROPLASTY  3-9-12    Rt     Restrictions/Precautions:  General Precautions, Fall Risk, Weight Bearing           Left Lower Extremity Weight Bearing: Weight Bearing As Tolerated        Prior Level of Function:  ADL Assistance: Needs assistance  Homemaking Assistance:  (dependent on granddaughter)  Ambulation Assistance: Independent  Transfer Assistance: Independent  Additional Comments: Pt states in the last week or two she has been primarily using a walker, but prior to this she was using a cane. Pt reports relying heavily on granddaughter for most all ADL and IADL tasks. Pt with limited ROM and use of BUE however pt does report she is able to do more for herself, however it is easier and faster if her granddaughter helps therefore decreasing her ind with basic ADL tasks.     Subjective Management, Positioning, Home Management Training    Goals:  Patient goals : To be more independent    Short term goals  Time Frame for Short term goals: One week  Short term goal 1: Pt will complete LB ADL tasks using LHAE prn with no greater than mod A for inc ind with self cares  Short term goal 2: Pt will tolerate sitting EOB with no greater than CGA for greater than 4 mins for increased ind with seated ADLs  Short term goal 3: Pt to complete sit<>stand transfer on<>off a variety of surfaces with no greater than max A x1 for increased ind with toileting  Short term goal 4: Pt will complete BUE AAROM/AROM for increasing functional ROM required for BADL task completion  Long term goals  Time Frame for Long term goals :  Two weeks  Long term goal 1: Pt will tolerate standing greater than 2 mins with no greater than mod A x1 and safety cues in preparation for ADL tasks  Long term goal 2: Pt to complete functional transfers on a variety of surfaces with no greater than Mod A x1 for increased ind with simple ADL tasks

## 2018-06-24 NOTE — PROGRESS NOTES
[address] [place] [address]  INPATIENT PHYSICAL THERAPY  DAILY NOTE  [facility] 8E - 8E-68/068-A    Time In: 3643  Time Out: 0745  Timed Code Treatment Minutes: 52 Minutes  Minutes: 47          Date: 2018  Patient Name: Olive Ruiz,  Gender:  female        MRN: [id number]  : 1930  (80 y.o.)  Referral Date : 06/15/18  Referring Practitioner: Roxi Guy MD  Diagnosis: Comminuted fracture of hip, left, closed  Additional Pertinent Hx: Per ED note, pt is a 80 y.o. female who has a past medical history of osteoarthritis, osteopenia, and a right total hip arthroplasty. Patient presents to the Emergency Department on 6/10/18 for the evaluation of left hip pain. Patient reports that she was walking to the bathroom early this morning when she reports that she thinks fell asleep while walking back to bed. Found to have a left intertrochanteric hip fx. OR: s/p open treatment  with cephalomedullary nail on . Past Medical History:   Diagnosis Date    Allergic rhinitis     Anxiety     Bilateral hydronephrosis 8/10/2016    Bladder cancer ([facility] [address]     Dr. Fermin Brandon Blood circulation, collateral     CHF (congestive heart failure) (HCC)     Constipation     attributed to Ultram    Coronary artery disease involving native coronary artery of native heart s/p cath 10/14/15-LM-P, LAD MID 90%, ANUERYSMAL, % PROX, RCA MID 60% DISTLA 70%, EDP 20 , EF 30%-NEED REVASCULARIZATION 10/14/2015    GERD (gastroesophageal reflux disease)     History of blood transfusion     [place] (hard of hearing)     Hyperglycemia     Hyperlipidemia     Hypertension     Obesity (BMI 30-39. 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  2017     Past Surgical History:   Procedure Laterality Date    ABDOMEN SURGERY      ABDOMINAL EXPLORATION SURGERY      BLADDER SURGERY  10/2016    stent placed and removed a blood clot    CARDIAC SURGERY stents    COLONOSCOPY      CYSTOSCOPY  11-    TUR and fulg BT  superficial noninvasive bladder ca    CYSTOSCOPY N/A 8/23/2017    CYSTOSCOPY, RIGHT URETEROSCOPY, RIGHT STENT EXCHANGE retrograde right and right ureteral dilitation performed by Kendal Hammans, MD at [address] 1/31/2018    CYSTOSCOPY EVACUATION OF CLOTS performed by Kendal Hammans, MD at [place] [address] EKG 12-LEAD  10/14/2015         EKG 12-LEAD  10/17/2015         ENDOSCOPY, COLON, DIAGNOSTIC      EYE SURGERY  04/2018    JOINT REPLACEMENT      hip    MOUTH SURGERY      cheek; had benign lump removed    OTHER SURGICAL HISTORY Right 04/19/2017    Cystoscopy, Attempted Right Ureteral Access     [place] CYSTOURETHROSCOPY,URETER CATHETER Right 1/31/2018    CYSTOSCOPY RIGHT RETROGRADE PYELOGRAM RIGHT URETEROSCOPY AND REPLACEMENT OF RIGHT URETERAL STENT performed by Kendal Hammans, MD at [address] [place] [address] OFFICE/OUTPT [facility] Left 6/11/2018    IM NAIL HENRY INSERTION, LEFT performed by Mary Mccloud MD at [place] [address]  03/2017    TOTAL HIP ARTHROPLASTY  3-9-12    Rt       Restrictions/Precautions:  General Precautions, Fall Risk, Weight Bearing           Left Lower Extremity Weight Bearing: Weight Bearing As Tolerated                Prior Level of Function:  ADL Assistance: Needs assistance  Homemaking Assistance:  (dependent on granddaughter)  Ambulation Assistance: Independent  Transfer Assistance: Independent  Additional Comments: Pt states in the last week or two she has been primarily using a walker, but prior to this she was using a cane. Pt reports relying heavily on granddaughter for most all ADL and IADL tasks. Pt with limited ROM and use of BUE however pt does report she is able to do more for herself, however it is easier and faster if her granddaughter helps therefore decreasing her ind with basic ADL tasks.     Subjective:  Response To balance, Decreased strength  Assessment: Patient tolerated session fair. Patient very anxious about falling during transfers and ambulation. Patient very sleepy during supine exercises. Patient would benefit from continued skilled physical therapy to improve functional mobility. Prognosis: Good  Discharge Recommendations: ECF with PT, 24 hour supervision or assist, Patient would benefit from continued therapy after discharge    Patient Education:  Patient Education: transfers, therex, bed mobility, gait    Equipment Recommendations: Other: continue to monitor    Safety:  Type of devices: All fall risk precautions in place, Gait belt, Nurse notified, Call light within reach, Patient at risk for falls (pt. on toilet with nurse tech present to assist pt. with bath)    Plan:  Times per week: 6x   Times per day: Daily  Current Treatment Recommendations: Strengthening, Functional Mobility Training, Transfer Training, Balance Training, Endurance Training, Gait Training, Safety Education & Training, Patient/Caregiver Education & Training, Equipment Evaluation, Education, & procurement, Stair training    Goals:  Patient goals : Go home    Short term goals  Time Frame for Short term goals: 1 week  Short term goal 1: Patient will complete bed mobility at [facility] in order to get into/out of bed. Short term goal 2: Patient will transfer sit <--> stand with CGA in order to get up to ambulate. Short term goal 3: Patient will ambulate 13' with RW and CGA in order to get to the bathroom. Short term goal 4: Patient will negotiate 4 steps with B hand rails and min A in order to get into/out of her home. Long term goals  Time Frame for Long term goals : 3 weeks  Long term goal 1: Patient will complete bed mobility at mod I in order to get into/out of bed. Long term goal 2: Patient will transfer sit <--> stand with mod I in order to get up to ambulate.   Long term goal 3: Patient will ambulate 48' with RW and SBA in order to get

## 2018-06-25 NOTE — PROGRESS NOTES
Izzy Rangel 60  INPATIENT OCCUPATIONAL THERAPY  [facility]  PROGRESS NOTE    Time:  Time In: 805  Time Out: 3133  Timed Code Treatment Minutes: 62 Minutes  Minutes: 58          Date: 2018  Patient Name: Kaleb Silverio,   Gender: female      MRN: [id number]  : 1930  (80 y.o.)  Referring Practitioner: ordering: Lilian Maier MD attending: Dr. Anayeli Ramachandran  Diagnosis: Comminuted fracture of Left hip  Additional Pertinent Hx: Per ED note, pt is a 80 y.o. female who has a past medical history of osteoarthritis, osteopenia, and a right total hip arthroplasty. Patient presents to the Emergency Department on 6/10/18 for the evaluation of left hip pain. Patient reports that she was walking to the bathroom early this morning when she reports that she thinks fell asleep while walking back to bed. Found to have a left intertrochanteric hip fx. OR: s/p open treatment  with cephalomedullary nail on . To TCU on 6/15/18    Restrictions/Precautions:  Restrictions/Precautions: General Precautions, Fall Risk, Weight Bearing    Left Lower Extremity Weight Bearing: Weight Bearing As Tolerated                 Past Medical History:   Diagnosis Date    Allergic rhinitis     Anxiety     Bilateral hydronephrosis 8/10/2016    Bladder cancer ([facility] [address]     Dr. Annie Wilson Blood circulation, collateral     CHF (congestive heart failure) (HCC)     Constipation     attributed to Ultram    Coronary artery disease involving native coronary artery of native heart s/p cath 10/14/15-LM-P, LAD MID 90%, ANUERYSMAL, % PROX, RCA MID 60% DISTLA 70%, EDP 20 , EF 30%-NEED REVASCULARIZATION 10/14/2015    GERD (gastroesophageal reflux disease)     History of blood transfusion     [place] (hard of hearing)     Hyperglycemia     Hyperlipidemia     Hypertension     Obesity (BMI 30-39. 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  2017     Past hygiene, dependent for clothing managment with mod A of one for balance with assist of another to pull up depends d/t pt fearful of falling)          Bed mobility  Supine to Sit: Moderate assistance (with incresaed time needed to lift trunk. pt was able to bring BLE to EOB with increased time)  Scooting: Moderate assistance (EOB)    Transfers  Sit to stand: Moderate assistance (from EOB with increased time needed o nfirst trial, min A on second trial, mod A on third trial with increased time needed to reach for walker )  Stand to sit: Minimal assistance (with vc for safety)  Toilet Transfers  Equipment Used: Extra wide bedside commode  Toilet Transfer: Moderate assistance  Toilet Transfers Comments: vc for safety    Balance  Sitting Balance: Stand by assistance (at EOB > 5 minutes)  Standing Balance: Moderate assistance (initially with heavy posterior lean, progressed to min A and CGA for brief periods at [facility])     Time: 2 minutes prep for mobility X 2 events, ADLs  Activity: static stand with BUE support on RW, pt has posterior lean initially with standing, tactile cues provided with pt able to maintain balance with min A - CGA briefly with BUE support. 3 trials this date. pt does require mod A for standing balance with 1 UE release tasks, increased posterior lean noted   Comment: max cues for reassurance throughout all standing tasks. pt very fearful of falling  Functional - Mobility Device: Rolling Walker  Activity: Other  Assist Level: Moderate assistance  Functional Mobility Comments: attempted to ambulate to bathroom this date, however to fatigued to make it, tolerated 8 feet to from EOB to [facility]. very slow shuffling steps needing cues for technique, mod A to push RW forward. max time needed to reach destination.  Pt completed additional mobility from [facility] to [facility] X 4 feet at slow pace with only min A on second trial, very slow pace    RN followed with w/c per pt request d/t fearful of falling         Activity Tolerance:  Activity Tolerance: Patient limited by fatigue;Patient Tolerated treatment well  Activity Tolerance: pt with increased R toe pain with RN aware. Pt very fearful and anxious of falling needing max encoruagement throughotu session, extra time for all tasks with rest breaks provided. Assessment:  Performance deficits / Impairments: Decreased functional mobility , Decreased ADL status, Decreased strength, Decreased endurance, Decreased safe awareness, Decreased balance, Decreased ROM  Assessment: Pt has made good stead progress toward OT goals at this time. Pt has met 3/4 STG and 2/2 LTG at this time. Pt is now able to complete functional transfers with consistent mod A and min A at times. And is able to stand at walker for > 2 minutes with mod A for balance, with occasional min A for static standing tasks. Upon eval pt was unable to walk with therapy, but today ambulated half way to bathroom with mod A and increased time. Pt continues to requires max A for LB ADL tasks and is limited by fear of falling, LLE pain, weakness, and overall activity tolerance. Pt continues to require skilled OT intervention for increasing safety and independence with basic ADL tasks and functional mobility. Pt continues to require education on energy conservation and adaptive techniques for maximizing pt independnece  Prognosis: Guarded, Fair  Discharge Recommendations: Patient would benefit from continued therapy after discharge    Patient Education:  Patient Education: transfer safety, standing balance, pt goals, \" I want to walk and get my arms stronger\"   Barriers to Learning: anxiety    Equipment Recommendations:   Other: cont to monitor    Safety:  Safety Devices in place: Yes  Type of devices: Call light within reach, Chair alarm in place, Left in chair, Gait belt, All fall risk precautions in place (s/u for breakfast)    Plan:  Times per week: 6x  Current Treatment Recommendations: Strengthening, Balance Training,

## 2018-06-25 NOTE — PROGRESS NOTES
stents    COLONOSCOPY      CYSTOSCOPY  11-    TUR and fulg BT  superficial noninvasive bladder ca    CYSTOSCOPY N/A 8/23/2017    CYSTOSCOPY, RIGHT URETEROSCOPY, RIGHT STENT EXCHANGE retrograde right and right ureteral dilitation performed by John Culver MD at [address] 1/31/2018    CYSTOSCOPY EVACUATION OF CLOTS performed by John Culver MD at [place] [address] EKG 12-LEAD  10/14/2015         EKG 12-LEAD  10/17/2015         ENDOSCOPY, COLON, DIAGNOSTIC      EYE SURGERY  04/2018    JOINT REPLACEMENT      hip    MOUTH SURGERY      cheek; had benign lump removed    OTHER SURGICAL HISTORY Right 04/19/2017    Cystoscopy, Attempted Right Ureteral Access     [place] CYSTOURETHROSCOPY,URETER CATHETER Right 1/31/2018    CYSTOSCOPY RIGHT RETROGRADE PYELOGRAM RIGHT URETEROSCOPY AND REPLACEMENT OF RIGHT URETERAL STENT performed by John Culver MD at [address] [place] [address] OFFICE/OUTPT [facility] Left 6/11/2018    IM NAIL HENRY INSERTION, LEFT performed by Alin Garcia MD at [place] [address]  03/2017    TOTAL HIP ARTHROPLASTY  3-9-12    Rt       Restrictions/Precautions:  General Precautions, Fall Risk, Weight Bearing     Left Lower Extremity Weight Bearing: Weight Bearing As Tolerated     Prior Level of Function:  ADL Assistance: Needs assistance  Homemaking Assistance:  (dependent on granddaughter)  Ambulation Assistance: Independent  Transfer Assistance: Independent  Additional Comments: Pt states in the last week or two she has been primarily using a walker, but prior to this she was using a cane. Pt reports relying heavily on granddaughter for most all ADL and IADL tasks. Pt with limited ROM and use of BUE however pt does report she is able to do more for herself, however it is easier and faster if her granddaughter helps therefore decreasing her ind with basic ADL tasks.     Subjective:  Response To Previous Treatment: will ambulate 13' with RW and CGA in order to get to the bathroom. - NOT MET  Short term goal 4: Patient will negotiate 4 steps with B hand rails and min A in order to get into/out of her home. - NOT ASSESSED     Long term goals  Time Frame for Long term goals : 3 weeks  Long term goal 1: Patient will complete bed mobility at mod I in order to get into/out of bed. Long term goal 2: Patient will transfer sit <--> stand with mod I in order to get up to ambulate. Long term goal 3: Patient will ambulate 48' with RW and SBA in order to get around safely in her home. Long term goal 4: Patient will negotiate 4 steps with B hand rails and CGA in order to get into/out of her home. Revised Short-Term Goals:  Short term goals  Time Frame for Short term goals: 1 week  Short term goal 1: Patient will complete bed mobility at [facility] in order to get into/out of bed. - Continue  Short term goal 2: Patient will transfer sit <--> stand with CGA in order to get up to ambulate. - Continue  Short term goal 3: Patient will ambulate 13' with RW and CGA in order to get to the bathroom. - Continue  Short term goal 4: Patient will negotiate 4 steps with B hand rails and min A in order to get into/out of her home. - Continue    Revised Long-Term Goals  Long term goals  Time Frame for Long term goals : 3 weeks  Long term goal 1: Patient will complete bed mobility at mod I in order to get into/out of bed. Long term goal 2: Patient will transfer sit <--> stand with mod I in order to get up to ambulate. Long term goal 3: Patient will ambulate 48' with RW and SBA in order to get around safely in her home. Long term goal 4: Patient will negotiate 4 steps with B hand rails and CGA in order to get into/out of her home.      Candy Haines, SPT

## 2018-06-25 NOTE — PROGRESS NOTES
Adama Sánchez        MRN: [id number]    : 1930  (80 y.o.)  Gender: female   Principal Problem: <principal problem not specified>    Section J    Health Conditions    Should Pain Assessment Interview be Conducted? Attempt to conduct interview with all residents. If resident is comatose, skip to , Shortness of Breath (dyspnea)   Enter Code  1 0 - No à (resident is rarely/never understood) à Skip to [address] of Breath  1 - Yes à Continue to , Pain Presence   Pain Assessment Interview   Pain Presence   Enter Code  1 Ask resident: Chris Deer you had pain or hurting at any time in the last 5 days?   0 - No à Skip to , Shortness of Breath  1 - Yes  à Continue to , Pain Frequency  9 - Unable to answer à Skip to , Shortness of Breath    Pain Frequency   Enter Code          1 Ask resident: [facility] much of the time have you experienced pain or hurting over the last 5 days?   1 - Almost constantly  2 - Frequently  3 - Occasionally  4 - Rarely  9 - Unable to answer    Pain Effect on Function   Enter Code      0 Ask resident: Hurtis Bellis the last 5 days, has pain made it hard for you to sleep at night?   0 - No   1 - Yes   9 - Unable to answer    Enter Code      1 Ask resident: Hurtis Bellis the last 5 days, have you limited your day-to-day activities because of pain?   0 - No   1 - Yes   9 - Unable to answer     Pain Intensity   Enter Code      08 A. Numeric Rating Scale (00-10)  Ask resident: Ye Romero rate your worst pain over the last 5 days on a zero to ten scale, with zero being no pain and ten as the worst pain you can imagine.  (Show resident 00-10 pain scale)  Enter two-digit response. Enter 99 if unable to answer.

## 2018-06-25 NOTE — PATIENT CARE CONFERENCE
posterior leaning present initially)  Quality of Gait:  significant posterior lean initially improved as distances increased, very forward flexed posture (head facing down), short step length B,   Distance: 15' x 1  Comments:  constant vc's to correct above gait deviations with poor follow-thru  PT Equipment Recommendations  Other: continue to monitor    Occupational  Therapy:   ADL  Feeding: Setup (breakfast tray)  Grooming: Setup, Stand by assistance (seated in chair with oral care and washing face)  UE Bathing: Minimal assistance (to wash LUE - able to complete remaining areas seated EOB with SBA)  LE Bathing: Maximum assistance (washing mor area/bottom while standing in chiqui stedy. Able to wash BLE below knees to mid shin. Assist to wash B feet)  UE Dressing: Minimal assistance (to doff gown and don tshirt- able to thread BUEs in shirt, assist to pull over head d/t decreased ROM in B shoulders)  LE Dressing: Maximum assistance (to don B shoes and thread LLE into pants. Pt able to thread RLE into pants using reacher with Tammie and don socks using sock aid after therapist donned sock onto aid. Pt stood in [address] [place] [address] stedy to pull up pants with 1 UE release from bar requiring modA for completion oft task)  Toileting: Maximum assistance (for clothing management and hygiene after BM )  Additional Comments: Patient would benefit from education on use of LHAE during ADL routine to help increase independence       Speech Therapy:       Nutrition:    Weight: 137 lb 1.6 oz (62.2 kg) / Body mass index is 29.67 kg/m². Please see nutrition note for details.     Family Education: No family available for education  Fall Risk:  Falling star program initiated    Goal Achievement:   Patient Continues to progress toward goal achievement slowly    Discharge Plan   Estimated Length of Stay: re eval  Destination: discharge home with supervision  Services at Discharge: [facility], Occupational Therapy, Nursing and aide 3x

## 2018-06-26 NOTE — PROGRESS NOTES
clot    CARDIAC SURGERY      stents    COLONOSCOPY      CYSTOSCOPY  11-    TUR and fulg BT  superficial noninvasive bladder ca    CYSTOSCOPY N/A 8/23/2017    CYSTOSCOPY, RIGHT URETEROSCOPY, RIGHT STENT EXCHANGE retrograde right and right ureteral dilitation [facility] [address] [facility] 1/31/2018    CYSTOSCOPY EVACUATION OF CLOTS performed by Josse Ackerman MD at [place] [address] EKG 12-LEAD  10/14/2015         EKG 12-LEAD  10/17/2015         ENDOSCOPY, COLON, DIAGNOSTIC      EYE SURGERY  04/2018    JOINT REPLACEMENT      hip    MOUTH SURGERY      cheek; had benign lump removed    OTHER SURGICAL HISTORY Right 04/19/2017    Cystoscopy, Attempted Right Ureteral Access     [place] CYSTOURETHROSCOPY,URETER CATHETER Right 1/31/2018    CYSTOSCOPY RIGHT RETROGRADE PYELOGRAM RIGHT URETEROSCOPY AND REPLACEMENT OF RIGHT URETERAL STENT performed by Josse Ackerman MD at [address] [place] [address] OFFICE/OUTPT [facility] Left 6/11/2018    IM NAIL HENRY INSERTION, LEFT performed by Delia Peterson MD at [place] [address]  03/2017    TOTAL HIP ARTHROPLASTY  3-9-12    Rt       Restrictions/Precautions:  General Precautions, Fall Risk, Weight Bearing        Left Lower Extremity Weight Bearing: Weight Bearing As Tolerated       Prior Level of Function:  ADL Assistance: Needs assistance  Homemaking Assistance:  (dependent on granddaughter)  Ambulation Assistance: Independent  Transfer Assistance: Independent  Additional Comments: Pt states in the last week or two she has been primarily using a walker, but prior to this she was using a cane. Pt reports relying heavily on granddaughter for most all ADL and IADL tasks. Pt with limited ROM and use of BUE however pt does report she is able to do more for herself, however it is easier and faster if her granddaughter helps therefore decreasing her ind with basic ADL tasks.     Subjective Subjective: Pt seated in bedside chair upon arrival. Agreeable to OT session  Overall Orientation Status: Within Functional Limits         Pain:  Pain Assessment  Patient Currently in Pain: Yes  Pain Assessment: 0-10  Pain Level: 9  Pain Location: Hip  Pain Orientation: Left       Objective  Overall Cognitive Status: Exceptions  Cognition Comment: Pt very talkative requiring cueing for redirection to task, decreased STM noted, very anxious about falling      ADL  Grooming: Minimal assistance (for hair care seated in chair d/t decreased ROM in B shoulders)  Toileting: Maximum assistance (for hygiene and clothing management (CGA of another for safety for standing balance d/t fear of falling))        Transfers  Stand Pivot Transfers: Minimal assistance (from w/c to bedside chair towards L side with use of RW- completed at very slow pace and encouragement provided d/t fear of falling)  Sit to stand: Moderate assistance (to Tammie from bedside chair, w/c, RTS)  Stand to sit: Minimal assistance (to w/c, bedside chair, RTS)  Transfer Comments: Minimal cueing for proper hand placement during all transfers  Toilet Transfers  Equipment Used: Raised toilet seat with rails  Toilet Transfer: Moderate assistance (modA-Tammie )  Toilet Transfers Comments: x2 trials    Balance  Sitting Balance: Stand by assistance  Standing Balance: Minimal assistance           Functional Mobility  Functional - Mobility Device: Rolling Walker  Activity: To/from bathroom  Assist Level: Minimal assistance (Tammie-CGA)  Functional Mobility Comments: Patient completed functional mobility to bathroom and approx 5 ft x2 trials at very slow pace. Pt had difficulty with advancement of B feet - requiring encouragement throughout d/t fear of falling. Pt demonstrated with a flexed forward posture- cueing provided to bring head upright. Required frequent standing rest breaks during mobility and lengthy seated rest break after mobility.          Activity Tolerance:  Activity Tolerance: Patient limited by fatigue;Patient limited by pain    Assessment:     Performance deficits / Impairments: Decreased functional mobility , Decreased ADL status, Decreased strength, Decreased endurance, Decreased safe awareness, Decreased balance, Decreased ROM  Prognosis: Guarded, Fair  Discharge Recommendations: Patient would benefit from continued therapy after discharge    Patient Education:  Patient Education: safety with transfers and mobility, ADL strategies  Barriers to Learning: anxiety    Equipment Recommendations:  Equipment Needed: No  Other: Patient denies any need for equipment at discharge. Safety:  Safety Devices in place: Yes  Type of devices: Call light within reach, Chair alarm in place, Left in chair, Gait belt    Plan:  Times per week: 6x  Current Treatment Recommendations: Strengthening, Balance Training, Functional Mobility Training, Endurance Training, Safety Education & Training, Patient/Caregiver Education & Training, Equipment Evaluation, Education, & procurement, Self-Care / ADL, ROM, Pain Management, Positioning, Home Management Training    Goals:  Patient goals : To be more independent    Short term goals  Time Frame for Short term goals:  One week  Short term goal 1: Pt will complete LB ADL tasks using LHAE prn with no greater than mod A for inc ind with self cares  Short term goal 2: Pt will tolerate dynamic standing task with min A and at least 1 UE release for > 3 minutes to improve indep with sink side grooming  Short term goal 3: Pt will complete functional transfers with consistant min A and no > 2 vc for safety technique including to/from raised toilet  Short term goal 4: Pt will complete BUE AAROM/AROM for increasing functional ROM required for BADL task completion  Short term goal 5: Pt will complete functional mobility to/from bathroom with min A and RW wtih no > 2 vc for safety to increase indep wtih toileting routine  Long term goals  Time

## 2018-06-26 NOTE — PLAN OF CARE
Problem: Pain:  Goal: Pain level will decrease  Pain level will decrease   Outcome: Ongoing  Pain medication administered as ordered. Scheduled Norco was switched to PRN and the PRN Tramadol was switched to scheduled. Pain level is reassessed with hourly rounding. Problem: Falls - Risk of:  Goal: Will remain free from falls  Will remain free from falls   Outcome: Ongoing  Free from falls at this time. Fall band, sign and non-skid footwear is worn. Problem: Risk for Impaired Skin Integrity  Goal: Tissue integrity - skin and mucous membranes  Structural intactness and normal physiological function of skin and  mucous membranes. Outcome: Ongoing  Skin is clean, dry and intact. Mucus membranes are pink, moist and intact. Surgical incisions are closed with surgical glue. Problem: Infection - Surgical Site:  Goal: Signs of wound healing will improve  Signs of wound healing will improve   Outcome: Ongoing  Wounds appear to be healing well. Will continue to monitor. Problem: Mood - Altered  Goal: Coping behaviors will improve  Coping behaviors will improve     Outcome: Ongoing  Pt is doing better with gaining confidence in walking. Was able to walk to the bathroom today with therapy. Still has the fear of falling with walking. Problem: Urinary Elimination:  Goal: Signs and symptoms of infection will decrease  Signs and symptoms of infection will decrease   Outcome: Ongoing  No s/s of infection. Will continue to monitor. Problem: DISCHARGE BARRIERS  Goal: Patient's continuum of care needs are met  Outcome: Ongoing  Pt's daily needs are met. Problem: Nutrition  Goal: Optimal nutrition therapy  Outcome: Ongoing  Optimal nutrition provided daily. Problem: ANXIETY  Goal: Anxiety is at manageable level  Outcome: Ongoing  Pt is on scheduled xanax. No outward appearance of anxiety.      Problem: Bleeding:  Goal: Will show no signs and symptoms of excessive bleeding  Will show no signs and

## 2018-06-26 NOTE — PROGRESS NOTES
stents    COLONOSCOPY      CYSTOSCOPY  11-    TUR and fulg BT  superficial noninvasive bladder ca    CYSTOSCOPY N/A 8/23/2017    CYSTOSCOPY, RIGHT URETEROSCOPY, RIGHT STENT EXCHANGE retrograde right and right ureteral [facility] [address] [facility] [facility]    CYSTOSCOPY EVACUATION OF CLOTS performed by Ira Elder MD at [place] [address] EKG 12-LEAD  10/14/2015         EKG 12-LEAD  10/17/2015         ENDOSCOPY, COLON, DIAGNOSTIC      EYE SURGERY  04/2018    JOINT REPLACEMENT      hip    MOUTH SURGERY      cheek; had benign lump removed    OTHER SURGICAL HISTORY Right 04/19/2017    Cystoscopy, Attempted Right Ureteral Access     [place] CYSTOURETHROSCOPY,URETER CATHETER Right 1/31/2018    CYSTOSCOPY RIGHT RETROGRADE PYELOGRAM RIGHT URETEROSCOPY AND REPLACEMENT OF RIGHT URETERAL STENT performed by Ira Elder MD at [address] [place] [address] OFFICE/OUTPT [facility] Left 6/11/2018    IM NAIL HENRY INSERTION, LEFT performed by Elise You MD at [place] [address]  03/2017    TOTAL HIP ARTHROPLASTY  3-9-12    Rt       Restrictions/Precautions:  General Precautions, Fall Risk, Weight Bearing           Left Lower Extremity Weight Bearing: Weight Bearing As Tolerated                Prior Level of Function:  ADL Assistance: Needs assistance  Homemaking Assistance:  (dependent on granddaughter)  Ambulation Assistance: Independent  Transfer Assistance: Independent  Additional Comments: Pt states in the last week or two she has been primarily using a walker, but prior to this she was using a cane. Pt reports relying heavily on granddaughter for most all ADL and IADL tasks. Pt with limited ROM and use of BUE however pt does report she is able to do more for herself, however it is easier and faster if her granddaughter helps therefore decreasing her ind with basic ADL tasks.     Subjective:     Subjective: in order to get up to ambulate. Long term goal 3: Patient will ambulate 48' with RW and SBA in order to get around safely in her home. Long term goal 4: Patient will negotiate 4 steps with B hand rails and CGA in order to get into/out of her home.

## 2018-06-26 NOTE — PLAN OF CARE
Problem: Pain:  Goal: Pain level will decrease  Pain level will decrease   Outcome: Ongoing  Patient able to rate pain on CAITLYN scale. Accepts repositioning. Ice offered and declined. Scheduled pain medications administered as per order. Patient did not request PRN pain medications this shift. Problem: Falls - Risk of:  Goal: Will remain free from falls  Will remain free from falls   Outcome: Ongoing  Patient free from falls/accidental injury this shift. Complained of right pinky toe injury from transfer to commode last evening. No areas of redness or open areas viewed. Relief with tennis shoe removal.  Uses call light appropriately to request staff assistance. Able to voice needs and aware of safety boundaries. Chair and bed alarm used for patient safety. Problem: Risk for Impaired Skin Integrity  Goal: Tissue integrity - skin and mucous membranes  Structural intactness and normal physiological function of skin and  mucous membranes. Outcome: Ongoing  Patients left hip surgical incisions remain closed with surgical glue. Old, dry drainage noted at sites. No redness, edema or new drainage. Buttocks red and slow to anais. EPC applied and waffle cusion to chair to prevent further breakdown. Patient does reposition self in chair. Problem: Urinary Elimination:  Goal: Signs and symptoms of infection will decrease  Signs and symptoms of infection will decrease   Outcome: Ongoing  Patients forman catheter remains patent. Draining clear yellow. Catheter care provided. .      Problem: Bleeding:  Goal: Will show no signs and symptoms of excessive bleeding  Will show no signs and symptoms of excessive bleeding   Outcome: Ongoing  Patient remains on lovenox for DVT prevention. No s/s of active bleeding.

## 2018-06-26 NOTE — PLAN OF CARE
Problem: DISCHARGE BARRIERS  Goal: Patient's continuum of care needs are met    Intervention: INVOLVE PATIENT/S.O. IN DISCHARGE PLANNING PROCESS  The TCU team meeting was held today. 6/26. Patient continues to participate in therapies, but progress remains slow and she is self-limiting due to anxiety and fear of falling. The team is recommending more therapy time (insurance permitting) to continue to work on transfers & gait. The patient will need more supervision and assistance at home when she is discharged, and home health services will be arranged. If insurance approves, will reassess progress at team meeting planned for 7/3. Following the meeting, the [facility] and  met with the patient and provided update. She stated that she would like to be able to do more in therapy sessions, and is trying to tamp down her anxiety and focus on progress. Later, the  contacted patient's daughter Virginia Anthony, and updated her. Informed her that an insurance review was requested today, and information was sent, asking for approval of additional skilled days. The daughter would like patient to remain on TCU for as long as possible, and pointed out that patient was having difficulty with transfers at home and this contributed to her fall. Reiterated the need for patient to have additional help in place when discharge date is confirmed. Will advise her as to outcome of insurance review in the event that there is a problem with approval of more skilled days.  TANVIR Nielsen

## 2018-06-27 NOTE — PLAN OF CARE
Comments: Care plan reviewed with patient. Patient verbalizes understanding of the plan of care and contribute to goal setting.

## 2018-06-27 NOTE — PROGRESS NOTES
[facility]  INPATIENT OCCUPATIONAL THERAPY  [facility]  DAILY NOTE    Time:  Time In: 0800  Time Out: 0900  Timed Code Treatment Minutes: 60 Minutes  Minutes: 60          Date: 2018  Patient Name: Judge Hurst,   Gender: female      Room: -68/068-A  MRN: [id number]  : 1930  (80 y.o.)  Referring Practitioner: ordering: Lindsay Fontaine MD attending: Dr. Ivette Lr  Diagnosis: Comminuted fracture of Left hip  Additional Pertinent Hx: Per ED note, pt is a 80 y.o. female who has a past medical history of osteoarthritis, osteopenia, and a right total hip arthroplasty. Patient presents to the Emergency Department on 6/10/18 for the evaluation of left hip pain. Patient reports that she was walking to the bathroom early this morning when she reports that she thinks fell asleep while walking back to bed. Found to have a left intertrochanteric hip fx. OR: s/p open treatment  with cephalomedullary nail on . To TCU on 6/15/18    Past Medical History:   Diagnosis Date    Allergic rhinitis     Anxiety     Bilateral hydronephrosis 8/10/2016    Bladder cancer ([facility] [address]     Dr. Irma Armando Blood circulation, collateral     CHF (congestive heart failure) (HCC)     Constipation     attributed to Ultram    Coronary artery disease involving native coronary artery of native heart s/p cath 10/14/15-LM-P, LAD MID 90%, ANUERYSMAL, % PROX, RCA MID 60% DISTLA 70%, EDP 20 , EF 30%-NEED REVASCULARIZATION 10/14/2015    GERD (gastroesophageal reflux disease)     History of blood transfusion     [place] (hard of hearing)     Hyperglycemia     Hyperlipidemia     Hypertension     Obesity (BMI 30-39. 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  2017     Past Surgical History:   Procedure Laterality Date    ABDOMEN SURGERY      ABDOMINAL EXPLORATION SURGERY      BLADDER SURGERY  10/2016    stent placed and removed a blood Moderate assistance (modA from EOB)  Stand to sit: Minimal assistance (to bedside chair)  Transfer Comments: 1 cue provided for proper hand placement during transfer       Balance  Sitting Balance: Stand by assistance  Standing Balance: Moderate assistance (with 1 UE release from walker d/t heavy posterior lean. Tammie-CGA with BUE support on walker)         Functional Mobility  Functional - Mobility Device: Rolling Walker  Activity: Other  Assist Level: Minimal assistance (Tammie-CGA)  Functional Mobility Comments: From EOB to bedside chair at very slow pace- no LOB noted. Pt demonstrated with a flexed forward posture- cueing provided to bring head upright. Activity Tolerance:  Activity Tolerance: Patient limited by fatigue;Patient Tolerated treatment well    Assessment:     Performance deficits / Impairments: Decreased functional mobility , Decreased ADL status, Decreased strength, Decreased endurance, Decreased safe awareness, Decreased balance, Decreased ROM  Prognosis: Guarded, Fair  Discharge Recommendations: Patient would benefit from continued therapy after discharge    Patient Education:  Patient Education: safety with transfers and mobility, ADL strategies with use of LHAE  Barriers to Learning: anxiety    Equipment Recommendations:  Equipment Needed: No  Other: Patient denies any need for equipment at discharge. Safety:  Safety Devices in place: Yes  Type of devices: Call light within reach, Chair alarm in place, Left in chair, Gait belt    Plan:  Times per week: 6x  Current Treatment Recommendations: Strengthening, Balance Training, Functional Mobility Training, Endurance Training, Safety Education & Training, Patient/Caregiver Education & Training, Equipment Evaluation, Education, & procurement, Self-Care / ADL, ROM, Pain Management, Positioning, Home Management Training    Goals:  Patient goals : To be more independent    Short term goals  Time Frame for Short term goals:  One week  Short term goal 1: Pt will complete LB ADL tasks using LHAE prn with no greater than mod A for inc ind with self cares  Short term goal 2: Pt will tolerate dynamic standing task with min A and at least 1 UE release for > 3 minutes to improve indep with sink side grooming  Short term goal 3: Pt will complete functional transfers with consistant min A and no > 2 vc for safety technique including to/from raised toilet  Short term goal 4: Pt will complete BUE AAROM/AROM for increasing functional ROM required for BADL task completion  Short term goal 5: Pt will complete functional mobility to/from bathroom with min A and RW wtih no > 2 vc for safety to increase indep wtih toileting routine  Long term goals  Time Frame for Long term goals : 3 weeks  Long term goal 1: Pt will complete dynamic standing task with SBA for > 4minutes with no LOB or vc for safety toincrease ease with getting simple snack at home  Long term goal 2: Pt will complete BADL routine with SBA and LHAE prn and only min cues to incorporate EC strategies to incresae indep with ADL routine at home

## 2018-06-27 NOTE — PROGRESS NOTES
stents    COLONOSCOPY      CYSTOSCOPY  11-    TUR and fulg BT  superficial noninvasive bladder ca    CYSTOSCOPY N/A 8/23/2017    CYSTOSCOPY, RIGHT URETEROSCOPY, RIGHT STENT EXCHANGE retrograde right and right ureteral dilitation performed by Xenia Lowry MD at [facility] 1/31/2018    CYSTOSCOPY EVACUATION OF CLOTS performed by Xenia Lowry MD at [place] [address] EKG 12-LEAD  10/14/2015         EKG 12-LEAD  10/17/2015         ENDOSCOPY, COLON, DIAGNOSTIC      EYE SURGERY  04/2018    JOINT REPLACEMENT      hip    MOUTH SURGERY      cheek; had benign lump removed    OTHER SURGICAL HISTORY Right 04/19/2017    Cystoscopy, Attempted Right Ureteral Access     [place] CYSTOURETHROSCOPY,URETER CATHETER Right 1/31/2018    CYSTOSCOPY RIGHT RETROGRADE PYELOGRAM RIGHT URETEROSCOPY AND REPLACEMENT OF RIGHT URETERAL STENT performed by Xenia Lowry MD at [address] [facility] OFFICE/OUTPT [facility] Left 6/11/2018    IM NAIL HENRY INSERTION, LEFT performed by Alida Ham MD at [place] [address]  03/2017    TOTAL HIP ARTHROPLASTY  3-9-12    Rt       Restrictions/Precautions:  General Precautions, Fall Risk, Weight Bearing           Left Lower Extremity Weight Bearing: Weight Bearing As Tolerated                Prior Level of Function:  ADL Assistance: Needs assistance  Homemaking Assistance:  (dependent on granddaughter)  Ambulation Assistance: Independent  Transfer Assistance: Independent  Additional Comments: Pt states in the last week or two she has been primarily using a walker, but prior to this she was using a cane. Pt reports relying heavily on granddaughter for most all ADL and IADL tasks. Pt with limited ROM and use of BUE however pt does report she is able to do more for herself, however it is easier and faster if her granddaughter helps therefore decreasing her ind with basic ADL tasks.     Subjective:  Comments: Pt is s/p reclined BLE therex to increase strength and independence with functional mobility. 1x10 reps ankle pumps, quad sets, glute sets, heel slides, hip ab/add, SLR, SAQ. very talkative throughout needing redirection, pt also tends to fall asleep if not carrying on conversation. Pt needed cues for full range and min A to achieve this     Activity Tolerance:  Activity Tolerance: Patient Tolerated treatment well;Patient limited by pain; Patient limited by endurance; Patient limited by fatigue  Activity Tolerance: VERY slow moving, increased time for all tasks, needs redirection    Assessment: Body structures, Functions, Activity limitations: Decreased functional mobility , Decreased strength, Decreased endurance, Decreased balance  Assessment: pt tolerated session well this date. pt is limited by strength, balance, functional tolerance and requires assist for all mobility. Pt will require cont skilled PT prior to discharge to improve mobility decrease risk for falls and return to PLOF  Prognosis: Good  Discharge Recommendations: ECF with PT, 24 hour supervision or assist, Patient would benefit from continued therapy after discharge    Patient Education:  Patient Education: therex, gait, transfers    Equipment Recommendations: Other: continue to monitor    Safety:  Type of devices:  All fall risk precautions in place, Call light within reach, Chair alarm in place, Gait belt, Patient at risk for falls, Left in chair    Plan:  Times per week: 6x   Times per day: Daily  Current Treatment Recommendations: Strengthening, Functional Mobility Training, Transfer Training, Balance Training, Endurance Training, Gait Training, Safety Education & Training, Patient/Caregiver Education & Training, Equipment Evaluation, Education, & procurement, Stair training    Goals:  Patient goals : Go home    Short term goals  Time Frame for Short term goals: 1 week  Short term goal 1: Patient will complete bed mobility at [facility] in order to get into/out of bed. - Continue  Short term goal 2: Patient will transfer sit <--> stand with CGA in order to get up to ambulate. - Continue  Short term goal 3: Patient will ambulate 13' with RW and CGA in order to get to the bathroom. - Continue  Short term goal 4: Patient will negotiate 4 steps with B hand rails and min A in order to get into/out of her home. - Continue    Long term goals  Time Frame for Long term goals : 3 weeks  Long term goal 1: Patient will complete bed mobility at mod I in order to get into/out of bed. Long term goal 2: Patient will transfer sit <--> stand with mod I in order to get up to ambulate. Long term goal 3: Patient will ambulate 48' with RW and SBA in order to get around safely in her home. Long term goal 4: Patient will negotiate 4 steps with B hand rails and CGA in order to get into/out of her home.

## 2018-06-27 NOTE — PROGRESS NOTES
Transitional Care Unit                   RECERTIFICATION    Patient Name: Sunshine Marina        MRN: [id number]    [place]: [de-identified]    : 1930  (80 y.o.)  Gender: female     RECERTIFICATION of continued SNF inpatient care. On or before the 14 day:  Date:      I certify that continued SNF inpatient care is necessary for the following reason(s):  PT/OT     I estimate that the additional period of SNF inpatient care will be 7-10 days.       Plans for post-SNF care are:  [x]Home Health Agency  [x]Office Care    []Other :    Continued SNF care is same condition for which patient received inpatient hospital services:  [x]Yes   []No    Electronically signed by Arlette Greenberg MD on 2018 at 6:22 PM

## 2018-06-28 NOTE — PLAN OF CARE
Renaldo Hazel  [id number]    This document includes comprehensive careplan information as well as current active orders for this admission to Transitional Care Unit (8E). A copy was given to the patient and/or patient representative, 6/28/2018. Current Active Orders:  Orders Placed This Encounter   Procedures    Urine Culture, Reflexed    Basic Metabolic Panel    CBC Auto Differential    Anion Gap    Glomerular Filtration Rate, Estimated    Basic Metabolic Panel    Anion Gap    Glomerular Filtration Rate, Estimated    Basic Metabolic Panel    Anion Gap    Glomerular Filtration Rate, Estimated    Electrolytes urine random    Basic Metabolic Panel    Anion Gap    Glomerular Filtration Rate, Estimated    Basic Metabolic Panel    Anion Gap    Glomerular Filtration Rate, Estimated    Urine with Reflexed Micro    Basic Metabolic Panel    Anion Gap    Glomerular Filtration Rate, Estimated    Basic Metabolic Panel    Basic Metabolic Panel    Anion Gap    Glomerular Filtration Rate, Estimated    Anion Gap    Glomerular Filtration Rate, Estimated    Basic Metabolic Panel    Anion Gap    Glomerular Filtration Rate, Estimated    Basic Metabolic Panel    Anion Gap    Glomerular Filtration Rate, Estimated    Basic Metabolic Panel    DIET GENERAL;    Insert indwelling urinary catheter    Catheter care    Place PPD    Waffle cushion in chair when up    Weigh patient daily    Full Weight Bearing    Vital signs    Elevate heels off of bed at all times if patient is not able to move lower extremities    Turn or assist with turn every 2 hours if patient is unable to turn self. Remind patient to turn if necessary.     Inspect skin per unit guidelines    Maintain HOB at the lowest elevation consistent with medical plan of care    Full code    Consult to Recreation Therapy    Consult to Social Work    Inpatient consult to Dietitian    Inpatient consult to Nephrology    Dietary Nutrition Supplements: Standard High Calorie Oral Supplement    OT eval and treat    PT eval and treat    EKG 12 Lead       Current Medications:  Current Facility-Administered Medications   Medication Dose Route Frequency Provider Last Rate Last Dose    amLODIPine (NORVASC) tablet 5 mg  5 mg Oral Daily Jorge L Parks MD   5 mg at 06/28/18 0920    HYDROcodone-acetaminophen ([address]) 5-325 MG per tablet 1 tablet  1 tablet Oral Q6H PRN Glenis Meyers MD   1 tablet at 06/28/18 5039    traMADol (ULTRAM) tablet 50 mg  50 mg Oral TID Glenis Meyers MD   50 mg at 06/27/18 2316    Phenylephrine-DM-GG-APAP 5--325 MG/10ML LIQD 20 mL  20 mL Oral Q4H PRN Glenis Meyers MD        lactase (LACTAID ULTRA) 9000 units tablet 9,000 Units  9,000 Units Oral 4x Daily PRN Glenis Meeyrs MD        tiZANidine (ZANAFLEX) tablet 2 mg  2 mg Oral Q8H PRN Glenis Meyers MD   2 mg at 06/23/18 0531    ppd (tuberculin skin test) read   Does not apply Weekly Fox Charles MD        tuberculin injection 5 Units  5 Units Intradermal Weekly Fox Charles MD   5 Units at 06/16/18 1231    enoxaparin (LOVENOX) injection 30 mg  30 mg Subcutaneous Daily Fox Charles MD   30 mg at 06/28/18 0920    magnesium hydroxide (MILK OF MAGNESIA) 400 MG/5ML suspension 30 mL  30 mL Oral Daily PRN Fox Charles MD        ALPRAZolam [place] [employer]ees) tablet 0.5 mg  0.5 mg Oral BID Fox Charles MD   0.5 mg at 06/28/18 0919    atorvastatin (LIPITOR) tablet 20 mg  20 mg Oral Nightly Fox Charles MD   20 mg at 06/27/18 2139    cetirizine (ZYRTEC) tablet 10 mg  10 mg Oral Daily Fox Charles MD   10 mg at 06/28/18 0920    diphenoxylate-atropine (LOMOTIL) 2.5-0.025 MG per tablet 1 tablet  1 tablet Oral 4x Daily PRN Fox Charles MD        docusate sodium (COLACE) capsule 100 mg  100 mg Oral Daily PRN Fox Charles MD   100 mg at 06/25/18 0918    escitalopram (LEXAPRO) tablet 10 mg  10 mg Oral Daily Adrienne Gale

## 2018-06-28 NOTE — PROGRESS NOTES
clot    CARDIAC SURGERY      stents    COLONOSCOPY      CYSTOSCOPY  11-    TUR and fulg BT  superficial noninvasive bladder ca    CYSTOSCOPY N/A 8/23/2017    CYSTOSCOPY, RIGHT URETEROSCOPY, RIGHT STENT EXCHANGE retrograde right and right ureteral dilitation performed [facility]imena Torres [facility] [address] [facility] 1/31/2018    CYSTOSCOPY EVACUATION OF CLOTS performed by Ximena Torres MD at [place] [address] EKG 12-LEAD  10/14/2015         EKG 12-LEAD  10/17/2015         ENDOSCOPY, COLON, DIAGNOSTIC      EYE SURGERY  04/2018    JOINT REPLACEMENT      hip    MOUTH SURGERY      cheek; had benign lump removed    OTHER SURGICAL HISTORY Right 04/19/2017    Cystoscopy, Attempted Right Ureteral Access     [place] CYSTOURETHROSCOPY,URETER CATHETER Right 1/31/2018    CYSTOSCOPY RIGHT RETROGRADE PYELOGRAM RIGHT URETEROSCOPY AND REPLACEMENT OF RIGHT URETERAL STENT performed by Ximnea Torres MD at [address] [place] [address] OFFICE/OUTPT [address] Left 6/11/2018    IM NAIL HENRY INSERTION, LEFT performed by Don Fairchild MD at [place] [address]  03/2017    TOTAL HIP ARTHROPLASTY  3-9-12    Rt       Restrictions/Precautions:  General Precautions, Fall Risk, Weight Bearing           Left Lower Extremity Weight Bearing: Weight Bearing As Tolerated                Prior Level of Function:  ADL Assistance: Needs assistance  Homemaking Assistance:  (dependent on granddaughter)  Ambulation Assistance: Independent  Transfer Assistance: Independent  Additional Comments: Pt states in the last week or two she has been primarily using a walker, but prior to this she was using a cane. Pt reports relying heavily on granddaughter for most all ADL and IADL tasks.   Pt with limited ROM and use of BUE however pt does report she is able to do more for herself, however it is easier and faster if her granddaughter helps therefore decreasing her ind with basic ADL bathroom with min A and RW wtih no > 2 vc for safety to increase indep wtih toileting routine  Long term goals  Time Frame for Long term goals : 3 weeks  Long term goal 1: Pt will complete dynamic standing task with SBA for > 4minutes with no LOB or vc for safety toincrease ease with getting simple snack at home  Long term goal 2: Pt will complete BADL routine with SBA and LHAE prn and only min cues to incorporate EC strategies to incresae indep with ADL routine at home

## 2018-06-28 NOTE — PLAN OF CARE
Problem: Pain:  Goal: Pain level will decrease  Pain level will decrease   Outcome: Ongoing  Give pain medications as ordered     Problem: Falls - Risk of:  Goal: Will remain free from falls  Will remain free from falls   Outcome: Ongoing  Bed and chair alarm on     Problem: Risk for Impaired Skin Integrity  Goal: Tissue integrity - skin and mucous membranes  Structural intactness and normal physiological function of skin and  mucous membranes.    Outcome: Ongoing  Skin checks every shift     Problem: Infection - Surgical Site:  Goal: Signs of wound healing will improve  Signs of wound healing will improve   Outcome: Ongoing

## 2018-06-28 NOTE — PROGRESS NOTES
[facility]  Recreational Therapy  Daily Note  [facility] T[facility] 8[facility]    Time Spent with Patient: 45 minutes    Date:  6/28/2018       Patient Name: Dave Bermudez      MRN: [id number]      YOB: 1930 (80 y.o.)       Gender: female  Diagnosis: Comminuted fracture of Left hip  Referring Practitioner: ordering: Tisha Ren MD attending: Dr. Hema Keene    RESTRICTIONS/PRECAUTIONS:  Restrictions/Precautions: General Precautions, Fall Risk, Weight Bearing  Vision: Impaired  Hearing: Exceptions to [facility]  Hearing Exceptions: Hard of hearing/hearing concerns (pt is extremely [place], reports just received hearing aids but they need adjusted)    PAIN: 0 - no c/o pain     SUBJECTIVE:  I'll eat in the dining room     OBJECTIVE:  Pt arrived to the dining room to eat lunch with peers - pt was very talkative throughout lunch - her affect was bright and social - pt was pleasant - after lunch pt stated that she wanted to go back to her room - pt was brought back to her room via w/c -  nurse was notified to help transfer pt to recliner - call light was placed within reach          Patient Education  New Education Provided: Importance of Leisure,     Electronically signed by:  Bartolo Hernandez  Date: 6/28/2018

## 2018-06-29 NOTE — PROGRESS NOTES
Patient with no complaints from previous session. Family / Caregiver Present: No  Subjective: pt. in bedside chair, very pleasant, requesting to go to the bathroom to try to have a BM,very lengthy time spent on toileting tasks. pt. was able to have a BM  at this time    Pain:   .  Pain Assessment  Pain Level: 4  Pain Type: Acute pain  Pain Location: Knee  Pain Orientation: Left  Pain Descriptors: Aching; Sore  Pain Frequency: Continuous  Pain Onset: On-going  Clinical Progression: Not changed       Social/Functional:  Lives With: Family (dtstephania Jha)  Type of Home: House  Home Layout: One level  Home Access: Stairs to enter with rails  Entrance Stairs - Number of Steps: 4  Entrance Stairs - Rails: Both  Home Equipment: Cane, Rolling walker, Sock aid, Long-handled shoehorn (pt was using cane and went back to RW)     Objective:  Scooting: Contact guard assistance;Stand by assistance (scoot to edge of sitting surfaces.  dependent with boost to back of b/s chair)    Transfers  Sit to Stand: Minimal Assistance  Stand to sit: Contact guard assistance  Toilet transfer with rolling walker, ETS with CGA stand to sit, MIN A sit to stand ,Dependent with hygiene following BM and MOD A needed with clothing management       Ambulation 1  Surface: level tile  Device: Rolling Walker  Other Apparatus: Wheelchair follow  Assistance: Contact guard assistance (lt. cga)  Quality of Gait:  kyphotic, short step length, downward gaze, increased reliance on RW, very slow moving, short shuffled steps, much improved foot clearance today, improved B LE heel strike and push off. pt. reported her arms were getting tired from increased reliance on walker, however nothing was mentioned about pain or fear of falling., very good progress shown  in recent days  Distance: 18' x 1,  25' x 1.  no pressuring of pt. to correct alot of her deviations , pt. and therapy remained very silent throughout each distance of amb. ( pt. gets frustrated with herself, gets anxious with pt's gait deviations increasing if alot of instructions are  given to correct  things)  Comments: patient very satisfied with ambulation and mobility recently being achieved     Exercises:  Exercises  Comments: seated in w/c pt. completed 2 sets of 10- 15 reps each b le heel-toe riases, laq, marching, hip abd/add, resistance t-band ham curls all for strengthening,endurance   Activity Tolerance:  Activity Tolerance: Patient Tolerated treatment well  Activity Tolerance: VERY slow moving, increased time for all tasks however pt. showing very good motivation, trying very hard in therapy to do better  in recent days    Assessment: Body structures, Functions, Activity limitations: Decreased functional mobility , Decreased strength, Decreased endurance, Decreased balance  Assessment: pt tolerated session well this date. pt is limited by strength, balance, functional tolerance and requires assist for all mobility. Pt will require cont skilled PT prior to discharge to improve mobility decrease risk for falls and return to PLOF  Prognosis: Good  Discharge Recommendations: Continue to assess pending progress, 24 hour supervision or assist, Patient would benefit from continued therapy after discharge    Patient Education:  Patient Education: therex, gait, transfers    Equipment Recommendations: Other: continue to monitor    Safety:  Type of devices:  All fall risk precautions in place, Patient at risk for falls, Call light within reach, Left in chair, Chair alarm in place, Gait belt    Plan:  Times per week: 6x   Times per day: Daily  Current Treatment Recommendations: Strengthening, Functional Mobility Training, Transfer Training, Balance Training, Endurance Training, Gait Training, Safety Education & Training, Patient/Caregiver Education & Training, Equipment Evaluation, Education, & procurement, Stair training    Goals:  Patient goals : Go home    Short term goals  Time Frame for Short term goals: 1 week  Short term goal 1: Patient will complete bed mobility at [facility] in order to get into/out of bed. - Continue  Short term goal 2: Patient will transfer sit <--> stand with CGA in order to get up to ambulate. - Continue  Short term goal 3: Patient will ambulate 13' with RW and CGA in order to get to the bathroom. - Continue  Short term goal 4: Patient will negotiate 4 steps with B hand rails and min A in order to get into/out of her home. - Continue    Long term goals  Time Frame for Long term goals : 3 weeks  Long term goal 1: Patient will complete bed mobility at mod I in order to get into/out of bed. Long term goal 2: Patient will transfer sit <--> stand with mod I in order to get up to ambulate. Long term goal 3: Patient will ambulate 48' with RW and SBA in order to get around safely in her home. Long term goal 4: Patient will negotiate 4 steps with B hand rails and CGA in order to get into/out of her home.

## 2018-06-29 NOTE — PROGRESS NOTES
tasks.    Subjective       Subjective: Pt lying in bed upon arrival. Agreeable to OT session    Overall Orientation Status: Within Functional Limits         Pain:  Pain Assessment  Patient Currently in Pain: Yes  Pain Assessment: 0-10  Pain Level: 7  Pain Location: Hip  Pain Orientation: Left       Objective  Overall Cognitive Status: Exceptions  Cognition Comment: cueing for redirection to task, decreased STM noted         ADL  Equipment Provided: Sock aid;Reacher  Feeding: Setup (breakfast tray)  Grooming: Minimal assistance (hair care seated in chair d/t decreased ROM in B shoulders)  UE Bathing: Minimal assistance  LE Bathing: Maximum assistance; Increased time to complete (washing bottom and B feet. Able to wash mor area with Tammie for standing balance. Washed BLE below knees seated EOB with SBA. Encouragement required throughout)  UE Dressing: Minimal assistance (to doff pajama gown and don tshirt. Able to thread BUE into shirt. Assist to pull over head d/t  decreased ROM in shoulders)  LE Dressing: Maximum assistance; Increased time to complete (to don L shoe and pull up pants over hips while standing. Pt able to thread BLE into pants using reacher with extended time and don socks using sock aid. Extended time provided to don socks onto sock aid)        Bed mobility  Rolling to Right: Minimal assistance  Supine to Sit: Moderate assistance (d/t an increase of pain in lower back this AM)  Scooting: Contact guard assistance (extended time to scoot hips towards EOB)    Transfers  Sit to stand: Moderate assistance (from EOB)  Stand to sit: Minimal assistance (to bedside chair, EOB)  Transfer Comments: Min vcs for hand placement       Balance  Sitting Balance: Supervision  Standing Balance: Moderate assistance         Functional Mobility  Functional - Mobility Device: Rolling Walker  Activity: Other  Assist Level: Minimal assistance (Tammie-CGA)  Functional Mobility Comments: EOB to bedside chair at very slow pace.  No LOB noted. Activity Tolerance:  Activity Tolerance: Patient limited by fatigue;Patient limited by pain    Assessment:     Performance deficits / Impairments: Decreased functional mobility , Decreased ADL status, Decreased strength, Decreased endurance, Decreased safe awareness, Decreased balance, Decreased ROM  Prognosis: Guarded, Fair  Discharge Recommendations: Patient would benefit from continued therapy after discharge    Patient Education:  Patient Education: safety with transfers and mobility, ADL strategies with use of LHAE  Barriers to Learning: anxiety    Equipment Recommendations:  Equipment Needed: No  Other: Patient denies any need for equipment at discharge. Safety:  Safety Devices in place: Yes  Type of devices: Call light within reach, Chair alarm in place, Left in chair, Gait belt    Plan:  Times per week: 6x  Current Treatment Recommendations: Strengthening, Balance Training, Functional Mobility Training, Endurance Training, Safety Education & Training, Patient/Caregiver Education & Training, Equipment Evaluation, Education, & procurement, Self-Care / ADL, ROM, Pain Management, Positioning, Home Management Training    Goals:  Patient goals : To be more independent    Short term goals  Time Frame for Short term goals:  One week  Short term goal 1: Pt will complete LB ADL tasks using LHAE prn with no greater than mod A for inc ind with self cares  Short term goal 2: Pt will tolerate dynamic standing task with min A and at least 1 UE release for > 3 minutes to improve indep with sink side grooming  Short term goal 3: Pt will complete functional transfers with consistant min A and no > 2 vc for safety technique including to/from raised toilet  Short term goal 4: Pt will complete BUE AAROM/AROM for increasing functional ROM required for BADL task completion  Short term goal 5: Pt will complete functional mobility to/from bathroom with min A and RW wtih no > 2 vc for safety to increase indep [facility] toileting routine  Long term goals  Time Frame for Long term goals : 3 weeks  Long term goal 1: Pt will complete dynamic standing task with SBA for > 4minutes with no LOB or vc for safety toincrease ease with getting simple snack at home  Long term goal 2: Pt will complete BADL routine with SBA and LHAE prn and only min cues to incorporate EC strategies to incresae indep with ADL routine at home

## 2018-06-30 NOTE — PROGRESS NOTES
glycol, acetaminophen    Review of Systems  Pertinent items are noted in HPI. Objective:     No data found. I/O last 3 completed shifts:   In: 5 [P.O.:720]  Out: 675 [Urine:675]  I/O this shift:  In: 120 [P.O.:120]  Out: -     BP (!) 168/73   Pulse 71   Temp 97.7 °F (36.5 °C) (Oral)   Resp 13   Ht 4' 9\" (1.448 m)   Wt 141 lb 3.2 oz (64 kg)   LMP  (Exact Date)   SpO2 92%   BMI 30.56 kg/m²     BP (!) 168/73   Pulse 71   Temp 97.7 °F (36.5 °C) (Oral)   Resp 13   Ht 4' 9\" (1.448 m)   Wt 141 lb 3.2 oz (64 kg)   LMP  (Exact Date)   SpO2 92%   BMI 30.56 kg/m²   General appearance: alert, appears stated age and cooperative  Head: Normocephalic, without obvious abnormality, atraumatic  Lungs: clear to auscultation bilaterally  Heart: regular rate and rhythm, S1, S2 normal, no murmur, click, rub or gallop  Abdomen: soft, non-tender; bowel sounds normal; no masses,  no organomegaly  Extremities: extremities normal, atraumatic, no cyanosis or edema  Skin: Skin color, texture, turgor normal. No rashes or lesions  Neurologic: Grossly normal      Electronically signed by Priti Pendleton MD on 6/30/2018 at 5:58 PM

## 2018-06-30 NOTE — PLAN OF CARE
Problem: Pain:  Goal: Pain level will decrease      Outcome: Ongoing  Give pain medications as ordered  Goal: Control of acute pain      Outcome: Ongoing  Offer alternatives other then pain medications     Problem: Falls - Risk of:  Goal: Will remain free from falls      Outcome: Ongoing  Bed and chair alarm on for safety   Goal: Absence of physical injury      Outcome: Completed Date Met: 06/30/18      Problem: Infection - Surgical Site:  Goal: Signs of wound healing will improve      Outcome: Ongoing  Keep wound clean and dry     Problem: Urinary Elimination:  Goal: Complications related to the disease process, condition or treatment will be avoided or minimized      Outcome: Ongoing  Continue with medication teaching and good nutrition     Problem: DISCHARGE BARRIERS  Goal: Patient's continuum of care needs are met  Outcome: Ongoing  Monitor until discharge     Problem: Nutrition  Intervention: Nutritional evaluation  Encourage to eat at least 50% of each meal     Goal: Optimal nutrition therapy  Outcome: Ongoing  Encourage to eat at least 50% of each  Meal

## 2018-06-30 NOTE — PLAN OF CARE
Problem: Pain:  Goal: Pain level will decrease      Outcome: Ongoing  Continue to complain of pain to left hip/leg. On scheduled ultram. No PRN norco given so far this shift. Pillow support provided, Denied offered ice packs. Problem: Falls - Risk of:  Goal: Will remain free from falls      Outcome: Ongoing  No falls sustained at this time. 2-assist with ambulation with walker and gait belt. Ultra low bed, nonskid footwear, belongings and call light left within reach, bed alarm on. Problem: Infection - Surgical Site:  Goal: Signs of wound healing will improve      Outcome: Ongoing  No s/s surgical wound infection. Sites continue with glued incisions, edges approximated    Problem: IP BALANCE  Goal: LTG - Patient will maintain balance to allow for safe/functional mobility  Outcome: Ongoing  Patient requires maximum assistance to maintain balance once assisted from seating to standing position. Patient encouraged to tuck hip, stand straight, and look ahead. Needs frequent cueing to do so. Also needs cued to pick foot fully off of floor when stepping    Problem: Bleeding:  Goal: Will show no signs and symptoms of excessive bleeding       Outcome: Ongoing  No s/s excessive bleeding. Bruising continue. No visible blood noted in urine.  On lovenox therapy

## 2018-07-01 NOTE — PLAN OF CARE
Problem: Pain:  Goal: Pain level will decrease      Outcome: Ongoing  Give pain medications as ordered     Problem: Falls - Risk of:  Goal: Will remain free from falls      Outcome: Ongoing  Bed and chair alarm on     Problem: Infection - Surgical Site:  Goal: Signs of wound healing will improve      Outcome: Ongoing  Wound care as ordered     Problem: DISCHARGE BARRIERS  Goal: Patient's continuum of care needs are met  Outcome: Ongoing  Monitor until discharge     Problem: Nutrition  Goal: Optimal nutrition therapy  Outcome: Ongoing  Encourage to eat at least 50% of each meal     Problem: ANXIETY  Goal: Anxiety is at manageable level  Outcome: Ongoing  Monitor  Until discharge

## 2018-07-01 NOTE — PROGRESS NOTES
clot    CARDIAC SURGERY      stents    COLONOSCOPY      CYSTOSCOPY  11-    TUR and fulg BT  superficial noninvasive bladder ca    CYSTOSCOPY N/A 8/23/2017    CYSTOSCOPY, RIGHT URETEROSCOPY, RIGHT STENT EXCHANGE retrograde right and right ureteral dilitation [facility] [address] [facility]    CYSTOSCOPY EVACUATION OF CLOTS performed by Charles Westfall MD at [place] [address] EKG 12-LEAD  10/14/2015         EKG 12-LEAD  10/17/2015         ENDOSCOPY, COLON, DIAGNOSTIC      EYE SURGERY  04/2018    JOINT REPLACEMENT      hip    MOUTH SURGERY      cheek; had benign lump removed    OTHER SURGICAL HISTORY Right 04/19/2017    Cystoscopy, Attempted Right Ureteral Access     [place] CYSTOURETHROSCOPY,URETER CATHETER Right 1/31/2018    CYSTOSCOPY RIGHT RETROGRADE PYELOGRAM RIGHT URETEROSCOPY AND REPLACEMENT OF RIGHT URETERAL STENT performed by Charles Westfall MD at [address] [facility] OFFICE/OUTPT [address] [facility] Left 6/11/2018    IM NAIL HENRY INSERTION, LEFT performed by Regan Myles MD at [place] [address]  03/2017    TOTAL HIP ARTHROPLASTY  3-9-12    Rt       Restrictions/Precautions:  General Precautions, Fall Risk, Weight Bearing           Left Lower Extremity Weight Bearing: Weight Bearing As Tolerated                Prior Level of Function:  ADL Assistance: Needs assistance  Homemaking Assistance:  (dependent on granddaughter)  Ambulation Assistance: Independent  Transfer Assistance: Independent  Additional Comments: Pt states in the last week or two she has been primarily using a walker, but prior to this she was using a cane. Pt reports relying heavily on granddaughter for most all ADL and IADL tasks.   Pt with limited ROM and use of BUE however pt does report she is able to do more for herself, however it is easier and faster if her granddaughter helps therefore decreasing her ind with basic ADL tasks.    Subjective       Subjective: Pt. seated in recliner upon arrival, agreeable to OT treatment  Comments: Pt. is very anxious, requesting x2 staff to ambulate to the BR              Pain:  Pain Assessment  Patient Currently in Pain: Yes  Pain Level: 6  Pain Type: Surgical pain  Pain Location: Hip  Pain Orientation: Left       Objective  Overall Cognitive Status: Exceptions              ADL  Toileting: Moderate assistance          Transfers  Sit to stand: Minimal assistance (from recliner x2 events, Pt. sat after initial transfer requesting a 2nd assist [facility] )  Toilet Transfers  Toilet - Technique: Ambulating  Equipment Used: Raised toilet seat with rails  Toilet Transfer: Moderate assistance    Balance  Sitting Balance: Supervision  Standing Balance: Minimal assistance     Time: x2 minutes   Activity: in prep to ambulate  Comment: max cues for hand placeent     Functional Mobility  Functional - Mobility Device: Rolling Walker  Assist Level: Minimal assistance  Functional Mobility Comments: to and from BR min x1, SBA x1 unsteady, very small steps, extra time to comlete             Activity Tolerance:  Activity Tolerance: Patient limited by fatigue;Patient limited by pain    Assessment:     Performance deficits / Impairments: Decreased functional mobility , Decreased ADL status, Decreased strength, Decreased endurance, Decreased safe awareness, Decreased balance, Decreased ROM  Prognosis: Guarded, Fair  Discharge Recommendations: Patient would benefit from continued therapy after discharge    Patient Education:  Patient Education: safety with transfers and mobility, ADL strategies with use of LHAE  Barriers to Learning: anxiety    Equipment Recommendations:  Equipment Needed: No  Other: Patient denies any need for equipment at discharge.      Safety:  Safety Devices in place: Yes  Type of devices: Call light within reach, Chair alarm in place, Left in chair, Gait

## 2018-07-01 NOTE — PROGRESS NOTES
[address] 8E - [id number]    Time In: 3365  Time Out: 0815  Timed Code Treatment Minutes: 40 Minutes  Minutes: 40          Date: 2018  Patient Name: Harrison Michaels,  Gender:  female        MRN: [id number]  : 1930  (80 y.o.)  Referral Date : 06/15/18  Referring Practitioner: Trish Bush MD  Diagnosis: Comminuted fracture of hip, left, closed  Additional Pertinent Hx: Per ED note, pt is a 80 y.o. female who has a past medical history of osteoarthritis, osteopenia, and a right total hip arthroplasty. Patient presents to the Emergency Department on 6/10/18 for the evaluation of left hip pain. Patient reports that she was walking to the bathroom early this morning when she reports that she thinks fell asleep while walking back to bed. Found to have a left intertrochanteric hip fx. OR: s/p open treatment  with cephalomedullary nail on . Past Medical History:   Diagnosis Date    Allergic rhinitis     Anxiety     Bilateral hydronephrosis 8/10/2016    Bladder cancer ([facility] [address]     Dr. Brianna Haider Blood circulation, collateral     CHF (congestive heart failure) (HCC)     Constipation     attributed to Ultram    Coronary artery disease involving native coronary artery of native heart s/p cath 10/14/15-LM-P, LAD MID 90%, ANUERYSMAL, % PROX, RCA MID 60% DISTLA 70%, EDP 20 , EF 30%-NEED REVASCULARIZATION 10/14/2015    GERD (gastroesophageal reflux disease)     History of blood transfusion     [place] (hard of hearing)     Hyperglycemia     Hyperlipidemia     Hypertension     Obesity (BMI 30-39. 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  2017     Past Surgical History:   Procedure Laterality Date    ABDOMEN SURGERY      ABDOMINAL EXPLORATION SURGERY      BLADDER SURGERY  10/2016    stent placed and removed a blood clot    CARDIAC SURGERY at [place] ankles more so at right did complete heelcord stretcth 3x30 sec hold at lana ankles, quad sets, glut sets, heelslides with assist for increased range at left LE, short arc quads x 10 reps each         Activity Tolerance:  Activity Tolerance: Patient Tolerated treatment well;Patient limited by endurance (fear of falling)    Assessment: Body structures, Functions, Activity limitations: Decreased functional mobility , Decreased strength, Decreased endurance, Decreased balance  Assessment: pt tolerated session fair, she cont to require much assist for bed mobility and transfers and reported that she is fearful of falling as she demonstrates a post lean/push with these activity, she still is only able to tolerate limited walking distances as she fatigued easy and would benefit from cont skilled therapy  Prognosis: Good  Discharge Recommendations: Continue to assess pending progress, 24 hour supervision or assist, Patient would benefit from continued therapy after discharge    Patient Education:  Patient Education: transfers, gait    Equipment Recommendations: Other: continue to monitor    Safety:  Type of devices: All fall risk precautions in place, Patient at risk for falls, Call light within reach, Left in chair, Chair alarm in place, Gait belt    Plan:  Times per week: 6x   Times per day: Daily  Current Treatment Recommendations: Strengthening, Functional Mobility Training, Transfer Training, Balance Training, Endurance Training, Gait Training, Safety Education & Training, Patient/Caregiver Education & Training, Equipment Evaluation, Education, & procurement, Stair training    Goals:  Patient goals : Go home    Short term goals  Time Frame for Short term goals: 1 week  Short term goal 1: Patient will complete bed mobility at [facility] in order to get into/out of bed. - Continue  Short term goal 2: Patient will transfer sit <--> stand with CGA in order to get up to ambulate.  - Continue  Short term goal 3: Patient

## 2018-07-02 NOTE — FLOWSHEET NOTE
07/01/18 2020   Encounter Summary   Services provided to: Patient and family together   Referral/Consult From: [facility] Family members   Continue Visiting Yes  (7/1/18)   Volunteer Visit Yes   Complexity of Encounter Moderate   Length of Encounter 15 minutes   Spiritual Assessment Completed Yes   Spiritual/Latter-day   Type Spiritual support   Assessment Calm; Hopeful   Intervention Active listening;Nurtured hope;Prayer   Outcome Expressed gratitude;Engaged in conversation   7/1/18  S. Patient was sitting up in chair. Room was filled with family. O. Patient said she had somewhat of a difficult day. A. We discussed her condition and she stated that she hoped tomorrow would be better. Prayed with patient and family. P. Since patient will be here a little while longer, recommend spiritual care follow up to encourage her.

## 2018-07-02 NOTE — PROGRESS NOTES
stents    COLONOSCOPY      CYSTOSCOPY  11-    TUR and fulg BT  superficial noninvasive bladder ca    CYSTOSCOPY N/A 8/23/2017    CYSTOSCOPY, RIGHT URETEROSCOPY, RIGHT STENT EXCHANGE retrograde right and right ureteral dilitation [facility] [address] [facility] 1/[facility]    CYSTOSCOPY EVACUATION OF CLOTS performed by Ira Elder MD at [place] [address] EKG 12-LEAD  10/14/2015         EKG 12-LEAD  10/17/2015         ENDOSCOPY, COLON, DIAGNOSTIC      EYE SURGERY  04/2018    JOINT REPLACEMENT      hip    MOUTH SURGERY      cheek; had benign lump removed    OTHER SURGICAL HISTORY Right 04/19/2017    Cystoscopy, Attempted Right Ureteral Access     [place] CYSTOURETHROSCOPY,URETER CATHETER Right 1/31/2018    CYSTOSCOPY RIGHT RETROGRADE PYELOGRAM RIGHT URETEROSCOPY AND REPLACEMENT OF RIGHT URETERAL STENT performed by Ira Elder MD at [address] [place] [address] OFFICE/OUTPT [address] Left 6/11/2018    IM NAIL HENRY INSERTION, LEFT performed by Elise You MD at [place] [address]  03/2017    TOTAL HIP ARTHROPLASTY  3-9-12    Rt       Restrictions/Precautions:  General Precautions, Fall Risk, Weight Bearing           Left Lower Extremity Weight Bearing: Weight Bearing As Tolerated                Prior Level of Function:  ADL Assistance: Needs assistance  Homemaking Assistance:  (dependent on granddaughter)  Ambulation Assistance: Independent  Transfer Assistance: Independent  Additional Comments: Pt states in the last week or two she has been primarily using a walker, but prior to this she was using a cane. Pt reports relying heavily on granddaughter for most all ADL and IADL tasks. Pt with limited ROM and use of BUE however pt does report she is able to do more for herself, however it is easier and faster if her granddaughter helps therefore decreasing her ind with basic ADL tasks.     Subjective:     Subjective: Pt pleasant and cooperative. Needs alot of education, encouragement, to do more for herself and not asking for second person assist for mobility. Pt extremelly anxious with all on feet mobility. Pt moves VERY slowly, approx 15 feet of gait training took over 10 minutes (bs chair to room toilet). Pt talkative and does stop activity when talking, needs redirected back to focus on task at times. Pain:   .      number not given     Social/Functional:  Lives With: Family (vivienne Bro Brittle)  Type of Home: House  Home Layout: One level  Home Access: Stairs to enter with rails  Entrance Stairs - Number of Steps: 4  Entrance Stairs - Rails: Both  Home Equipment: Cane, Rolling walker, Sock aid, Long-handled shoehorn (pt was using cane and went back to RW)     Objective:       Transfers  Sit to Stand:  Moderate Assistance (much extra time for weight shifting forward and t-ferring hands up onto wlaker )  Stand to sit: Minimal Assistance       Ambulation 1  Surface: level tile  Device: Rolling Walker  Assistance: Contact guard assistance  Quality of Gait: at times nearly Tammie for pt posterior pushing at hips; ed for posture, wieght shifting, pt only propels about 2 inches per step and uses step to gait, pt not recpetive to gait stride education , pt reports she \"can't\" increase stride length and has tried before, VERY SLOW moving, non functional gait pace and pt requested second assist twice with upright with PTA eudcating that pt needs to not get dependent on 2 person assist , did keep close w/c follow for pt safety and comfort   Distance: approx 15 feet x 1 , 6 feet x 2          Balance  Comments: toileting  with Tammie for clothing management and pericare; after ed pt did attempt to assist with clothing management with right hand at walker                 Exercises:  Exercises  Comments: seated LAQ, HS curls, AP, glute sets, all x 10 reps each for improved overall functional mobility for improved bloodflow to lana LEs

## 2018-07-02 NOTE — PROGRESS NOTES
[facility]  INPATIENT OCCUPATIONAL THERAPY  [facility] 8E  DAILY NOTE    Time:  Time In: 1298  Time Out: 9783  Timed Code Treatment Minutes: 64 Minutes  Minutes: 56          Date: 2018  Patient Name: Nirmal Cage,   Gender: female      Room: -68/068-A  MRN: [id number]  : 1930  (80 y.o.)  Referring Practitioner: ordering: Ivan Sterling MD attending: Dr. German Ford  Diagnosis: Comminuted fracture of Left hip  Additional Pertinent Hx: Per ED note, pt is a 80 y.o. female who has a past medical history of osteoarthritis, osteopenia, and a right total hip arthroplasty. Patient presents to the Emergency Department on 6/10/18 for the evaluation of left hip pain. Patient reports that she was walking to the bathroom early this morning when she reports that she thinks fell asleep while walking back to bed. Found to have a left intertrochanteric hip fx. OR: s/p open treatment  with cephalomedullary nail on . To TCU on 6/15/18    Past Medical History:   Diagnosis Date    Allergic rhinitis     Anxiety     Bilateral hydronephrosis 8/10/2016    Bladder cancer ([facility] [address]     Dr. Seven Gonzales Blood circulation, collateral     CHF (congestive heart failure) (HCC)     Constipation     attributed to Ultr[employer]    Coronary artery disease involving native coronary artery of native heart s/p cath 10/14/15-LM-P, LAD MID 90%, ANUERYSMAL, % PROX, RCA MID 60% DISTLA 70%, EDP 20 , EF 30%-NEED REVASCULARIZATION 10/14/2015    GERD (gastroesophageal reflux disease)     History of blood transfusion     [place] (hard of hearing)     Hyperglycemia     Hyperlipidemia     Hypertension     Obesity (BMI 30-39. 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  2017     Past Surgical History:   Procedure Laterality Date    ABDOMEN SURGERY      ABDOMINAL EXPLORATION SURGERY      BLADDER SURGERY  10/2016    stent placed and removed a blood clot    CARDIAC SURGERY      stents    COLONOSCOPY      CYSTOSCOPY  11-    TUR and fulg BT  superficial noninvasive bladder ca    CYSTOSCOPY N/A 8/23/2017    CYSTOSCOPY, RIGHT URETEROSCOPY, RIGHT STENT EXCHANGE retrograde right and right ureteral dilitation [facility] [address] [facility]    CYSTOSCOPY EVACUATION OF CLOTS performed by Atiya Lester MD at [place] [address] EKG 12-LEAD  10/14/2015         EKG 12-LEAD  10/17/2015         ENDOSCOPY, COLON, DIAGNOSTIC      EYE SURGERY  04/2018    JOINT REPLACEMENT      hip    MOUTH SURGERY      cheek; had benign lump removed    OTHER SURGICAL HISTORY Right 04/19/2017    Cystoscopy, Attempted Right Ureteral Access     [place] CYSTOURETHROSCOPY,URETER CATHETER Right 1/31/2018    CYSTOSCOPY RIGHT RETROGRADE PYELOGRAM RIGHT URETEROSCOPY AND REPLACEMENT OF RIGHT URETERAL STENT performed by Atiya Lester MD at [address] OFFICE/OUTPT [facility] Left 6/11/2018    IM NAIL HENRY INSERTION, LEFT performed by Kelli Bonner MD at [place] [address]  03/2017    TOTAL HIP ARTHROPLASTY  3-9-12    Rt       Restrictions/Precautions:  General Precautions, Fall Risk, Weight Bearing           Left Lower Extremity Weight Bearing: Weight Bearing As Tolerated                Prior Level of Function:  ADL Assistance: Needs assistance  Homemaking Assistance:  (dependent on granddaughter)  Ambulation Assistance: Independent  Transfer Assistance: Independent  Additional Comments: Pt states in the last week or two she has been primarily using a walker, but prior to this she was using a cane. Pt reports relying heavily on granddaughter for most all ADL and IADL tasks.   Pt with limited ROM and use of BUE however pt does report she is able to do more for herself, however it is easier and faster if her granddaughter helps therefore decreasing her ind with basic ADL Patient limited by fatigue;Patient limited by pain    Assessment:     Performance deficits / Impairments: Decreased functional mobility , Decreased ADL status, Decreased strength, Decreased endurance, Decreased safe awareness, Decreased balance, Decreased ROM  Prognosis: Guarded, Fair  Discharge Recommendations: Patient would benefit from continued therapy after discharge    Patient Education:  Patient Education: safety with transfers and mobility, ADL strategies, A/AROM  Barriers to Learning: anxiety    Equipment Recommendations:  Equipment Needed: No  Other: Patient denies any need for equipment at discharge. Safety:  Safety Devices in place: Yes  Type of devices: Call light within reach, Chair alarm in place, Left in chair, Gait belt    Plan:  Times per week: 6x  Current Treatment Recommendations: Strengthening, Balance Training, Functional Mobility Training, Endurance Training, Safety Education & Training, Patient/Caregiver Education & Training, Equipment Evaluation, Education, & procurement, Self-Care / ADL, ROM, Pain Management, Positioning, Home Management Training    Goals:  Patient goals : To be more independent    Short term goals  Time Frame for Short term goals:  One week  Short term goal 1: Pt will complete LB ADL tasks using LHAE prn with no greater than mod A for inc ind with self cares  Short term goal 2: Pt will tolerate dynamic standing task with min A and at least 1 UE release for > 3 minutes to improve indep with sink side grooming  Short term goal 3: Pt will complete functional transfers with consistant min A and no > 2 vc for safety technique including to/from raised toilet  Short term goal 4: Pt will complete BUE AAROM/AROM for increasing functional ROM required for BADL task completion  Short term goal 5: Pt will complete functional mobility to/from bathroom with min A and RW wtih no > 2 vc for safety to increase indep wtih toileting routine  Long term goals  Time Frame for Long term goals : 3 weeks  Long term goal 1: Pt will complete dynamic standing task with SBA for > 4minutes with no LOB or vc for safety toincrease ease with getting simple snack at home  Long term goal 2: Pt will complete BADL routine with SBA and LHAE prn and only min cues to incorporate EC strategies to incresae indep with ADL routine at home

## 2018-07-02 NOTE — PATIENT CARE CONFERENCE
[address] [place] [address]  Transitional Care Unit  Team Conference Note    Patient Name: Dereje Moreau   MRN: [id number]    : 1930  (80 y.o.)  Gender: female   Referring Practitioner: Alma Brandon MD  Diagnosis: Comminuted fracture of hip, left, closed    Team Conference Date: 7/3/2018   Admission Date:     5:35 PM  Re-Certification Date:     :  Tentative Discharge Plan and current psychosocial issues: The patient is a  who shares her home with her granddaughter Kell Mack. Kell Mack is with the patient the majority of the time and provides any assistance that patient may need. She requires help with ADL's due to hx of arthritis, but was able to ambulate in the home, and most recently was using a rolling walker. The patient has an emergency response system, and this was called into play at the time of her recent fall. She sustained a left hip fracture, had surgery on , and is WBAT. The daughter would like patient to remain on TCU for as long as possible, and pointed out that patient was having difficulty with transfers at home and this contributed to her fall. Reiterated the need for patient to have additional help in place when discharge date is confirmed.     Nursing:     Weight:  Weight: questionable accuracy to weights wiht history of CHF     Wounds/Incisions/Ulcers:  Incision healing well redness to buttocks  Bowel: continent  Bladder: chronic forman     Pain: Patient's pain is currently controlled with tramadol, norco, tylenol and lidoderm    Education Provided:  Diabetic:  No  Peg Tube:No    Forman Care:  Education:NA  Removal Necessary:  NA  Supplies Needed: NA     Anticoagulant Use:  Patient on lovenox for anticoagulation, which is being managed by Primary Care Physician    Antibiotic Use:  Patient not on antibiotics    Psychotropic Medication Use:  Patient on xanax and lexapro     Oxygen Use: No    Home Medications Reconciled: N/A    Physical Therapy:   Bed Mobility  Scooting: Contact guard assistance (scooting hips towards eOB)  Transfers  Sit to Stand: Moderate Assistance (much extra time for weight shifting forward and t-ferring hands up onto wlaker )  Stand to sit: Minimal Assistance  Bed to Chair: Moderate assistance (using walker)  Stand Pivot Transfers: Moderate Assistance (heavy Mod A x1, from bed > w/c, posterior lean)  Comment: patient became tearful after transfer at end of session due to frustrated with her mobility  Ambulation 1  Surface: level tile  Device: Rolling Walker  Other Apparatus: Wheelchair follow  Assistance: Contact guard assistance  Quality of Gait: at times nearly Tammie for pt posterior pushing at hips; ed for posture, wieght shifting, pt only propels about 2 inches per step and uses step to gait, pt not recpetive to gait stride education , pt reports she \"can't\" increase stride length and has tried before, VERY SLOW moving, non functional gait pace and pt requested second assist twice with upright with PTA eudcating that pt needs to not get dependent on 2 person assist , did keep close w/c follow for pt safety and comfort   Distance: approx 15 feet x 1 , 6 feet x 2   Comments: patient very satisfied with ambulation and mobility recently being achieved  PT Equipment Recommendations  Other: continue to monitor    Occupational  Therapy:   ADL  Equipment Provided: Sock aid, Reacher  Feeding: Setup (breakfast tray)  Grooming: Minimal assistance (hair care d/t decreased ROM in shoulders)  UE Bathing: Minimal assistance  LE Bathing: Maximum assistance, Increased time to complete (washing bottom and B feet. Able to wash mor area with Tammie for standing balance. Washed BLE below knees seated EOB with SBA. Encouragement required throughout)  UE Dressing: Minimal assistance (to doff dominick gown and don tshirt. Able to thread BUE into shirt.  Assist to pull over head d/t  decreased ROM in shoulders)  LE Dressing: Maximum assistance, Increased time to complete (to don L shoe and pull up pants over hips while standing. Pt able to thread BLE into pants using reacher with extended time and don socks using sock aid. Extended time provided to don socks onto sock aid)  Toileting: Moderate assistance (for hygiene and clothing management)  Additional Comments: Patient would benefit from continued education on use of LHAE to help increase independence during LB ADL routine        Speech Therapy:       Nutrition:    Weight: 157 lb 8 oz (71.4 kg) / Body mass index is 34.08 kg/m². Please see nutrition note for details. Family Education: No family available for education  Fall Risk:  Falling star program initiated    Goal Achievement:   Patient Continues to progress toward goal achievement    Discharge Plan   Estimated Length of Stay: re eval  Destination: discharge home with supervision  Services at Discharge: [facility] [employer] [facility], Occupational Therapy, Nursing and aide 3x week  Equipment at Discharge: No equipment needs  Factors facilitating achievement of predicted outcomes: Family support, Motivated, Cooperative and Pleasant  Barriers to the achievement of predicted outcomes: Pain, Anxiety, Decreased endurance, Upper extremity weakness and Lower extremity weakness    Readmission Risk              Risk of Unplanned Readmission:        43           High (20-31)         Team Members Present at Conference:  Physician: Dr. Hoda Harvey MD  Nurse: Gabriella Draper RN, Deion Rutledge RN  :  TANVIR Mitchell  Occupational Therapist:  VITOR Foy  Physical Therapist:   BRADLEY  Speech Therapist: Speech Therapist: N/A. All team members have reviewed and updated as necessary and appropriate the established interdisciplinary plan of care within the medical record of [facility]. Pt's team conference attended (see above). Pt seen on rounds with LSW, to review the results with patient and family. Discussed length of stay as above.   Pt's team conference attended (see above.)  Pt seen on rounds with LSW, to review the results with patient and family. Discussed length of stay as above.     Physical Exam:  General:  Alert and oriented  Vitals:   Vitals:    07/03/18 0816   BP: 126/66   Pulse: 79   Resp: 18   Temp: 96.2 °F (35.7 °C)   SpO2: 96%     Heart:  Regular rate and rhythm  Lungs:  Clear to auscultation  Abdomen:  soft with positive bowel sounds     Assessment:  Progressing in full rehabilitation program (see above)    Plan:  Continue Rehab            Continue current medications            Spent 37 minutes today in patient management and care    Electronically signed by Turner Alcantar MD on 7/3/2018 at 9:07 AM

## 2018-07-03 NOTE — PROGRESS NOTES
RECREATIONAL THERAPY  MISSED TREATMENT    [x]Transitional Care Unit  []Inpatient Rehabilitation    Date:  7/3/2018            Patient Name: Judge Hurst           MRN: [id number]  Acct: [de-identified]          YOB: 1930 (80 y.o.)       Gender: female   Diagnosis: Comminuted fracture of Left hip  Physician: Referring Practitioner: ordering: Lindsay Fontaine MD attending: Dr. Leandra Wright:    []Hold treatment per nursing request  []Patient refusal  []Family declined treatment  []Patient at testing and/or off the floor  []Patient unavailable, with PT/OT/nursing, etc.  [x]Other pt chose not to come to our July picnic today with peers and chose to eat her ordered lunch in her room today-  Graciela Sidhu, [address]    7/3/2018

## 2018-07-03 NOTE — PROGRESS NOTES
tasks.    Subjective     Subjective: Pt seated in bedside chair upon arrival. Agreeable to OT session  Overall Orientation Status: Within Functional Limits      Pain:  Pain Assessment  Patient Currently in Pain: Yes  Pain Assessment: 0-10  Pain Level: 5  Pain Location: Leg;Hip       Objective  Overall Cognitive Status: Exceptions  Cognition Comment: cueing for redirection to task, decreased STM noted      ADL  Equipment Provided: Sock aid;Reacher  Grooming: Minimal assistance (hair care d/t decreased ROM in B shoulders)  UE Bathing: Minimal assistance (seated in chair)  LE Bathing: Maximum assistance; Increased time to complete (washing bottom and B feet. Pt able to wash mor area seated in chair and BLE below knees with extended time provided)  UE Dressing: Minimal assistance (to doff gown and don tshirt seated in chair)  LE Dressing: Maximum assistance; Increased time to complete (to don L shoe and pull up pants over hips while standing. Pt able to thread BLE into pants using reacher with extended time and don socks using sock aid. Extended time provided to don socks onto sock aid)       Transfers  Sit to stand: Moderate assistance  Stand to sit: Minimal assistance (Tammie-CGA)       Balance  Standing Balance: Minimal assistance     Time: ~2 minutes  Activity: Standing on standing scale      Functional Mobility  Functional - Mobility Device: Rolling Walker  Activity: Other  Assist Level: Contact guard assistance  Functional Mobility Comments: Approx 5 ft x2 trials at very slow pace.  No LOB noted        Activity Tolerance:  Activity Tolerance: Patient limited by fatigue;Patient limited by pain    Assessment:     Performance deficits / Impairments: Decreased functional mobility , Decreased ADL status, Decreased strength, Decreased endurance, Decreased safe awareness, Decreased balance, Decreased ROM  Prognosis: Guarded, Fair  Discharge Recommendations: Patient would benefit from continued therapy after

## 2018-07-03 NOTE — PROGRESS NOTES
Activity limitations: Decreased functional mobility , Decreased strength, Decreased endurance, Decreased balance  Assessment: pt tolerated session fairly well however moves extremely slow and requires increase time to complete task. Pt is very talkative during session and requires cues to stay on task. Pt up in bedside chair at end of session. Pt would benefit from cont therapy at this time. Prognosis: Good  Discharge Recommendations: Continue to assess pending progress, 24 hour supervision or assist, Patient would benefit from continued therapy after discharge    Patient Education:  Patient Education: ambulation, bed mobility, POC    Equipment Recommendations: Other: continue to monitor    Safety:  Type of devices: All fall risk precautions in place, Left in chair, Call light within reach, Chair alarm in place    Plan:  Times per week: 6x   Times per day: Daily  Current Treatment Recommendations: Strengthening, Functional Mobility Training, Transfer Training, Balance Training, Endurance Training, Gait Training, Safety Education & Training, Patient/Caregiver Education & Training, Equipment Evaluation, Education, & procurement, Stair training    Goals:  Patient goals : Go home    Short term goals  Time Frame for Short term goals: 1 week  Short term goal 1: Patient will complete bed mobility at [facility] in order to get into/out of bed. - Continue  Short term goal 2: Patient will transfer sit <--> stand with CGA in order to get up to ambulate. - Continue  Short term goal 3: Patient will ambulate 13' with RW and CGA in order to get to the bathroom. - Continue  Short term goal 4: Patient will negotiate 4 steps with B hand rails and min A in order to get into/out of her home. - Continue    Long term goals  Time Frame for Long term goals : 3 weeks  Long term goal 1: Patient will complete bed mobility at mod I in order to get into/out of bed.   Long term goal 2: Patient will transfer sit <--> stand with mod I in order to get

## 2018-07-03 NOTE — PROGRESS NOTES
Nutrition Assessment    Type and Reason for Visit: Reassess (po/wt. check)    Nutrition Recommendations: Decrease ensure to twice/day per pt. Request/wt. Gain. Consider MVI. Malnutrition Assessment:  · Malnutrition Status: At risk for malnutrition  Nutrition Diagnosis:   · Problem: Increased nutrient needs  · Etiology: related to Increased demand for energy/nutrients due to (wound healing)     Signs and symptoms:  as evidenced by Presence of wounds    Nutrition Assessment:  · Subjective Assessment: Pt. admitted with s/p ORIF 6/11. Pt. mentions poor appetite however she mentions she consumed 75% omelet, oranges & 180ml po at breakfast. Pt. likes ensure enliive ONS & drinks ~ 1/2 of it. She denies chewing/swallowing difficulty. Labs include: (7/1) Hemoglobin 10.8. Intake varies %. Vel Monday brought her a fish lunch & pt. eating it now. meds include: Ferrous Sulphate, Lomotil, Colace, Milk of Magnesia, Glycolax, Senokot. Notice no BM for several days however pt. mentions she feels like she will have one soon & told me she drank prune juice this morning.     · Wound Type: coccyx posterior incision left thigh outer, incision knee left outer, Surgical Wound (hip surgery 6/11)  · Current Nutrition Therapies:  · Oral Diet Orders: General   · Oral Diet intake: 26-50%, 51-75%, %  · Oral Nutrition Supplement (ONS) Orders: Standard High Calorie Oral Supplement  · ONS intake: 26-50%  · Anthropometric Measures:  · Ht: 4' 9\" (144.8 cm)   · Current Body Wt: 159 lb 13.3 oz (72.5 kg) (edema not assessed)  · Admission Body Wt: 142 lb 11.2 oz (64.7 kg) (6/18, +1, trace edema)  · Usual Body Wt:  (per EMR 10/17: 140# 9.6 oz, daughter previously reported 140#)  · % Weight Change: 12% wt. gain,  since 6/18 ( edema not assessed, hx CHF, intake varied %)   · Ideal Body Wt: 93 lb (42.2 kg), % Ideal Body 171%  · Adjusted Body Wt: 105 lb 13.1 oz (48 kg), body weight adjusted for Obesity  · BMI Classification: BMI 30.0 - 34.9 Obese Class I  · Comparative Standards (Estimated Nutrition Needs):  · Estimated Daily Total Kcal: 0369-4796 kcals  · Estimated Daily Protein (g): 58-72 gm (if renal function allows)    Estimated Intake vs Estimated Needs: Intake Improving    Nutrition Risk Level: Moderate    Nutrition Interventions:   Modify current ONS, Continue current diet Will reduce ensure enlive to twice/day per pt. Request.   Continued Inpatient Monitoring, Education Initiated (Encouraged good oral intake best effort)  Encouraged prune juice/high fiber foods to aid with having a BM. Nutrition Evaluation:   · Evaluation: No progress toward goals (intake varied % however she is gaining wt. )   · Goals: Pt. will consume 75% or more of meals to promote wound healing during LOS. · Monitoring: Meal Intake, Supplement Intake, Diet Tolerance, Ascites/Edema, Wound Healing, Weight, Comparative Standards, Pertinent Labs    See Adult Nutrition Doc Flowsheet for more detail.      Electronically signed by Shiraz Padilla RD, LD on 7/3/18 at 12:32 PM    Contact Number: [phone]

## 2018-07-03 NOTE — PLAN OF CARE
Problem: Nutrition  Goal: Optimal nutrition therapy  Outcome: Ongoing  Nutrition Problem: Increased nutrient needs  Intervention: Food and/or Nutrient Delivery: Modify current ONS, Continue current diet  Nutritional Goals: Pt. will consume 75% or more of meals to promote wound healing during LOS.

## 2018-07-04 NOTE — PLAN OF CARE
Problem: Pain:  Goal: Pain level will decrease      Outcome: Ongoing  Pian level can be high at times  Goal: Control of acute pain      Outcome: Ongoing  Pain is not controlled   Goal: Control of chronic pain      Outcome: Ongoing  Chronic pain not controlled    Problem: Falls - Risk of:  Goal: Will remain free from falls      Outcome: Ongoing  No falls this shift    Problem: Infection - Surgical Site:  Goal: Signs of wound healing will improve      Outcome: Ongoing  Her wound is healing    Problem: Urinary Elimination:  Goal: Complications related to the disease process, condition or treatment will be avoided or minimized      Outcome: Ongoing  She still has her catheter for retention    Problem: DISCHARGE BARRIERS  Goal: Patient's continuum of care needs are met  Outcome: Ongoing  Patient's continuum of care needs being met    Problem: Nutrition  Goal: Optimal nutrition therapy  Outcome: Ongoing  Working toward optimalnutrition    Problem: Bleeding:  Goal: Will show no signs and symptoms of excessive bleeding       Outcome: Ongoing  No symptoms of excessive bleeding monitoring labs

## 2018-07-05 NOTE — PROGRESS NOTES
[facility] 60  INPATIENT OCCUPATIONAL THERAPY  [facility] TCU 8E  DAILY NOTE    Time:  Time In: 813  Time Out: 09  Timed Code Treatment Minutes: 54 Minutes  Minutes: 55          Date: 2018  Patient Name: Yoseph Santana,   Gender: female      Room: [facility]68/068-A  MRN: [id number]  : 1930  (80 y.o.)  Referring Practitioner: ordering: Claudene Lobo, MD attending: Dr. Wiley Fontenot  Diagnosis: Comminuted fracture of Left hip  Additional Pertinent Hx: Per ED note, pt is a 80 y.o. female who has a past medical history of osteoarthritis, osteopenia, and a right total hip arthroplasty. Patient presents to the Emergency Department on 6/10/18 for the evaluation of left hip pain. Patient reports that she was walking to the bathroom early this morning when she reports that she thinks fell asleep while walking back to bed. Found to have a left intertrochanteric hip fx. OR: s/p open treatment  with cephalomedullary nail on . To TCU on 6/15/18    Past Medical History:   Diagnosis Date    Allergic rhinitis     Anxiety     Bilateral hydronephrosis 8/10/2016    Bladder cancer ([facility] [address]     Dr. Vinod Solano Blood circulation, collateral     CHF (congestive heart failure) (HCC)     Constipation     attributed to Ultram    Coronary artery disease involving native coronary artery of native heart s/p cath 10/14/15-LM-P, LAD MID 90%, ANUERYSMAL, % PROX, RCA MID 60% DISTLA 70%, EDP 20 , EF 30%-NEED REVASCULARIZATION 10/14/2015    GERD (gastroesophageal reflux disease)     History of blood transfusion     [place] (hard of hearing)     Hyperglycemia     Hyperlipidemia     Hypertension     Obesity (BMI 30-39. 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  2017     Past Surgical History:   Procedure Laterality Date    ABDOMEN SURGERY      ABDOMINAL EXPLORATION SURGERY      BLADDER SURGERY  10/2016    stent placed and removed a blood Subjective: Pt lying in bed upon arrival. Agreeable to OT sessoin  Overall Orientation Status: Within Functional Limits      Pain:  Pain Assessment  Patient Currently in Pain: Yes  Pain Assessment: 0-10  Pain Level: 7  Pain Location: Hip;Leg;Shoulder  Pain Orientation: Left;Right       Objective  Overall Cognitive Status: Exceptions  Cognition Comment: cueing for redirection to task, decreased STM       ADL  Feeding: Setup (breakfast tray)  Toileting: Moderate assistance (for hygiene after BM- pt able to complete clothing management with Tammie and extended time)          Bed mobility  Rolling to Right: Stand by assistance (increased time)  Supine to Sit: Contact guard assistance (with extended time provided)  Scooting: Contact guard assistance (scooting hips towards EOB with extended time provided)    Transfers  Sit to stand: Minimal assistance (from EOB, w/c, RTS)  Stand to sit: Contact guard assistance (to RTS, w/c)  Transfer Comments: Min vcs for hand placement and technique  Toilet Transfers  Equipment Used: Raised toilet seat with rails  Toilet Transfer: Minimal assistance (Tammie from RTS; CGA to RTS)    Balance  Sitting Balance: Stand by assistance  Standing Balance: Minimal assistance     Time: ~2 minutes x2 trials   Activity: Static standing with BUE support on walker requiring Tammie-CGA for balance with moderate vcs for technique and upright posture. Required seated rest break after each trial of standing. Completed to increase standing balance and activity tolerance required for ADLs     Functional Mobility  Functional - Mobility Device: Rolling Walker  Activity: To/from bathroom; Other  Assist Level: Contact guard assistance  Functional Mobility Comments: Approx 20 ft x2 trials at very slow pace. No LOB noted.  Required lengthy seated rest break after each trial of mobility  Apparatus Needs: Wheelchair follow       Type of ROM/Therapeutic Exercise: AAROM;AROM  Comment: Pt completed BUE AROM exercises with

## 2018-07-05 NOTE — PROGRESS NOTES
stents    COLONOSCOPY      CYSTOSCOPY  11-    TUR and fulg BT  superficial noninvasive bladder ca    CYSTOSCOPY N/A 8/23/2017    CYSTOSCOPY, RIGHT URETEROSCOPY, RIGHT STENT EXCHANGE retrograde right and right ureteral dilitation performed by She Payne MD at [address] 1/31/2018    CYSTOSCOPY EVACUATION OF CLOTS performed by She Payne MD at [place] [address] EKG 12-LEAD  10/14/2015         EKG 12-LEAD  10/17/2015         ENDOSCOPY, COLON, DIAGNOSTIC      EYE SURGERY  04/2018    JOINT REPLACEMENT      hip    MOUTH SURGERY      cheek; had benign lump removed    OTHER SURGICAL HISTORY Right 04/19/2017    Cystoscopy, Attempted Right Ureteral Access     AR CYSTOURETHROSCOPY,URETER CATHETER Right 1/31/2018    CYSTOSCOPY RIGHT RETROGRADE PYELOGRAM RIGHT URETEROSCOPY AND REPLACEMENT OF RIGHT URETERAL STENT performed by She Payne MD at [address] [place] [address] OFFICE/OUTPT [address] Left 6/11/2018    IM NAIL HENRY INSERTION, LEFT performed by Holly Vela MD at [place] [address]  03/2017    TOTAL HIP ARTHROPLASTY  3-9-12    Rt       Restrictions/Precautions:  General Precautions, Fall Risk, Weight Bearing           Left Lower Extremity Weight Bearing: Weight Bearing As Tolerated                Prior Level of Function:  ADL Assistance: Needs assistance  Homemaking Assistance:  (dependent on granddaughter)  Ambulation Assistance: Independent  Transfer Assistance: Independent  Additional Comments: Pt states in the last week or two she has been primarily using a walker, but prior to this she was using a cane. Pt reports relying heavily on granddaughter for most all ADL and IADL tasks. Pt with limited ROM and use of BUE however pt does report she is able to do more for herself, however it is easier and faster if her granddaughter helps therefore decreasing her ind with basic ADL tasks.     Subjective:     Subjective: pt in recliner upon arrival and agrees to therapy. pt anxious on feet/fear of falling. Pt VERY slow moving. Pain:  Yes. Pain Assessment  Pain Assessment: 0-10  Pain Level: 9 (9/10 back and 8/10 LLE)       Social/Functional:  Lives With: Family (dtr Rosa Kirkland)  Type of Home: House  Home Layout: One level  Home Access: Stairs to enter with rails  Entrance Stairs - Number of Steps: 4  Entrance Stairs - Rails: Both  Home Equipment: Cane, Rolling walker, Sock aid, Long-handled shoehorn (pt was using cane and went back to RW)     Objective:       Transfers  Sit to Stand: Minimal Assistance (pt leaning posterior, cues and assist to set feet underneath pt, increased time, fear of falling from recliner and toilet x1)  Stand to sit: Minimal Assistance (good hand pacement, increased time to complete to recliner and toilet x1)       Ambulation 1  Surface: level tile  Device: Rolling Walker  Assistance: Contact guard assistance  Quality of Gait: VERY slow moving, Very short shuffled steps, unequal step length, poor heel strike and push off, forward flexed posture, downward gaze, anxious with fear of falling, repeatedly asking PTA \"you got me? \" much increased time to complete  Distance: 2x15'       Balance  Comments: pt needed assist with clothing management and pericare, pt attempting to pull up brief however needed assist. CGA/min A throughout. pt leaning posterior many cues to stand upright       Exercises:  Exercises  Comments: none this session     Activity Tolerance:  Activity Tolerance: Patient Tolerated treatment well;Patient limited by endurance; Patient limited by fatigue;Patient limited by pain  Activity Tolerance: fear of falling    Assessment: Body structures, Functions, Activity limitations: Decreased functional mobility , Decreased strength, Decreased endurance, Decreased balance, Decreased ADL status, Decreased safe awareness  Assessment: Pt tolerated session fair.  pt is extremely slow moving, unsteady on feet

## 2018-07-06 NOTE — PROGRESS NOTES
so fidgety or restless that s/he has been moving around a lot more than usual   0   0   I. Thoughts that you would be better off dead, or of hurting yourself in some way. 0 0              Patient Name: Josiane Hardin        MRN: [id number]    : 1930  (80 y.o.)  Gender: female   Principal Problem: <principal problem not specified>    Section J    Health Conditions    Should Pain Assessment Interview be Conducted? Attempt to conduct interview with all residents. If resident is comatose, skip to , Shortness of Breath (dyspnea)   Enter Code  1 0 - No à (resident is rarely/never understood) à Skip to [address] of Breath  1 - Yes à Continue to , Pain Presence   Pain Assessment Interview   Pain Presence   Enter Code  1 Ask resident: Jama Herron you had pain or hurting at any time in the last 5 days?   0 - No à Skip to , Shortness of Breath  1 - Yes  à Continue to , Pain Frequency  9 - Unable to answer à Skip to , Shortness of Breath    Pain Frequency   Enter Code          3 Ask resident: Nahid Hoty much of the time have you experienced pain or hurting over the last 5 days?   1 - Almost constantly  2 - Frequently  3 - Occasionally  4 - Rarely  9 - Unable to answer    Pain Effect on Function   Enter Code      0 Ask resident: Darrin Michela the last 5 days, has pain made it hard for you to sleep at night?   0 - No   1 - Yes   9 - Unable to answer    Enter Code      0 Ask resident: Darrin Michela the last 5 days, have you limited your day-to-day activities because of pain?   0 - No   1 - Yes   9 - Unable to answer     Pain Intensity   Enter Code      07 A. Numeric Rating Scale (00-10)  Ask resident: Edi Sharif rate your worst pain over the last 5 days on a zero to ten scale, with zero being no pain and ten as the worst pain you can imagine.  (Show resident 00-10 pain scale)  Enter two-digit response. Enter 99 if unable to answer.

## 2018-07-06 NOTE — PROGRESS NOTES
[address] [place] [address]  INPATIENT PHYSICAL THERAPY  PROGRESSNOTE  [facility] TCU 8E - 8E-68/068-A    Time In: 1401  Time Out: 1500  Timed Code Treatment Minutes: 61 Minutes  Minutes: 59     Date: 2018  Patient Name: Tessie Griffith,  Gender:  female        MRN: [id number]  : 1930  (80 y.o.)  Referral Date : 06/15/18  Referring Practitioner: Lea Lagunas MD  Diagnosis: Comminuted fracture of hip, left, closed  Additional Pertinent Hx: Per ED note, pt is a 80 y.o. female who has a past medical history of osteoarthritis, osteopenia, and a right total hip arthroplasty. Patient presents to the Emergency Department on 6/10/18 for the evaluation of left hip pain. Patient reports that she was walking to the bathroom early this morning when she reports that she thinks fell asleep while walking back to bed. Found to have a left intertrochanteric hip fx. OR: s/p open treatment  with cephalomedullary nail on . Past Medical History:   Diagnosis Date    Allergic rhinitis     Anxiety     Bilateral hydronephrosis 8/10/2016    Bladder cancer ([facility] [address]     Dr. Marroquin Flatterconner Blood circulation, collateral     CHF (congestive heart failure) ([facility])     Constipation     attributed to Ultram    Coronary artery disease involving native coronary artery of native heart s/p cath 10/14/15-LM-P, LAD MID 90%, ANUERYSMAL, % PROX, RCA MID 60% DISTLA 70%, EDP 20 , EF 30%-NEED REVASCULARIZATION 10/14/2015    GERD (gastroesophageal reflux disease)     History of blood transfusion     [place] (hard of hearing)     Hyperglycemia     Hyperlipidemia     Hypertension     Obesity (BMI 30-39. 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  2017     Past Surgical History:   Procedure Laterality Date    ABDOMEN SURGERY      ABDOMINAL EXPLORATION SURGERY      BLADDER SURGERY  10/2016    stent placed and removed a blood clot    CARDIAC SURGERY stents    COLONOSCOPY      CYSTOSCOPY  11-    TUR and fulg BT  superficial noninvasive bladder ca    CYSTOSCOPY N/A 8/23/2017    CYSTOSCOPY, RIGHT URETEROSCOPY, RIGHT STENT EXCHANGE retrograde right and right ureteral dilitation performed by Vick Cross MD at [address] 1/31/2018    CYSTOSCOPY EVACUATION OF CLOTS performed by Vick Cross MD at [place] [address] EKG 12-LEAD  10/14/2015         EKG 12-LEAD  10/17/2015         ENDOSCOPY, COLON, DIAGNOSTIC      EYE SURGERY  04/2018    JOINT REPLACEMENT      hip    MOUTH SURGERY      cheek; had benign lump removed    OTHER SURGICAL HISTORY Right 04/19/2017    Cystoscopy, Attempted Right Ureteral Access     NM CYSTOURETHROSCOPY,URETER CATHETER Right 1/31/2018    CYSTOSCOPY RIGHT RETROGRADE PYELOGRAM RIGHT URETEROSCOPY AND REPLACEMENT OF RIGHT URETERAL STENT performed by Vick Cross MD at [address] OFFICE/OUTPT [address] [facility] Left 6/11/2018    IM NAIL HENRY INSERTION, LEFT performed by Tamara Hernandez MD at [place] [address]  03/2017    TOTAL HIP ARTHROPLASTY  3-9-12    Rt       Restrictions/Precautions:  General Precautions, Fall Risk, Weight Bearing     Left Lower Extremity Weight Bearing: Weight Bearing As Tolerated    Prior Level of Function:  ADL Assistance: Needs assistance  Homemaking Assistance:  (dependent on granddaughter)  Ambulation Assistance: Independent  Transfer Assistance: Independent  Additional Comments: Pt states in the last week or two she has been primarily using a walker, but prior to this she was using a cane. Pt reports relying heavily on granddaughter for most all ADL and IADL tasks. Pt with limited ROM and use of BUE however pt does report she is able to do more for herself, however it is easier and faster if her granddaughter helps therefore decreasing her ind with basic ADL tasks.     Subjective:  Response To Previous Treatment: Patient with no complaints from previous session. Family / Caregiver Present: Yes  Subjective: Pt in bedside chair visiting with daughter on arrival, pleasant, and agreeable to participate in PT session with SPT. Pt very slow moving secondary to fear of falling. Pain:  Denies.         Social/Functional:  Lives With: Family (vivienne Hartley)  Type of Home: House  Home Layout: One level  Home Access: Stairs to enter with rails  Entrance Stairs - Number of Steps: 4  Entrance Stairs - Rails: Both  Home Equipment: Cane, Rolling walker, Sock aid, Long-handled shoehorn (pt was using cane and went back to RW)     Objective:  Supine to Sit: Stand by assistance (HOB flat with no use of bedrail)  Sit to Supine: Minimal assistance (HOB flat with no use of bedrail; to raise BLE onto bed)    Transfers  Sit to Stand: Contact guard assistance;Minimal Assistance (from bedside chair, EOB, and toilet seat; CGA from bedside chair and EOB, Tammie from toilet seat (x2); posterior trunk lean; verbal cues to shift weight anterior)       Ambulation 1  Surface: level tile  Device: Rolling Walker  Other Apparatus: Wheelchair follow  Assistance: Contact guard assistance  Quality of Gait: VERY decreased velocity, decreased nicholas, poor B heel strike, fair- B foot clearance, severe kyphotic posture with continual downard gaze, anxious throughout, decreased B step length (LLE>RLE)  Distance: 5'x1, 15'x1, 10'x1  Comments: verbal cues for upright posture and to shift gaze straigh ahead; verbal and visual cues and tactile facilitation at LLE to assist in advancing leg to increase L step length with poor carryover; verbal cues to shift weight anterior to prevent posterior trunk lean; manual assist at RW for navigation and to prevent RW from tipping posterior       Balance  Comments: pt stood with BUE support at RW while SPT performed pericare and clothing management at [facility]; no unsteadiness noted    Exercises:  Exercises  Comments: none completed this PM     Activity Tolerance:  Activity Tolerance: Patient Tolerated treatment well;Patient limited by fatigue;Patient limited by pain; Patient limited by endurance    Assessment: Body structures, Functions, Activity limitations: Decreased functional mobility , Decreased strength, Decreased safe awareness, Decreased endurance, Decreased balance  Assessment: Pt tolerated PT session fairly well. PT session limited by pt requiring increased time to perform all functional mobility d/t fear of falling. Pt met 1/4 STGs. Stair negotiation was not assessed secondary to safety concerns. Pt requires assist for all mobility. Further PT sessions will emphasize tolerating prolonged standing activity and ambulation, increasing B step length during ambulation, and improving balance to minimize risk of falls and increase independence. Prognosis: Good  Discharge Recommendations: Continue to assess pending progress, Patient would benefit from continued therapy after discharge    Patient Education:  Patient Education: POC, transfers, gait, posture    Equipment Recommendations: Other: continue to monitor    Safety:  Type of devices: All fall risk precautions in place, Call light within reach, Chair alarm in place, Gait belt, Patient at risk for falls, Left in chair    Plan:  Times per week: 6x   Times per day: Daily  Current Treatment Recommendations: Strengthening, Functional Mobility Training, Transfer Training, Balance Training, Endurance Training, Gait Training, Safety Education & Training, Patient/Caregiver Education & Training, Equipment Evaluation, Education, & procurement, Stair training    Goals:  Patient goals : Go home    Short term goals  Time Frame for Short term goals: 1 week  Short term goal 1: Patient will complete bed mobility at [facility] in order to get into/out of bed. - Continue - Not met  Short term goal 2: Patient will transfer sit <--> stand with CGA in order to get up to ambulate.  - Continue - Not met  Short term

## 2018-07-06 NOTE — PROGRESS NOTES
Izzy Rangel 60  INPATIENT OCCUPATIONAL THERAPY  [facility]  PROGRESS NOTE    Time:  Time In: 730  Time Out: 08  Timed Code Treatment Minutes: 54 Minutes  Minutes: 55          Date: 2018  Patient Name: Harrison Michaels,   Gender: female      MRN: [id number]  : 1930  (80 y.o.)  Referring Practitioner: ordering: Trish Bush MD attending: Dr. Denita Mcbride  Diagnosis: Comminuted fracture of Left hip  Additional Pertinent Hx: Per ED note, pt is a 80 y.o. female who has a past medical history of osteoarthritis, osteopenia, and a right total hip arthroplasty. Patient presents to the Emergency Department on 6/10/18 for the evaluation of left hip pain. Patient reports that she was walking to the bathroom early this morning when she reports that she thinks fell asleep while walking back to bed. Found to have a left intertrochanteric hip fx. OR: s/p open treatment  with cephalomedullary nail on . To TCU on 6/15/18    Restrictions/Precautions:  Restrictions/Precautions: General Precautions, Fall Risk, Weight Bearing    Left Lower Extremity Weight Bearing: Weight Bearing As Tolerated                 Past Medical History:   Diagnosis Date    Allergic rhinitis     Anxiety     Bilateral hydronephrosis 8/10/2016    Bladder cancer ([facility] [address]     Dr. Brianna Haider Blood circulation, collateral     CHF (congestive heart failure) (HCC)     Constipation     attributed to Ultram    Coronary artery disease involving native coronary artery of native heart s/p cath 10/14/15-LM-P, LAD MID 90%, ANUERYSMAL, % PROX, RCA MID 60% DISTLA 70%, EDP 20 , EF 30%-NEED REVASCULARIZATION 10/14/2015    GERD (gastroesophageal reflux disease)     History of blood transfusion     Nikolai (hard of hearing)     Hyperglycemia     Hyperlipidemia     Hypertension     Obesity (BMI 30-39. 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  2017     Past and mor)  UE Dressing: Minimal assistance (shirt for bringing over R shoulder)  LE Dressing: Maximum assistance (socks, shoes, threading and pulling up pants 95% of the way, pt able to adjust pants and briefs slightly over bottom, decreased ability for releasing hands off walker for task.)  Additional Comments: extended time          Bed mobility  Supine to Sit: Stand by assistance  Scooting: Stand by assistance  Comment: extended time    Transfers  Sit to stand: Maximum assistance (from EOB)  Stand to sit: Contact guard assistance (to recliner)  Transfer Comments: pt reports stiffness this AM       Balance  Sitting Balance: Supervision  Standing Balance: Moderate assistance     Time: x 2 min during ADL, initially maxA, decreasing to Tammie  Functional - Mobility Device: Rolling Walker  Activity: Other  Assist Level: Contact guard assistance  Functional Mobility Comments: EOB to recliner, max extended time required        Activity Tolerance:  Activity Tolerance: Patient Tolerated treatment well;Patient limited by fatigue  Activity Tolerance: Pt requiring extended time and RBs to complete session d/t fatigue and fear of falling. Pt perseverative on fear of falling. Assessment:  Performance deficits / Impairments: Decreased functional mobility , Decreased ADL status, Decreased strength, Decreased endurance, Decreased safe awareness, Decreased balance, Decreased ROM  Assessment:Pt is making slow progress towards goals, limited secondary to fear of falling. Pt has met 2/5 STGs and 0/2 LTGs. Pt has exhibited improvement in functional mobility and ability to engage in exs. On Admission pt requiring maxA mobility, now requiring CGA. Pt continues to exhibit decreased balance, endurance, and activity tolerance causing barriers to meeting pt's goals. Pt continues to require skilled OT intervention to improve ADL performance required for pt to return [facility] at [facility].   Prognosis: Guarded, [address]  Discharge Recommendations: Patient snack at home NOT MET, CONTINUE  Long term goal 2: Pt will complete BADL routine with SBA and LHAE prn and only min cues to incorporate EC strategies to incresae indep with ADL routine at home NOT MET, CONTINUE    Revised Short-Term Goals  Short term goals  Time Frame for Short term goals:  One week  Short term goal 1: Pt will complete LB ADL tasks using LHAE prn with no greater than mod A for inc ind with self cares  Short term goal 2: Pt will tolerate dynamic standing task with min A and at least 1 UE release for > 3 minutes to improve indep with sink side grooming  Short term goal 3: Pt will complete functional transfers with consistant min A and no > 2 vc for safety technique including to/from raised toilet  Short term goal 4: Pt will complete BUE AAROM/AROM for increasing functional ROM required for BADL task completion  Short term goal 5: Pt will complete functional mobility to/from bathroom with SBA and RW wtih no > 2 vc for safety to increase indep wtih toileting routine  Long term goals  Time Frame for Long term goals : 3 weeks  Long term goal 1: Pt will complete dynamic standing task with SBA for > 4minutes with no LOB or vc for safety toincrease ease with getting simple snack at home  Long term goal 2: Pt will complete BADL routine with SBA and LHAE prn and only min cues to incorporate EC strategies to incresae indep with ADL routine at home

## 2018-07-07 NOTE — PROGRESS NOTES
clot    CARDIAC SURGERY      stents    COLONOSCOPY      CYSTOSCOPY  11-    TUR and fulg BT  superficial noninvasive bladder ca    CYSTOSCOPY N/A 8/23/2017    CYSTOSCOPY, RIGHT URETEROSCOPY, RIGHT STENT EXCHANGE retrograde right and right ureteral dilitation performed by Eleni Leon MD at [address] 1/31/2018    CYSTOSCOPY EVACUATION OF CLOTS performed by Eleni Leon MD at [place] [address] EKG 12-LEAD  10/14/2015         EKG 12-LEAD  10/17/2015         ENDOSCOPY, COLON, DIAGNOSTIC      EYE SURGERY  04/2018    JOINT REPLACEMENT      hip    MOUTH SURGERY      cheek; had benign lump removed    OTHER SURGICAL HISTORY Right 04/19/2017    Cystoscopy, Attempted Right Ureteral Access     GA CYSTOURETHROSCOPY,URETER CATHETER Right 1/31/2018    CYSTOSCOPY RIGHT RETROGRADE PYELOGRAM RIGHT URETEROSCOPY AND REPLACEMENT OF RIGHT URETERAL STENT performed by Eleni Leon MD at [address] OFFICE/OUTPT [facility] Left 6/11/2018    IM NAIL HENRY INSERTION, LEFT performed by Sam Acevedo MD at [place] [address]  03/2017    TOTAL HIP ARTHROPLASTY  3-9-12    Rt       Restrictions/Precautions:  General Precautions, Fall Risk, Weight Bearing           Left Lower Extremity Weight Bearing: Weight Bearing As Tolerated                Prior Level of Function:  ADL Assistance: Needs assistance  Homemaking Assistance:  (dependent on granddaughter)  Ambulation Assistance: Independent  Transfer Assistance: Independent  Additional Comments: Pt states in the last week or two she has been primarily using a walker, but prior to this she was using a cane. Pt reports relying heavily on granddaughter for most all ADL and IADL tasks.   Pt with limited ROM and use of BUE however pt does report she is able to do more for herself, however it is easier and faster if her granddaughter helps therefore decreasing her ind with basic ADL mobility , Decreased ADL status, Decreased strength, Decreased endurance, Decreased safe awareness, Decreased balance, Decreased ROM  Prognosis: Guarded, Fair  Discharge Recommendations: Patient would benefit from continued therapy after discharge, 24 hour supervision or assist    Patient Education:  Patient Education: safety with transfers and mobility, ADL strategies  Barriers to Learning: anxiety    Equipment Recommendations:  Equipment Needed: No  Other: Patient denies any need for equipment at discharge. Safety:  Safety Devices in place: Yes  Type of devices: Call light within reach, Chair alarm in place, Left in chair, Gait belt    Plan:  Times per week: 6x  Current Treatment Recommendations: Strengthening, Balance Training, Functional Mobility Training, Endurance Training, Safety Education & Training, Patient/Caregiver Education & Training, Equipment Evaluation, Education, & procurement, Self-Care / ADL, ROM, Pain Management, Positioning, Home Management Training    Goals:  Patient goals : To be more independent    Short term goals  Time Frame for Short term goals:  One week  Short term goal 1: Pt will complete LB ADL tasks using LHAE prn with no greater than mod A for inc ind with self cares  Short term goal 2: Pt will tolerate dynamic standing task with min A and at least 1 UE release for > 3 minutes to improve indep with sink side grooming  Short term goal 3: Pt will complete functional transfers with consistant min A and no > 2 vc for safety technique including to/from raised toilet  Short term goal 4: Pt will complete BUE AAROM/AROM for increasing functional ROM required for BADL task completion  Short term goal 5: Pt will complete functional mobility to/from bathroom with SBA and RW wtih no > 2 vc for safety to increase indep wtih toileting routine  Long term goals  Time Frame for Long term goals : 3 weeks  Long term goal 1: Pt will complete dynamic standing task with SBA for > 4minutes with no LOB

## 2018-07-07 NOTE — PLAN OF CARE
Problem: Pain:  Goal: Pain level will decrease      Outcome: Ongoing  Reposition frequently to alleviate pain. Problem: Falls - Risk of:  Goal: Will remain free from falls      Outcome: Ongoing  Pt will remain free of falls. Problem: Infection - Surgical Site:  Goal: Signs of wound healing will improve      Outcome: Ongoing  Monitor for ss of infection daily: additional warmth, pain, drainage, odor    Problem: Mood - Altered  Goal: Mood stable  Outcome: Ongoing  Provide calm environment. Problem: Urinary Elimination:  Goal: Complications related to the disease process, condition or treatment will be avoided or minimized      Outcome: Ongoing  Pt continues to use forman catheter. Problem: DISCHARGE BARRIERS  Goal: Patient's continuum of care needs are met  Outcome: Ongoing  Involve pt and family in dc planning. Problem: ANXIETY  Goal: Anxiety is at manageable level  Outcome: Ongoing  Redirect if possible     Problem: Bleeding:  Goal: Will show no signs and symptoms of excessive bleeding       Outcome: Ongoing  Pt will have no new bleeding. Problem: Pressure Ulcer - Risk of  Goal: Absence of pressure ulcer  Outcome: Ongoing  Reposition frequently to prevent skin breakdown.

## 2018-07-08 NOTE — PROGRESS NOTES
stents    COLONOSCOPY      CYSTOSCOPY  11-    TUR and fulg BT  superficial noninvasive bladder ca    CYSTOSCOPY N/A 8/23/2017    CYSTOSCOPY, RIGHT URETEROSCOPY, RIGHT STENT EXCHANGE retrograde right and right ureteral dilitation performed by Adele Perez MD at [facility] [address] [place] [address] 1/31/2018    CYSTOSCOPY EVACUATION OF CLOTS performed by Adele Perez MD at [place] [address] EKG 12-LEAD  10/14/2015         EKG 12-LEAD  10/17/2015         ENDOSCOPY, COLON, DIAGNOSTIC      EYE SURGERY  04/2018    JOINT REPLACEMENT      hip    MOUTH SURGERY      cheek; had benign lump removed    OTHER SURGICAL HISTORY Right 04/19/2017    Cystoscopy, Attempted Right Ureteral Access     ID CYSTOURETHROSCOPY,URETER CATHETER Right 1/31/2018    CYSTOSCOPY RIGHT RETROGRADE PYELOGRAM RIGHT URETEROSCOPY AND REPLACEMENT OF RIGHT URETERAL STENT performed by Adele Perez MD at [address] OFFICE/OUTPT [address] [facility] Left 6/11/2018    IM NAIL HENRY INSERTION, LEFT performed by Jignesh Duran MD at [place] [address]  03/2017    TOTAL HIP ARTHROPLASTY  3-9-12    Rt       Restrictions/Precautions:  General Precautions, Fall Risk, Weight Bearing   Left Lower Extremity Weight Bearing: Weight Bearing As Tolerated        Prior Level of Function:  ADL Assistance: Needs assistance  Homemaking Assistance:  (dependent on granddaughter)  Ambulation Assistance: Independent  Transfer Assistance: Independent  Additional Comments: Pt states in the last week or two she has been primarily using a walker, but prior to this she was using a cane. Pt reports relying heavily on granddaughter for most all ADL and IADL tasks. Pt with limited ROM and use of BUE however pt does report she is able to do more for herself, however it is easier and faster if her granddaughter helps therefore decreasing her ind with basic ADL tasks.     Subjective:     Subjective: Patient resting in bed upon arrival. Patient pleasant and agreeable to therapy. Patient very hesistant and fearful of falling. Pain:  Denies. Social/Functional:  Lives With: Family (vivienne Templeton )  Type of Home: House  Home Layout: One level  Home Access: Stairs to enter with rails  Entrance Stairs - Number of Steps: 4  Entrance Stairs - Rails: Both  Home Equipment: Cane, Rolling walker, Sock aid, Long-handled shoehorn (pt was using cane and went back to RW)     Objective:  Rolling to Right: Stand by assistance (HOB elevated, use of handrail for assistance, increased time required)  Supine to Sit: Minimal assistance (MINx1 with HOB elevated for upper body placement on EOB. Patient able to bring BLE off EOB but requires additional time)  Scooting: Stand by assistance (to advance hips to EOB and back in chair, additional time required)    Transfers  Sit to Stand: Contact guard assistance;Minimal Assistance (CGA from elevated toilet seat, MINx1 from w/c and bed due to lower height. Patient very fearful of falling with slowed velocity/hesitation towards all movements)  Stand to sit: Contact guard assistance (CGA to elevated toilet seat, w/c and bedside chair. Patient very hesistant but is able to complete all safety precaution prior to sitting)       Ambulation 1  Surface: level tile  Device: Rolling Walker  Assistance: Contact guard assistance  Quality of Gait: very slow velocity, decreased nicholas, very short step length bilaterally with left step length approximately half of right. severe kyphotic posture with continuous downward gaze. Anxious throughout mobility. Distance: 15x1, 5x1  Comments: verbal cues given for upright posture and larger step length on left. Patient able to perform equal step lengths ~5 steps before returning to shortened steps with left. Patient extremely fearful of falling during ambulation. Occasional posterior weight shift noted but patient able to correct 100% of time.      Balance  Comments: pt stood with BUE support at RW during pericare, no unsteadiness noted     Activity Tolerance:  Activity Tolerance: Patient Tolerated treatment well;Patient limited by fatigue;Patient limited by endurance  Activity Tolerance: patient very anxious and fearful of falling     Assessment: Body structures, Functions, Activity limitations: Decreased functional mobility , Decreased strength, Decreased safe awareness, Decreased endurance, Decreased balance  Assessment: Patient tolerated session well but is limited by fear of falling and anxiety with all mobility. Patient required additional time for all ambulation and transfers due to hesistancy of movement. Patient given verbal cues to improve LLE step length with patient able to perform for ~5 steps before returning to shortened step length. Patient with occasional posterior weight shifting during ambulation but was able to self correct during all episodes. Prognosis: Good  Discharge Recommendations: Continue to assess pending progress, Patient would benefit from continued therapy after discharge    Patient Education:  Patient Education: POC, transfers, gait, posture    Equipment Recommendations: Other: continue to monitor    Safety:  Type of devices:  All fall risk precautions in place, Call light within reach, Chair alarm in place, Gait belt, Patient at risk for falls, Left in chair    Plan:  Times per week: 6x   Times per day: Daily  Current Treatment Recommendations: Strengthening, Functional Mobility Training, Transfer Training, Balance Training, Endurance Training, Gait Training, Safety Education & Training, Patient/Caregiver Education & Training, Equipment Evaluation, Education, & procurement, Stair training    Goals:  Patient goals : Go home    Short term goals  Time Frame for Short term goals: 1 week  Short term goal 1: Patient will complete bed mobility at [facility] in order to get into/out of bed. - Continue  Short term goal 2: Patient will transfer sit <--> stand

## 2018-07-09 NOTE — PROGRESS NOTES
rehabilitation program (see above)    Plan:  Continue Rehab            Continue current medications            Spent 33 minutes today in patient management and care    Electronically signed by Ela Bal MD on 7/12/2018 at 8:52 AM

## 2018-07-09 NOTE — PROGRESS NOTES
[address] [address]  INPATIENT PHYSICAL THERAPY  DAILY NOTE  [facility] 8E - 8E-68/068-A    Time In: 7718  Time Out: 1124  Timed Code Treatment Minutes: 69 Minutes  Minutes: 69          Date: 2018  Patient Name: Tomasa Damon,  Gender:  female        MRN: [id number]  : 1930  (80 y.o.)  Referral Date : 06/15/18  Referring Practitioner: Chacha Napoles MD  Diagnosis: Comminuted fracture of hip, left, closed  Additional Pertinent Hx: Per ED note, pt is a 80 y.o. female who has a past medical history of osteoarthritis, osteopenia, and a right total hip arthroplasty. Patient presents to the Emergency Department on 6/10/18 for the evaluation of left hip pain. Patient reports that she was walking to the bathroom early this morning when she reports that she thinks fell asleep while walking back to bed. Found to have a left intertrochanteric hip fx. OR: s/p open treatment  with cephalomedullary nail on . Past Medical History:   Diagnosis Date    Allergic rhinitis     Anxiety     Bilateral hydronephrosis 8/10/2016    Bladder cancer ([facility] [address]     Dr. Velma Deshpande Blood circulation, collateral     CHF (congestive heart failure) (HCC)     Constipation     attributed to Ultram    Coronary artery disease involving native coronary artery of native heart s/p cath 10/14/15-LM-P, LAD MID 90%, ANUERYSMAL, % PROX, RCA MID 60% DISTLA 70%, EDP 20 , EF 30%-NEED REVASCULARIZATION 10/14/2015    GERD (gastroesophageal reflux disease)     History of blood transfusion     [place] (hard of hearing)     Hyperglycemia     Hyperlipidemia     Hypertension     Obesity (BMI 30-39. 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  2017     Past Surgical History:   Procedure Laterality Date    ABDOMEN SURGERY      ABDOMINAL EXPLORATION SURGERY      BLADDER SURGERY  10/2016    stent placed and removed a blood clot    CARDIAC SURGERY stents    COLONOSCOPY      CYSTOSCOPY  11-    TUR and fulg BT  superficial noninvasive bladder ca    CYSTOSCOPY N/A 8/23/2017    CYSTOSCOPY, RIGHT URETEROSCOPY, RIGHT STENT EXCHANGE retrograde right and right ureteral dilitation [facility] [address] [facility]    CYSTOSCOPY EVACUATION OF CLOTS performed by Atiya Lester MD at [address] [place] [address] EKG 12-LEAD  10/14/2015         EKG 12-LEAD  10/17/2015         ENDOSCOPY, COLON, DIAGNOSTIC      EYE SURGERY  04/2018    JOINT REPLACEMENT      hip    MOUTH SURGERY      cheek; had benign lump removed    OTHER SURGICAL HISTORY Right 04/19/2017    Cystoscopy, Attempted Right Ureteral Access     ME CYSTOURETHROSCOPY,URETER CATHETER Right 1/31/2018    CYSTOSCOPY RIGHT RETROGRADE PYELOGRAM RIGHT URETEROSCOPY AND REPLACEMENT OF RIGHT URETERAL STENT performed by Atiya Lester MD at [address] [facility] OFFICE/OUTPT [address] [facility] Left 6/11/2018    IM NAIL HENRY INSERTION, LEFT performed by Kelli Bonner MD at [address] [address]  03/2017    TOTAL HIP ARTHROPLASTY  3-9-12    Rt       Restrictions/Precautions:  General Precautions, Fall Risk, Weight Bearing           Left Lower Extremity Weight Bearing: Weight Bearing As Tolerated                Prior Level of Function:  ADL Assistance: Needs assistance  Homemaking Assistance:  (dependent on granddaughter)  Ambulation Assistance: Independent  Transfer Assistance: Independent  Additional Comments: Pt states in the last week or two she has been primarily using a walker, but prior to this she was using a cane. Pt reports relying heavily on granddaughter for most all ADL and IADL tasks. Pt with limited ROM and use of BUE however pt does report she is able to do more for herself, however it is easier and faster if her granddaughter helps therefore decreasing her ind with basic ADL tasks.     Subjective:  Response To Previous Treatment: Patient with no complaints from previous session. Family / Caregiver Present: No  Subjective: Patient up  in bedside chair upon arrival. Patient pleasant and agreeable to therapy. , requesting to go to the bathroom    Pain:   .  Pain Assessment  Pain Level: 0 (no # given)  Pain Type: Acute pain  Pain Location: Leg;Knee;Back;Neck  Pain Orientation: Left  Pain Descriptors: Aching;Sore;Constant  Pain Frequency: Continuous  Pain Onset: On-going  Clinical Progression: Not changed       Social/Functional:  Lives With: Family (vivienne Alexander)  Type of Home: House  Home Layout: One level  Home Access: Stairs to enter with rails  Entrance Stairs - Number of Steps: 4  Entrance Stairs - Rails: Both  Home Equipment: Cane, Rolling walker, Sock aid, Long-handled shoehorn (pt was using cane and went back to RW)     Objective:  Scooting: Stand by assistance (scoot to edge of sitting surfaces)    Transfers  Sit to Stand: Contact guard assistance (from b/s chair, ets and min a from w/c)  Stand to sit: Contact guard assistance       Ambulation 1  Surface: level tile  Device: Rolling Walker  Other Apparatus: Wheelchair follow  Assistance: Contact guard assistance  Quality of Gait: very, very slow velocity, decreased nicholas, very short step length bilaterally with left step length approximately half of right. severe kyphotic posture with continuous downward gaze. Distance: 13' x 1,25' x 1,20' x 1  Comments: verbal cues given for upright posture and larger step length on left. temporary correction only. pt. reports amb. much better at this time compared to earlier this am    Stairs  # Steps : 4  Stairs Height: 6\"  Rails: Bilateral  Assistance:  Moderate assistance (heavy mod a at buttocks and occasional lt. min a with right foot for ascending and  mod a at trunk and occasional min a with left le placement  for descending steps)  Comment: pt. was very happy to achieve steps this am however stated \"I still need alot of help\"              Exercises:  Exercises  Comments: 15 reps each b le seated heel-toe raises, laq, marching, hip abd/add, resistance t-band ham curls all for strengthening/endurance     Activity Tolerance:  Activity Tolerance: Patient Tolerated treatment well;Patient limited by endurance  Activity Tolerance: good motivation. pt. continues to take alot of time, does not like to feel rushed, continues to require alot of assist with activity    Assessment: Body structures, Functions, Activity limitations: Decreased functional mobility , Decreased strength, Decreased safe awareness, Decreased endurance, Decreased balance  Assessment: Patient tolerated session well but is limited by fear of falling and anxiety with all mobility. Patient required additional time for all ambulation and transfers due to hesistancy of movement. Patient given verbal cues to improve LLE step length with patient able to perform for ~5 steps before returning to shortened step length. Patient with occasional posterior weight shifting during ambulation but was able to self correct during all episodes. Prognosis: Good  Discharge Recommendations: 24 hour supervision or assist, Home with Home health PT    Patient Education:  Patient Education: POC, transfers, gait, posture, 4 steps with 2 rails    Equipment Recommendations: Other: continue to monitor    Safety:  Type of devices:  All fall risk precautions in place, Patient at risk for falls, Gait belt, Call light within reach, Left in chair, Chair alarm in place (nurse present with rubbing gel to left knee)    Plan:  Times per week: 6x   Times per day: Daily  Current Treatment Recommendations: Strengthening, Functional Mobility Training, Transfer Training, Balance Training, Endurance Training, Gait Training, Safety Education & Training, Patient/Caregiver Education & Training, Equipment Evaluation, Education, & procurement, Stair training    Goals:  Patient goals : Go home    Short term goals  Time Frame for Short term goals: 1 week  Short term goal 1: Patient will complete bed mobility at [facility] in order to get into/out of bed. - Continue  Short term goal 2: Patient will transfer sit <--> stand with CGA in order to get up to ambulate. - Continue  Short term goal 3: Patient will ambulate 13' with RW and CGA in order to get to the bathroom. - Discontinue  (refer to LTG)  Short term goal 4: Patient will negotiate 4 steps with B hand rails and min A in order to get into/out of her home. - Continue    Long term goals  Time Frame for Long term goals : 3 weeks  Long term goal 1: Patient will complete bed mobility at mod I in order to get into/out of bed. Long term goal 2: Patient will transfer sit <--> stand with mod I in order to get up to ambulate. Long term goal 3: Patient will ambulate 48' with RW and SBA in order to get around safely in her home. Long term goal 4: Patient will negotiate 4 steps with B hand rails and CGA in order to get into/out of her home.

## 2018-07-09 NOTE — PROGRESS NOTES
[address]  INPATIENT OCCUPATIONAL THERAPY  [facility] 8E  DAILY NOTE    Time:  Time In: 9477  Time Out: 0288  Timed Code Treatment Minutes: 61 Minutes  Minutes: 59          Date: 2018  Patient Name: Nirmal Cage,   Gender: female      Room: -68/068-A  MRN: [id number]  : 1930  (80 y.o.)  Referring Practitioner: ordering: Ivan Sterling MD attending: Dr. German Ford  Diagnosis: Comminuted fracture of Left hip  Additional Pertinent Hx: Per ED note, pt is a 80 y.o. female who has a past medical history of osteoarthritis, osteopenia, and a right total hip arthroplasty. Patient presents to the Emergency Department on 6/10/18 for the evaluation of left hip pain. Patient reports that she was walking to the bathroom early this morning when she reports that she thinks fell asleep while walking back to bed. Found to have a left intertrochanteric hip fx. OR: s/p open treatment  with cephalomedullary nail on . To TCU on 6/15/18    Past Medical History:   Diagnosis Date    Allergic rhinitis     Anxiety     Bilateral hydronephrosis 8/10/2016    Bladder cancer ([facility] [address]     Dr. Reis Blocker Blood circulation, collateral     CHF (congestive heart failure) (HCC)     Constipation     attributed to Ultram    Coronary artery disease involving native coronary artery of native heart s/p cath 10/14/15-LM-P, LAD MID 90%, ANUERYSMAL, % PROX, RCA MID 60% DISTLA 70%, EDP 20 , EF 30%-NEED REVASCULARIZATION 10/14/2015    GERD (gastroesophageal reflux disease)     History of blood transfusion     [place] (hard of hearing)     Hyperglycemia     Hyperlipidemia     Hypertension     Obesity (BMI 30-39. 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  2017     Past Surgical History:   Procedure Laterality Date    ABDOMEN SURGERY      ABDOMINAL EXPLORATION SURGERY      BLADDER SURGERY  10/2016    stent placed and removed a blood noted. Max extended time provided       Activity Tolerance:  Activity Tolerance: Patient limited by fatigue    Assessment:     Performance deficits / Impairments: Decreased functional mobility , Decreased ADL status, Decreased strength, Decreased endurance, Decreased safe awareness, Decreased balance, Decreased ROM  Prognosis: Guarded, Fair  Discharge Recommendations: Patient would benefit from continued therapy after discharge, 24 hour supervision or assist    Patient Education:  Patient Education: safety with transfers and mobility, ADL strategies using LHAE  Barriers to Learning: anxiety    Equipment Recommendations:  Equipment Needed: No  Other: Patient denies any need for equipment at discharge. Safety:  Safety Devices in place: Yes  Type of devices: Call light within reach, Chair alarm in place, Left in chair, Gait belt    Plan:  Times per week: 6x  Current Treatment Recommendations: Strengthening, Balance Training, Functional Mobility Training, Endurance Training, Safety Education & Training, Patient/Caregiver Education & Training, Equipment Evaluation, Education, & procurement, Self-Care / ADL, ROM, Pain Management, Positioning, Home Management Training    Goals:  Patient goals : To be more independent    Short term goals  Time Frame for Short term goals:  One week  Short term goal 1: Pt will complete LB ADL tasks using LHAE prn with no greater than mod A for inc ind with self cares  Short term goal 2: Pt will tolerate dynamic standing task with min A and at least 1 UE release for > 3 minutes to improve indep with sink side grooming  Short term goal 3: Pt will complete functional transfers with consistant min A and no > 2 vc for safety technique including to/from raised toilet  Short term goal 4: Pt will complete BUE AAROM/AROM for increasing functional ROM required for BADL task completion  Short term goal 5: Pt will complete functional mobility to/from bathroom with SBA and RW wtih no > 2 vc for safety

## 2018-07-09 NOTE — PROGRESS NOTES
[facility]    Renal inpatient Progress Note  7/9/2018 5:36 PM      Pt Name:   Emilia Loss:   9/9/1930  Attending:   Corey Simon MD     Chief Complaint:   Olive Ruiz is a 80 y.o. female being followed by nephrology for BEREKET / CKD     Interval History : patient seen and examined by me. feels better. No cp or SOB. Overall stable. No issues     Scheduled Medications :   multivitamin  1 tablet Oral Daily    bumetanide  0.5 mg Oral Q MWF    amLODIPine  5 mg Oral Daily    traMADol  50 mg Oral TID    ALPRAZolam  0.5 mg Oral BID    atorvastatin  20 mg Oral Nightly    cetirizine  10 mg Oral Daily    escitalopram  10 mg Oral Daily    ferrous sulfate  325 mg Oral BID WC    fluticasone  2 spray Nasal BID    isosorbide mononitrate  60 mg Oral Daily    lidocaine  1 patch Transdermal Daily    metoprolol succinate  25 mg Oral Daily    pantoprazole  40 mg Oral BID AC    rOPINIRole  4 mg Oral BID    zafirlukast  20 mg Oral BID          Vitals :  BP (!) 119/51   Pulse 79   Temp 97.5 °F (36.4 °C) (Oral)   Resp 20   Ht 4' 9\" (1.448 m)   Wt 138 lb 5 oz (62.7 kg)   LMP  (Exact Date)   SpO2 99%   BMI 29.93 kg/m²     24HR INTAKE/OUTPUT:      Intake/Output Summary (Last 24 hours) at 07/09/18 1736  Last data filed at 07/08/18 1800   Gross per 24 hour   Intake              120 ml   Output                0 ml   Net              120 ml     Last 3 weights  Wt Readings from Last 3 Encounters:   07/09/18 138 lb 5 oz (62.7 kg)   06/12/18 132 lb 4.4 oz (60 kg)   05/21/18 141 lb (64 kg)        Physical Exam :  General Appearance:  Well developed. No distress  Mouth/Throat:  Oral mucosa moist  Neck:  Supple, no JVD  Lungs:  Breath sounds: clear, diminised breath sounds  Heart[de-identified]  S1,S2 heard  Abdomen:  Soft, non - tender  Musculoskeletal:  Edema - none     Last 3 CBC  No results for input(s): WBC, RBC, HGB, HCT, PLT in the last 72 hours.   Last 3 CMP  Recent Labs      07/09/18 1210   NA  139   K  5.7*   CL  100   CO2  28   BUN  45*   CREATININE  1.8*   CALCIUM  9.5        Assessment / Plan   Renal - BEREKET mostly prerenal from poor oral intake and use of diuretics  · Creat fluctuating 1.8. Encourage oral intake of fluids  · Stop bumex. BMP in AM           Hyperkalemia - 2/2 BEREKET and diet. - she is on ensure has 15 meq of potassium in each bottle . - also she is having tangerines and mashed potatoes  - at this time switch to nepro. Kayexalate 30 grams  - leave her on regular diet as her appetite is poor and I dont think she is eating and drinking enough anyway    Essential hypertension- stable. Follow for now    Fracture left hip - S/P ORIF 6/11    meds reviewed and D/W patient , family and RN    Dr. Oanh Ely M,D.  [facility].

## 2018-07-10 PROBLEM — R77.8 ELEVATED TROPONIN: Status: RESOLVED | Noted: 2018-01-01 | Resolved: 2018-01-01

## 2018-07-10 NOTE — PROGRESS NOTES
[facility]    Renal inpatient Progress Note  7/10/2018 8:11 AM      Pt Name:   Kaitlin Arevalo:   9/9/1930  Attending:   Camila Leahy MD     Chief Complaint:   Kassy Martin is a 80 y.o. female being followed by nephrology for BEREKET / CKD     Interval History : patient seen and examined by me. feels better. No cp or SOB. Sleeping comfortably  Overall stable. No issues     Scheduled Medications :   multivitamin  1 tablet Oral Daily    amLODIPine  5 mg Oral Daily    traMADol  50 mg Oral TID    ALPRAZolam  0.5 mg Oral BID    atorvastatin  20 mg Oral Nightly    cetirizine  10 mg Oral Daily    escitalopram  10 mg Oral Daily    ferrous sulfate  325 mg Oral BID WC    fluticasone  2 spray Nasal BID    isosorbide mononitrate  60 mg Oral Daily    lidocaine  1 patch Transdermal Daily    metoprolol succinate  25 mg Oral Daily    pantoprazole  40 mg Oral BID AC    rOPINIRole  4 mg Oral BID    zafirlukast  20 mg Oral BID          Vitals :  /60   Pulse 71   Temp 97.8 °F (36.6 °C)   Resp 17   Ht 4' 9\" (1.448 m)   Wt 138 lb 5 oz (62.7 kg)   LMP  (Exact Date)   SpO2 97%   BMI 29.93 kg/m²     24HR INTAKE/OUTPUT:      Intake/Output Summary (Last 24 hours) at 07/10/18 0811  Last data filed at 07/10/18 0706   Gross per 24 hour   Intake              340 ml   Output             2175 ml   Net            -1835 ml     Last 3 weights  Wt Readings from Last 3 Encounters:   07/09/18 138 lb 5 oz (62.7 kg)   06/12/18 132 lb 4.4 oz (60 kg)   05/21/18 141 lb (64 kg)        Physical Exam :  General Appearance:  Well developed. No distress  Mouth/Throat:  Oral mucosa moist  Neck:  Supple, no JVD  Lungs:  Breath sounds: clear, diminised breath sounds  Heart[de-identified]  S1,S2 heard  Abdomen:  Soft, non - tender  Musculoskeletal:  Edema - none     Last 3 CBC  No results for input(s): WBC, RBC, HGB, HCT, PLT in the last 72 hours.   Last 3 CMP  Recent Labs      07/09/18   1210  07/10/18   0556   NA

## 2018-07-10 NOTE — PROGRESS NOTES
spoke with patient's daughter and [place] Session, about the tentative discharge planned for Saturday, 7/14/18 (patient's 30th day on TCU), pending Ins approval. Mrs Chikis Lilly said she has arranged for her mother to have 24 hour care/supervision in the home. We also discussed skilled home care services of PT/OT/Nursing/Aide, and she asked for the referral to be made to [facility]. She was made aware of [employer]'s request for updated chart information for review on 7/10. SW will ask for additional skilled days for TCU with planned discharge for 7/14.

## 2018-07-10 NOTE — TELEPHONE ENCOUNTER
This would be difficult. The reason is that we have a locum currently and if they consult us to do the H&P it will be Dr. Mando Mike. An NP cannot initiate a consult on the unit Karen Gruber is in unfortunately. Are they able to wheel her over to be seen in the office? They have done that before for other patients.

## 2018-07-10 NOTE — PROGRESS NOTES
Subjective: Pt seated in bedside chair finishing breakfast upon arrival. Agreeable to OT session  Overall Orientation Status: Within Functional Limits         Pain:  Pain Assessment  Patient Currently in Pain: Yes  Pain Assessment: 0-10  Pain Level: 5  Pain Location: Back; Hip  Pain Orientation: Left       Objective  Overall Cognitive Status: Exceptions      ADL  Grooming: Minimal assistance (hair care seated in chair d/t decreased ROM in B shoulders)  UE Dressing: Minimal assistance (to don robe)  LE Dressing: Maximum assistance (to don B shoes seated in bedside chair)  Toileting: Moderate assistance (for hygiene; Tammie for clothing management- extended time provided with use of bathroom)       Transfers  Sit to stand: Minimal assistance (from bedside chair, RTS)  Stand to sit: Contact guard assistance (to bedside chair, RTS)  Transfer Comments: Required minimal cueing for proper hand placement  Toilet Transfers  Equipment Used: Raised toilet seat with rails  Toilet Transfer: Minimal assistance (Tammie from RTS; CGA to RTS)    Balance  Sitting Balance: Supervision  Standing Balance: Minimal assistance (Tammie-close SBA)     Time: ~6 mintues 30 seconds  Activity: Completed dynamic standing balance/reaching task with 1 UE release from walker requiring CGA-close SBA for balance with no LOB noted. Required required lengthy seated rest break after task.  Completed to increase dynamic standing balance and activity tolerance required for ADLs     Functional Mobility  Functional - Mobility Device: Rolling Walker  Activity: To/from bathroom  Assist Level: Contact guard assistance  Functional Mobility Comments: Very slow pace       Activity Tolerance:  Activity Tolerance: Patient limited by fatigue    Assessment:     Performance deficits / Impairments: Decreased functional mobility , Decreased ADL status, Decreased strength, Decreased endurance, Decreased safe awareness, Decreased balance, Decreased ROM  Prognosis: Guarded, to incorporate EC strategies to incresae indep with ADL routine at home

## 2018-07-10 NOTE — PROGRESS NOTES
[facility]  INPATIENT PHYSICAL THERAPY  DAILY NOTE  [facility] TCU 8E - 8E-68/068-A    Time In: 1140  Time Out: 1238  Timed Code Treatment Minutes: 62 Minutes  Minutes: 58          Date: 7/10/2018  Patient Name: Sharif Burton,  Gender:  female        MRN: [id number]  : 1930  (80 y.o.)  Referral Date : 06/15/18  Referring Practitioner: Amanda Mendez MD  Diagnosis: Comminuted fracture of hip, left, closed  Additional Pertinent Hx: Per ED note, pt is a 80 y.o. female who has a past medical history of osteoarthritis, osteopenia, and a right total hip arthroplasty. Patient presents to the Emergency Department on 6/10/18 for the evaluation of left hip pain. Patient reports that she was walking to the bathroom early this morning when she reports that she thinks fell asleep while walking back to bed. Found to have a left intertrochanteric hip fx. OR: s/p open treatment  with cephalomedullary nail on . Past Medical History:   Diagnosis Date    Allergic rhinitis     Anxiety     Bilateral hydronephrosis 8/10/2016    Bladder cancer ([facility] [address]     Dr. Oralia Rosado Blood circulation, collateral     CHF (congestive heart failure) (HCC)     Constipation     attributed to Ultram    Coronary artery disease involving native coronary artery of native heart s/p cath 10/14/15-LM-P, LAD MID 90%, ANUERYSMAL, % PROX, RCA MID 60% DISTLA 70%, EDP 20 , EF 30%-NEED REVASCULARIZATION 10/14/2015    GERD (gastroesophageal reflux disease)     History of blood transfusion     [place] (hard of hearing)     Hyperglycemia     Hyperlipidemia     Hypertension     Obesity (BMI 30-39. 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  2017     Past Surgical History:   Procedure Laterality Date    ABDOMEN SURGERY      ABDOMINAL EXPLORATION SURGERY      BLADDER SURGERY  10/2016    stent placed and removed a blood clot    CARDIAC SURGERY stents    COLONOSCOPY      CYSTOSCOPY  11-    TUR and fulg BT  superficial noninvasive bladder ca    CYSTOSCOPY N/A 8/23/2017    CYSTOSCOPY, RIGHT URETEROSCOPY, RIGHT STENT EXCHANGE retrograde right and right [facility]teral [facility] [address] [facility]    CYSTOSCOPY EVACUATION OF CLOTS performed by Lv Brown MD at [place] [address] EKG 12-LEAD  10/14/2015         EKG 12-LEAD  10/17/2015         ENDOSCOPY, COLON, DIAGNOSTIC      EYE SURGERY  04/2018    JOINT REPLACEMENT      hip    MOUTH SURGERY      cheek; had benign lump removed    OTHER SURGICAL HISTORY Right 04/19/2017    Cystoscopy, Attempted Right Ureteral Access     [place] CYSTOURETHROSCOPY,URETER CATHETER Right 1/31/2018    CYSTOSCOPY RIGHT RETROGRADE PYELOGRAM RIGHT URETEROSCOPY AND REPLACEMENT OF RIGHT URETERAL STENT performed by Lv Brown MD at [address] [place] [address] OFFICE/OUTPT [address] Left 6/11/2018    IM NAIL HENRY INSERTION, LEFT performed by Qamar Wong MD at [place] [address]  03/2017    TOTAL HIP ARTHROPLASTY  3-9-12    Rt       Restrictions/Precautions:  General Precautions, Fall Risk, Weight Bearing           Left Lower Extremity Weight Bearing: Weight Bearing As Tolerated     Prior Level of Function:  ADL Assistance: Needs assistance  Homemaking Assistance:  (dependent on granddaughter)  Ambulation Assistance: Independent  Transfer Assistance: Independent  Additional Comments: Pt states in the last week or two she has been primarily using a walker, but prior to this she was using a cane. Pt reports relying heavily on granddaughter for most all ADL and IADL tasks. Pt with limited ROM and use of BUE however pt does report she is able to do more for herself, however it is easier and faster if her granddaughter helps therefore decreasing her ind with basic ADL tasks.     Subjective:  Response To Previous Treatment: Patient with no complaints from previous session. Family / Caregiver Present: No  Subjective: Patient up  in bedside chair upon arrival. Patient pleasant and agreeable to therapy. requesting to go to the bathroom 3 different times throughout session, reports having loose bm's due to earlier laxative(MOM) given    Pain: NO PAIN REPORTED. PT. FOCUSED MORE ON BOWELS   . Social/Functional:  Lives With: Family (dtr Bettye Ganser)  Type of Home: House  Home Layout: One level  Home Access: Stairs to enter with rails  Entrance Stairs - Number of Steps: 4  Entrance Stairs - Rails: Both  Home Equipment: Cane, Rolling walker, Sock aid, Long-handled shoehorn (pt was using cane and went back to RW)     Objective:  Scooting: Supervision    Transfers  Sit to Stand: Contact guard assistance;Minimal Assistance; Moderate Assistance (cga from b/s chair,  min a from RTS and min-> mod a from w/c)  Stand to sit: Contact guard assistance (toilet transfer x 3 times during session with cga stand to sit, cga-> min a  sit to stand from RTS assist needed with hygiene following bm's and with clothing management)       Ambulation 1  Surface: level tile  Device: [address]: Contact guard assistance  Quality of Gait: very, very slow velocity, decreased nicholas, very short step length bilaterally severe kyphotic posture with continuous downward gaze. Distance: 15' x 4,  20' x 1,  Comments: verbal cues given for upright posture and larger step length on left. temporary correction only. Stairs  # Steps : 4  Stairs Height: 6\"  Rails: Bilateral  Assistance: Moderate assistance (heavy mod a at buttocks to help pt ascend steps, pt. was able to raise her feet high enough today to clear the steps with ascending and was able to lower b le's down the steps without assist today.  pt. demonstrated an increase in posterior leaning today)  Comment:  \"I still need more practice \"    Exercises:  Exercises  Comments: 15 reps

## 2018-07-11 NOTE — PLAN OF CARE
- Risk of  Goal: Absence of pressure ulcer  Outcome: Ongoing  No new pressure areas noted at this time. Ambulation and repositioning encouraged t/o shift. Waffle cushion in place to chair, heels elevated while in bad/chair. Q2hr turn/repostition.

## 2018-07-11 NOTE — PROGRESS NOTES
Needs: Intake Improving    Nutrition Risk Level: Moderate    Nutrition Interventions:   Continue current diet, Continue current ONS  Continued Inpatient Monitoring, Education Not Indicated    Nutrition Evaluation:   · Evaluation: Progressing toward goals   · Goals: Pt. will consume 75% or more of meals to promote wound healing during LOS. · Monitoring: Meal Intake, Supplement Intake, Weight    See Adult Nutrition Doc Flowsheet for more detail.      Electronically signed by Osiris Carter RD LD [address]  on 7/11/18 at 3:09 PM    Contact Number: [phone]

## 2018-07-11 NOTE — PROGRESS NOTES
 CARDIAC SURGERY      stents    COLONOSCOPY      CYSTOSCOPY  11-    TUR and fulg BT  superficial noninvasive bladder ca    CYSTOSCOPY N/A 8/23/2017    CYSTOSCOPY, RIGHT URETEROSCOPY, RIGHT STENT EXCHANGE retrograde right and right ureteral dilitation performed by Corbin Sandoval MD at [address] 1/31/2018    CYSTOSCOPY EVACUATION OF CLOTS performed by Corbin Sandoval MD at [place] [address] EKG 12-LEAD  10/14/2015         EKG 12-LEAD  10/17/2015         ENDOSCOPY, COLON, DIAGNOSTIC      EYE SURGERY  04/2018    JOINT REPLACEMENT      hip    MOUTH SURGERY      cheek; had benign lump removed    OTHER SURGICAL HISTORY Right 04/19/2017    Cystoscopy, Attempted Right Ureteral Access     [place] CYSTOURETHROSCOPY,URETER CATHETER Right 1/31/2018    CYSTOSCOPY RIGHT RETROGRADE PYELOGRAM RIGHT URETEROSCOPY AND REPLACEMENT OF RIGHT URETERAL STENT performed by Corbin Sandoval MD at [address] [place] [address] OFFICE/OUTPT [address] Left 6/11/2018    IM NAIL HENRY INSERTION, LEFT performed by Golden Fernandez MD at [place] [address]  03/2017    TOTAL HIP ARTHROPLASTY  3-9-12    Rt       Restrictions/Precautions:  General Precautions, Fall Risk, Weight Bearing   Left Lower Extremity Weight Bearing: Weight Bearing As Tolerated      Prior Level of Function:  ADL Assistance: Needs assistance  Homemaking Assistance:  (dependent on granddaughter)  Ambulation Assistance: Independent  Transfer Assistance: Independent  Additional Comments: Pt states in the last week or two she has been primarily using a walker, but prior to this she was using a cane. Pt reports relying heavily on granddaughter for most all ADL and IADL tasks. Pt with limited ROM and use of BUE however pt does report she is able to do more for herself, however it is easier and faster if her granddaughter helps therefore decreasing her ind with basic ADL tasks.     Subjective   Subjective: Up in bathroom upon arrival, agreeable to OT session, slow moving    Overall Orientation Status: Within Functional Limits  Pain:  Pain Assessment  Patient Currently in Pain: Yes  Pain Level: 5  Pain Type: Chronic pain  Pain Location: Neck; Shoulder  Pain Orientation: Right;Left     Objective  Overall Cognitive Status: Exceptions  Cognition Comment: cueing for redirection to task, fear of falling       ADL  Feeding: Setup (A for opening containers)  Grooming: Minimal assistance (SBA when standing to brush teeth and Min A for thoroughness of picking hair)  Toileting: Moderate assistance (A for thoroughness of wiping buttocks, A for pulling up back of depends, extra time required for task)     Transfers  Sit to stand: Minimal assistance  Stand to sit: Contact guard assistance  Toilet Transfers  Toilet - Technique: Ambulating  Equipment Used: Raised toilet seat with rails  Toilet Transfer: Minimal assistance      Patient stood x 11 minutes with SBA for clothing management and grooming sinkside  Functional Mobility  Functional - Mobility Device: Rolling Walker  Assist Level: Contact guard assistance  Functional Mobility Comments: from bathroom   Comment: Pt completed BUE AROM exercises with elbow flexion/extension, forearm pronation/supination, and wrist flexion/extension x10 reps x1 set and BUE AAROM exercises with shoulder flexion and shoulder horizontal abduction/adduction x10 reps x1 set. Required rest breaks after each exercise.  Completed to help increase activity tolerance required for ADL      Activity Tolerance:  Activity Tolerance: Patient limited by fatigue;Patient Tolerated treatment well    Assessment:     Performance deficits / Impairments: Decreased functional mobility , Decreased ADL status, Decreased strength, Decreased endurance, Decreased safe awareness, Decreased balance, Decreased ROM  Prognosis: Guarded, Fair  Discharge Recommendations: Patient would benefit from continued therapy after discharge, 24

## 2018-07-11 NOTE — PLAN OF CARE
Problem: Nutrition  Goal: Optimal nutrition therapy  Outcome: Ongoing  Nutrition Problem: Increased nutrient needs  Intervention: Food and/or Nutrient Delivery: Continue current diet, Continue current ONS  Nutritional Goals: Pt. will consume 75% or more of meals to promote wound healing during LOS.

## 2018-07-11 NOTE — PROGRESS NOTES
stents    COLONOSCOPY      CYSTOSCOPY  11-    TUR and fulg BT  superficial noninvasive bladder ca    CYSTOSCOPY N/A 8/23/2017    CYSTOSCOPY, RIGHT URETEROSCOPY, RIGHT STENT EXCHANGE retrograde right and right ureteral dilitation [facility] [address] [facility] 1/31/[facility]    CYSTOSCOPY EVACUATION OF CLOTS performed by Mahnaz Monsalve MD at [place] [address] EKG 12-LEAD  10/14/2015         EKG 12-LEAD  10/17/2015         ENDOSCOPY, COLON, DIAGNOSTIC      EYE SURGERY  04/2018    JOINT REPLACEMENT      hip    MOUTH SURGERY      cheek; had benign lump removed    OTHER SURGICAL HISTORY Right 04/19/2017    Cystoscopy, Attempted Right Ureteral Access     [place] CYSTOURETHROSCOPY,URETER CATHETER Right 1/31/2018    CYSTOSCOPY RIGHT RETROGRADE PYELOGRAM RIGHT URETEROSCOPY AND REPLACEMENT OF RIGHT URETERAL STENT performed by Mahnaz Monsalve MD at [address] [facility] OFFICE/OUTPT [address] [facility] Left 6/11/2018    IM NAIL HENRY INSERTION, LEFT performed by Irais Jay MD at [place] [address]  03/2017    TOTAL HIP ARTHROPLASTY  3-9-12    Rt       Restrictions/Precautions:  General Precautions, Fall Risk, Weight Bearing           Left Lower Extremity Weight Bearing: Weight Bearing As Tolerated                Prior Level of Function:  ADL Assistance: Needs assistance  Homemaking Assistance:  (dependent on granddaughter)  Ambulation Assistance: Independent  Transfer Assistance: Independent  Additional Comments: Pt states in the last week or two she has been primarily using a walker, but prior to this she was using a cane. Pt reports relying heavily on granddaughter for most all ADL and IADL tasks. Pt with limited ROM and use of BUE however pt does report she is able to do more for herself, however it is easier and faster if her granddaughter helps therefore decreasing her ind with basic ADL tasks.     Subjective:     Subjective: Patient up  in bedside chair upon arrival. Patient pleasant and agreeable to therapy. requesting to go to the bathroom and would like her nurse to apply a pain patch to her left knee area and lotion to her back. nurse informed-> patch to knee and lotion to back completed while pt. was sitting on toilet by nurse Ryanne Laguerre)    Pain:   .  Pain Assessment  Pain Level: 5  Pain Type: Chronic pain  Pain Location: Knee  Pain Orientation: Left  Pain Descriptors: Aching; Sore  Pain Frequency: Intermittent  Pain Onset: On-going  Clinical Progression: Not changed       Social/Functional:  Lives With: Family (dtr Rosa Kirkland)  Type of Home: House  Home Layout: One level  Home Access: Stairs to enter with rails  Entrance Stairs - Number of Steps: 4  Entrance Stairs - Rails: Both  Home Equipment: Cane, Rolling walker, Sock aid, Long-handled shoehorn (pt was using cane and went back to RW)     Objective:  Scooting: Modified independent (scoot to edge of sitting surfaces)    Transfers  Sit to Stand: Contact guard assistance;Minimal Assistance (cga->occasional min a from lower surfaces)  Stand to sit: Contact guard assistance;Stand by assistance (takes alot of time)  Car Transfer: Minimal Assistance (min a with b le's into car and min a with left LE getting out of car, otherwise cga-> sba, pt. very slow moving)       Ambulation 1  Surface: level tile;uneven;carpet  Device: Rolling Walker  Assistance: Contact guard assistance;Stand by assistance (cga-> close sba)  Quality of Gait: very, very slow velocity, decreased nicholas, very short step length bilaterally severe kyphotic posture with continuous downward gaze. vc to pick feet up more upon advancement on low carpet surfaces, good follow-thru  Distance: 15' x 1,5' x 2, 10' x 1,28' x 1  Comments: verbal cues given for upright posture and larger step length on left. temporary correction only. Stairs  # Steps : 4  Stairs Height: 6\"  Rails: Bilateral  Assistance: Moderate assistance (lt. mod at buttocks to assist pt. with ascending steps. pt. was able to clear each step with raising of feet without assist.    min a for controlled descent onto each step, good foot placement upon lowering each foot onto steps this am.)  Comment: \"That felt alot better today\"     Exercises:  Exercises  Comments: no ther. ex due to easy street activity     Activity Tolerance:  Activity Tolerance: Patient Tolerated treatment well  Activity Tolerance: good motivation. pt. continues to take alot of time, does not like to feel rushed,  good progress shown this am    Assessment:  Discharge Recommendations: 24 hour supervision or assist, Patient would benefit from continued therapy after discharge    Patient Education:  Patient Education: POC, transfers, gait, posture,( 7-11-18: 4 steps with 2 rails, car transfer, amb. carpet surface with rolling walker)    Equipment Recommendations: Other: continue to monitor    Safety:  Type of devices: All fall risk precautions in place, Call light within reach, Left in chair, Chair alarm in place, Gait belt, Patient at risk for falls (lunch present)    Plan:  Times per week: 6x   Times per day: Daily  Current Treatment Recommendations: Strengthening, Functional Mobility Training, Transfer Training, Balance Training, Endurance Training, Gait Training, Safety Education & Training, Patient/Caregiver Education & Training, Equipment Evaluation, Education, & procurement, Stair training    Goals:  Patient goals : Go home    Short term goals  Time Frame for Short term goals: 1 week  Short term goal 1: Patient will complete bed mobility at [facility] in order to get into/out of bed. - Continue  Short term goal 2: Patient will transfer sit <--> stand with CGA in order to get up to ambulate. - Continue  Short term goal 3: Patient will ambulate 13' with RW and CGA in order to get to the bathroom.  - Discontinue  (refer to LTG)  Short term goal 4: Patient will negotiate 4 steps with B hand rails and min A in

## 2018-07-12 NOTE — PROGRESS NOTES
[facility]  INPATIENT PHYSICAL THERAPY  DAILY NOTE  [facility] TCU 8E - 8E-68/068-A    Time In: 1000  Time Out: 1114  Timed Code Treatment Minutes: 74 Minutes  Minutes: 74          Date: 2018  Patient Name: Yoseph Santana,  Gender:  female        MRN: [id number]  : 1930  (80 y.o.)  Referral Date : 06/15/18  Referring Practitioner: Claudene Lobo, MD  Diagnosis: Comminuted fracture of hip, left, closed  Additional Pertinent Hx: Per ED note, pt is a 80 y.o. female who has a past medical history of osteoarthritis, osteopenia, and a right total hip arthroplasty. Patient presents to the Emergency Department on 6/10/18 for the evaluation of left hip pain. Patient reports that she was walking to the bathroom early this morning when she reports that she thinks fell asleep while walking back to bed. Found to have a left intertrochanteric hip fx. OR: s/p open treatment  with cephalomedullary nail on . Past Medical History:   Diagnosis Date    Allergic rhinitis     Anxiety     Bilateral hydronephrosis 8/10/2016    Bladder cancer ([facility] [address]     Dr. Ruthann Major Blood circulation, collateral     CHF (congestive heart failure) (HCC)     Constipation     attributed to Ultram    Coronary artery disease involving native coronary artery of native heart s/p cath 10/14/15-LM-P, LAD MID 90%, ANUERYSMAL, % PROX, RCA MID 60% DISTLA 70%, EDP 20 , EF 30%-NEED REVASCULARIZATION 10/14/2015    GERD (gastroesophageal reflux disease)     History of blood transfusion     [place] (hard of hearing[place]     Hyperglycemia     Hyperlipidemia     Hypertension     Obesity (BMI 30-39. 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  2017     Past Surgical History:   Procedure Laterality Date    ABDOMEN SURGERY      ABDOMINAL EXPLORATION SURGERY      BLADDER SURGERY  10/2016    stent placed and removed a blood clot    CARDIAC SURGERY Previous Treatment: Patient with no complaints from previous session. Family / Caregiver Present: No  Subjective: Patient up  in bedside chair upon arrival. Patient pleasant and agreeable to therapy. requesting to go to the bathroom . lengthy time needed with toileting tasks    Pain:   .  Pain Assessment  Pain Level: 4  Pain Type: Chronic pain  Pain Location: Knee  Pain Orientation: Left  Pain Descriptors: Aching; Sore  Pain Frequency: Intermittent  Pain Onset: On-going  Clinical Progression: Not changed       Social/Functional:  Lives With: Family (dtstephania Tuttle)  Type of Home: House  Home Layout: One level  Home Access: Stairs to enter with rails  Entrance Stairs - Number of Steps: 4  Entrance Stairs - Rails: Both  Home Equipment: Cane, Rolling walker, Sock aid, Long-handled shoehorn (pt was using cane and went back to RW)     Objective:  Scooting: Supervision (scoot to edge of sitting surfaces)    Transfers  Sit to Stand: Contact guard assistance;Minimal Assistance (fluctuates from cga-> min a , occasional posertior lean upon initial standing)  Stand to sit: Contact guard assistance;Stand by assistance  Toilet transfer with rolling walker, ets with cga min-> mod a with clothing management x 2 during session       Ambulation 1  Surface: level tile  Device: Rolling Walker  Other Apparatus: Wheelchair follow  Assistance: Stand by assistance  Quality of Gait: SBA-> occasional LT. CGA. very slow pace, decreased nicholas, very short step length bilaterally severe kyphotic posture with continuous downward gaze. Distance: 15' x 1,, 30' x 1,20' x 1,80' x 1  Comments: verbal cues given for upright posture and larger step length on left. temporary correction only.          Exercises:  Exercises  Comments: 15 reps each b le seated heel-toe raises, laq, marching, hip abd/add, resistance yellow t-band ham curls all for strengthening/endurance       Activity Tolerance:  Activity Tolerance: Patient Tolerated treatment well  Activity Tolerance: good motivation. pt. continues to take alot of time, does not like to feel rushed,  good progress shown this past week    Assessment:  Discharge Recommendations: 24 hour supervision or assist, Patient would benefit from continued therapy after discharge    Patient Education:  Patient Education: POC, transfers, gait, posture,( 7-11-18: 4 steps with 2 rails, car transfer, amb. carpet surface with rolling walker)    Equipment Recommendations: Other: continue to monitor    Safety:  Type of devices: All fall risk precautions in place, Patient at risk for falls, Call light within reach, Left in chair, Gait belt (pt. seated in w/c with nursing tech present to assist pt. with a bath)    Plan:  Times per week: 6x   Times per day: Daily  Current Treatment Recommendations: Strengthening, Functional Mobility Training, Transfer Training, Balance Training, Endurance Training, Gait Training, Safety Education & Training, Patient/Caregiver Education & Training, Equipment Evaluation, Education, & procurement, Stair training    Goals:  Patient goals : Go home    Short term goals  Time Frame for Short term goals: 1 week  Short term goal 1: Patient will [facility] in order to get into/out of bed. - Continue  Short term goal 2: Patient will transfer sit <--> stand with CGA in order to get up to ambulate. - Continue  Short term goal 3: Patient will ambulate 13' with RW and CGA in order to get to the bathroom. - Discontinue  (refer to LTG)  Short term goal 4: Patient will negotiate 4 steps with B hand rails and min A in order to get into/out of her home. - Continue    Long term goals  Time Frame for Long term goals : 3 weeks  Long term goal 1: Patient will complete bed mobility at mod I in order to get into/out of bed. Long term goal 2: Patient will transfer sit <--> stand with mod I in order to get up to ambulate.   Long term goal 3: Patient will ambulate 48' with RW and SBA in order to get

## 2018-07-12 NOTE — TELEPHONE ENCOUNTER
Talked to Brinda's daughter today and they will see if they can wheel her over for appointment. She liked that idea because Xenia Moore is to be released on Saturday and she has a lot of follow ups in the next couple weeks.

## 2018-07-12 NOTE — PROGRESS NOTES
[facility]    Renal inpatient Progress Note  7/12/2018 8:44 AM      Pt Name:   Zayda Rondon:   9/9/1930  Attending:   Turner Alcantar MD     Chief Complaint:   Ese Mathews is a 80 y.o. female being followed by nephrology for BEREKET / CKD     Interval History : patient seen and examined by me. feels better. No cp or SOB. Sitting comfortably  Overall stable. Looks well. Poor oral intake,     Scheduled Medications :   multivitamin  1 tablet Oral Daily    amLODIPine  5 mg Oral Daily    traMADol  50 mg Oral TID    ALPRAZolam  0.5 mg Oral BID    atorvastatin  20 mg Oral Nightly    cetirizine  10 mg Oral Daily    escitalopram  10 mg Oral Daily    ferrous sulfate  325 mg Oral BID WC    fluticasone  2 spray Nasal BID    isosorbide mononitrate  60 mg Oral Daily    lidocaine  1 patch Transdermal Daily    metoprolol succinate  25 mg Oral Daily    pantoprazole  40 mg Oral BID AC    rOPINIRole  4 mg Oral BID    zafirlukast  20 mg Oral BID      sodium chloride 75 mL/hr at 07/12/18 0715        Vitals :  BP (!) 118/55   Pulse 75   Temp 98.3 °F (36.8 °C) (Oral)   Resp 16   Ht 4' 9\" (1.448 m)   Wt 130 lb 1.6 oz (59 kg)   LMP  (Exact Date)   SpO2 96%   BMI 28.15 kg/m²     24HR INTAKE/OUTPUT:      Intake/Output Summary (Last 24 hours) at 07/12/18 0844  Last data filed at 07/12/18 0624   Gross per 24 hour   Intake              240 ml   Output             2500 ml   Net            -2260 ml     Last 3 weights  Wt Readings from Last 3 Encounters:   07/11/18 130 lb 1.6 oz (59 kg)   06/12/18 132 lb 4.4 oz (60 kg)   05/21/18 141 lb (64 kg)        Physical Exam :  General Appearance:  Well developed.  No distress  Mouth/Throat:  Oral mucosa moist  Neck:  Supple, no JVD  Lungs:  Breath sounds: clear, diminised breath sounds  Heart[de-identified]  S1,S2 heard  Abdomen:  Soft, non - tender  Musculoskeletal:  Edema - none     Last 3 CBC  No results for input(s): WBC, RBC, HGB, HCT, PLT in the last 72 hours. Last 3 CMP  Recent Labs      07/10/18   0556  07/11/18   0652  07/12/18   0648   NA  139  140  139   K  5.0  4.2  4.6   CL  98  97*  104   CO2  25  28  20*   BUN  41*  42*  39*   CREATININE  1.7*  2.0*  1.7*   CALCIUM  9.6  9.3  8.9        Assessment / Plan   Renal - BEREKET mostly prerenal from poor oral intake and use of diuretics  · Creat started to improve with IV fluids. · We will continue     IV fluids until the current bag is finished  · Urine lites also suggest prerenal.  I have a feeling this is going to be ongoing issue as long as the patient starts to drink more fluids. · BMP in the morning       Hyperkalemia - 2/2 BEREKET and diet. - better after kayexalate. currently normal    Mild acidosis secondary to IV fluids    Essential hypertension- stable. Follow for now    Fracture left hip - S/P ORIF 6/11    meds reviewed and D/W patient and RN    Dr. Deidre Ramirez M,RUTH.  [facility].

## 2018-07-13 NOTE — PROGRESS NOTES
Subjective:      Patient ID: Jeff Floyd is a 80 y.o. female. ANAIS Floyd presents for follow-up of bladder cancer, urinary retention, right hydronephrosis, recurrent UTI, and pelvic floor prolapse. She is due for stent exchange. She currently has a right ureteral stent in place, however, she has a vaginal defect and the bladder falls through causing continued right hydronephrosis despite the ureteral stent being in place. She has urinary retention due to the pelvic floor prolapse. She was previously on a straight cath regimen but was asking to be catheterized every hour due to urgency so she most recently has had an indwelling forman catheter.                She previously underwent nephrostomy tube and antegrade stent placement on 4/24/17 per Dr. Annamarie Coburn. The nephrostogram was successful and nephrostomy tube was removed on 5/2/17. Theodora Taylor to this she underwent cystoscopy with right ureteroscopy, aborted procedure on 4/19/17 per Dr. Eugenio Farrell (right UO not able to be accessed).                TAHIRA has a history of muscle invasive bladder cancer and she has completed chemotherapy treatment for this. Most recent surveillance cystoscopy on 2/6/17 was negative. Historically, she underwent cystoscopy with clot evacuation, TURBT medium, bilateral ureteroscopy with bilateral ureteral stent placement per Dr. Eugenio Farrell on 10/25/16. Review of Systems   Constitutional: Negative for chills, fatigue and fever. Gastrointestinal: Negative for abdominal distention, abdominal pain, nausea and vomiting. Genitourinary: Negative for difficulty urinating, flank pain and hematuria. Intermittent gross hematuria--chronic. Objective:   Physical Exam   Constitutional: She is oriented to person, place, and time. She appears well-developed and well-nourished. Wheelchair. HENT:   Head: Normocephalic and atraumatic.    Right Ear: External ear normal.   Left Ear: External ear normal.   Nose: Nose normal.   Eyes:

## 2018-07-13 NOTE — PROGRESS NOTES
Family had requested a repeat UA prior to patient discharge. Notified Dr. Tawny Teague who explained that patient will be seeing urologist tomorrow. 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 Tawny Teague feels that it would be beneficial for the Urologist to determine if she needs a UA, rather than order one here. Andres Araujo, LPN notified.

## 2018-07-13 NOTE — PROGRESS NOTES
This nurse advised this patient and patient daughter that the urologist did not see indications to collect a UA at this time.

## 2018-07-13 NOTE — TELEPHONE ENCOUNTER
.Transition of Care visit scheduled. 7/19/2018  Patient is being discharged to home. Discharged from [facility].

## 2018-07-13 NOTE — PROGRESS NOTES
stents    COLONOSCOPY      CYSTOSCOPY  11-    TUR and fulg BT  superficial noninvasive bladder ca    CYSTOSCOPY N/A 8/23/2017    CYSTOSCOPY, RIGHT URETEROSCOPY, RIGHT STENT EXCHANGE retrograde right and right ureteral dilitation performed by Britton Tracy MD at [address] 1/31/2018    CYSTOSCOPY EVACUATION OF CLOTS performed by Britton Tracy MD at [place] [address] EKG 12-LEAD  10/14/2015         EKG 12-LEAD  10/17/2015         ENDOSCOPY, COLON, DIAGNOSTIC      EYE SURGERY  04/2018    JOINT REPLACEMENT      hip    MOUTH SURGERY      cheek; had benign lump removed    OTHER SURGICAL HISTORY Right 04/19/2017    Cystoscopy, Attempted Right Ureteral Access     MD CYSTOURETHROSCOPY,URETER CATHETER Right 1/31/2018    CYSTOSCOPY RIGHT RETROGRADE PYELOGRAM RIGHT URETEROSCOPY AND REPLACEMENT OF RIGHT URETERAL STENT performed by Britton Tracy MD at [address] OFFICE/OUTPT [facility] Left 6/11/2018    IM NAIL HENRY INSERTION, LEFT performed by Kenna Hernandez MD at [place] [address]  03/2017    TOTAL HIP ARTHROPLASTY  3-9-12    Rt       Restrictions/Precautions:  General Precautions, Fall Risk, Weight Bearing         Left Lower Extremity Weight Bearing: Weight Bearing As Tolerated     Prior Level of Function:  ADL Assistance: Needs assistance  Homemaking Assistance:  (dependent on granddaughter)  Ambulation Assistance: Independent  Transfer Assistance: Independent  Additional Comments: Pt states in the last week or two she has been primarily using a walker, but prior to this she was using a cane. Pt reports relying heavily on granddaughter for most all ADL and IADL tasks. Pt with limited ROM and use of BUE however pt does report she is able to do more for herself, however it is easier and faster if her granddaughter helps therefore decreasing her ind with basic ADL tasks.     Subjective:     Subjective: Pt up in performs with SBA- min A. Pt required mod A for transfers, now requires SBA- min A. Pt amb 3 feet with RW mod A at time of evaluation, now amb up to 80 feet with RW SBA- CGA. Pt able to ascend/descend 4 steps with B HR mod A. Pt to DC home with 24/7 [facility]-on [facility]. Prognosis: Good  Discharge Recommendations: 24 hour supervision or assist, Home with Home health PT    Patient Education:  Patient Education: Transfer training, gait training    Equipment Recommendations:  Equipment Needed: No  Other:       Safety:  Type of devices: All fall risk precautions in place, Call light within reach, Chair alarm in place, Gait belt, Left in chair, Patient at risk for falls  Restraints  Initially in place: No    Plan:  Times per week: DC home with 24/7 care 7/14      Goals:  Patient goals : Go home    Short term goals  Time Frame for Short term goals: 1 week  Short term goal 1: Patient will complete bed mobility at [facility] in order to get into/out of bed. - NOT MET  Short term goal 2: Patient will transfer sit <--> stand with CGA in order to get up to ambulate. - MET  Short term goal 3: Patient will ambulate 13' with RW and CGA in order to get to the bathroom. - MET  Short term goal 4: Patient will negotiate 4 steps with B hand rails and min A in order to get into/out of her home. - NOT MET    Long term goals  Time Frame for Long term goals : 3 weeks  Long term goal 1: Patient will complete bed mobility at mod I in order to get into/out of bed.- NOT MET  Long term goal 2: Patient will transfer sit <--> stand with mod I in order to get up to ambulate.- NOT MET  Long term goal 3: Patient will ambulate 48' with RW and SBA in order to get around safely in her home. - MET  Long term goal 4: Patient will negotiate 4 steps with B hand rails and CGA in order to get into/out of her home.- NOT MET

## 2018-07-13 NOTE — PROGRESS NOTES
[facility]  INPATIENT OCCUPATIONAL THERAPY  [facility]  DISCHARGE NOTE    Time:  Time In: 1400  Time Out: 1454  Timed Code Treatment Minutes: 47 Minutes  Minutes: 54          Date: 2018  Patient Name: Nirmal Cage,   Gender: female      MRN: [id number]  : 1930  (80 y.o.)  Referring Practitioner: ordering: Ivan Sterling MD attending: Dr. German Ford  Diagnosis: Comminuted fracture of Left hip  Additional Pertinent Hx: Per ED note, pt is a 80 y.o. female who has a past medical history of osteoarthritis, osteopenia, and a right total hip arthroplasty. Patient presents to the Emergency Department on 6/10/18 for the evaluation of left hip pain. Patient reports that she was walking to the bathroom early this morning when she reports that she thinks fell asleep while walking back to bed. Found to have a left intertrochanteric hip fx. OR: s/p open treatment  with cephalomedullary nail on . To TCU on 6/15/18    Restrictions/Precautions:  Restrictions/Precautions: General Precautions, Fall Risk, Weight Bearing    Left Lower Extremity Weight Bearing: Weight Bearing As Tolerated                 Past Medical History:   Diagnosis Date    Allergic rhinitis     Anxiety     Bilateral hydronephrosis 8/10/2016    Bladder cancer ([facility] [address]     Dr. Reis Blocker Blood circulation, collateral     Broken leg     left     CHF (congestive heart failure) (HCC)     Constipation     attributed to Ultram    Coronary artery disease involving native coronary artery of native heart s/p cath 10/14/15-LM-P, LAD MID 90%, ANUERYSMAL, % PROX, RCA MID 60% DISTLA 70%, EDP 20 , EF 30%-NEED REVASCULARIZATION 10/14/2015    GERD (gastroesophageal reflux disease)     History of blood transfusion     [place] (hard of hearing)     Hyperglycemia     Hyperlipidemia     Hypertension     Obesity (BMI 30-39. 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 devices: Call light within reach, Chair alarm in place, Left in chair, Gait belt, All fall risk precautions in place    Plan:  Times per week: 6x  Current Treatment Recommendations: Strengthening, Balance Training, Functional Mobility Training, Endurance Training, Safety Education & Training, Patient/Caregiver Education & Training, Equipment Evaluation, Education, & procurement, Self-Care / ADL, ROM, Pain Management, Positioning, Home Management Training    Goals:  Patient goals : To be more independent    Short term goals  Time Frame for Short term goals:  One week  Short term goal 1: Pt will complete LB ADL tasks using LHAE prn with no greater than mod A for inc ind with self cares MET   Short term goal 2: Pt will tolerate dynamic standing task with min A and at least 1 UE release for > 3 minutes to improve indep with sink side grooming MET  Short term goal 3: Pt will complete functional transfers with consistant min A and no > 2 vc for safety technique including to/from raised toilet MET  Short term goal 4: Pt will complete BUE AAROM/AROM for increasing functional ROM required for BADL task completion MET  Short term goal 5: Pt will complete functional mobility to/from bathroom with SBA and RW wtih no > 2 vc for safety to increase indep wtih toileting routine NOT MET   Long term goals  Time Frame for Long term goals : 3 weeks  Long term goal 1: Pt will complete dynamic standing task with SBA for > 4minutes with no LOB or vc for safety toincrease ease with getting simple snack at home NOT MET  Long term goal 2: Pt will complete BADL routine with SBA and LHAE prn and only min cues to incorporate EC strategies to incresae indep with ADL routine at home NOT MET

## 2018-07-13 NOTE — PROGRESS NOTES
[facility]  Recreational Therapy  Discharge Note  Transitional Care Unit           Date:  7/13/2018       Patient Name: Sunshine Marina      MRN: [id number]       YOB: 1930 (80 y.o.)       Gender: female  Diagnosis: Comminuted fracture of Left hip  Referring Practitioner: ordering: Diane Novak MD attending: Dr. David Davis    Patient discharged from Recreational Therapy at this time. See recreational therapy notes for details.     Electronically signed by: JESS Ron  Date: 7/13/2018

## 2018-07-13 NOTE — PLAN OF CARE
Problem: DISCHARGE BARRIERS  Goal: Patient's continuum of care needs are met    Intervention: INVOLVE PATIENT/S.O. IN DISCHARGE PLANNING PROCESS  The patient is completing her TCU stay and will be returning home tomorrow, 7/14. Her family will provide supervision and any needed assistance. [facility] will provide skilled services for nursing, OT, PT, & aide. Patient's [employer]/Medicare reviewer has been updated regarding plans and approved today, 7/13, as last covered skilled day. The patient is pleased with her progress and is looking forward to leaving the hospital after a lengthy stay. Transportation home to be provided by her family. Discharge goals have been met.  TANVIR Mendez

## 2018-07-14 NOTE — PROGRESS NOTES
The patient is due for a minor urological procedure 8/3/18. Based on my recent medical care of her I would consider her medically cleared for that procedure.

## 2018-07-16 NOTE — TELEPHONE ENCOUNTER
Patient scheduled for stent exchange on 08- at 1:00pm with an arrival of 11:30am.  Surgery consent signed. Preop orders and instructions given.

## 2018-07-16 NOTE — TELEPHONE ENCOUNTER
SURGERY [facility]  [address] [place] [address] [address] TAYE HAMEED AM OFFENEGG II.JALEN, [address]      Phone *[phone] *[phone]   Surgical Scheduling Direct Line Phone *[phone] Fax *[phone]      Harrison Michaels 9/9/1930 female    [address]  [address] [address]  Marital Status:          Home Phone: [phone]      Cell Phone:    Telephone Information:   Mobile [phone]          Surgeon: Dr. Jean Pierre Vincent  Surgery Date: 08-  Time: 1:00pm    Procedure: Cystoscopy with right retrograde pyelogram, right ureteroscopy and replacement of right ureteral stent. Diagnosis: Right ureteral Stricture     Important Medical History: In [facility]    Special Inst/Equip:     CPT Codes:    35875, 41944  Latex Allergy:  No     Cardiac Device:  No     Anesthesia:  Anesthesiologist (General/Spinal)          Admission Type:  Same Day                             Admit Prior to Day of Surgery: No    Case Location:  Main OR           Preadmission Testing:Phone Call              PAT Date and Time:______________________________________________________    PAT Confirmation #: ______________________________________________________    Post Op Visit: ___________________________________________________________    Need Preop Cardiac Clearance: No    Does Patient have Cardiologist/physician?          Surgery Confirmation #: __________________________________________________    : ________________________   Date: __________________________     [employer] Name: [employer]

## 2018-07-16 NOTE — CARE COORDINATION
[facility] Transitions Initial Follow Up Call    Call within 2 business days of discharge: Yes    Patient: Echo Conte Patient : 1930   MRN: [id number]  Reason for Admission: There are no discharge diagnoses documented for the most recent discharge. Discharge Date: 18 RARS: Readmission Risk Score: 48     Second attempt to reach pt for the care transition initial follow up call. Unable to leave a message.   Pt phone message \" the mailbox is full & can not accept any messages at this time\"       Follow Up  Future Appointments  Date Time Provider Kobi Turpin   2018 3:15 PM Lea Nunez MD [facility]   2018 1:45 PM Demarco Nguyen MD [facility]   10/5/2018 10:15 AM Demarco Nguyen MD [address] Transition Coordinator  [phone]

## 2018-07-17 NOTE — PROGRESS NOTES
of native heart with stable angina pectoris ([address]    Gross hematuria    Hypotension due to drugs    CAD in native artery    Hematuria    Acute cystitis without hematuria    Non morbid obesity    Encounter for chemotherapy management    Chronic diastolic congestive heart failure (HCC)    Anemia    Lesion of bladder    Ureteral stenosis    Iron deficiency anemia due to chronic blood loss    Closed fracture of right wrist    Pre-op evaluation    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  Bladder prolapse, female, acquired    Prerenal azotemia    Malignant neoplasm of urinary bladder (HCC)    Tinnitus of right ear    Pain in metatarsus of right foot    Mixed conductive and sensorineural hearing loss of both ears    Chronic kidney disease, stage 3    Hydroureter on left    History of right ureter stent    Abdominal pain    Major depressive disorder, single episode, in full remission ([address]    Comminuted fracture of hip, left, closed, initial encounter ([address]    Closed displaced fracture of greater trochanter of left femur ([address]    LAD stenosis    History of placement of stent in LAD coronary artery    Overweight (BMI 25.0-29. 9)    Thrush, oral       Past Surgical History:   Procedure Laterality Date    ABDOMEN SURGERY      ABDOMINAL EXPLORATION SURGERY  1954    BLADDER SURGERY  10/2016    stent placed and removed a blood clot    CARDIAC SURGERY      stents    COLONOSCOPY      CYSTOSCOPY  11-    TUR and fulg BT  superficial noninvasive bladder ca    CYSTOSCOPY N/A 8/23/2017    CYSTOSCOPY, RIGHT URETEROSCOPY, RIGHT STENT EXCHANGE retrograde right and right ureteral dilitation [facility]    CYSTOSCOPY EVACUATION OF CLOTS performed by Ismael Cortes MD at [facility] EKG 12-LEAD  10/14/2015         EKG 12-LEAD  10/17/2015         ENDOSCOPY, COLON, DIAGNOSTIC  EYE SURGERY  04/2018    JOINT REPLACEMENT      hip    MOUTH SURGERY      cheek; had benign lump removed    OTHER SURGICAL HISTORY Right 04/19/2017    Cystoscopy, Attempted Right Ureteral Access     ME CYSTOURETHROSCOPY,URETER CATHETER Right 1/31/2018    CYSTOSCOPY RIGHT RETROGRADE PYELOGRAM RIGHT URETEROSCOPY AND REPLACEMENT OF RIGHT URETERAL STENT performed by John Culver MD at [address] [facility] OFFICE/OUTPT [address] Left 6/11/2018    IM NAIL HENRY INSERTION, LEFT performed by Alin Garcia MD at [place] [address]  03/2017    TOTAL HIP ARTHROPLASTY  3-9-12    Rt       Allergies   Allergen Reactions    Adhesive Tape Rash     USE PAPER TAPE    Cleocin [Clindamycin Hcl] Diarrhea    Metronidazole Diarrhea    Penicillins Other (See Comments)     Very sleepy    Septra [Bactrim] Other (See Comments)     Too big to swallow        Family History   Problem Relation Age of Onset    Arthritis Mother     Asthma Mother     Stroke Mother     Arthritis Father     Heart Disease Father 68        enlarged heart    Heart Disease Sister         CHF    Cancer Brother         metatstatic    Diabetes Brother     Parkinsonism Brother     Diabetes Sister     Stroke Sister     Diabetes Brother     Heart Disease Brother         Social History     Social History    Marital status:      Spouse name: N/A    Number of children: N/A    Years of education: N/A     Occupational History    Not on file. Social History Main Topics    Smoking status: Never Smoker    Smokeless tobacco: Never Used    Alcohol use No    Drug use: No    Sexual activity: Not on file     Other Topics Concern    Not on file     Social History Narrative    No narrative on file       Current Outpatient Prescriptions   Medication Sig Dispense Refill    traMADol (ULTRAM) 50 MG tablet Take 1 tablet by mouth every 8 hours as needed for Pain for up to 7 days. . 21 tablet 0    isosorbide mononitrate (IMDUR) 60 MG extended release tablet Take 1 tablet by mouth daily Additional RF per Dr Michelle Rogers 30 tablet 0    metoprolol succinate (TOPROL XL) 50 MG extended release tablet Take 0.5 tablets by mouth daily      diclofenac sodium 1 % GEL Apply 2 g topically 2 times daily as needed for Pain 1 Tube 0    LORazepam (ATIVAN) 1 MG tablet Take 1 mg by mouth 3 times daily as needed for Anxiety. [facility] atorvastatin (LIPITOR) 20 MG tablet TAKE 1 TABLET BY MOUTH NIGHTLY 90 tablet 0    meclizine (ANTIVERT) 25 MG tablet Take 1 tablet by mouth 3 times daily as needed for Dizziness 270 tablet 3    escitalopram (LEXAPRO) 10 MG tablet TAKE 1 TABLET BY MOUTH DAILY 90 tablet 1    zafirlukast (ACCOLATE) 20 MG tablet TAKE 1 TABLET TWICE A  tablet 1    rOPINIRole (REQUIP) 4 MG tablet Take 1 tablet by mouth 2 times daily 180 tablet 3    pantoprazole (PROTONIX) 40 MG tablet TAKE 1 TABLET BY MOUTH 2 TIMES DAILY (BEFORE MEALS) 60 tablet 11    Incontinence Supplies (BEDSIDE DRAINAGE BAG) MISC Large 2000 cc bedside drainage bag 1 each 11    Lidocaine 2 % GEL Apply 1 drop topically 4 times daily as needed (forman catheter irritation) 1 Tube 3    oxybutynin (DITROPAN) 5 MG tablet Take 1 tablet by mouth 3 times daily as needed (stent pain, bladder spasms) 90 tablet 1    fluticasone (FLONASE) 50 MCG/ACT nasal spray 2 sprays by Nasal route 2 times daily      Elastic Bandages & Supports (MEDICAL COMPRESSION STOCKINGS) MISC Knee-high, 20-30 mmHg.   Dx: LE edema 1 each 0    ferrous sulfate 325 (65 Fe) MG tablet TAKE 1 TABLET BY MOUTH 2 TIMES DAILY (WITH MEALS) 30 tablet 3    ondansetron (ZOFRAN) 4 MG tablet TAKE 1 TABLET BY MOUTH EVERY 8 HOURS AS NEEDED FOR NAUSEA OR VOMITING 30 tablet 3    diphenoxylate-atropine (LOMOTIL) 2.5-0.025 MG per tablet Take 1 tablet by mouth 4 times daily as needed for Diarrhea 30 tablet 3    docusate sodium (COLACE) 100 MG capsule Take 1 capsule by mouth daily as needed for Constipation 30 capsule 1    BIOTIN Component Value Date    WBC 8.1 07/01/2018    RBC 3.52 07/01/2018    HGB 10.8 07/01/2018    HCT 34.5 07/01/2018    MCV 98.0 07/01/2018    MCH 30.7 07/01/2018    MCHC 31.3 07/01/2018    RDW 16.6 06/17/2018     07/01/2018    MPV 9.0 07/01/2018     BMP:    Lab Results   Component Value Date     07/13/2018    K 4.1 07/13/2018    K 4.3 06/11/2018     07/13/2018    CO2 22 07/13/2018    BUN 31 07/13/2018    LABALBU 3.7 06/10/2018    CREATININE 1.5 07/13/2018    CALCIUM 9.0 07/13/2018    LABGLOM 33 07/13/2018    GLUCOSE 143 07/13/2018     Hepatic Function Panel:    Lab Results   Component Value Date    ALKPHOS 118 06/10/2018    ALT 13 06/10/2018    AST 20 06/10/2018    PROT 6.9 06/10/2018    BILITOT 0.2 06/10/2018    BILIDIR <0.2 04/28/2018    LABALBU 3.7 06/10/2018     Magnesium:    Lab Results   Component Value Date    MG 1.6 06/15/2018     Warfarin PT/INR:  No components found for: PTPATWAR, PTINRWAR  HgBA1c:  No results found for: LABA1C  FLP:    Lab Results   Component Value Date    TRIG 79 04/12/2018    HDL 51 04/12/2018    HDL 58 12/14/2011    LDLCALC 41 04/12/2018     TSH:    Lab Results   Component Value Date    TSH 1.860 04/28/2018           Assessment     Diagnosis Orders   1. Coronary artery disease of native artery of native heart with stable angina pectoris ([facility]     2. Chronic diastolic heart failure ([facility]     3. Chronic kidney disease, stage 3     4. History of placement of stent in LAD coronary artery     5. Hypercholesteremia     6.  S/P angioplasty with stent TINA in 10/2015              Admission with gross hematuria n=now on CBI  Hx bladder cancer - on chemotherapy  Acute blood loss anemia - s/p 1 unit PRBC  S/p cystoscopy with clot evacuation and bilateral ureteral stent placement 10/25/16  CAD - hx triple vessel CAD - refused for surgery  s/p TINA LAD 10/14/15  ICMP - ef was 27 and now 45 per echo 2/12/16  HTN  HLP      Cont bb/ace/nitrate/statin/bumex  Off asa and brilinta now  Restart asa once ok with urology  Pt appears stable from cardiac standpoint at this time  S\p cystoscopy with extensive clot evacuation along with TURBT largeHx bladder cancer      Hx TVD: refused for surgery  CAD-s\p LAD PCI with  2.75x38 mm drug-eluting stent postdilated up to 3.0 mm. On 10/16/15  - RCA / CX-- OMT      HTN  HLP  ICDMP EF 40%  Was 30%         S/p egd and colonscopy  GI bleeding - subsided - H/H remained stable s/p EGD and colonoscopy- finding is gastritis and 1.5 cm duodenal ulcer with clear base . Also sigmoid ulcer ( ischemic )- no complication - tolerated PO intake . On PPI - follow Bx result   GI Bleed probably lower-rectal or anal  Small blood noted on the stool surface today ( stool color normal per pat and attendant)  This suggest the bleed is from lower rectum or anal area- hemmorrhiods, versus rectal ulcer versus ischemia. Probably more of [id number] will leave to eval of GI   S/p Angioplasty with stent of the LAD  Triple vessel CAD  Admission for dizziness - likely vertigo  Acute systolic and diastolic chf- improved  CMP-ischemic newly Dxed-Ef 35-40 with grade I DDfx per echo 10/9/15  BY LVG ef 30%  Mod MR  UTI- treated with levaquin      PLAN:  The current Meds and labs reviewed     Continue the current treatment and with constant vigilance to changes in symptoms and also any potential side effects. Return for care or seek medical attention immediately if symptoms got worse and/or develop new symptoms. Coronary artery disease, seems to be stable. Denies angina or change in breathing pattern  Had PCI of the LAD successfully  Cont    imdur 60  protonix   NTG prn  Stop Norvasc 5    Cardiomyopathy: improving, no CHF symptoms, no change in clinical condition. Will need periodic echocardiograms depending on symptoms.   CMP ischemic  Very limited physically   Cont  toprol X 25 po qd  Off  lisinopril 2.5  Po qd for dizziness    Congestive heart failure: no evidence of fluid

## 2018-07-17 NOTE — CARE COORDINATION
[facility] Transitions Initial Follow Up Call    Call within 2 business days of discharge: Yes    Patient: Nirmal Cage Patient : 1930   MRN: [id number]  Reason for Admission: There are no discharge diagnoses documented for the most recent discharge. Discharge Date: 18 RARS: Readmission Risk Score: 48     Third attempt to reach pt for the care transition initial follow up call. Unable to leave a message. Pt phone message Shira Face++munira mailbox is full & can not accept any messages at this time\"  CTC to sign off. Will refer back to the [facility].     Follow Up  Future Appointments  Date Time Provider Kobi Turpin   2018 3:15 PM El Bridges  [facility] [address]   2018 1:45 PM Allison Adams MD [address] [facility] AM [address]   10/5/2018 10:15 AM Allison Adams MD [id number] Transition Coordinator  [phone]

## 2018-07-18 NOTE — TELEPHONE ENCOUNTER
Per [facility] no prior authorization is required for CPT [id number] or [id number] as long as everyone is in network.

## 2018-07-19 NOTE — DISCHARGE SUMMARY
TCU  Discharge Summary     Patient Identification:  Kassy Martin  : 1930  Admit date: 6/15/2018  Discharge date: 18  Attending provider: Camila Leahy MD      Primary care provider: Payam Chu MD     Discharge Diagnoses: Active Hospital Problems    Diagnosis Date Noted    Restless legs syndrome (RLS) [G25.81] 2012     Priority: High    Overweight (BMI 25.0-29. 9) [E66.3] 06/15/2018    Thrush, oral [B37.0] 06/15/2018    Comminuted fracture of hip, left, closed, initial encounter ([facility] Felipe Lin 06/10/2018    Chronic kidney disease, stage 3 [N18.3] 2018    Bladder prolapse, female, acquired [N81.10] 2017    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 [F32.9] 2017    Chronic diastolic congestive heart failure (HCC) [I50.32] 2016    Physical deconditioning [R53.81] 10/26/2015    Presence of stent in LAD coronary artery [Z95.5]     Chronic diastolic heart failure (HCC) [I50.32]     RLS (restless legs syndrome) [G25.81]     Essential hypertension [I10]     BPV (benign positional vertigo) [H81.10]     Osteoarthritis [M19.90]     Anxiety, situational [F41.9] 2011    AR (allergic rhinitis) [J30.9] 2011    Hypercholesteremia [E78.00] 2011    Generalized OA [M15.9] 2011    History of bladder cancer [Z85.51] 2011       TCU Course:   Kassy Martin is a 80 y.o. female admitted to the transitional care unit on 6/15/2018 for continuation of therapies following the orthopedic surgical repair of her left fractured hip. While on the unit she was able to make progress with therapires to become stronger and more independent. She was followed by urology and nephrology and remained medically stable. Consults:   nephrology and urology    Significant Diagnostics:   Results for Keny Baum (MRN [id number]) as of 2018 21:51   Ref.  Range 7/10/2018 05:56 2018 06:52 2018 22:50 2018 06:48 2018 15:15 mouth daily Additional RF per Dr Hannah Cortes             Lidocaine 2 % GEL  Apply 1 drop topically 4 times daily as needed (forman catheter irritation)             LORazepam (ATIVAN) 1 MG tablet  Take 1 mg by mouth 3 times daily as needed for Anxiety. .             meclizine (ANTIVERT) 25 MG tablet  Take 1 tablet by mouth 3 times daily as needed for Dizziness             metoprolol succinate (TOPROL XL) 50 MG extended release tablet  Take 0.5 tablets by mouth daily             ondansetron (ZOFRAN) 4 MG tablet  TAKE 1 TABLET BY MOUTH EVERY 8 HOURS AS NEEDED FOR NAUSEA OR VOMITING             oxybutynin (DITROPAN) 5 MG tablet  Take 1 tablet by mouth 3 times daily as needed (stent pain, bladder spasms)             pantoprazole (PROTONIX) 40 MG tablet  TAKE 1 TABLET BY MOUTH 2 TIMES DAILY (BEFORE MEALS)             rOPINIRole (REQUIP) 4 MG tablet  Take 1 tablet by mouth 2 times daily             sodium chloride (OCEAN) 0.65 % nasal spray  1 spray by Nasal route as needed for Congestion             traMADol (ULTRAM) 50 MG tablet  Take 1 tablet by mouth every 8 hours as needed for Pain for up to 7 days. .             zafirlukast (ACCOLATE) 20 MG tablet  TAKE 1 TABLET TWICE A DAY                 35 minutes spent preparing the patient for discharge    Tramaine Carrion MD

## 2018-07-23 NOTE — TELEPHONE ENCOUNTER
----- Message from Deidre Ramirez MD sent at 7/23/2018  2:16 PM EDT -----  Advised to drink more fluids

## 2018-07-24 PROBLEM — I95.0 IDIOPATHIC HYPOTENSION: Status: ACTIVE | Noted: 2018-01-01

## 2018-07-24 NOTE — PROGRESS NOTES
[facility]        Outpatient Follow-Up note         7/24/2018 3:49 PM    Patient Name:   Jessi Ramirez:    9/9/1930  Primary Care Physician:  Aviva Santos MD     Chief Complaint / Reason for follow-up : Follow Up of CKD     Interval History :  Patient seen and examined by me. Feels ok. No cp or SOB. She is not in any acute distress. She was recently in hospital for [address] / hyperkalemia and a hip fracture as well. Past History :  Past Medical History:   Diagnosis Date    Allergic rhinitis     Anxiety     Bilateral hydronephrosis 8/10/2016    Bladder cancer ([address] 2008    Dr. Irma Armando Blood circulation, collateral     Broken leg     left     CH[facility]congestive heart failure) ([facility])     Constipation     attributed to Ultram    Coronary artery disease involving native coronary artery of native heart s/p cath 10/14/15-LM-P, LAD MID 90%, ANUERYSMAL, % PROX, RCA MID 60% DISTLA 70%, EDP 20 , EF 30%-NEED REVASCULARIZATION 10/14/2015    GERD (gastroesophageal reflux disease)     History of blood transfusion     [place] (hard of hearing)     Hyperglycemia     Hyperlipidemia     Hypertension     Obesity (BMI 30-39. 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  4/26/2017     Past Surgical History:   Procedure Laterality Date    ABDOMEN SURGERY      ABDOMINAL EXPLORATION SURGERY  1954    BLADDER SURGERY  10/2016    stent placed and removed a blood clot    CARDIAC SURGERY      stents    COLONOSCOPY      CYSTOSCOPY  11-    TUR and fulg BT  superficial noninvasive bladder ca    CYSTOSCOPY N/A 8/23/2017    CYSTOSCOPY, RIGHT URETEROSCOPY, RIGHT STENT EXCHANGE retrograde right and right ureteral dilitation performed by Matthew Caban MD at [address] 1/31/2018    CYSTOSCOPY EVACUATION OF CLOTS performed by Matthew Caban MD at [address] 1999    EKG 12-LEAD  10/14/2015         EKG 12-LEAD  10/17/2015         ENDOSCOPY, COLON, DIAGNOSTIC      EYE SURGERY  04/2018    JOINT REPLACEMENT      hip    MOUTH SURGERY      cheek; had benign lump removed    OTHER SURGICAL HISTORY Right 04/19/2017    Cystoscopy, Attempted Right Ureteral Access     [place] CYSTOURETHROSCOPY,URETER CATHETER Right 1/31/2018    CYSTOSCOPY RIGHT RETROGRADE PYELOGRAM RIGHT URETEROSCOPY AND REPLACEMENT OF RIGHT URETERAL STENT performed by Marika Ruiz MD at [address] [place] [address] OFFICE/OUTPT [address] Left 6/11/2018    IM NAIL HENRY INSERTION, LEFT performed by Tom Rinne, MD at [place] [address]  03/2017    TOTAL HIP ARTHROPLASTY  3-9-12    Rt        Medications :     Outpatient Prescriptions Marked as Taking for the 7/24/18 encounter (Office Visit) with Coral Peace MD   Medication Sig Dispense Refill    traMADol (ULTRAM) 50 MG tablet Take 50 mg by mouth every 6 hours as needed for Pain. Harriet Goes isosorbide mononitrate (IMDUR) 60 MG extended release tablet Take 1 tablet by mouth daily Additional RF per Dr Nancy Garcia 30 tablet 0    metoprolol succinate (TOPROL XL) 50 MG extended release tablet Take 0.5 tablets by mouth daily      diclofenac sodium 1 % GEL Apply 2 g topically 2 times daily as needed for Pain 1 Tube 0    LORazepam (ATIVAN) 1 MG tablet Take 1 mg by mouth 2 times daily as needed for Anxiety.  Harriet Goes atorvastatin (LIPITOR) 20 MG tablet TAKE 1 TABLET BY MOUTH NIGHTLY 90 tablet 0    meclizine (ANTIVERT) 25 MG tablet Take 1 tablet by mouth 3 times daily as needed for Dizziness 270 tablet 3    escitalopram (LEXAPRO) 10 MG tablet TAKE 1 TABLET BY MOUTH DAILY 90 tablet 1    zafirlukast (ACCOLATE) 20 MG tablet TAKE 1 TABLET TWICE A  tablet 1    rOPINIRole (REQUIP) 4 MG tablet Take 1 tablet by mouth 2 times daily 180 tablet 3    pantoprazole (PROTONIX) 40 MG tablet TAKE 1 TABLET BY MOUTH 2 TIMES DAILY (BEFORE MEALS) 60 tablet 11   

## 2018-07-24 NOTE — PROGRESS NOTES
Granddaughter said cardiologist took her off of blood pressure med last week. Stated was making BP too low.

## 2018-07-25 NOTE — TELEPHONE ENCOUNTER
Michelle Comment called requesting a refill on the following medications:  Requested Prescriptions     Pending Prescriptions Disp Refills    diclofenac sodium 1 % GEL 1 Tube 0     Sig: Apply 2 g topically 2 times daily as needed for Pain     Pharmacy verified:  [email or web address]    Date of last visit: 05/08/18  Date of next visit (if applicable): 30/3/6338

## 2018-07-26 NOTE — TELEPHONE ENCOUNTER
I called the lab the culture and sensitivity will not be done until tomorrow or Saturday. Will you please prescribe something until we get the results.

## 2018-07-26 NOTE — TELEPHONE ENCOUNTER
I spoke w/Dr. Parvin Mcfadden and he wants pt to take Keflex 250 mg bid for 7 days. I spoke w/Christi and gave her this information.

## 2018-07-27 NOTE — TELEPHONE ENCOUNTER
Parvin from Dr Chappell Serchristoph office [phone] called regarding the urine culture results now in [facility] for your review    Please advise

## 2018-07-27 NOTE — H&P
History and Physical performed by Emma Calle CNP    Pradip Acosta is a 80 y.o. female.     ANAIS Dominguez presents for follow-up of bladder cancer, urinary retention, right hydronephrosis, recurrent UTI, and pelvic floor prolapse. She is due for stent exchange. She currently has a right ureteral stent in place, however, she has a vaginal defect and the bladder falls through causing continued right hydronephrosis despite the ureteral stent being in place. She has urinary retention due to the pelvic floor prolapse. She was previously on a straight cath regimen but was asking to be catheterized every hour due to urgency so she most recently has had an indwelling forman catheter.                She previously underwent nephrostomy tube and antegrade stent placement on 4/24/17 per Dr. Flaco Ernandez. The nephrostogram was successful and nephrostomy tube was removed on 5/2/17. Alma Ku to this she underwent cystoscopy with right ureteroscopy, aborted procedure on 4/19/17 per Dr. Jaylon Garcia (right UO not able to be accessed).                LUCIANA has a history of muscle invasive bladder cancer and she has completed chemotherapy treatment for this. Most recent surveillance cystoscopy on 2/6/17 was negative. Historically, she underwent cystoscopy with clot evacuation, TURBT medium, bilateral ureteroscopy with bilateral ureteral stent placement per Dr. Jaylon Garcia on 10/25/16.      Review of Systems   Constitutional: Negative for chills, fatigue and fever. Gastrointestinal: Negative for abdominal distention, abdominal pain, nausea and vomiting. Genitourinary: Negative for difficulty urinating, flank pain and hematuria. Intermittent gross hematuria--chronic.         Objective:   Physical Exam   Constitutional: She is oriented to person, place, and time. She appears well-developed and well-nourished. Wheelchair. HENT:   Head: Normocephalic and atraumatic.    Right Ear: External ear normal.   Left

## 2018-08-01 NOTE — TELEPHONE ENCOUNTER
Spoke to pt's deric regarding the arrival time on 8/3/18. To arrive [phone] second floor at [address] [facility]. NPO midnight. Voiced understanding.

## 2018-08-03 NOTE — PROGRESS NOTES
Patient returned to [facility]. Report received from [facility] PACU. Patient taking sips of water. C/o burning in catheter area. Family at bedside. No other complaints voiced.

## 2018-08-03 NOTE — PROGRESS NOTES
Patient states pain pill helped. Burning much better. No complaints voiced. Family remains at bedside.

## 2018-08-03 NOTE — PROGRESS NOTES
Discharge criteria met. Discharge instructions given to patient and granddaughter. Verbalized understanding of discharge criteria.

## 2018-08-03 NOTE — ANESTHESIA PRE PROCEDURE
(BEDSIDE DRAINAGE BAG) MISC Large 2000 cc bedside drainage bag 2/28/18   KALYN Freed CNP   Lidocaine 2 % GEL Apply 1 drop topically 4 times daily as needed (forman catheter irritation) 2/28/18   KALYN Freed CNP   oxybutynin (DITROPAN) 5 MG tablet Take 1 tablet by mouth 3 times daily as needed (stent pain, bladder spasms) 12/29/17   KALYN Freed CNP   fluticasone (FLONASE) 50 MCG/ACT nasal spray 2 sprays by Nasal route 2 times daily    Historical Provider, MD   Elastic Bandages & Supports (MEDICAL COMPRESSION STOCKINGS) MISC Knee-high, 20-30 mmHg. Dx: LE edema 12/22/17   Irina Aragon DO   ferrous sulfate 325 (65 Fe) MG tablet TAKE 1 TABLET BY MOUTH 2 TIMES DAILY (WITH MEALS) 11/2/17   Corky Johnson MD   ondansetron (ZOFRAN) 4 MG tablet TAKE 1 TABLET BY MOUTH EVERY 8 HOURS AS NEEDED FOR NAUSEA OR VOMITING 8/22/17   KALYN Parekr CNP   diphenoxylate-atropine (LOMOTIL) 2.5-0.025 MG per tablet Take 1 tablet by mouth 4 times daily as needed for Diarrhea 8/2/17   Corky Johnson MD   docusate sodium (COLACE) 100 MG capsule Take 1 capsule by mouth daily as needed for Constipation 4/19/17   Ira Elder MD   BIOTIN FORTE PO Take by mouth daily    Historical Provider, MD   acetaminophen (TYLENOL) 325 MG tablet Take 2 tablets by mouth every 4 hours as needed for Pain or Fever 10/31/16   Moses Toledo MD   sodium chloride (OCEAN) 0.65 % nasal spray 1 spray by Nasal route as needed for Congestion    Historical Provider, MD   cetirizine (ZYRTEC) 10 MG tablet Take 10 mg by mouth daily    Historical Provider, MD   Handicap Placard MISC by Does not apply route. 2/28/14   Corky Johnson MD       Current medications:    No current facility-administered medications for this visit. No current outpatient prescriptions on file.      Facility-Administered Medications Ordered in Other Visits   Medication Dose Route Frequency Provider Last Rate Last Dose    0.9 % sodium chloride infusion   Intravenous Continuous Payal Foster        ciprofloxacin (CIPRO) IVPB 400 mg  400 mg Intravenous 30 Min Pre-Op Payal Foster           Allergies:     Allergies   Allergen Reactions    Adhesive Tape Rash     USE PAPER TAPE    Cleocin [Clindamycin Hcl] Diarrhea    Metronidazole Diarrhea    Penicillins Other (See Comments)     Very sleepy    Septra [Bactrim] Other (See Comments)     Too big to swallow       Problem List:    Patient Active Problem List   Diagnosis Code    Hypercholesteremia E78.00    AR (allergic rhinitis) J30.9    RAD (reactive airway disease) J45.909    History of bladder cancer Z85.51    Generalized OA M15.9    Vertigo R42    Osteopenia M85.80    Anxiety, situational F41.9    Sinusitis J32.9    Osteoarthritis M19.90    Cancer ([facility] C80.1    Restless legs syndrome (RLS) G25.81    Constipation K59.00    Acute bronchitis J20.9    Medication monitoring encounter Z51.81    Local reaction to bee sting T63.441A    ETD (eustachian tube dysfunction) H69.80    Hyponatremia E87.1    SIADH (syndrome of inappropriate ADH production) ([facility]) E22.2    Dizziness R42    Acute exacerbation of CHF (congestive heart failure) ([facility]) I50.9    Essential hypertension I10    BPV (benign positional vertigo) Z63.79    Complicated UTI (urinary tract infection) N39.0    RLS (restless legs syndrome) G25.81    Chronic diastolic heart failure ([facility]) X25.02    Acute systolic congestive heart failure ([facility]) I50.21    Presence of stent in LAD coronary artery Z95.5    Acute GI bleeding K92.2    Duodenal ulcer K26.9    Gastritis K29.70    Sigmoid ulcer K63.3    Esophagitis K20.9    Ischemic colon ([facility]) K55.9    Physical deconditioning R53.81    S/P angioplasty with stent TINA in 10/2015 Z95.9    Dyslipidemia E78.5    SOB (shortness of breath) on exertion R06.02    Insomnia due to anxiety and fear F51.05, F40.9    Chest pain, atypical R07.89    Chronic right hydronephrosis N13.30    Coronary artery disease of native artery of native heart with stable angina pectoris ([facility] [address] I25.118    Gross hematuria R31.0    Hypotension due to drugs I95.2    CAD in native artery I25.10    Hematuria R31.9    Acute cystitis without hematuria N30.00    Non morbid obesity E66.9    Encounter for chemotherapy management Z51.11    Chronic diastolic congestive heart failure (HCC) I50.32    Anemia D64.9    Lesion of bladder N32.9    Ureteral stenosis Q62.10    Iron deficiency anemia due to chronic blood loss D50.0    Closed fracture of right wrist S62.101A    Pre-op evaluation Z01.818    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    Chronic kidney disease, stage 3 N18.3    Hydroureter on left N13.4    History of right ureter stent Z96.0    Abdominal pain R10.9    Major depressive disorder, single episode, in full remission ([facility] [address] F32.5    Comminuted fracture of hip, left, closed, initial encounter ([facility] [address] M37.663A    Closed displaced fracture of greater trochanter of left femur ([facility] [address] T62.495V    LAD stenosis I25.10    History of placement of stent in LAD coronary artery Z95.5    Overweight (BMI 25.0-29. 9) E66.3    Thrush, oral B37.0    Idiopathic hypotension I95.0       Past Medical History:        Diagnosis Date    Allergic rhinitis     Anxiety     Bilateral hydronephrosis 8/10/2016    Bladder cancer [facility]) 2008    Dr. Karen Jaimes Blood circulation, collateral     Broken leg     left     CHF (congestive heart failure) (HCC)     Constipation     attributed to Ultram    Coronary artery disease involving native coronary artery of native heart s/p cath 10/14/15-LM-P, LAD MID 90%, ANUERYSMAL, % PROX, Answered      Vital Signs (Current): There were no vitals filed for this visit. BP Readings from Last 3 Encounters:   08/03/18 (!) 127/57   07/24/18 106/60   07/17/18 126/66       NPO Status:                                                                                 BMI:   Wt Readings from Last 3 Encounters:   08/03/18 130 lb (59 kg)   07/24/18 130 lb (59 kg)   07/17/18 130 lb 1.1 oz (59 kg)     There is no height or weight on file to calculate BMI.    CBC:   Lab Results   Component Value Date    WBC 8.1 07/01/2018    RBC 3.52 07/01/2018    HGB 10.8 07/01/2018    HCT 34.5 07/01/2018    MCV 98.0 07/01/2018    RDW 16.6 06/17/2018     07/01/2018       CMP:   Lab Results   Component Value Date     07/23/2018    K 4.2 07/23/2018    K 4.3 06/11/2018     07/23/2018    CO2 19 07/23/2018    BUN 41 07/23/2018    CREATININE 1.7 07/23/2018    LABGLOM 28 07/23/2018    GLUCOSE 73 07/23/2018    PROT 6.9 06/10/2018    CALCIUM 9.2 07/23/2018    BILITOT 0.2 06/10/2018    ALKPHOS 118 06/10/2018    AST 20 06/10/2018    ALT 13 06/10/2018       POC Tests: No results for input(s): POCGLU, POCNA, POCK, POCCL, POCBUN, POCHEMO, POCHCT in the last 72 hours.     Coags:   Lab Results   Component Value Date    INR 1.19 04/28/2018    APTT 23.1 01/21/2018       HCG (If Applicable): No results found for: PREGTESTUR, PREGSERUM, HCG, HCGQUANT     ABGs: No results found for: PHART, PO2ART, FLS5KBV, NOE3HIF, BEART, X1TEKEFC     Type & Screen (If Applicable):  Lab Results   Component Value Date    LABRH NEG 06/10/2018       Anesthesia Evaluation  Patient summary reviewed and Nursing notes reviewed no history of anesthetic complications:   Airway: Mallampati: III  TM distance: >3 FB   Neck ROM: full  Mouth opening: < 3 FB Dental: normal exam         Pulmonary:normal exam  breath sounds clear to auscultation  (+) shortness of breath: chronic,  asthma: Cardiovascular:    (+) hypertension:, CAD:, CABG/stent:, CHF: systolic and diastolic, MAYS:, hyperlipidemia      ECG reviewed  Rhythm: regular  Rate: normal  Echocardiogram reviewed                  Neuro/Psych:   (+) depression/anxiety             GI/Hepatic/Renal:   (+) GERD:, PUD,           Endo/Other:                     Abdominal:           Vascular:                                          Anesthesia Plan      general     ASA 4       Induction: intravenous. MIPS: Postoperative opioids intended and Prophylactic antiemetics administered. Anesthetic plan and risks discussed with patient and child/children. Plan discussed with CRNA.                   Reynold Bustillo MD   8/3/2018

## 2018-08-04 NOTE — ANESTHESIA POSTPROCEDURE EVALUATION
Department of Anesthesiology  Postprocedure Note    Patient: Dima Marquez  MRN: [id number]  YOB: 1930  Date of evaluation: 8/3/2018  Time:  8:55 PM     Procedure Summary     Date:  08/03/18 Room / Location:  [facility] / [facility]    Anesthesia Start:  9056 Anesthesia Stop:  1082    Procedure:  CYSTOSCOPY WITH RIGHT RETROGRADE PYELOGRAM, RIGHT URETEROSCOPY AND REPLACEMENT OF RIGHT URETERAL STENT (Right Bladder) Diagnosis:  (RIGHT URETERAL STRICTURE)    Surgeon:  Joe Rojo MD Responsible Provider:  Pearl Aase, MD    Anesthesia Type:  general ASA Status:  4          Anesthesia Type: general    Veto Phase I: Veto Score: 9    Veto Phase II: Veto Score: 10    Last vitals: Reviewed and per EMR flowsheets. Anesthesia Post Evaluation    Patient location during evaluation: PACU  Patient participation: complete - patient participated  Level of consciousness: awake and alert  Airway patency: patent  Nausea & Vomiting: no nausea and no vomiting  Complications: no  Cardiovascular status: hemodynamically stable  Respiratory status: acceptable  Hydration status: euvolemic      [facility]  POST-ANESTHESIA NOTE       Name:  Dima Marquez                                         Age:  80 y.o.   MRN:  [id number]      Last Vitals:  /62   Pulse 74   Temp 97.2 °F (36.2 °C) (Temporal)   Resp 16   Ht 4' 9\" (1.448 m)   Wt 130 lb (59 kg)   LMP  (Exact Date)   SpO2 95%   BMI 28.13 kg/m²   Patient Vitals for the past 4 hrs:   BP Temp Temp src Pulse Resp SpO2   08/03/18 1803 135/62 - - 74 16 95 %   08/03/18 1701 102/68 - - 77 16 -   08/03/18 1657 133/75 97.2 °F (36.2 °C) Temporal 86 20 95 %       Level of Consciousness:  Awake    Respiratory:  Stable    Oxygen Saturation:  Stable    Cardiovascular:  Stable    Hydration:  Adequate    PONV:  Stable    Post-op Pain:  Adequate analgesia    Post-op Assessment:  No apparent anesthetic complications    Additional Follow-Up / Treatment / Comment:  None    Miguel Harrison MD  August 3, 2018   8:55 PM

## 2018-08-06 NOTE — TELEPHONE ENCOUNTER
She is on oxybutynin which can help with spasms from stent and discomfort. We can try urispas instead of oxybutynin. Will send into pharmacy on [address].

## 2018-08-06 NOTE — TELEPHONE ENCOUNTER
After talking to other daughter Sherri Mode) he told  her to order D-monnase from [facility] to prevent uti. She ordered that and it came in. Then he would call in some medication for discomfort. Not sure what it was called? ?. [facility]. Call pt daughter back at [phone] when called in.  Thank you

## 2018-08-08 NOTE — TELEPHONE ENCOUNTER
CLINICAL PHARMACY NOTE - Post-Discharge Transitions of Care (DENIS)    Patient discharged from [address] [place] [address] on 7/14/2018. First attempt made to reach patient by telephone for medication review. Patient's voicemail box was full and I was unable to leave a message. Will continue to attempt to contact patient by telephone as appropriate. Genesis Saab  PharmD Candidate 2019    I have discussed the care of this patient with the student. I agree with the assessment and plan as documented.      Kem King, PharmD, [facility], [facility]  Clinical Pharmacy Specialist  O: [phone]  C: [phone]

## 2018-08-13 NOTE — TELEPHONE ENCOUNTER
Called ade daughter to let her know that the Giovanny Hartman was called in to pharmacy on [facility]

## 2018-08-16 PROBLEM — N30.90 RECURRENT CYSTITIS: Status: ACTIVE | Noted: 2018-01-01

## 2018-08-16 NOTE — TELEPHONE ENCOUNTER
pt has blood tinged urine  pushed fluids yesterday today dtg wants U/a and C & S  Pt has a cath  Called Dr Antonino Roa office  No ret call  Will you order tests?   Please call Trish at [place] [phone]

## 2018-08-16 NOTE — CARE COORDINATION
continue to monitor. Not currently monitoring daily wts d/t weakness. I discussed grand daughter if hospice has ever been discussed with Atul Sharpe and family. Theodis Brittle states that she has brought this up to her aunt as Atul Sharpe has made comments before that she is of dealing with all of her health problems and tired of being in so much discomfort. Theodis Brittle states that her Sally De Luna (Brinda's daughter) seems to be in denial of Brinda's decline in health and did not want to discuss. Contacted pharmacy. Informed that someone picked up isosorbide script on 8/8. Contacted Quin back. Informed her that script was picked up on 8/8. She went back and looked at her meds and was able to locate that bottle. Will send med refill request for voltaryn gel to PCP. Theodis Brittle states that just since initial call she has put out another 50mls of urine and she can see urine in tubing. Advised her at this point to continue pushing fluids.  PT out at home now and [place] nurse just arrived to obtain urine specimen. [address] daughter states that she is scheduled to have a court date 9/20. States that she will not be able to attend d/t being Brinda's caregiver. States that she was told by her  that she would need to obtain letter from Brinda's PCP stating that she is caregiver for Atul Sharpe who requires around the clock care d/t her multiple medical issues.   Will route PCP regarding request.   Congestive Heart Failure Assessment    Are you currently restricting fluids?:  No Restriction  Do you understand a low sodium diet?:  Yes  Do you understand how to read food labels?:  Yes  How many restaurant meals do you eat per week?:  0  Do you salt your food before tasting it?:  No         Symptoms:   CHF associated angina: Neg, CHF associated dyspnea on exertion: Pos, CHF associated fatigue: Pos, CHF associated leg swelling: Neg, CHF associated orthostatic hypotension: Neg, CHF associated PND: Neg, CHF associated shortness of breath: Neg, CHF associated weakness: Pos      Symptom course:  stable  Patient-reported weight (lb):  (Comment: not monitoring daily wts)  Salt intake watch compared to last visit:  stable     ,   COPD Assessment    Does the patient understand envrionmental exposure?:  Yes  Is the patient able to verbalize Rescue vs. Long Acting medications?:  Yes  Does the patient have a nebulizer?:  Yes  Does the patient use a space with inhaled medications?:  No            Symptoms:   None:  Yes      Symptom course:  stable  Breathlessness:  minimal exertion  Increase use of rapid acting/rescue inhaled medications?:  Not Applicable  Change in chronic cough?:  No/At Baseline  Change in sputum?:  No/At Baseline  Self Monitoring - SaO2:  No  Have you had a recent diagnosis of pneumonia either by PCP or at a hospital?:  No      and   General Assessment    Do you have any symptoms that are causing concern?:  Yes  Progression since Onset:  Unchanged  Reported Symptoms:  Other (Comment: urinary symptoms. has forman catheter. grand daughter states low output this morning. urine dark in color. ua C&S ordered. [facility] nurse coming out today to obtain. denies low abd pain or pressure.   if no improvement advised to seek medical eval. )               Care Coordination Interventions    Program Enrollment:  Complex Care  Referral from Primary Care Provider:  Yes  Suggested Interventions and [address]:  Completed (Comment: pt receiving [facility] )  Meals on Wheels:  Declined  Medi Set or Pill Pack:  Completed  Occupational Therapy:  Completed (Comment: receiving [facility] OT)  Physical Therapy:  Completed (Comment: recieving [facility] PT)  Zone Management Tools:  Completed (Comment: CHF zone mgmt tool sheet mailed to pt. )         Goals Addressed             Most Recent     care coordination-self monitoring   Not on track (8/16/2018)             Care Coordination Goal:  Patient Self-Monitoring  Choose a Goal:      Daily weight:   No  I

## 2018-08-16 NOTE — TELEPHONE ENCOUNTER
Trish with [address] [place] [address] was notified and voiced understanding. She will pull the UA reflex culture order from [facility].

## 2018-08-16 NOTE — CARE COORDINATION
Received call back from grand daughter Nieves Hartley. States that [facility] nurse did collect urine specimen. States that she continues to try to push fluids, but Ginger Guillen just does not want to drink much. Nieves Hartley put Ginger Guillen on the phone. Pt has difficult time communicating b/c she is very [place]. I stressed importance of her drinking fluids. Pt states that she understands and will try. Nieves Hartley states that everyone has been telling Ginger Guillen that she needs to drink more, but it continues to be a challenge. Urine output has been reduced today. Grand daughter states that from midnight to 8am she had 200mls out of forman catheter. [address] daughter emptied forman cath while on phone. Pt has had appx 100mls from 0800 to 1530 today. Reports urine is \"rust\" color. Advised that if pt does not increase fluid intake and urine output continues to decline she will need to take her to ED. Please advise with any further recommendations. Attempted to reach [address] [place] that was out to see pt today. No answer. Message left to return call.

## 2018-08-17 NOTE — TELEPHONE ENCOUNTER
Brian Vásquez informed on HIPAA regarding letter. Brian Seayly also asking for lab results done 8/17/18. Brian Vásquez would like a call back with advice.

## 2018-08-17 NOTE — TELEPHONE ENCOUNTER
The urine has been sent for culture and no results are back yet. When the culture and sensitivity report is available, orders will be sent in depending on the results. In the meantime, she may use over-the-counter Azo for any urinary tract discomfort.

## 2018-08-21 NOTE — TELEPHONE ENCOUNTER
BMP order placed per noted. Contacted [facility] to inform. No answer. Message left on nurse line informing them BMP ordered to be drawn end of this week. (8/24).

## 2018-08-28 NOTE — CARE COORDINATION
Attempted to reach [facility] for f/u. No answer. Unable to leave VM as mailbox is full and not accepting new messages. Contacted [facility]. Spoke with Thor Abbasi. Informed that nurse will be out to see pt tomorrow and they will be drawing BMP at that time.

## 2018-08-31 NOTE — TELEPHONE ENCOUNTER
Dr. Sierra Ellsworth,      Patient's daughter Marvin Gomez [employer] [facility]) called and said that her mother is in severe leg pain from 6 pm to 6 am.  Marvin Gomez stated that you took her off the Norcos due to her kidneys but she said the tylenol and tramadol isn't working for her. Is there anything else that you can suggest?   Patient's last appointment with you was 7/24/18 and is scheduled to see you on 11/27/18. Marvin Gomez can be reached at this number  [phone]          Thank you and have a great day.     Marilou Guerra

## 2018-08-31 NOTE — TELEPHONE ENCOUNTER
I did not take her off norco. She can take it if she need it. Can take 1- 2 days aleve and see if it helps twice a day.   No more than 2 days

## 2018-09-06 NOTE — PROGRESS NOTES
Subjective:      Patient ID: Janette Rasmussen is a 80 y.o. female. ANAIS Betancur presents for follow-up of bladder cancer, urinary retention, right hydronephrosis, recurrent UTI, and pelvic floor prolapse. She is due for stent exchange. She currently has a right ureteral stent in place, however, she has a vaginal defect and the bladder falls through causing continued right hydronephrosis despite the ureteral stent being in place. She has urinary retention due to the pelvic floor prolapse. She was previously on a straight cath regimen but was asking to be catheterized every hour due to urgency so she most recently has had an indwelling forman catheter.                Most recently on 8/3/18 she underwent cystoscopy with right ureteroscopy, dilation of distal right ureter and replacement of right ureteral stent.                 She has a history of muscle invasive bladder cancer and she has completed chemotherapy treatment for this. Most recent surveillance cystoscopy on 2/6/17 was negative. Historically, she underwent cystoscopy with clot evacuation, TURBT medium, bilateral ureteroscopy with bilateral ureteral stent placement per Dr. Kian Toledo on 10/25/16. Review of Systems   Constitutional: Negative for chills, fatigue and fever. Gastrointestinal: Negative for abdominal distention, abdominal pain, nausea and vomiting. Genitourinary: Negative for difficulty urinating, flank pain and hematuria. Intermittent gross hematuria--chronic. Objective:   Physical Exam   Constitutional: She is oriented to person, place, and time. She appears well-developed and well-nourished. Wheelchair. HENT:   Head: Normocephalic and atraumatic. Right Ear: External ear normal.   Left Ear: External ear normal.   Nose: Nose normal.   Eyes: Conjunctivae are normal.   Neck: Normal range of motion. Cardiovascular: Normal rate and normal heart sounds. Pulmonary/Chest: Effort normal.   Abdominal: Soft.

## 2018-09-10 NOTE — TELEPHONE ENCOUNTER
Patient's granddaughter, Jazlyn Bernard, called and is requesting a prescription for isosorbide 60 mg one daily. Also, Dr. Jade Santana took her off the Norco because of her kidneys but patient is having pain so Rachell Sanchez is requesting to put her back on it. She's requesting a prescription. [facility]. DOLV 5/8/18, DONV 10/5/18.

## 2018-09-11 NOTE — ED PROVIDER NOTES
89. Her respiration is 18 and oxygen saturation is 93%. Physical Exam          DIFFERENTIAL DIAGNOSIS:       DIAGNOSTIC RESULTS     EKG: All EKG's are interpreted by the Emergency Department Physician who either signs or Co-signs this chart in the absence of a cardiologist.      RADIOLOGY: non-plain film images(s) such as CT, Ultrasound and MRI are read by the radiologist.  Plain radiographic images are visualized and preliminarily interpreted by the emergency physician unless otherwise stated below. US RENAL COMPLETE   Final Result   1. Double-J stent right kidney. Mild hydronephrosis around the indwelling stent. 2. Moderately severe hydronephrosis left kidney. **This report has been created using voice recognition software. It may contain minor errors which are inherent in voice recognition technology. **      Final report electronically signed by Dr. Stephany Ndiaye on 9/12/2018 3:00 PM      CT CHEST WO CONTRAST   Final Result   1. Interstitial fibrosis. Pulmonary arterial hypertension. 2. Multiple small scattered noncalcified nodules in both lungs, not appreciably changed from prior CT thorax 12/28/2016, likely representing poorly calcified granulomas. 3. Small bilateral pleural effusions. Mild bibasilar atelectasis/pneumonia. 4. Moderate hydronephrosis left side which is a chronic finding in this patient. **This report has been created using voice recognition software. It may contain minor errors which are inherent in voice recognition technology. **      Final report electronically signed by Dr. Stephany Ndiaye on 9/12/2018 8:26 AM      XR CHEST PORTABLE   Final Result   Prominent interstitium. Bilateral pleural effusions. Correlate for CHF/pulmonary edema. Infection is not excluded. **This report has been created using voice recognition software. It may contain minor errors which are inherent in voice recognition technology. **      Final report electronically signed by Dr. Jass Spangler on 9/11/2018 6:10 PM          LABS:   Labs Reviewed   CBC WITH AUTO DIFFERENTIAL - Abnormal; Notable for the following:        Result Value    WBC 13.4 (*)     RBC 3.63 (*)     Hemoglobin 11.5 (*)     Hematocrit 36.1 (*)     MCV 99.4 (*)     MCHC 31.9 (*)     RDW-CV 15.6 (*)     RDW-SD 57.0 (*)     Segs Absolute 11.3 (*)     All other components within normal limits   BASIC METABOLIC PANEL - Abnormal; Notable for the following:     CO2 17 (*)     Glucose 124 (*)     BUN 30 (*)     CREATININE 2.0 (*)     All other components within normal limits   HEPATIC FUNCTION PANEL - Abnormal; Notable for the following:     Alkaline Phosphatase 168 (*)     ALT 8 (*)     All other components within normal limits   TROPONIN - Abnormal; Notable for the following:     Troponin T 0.042 (*)     All other components within normal limits   PROCALCITONIN - Abnormal; Notable for the following:     Procalcitonin 0.40 (*)     All other components within normal limits   BRAIN NATRIURETIC PEPTIDE - Abnormal; Notable for the following:     Pro-BNP 05655.0 (*)     All other components within normal limits   GLOMERULAR FILTRATION RATE, ESTIMATED - Abnormal; Notable for the following:     Est, Glom Filt Rate 24 (*)     All other components within normal limits   BASIC METABOLIC PANEL - Abnormal; Notable for the following:     CO2 17 (*)     Glucose 135 (*)     BUN 31 (*)     CREATININE 2.1 (*)     All other components within normal limits   BRAIN NATRIURETIC PEPTIDE - Abnormal; Notable for the following:     Pro-BNP 94299.0 (*)     All other components within normal limits   TROPONIN - Abnormal; Notable for the following:     Troponin T 0.051 (*)     All other components within normal limits   TROPONIN - Abnormal; Notable for the following:     Troponin T 0.051 (*)     All other components within normal limits   URINE WITH REFLEXED MICRO - Abnormal; Notable for the following:     Blood, Urine LARGE (*)     Nitrite, Urine

## 2018-09-11 NOTE — ED TRIAGE NOTES
Patient presents via [facility] to ED with complaints of shortness of breath, hematuria, and urinary catheter not draining that began this morning. Urine in catheter bag appears to be brown in color, and patients family reports patient is in stage 4 kidney failure.

## 2018-09-11 NOTE — TELEPHONE ENCOUNTER
I followed up with [facility] and spoke with Tesfaye Ojeda, director of home health. She said that they went to the home and the patient was sent to the emergency room to be evaluated.

## 2018-09-11 NOTE — H&P
History & Physical  //      Patient:  Harrison Michaels  YOB: 1930    MRN: [id number]     Acct: [de-identified]    PCP: Karo Pina MD    Date of Admission: 9/11/2018    Date of Service: Pt seen/examined on 09/11/2018and Admitted to Inpatient with expected LOS greater than two midnights due to medical therapy. Chief Complaint:  Gross hematuria        History Of Present Illness:  80 y.o. female who presented to [address] [place] [address] brought in by niece for further evaluation of gross hematuria that started this morning. In the ED she was placed on CBI and was passing clots. It was noted she has lost approximately 1 gm of hemoglobin from the last time she was in the hospital, however she denies dizziness, palpitations, or chest pain. In the ED further work up came back consistent with CHF exacerbation and possible PNA, she will also be admitted for further evaluation of this. Past Medical History:          Diagnosis Date    Allergic rhinitis     Anxiety     Bilateral hydronephrosis 8/10/2016    Bladder cancer ([facility] [address] 2008    Dr. Brianna Haider Blood circulation, collateral     Broken leg     left     CHF (congestive heart failure) (HCC)     Constipation     attributed to Ultram    Coronary artery disease involving native coronary artery of native heart s/p cath 10/14/15-LM-P, LAD MID 90%, ANUERYSMAL, % PROX, RCA MID 60% DISTLA 70%, EDP 20 , EF 30%-NEED REVASCULARIZATION 10/14/2015    GERD (gastroesophageal reflux disease)     History of blood transfusion     [place] (hard of hearing)     Hyperglycemia     Hyperlipidemia     Hypertension     Obesity (BMI 30-39. 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  4/26/2017       Past Surgical History:          Procedure Laterality Date    ABDOMEN SURGERY      ABDOMINAL EXPLORATION SURGERY  1954    BLADDER SURGERY  10/2016    stent placed and removed a blood clot    for Diarrhea 8/2/17   Andres Conde MD   docusate sodium (COLACE) 100 MG capsule Take 1 capsule by mouth daily as needed for Constipation 4/19/17   Reina Pyle MD   BIOTIN FORTE PO Take by mouth daily    Historical Provider, MD   acetaminophen (TYLENOL) 325 MG tablet Take 2 tablets by mouth every 4 hours as needed for Pain or Fever 10/31/16   Roldan Long MD   sodium chloride (OCEAN) 0.65 % nasal spray 1 spray by Nasal route as needed for Congestion    Historical Provider, MD   cetirizine (ZYRTEC) 10 MG tablet Take 10 mg by mouth daily    Historical Provider, MD   Handicap Placard MISC by Does not apply route. 2/28/14   Andres Conde MD       Allergies:  Adhesive tape; Cleocin [clindamycin hcl]; Metronidazole; Penicillins; and Septra [bactrim]    Social History:      The patient currently lives at home    TOBACCO:   reports that she has never smoked. She has never used smokeless tobacco.  ETOH:   reports that she does not drink alcohol. Family History:       Reviewed in detail and positive as follows:    Family History   Problem Relation Age of Onset    Arthritis Mother     Asthma Mother     Stroke Mother     Arthritis Father     Heart Disease Father 68        enlarged heart    Heart Disease Sister         CHF    Cancer Brother         metatstatic    Diabetes Brother     Parkinsonism Brother     Diabetes Sister     Stroke Sister     Diabetes Brother     Heart Disease Brother        Diet:       REVIEW OF SYSTEMS:   Pertinent positives as noted in the HPI. All other systems reviewed and negative. PHYSICAL EXAM:    BP (!) 110/56   Pulse 87   Temp 99.1 °F (37.3 °C) (Oral)   Resp 16   Ht 4' 9.5\" (1.461 m)   Wt 133 lb (60.3 kg)   LMP  (Exact Date)   SpO2 98%   BMI 28.28 kg/m²     General appearance:  No apparent distress, appears stated age and cooperative. HEENT:  Normal cephalic, atraumatic without obvious deformity. Pupils equal, round, and reactive to light.   Extra ocular muscles intact. Conjunctivae/corneas clear. Neck: Supple, with full range of motion. No jugular venous distention. Trachea midline. Respiratory:  Normal respiratory effort. Clear to auscultation, bilaterally without Rales/Wheezes/Rhonchi. Cardiovascular:  Regular rate and rhythm with normal S1/S2 without murmurs, rubs or gallops. Abdomen: Soft, non-tender, non-distended with normal bowel sounds. Glez cath with gross hematuria  Musculoskeletal:  No clubbing, cyanosis or edema bilaterally. Full range of motion without deformity. Skin: Skin color, texture, turgor normal.  No rashes or lesions. Neurologic:  Neurovascularly intact without any focal sensory/motor deficits. Cranial nerves: II-XII intact, grossly non-focal.  Psychiatric:  Alert and oriented, thought content appropriate, normal insight  Capillary Refill: Brisk,< 3 seconds   Peripheral Pulses: +2 palpable, equal bilaterally       Labs:     Recent Labs      09/11/18   1630   WBC  13.4*   HGB  11.5*   HCT  36.1*   PLT  398     Recent Labs      09/11/18   1630   NA  139   K  4.9   CL  107   CO2  17*   BUN  30*   CREATININE  2.0*   CALCIUM  8.9     Recent Labs      09/11/18   1630   AST  14   ALT  8*   BILIDIR  <0.2   BILITOT  0.3   ALKPHOS  168*     Recent Labs      09/11/18   1630   INR  1.13     No results for input(s): Jonathan Schaefer in the last 72 hours. Urinalysis:      Lab Results   Component Value Date    NITRU NEGATIVE 08/16/2018    WBCUA >200W/CLUMPS 08/16/2018    BACTERIA MODERATE 08/16/2018    RBCUA > 200 08/16/2018    BLOODU LARGE 08/16/2018    SPECGRAV 1.020 07/25/2018    GLUCOSEU NEGATIVE 08/16/2018       Intake & Output:  No intake/output data recorded. I/O this shift:  In: -   Out: 300 [Urine:300]      Radiology:   EKG:  I have reviewed the EKG with the following interpretation: Sinus tachycardia, HR in 110's, no ST changes    XR CHEST PORTABLE   Final Result   Prominent interstitium. Bilateral pleural effusions.  Correlate for

## 2018-09-11 NOTE — ED NOTES
Continuous irrigation started with clots being drained into bag. Patient reports decrease in pain. Drainage becoming lighter pink. Resting comfortably on cart. Will continue to monitor.      Neetu Sheridan RN  09/11/18 [id number]

## 2018-09-11 NOTE — ED NOTES
Bed: 015A  Expected date: 9/11/18  Expected time: 4:02 PM  Means of arrival: [facility]  Comments:     Meridee Siemens, RN  09/11/18 4804

## 2018-09-11 NOTE — ED PROVIDER NOTES
TAKE 1 TABLET BY MOUTH DAILY    ONDANSETRON (ZOFRAN) 4 MG TABLET    TAKE 1 TABLET BY MOUTH EVERY 8 HOURS AS NEEDED FOR NAUSEA OR VOMITING    PANTOPRAZOLE (PROTONIX) 40 MG TABLET    TAKE 1 TABLET BY MOUTH 2 TIMES DAILY (BEFORE MEALS)    ROPINIROLE (REQUIP) 4 MG TABLET    Take 1 tablet by mouth 2 times daily    SODIUM CHLORIDE (OCEAN) 0.65 % NASAL SPRAY    1 spray by Nasal route as needed for Congestion    TRAMADOL (ULTRAM) 50 MG TABLET    Take 50 mg by mouth every 6 hours as needed for Pain. Vivian Copper ZAFIRLUKAST (ACCOLATE) 20 MG TABLET    TAKE 1 TABLET TWICE A DAY       ALLERGIES     is allergic to adhesive tape; cleocin [clindamycin hcl]; metronidazole; penicillins; and septra [bactrim]. FAMILY HISTORY     indicated that her mother is . She indicated that her father is . family history includes Arthritis in her father and mother; Asthma in her mother; Cancer in her brother; Diabetes in her brother, brother, and sister; Heart Disease in her brother and sister; Heart Disease (age of onset: 68) in her father; Parkinsonism in her brother; Stroke in her mother and sister. SOCIAL HISTORY      reports that she has never smoked. She has never used smokeless tobacco. She reports that she does not drink alcohol or use drugs. PHYSICAL EXAM     INITIAL VITALS:  height is 4' 9.5\" (1.461 m) and weight is 133 lb (60.3 kg). Her oral temperature is 99.1 °F (37.3 °C). Her blood pressure is 110/56 (abnormal) and her pulse is 87. Her respiration is 16 and oxygen saturation is 98%. Physical Exam   Constitutional: She is oriented to person, place, and time. She appears well-developed and well-nourished. No distress. HENT:   Head: Normocephalic and atraumatic. Right Ear: External ear normal.   Left Ear: External ear normal.   Nose: Nose normal.   Mouth/Throat: Oropharynx is clear and moist.   Eyes: Pupils are equal, round, and reactive to light.  Conjunctivae and EOM are normal. Right eye exhibits no discharge. Left eye exhibits no discharge. No scleral icterus. Neck: Normal range of motion. Neck supple. Cardiovascular: Normal rate, regular rhythm and normal heart sounds. Exam reveals no gallop and no friction rub. No murmur heard. Pulmonary/Chest: Effort normal and breath sounds normal. No respiratory distress. She has no wheezes. She has no rales. She exhibits no tenderness. Abdominal: Soft. Bowel sounds are normal. She exhibits no distension and no mass. There is no tenderness. There is no rebound and no guarding. Musculoskeletal: Normal range of motion. She exhibits no edema. Lymphadenopathy:     She has no cervical adenopathy. Neurological: She is alert and oriented to person, place, and time. She exhibits normal muscle tone. Coordination normal. GCS eye subscore is 4. GCS verbal subscore is 5. GCS motor subscore is 6. Skin: Skin is warm and dry. No rash noted. She is not diaphoretic. No erythema. No pallor. Psychiatric: She has a normal mood and affect. Her behavior is normal. Thought content normal.   Nursing note and vitals reviewed.             DIFFERENTIAL DIAGNOSIS:   Includes but not limited to: UTI, hematuria, ureteral stenosis, catheter occlusion, ACS, bronchitis, pneumonia, pleuritis, COPD exacerbation, CHF exacerbation, pulmonary edema    DIAGNOSTIC RESULTS     EKG: All EKG's are interpreted by the Emergency Department Physician who either signs or Co-signs this chart in the absence of a cardiologist.  EKG SOB (Preliminary result)   Component (Lab Inquiry)   Collection Time Result Time Ventricular Rate Atrial Rate P-R Interval QRS Duration Q-T Interval   09/11/18 16:11:24 09/11/18 16:36:24 113 113 166 80 326       Collection Time Result Time QTc Calculation (Bazett) P Axis R Axis T Axis   09/11/18 16:11:24 09/11/18 16:36:24 447 81 54 65       Preliminary result                Narrative:    Poor data quality, interpretation may be adversely affected  Sinus tachycardia with occasional Premature ventricular complexes and Fusion complexes  Possible Left atrial enlargement  Nonspecific ST and T wave abnormality  Abnormal ECG  When compared with ECG of 18-JUN-2018 10:04,  Significant changes have occurred              RADIOLOGY: non-plain film images(s) such as CT, Ultrasound and MRI are read by the radiologist.  Plain radiographic images are visualized and preliminarily interpreted by the emergency physician unless otherwise stated below. XR CHEST PORTABLE   Final Result   Prominent interstitium. Bilateral pleural effusions. Correlate for CHF/pulmonary edema. Infection is not excluded. **This report has been created using voice recognition software. It may contain minor errors which are inherent in voice recognition technology. **      Final report electronically signed by Dr. Jessica Martinez on 9/11/2018 6:10 PM          LABS:   Labs Reviewed   CBC WITH AUTO DIFFERENTIAL - Abnormal; Notable for the following:        Result Value    WBC 13.4 (*)     RBC 3.63 (*)     Hemoglobin 11.5 (*)     Hematocrit 36.1 (*)     MCV 99.4 (*)     MCHC 31.9 (*)     RDW-CV 15.6 (*)     RDW-SD 57.0 (*)     Segs Absolute 11.3 (*)     All other components within normal limits   BASIC METABOLIC PANEL - Abnormal; Notable for the following:     CO2 17 (*)     Glucose 124 (*)     BUN 30 (*)     CREATININE 2.0 (*)     All other components within normal limits   HEPATIC FUNCTION PANEL - Abnormal; Notable for the following:     Alkaline Phosphatase 168 (*)     ALT 8 (*)     All other components within normal limits   TROPONIN - Abnormal; Notable for the following:     Troponin T 0.042 (*)     All other components within normal limits   PROCALCITONIN - Abnormal; Notable for the following:     Procalcitonin 0.40 (*)     All other components within normal limits   BRAIN NATRIURETIC PEPTIDE - Abnormal; Notable for the following:     Pro-BNP 43378.0 (*)     All other components within normal limits   GLOMERULAR FILTRATION RATE, ESTIMATED - Abnormal; Notable for the following:     Est, Glom Filt Rate 24 (*)     All other components within normal limits   APTT   PROTIME-INR   LIPASE   LACTIC ACID, PLASMA   ANION GAP   OSMOLALITY   URINE RT REFLEX TO CULTURE       EMERGENCY DEPARTMENT COURSE:   Vitals:    Vitals:    09/11/18 1700 09/11/18 1737 09/11/18 1807 09/11/18 1901   BP: 136/79 (!) 147/84 (!) 163/67 (!) 110/56   Pulse: 97 94 87 87   Resp: 18 20 22 16   Temp:       TempSrc:       SpO2: 98% 99% 98% 98%   Weight:       Height:         The patient was seen and evaluated within the ED today with hematuria, urinary catheter problem, shortness of breath, and chest pain. Within the department, I observed the patient's vital signs to be within acceptable range, and she was afebrile. On exam, I appreciated clear lung sounds. THere is no abdominal tenderness. THere is gross hematuria. Radiologic studies within the department revealed prominent interstitium. Bilateral pleural effusions. Correlate for CHF/pulmonary edema. Infection is not excluded. Laboratory work revealed a WBC count of 13.4, procalcitonin 0.4, lactic acid 1.1. BNP was 53693. CO2 17. BUN 30, Cr 2. Troponin was 0.042, although she has chronic elevations due to CHF and CKD. Hgb 11.5, RBC count 3.63. Within the department, the patient was treated with Truddie Randall for her chronic pain per her at-home medications schedule. Her urinary catheter was flushed with reported improvement in her symptoms. Over 2 L of grossly bloody urine was irrigated. I observed the patient's condition to remain stable during the duration of the stay. I explained my proposed course of treatment to the patient and her daughter, who were amenable to my treatment and admission decisions. Dr. Lorri Kelley, was consulted and kindly agreed to admit the patient for further evaluation and management.  The patient remained stable and maintained stable vital signs until admission to the

## 2018-09-12 PROBLEM — E87.20 METABOLIC ACIDOSIS: Status: ACTIVE | Noted: 2018-01-01

## 2018-09-12 NOTE — PROGRESS NOTES
[facility] 60  INPATIENT OCCUPATIONAL THERAPY  [facility] RENAL TELEMETRY 6K  EVALUATION    Time:  Time In: 6107  Time Out: 1107  Timed Code Treatment Minutes: 25 Minutes  Minutes: 39          Date: 2018  Patient Name: Dima Marquez,   Gender: female      MRN: [id number]  : 1930  (80 y.o.)  Referring Practitioner: Dr. Amada Chamberlain  Diagnosis: hematuria  Additional Pertinent Hx: 80 y.o. female who presents to the emergency department on 18 for evaluation of hematuria, a urinary catheter problem, chest pain, and SOB. The patient has chronic intermittent hematuria as well as recurrent UTIs. She has an indwelling Glez catheter due to urinary retention secondary to pelvic floor prolapse. She also has a pas history of bladder cancer. The patient has a history of CHF, CAD, hypertension, COPD, and angioplasty with stent placement    Restrictions/Precautions:  Fall Risk                            Past Medical History:   Diagnosis Date    Allergic rhinitis     Anxiety     Bilateral hydronephrosis 8/10/2016    Bladder cancer ([facility] [address]     Dr. Tawnya Diego Blood circulation, collateral     Broken leg     left     CHF (congestive heart failure) (HCC)     Constipation     attributed to Ultram    Coronary artery disease involving native coronary artery of native heart s/p cath 10/14/15-LM-P, LAD MID 90%, ANUERYSMAL, % PROX, RCA MID 60% DISTLA 70%, EDP 20 , EF 30%-NEED REVASCULARIZATION 10/14/2015    GERD (gastroesophageal reflux disease)     History of blood transfusion     [place] (hard of hearing)     Hyperglycemia     Hyperlipidemia     Hypertension     Obesity (BMI 30-39. 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  2017     Past Surgical History:   Procedure Laterality Date    ABDOMEN SURGERY      ABDOMINAL EXPLORATION SURGERY      BLADDER SURGERY  10/2016    stent placed and removed a blood clot    assistance (onto EOB for safety d/t pt height and feet slipping forwrad. )    Balance  Sitting Balance: Contact guard assistance (at EOB )  Standing Balance: Maximum assistance (x1 at walker with bilateral UE support and posterior lean. )     Time: x 15 sec   Activity: static standing. Functional Mobility  Functional Mobility Comments: OTR to further assess- Pt dmeo static standing with posterior lean and increased assistance to complete. Pt unable to advance feet with feet sliding. Pt returned to sitting EOB. Activity Tolerance:  Activity Tolerance: Patient limited by fatigue (increased time to complete tasks for sfety and to allow pt to recovery)  Activity Tolerance: OT anh completed this date. Pt sittig EOB with initial max A d/t posterior lean. Pt educated on safety with sit to stand from eOB with blocking of LE with therapist placing hand on right shin area d/t pts toes being to sensitive for blocking at them. Pt standing with posterior lean at walker not safe to attempt to step. Pt returned to sitting but d/t height returned to supine immediatley for safety. RN present to assist with transfer of stand pivot from bed to chair with max A x2. Recommend use of Bella Bue to reutrn pt back to bed for safety reasons. Assessment:  Assessment: Pt dmeo decreased ability to complete ADL taks and mobility at PLOF d/t decreased balance and weakness/activity tolreance. Pt would  benefit from skilled OT SErvices to icnrease her safety wiht transfers and mobility as well as ADL tasks.    Performance deficits / Impairments: Decreased functional mobility , Decreased safe awareness, Decreased balance, Decreased ADL status, Decreased endurance  Prognosis: Fair  Discharge Recommendations: Subacute/Skilled Nursing Facility    Clinical Decision Making: Clinical Decision making was of Moderate Complexity as the result of analysis of data from a detailed Inpatient Daily Activity Raw Score: 12  AM-PAC Inpatient ADL T-Scale Score : 30.6  ADL Inpatient CMS 0-100% Score: 66.57  ADL Inpatient CMS G-Code Modifier : CL

## 2018-09-12 NOTE — CARE COORDINATION
9/12/18, 1:53 PM  Wayne Miller       Admitted from: ED 9/11/2018/ Erma day: 1   Location: -08/008-A Reason for admit: Hematuria [R31.9] Status: inpt  Admit order signed?: yes  PMH:  has a past medical history of Allergic rhinitis; Anxiety; Bilateral hydronephrosis; Bladder cancer ([facility] [address] Blood circulation, collateral; Broken leg; CHF (congestive heart failure) ([facility] [address] Constipation; Coronary artery disease involving native coronary artery of native heart s/p cath 10/14/15-LM-P, LAD MID 90%, ANUERYSMAL, % PROX, RCA MID 60% DISTLA 70%, EDP 20 , EF 30%-NEED REVASCULARIZATION; GERD (gastroesophageal reflux disease); History of blood transfusion; [place] (hard of hearing); Hyperglycemia; Hyperlipidemia; Hypertension; Obesity (BMI 30-39.9); Osteoarthritis; Osteopenia; Pneumonia; and 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 .  Medications:  Scheduled Meds:   cetirizine  10 mg Oral Daily    ferrous sulfate  325 mg Oral Daily with breakfast    fluticasone  2 spray Nasal BID    pantoprazole  40 mg Oral QAM AC    rOPINIRole  4 mg Oral BID    zafirlukast  20 mg Oral BID    escitalopram  10 mg Oral Daily    atorvastatin  20 mg Oral Daily    metoprolol succinate  50 mg Oral Daily    diclofenac sodium  2 g Topical 4x Daily    isosorbide mononitrate  60 mg Oral Daily    sodium chloride flush  10 mL Intravenous 2 times per day    meropenem  500 mg Intravenous Q12H     Continuous Infusions:   Pertinent Info/Orders/Treatment Plan: Pt admitted with hematuria in [facility] attempted to flush [place] but unsuccessful. Urology and nephrology consulted. BNP [id number]. IV atbx started. PT/OT ordered to see.    Diet: DIET LOW SODIUM 2 GM;   Vital Signs: BP (!) 110/52   Pulse 89   Temp 98.6 °F (37 °C) (Oral)   Resp 18   Ht 4' 9\" (1.448 m)   Wt 138 lb 11.2 oz (62.9 kg)   LMP  (Exact Date)   SpO2 93%   BMI 30.01 kg/m²   DVT Prophylaxis: SCDs  Influenza Vaccination Screening Completed: yes  Pneumonia Vaccination Screening Completed: yes  PCP: Demarco Nguyen MD  Readmission: no  Readmission Risk Score: 40%    Discharge Planning  Current Residence:  Private Residence  Living Arrangements:  Alone   Support Systems:  Children, Family Members  Current Services PTA:     Potential Assistance Needed:  Home Care  Potential Assistance Purchasing Medications:  No  Does patient want to participate in local refill/ meds to beds program?  No  Type of Home Care Services:  Aide Services, OT, PT, Nursing Services  Patient expects to be discharged to:  home with home health  Expected Discharge date: Follow Up Appointment: Best Day/ Time:      Discharge Plan: Pt from home with grand-daughter. Pt and grand-daughter would like to go home with [facility] again. PT/OT ordered, PT eval pending.     Evaluation: yes

## 2018-09-12 NOTE — PROGRESS NOTES
Pharmacy Medication History Note      List of current medications patient is taking is complete. Source of information: patient's daughter, medication list     Changes made to medication list:  Medications removed (include reason, ex. therapy complete or physician discontinued):  · Docusate 100 mg - patient refuses to use at home    Medications added/doses adjusted:  · Phenazopyridine 100 mg tablet (OTC Uricalm Max) - 3 times daily PRN with meals    Other notes (ex. Recent course of antibiotics, Coumadin dosing):  · Denies use of other OTC or herbal medications.     Electronically signed by Sydney Cheng on 9/12/2018 at 2:01 PM

## 2018-09-12 NOTE — PROGRESS NOTES
doses of IV lasix; monitor closely off diuretics for now. 5. Patient noted to be on supplemental oxygen in ER, saturating 98%. She does not appear to be hypoxic and she has since been weaned off supplemental oxygen. Diet: DIET LOW SODIUM 2 GM;  Dietary Nutrition Supplements: Renal Oral Supplement    Code status: Full Code     ----------  Subjective  Reports some dysuria on admission. No flank pain. No chest pain. No SOB. She's had some nasal congestion. Objective  Physical exam:  Vitals: BP (!) 110/52   Pulse 89   Temp 98.6 °F (37 °C) (Oral)   Resp 18   Ht 4' 9\" (1.448 m)   Wt 138 lb 11.2 oz (62.9 kg)   LMP  (Exact Date)   SpO2 93%   BMI 30.01 kg/m²   Gen: Not in distress. Alert. Oriented. Head: Normocephalic. Atraumatic. Eyes: EOMI. Good acuity. ENT: Oral mucosa moist  Neck: No JVD. No obvious thyromegaly. CVS: Nml S1S2, no MRG, RRR  Pulmomary: Clear bilaterally. No crackles. No wheezes. Gastrointestinal: Soft, NT/ND. Positive bowel sounds. Musculoskeletal: No edema. Warm  Neuro: No focal deficit. Moves extremity spontaneously. Psychiatry: Appropriate affect. Not agitated. Skin: Warm, dry with normal turgor. No rash  Capillary Refill: Brisk,< 3 seconds   Peripheral Pulses: +2 palpable, equal bilaterally    24HR INTAKE/OUTPUT:    Intake/Output Summary (Last 24 hours) at 09/12/18 1632  Last data filed at 09/12/18 1439   Gross per 24 hour   Intake              780 ml   Output             -975 ml   Net             1755 ml     I/O last 3 completed shifts: In: 780 [P.O.:780]  Out: -975   No intake/output data recorded.       Meds:    sodium bicarbonate  1,300 mg Oral 4x Daily    cetirizine  10 mg Oral Daily    ferrous sulfate  325 mg Oral Daily with breakfast    fluticasone  2 spray Nasal BID    pantoprazole  40 mg Oral QAM AC    rOPINIRole  4 mg Oral BID    zafirlukast  20 mg Oral BID    escitalopram  10 mg Oral Daily    atorvastatin  20 mg Oral Daily    metoprolol succinate  50 mg Oral Daily    diclofenac sodium  2 g Topical 4x Daily    isosorbide mononitrate  60 mg Oral Daily    sodium chloride flush  10 mL Intravenous 2 times per day    meropenem  500 mg Intravenous Q12H       Infusions:       PRN Meds: sodium chloride, docusate sodium, diphenoxylate-atropine, lidocaine, meclizine, LORazepam, traMADol, flavoxate, HYDROcodone-acetaminophen, sodium chloride flush, ondansetron, potassium chloride **OR** potassium chloride **OR** potassium chloride, magnesium sulfate, acetaminophen    Labs/imaging:  CBC:   Recent Labs      09/11/18   1630   WBC  13.4*   HGB  11.5*   PLT  398         BMP:  Recent Labs      09/11/18   1630  09/12/18   0518   NA  139  139   K  4.9  4.5   CL  107  110   CO2  17*  17*   BUN  30*  31*   CREATININE  2.0*  2.1*   GLUCOSE  124*  135*         Hepatic: Recent Labs      09/11/18   1630   AST  14   ALT  8*   BILITOT  0.3   ALKPHOS  168*       INR:   Recent Labs      09/11/18   1630   INR  1.13         Court Gee MD  -------------------------------  Rounding hospitalist

## 2018-09-12 NOTE — CONSULTS
daily as needed for Dizziness 4/10/18   Alexadnria Borrego MD   Elastic Bandages & Supports (MEDICAL COMPRESSION STOCKINGS) MISC Knee-high, 20-30 mmHg. Dx: LE edema 12/22/17   Adelia Haywood DO   ondansetron (ZOFRAN) 4 MG tablet TAKE 1 TABLET BY MOUTH EVERY 8 HOURS AS NEEDED FOR NAUSEA OR VOMITING 8/22/17   Newton Simmons APRN - CNP   docusate sodium (COLACE) 100 MG capsule Take 1 capsule by mouth daily as needed for Constipation 4/19/17   Charles Westfall MD   Handicap Placard MISC by Does not apply route. 2/28/14   Alexandria Borrego MD       Review of Systems:  Source of data: Patient  Constitutional: Denies fever, chills, Reports malaise, fatigue. HEENT: Denies visual changes, runny nose, sore throat, dysphagia, [place], tinnitus. Chest: Denies SOB, sputum production, cough. Cardiovascular: Denies palpitations, chest pain or pressure. GI: Denies abdominal pain, nausea, vomiting, diarrhea, constipation. Reports low appetite. : Denies hematuria, foamy urine, hesitancy, or painful urination. Skin: Denies wounds, rashes, redness. Musculoskeletal: Denies pain, swelling, tingling/numbness, weakness. Neurological: Denies dizziness, light headedness, syncope, confusion, numbness, tingling, gait disturbance.      Current Meds:  Infusion:   Meds:    cetirizine  10 mg Oral Daily    ferrous sulfate  325 mg Oral Daily with breakfast    fluticasone  2 spray Nasal BID    pantoprazole  40 mg Oral QAM AC    rOPINIRole  4 mg Oral BID    zafirlukast  20 mg Oral BID    escitalopram  10 mg Oral Daily    atorvastatin  20 mg Oral Daily    metoprolol succinate  50 mg Oral Daily    diclofenac sodium  2 g Topical 4x Daily    isosorbide mononitrate  60 mg Oral Daily    sodium chloride flush  10 mL Intravenous 2 times per day    meropenem  500 mg Intravenous Q12H     Meds prn: sodium chloride, docusate sodium, diphenoxylate-atropine, lidocaine, meclizine, LORazepam, traMADol, flavoxate, HYDROcodone-acetaminophen, bilateral pleural effusions. Mild bibasilar atelectasis/pneumonia. 4. Moderate hydronephrosis left side which is a chronic finding in this patient. CXR 9/11/18  FINDINGS:       Prominent interstitium. Bilateral pleural effusions. Correlate for CHF/pulmonary edema. Infection is not excluded. Atherosclerotic changes aortic arch. Osteopenia. High riding humeral heads. Degenerative changes thoracic spine. Partially imaged ureteral    stent. Cardiac mediastinal silhouette is within normal limits.               Impression   Prominent interstitium. Bilateral pleural effusions. Correlate for CHF/pulmonary edema. Infection is not excluded. Echocardiogram 5/1/18  Conclusions      Summary   Normal left ventricle size and systolic function. Ejection fraction was   estimated at 55%. There were no regional left ventricular wall motion   abnormalities ,and wall thickness was mild LVH.   Left atrial size was moderately dilated.   The aortic valve was trileaflet with moderate calcium and mild reduction   in cuspal separation. DOPPLER: Transaortic velocity was c/w mild aortic   stenosis.   Doppler parameters were consistent with abnormal left ventricular   relaxation (grade 1 diastolic dysfunction).   Previous 4/2017 no sig. Change    24HR INTAKE/OUTPUT:    Intake/Output Summary (Last 24 hours) at 09/12/18 0950  Last data filed at 09/12/18 0408   Gross per 24 hour   Intake              300 ml   Output            -1625 ml   Net             1925 ml     I/O last 3 completed shifts: In: 300 [P.O.:300]  Out: -1625   No intake/output data recorded. Admission weight: 133 lb (60.3 kg)  Wt Readings from Last 3 Encounters:   09/12/18 138 lb 11.2 oz (62.9 kg)   09/06/18 134 lb (60.8 kg)   08/03/18 130 lb (59 kg)     Body mass index is 30.01 kg/m².     Physical Examination:  VITALS:  BP (!) 127/58   Pulse 84   Temp 98.2 °F (36.8 °C) (Oral)   Resp 18   Ht 4' 9\" (1.448 m)   Wt 138 lb 11.2 oz (62.9 kg)   LMP  (Exact Date)   SpO2 91%   BMI 30.01 kg/m²   Weight:   Wt Readings from Last 3 Encounters:   09/12/18 138 lb 11.2 oz (62.9 kg)   09/06/18 134 lb (60.8 kg)   08/03/18 130 lb (59 kg)     Constitutional: alert and cooperative with exam, appears comfortable, no distress  Head: NC/AT   Eyes: no icteric sclera, no pallor conjunctiva, no discharge seen from either eye  Oral: moist oral mucus membranes  Ears: normal external appearance of left and right ear, both ear lobules nontender to palpation  Nose: no active drainage  Neck: No jugular venous distention, appears symmetric, good ROM  Lungs: Air entry B/L, no crackles or rales, clear anterior throughout, posterior left base with small area of fine crackles noted. Heart: regular rate and rhythm, S1, S2, no murmurs rubs or gallops. Extremities: No LE edema  GI: soft, non-tender, no guarding  : Glez in place, clear yellow urine noted in bag. Skin: no rash seen on exposed extremities  Musculo: moves all extremities  Neuro: no slurred speech, no facial drooping, symmetric strength  Psychiatric: Awake, alert, Oriented    Impression and Plan:    1. BEREKET, mild, unclear etiology, chronic hydro noted, infection, acute blood loss, diuretics. - Send stat urine lytes, eosinophils.   - Strict I&O, daily weights. - She was recently on nitrofurantoin for 10 days from 8/21-8/31. And cipro from 8/3-8/8  - Glez in place, discussed with urology, suspect possible reflux on left with dilated ureter, if crt continues to worsen may consider doing cystoscopyureteroscopy to evaluate.   - good UOP with Lasix. - Lasix stopped this am.   - Monitor BMP daily, currently not far from baseline crt, should improve with cessation of diuretics. Due to CHF will not start fluids at this time.   - Dr. Sam Martines will assume care tomorrow. 2. Acidosis, bicarb stable at 17. Should improve with improving renal function.    3. Leukocytosis, UTI with gross hematuria, management per urology including CBI, on Merrem  4. CKD III, baseline crt around 1.5-1.8, with eGFR 28-33 mL/min. 5. Acute/chronic CHF with preserved EF 29%, Grade 1 diastolic dysfunction, elevated BNP higher today than yesterday, on Lasix 20 mg daily, achieved 1925 mL of UOP today. 6. Anemia, acute blood loss, on chronic iron deficiency, on ferrous sulfate, continue. Stable at Hg 11.5.   7. Right distal ureteral stenosis, s/p stent insertion     Discussed with Dr. Camila Delvalle. All labs, radiology and medications were reviewed and discussed with the patient and RN caring for pt. Thank you for the consult. Please feel free to call me if you have any questions.      KALYN Maza, CNP  9/12/2018  9:50 AM  [facility]

## 2018-09-12 NOTE — PROGRESS NOTES
Pharmacy Renal Adjustment Consult    Romel Reyes is a 80 y.o. female. Pharmacy has been consulted to renally adjust the following medications: Merrem    Recent Labs      09/11/18   1630   BUN  30*       Recent Labs      09/11/18   1630   CREATININE  2.0*       CrCl cannot be calculated (Unknown ideal weight.). Calculated CrCl: ~14 mL/min (IBW)    Height:   Ht Readings from Last 1 Encounters:   09/11/18 4' 9\" (1.448 m)     Weight:  Wt Readings from Last 1 Encounters:   09/11/18 134 lb 8 oz (61 kg)     Results for Robert Montanez (MRN [id number]) as of 9/11/2018 21:58   Ref.  Range 7/12/2018 06:48 7/13/2018 15:15 7/23/2018 09:00 8/29/2018 12:15 9/11/2018 16:30   Creatinine Latest Ref Range: 0.4 - 1.2 mg/dL 1.7 (H) 1.5 (H) 1.7 (H) 1.8 (H) 2.0 (H)         Plan: Adjustments based on renal function:          Decrease Merrem to 500 mg q12h    Thank you,  Razia Luna, PharmD

## 2018-09-12 NOTE — PROGRESS NOTES
assess with fluid/edema changes),  6 months  · Ideal Body Wt: 93 lb (42.2 kg),   · BMI Classification: BMI 30.0 - 34.9 Obese Class I  · Comparative Standards (Estimated Nutrition Needs):  · Estimated Daily Total Kcal: 8830-4015 kcals  · Estimated Daily Protein (g): 43-52 grams pending renal status    Estimated Intake vs Estimated Needs: Intake Less Than Needs    Nutrition Risk Level: Moderate    Nutrition Interventions:   Continue current diet, Start ONS  Continued Inpatient Monitoring, Education Completed, Coordination of Care (CHF diet/fluid restriction reinforced with pt and granddaughter, handout provided as well 9/12/18.)    Nutrition Evaluation:   · Evaluation: Goals set   · Goals: Pt will consume 75% or more of meals during LOS. · Monitoring: Meal Intake, Supplement Intake, Diet Tolerance, Ascites/Edema, Weight, Pertinent Labs    See Adult Nutrition Doc Flowsheet for more detail.      Electronically signed by Nish Ann RD, LD on 9/12/18 at 2:48 PM    Contact Number: [phone]

## 2018-09-12 NOTE — CONSULTS
Urology Consult Note      Reason for Consult:  Gross hematuria  Requesting Physician:  Marleny Velasco  9/11/2018    History Obtained From:  patient    HISTORY OF PRESENT ILLNESS:      Bahman Crowell is a 80 y.o. female known to urology for history of bladder cancer and a right ureteral stricture, which is managed with a chronic stent. Her bladder CA was diagnosed in 2008 and was NMI LG disease. She had recurrence in 6-16. Disease was HG and invassive. She was not a surgical candidate and had EBRT with CT. She has a ureteral stricture managed with a chronic stent. This was last changed in 8-3-18. She presented to the ER last PM with gross hematuria. UA was nit/LE+. Cultures are pending. She is currently on meropenem. Past Medical History:        Diagnosis Date    Allergic rhinitis     Anxiety     Bilateral hydronephrosis 8/10/2016    Bladder cancer ([address] 2008    Dr. Kenzie Mcgovern Blood circulation, collateral     Broken leg     left     CHF (congestive heart failure) (HCC)     Constipation     attributed to Ultram    Coronary artery disease involving native coronary artery of native heart s/p cath 10/14/15-LM-P, LAD MID 90%, ANUERYSMAL, % PROX, RCA MID 60% DISTLA 70%, EDP 20 , EF 30%-NEED REVASCULARIZATION 10/14/2015    GERD (gastroesophageal reflux disease)     History of blood transfusion     [place] (hard of hearing)     Hyperglycemia     Hyperlipidemia     Hypertension     Obesity (BMI 30-39. 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  4/26/2017       Past Surgical History:        Procedure Laterality Date    ABDOMEN SURGERY      ABDOMINAL EXPLORATION SURGERY  1954    BLADDER SURGERY  10/2016    stent placed and removed a blood clot    CARDIAC SURGERY      stents    COLONOSCOPY      CYSTOSCOPY  11-    TUR and fulg BT  superficial noninvasive bladder ca    CYSTOSCOPY N/A 8/23/2017    CYSTOSCOPY, RIGHT BID  escitalopram (LEXAPRO) tablet 10 mg, 10 mg, Oral, Daily  meclizine (ANTIVERT) tablet 25 mg, 25 mg, Oral, TID PRN  LORazepam (ATIVAN) tablet 1 mg, 1 mg, Oral, BID PRN  traMADol (ULTRAM) tablet 50 mg, 50 mg, Oral, Q6H PRN  atorvastatin (LIPITOR) tablet 20 mg, 20 mg, Oral, Daily  metoprolol succinate (TOPROL XL) extended release tablet 50 mg, 50 mg, Oral, Daily  flavoxate (URISPAS) tablet 100 mg, 100 mg, Oral, TID PRN  diclofenac sodium 1 % gel 2 g, 2 g, Topical, 4x Daily  isosorbide mononitrate (IMDUR) extended release tablet 60 mg, 60 mg, Oral, Daily  HYDROcodone-acetaminophen (NORCO) 5-325 MG per tablet 1 tablet, 1 tablet, Oral, Q6H PRN  sodium chloride flush 0.9 % injection 10 mL, 10 mL, Intravenous, 2 times per day  sodium chloride flush 0.9 % injection 10 mL, 10 mL, Intravenous, PRN  ondansetron (ZOFRAN) injection 4 mg, 4 mg, Intravenous, Q6H PRN  potassium chloride (KLOR-CON M) extended release tablet 40 mEq, 40 mEq, Oral, PRN **OR** potassium chloride 20 MEQ/15ML (10%) oral solution 40 mEq, 40 mEq, Oral, PRN **OR** potassium chloride 10 mEq/100 mL IVPB (Peripheral Line), 10 mEq, Intravenous, PRN  magnesium sulfate 1 g in dextrose 5 % 100 mL IVPB, 1 g, Intravenous, PRN  acetaminophen (TYLENOL) tablet 650 mg, 650 mg, Oral, Q4H PRN  meropenem (MERREM) 500 mg in sodium chloride 0.9 % 100 mL IVPB, 500 mg, Intravenous, Q12H    Allergies:  Adhesive tape; Cleocin [clindamycin hcl];  Metronidazole; Penicillins; and Septra [bactrim]    Social History:   ND      Family History:   Family History   Problem Relation Age of Onset    Arthritis Mother     Asthma Mother     Stroke Mother     Arthritis Father     Heart Disease Father 68        enlarged heart    Heart Disease Sister         CHF    Cancer Brother         metatstatic    Diabetes Brother     Parkinsonism Brother     Diabetes Sister     Stroke Sister     Diabetes Brother     Heart Disease Brother        REVIEW OF SYSTEMS:  I completed a review of symptoms with the patient that included the Constitutional, Cardiovascular, Respiratory, Gastrointestinal, Genitourinary, Musculoskeletal, Skin, Neurological, Psychologic, and Endocrine which were negativeother than  Noted in the HPI    PHYSICAL EXAM:    VITALS:  BP (!) 123/58   Pulse 71   Temp 98 °F (36.7 °C) (Oral)   Resp 19   Ht 4' 9\" (1.448 m)   Wt 138 lb 11.2 oz (62.9 kg)   LMP  (Exact Date)   SpO2 91%   BMI 30.01 kg/m²     Well developed, well nourished in no acute distress  Mood indicates no abnormalities. Pt doesn't appear depressed  Oriented to time and place  Neck is supple, trachea is midline  Respiratory effort is normal  Cardiovascular shows no extremity swelling  Abdomen no masses or hernias are palpated, there is no tenderness. Liver and spleen appear normal.  Skin shows no abnormal lesions  Lymph nodes are not palpated in the inguinal, neck, or axillary area.         DATA:  CBC:   Lab Results   Component Value Date    WBC 13.4 09/11/2018    RBC 3.63 09/11/2018    HGB 11.5 09/11/2018    HCT 36.1 09/11/2018    MCV 99.4 09/11/2018    MCH 31.7 09/11/2018    MCHC 31.9 09/11/2018    RDW 16.6 06/17/2018     09/11/2018    MPV 9.4 09/11/2018     BMP:    Lab Results   Component Value Date     09/12/2018    K 4.5 09/12/2018    K 4.3 06/11/2018     09/12/2018    CO2 17 09/12/2018    BUN 31 09/12/2018    CREATININE 2.1 09/12/2018    CALCIUM 8.5 09/12/2018    LABGLOM 22 09/12/2018    GLUCOSE 135 09/12/2018     BUN/Creatinine:    Lab Results   Component Value Date    BUN 31 09/12/2018    CREATININE 2.1 09/12/2018     Magnesium:    Lab Results   Component Value Date    MG 1.8 09/12/2018     Phosphorus:    Lab Results   Component Value Date    PHOS 3.2 05/02/2018     PT/INR:    Lab Results   Component Value Date    INR 1.13 09/11/2018     U/A:    Lab Results   Component Value Date    NITRITE negative 09/12/2016    COLORU YELLOW 09/12/2018    PHUR 5.5 09/12/2018    LABCAST OBSCURED 07/25/2018    WBCUA 50-75 09/12/2018    RBCUA > 200 09/12/2018    MUCUS OBSCURED 07/25/2018    YEAST NONE SEEN 09/12/2018    BACTERIA NONE 09/12/2018    CLARITYU cloudy 06/09/2016    SPECGRAV 1.020 07/25/2018    LEUKOCYTESUR LARGE 09/12/2018    UROBILINOGEN 0.2 09/12/2018    BILIRUBINUR NEGATIVE 09/12/2018    BILIRUBINUR negative 06/09/2016    BLOODU LARGE 09/12/2018    GLUCOSEU NEGATIVE 09/12/2018    AMORPHOUS OBSCURED 07/25/2018     Urine Culture:  No components found for: CURINE  Blood Culture:  No components found for: CBLOOD, CFUNGUSBL    IMPRESSION:   Gross hematuria, UTI, history of bladder cancer, chronic stent for right ureteral stricture. Plan:      Urine clear this AM  Stop CBI. Ambulate. If urine stays clear disconnect CBI  Continue ABX for UTI. F/U on cultures  Check KUB to make certain that stent is in appropriate position. She has had chronic left hydro for years. On CT, this appears to be dilated through the wall of the bladder. I am guessing this is a patulous UO from reflux, which we would not treat in adults.   If her creatinine does not improve, I would get a cystogram after another day or two of ABx to confirm this suspicion            Electronically signed by Maria E Moreno MD on 9/12/2018 at 9:17 AM

## 2018-09-13 NOTE — PROGRESS NOTES
fluticasone  2 spray Nasal BID    pantoprazole  40 mg Oral QAM AC    rOPINIRole  4 mg Oral BID    zafirlukast  20 mg Oral BID    escitalopram  10 mg Oral Daily    atorvastatin  20 mg Oral Daily    metoprolol succinate  50 mg Oral Daily    diclofenac sodium  2 g Topical 4x Daily    isosorbide mononitrate  60 mg Oral Daily    sodium chloride flush  10 mL Intravenous 2 times per day       Infusions:       PRN Meds: sodium chloride, docusate sodium, diphenoxylate-atropine, lidocaine, meclizine, LORazepam, traMADol, flavoxate, HYDROcodone-acetaminophen, sodium chloride flush, ondansetron, potassium chloride **OR** potassium chloride **OR** potassium chloride, magnesium sulfate, acetaminophen    Labs/imaging:  CBC:   Recent Labs      09/11/18   1630  09/13/18   0503   WBC  13.4*  8.1   HGB  11.5*  9.1*   PLT  398  267         BMP:    Recent Labs      09/11/18   1630  09/12/18   0518  09/13/18   0503   NA  139  139  137   K  4.9  4.5  4.3   CL  107  110  108   CO2  17*  17*  20*   BUN  30*  31*  25*   CREATININE  2.0*  2.1*  1.9*   GLUCOSE  124*  135*  97         Hepatic:   Recent Labs      09/11/18   1630   AST  14   ALT  8*   BILITOT  0.3   ALKPHOS  168*       INR:   Recent Labs      09/11/18   1630   INR  1.13         Gregory Cisneros MD  -------------------------------  Rounding hospitalist

## 2018-09-13 NOTE — PROGRESS NOTES
Urology Progress Note    Chief Complaint: Hematuria    Subjective:   Patient is resting in bed, voiding per forman catheter clear yellow urine, +flatus, +BM, ambulating with assistance, tolerating regular diet, denies any nausea or vomiting. There are complaints of no pain at this time. Pending urine cultures for antibiotic sensitivity. Vitals:  BP (!) 147/60   Pulse 86   Temp 98.2 °F (36.8 °C) (Oral)   Resp 18   Ht 4' 9\" (1.448 m)   Wt 131 lb 8 oz (59.6 kg)   LMP  (Exact Date)   SpO2 95%   BMI 28.46 kg/m²   Temp  Av.2 °F (36.8 °C)  Min: 97.9 °F (36.6 °C)  Max: 98.6 °F (37 °C)    Intake/Output Summary (Last 24 hours) at 18 1042  Last data filed at 18 0358   Gross per 24 hour   Intake           923.99 ml   Output             1550 ml   Net          -626.01 ml       Social History     Social History    Marital status:      Spouse name: N/A    Number of children: N/A    Years of education: N/A     Occupational History    Not on file.      Social History Main Topics    Smoking status: Never Smoker    Smokeless tobacco: Never Used    Alcohol use No    Drug use: No    Sexual activity: Not on file     Other Topics Concern    Not on file     Social History Narrative    No narrative on file     Family History   Problem Relation Age of Onset    Arthritis Mother     Asthma Mother     Stroke Mother     Arthritis Father     Heart Disease Father 68        enlarged heart    Heart Disease Sister         CHF    Cancer Brother         metatstatic    Diabetes Brother     Parkinsonism Brother     Diabetes Sister     Stroke Sister     Diabetes Brother     Heart Disease Brother      Allergies   Allergen Reactions    Adhesive Tape Rash     USE PAPER TAPE    Cleocin [Clindamycin Hcl] Diarrhea    Metronidazole Diarrhea    Penicillins Other (See Comments)     Very sleepy    Septra [Bactrim] Other (See Comments)     Too big to swallow         Constitutional: Alert and

## 2018-09-13 NOTE — PROGRESS NOTES
[facility]    Renal inpatient Progress Note  9/13/2018 3:30 PM      Pt Name:   Chaz Blankenship:   9/9/1930  Attending:   Michelle Cobos MD    Chief Complaint:   Tanya Robb is a 80 y.o. female being followed by nephrology for BEREKET /CKD     Interval History : patient seen and examined by me. feels ok. No cp or SOB. Says eating and drinking better     Scheduled Medications :   ampicillin IV  1 g Intravenous Q8H    vancomycin  750 mg Intravenous Once    sodium bicarbonate  1,300 mg Oral 4x Daily    cetirizine  10 mg Oral Daily    ferrous sulfate  325 mg Oral Daily with breakfast    fluticasone  2 spray Nasal BID    pantoprazole  40 mg Oral QAM AC    rOPINIRole  4 mg Oral BID    zafirlukast  20 mg Oral BID    escitalopram  10 mg Oral Daily    atorvastatin  20 mg Oral Daily    metoprolol succinate  50 mg Oral Daily    diclofenac sodium  2 g Topical 4x Daily    isosorbide mononitrate  60 mg Oral Daily    sodium chloride flush  10 mL Intravenous 2 times per day          Vitals :  /60   Pulse 85   Temp 98.8 °F (37.1 °C) (Oral)   Resp 18   Ht 4' 9\" (1.448 m)   Wt 131 lb 8 oz (59.6 kg)   LMP  (Exact Date)   SpO2 95%   BMI 28.46 kg/m²     24HR INTAKE/OUTPUT:      Intake/Output Summary (Last 24 hours) at 09/13/18 1530  Last data filed at 09/13/18 1328   Gross per 24 hour   Intake           953.99 ml   Output             1375 ml   Net          -421.01 ml     Last 3 weights  Wt Readings from Last 3 Encounters:   09/13/18 131 lb 8 oz (59.6 kg)   09/06/18 134 lb (60.8 kg)   08/03/18 130 lb (59 kg)        Physical Exam :  General Appearance:  Well developed.  No distress  Mouth/Throat:  Oral mucosa moist  Neck:  Supple, no JVD  Lungs:  Breath sounds: clear  Heart[de-identified]  S1,S2 heard  Abdomen:  Soft, non - tender  Musculoskeletal:  Edema - none  Chronic forman in place     Last 3 CBC  Recent Labs      09/11/18   1630  09/13/18   0503   WBC  13.4*  8.1   RBC  3.63*  2.92* HGB  11.5*  9.1*   HCT  36.1*  30.1*   PLT  398  267     Last 3 CMP  Recent Labs      09/11/18   1630  09/12/18   0518  09/13/18   0503   NA  139  139  137   K  4.9  4.5  4.3   CL  107  110  108   CO2  17*  17*  20*   BUN  30*  31*  25*   CREATININE  2.0*  2.1*  1.9*   CALCIUM  8.9  8.5  8.3*   LABALBU  3.9   --   2.9*   BILITOT  0.3   --    --         Assessment / Plan   Renal - Mild AK I on chronic kidney disease  - Overall renal function appears to be stable still around baseline. Agree with discontinuation of IV fluids and encourage oral intake of fluids.  - Renal ultrasound scan shows B/L hydro - urology following  - closely follow BMP    Electrolytes - WNL  Essential Hypertension - well controlled  UTI on antibiotics  D/W patient,  meds reviewed    RONALDO Tejada D.  [facility].

## 2018-09-13 NOTE — PLAN OF CARE
Problem: Pain:  Goal: Pain level will decrease  Pain level will decrease   Outcome: Ongoing  Pain Assessment: 0-10  Pain Level: 0   Pain goal:  0  Is pain goal met at this time? Yes  Pain Intervention(s): Medication (see eMar), Repositioned  Additional interventions to be implemented: medications tramadol, position change and rest      Problem: Falls - Risk of:  Goal: Will remain free from falls  Will remain free from falls   Outcome: Met This Shift  Pt has had no falls so far this shift. Pt bed alarm on. Problem: Risk for Impaired Skin Integrity  Goal: Tissue integrity - skin and mucous membranes  Structural intactness and normal physiological function of skin and  mucous membranes. Outcome: Ongoing  Pt repositioned every 2 hours with pillow support. Problem: Nutrition  Goal: Optimal nutrition therapy  Outcome: Ongoing  Pt has had a poor appetite. Comments: Care plan reviewed with patient and family. Patient and family verbalize understanding of the plan of care and contribute to goal setting.

## 2018-09-13 NOTE — CARE COORDINATION
9/13/18 10:22 AM     Creat 1.9. CBI off, urine clear and pink. Urology and nephrology following. Remains on IV ampicillin. Dr. Celestino Villalpando feels patient is likely ready for discharge pending any further plans from urology.

## 2018-09-13 NOTE — PROGRESS NOTES
time         Exercises:  Comments: Pt performs B LE AROM: ankle pumps and heel slides while supine, LAQ while seated in chair, all x 10 reps to increase strength for improved gait. Activity Tolerance:  Activity Tolerance: Patient limited by endurance; Patient limited by fatigue    Treatment Initiated: See above exercises. Assessment: Body structures, Functions, Activity limitations: Decreased functional mobility , Decreased endurance, Decreased balance, Decreased strength  Assessment: Pt tolerates session fair, limited by generalized weakness and impaired endurance. Pt requires constant hands-on assist when up, states granddaughter is available to do that at home. Pt demonstrates decreased strength, bed mobility, transfers, balance, activity tolerance and gait indicating the need for skilled PT services. Prognosis: Good    Clinical Presentation: Low - Stable and Uncomplicated: Pt tolerates session fair, limited by generalized weakness and impaired endurance. Pt requires constant hands-on assist when up, states granddaughter is available to do that at home. Pt demonstrates decreased strength, bed mobility, transfers, balance, activity tolerance and gait indicating the need for skilled PT services. Decision Making: High Complexitybased on patient assessment and decision making process of determining plan of care and establishing reasonable expectations for measurable functional outcomes    REQUIRES PT FOLLOW UP: Yes  Discharge Recommendations: 24 hour supervision or assist, Home with Home health PT (Pt ed on need for 24/7 hands-on care at home, states granddaughter is there 24/7 and can assist)    Patient Education:  Patient Education: POC, need for 24/7 hands-on care at home    Equipment Recommendations:  Equipment Needed: No    Safety:  Type of devices:  All fall risk precautions in place, Call light within reach, Chair alarm in place, Gait belt, Patient at risk for falls, Left in

## 2018-09-13 NOTE — PROGRESS NOTES
Pharmacy Renal Adjustment Consult    Kassy Martin is a 80 y.o. female. Pharmacy has been consulted to renally adjust the following medications: Ampicillin    Recent Labs      09/12/18   0518  09/13/18   0503   BUN  31*  25*       Recent Labs      09/12/18   0518  09/13/18   0503   CREATININE  2.1*  1.9*       CrCl cannot be calculated (Unknown ideal weight.).   Calculated CrCl:    Height:   Ht Readings from Last 1 Encounters:   09/11/18 4' 9\" (1.448 m)     Weight:  Wt Readings from Last 1 Encounters:   09/13/18 131 lb 8 oz (59.6 kg)       CKD stage: 4         Baseline SCr: ~1.5    Plan: Adjustments based on renal function:          Decrease ampicillin to 1 g IV q8h

## 2018-09-13 NOTE — FLOWSHEET NOTE
09/12/18 2120   Encounter Summary   Services provided to: Patient   Referral/Consult From: [facility] Children;Family members   Continue Visiting Yes  (9/12/18)   Complexity of Encounter Moderate   Length of Encounter 15 minutes   Spiritual/Nondenominational   Type Spiritual support   Assessment Passive   Intervention Active listening;Nurtured hope   Outcome Expressed gratitude     Advance Directive Consult: Advance Directive materials were provided to patient and explained. Patient is not ready to complete at this time, gave information for Spiritual Care to be contacted if further assistance is needed.

## 2018-09-14 NOTE — PROGRESS NOTES
Patient up to toilet with therapy. Blood noted between patient legs, dark tarry stool noted in toilet, frances red blood between legs. Patient assisted by therapy to stand so that this nurse could provide perineal care, when patient stood forman noted to be under patient's foot with tension to the line. Patient then ambulated to the bed. Dr Pierce Houser arrived to see patient and gave verbal order to remove and replace forman to avoid further trauma to urinary tract. Forman removed noted to have blood clots attached to end of catheter as well as in the urine collection bag. Perineal care provided using soap and water. New forman placed with urine return noted and frances red blood in urine collection bag. Patient complains of pain. Hospitalist updated of situation and recommended updating urology. Dr Manrique Poster called and updated, ordered to place a 22 [place]ian forman. 16 Setswana forman placed prior to speaking with urology on catheter size.

## 2018-09-14 NOTE — CARE COORDINATION
9/14/18, 2:31 PM    DISCHARGE BARRIERS    SW informed family and patient asking about rehab. SW in room to explain that insurance can take 48-72 hours to approve rehab and patient is now ready for discharge. Discussed HH therapy that can assist patient at home. Plan home tomorrow with [facility], [address] notified Margret Varela at [facility]. Discharge plan discussed by  and . Discharge plan reviewed with patient/ family. Patient/ family verbalize understanding of discharge plan and are in agreement with plan. Understanding was demonstrated using the teach back method.

## 2018-09-14 NOTE — PROGRESS NOTES
CYSTOSCOPY  11-    TUR and fulg BT  superficial noninvasive bladder ca    CYSTOSCOPY N/A 8/23/2017    CYSTOSCOPY, RIGHT URETEROSCOPY, RIGHT STENT EXCHANGE retrograde right and right ureteral dilitation [facility] [address] [facility] 1/[facility]/2018    CYSTOSCOPY EVACUATION OF CLOTS performed by Skylar Singh MD at [place] [address] EKG 12-LEAD  10/14/2015         EKG 12-LEAD  10/17/2015         ENDOSCOPY, COLON, DIAGNOSTIC      EYE SURGERY  04/2018    JOINT REPLACEMENT      hip    MOUTH SURGERY      cheek; had benign lump removed    OTHER SURGICAL HISTORY Right 04/19/2017    Cystoscopy, Attempted Right Ureteral Access     NM CYSTOURETHROSCOPY,URETER CATHETER Right 1/31/2018    CYSTOSCOPY RIGHT RETROGRADE PYELOGRAM RIGHT URETEROSCOPY AND REPLACEMENT OF RIGHT URETERAL STENT performed by Skylar Singh MD at [address] [facility] Right 8/3/2018    CYSTOSCOPY WITH RIGHT RETROGRADE PYELOGRAM, RIGHT URETEROSCOPY AND REPLACEMENT OF RIGHT URETERAL STENT performed by Skylar Singh MD at [address] OFFICE/OUTPT [facility] Left 6/11/2018    IM NAIL HENRY INSERTION, LEFT performed by Daisy Henson MD at [place] [address]  03/2017    TOTAL HIP ARTHROPLASTY  3-9-12    Rt       Restrictions/Precautions:  Fall Risk                    Other position/activity restrictions: Wear shoes when up       Prior Level of Function:  ADL Assistance: Needs assistance  Ambulation Assistance: Needs assistance  Transfer Assistance: Needs assistance  Additional Comments: Pt reporting using RW. Pt states her granddaughter helps her with dressing and sponge bathing tasks. Pt was receiving [facility] services of PT, RN, aide. Pt stating her granddaughter just started to let her ambulate to bathroom alone but would come in to assist with clothing management and hygiene.   Granddaughter home 24/7    Subjective  Chart Reviewed: Yes  Patient assessed for rehabilitation services?: Yes  Response to previous treatment: Patient with no complaints from previous session  Family / Caregiver Present: No    Subjective: Pt lying in bed. Pt agreeabel to OT Session. Comments: RN approved session. Pt did not report pain but did state that she did not feel right. She sat at the edge of the bed and asked to return to supine. She had a breakfast tray waiting for her which she started to eat following taking her meds. Session was shortened. Overall Orientation Status: Within Normal Limits         Pain:  Pain Assessment  Patient Currently in Pain: Denies  Response to Pain Intervention: Patient Satisfied  Multiple Pain Sites: No       Objective  Cognition Comment: Anxiety noted with getting up and moving. Coordination  Fine Motor: No difficulty noted with taking her morning medications from a small cup         ADL  Grooming: Stand by assistance (wiping her face and hands)  LE Dressing: Maximum assistance (donning her shoes with elastic laces)          Bed mobility  Rolling to Right: Stand by assistance  Supine to Sit: Minimal assistance  Scooting: Contact guard assistance    Transfers  Sit to stand: Unable to assess  Stand to sit: Unable to assess       Balance  Sitting Balance: Stand by assistance (wiping her face with a washcloth)  Standing Balance: Unable to assess(comment)     Time: not applicable     Functional Mobility  Functional Mobility Comments: Not able to demonstrate. Activity Tolerance:  Activity Tolerance: Patient limited by fatigue  Activity Tolerance: Pt was sitting at the edge of bed for 6 mintue duration before she returned to supine. Assessment:     Performance deficits / Impairments: Decreased functional mobility , Decreased safe awareness, Decreased balance, Decreased ADL status, Decreased endurance  Prognosis: Fair  Discharge Recommendations: [address]    Patient Education:  Patient Education: Safety awareness while preparing to get up and moving so she avoids having any accidents  Barriers to Learning: cognition     Equipment Recommendations:  Equipment Needed: No  Other: continue to assess     Safety:  Safety Devices in place: Yes  Type of devices: Nurse notified, Call light within reach, Patient at risk for falls, Left in bed    Plan:  Times per week: 5x  Current Treatment Recommendations: Endurance Training, Strengthening, Patient/Caregiver Education & Training, Self-Care / ADL, Balance Training, Functional Mobility Training, Safety Education & Training  Plan Comment: Pt would be able to return home with help from her granddaughter and [facility] services when medically stable.   Specific instructions for Next Treatment: Functional mobility; ADLs as able; upper body exercisess    Goals:  Patient goals : go home     Short term goals  Time Frame

## 2018-09-14 NOTE — PLAN OF CARE
Problem: Pain:  Goal: Pain level will decrease  Pain level will decrease   Outcome: Ongoing  Patient at risk for increase in pain level d/t chronic shoulder pain. Current interventions in place to manage acute and/or chronic pain level include prn pain meds and repositioning. Patient accepting pain interventions without difficulty. Problem: Falls - Risk of:  Goal: Will remain free from falls  Will remain free from falls   Outcome: Ongoing  Patient at risk for falls due to generalized weakness. Fall assessment completed. Patient uses call light for needs this shift. Fall band in place on patient's arm. Fall signs posted. Bed alarm in place and functioning properly. Problem: Risk for Impaired Skin Integrity  Goal: Tissue integrity - skin and mucous membranes  Structural intactness and normal physiological function of skin and  mucous membranes. Outcome: Ongoing  Patient at risk for decreased skin integrity due to decreased circulation. Patient turned and repositioned every 2 hours and PRN t/o this shift. No new skin breakdown noted this shift. Comments: Care plan reviewed with patient. Patient verbalize understanding in current plan of care and participate in goal setting.

## 2018-09-14 NOTE — PROGRESS NOTES
[facility]         Renal Inpatient Follow-Up note         9/14/2018 11:34 AM    Pt Name:   Jered Console:   9/9/1930  Attending:   Paco Burroughs MD    Chief Complaint : Sunshine Marina is a 80 y.o. female being followed by nephrology for BEREKET/CKD    Interval History :   Patient seen and examined by me. No distress  -150  UOP 1475 mL/24 hours. Had renal US this am.   Denies SOB, NVD. States her appetite was low this am.   Discussed with hospitalist, they would like to discharge pt today.       Scheduled Medications :    [START ON 9/15/2018] cetirizine  5 mg Oral Daily    ampicillin IV  1 g Intravenous Q8H    sodium bicarbonate  1,300 mg Oral 4x Daily    ferrous sulfate  325 mg Oral Daily with breakfast    fluticasone  2 spray Nasal BID    pantoprazole  40 mg Oral QAM AC    rOPINIRole  4 mg Oral BID    zafirlukast  20 mg Oral BID    escitalopram  10 mg Oral Daily    atorvastatin  20 mg Oral Daily    metoprolol succinate  50 mg Oral Daily    diclofenac sodium  2 g Topical 4x Daily    isosorbide mononitrate  60 mg Oral Daily    sodium chloride flush  10 mL Intravenous 2 times per day         Vitals :  BP (!) 143/66   Pulse 83   Temp 97.8 °F (36.6 °C) (Oral)   Resp 16   Ht 4' 9\" (1.448 m)   Wt 122 lb 8 oz (55.6 kg)   LMP  (Exact Date)   SpO2 94%   BMI 26.51 kg/m²     24HR INTAKE/OUTPUT:      Intake/Output Summary (Last 24 hours) at 09/14/18 1134  Last data filed at 09/14/18 0402   Gross per 24 hour   Intake              610 ml   Output             1475 ml   Net             -865 ml       Last 3 Weights  Wt Readings from Last 3 Encounters:   09/14/18 122 lb 8 oz (55.6 kg)   09/06/18 134 lb (60.8 kg)   08/03/18 130 lb (59 kg)           Physical Exam :  Constitutional: alert and cooperative with exam, appears comfortable, no distress  Oral: moist oral mucus membranes  Neck: No JVD  Lungs: Clear  Heart: regular rate and rhythm, S1, S2   Extremities: No LE

## 2018-09-14 NOTE — PROGRESS NOTES
catheter changed on admission. Urine culture with MRSA - was initially on ampicillin (per prior sensitivities from Enterococcus infection); changed to zyvox with plan to continue same on discharge. 3. BEREKET on CKD4, with metabolic acidosis. Baseline Cr around 1.7, presents with Cr 2.0 to 2.1. Overall, Cr improving and down to 1.8 today. On bicarb for metabolic acidosis, which has now improved. 4. Trace bilateral pulmonary edema. Does not appear to be in CHF exacerbation. Received 2 doses of IV lasix; monitor closely off diuretics for now. 5. Dispo. Discharge early tomorrow morning. Diet: DIET LOW SODIUM 2 GM;  Dietary Nutrition Supplements: Renal Oral Supplement    Code status: Full Code     ----------  Subjective  No new complaints today. D/w pt and caretaker about DC plan. Pt not comfortable going home today because she won't have anyone with her this evening; she has agreed on discharge early tomorrow morning. Objective  Physical exam:  Vitals: BP (!) 143/66   Pulse 83   Temp 97.8 °F (36.6 °C) (Oral)   Resp 16   Ht 4' 9\" (1.448 m)   Wt 122 lb 8 oz (55.6 kg)   LMP  (Exact Date)   SpO2 94%   BMI 26.51 kg/m²   Gen: Not in distress. Alert. Oriented. Head: Normocephalic. Atraumatic. Eyes: EOMI. Good acuity. ENT: Oral mucosa moist  Neck: No JVD. No obvious thyromegaly. CVS: Nml S1S2, no MRG, RRR  Pulmomary: Clear bilaterally. No crackles. No wheezes. Gastrointestinal: Soft, NT/ND. Positive bowel sounds. Musculoskeletal: No edema. Warm  : Glez bag with light red urine. Neuro: No focal deficit. Moves extremity spontaneously. Psychiatry: Appropriate affect. Not agitated. Skin: Warm, dry with normal turgor.  No rash  Capillary Refill: Brisk,< 3 seconds   Peripheral Pulses: +2 palpable, equal bilaterally    24HR INTAKE/OUTPUT:      Intake/Output Summary (Last 24 hours) at 09/14/18 1335  Last data filed at 09/14/18 0402   Gross per 24 hour   Intake              100 ml   Output             1000 ml   Net             -900 ml     I/O last 3 completed shifts: In: 56 [P.O.:550; I.V.:60]  Out: 1475 [Urine:1475]  No intake/output data recorded.       Meds:    [START ON 9/15/2018] cetirizine  5 mg Oral Daily    linezolid  600 mg Intravenous Q12H    sodium bicarbonate  650 mg Oral BID    ferrous sulfate  325 mg Oral Daily with breakfast    fluticasone  2 spray Nasal BID    pantoprazole  40 mg Oral QAM AC    rOPINIRole  4 mg Oral BID    zafirlukast  20 mg Oral BID    escitalopram  10 mg Oral Daily    atorvastatin  20 mg Oral Daily    metoprolol succinate  50 mg Oral Daily    diclofenac sodium  2 g Topical 4x Daily    isosorbide mononitrate  60 mg Oral Daily    sodium chloride flush  10 mL Intravenous 2 times per day       Infusions:       PRN Meds: sodium chloride, docusate sodium, diphenoxylate-atropine, lidocaine, meclizine, LORazepam, flavoxate, HYDROcodone-acetaminophen, sodium chloride flush, ondansetron, potassium chloride **OR** potassium chloride **OR** potassium chloride, magnesium sulfate, acetaminophen    Labs/imaging:  CBC:   Recent Labs      09/11/18   1630  09/13/18   0503  09/14/18   0527   WBC  13.4*  8.1  7.8   HGB  11.5*  9.1*  9.2*   PLT  398  267  292         BMP:    Recent Labs      09/12/18   0518  09/13/18   0503  09/14/18   0527   NA  139  137  138   K  4.5  4.3  4.4   CL  110  108  104   CO2  17*  20*  24   BUN  31*  25*  23*   CREATININE  2.1*  1.9*  1.8*   GLUCOSE  135*  97  97         Hepatic:   Recent Labs      09/11/18   1630   AST  14   ALT  8*   BILITOT  0.3   ALKPHOS  168*       INR:   Recent Labs      09/11/18   1630   INR  1.13         Criss Sharma MD  -------------------------------  Rounding hospitalist

## 2018-09-14 NOTE — PROGRESS NOTES
Urology Progress Note    Chief Complaint: Hematuria    Subjective:   Patient is resting in bed, voiding per forman catheter clear yellow urine, +flatus, +BM, ambulating with assistance and working with PT on weakness, tolerating regular diet, admits to nausea this AM and didn't feel like eating breakfast. There are complaints of no pain at this time. Pending urine cultures for antibiotic sensitivity. Vitals:  BP (!) 144/88   Pulse 92   Temp 98.6 °F (37 °C) (Oral)   Resp 16   Ht 4' 9\" (1.448 m)   Wt 122 lb 8 oz (55.6 kg)   LMP  (Exact Date)   SpO2 97%   BMI 26.51 kg/m²   Temp  Av.7 °F (37.1 °C)  Min: 98.1 °F (36.7 °C)  Max: 99.4 °F (37.4 °C)    Intake/Output Summary (Last 24 hours) at 18 1003  Last data filed at 18 0402   Gross per 24 hour   Intake              610 ml   Output             1475 ml   Net             -865 ml       Social History     Social History    Marital status:      Spouse name: N/A    Number of children: N/A    Years of education: N/A     Occupational History    Not on file.      Social History Main Topics    Smoking status: Never Smoker    Smokeless tobacco: Never Used    Alcohol use No    Drug use: No    Sexual activity: Not on file     Other Topics Concern    Not on file     Social History Narrative    No narrative on file     Family History   Problem Relation Age of Onset    Arthritis Mother     Asthma Mother     Stroke Mother     Arthritis Father     Heart Disease Father 68        enlarged heart    Heart Disease Sister         CHF    Cancer Brother         metatstatic    Diabetes Brother     Parkinsonism Brother     Diabetes Sister     Stroke Sister     Diabetes Brother     Heart Disease Brother      Allergies   Allergen Reactions    Adhesive Tape Rash     USE PAPER TAPE    Cleocin [Clindamycin Hcl] Diarrhea    Metronidazole Diarrhea    Penicillins Other (See Comments)     Very sleepy    Septra [Bactrim] Other (See Comments)     Too big to swallow         Constitutional: Alert and oriented times x3, no acute distress, and cooperative to examination with appropriate mood and affect. HEENT:   Head:         Normocephalic and atraumatic. Mucous membranes are normal.   Eyes:         EOM are normal. No scleral icterus. Nose:    The external appearance of the nose is normal  Ears: The ears appear normal to external inspection. Cardiovascular:       Normal rate, regular rhythm. Pulmonary/Chest:  Normal respiratory rate and rhthym. No use of accessory muscles. Lungs clear bilaterally. Abdominal:          Soft. No tenderness. Active bowel sounds. Genitalia:    Glez catheter draining yellow urine with sediment. Musculoskeletal:    Normal range of motion. He exhibits no edema or tenderness of lower extremities. Extremities:    No cyanosis, clubbing, or edema present. Neurological:    Alert and oriented. Labs:  WBC:    Lab Results   Component Value Date    WBC 7.8 09/14/2018     Hemoglobin/Hematocrit:    Lab Results   Component Value Date    HGB 9.2 09/14/2018    HCT 29.6 09/14/2018     BMP:    Lab Results   Component Value Date     09/14/2018    K 4.4 09/14/2018    K 4.3 06/11/2018     09/14/2018    CO2 24 09/14/2018    BUN 23 09/14/2018    LABALBU 2.9 09/14/2018    CREATININE 1.8 09/14/2018    CALCIUM 8.6 09/14/2018    LABGLOM 27 09/14/2018       Impression:  Gross hematuria  UTI  Hx bladder cancer  Chronic stent for right ureteral stricture  CKD4  Acute cystitis    Plan:  Creat. Improved with chronic stent - 1.8. History chronic left hydro for years. Recheck hydro Renal US today  UC growing Staph Aureus. On ampicillin. Tailor antibiotics to sensitivity to treat UTI. F/U with office in 2 weeks. Will follow PRN.       TAI Drew  09/14/18 10:03 AM  Urology

## 2018-09-14 NOTE — PROGRESS NOTES
COLONOSCOPY      CYSTOSCOPY  11-    TUR and fulg BT  superficial noninvasive bladder ca    CYSTOSCOPY N/A 8/23/2017    CYSTOSCOPY, RIGHT URETEROSCOPY, RIGHT STENT EXCHANGE retrograde right and right ureteral dilitation [facility] [address] [facility] 1/[facility]2018    CYSTOSCOPY EVACUATION OF CLOTS performed by Corbin Sandoval MD at [place] [address] EKG 12-LEAD  10/14/2015         EKG 12-LEAD  10/17/2015         ENDOSCOPY, COLON, DIAGNOSTIC      EYE SURGERY  04/2018    JOINT REPLACEMENT      hip    MOUTH SURGERY      cheek; had benign lump removed    OTHER SURGICAL HISTORY Right 04/19/2017    Cystoscopy, Attempted Right Ureteral Access     ME CYSTOURETHROSCOPY,URETER CATHETER Right 1/31/2018    CYSTOSCOPY RIGHT RETROGRADE PYELOGRAM RIGHT URETEROSCOPY AND REPLACEMENT OF RIGHT URETERAL STENT performed by Corbin Sandoval MD at [address] [facility] Right 8/3/2018    CYSTOSCOPY WITH RIGHT RETROGRADE PYELOGRAM, RIGHT URETEROSCOPY AND REPLACEMENT OF RIGHT URETERAL STENT performed by Corbin Sandoval MD at [address] OFFICE/OUTPT [address] [place] [address] Left 6/11/2018    IM NAIL HENRY INSERTION, LEFT performed by Golden Fernandez MD at [place] [address]  03/2017    TOTAL HIP ARTHROPLASTY  3-9-12    Rt       Restrictions/Precautions:  Fall Risk           Other position/activity restrictions: Wear shoes when up       Prior Level of Function:  ADL Assistance: Needs assistance  Ambulation Assistance: Needs assistance  Transfer Assistance: Needs assistance  Additional Comments: Pt reporting using RW. Pt states her granddaughter helps her with dressing and sponge bathing tasks. Pt was receiving [facility] services of PT, RN, aide. Pt stating her granddaughter just started to let her ambulate to bathroom alone but would come in to assist with clothing management and hygiene.   Granddaughter home 24/7    Subjective:  Chart Reviewed: Yes  Family / Caregiver Present: No  Subjective: RN approved session. Pt resting in bed upon arrival, wanting to use restroom. Once pt stood from EOB red blood noted on pad. Red blood also on pts inner thighs upon standing. RN notified and in to address. Pain:  Denies. Social/Functional:  Lives With: Family (granddaughter )  Type of Home: House  Home Layout: One level (has a basement but doesn't go down )  Home Access: Stairs to enter with rails  Entrance Stairs - Number of Steps: 4  Home Equipment: Rolling walker, Cane     Objective:  Supine to Sit: Moderate assistance (Required increased time. )  Sit to Supine: Moderate assistance (x2)    Transfers  Sit to Stand: Minimal Assistance; Moderate Assistance (Min A from EOB and Mod A from toilet)  Stand to sit: Minimal Assistance       Ambulation 1  Surface: level tile  Device: Rolling Walker  Assistance: Contact guard assistance  Quality of Gait: Improved balance today. Kyphotic posture. Decreased B step lengths and decreased B foot clearance. Distance: 12 feet x2          Balance  Comments: Static stand while RN completed mor clean up and assessed forman. Pt requiring Contact Guard to Min A for balance. Exercises:  Exercises  Comments: None. RN needing to fix forman due to bleeding. Activity Tolerance:  Activity Tolerance: Patient limited by endurance; Patient limited by fatigue    Assessment: Body structures, Functions, Activity limitations: Decreased functional mobility , Decreased endurance, Decreased balance, Decreased strength  Assessment: Pt tolerated session fairly well. Limited by decreased strength, decreased mobility, decreased balance. Would benefit from continued therapy at discharge.    Prognosis: Good     REQUIRES PT FOLLOW UP: Yes  Discharge Recommendations: 24 hour supervision or assist, Patient would benefit from continued therapy after discharge    Patient Education:  Patient Education: Bed mobility. Transfers    Equipment Recommendations:  Equipment Needed: No    Safety:  Type of devices: All fall risk precautions in place, Call light within reach, Gait belt, Patient at risk for falls, Left in bed, Bed alarm in place  Restraints  Initially in place: No    Plan:  Times per week: 5 X GM  Times per day: Daily  Current Treatment Recommendations: Strengthening, Neuromuscular Re-education, Safety Education & Training, Patient/Caregiver Education & Training, Balance Training, Endurance Training, Functional Mobility Training, Transfer Training, Gait Training, Stair training    Goals:  Patient goals : To go home. Short term goals  Time Frame for Short term goals: 2 weeks  Short term goal 1: Pt to transfer supine <--> sit SBA to enable pt to get in/out of bed. Short term goal 2: Pt to transfer sit <--> stand CGA for increased functional mobility. Short term goal 3: Pt to ambulate >25 feet with RW CGA to enable pt to amb to the bathroom. Short term goal 4: Pt to ascend/descend 4 steps with HR CGA for home entry. Long term goals  Time Frame for Long term goals : NA due to short length of stay.             AM-PAC Inpatient Mobility without Stair Climbing Raw Score : 14  AM-PAC Inpatient without Stair Climbing T-Scale Score : 40.85  Mobility Inpatient CMS 0-100% Score: 53.33  Mobility Inpatient without Stair CMS G-Code Modifier : RBANDON

## 2018-09-14 NOTE — PROGRESS NOTES
Needed: No  Other: continue to assess     Safety:  Safety Devices in place: Yes  Type of devices: Nurse notified, Call light within reach, Patient at risk for falls, Left in chair, Chair alarm in place, Gait belt    Plan:  Times per week: 5x  Current Treatment Recommendations: Endurance Training, Strengthening, Patient/Caregiver Education & Training, Self-Care / ADL, Balance Training, Functional Mobility Training, Safety Education & Training  Plan Comment: Pt would be able to return home with help from her granddaughter and [facility] services when medically stable. Specific instructions for Next Treatment: Functional mobility; ADLs as able; upper body exercisess    Goals:  Patient goals : go home     Short term goals  Time Frame for Short term goals: 2 weeks   Short term goal 1: Pt to complete sit to stand transfers from various surfaces with Min A x1 to increase indep with self care.    Short term goal 2: Pt to complete stand pivot transfers from [facility] with CGA x1 to/from [facility] for toileting tasks   Short term goal 3: Pt to dmeo static stnading with bilatearl UE support >1 min and with min A to complete clothign management   Short term goal 4: Pt to complete bilateral UE AROM ex to increse UB activity tolerance for ADL tasks   Long term goals  Time Frame for Long term goals : not est d/t ELOS

## 2018-09-14 NOTE — PROGRESS NOTES
Dr Lurene Gosselin called and requested order clarification on changing out forman catheters and the initiation of CBI. New orders obtained. Forman catheter draining bloody urine.

## 2018-09-15 NOTE — PROGRESS NOTES
[facility]         Renal Inpatient Follow-Up note         9/15/2018 1:18 PM    Pt Name:   Colletta Chinchilla:   9/9/1930  Attending:   Kd Bright MD    Chief Complaint : Navneet Colon is a 80 y.o. female being followed by nephrology for BEREKET/CKD    Interval History :   Patient seen and examined by me. No distress  The patient was relaxing in bed chatting with her family. She remains unchanged. She denied   N/V/C/D/SOB/CP. Her kidney function remains poor but stable.       Scheduled Medications :    cetirizine  5 mg Oral Daily    linezolid  600 mg Intravenous Q12H    sodium bicarbonate  650 mg Oral BID    ferrous sulfate  325 mg Oral Daily with breakfast    fluticasone  2 spray Nasal BID    pantoprazole  40 mg Oral QAM AC    rOPINIRole  4 mg Oral BID    zafirlukast  20 mg Oral BID    escitalopram  10 mg Oral Daily    atorvastatin  20 mg Oral Daily    metoprolol succinate  50 mg Oral Daily    diclofenac sodium  2 g Topical 4x Daily    isosorbide mononitrate  60 mg Oral Daily    sodium chloride flush  10 mL Intravenous 2 times per day         Vitals :  BP (!) 114/56   Pulse 79   Temp 97.8 °F (36.6 °C) (Oral)   Resp 16   Ht 4' 9\" (1.448 m)   Wt 133 lb 8 oz (60.6 kg)   LMP  (Exact Date)   SpO2 92%   BMI 28.89 kg/m²     24HR INTAKE/OUTPUT:      Intake/Output Summary (Last 24 hours) at 09/15/18 1318  Last data filed at 09/15/18 0326   Gross per 24 hour   Intake              656 ml   Output             1595 ml   Net             -939 ml       Last 3 Weights  Wt Readings from Last 3 Encounters:   09/15/18 133 lb 8 oz (60.6 kg)   09/06/18 134 lb (60.8 kg)   08/03/18 130 lb (59 kg)           Physical Exam :  Constitutional: alert and cooperative with exam, appears comfortable, no distress  Oral: moist oral mucus membranes  Neck: No JVD  Lungs: Clear  Heart: regular rate and rhythm, S1, S2   Extremities: No LE edema    GI: soft, non-tender, no guarding  Skin: Pale warm dry  Musculo: moves all extremities  Neuro: no deficits apparent  Psychiatric: Awake, alert, Oriented         Last 3 CBC   Recent Labs      09/13/18   0503  09/14/18   0527  09/15/18   0537   WBC  8.1  7.8  9.7   RBC  2.92*  2.93*  2.93*   HGB  9.1*  9.2*  9.4*  9.4*   HCT  30.1*  29.6*  31.1*  30.3*   PLT  267  292  308     Last 3 CMP  Recent Labs      09/13/18   0503  09/14/18   0527  09/15/18   0537   NA  137  138  139   K  4.3  4.4  4.2   CL  108  104  102   CO2  20*  24  23   BUN  25*  23*  21   CREATININE  1.9*  1.8*  1.8*   CALCIUM  8.3*  8.6  8.6   LABALBU  2.9*  2.9*  3.0*              Assessment    1. CKD III with mild BEREKET, improving  - Good UOP  - Fluid discontinued  - Ge hydro on renal US, rechecking renal US today. Urology plans to follow up as O/P.   - Renal functional close to baseline/at baseline.   - Hospitalist would like to discharge home today. 2. Acidosis, on oral bicarbonate, due to BEREKET, improving, bicarb up to 24 today. Will decrease bicarbonate to 650 mg twice daily  3. Ge hydronephrosis, with PMH chronic stent in place, improving renal function. Urology following. 4. PMH bladder Ca  5. HTN, -150  6. UTI,, antibiotics narrowed to Zyvox due to MRSA in urine. 7. Anemia, iron deficiency, on ferrous sulfate, Hg 9.2 today macrocytic, stable. Erica Dear,   9/15/2018  1:18 PM  [facility].

## 2018-09-15 NOTE — DISCHARGE SUMMARY
UNRESOLVED, COMPARISON: 12/28/2016 TECHNIQUE: Multiple axial 5 millimeter images of the thorax and upper abdomen were obtained without the administration of intravenous contrast material . All CT scans at this facility use dose modulation, iterative reconstruction, and/or weight-based dosing when appropriate to reduce radiation dose to as low as reasonably achievable. FINDINGS: Upper abdomen: Moderate hydronephrosis left side not appreciably changed from prior CT abdomen dated 4/28/2018. Mediastinum and kaitlin: There are a few calcified lymph nodes in mediastinum, consistent with old healed granulomatous disease. No abnormally enlarged hilar or mediastinal lymph nodes are seen. Prominent pulmonary arteries, consistent with pulmonary arterial hypertension. Bones: Moderately increased thoracic kyphosis. Moderate degenerative vertebral body spondylosis scattered in the thoracic spine. Bones are diffusely demineralized, consistent with osteoporosis. Mild chronic compression deformities of 2 adjacent upper thoracic vertebra. Mild levoscoliosis mid and upper thoracic spine. Mild lower thoracic dextro scoliosis. Lungs: There are small bilateral pleural effusions. There is mild interstitial fibrosis scattered throughout both lungs. Multiple nodules are scattered diffusely in both lungs, not appreciably changed from prior study dated 12/28/2016, likely representing poorly calcified granulomas. There appears be mild superimposed atelectasis/pneumonia both lung bases. 1. Interstitial fibrosis. Pulmonary arterial hypertension. 2. Multiple small scattered noncalcified nodules in both lungs, not appreciably changed from prior CT thorax 12/28/2016, likely representing poorly calcified granulomas. 3. Small bilateral pleural effusions. Mild bibasilar atelectasis/pneumonia. 4. Moderate hydronephrosis left side which is a chronic finding in this patient. **This report has been created using voice recognition software.   It may contain minor errors which are inherent in voice recognition technology. ** Final report electronically signed by Dr. Omid Blount on 9/12/2018 8:26 AM    Us Renal Complete    Result Date: 9/14/2018  PROCEDURE: US RENAL COMPLETE CLINICAL INFORMATION: 80-year-old female with abdominal pain. Evaluation for hydronephrosis. COMPARISON: Comparison is made to previous study dated 9/12/2018. TECHNIQUE: Grayscale and color images were obtained of both kidneys. FINDINGS: RIGHT KIDNEY - 8.1 x 4.7 x 4.6 cm Resistive Index - 0.83 Cortical Thickness - 1.7 cm LEFT KIDNEY - 9.8 x 5.4 x 5.8 cm Resistive Index - 0.78 Cortical Thickness - 1.2 cm URINARY BLADDER- compressed with Glez The kidneys demonstrate an atrophic appearance bilaterally. The renal artery resistive indices are elevated bilaterally. There is a ureteral stent on the right side. There is mild right-sided hydronephrosis which is similar to the prior examination. The urinary bladder is decompressed with an indwelling Glez catheter. 1. Moderate to severe left-sided and mild right-sided hydronephrosis, findings similar to the prior study. 2. Elevated renal artery resistive indices bilaterally. The left renal resistive index is now 0.78 when it was previously 0.64 (within normal limits) on the prior exam 9/12/2018. **This report has been created using voice recognition software. It may contain minor errors which are inherent in voice recognition technology. ** Final report electronically signed by Dr Odie Riedel on 9/14/2018 5:28 PM    Us Renal Complete    Result Date: 9/12/2018  BILATERAL RENAL ULTRASOUND: CLINICAL INFORMATION: Hematuria. Bladder cancer. COMPARISON: No comparison available. TECHNIQUE: Multiple sonographic images of both kidneys were obtained. Images of the urinary bladder were also obtained.  FINDINGS: RIGHT KIDNEY - 8.5 x 4.2 x 4.3 cm Resistive Index - 0.72 Cortical Thickness - 1.0 cm LEFT KIDNEY - 8.4 x 3.6 x 5.3 cm Resistive Index - 0.64 Cortical Thickness - 1.1 cm  KIDNEYS:  Mildly hypoplastic/atrophic appearing kidneys. Mildly elevated resistive index right side, suggesting medical renal disease. MASS/CYST: No solid or cystic lesion of either kidney is seen. HYDRONEPHROSIS:  Double-J ureteral stent right side. Mild hydronephrosis right side. Moderately severe hydronephrosis left side. CALCULI:  No calculi are seen. BLADDER:  Patient has a continuous irrigation catheter in urinary bladder. Bladder otherwise unremarkable. .     1. Double-J stent right kidney. Mild hydronephrosis around the indwelling stent. 2. Moderately severe hydronephrosis left kidney. **This report has been created using voice recognition software. It may contain minor errors which are inherent in voice recognition technology. ** Final report electronically signed by Dr. Derick Mackenzie on 9/12/2018 3:00 PM    Xr Chest Portable    Result Date: 9/11/2018  PROCEDURE: XR CHEST PORTABLE CLINICAL INFORMATION: SOB, . COMPARISON: July 8, 2018 TECHNIQUE: Portable chest. FINDINGS: Prominent interstitium. Bilateral pleural effusions. Correlate for CHF/pulmonary edema. Infection is not excluded. Atherosclerotic changes aortic arch. Osteopenia. High riding humeral heads. Degenerative changes thoracic spine. Partially imaged ureteral stent. Cardiac mediastinal silhouette is within normal limits. Prominent interstitium. Bilateral pleural effusions. Correlate for CHF/pulmonary edema. Infection is not excluded. **This report has been created using voice recognition software. It may contain minor errors which are inherent in voice recognition technology. ** Final report electronically signed by Dr. Georgia Polanco on 9/11/2018 6:10 PM            Treatments: As above.       Discharge Medications:     Medication List      START taking these medications    linezolid 600 MG tablet  Commonly known as:  ZYVOX  Take 1 tablet by mouth 2 times daily for 10 days     sodium bicarbonate 650 MG tablet  Take 1 tablet by mouth 2 times daily        CONTINUE taking these medications    acetaminophen 325 MG tablet  Commonly known as:  TYLENOL  Take 2 tablets by mouth every 4 hours as needed for Pain or Fever     atorvastatin 20 MG tablet  Commonly known as:  LIPITOR  TAKE 1 TABLET BY MOUTH NIGHTLY     Bedside Drainage Bag Misc  Large 2000 cc bedside drainage bag     BIOTIN FORTE PO     cetirizine 10 MG tablet  Commonly known as:  ZYRTEC     D-MANNOSE PO     diclofenac sodium 1 % Gel  Apply 2 g topically 4 times daily     diphenoxylate-atropine 2.5-0.025 MG per tablet  Commonly known as:  LOMOTIL  Take 1 tablet by mouth 4 times daily as needed for Diarrhea     escitalopram 10 MG tablet  Commonly known as:  LEXAPRO  TAKE 1 TABLET BY MOUTH DAILY     ferrous sulfate 325 (65 Fe) MG tablet  TAKE 1 TABLET BY MOUTH 2 TIMES DAILY (WITH MEALS)     flavoxate 100 MG tablet  Commonly known as:  URISPAS  Take 1 tablet by mouth 3 times daily as needed (bladder spasms)     fluticasone 50 MCG/ACT nasal spray  Commonly known as:  FLONASE     Handicap Placard Misc  by Does not apply route. HYDROcodone-acetaminophen 5-325 MG per tablet  Commonly known as:  NORCO  Take 1 tablet by mouth every 6 hours as needed for Pain for up to 30 days. .     isosorbide mononitrate 60 MG extended release tablet  Commonly known as:  IMDUR  Take 1 tablet by mouth daily     Lidocaine 2 % Gel  Apply 1 drop topically 4 times daily as needed (forman catheter irritation)     LORazepam 1 MG tablet  Commonly known as:  ATIVAN     meclizine 25 MG tablet  Commonly known as:  ANTIVERT  Take 1 tablet by mouth 3 times daily as needed for Dizziness     Medical Compression Stockings Misc  Knee-high, 20-30 mmHg.   Dx: LE edema     metoprolol succinate 50 MG extended release tablet  Commonly known as:  TOPROL XL  TAKE 1 TABLET BY MOUTH DAILY     ondansetron 4 MG tablet  Commonly known as:  ZOFRAN  TAKE 1 TABLET BY MOUTH EVERY 8 HOURS AS NEEDED FOR NAUSEA OR VOMITING pantoprazole 40 MG tablet  Commonly known as:  PROTONIX  TAKE 1 TABLET BY MOUTH 2 TIMES DAILY (BEFORE MEALS)     phenazopyridine 100 MG tablet  Commonly known as:  PYRIDIUM     rOPINIRole 4 MG tablet  Commonly known as:  REQUIP  Take 1 tablet by mouth 2 times daily     sodium chloride 0.65 % nasal spray  Commonly known as:  OCEAN     zafirlukast 20 MG tablet  Commonly known as:  ACCOLATE  TAKE 1 TABLET TWICE A DAY        STOP taking these medications    docusate sodium 100 MG capsule  Commonly known as:  COLACE     traMADol 50 MG tablet  Commonly known as:  ULTRAM           Where to Get Your Medications      These medications were sent to [facility]/pharmacy #[id number]- [place], [place] - [address] - P [phone] -  [phone]  [address] [address]    Phone:  [phone]   · linezolid 600 MG tablet  · sodium bicarbonate 650 MG tablet         Activity: activity as tolerated  Diet: DIET LOW SODIUM 2 GM;  Dietary Nutrition Supplements: Renal Oral Supplement      Disposition: home  Discharged Condition: Stable  Follow Up:   Alexandria Borrego MD  [address] [facility], [address] [place] [address] [address]  [address]    Schedule an appointment as soon as possible for a visit in 1 week       [address] [facility]  [address] [address]  [phone]        KALYN Jernigan - [facility]  [address]  [phone]    On 10/8/2018  Appointment scheduled for 10/8/18 at 1pm on 2K in the [address] [phone]. [address] [facility]  [address] [facility] [address]  [address]  Schedule an appointment as soon as possible for a visit in 2 weeks  gross hematuria hospital follow up    Laquita Gonzalez MD  [address]  [address]  [phone]    Schedule an appointment as soon as possible for a visit in 3 week      Charles Westfall MD  [address]    Schedule an appointment as soon as possible for a visit in 1 week          Code status:  Full Code       Total time spent on discharge, finalizing medications, referrals and arranging outpatient follow up was more than 30 minutes      Thank you Dr. Honey Fischer MD for the opportunity to be involved in this patients care.

## 2018-09-15 NOTE — PROGRESS NOTES
Hospital Medicine Progress Note     Date:  9/15/2018     PCP: Adriana Rich MD (Tel: [phone])    Date of Admission: 9/11/2018    Chief complaint:   Chief Complaint   Patient presents with    Shortness of Breath    Hematuria    Urinary Catheter Problem       Brief hospital course: 88F with hx of bladder cancer (currently in remission), hx of recurrent admissions for hematuria, history of right double-J stent placement, CKD4, who presents with complaints of shortness of breath (which she described as nasal congestion), gross hematuria and dysuria. She was diagnosed with acute cystitis. She was started on antibiotics and CBI was initiated for hematuria. Assessment:  Principal Problem:    Gross hematuria  Active Problems:    Acute blood loss anemia    Acute cystitis with hematuria    Acute kidney injury superimposed on chronic kidney disease (HCC)    Metabolic acidosis    Restless legs syndrome (RLS)    Chronic right hydronephrosis    Hydroureter on left    Hypercholesteremia    History of bladder cancer    Elevated brain natriuretic peptide (BNP) level    CKD (chronic kidney disease) stage 4, GFR 15-29 ml/min (HCC)    Acute kidney injury superimposed on CKD (HCC)    Acute on chronic diastolic (congestive) heart failure (HCC)    CKD (chronic kidney disease), stage III  Resolved Problems:    * No resolved hospital problems. *        Plan:  1. Gross hematuria in the absence of antiplatelets or anticoagulation in patient with hx of bladder cancer, recurrent hematuria, recent left ureteral stent placement. Was initially on CBI on admission, which was discontinued as of 9/12as hematuria resolved. She accidentally stepped on forman tubing on 9/14, per nursing staff, and hematuria has resolved, although light red color this time. Renal US demonstrates mild hydronephrosis around indwelling stent, moderate severe hydronephrosis of left kidney, both of which are chronic.  Suspect etiology of initial hematuria Intake              656 ml   Output             1595 ml   Net             -939 ml     I/O last 3 completed shifts: In: 058 [P.O.:480; I.V.:416]  Out: 1595 [Urine:1595]  No intake/output data recorded. Meds:    cetirizine  5 mg Oral Daily    linezolid  600 mg Intravenous Q12H    sodium bicarbonate  650 mg Oral BID    ferrous sulfate  325 mg Oral Daily with breakfast    fluticasone  2 spray Nasal BID    pantoprazole  40 mg Oral QAM AC    rOPINIRole  4 mg Oral BID    zafirlukast  20 mg Oral BID    escitalopram  10 mg Oral Daily    atorvastatin  20 mg Oral Daily    metoprolol succinate  50 mg Oral Daily    diclofenac sodium  2 g Topical 4x Daily    isosorbide mononitrate  60 mg Oral Daily    sodium chloride flush  10 mL Intravenous 2 times per day       Infusions:    sodium chloride           PRN Meds: opium-belladonna, LORazepam, sodium chloride, docusate sodium, lidocaine, meclizine, flavoxate, HYDROcodone-acetaminophen, sodium chloride flush, ondansetron, potassium chloride **OR** potassium chloride **OR** potassium chloride, magnesium sulfate, acetaminophen    Labs/imaging:  CBC:   Recent Labs      09/13/18   0503  09/14/18   0527  09/15/18   0537   WBC  8.1  7.8  9.7   HGB  9.1*  9.2*  9.4*  9.4*   PLT  267  292  308         BMP:    Recent Labs      09/13/18   0503  09/14/18   0527  09/15/18   0537   NA  137  138  139   K  4.3  4.4  4.2   CL  108  104  102   CO2  20*  24  23   BUN  25*  23*  21   CREATININE  1.9*  1.8*  1.8*   GLUCOSE  97  97  109*         Hepatic:   No results for input(s): AST, ALT, ALB, BILITOT, ALKPHOS in the last 72 hours. INR:   No results for input(s): INR in the last 72 hours.       Dillan Floyd MD  -------------------------------  Vickie hospitalist

## 2018-09-15 NOTE — PROGRESS NOTES
Urology Progress Note      Subjective:   Patient resting in bed surrounded by family members. Yesterday, while doing PT, I was informed by nursing staff that the forman was pulled out with the balloon inflated, promoting increased hematuria. The forman was replaced and draing dark red urine. 16 Fr.  H/H stable. Objective:   Patient is alert, oriented, in no acute distress, and cooperative to examination with appropriate mood and affect. Vitals:  BP (!) 142/63   Pulse 88   Temp 98.3 °F (36.8 °C) (Oral)   Resp 16   Ht 4' 9\" (1.448 m)   Wt 133 lb 8 oz (60.6 kg)   LMP  (Exact Date)   SpO2 95%   BMI 28.89 kg/m²   Temp  Av.3 °F (36.8 °C)  Min: 97.8 °F (36.6 °C)  Max: 98.8 °F (37.1 °C)    Intake/Output Summary (Last 24 hours) at 09/15/18 1053  Last data filed at 09/15/18 0326   Gross per 24 hour   Intake              656 ml   Output             1595 ml   Net             -939 ml       Social History     Social History    Marital status:      Spouse name: N/A    Number of children: N/A    Years of education: N/A     Occupational History    Not on file.      Social History Main Topics    Smoking status: Never Smoker    Smokeless tobacco: Never Used    Alcohol use No    Drug use: No    Sexual activity: Not on file     Other Topics Concern    Not on file     Social History Narrative    No narrative on file     Family History   Problem Relation Age of Onset    Arthritis Mother     Asthma Mother     Stroke Mother     Arthritis Father     Heart Disease Father 68        enlarged heart    Heart Disease Sister         CHF    Cancer Brother         metatstatic    Diabetes Brother     Parkinsonism Brother     Diabetes Sister     Stroke Sister     Diabetes Brother     Heart Disease Brother      Allergies   Allergen Reactions    Adhesive Tape Rash     USE PAPER TAPE    Cleocin [Clindamycin Hcl] Diarrhea    Metronidazole Diarrhea    Penicillins Other (See Comments)     Very sleepy    Septra [Bactrim] Other (See Comments)     Too big to swallow         Neurological:    Alert   Abdominal:          Soft, non tender, non distended  Genitalia:   Glez catheter draining dark red urine. Extremities:    No cyanosis     Labs:  CBC:   Recent Labs      09/13/18   0503  09/14/18   0527  09/15/18   0537   WBC  8.1  7.8  9.7   HGB  9.1*  9.2*  9.4*  9.4*   PLT  267  292  308     BMP:  Recent Labs      09/13/18   0503  09/14/18   0527  09/15/18   0537   NA  137  138  139   K  4.3  4.4  4.2   CL  108  104  102   CO2  20*  24  23   BUN  25*  23*  21   CREATININE  1.9*  1.8*  1.8*   GLUCOSE  97  97  109*   CALCIUM  8.3*  8.6  8.6       Impression:  Gross hematuria  UTI  Hx bladder cancer  Chronic stent for right ureteral stricture    Plan:  Urine still dark red, catheter draining well. Recommend to keep the patient inhouse and reassess tomorrow.   Encouraged to increase fluid intake and resume IV fluids per hospitalist.    Thank you for including us in the care of Olive Min      Electronically signed by Koko Marcano MD on 9/15/2018 at 10:53 AM  Urology

## 2018-09-16 NOTE — PROGRESS NOTES
Patient hit her call light requesting someone to come give her a cough drop off her bedside table that is in front of her, partially over her bed. Told patient that we were with another patient and that we would be in as soon as we could. Patient said that was fine. When I went into her room to start her assessment and get her a cough drop she said it took an hour for someone to come. When I told her it was me who she spoke to, yes said, oh ok, I guess it wasn't that long, thank you so much for helping me. Continuously telling staff how thankful she is for them coming to her room often and doing silly things for her.

## 2018-09-16 NOTE — PROGRESS NOTES
Patient said she was having pain and explained that she had been having a bad day dealing with her restless leg and upset that she didn't get to go home today. Explained to patient that her doctor wanted her to stay another day so we could keep an eye on her, her new forman and to watch her output. She said she understood, but was ready to get out of here. She is continuously moving her legs around in the bed and trying to rub on them. PRN Norco given d/t her pain in her legs and lower back. PRN Ativan given. Diclofen Sodium gel applied and massaged into legs per patient request. Attempted to reposition patient in bed on her side to get her off her back, but refused. Offered to put SCD's on her lower legs, but also refused. Explained to patient that some people with restless leg like the SCD'd because they say it kind of feels like a massage and it help relax their legs. She said she prefer not to have them on. Patient pulled up in bed and attempted to make comfortable.  She said she was going to try to watch the OrthoScan game and get some rest.

## 2018-09-16 NOTE — PROGRESS NOTES
Patient apologized multiple times for calling her daughter and getting staff yelled at. She said she knows we have been in her room all night trying to help her since she hasn't been able to get comfortable and sleep. Eating breakfast at this time. Patient said she is anticipating getting to go home today.

## 2018-09-16 NOTE — PROGRESS NOTES
Patient requesting that nurse make sure her gown isn't behind her so it doesn't get a chance to wrinkle under her. Showed patient that none of her gown is under her and that I make sure of it every time I pull her up, because I know she doesn't like the wrinkles. Patient said she doesn't know why her legs are this bad at times. She said this happens often at home and there isn't anything to do besides take her medications. I asked what does her granddaughter do to help her when she is feeling this way and she said I have already done everything that she would have done. She just has to try to get comfortable and relax. Remain worrying about lab coming if she falls asleep.  Encouraged patient to try to relax and get some rest.

## 2018-09-16 NOTE — PROGRESS NOTES
Patient continuously on call light requesting more medications for her restless leg. Explain to patient that she was just given all her medications at 2045 and they she had to give them a chance to work. She said she just doesn't understand why it's taking so long. Attempted to reposition patient. She will allow me to pull her up in her bed, but not turn her or place a pillow under her legs.

## 2018-09-17 NOTE — CARE COORDINATION
reported the pt has [place] for pain from the \"trauma\"  stepped on forman tubing. Pt does not have a fever & no longer C/O SOB. Pt is doing better. Heladio Marguerite reported \"Dr Odalys Renee wants her to have a # 18\" Fr forman. Leija Marguerite reported the pt has a # 16 now & it is leaking. Informed granddaughter, [facility] will notify the [place] [address] nurse. Reviewed meds with Heladio Everett. Heladio Everett stated she did not get an RX for the Pyridium, it was TID prn while INPT & on the AVS.  Heladio Everett stated she is holding the Lexapro, \"because the pharmacist said it can make her go crazy with the new antibiotic. \"  The pt did not get the Na Bicarb either, \"they said they did not have an RX\"  [facility] called pharmacist to F/U. The lexapro & the zyvox are not contraindicated, they may increase the risk of serotonin syndrome, & the family was counseled on S/S. The Na Bicarb will be in stock today & the pharmacy will call Heladio Everett when it is ready for . [facility] notified [facility] of discharge. Aries Patricia reported they will see the pt tomorrow. [facility] will contact Dr Odalys Renee for new forman order & inquire about the pyridium. Reminded Aries Patricia the pt needs BMP drawn also. [facility] sent an in basket message to the pool for TCM appt. Heladio Everett reported she will call for appt with Dr Angie Walker. Called Heladio Marguerite back, informed of above & inquired if she had any other concerns that needed addressed today. Heladio Everett stated \"you covered it\"  Informed Heladio Everett, she will be contacted next week per the [address]. [facility] will refer back to the [employer].       Follow Up  Future Appointments  Date Time Provider Kobi Turpin   9/24/2018 2:00 PM Edie Krishnamurthy MD ES Kidney [facility]   9/26/2018 8:00 AM Erasmo Khan Urology [facility]   10/5/2018 10:15 AM Eduardo Zavaleta MD [address]   10/5/2018 12:15 PM Chad Britton Urology [facility]   10/8/2018 1:00 PM KALYN Uribe - CNP SRPX Heart [facility]   10/18/2018 3:00 PM

## 2018-09-17 NOTE — TELEPHONE ENCOUNTER
09/17/2018 LMOM of daughter Gio Castro Hipaa contact and POA to call Dr. Ellyn Moore office to schedule appointment with Dr. Ellyn Moore within 7 days. da

## 2018-09-21 NOTE — PROGRESS NOTES
Visit Information    Have you changed or started any medications since your last visit including any over-the-counter medicines, vitamins, or herbal medicines? no   Are you having any side effects from any of your medications? -  no  Have you stopped taking any of your medications? Is so, why? -  no    Have you seen any other physician or provider since your last visit? Yes - Records Obtained  Have you had any other diagnostic tests since your last visit? Yes - Records Obtained  Have you been seen in the emergency room and/or had an admission to a hospital since we last saw you? Yes - Records Obtained  Have you had your routine dental cleaning in the past 6 months? no    Have you activated your [employer] account? If not, what are your barriers?  Yes     Patient Care Team:  Karo Pina MD as PCP - General (Family Medicine)  Karo Pina MD as PCP - [facility] Attributed Provider  Abigail Dominguez MD as Consulting Physician (Oncology)  Lay Mehta MD as Consulting Physician (Urology)  Jennifer Lester MD as Consulting Physician (Cardiology)  Zuleyka Beltre RN as Care Coordinator    Medical History Review  Past Medical, Family, and Social History reviewed and does contribute to the patient presenting condition    Health Maintenance   Topic Date Due    Shingles Vaccine (1 of 2 - 2 Dose Series) 09/09/1980    Pneumococcal high/highest risk (2 of 2 - PPSV23) 11/24/2017    Flu vaccine (1) 09/01/2018    Potassium monitoring  09/16/2019    Creatinine monitoring  09/16/2019    DTaP/Tdap/Td vaccine (2 - Td) 06/28/2022
MG per tablet Take 1 tablet by mouth 4 times daily as needed for Diarrhea 30 tablet 3    BIOTIN FORTE PO Take by mouth daily      acetaminophen (TYLENOL) 325 MG tablet Take 2 tablets by mouth every 4 hours as needed for Pain or Fever 120 tablet 3    sodium chloride (OCEAN) 0.65 % nasal spray 1 spray by Nasal route as needed for Congestion      cetirizine (ZYRTEC) 10 MG tablet Take 10 mg by mouth daily      Handicap Placard MISC by Does not apply route. 1 each 0         Vitals:    09/21/18 0927   BP: 120/66   Site: Left Upper Arm   Position: Sitting   Cuff Size: Medium Adult   Pulse: 88   Resp: 20   Weight: 136 lb 12.8 oz (62.1 kg)   Height: 4' 9\" (1.448 m)     Body mass index is 29.6 kg/m². Wt Readings from Last 3 Encounters:   09/21/18 136 lb 12.8 oz (62.1 kg)   09/16/18 134 lb 11.2 oz (61.1 kg)   09/06/18 134 lb (60.8 kg)     BP Readings from Last 3 Encounters:   09/21/18 120/66   09/16/18 131/60   09/06/18 128/80        Patient was admitted to [facility] from 9/11/18 to 9/16/18 for Hematuria. Inpatient course: Discharge summary reviewed- see chart. Current status: Indwelling Walker - no blood in urine. Review of Systems:  Review of Systems   Constitutional: Negative for fatigue. HENT: Negative for congestion and postnasal drip. Respiratory: Negative for cough, shortness of breath and wheezing. Cardiovascular: Negative for chest pain, palpitations and leg swelling. Gastrointestinal: Negative. Negative for abdominal pain. Genitourinary: Positive for hematuria. Negative for difficulty urinating, dysuria, frequency and urgency. WALKER CATHETER in placed   Musculoskeletal: Positive for arthralgias and back pain. Skin: Negative. Allergic/Immunologic: Positive for environmental allergies. Neurological: Positive for headaches. Negative for dizziness and numbness. Hematological: Bruises/bleeds easily. Psychiatric/Behavioral: Positive for sleep disturbance.  The

## 2018-09-24 PROBLEM — K52.9 CHRONIC DIARRHEA: Status: ACTIVE | Noted: 2018-01-01

## 2018-09-24 NOTE — TELEPHONE ENCOUNTER
Patient was just seen 8/21/18 by Genevieve Hedrick. She is calling in because she thinks the antibiotic the hospital prescribed her, linezolid, may be causing her to have a sore in her mouth, and her tongue is sore. She was wanting to know if there is a mouth wash she could use, or what would Dr Gemma Acevedo recommend. Please call her to advise, and please leave her a msg if she can not answer the phone.

## 2018-09-24 NOTE — PROGRESS NOTES
[facility]        Outpatient Follow-Up note         9/24/2018 2:44 PM    Patient Name:   Violet Levi:    9/9/1930  Primary Care Physician:  Mela Bingham MD     Chief Complaint / Reason for follow-up : Follow Up of CKD     Interval History :  Patient seen and examined by me. Feels ok. No cp or SOB. She is not in any acute distress. She was recently in hospital for BEREKET     Past History :  Past Medical History:   Diagnosis Date    Allergic rhinitis     Anxiety     Bilateral hydronephrosis 8/10/2016    Bladder cancer ([address] 2008    Dr. Tania Bermudez Blood circulation, collateral     Broken leg     left     CHF (congestive heart failure) (HCC)     Constipation     attributed to Ultram    Coronary artery disease involving native coronary artery of native heart s/p cath 10/14/15-LM-P, LAD MID 90%, ANUERYSMAL, % PROX, RCA MID 60% DISTLA 70%, EDP 20 , EF 30%-NEED REVASCULARIZATION 10/14/2015    GERD (gastroesophageal reflux disease)     History of blood transfusion     [place] (hard of hearing)     Hyperglycemia     Hyperlipidemia     Hypertension     Obesity (BMI 30-39. 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  4/26/2017     Past Surgical History:   Procedure Laterality Date    ABDOMEN SURGERY      ABDOMINAL EXPLORATION SURGERY  1954    BLADDER SURGERY  10/2016    stent placed and removed a blood clot    CARDIAC SURGERY      stents    COLONOSCOPY      CYSTOSCOPY  11-    TUR and fulg BT  superficial noninvasive bladder ca    CYSTOSCOPY N/A 8/23/2017    CYSTOSCOPY, RIGHT URETEROSCOPY, RIGHT STENT EXCHANGE retrograde right and right ureteral dilitation performed by Mendel Macho, MD at [address] 1/31/2018    CYSTOSCOPY EVACUATION OF CLOTS performed by Mendel Macho, MD at [address] EKG 12-LEAD  10/14/2015         EKG 12-LEAD  10/17/2015         ENDOSCOPY, COLON, DIAGNOSTIC      EYE SURGERY  04/2018    JOINT REPLACEMENT      hip    MOUTH SURGERY      cheek; had benign lump removed    OTHER SURGICAL HISTORY Right 04/19/2017    Cystoscopy, Attempted Right Ureteral Access     [place] CYSTOURETHROSCOPY,URETER CATHETER Right 1/31/2018    CYSTOSCOPY RIGHT RETROGRADE PYELOGRAM RIGHT URETEROSCOPY AND REPLACEMENT OF RIGHT URETERAL STENT performed by Vick Cross MD at [address] [facility] Right 8/3/2018    CYSTOSCOPY WITH RIGHT RETROGRADE PYELOGRAM, RIGHT URETEROSCOPY AND REPLACEMENT OF RIGHT URETERAL STENT performed by Vick Cross MD at [address] OFFICE/OUTPT [address] Left 6/11/2018    IM NAIL HENRY INSERTION, LEFT performed by Tamara Hernandez MD at [place] [address]  03/2017    TOTAL HIP ARTHROPLASTY  3-9-12    Rt        Medications :     Outpatient Prescriptions Marked as Taking for the 9/24/18 encounter (Office Visit) with Ramona Hatch MD   Medication Sig Dispense Refill    diphenoxylate-atropine (LOMOTIL) 2.5-0.025 MG per tablet Take 1 tablet by mouth 4 times daily as needed for Diarrhea for up to 30 days. . 120 tablet 3    nystatin (MYCOSTATIN) 565051 UNIT/ML suspension Take 5 mLs by mouth 4 times daily for 5 days 100 mL 0    LORazepam (ATIVAN) 1 MG tablet Take 1 tablet by mouth 2 times daily as needed for Anxiety for up to 30 days. . 90 tablet 0    flavoxate (URISPAS) 100 MG tablet TAKE 1 TABLET BY MOUTH 3 TIMES DAILY AS NEEDED (BLADDER SPASMS) (Patient taking differently: Take 100 mg by mouth 2 times daily ) 90 tablet 0    sodium bicarbonate 650 MG tablet Take 1 tablet by mouth 2 times daily 60 tablet 0    linezolid (ZYVOX) 600 MG tablet Take 1 tablet by mouth 2 times daily for 10 days 20 tablet 0    phenazopyridine (PYRIDIUM) 100 MG tablet Take 100 mg by mouth 3 times daily as needed for Pain (with meal)      isosorbide mononitrate (IMDUR) 60 MG Upper Arm, Position: Sitting, Cuff Size: Small Adult)   Pulse 98   Ht 4' 10\" (1.473 m)   Wt 136 lb (61.7 kg)   LMP  (Exact Date)   SpO2 95%   BMI 28.42 kg/m²  Wt Readings from Last 3 Encounters:   09/24/18 136 lb (61.7 kg)   09/21/18 136 lb 12.8 oz (62.1 kg)   09/16/18 134 lb 11.2 oz (61.1 kg)        Physical Exam     General -- no distress  Lungs -- clear  Heart -- S1, S2 heard, JVD - no  Abdomen - soft, non-tender  Extremities -- no edema  CNS - awake and alert    Labs, Radiology and Tests    Labs -    7/18 9/18          Potassium 4.2 4.2          BUN 41 26          Creatinine 1.7 1.8          eGFR 28 27                      [facility]                          Renal Ultrasound Scan -- 7/2017  10.5 cm right kidney and left kidney 10 cm moderate) hydroureteronephrosis      Assessment    1. Renal - chronic kidney disease stage III with baseline creatinine close to 1.8  - However she frequently has acute kidney injury secondary to poor oral intake. - continue oral intake of fluids at least 50-60 ounces a day. 2. Electrolytes appear to be within normal limits  3. Chronic hypotension stable at this time. Was taken off some antihypertensive medication by cardiology. 4. UTI on abx  5. Right-sided hydronephrosis currently Glez catheter in place follows up with Dr. Shannan Juarez  6. Status post hip fractures and ORIF  7. meds reviewed and D/W patient  8. FU in 6 months    Tests and orders placed this Encounter     Orders Placed This Encounter   Procedures   Mathieu Ellison M.D  [facility].

## 2018-09-25 NOTE — CARE COORDINATION
Admission medications    Medication Sig Start Date End Date Taking? Authorizing Provider   zafirlukast (ACCOLATE) 20 MG tablet TAKE 1 TABLET TWICE A DAY 9/25/18   Layla Florez MD   diphenoxylate-atropine (LOMOTIL) 2.5-0.025 MG per tablet Take 1 tablet by mouth 4 times daily as needed for Diarrhea for up to 30 days. . 9/24/18 10/24/18  Layla Florez MD   nystatin (MYCOSTATIN) 712039 UNIT/ML suspension Take 5 mLs by mouth 4 times daily for 5 days 9/24/18 9/29/18  KALYN Marin CNP   LORazepam (ATIVAN) 1 MG tablet Take 1 tablet by mouth 2 times daily as needed for Anxiety for up to 30 days. . 9/21/18 10/21/18  KALYN Marin CNP   flavoxate (URISPAS) 100 MG tablet TAKE 1 TABLET BY MOUTH 3 TIMES DAILY AS NEEDED (BLADDER SPASMS)  Patient taking differently: Take 100 mg by mouth 2 times daily  9/20/18   KALYN Garrett CNP   sodium bicarbonate 650 MG tablet Take 1 tablet by mouth 2 times daily 9/14/18 10/14/18  KALYN Tirado CNP   phenazopyridine (PYRIDIUM) 100 MG tablet Take 100 mg by mouth 3 times daily as needed for Pain (with meal)    Historical Provider, MD   isosorbide mononitrate (IMDUR) 60 MG extended release tablet Take 1 tablet by mouth daily 9/10/18   Layla Florez MD   HYDROcodone-acetaminophen (NORCO) 5-325 MG per tablet Take 1 tablet by mouth every 6 hours as needed for Pain for up to 30 days. . 9/10/18 10/10/18  Layla Florez MD   diclofenac sodium 1 % GEL Apply 2 g topically 4 times daily  Patient taking differently: Apply 2 g topically 2 times daily  8/16/18   Layla Florez MD   metoprolol succinate (TOPROL XL) 50 MG extended release tablet TAKE 1 TABLET BY MOUTH DAILY 8/1/18   Yaneth Meng PA-C   atorvastatin (LIPITOR) 20 MG tablet TAKE 1 TABLET BY MOUTH NIGHTLY 7/31/18   KALYN Kimble CNP   meclizine (ANTIVERT) 25 MG tablet Take 1 tablet by mouth 3 times daily as needed for Dizziness 4/10/18   Layla Florez MD   escitalopram (LEXAPRO) 10 MG tablet

## 2018-09-26 PROBLEM — Z01.818 PRE-OP EVALUATION: Status: RESOLVED | Noted: 2017-04-07 | Resolved: 2018-01-01

## 2018-10-05 NOTE — PATIENT INSTRUCTIONS
Search Health Information box to learn more about \"Learning About Diuretics for High Blood Pressure. \"     If you do not have an account, please click on the \"Sign Up Now\" link. Current as of: May 10, 2017  Content Version: 11.7  © 0705-7845 [employer] formerly [employer]. Care instructions adapted under license by [facility] ([facility]). If you have questions about a medical condition or this instruction, always ask your healthcare professional. [address] any warranty or liability for your use of this information.

## 2018-10-05 NOTE — PROGRESS NOTES
Chronic Disease Visit Information    BP Readings from Last 3 Encounters:   09/26/18 132/64   09/24/18 118/76   09/21/18 120/66          LDL Calculated (mg/dL)   Date Value   04/12/2018 41     HDL   Date Value   04/12/2018 51 mg/dL   12/14/2011 58 mg/dl     BUN (mg/dL)   Date Value   09/21/2018 26 (H)     CREATININE (mg/dL)   Date Value   09/21/2018 1.8 (H)     Glucose (mg/dL)   Date Value   09/21/2018 93            Have you changed or started any medications since your last visit including any over-the-counter medicines, vitamins, or herbal medicines? no   Are you having any side effects from any of your medications? -  no  Have you stopped taking any of your medications? Is so, why? -  no    Have you seen any other physician or provider since your last visit? Yes - Records Obtained  Have you had any other diagnostic tests since your last visit? No  Have you been seen in the emergency room and/or had an admission to a hospital since we last saw you? No  Have you had your annual diabetic retinal (eye) exam? No  Have you had your routine dental cleaning in the past 6 months? no    Have you activated your Known account? If not, what are your barriers?  Yes     Patient Care Team:  Marcell Gray MD as PCP - General (Family Medicine)  Marcell Gray MD as PCP - [facility] Attributed Provider  Aminta Acosta MD as Consulting Physician (Oncology)  Jocelyn Winchester MD as Consulting Physician (Urology)  Kahlil Lilly MD as Consulting Physician (Cardiology)  Evan Torres RN as Care Coordinator         Medical History Review  Past Medical, Family, and Social History reviewed and does contribute to the patient presenting condition    Health Maintenance   Topic Date Due    Shingles Vaccine (1 of 2 - 2 Dose Series) 09/09/1980    Pneumococcal high/highest risk (2 of 2 - PPSV23) 11/24/2017    Flu vaccine (1) 09/01/2018    Potassium monitoring  09/21/2019    Creatinine monitoring  09/21/2019    DTaP/Tdap/Td vaccine (2 - Td)

## 2018-10-05 NOTE — PROGRESS NOTES
transport around the house and out of the house. She is increasingly difficult to move due to generalized weakness which is secondary to her multiple physical conditions that have been exacerbated by her recent hip and thigh fracture. Her level of physical deconditioning is the worst it has been for a long period of time. Another reason for her wheelchair has been evidence of increasing exertional shortness of breath. She uses her inhalers appropriately but increasingly she needs transport in a wheelchair. The rest of this patient's conditions are stable. Past medical and surgical hx reviewed. Past Medical History:   Diagnosis Date    Allergic rhinitis     Anxiety     Bilateral hydronephrosis 8/10/2016    Bladder cancer ([facility] 2008    Dr. Zora Alejandro Blood circulation, collateral     Broken leg     left     CHF (congestive heart failure) ([facility])     Constipation     attributed to Ultram    Coronary artery disease involving native coronary artery of native heart s/p cath 10/14/15-LM-P, LAD MID 90%, ANUERYSMAL, % PROX, RCA MID 60% DISTLA 70%, EDP 20 , EF 30%-NEED REVASCULARIZATION 10/14/2015    GERD (gastroesophageal reflux disease)     History of blood transfusion     [place] (hard of hearing)     Hyperglycemia     Hyperlipidemia     Hypertension     Obesity (BMI 30-39. 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  4/26/2017     Past Surgical History:   Procedure Laterality Date    ABDOMEN SURGERY      ABDOMINAL EXPLORATION SURGERY  1954    BLADDER SURGERY  10/2016    stent placed and removed a blood clot    CARDIAC SURGERY      stents    COLONOSCOPY      CYSTOSCOPY  11-    TUR and fulg BT  superficial noninvasive bladder ca    CYSTOSCOPY N/A 8/23/2017    CYSTOSCOPY, RIGHT URETEROSCOPY, RIGHT STENT EXCHANGE retrograde right and right ureteral dilitation [facility] [address] [facility]. The patient is nervous/anxious. Objective:   Physical Exam   Constitutional: She is oriented to person, place, and time. She appears well-developed and well-nourished. Neck: Carotid bruit is not present. No thyromegaly present. Cardiovascular: Normal rate, regular rhythm, S1 normal, S2 normal and normal heart sounds. No extrasystoles are present. Exam reveals no gallop. No murmur heard. Pulmonary/Chest: Effort normal and breath sounds normal. She has no wheezes. She has no rales. Abdominal: Soft. Bowel sounds are normal.   Musculoskeletal: She exhibits edema. Left hip: She exhibits tenderness and bony tenderness. Lumbar back: She exhibits decreased range of motion, tenderness and pain. Lymphadenopathy:     She has no cervical adenopathy. Neurological: She is alert and oriented to person, place, and time. Skin: Skin is warm and dry. Psychiatric: Her mood appears anxious. She is slowed. Nursing note and vitals reviewed. Assessment:       Diagnosis Orders   1. Essential hypertension     2. Need for prophylactic vaccination against Streptococcus pneumoniae (pneumococcus)  Pneumococcal polysaccharide vaccine 23-valent PPSV23   3. Hypercholesteremia  atorvastatin (LIPITOR) 20 MG tablet   4. RLS (restless legs syndrome)  Lidocaine 2 % GEL   5. Physical deconditioning  Walker rolling    DME Order for Wheel Chair as OP   6. SOB (shortness of breath) on exertion     7. Insomnia due to anxiety and fear     8. Recurrent cystitis  Urine Rt Reflex To Culture   9. Chronic diastolic heart failure ([facility]     10. Comminuted fracture of hip, left, closed, initial encounter ([facility]  Asa Lara rolling    DME Order for [employer] as OP   11. Closed displaced fracture of greater trochanter of left femur, sequela  Walker rolling    DME Order for [employer] as OP   12.  Medication monitoring encounter             Plan:      Orders Placed This Encounter   Procedures    TAKE 1 TABLET TWICE A  tablet 1    diphenoxylate-atropine (LOMOTIL) 2.5-0.025 MG per tablet Take 1 tablet by mouth 4 times daily as needed for Diarrhea for up to 30 days. . 120 tablet 3    LORazepam (ATIVAN) 1 MG tablet Take 1 tablet by mouth 2 times daily as needed for Anxiety for up to 30 days. . 90 tablet 0    flavoxate (URISPAS) 100 MG tablet TAKE 1 TABLET BY MOUTH 3 TIMES DAILY AS NEEDED (BLADDER SPASMS) (Patient taking differently: Take 100 mg by mouth 2 times daily ) 90 tablet 0    sodium bicarbonate 650 MG tablet Take 1 tablet by mouth 2 times daily 60 tablet 0    isosorbide mononitrate (IMDUR) 60 MG extended release tablet Take 1 tablet by mouth daily 30 tablet 5    HYDROcodone-acetaminophen (NORCO) 5-325 MG per tablet Take 1 tablet by mouth every 6 hours as needed for Pain for up to 30 days. . 120 tablet 0    diclofenac sodium 1 % GEL Apply 2 g topically 4 times daily (Patient taking differently: Apply 2 g topically 2 times daily ) 4 Tube 3    metoprolol succinate (TOPROL XL) 50 MG extended release tablet TAKE 1 TABLET BY MOUTH DAILY 90 tablet 3    meclizine (ANTIVERT) 25 MG tablet Take 1 tablet by mouth 3 times daily as needed for Dizziness 270 tablet 3    rOPINIRole (REQUIP) 4 MG tablet Take 1 tablet by mouth 2 times daily 180 tablet 3    pantoprazole (PROTONIX) 40 MG tablet TAKE 1 TABLET BY MOUTH 2 TIMES DAILY (BEFORE MEALS) 60 tablet 11    Incontinence Supplies (BEDSIDE DRAINAGE BAG) MISC Large 2000 cc bedside drainage bag 1 each 11    fluticasone (FLONASE) 50 MCG/ACT nasal spray 2 sprays by Nasal route 2 times daily      ferrous sulfate 325 (65 Fe) MG tablet TAKE 1 TABLET BY MOUTH 2 TIMES DAILY (WITH MEALS) 30 tablet 3    ondansetron (ZOFRAN) 4 MG tablet TAKE 1 TABLET BY MOUTH EVERY 8 HOURS AS NEEDED FOR NAUSEA OR VOMITING 30 tablet 3    BIOTIN FORTE PO Take by mouth daily      acetaminophen (TYLENOL) 325 MG tablet Take 2 tablets by mouth every 4 hours as needed for Pain or Fever

## 2018-10-07 PROBLEM — J96.01 ACUTE RESPIRATORY FAILURE WITH HYPOXIA (HCC): Status: ACTIVE | Noted: 2018-01-01

## 2018-10-07 NOTE — ED PROVIDER NOTES
[facility]  eMERGENCY dEPARTMENT eNCOUnter          CHIEF COMPLAINT       Chief Complaint   Patient presents with    Shortness of Breath       Nurses Notes reviewed and I agree except as noted in the HPI. HISTORY OF PRESENT ILLNESS    Gary Chopra is a 80 y.o. female presents to the ED for evaluation of shortness of breath. The patient reports that she has had increased shortness of breath and left lower rib pain. She rates her pain at a 4/10 in severity. En route to the ED the patient's O2 sat was in the 80s, and she is now on 4L of O2. She does not have home O2 or breathing treatments. She states that she was recently discharged from [address] [place] [address] due to pneumonia. She has a history of CHF and is followed by Dr. Vicki Durham (cardiologist). She recently had her influenza vaccination. She denies abdominal pain. The patient has a chronic forman catheter due to her history of bladder cancer that is now in remission. She also has a history of stage 4 kidney failure, but is not on dialysis. She has also had 2 blood transfusions due to her bladder cancer, and a broken leg in the past. Patient denies any further complaints at initial encounter. REVIEW OF SYSTEMS     Constitutional: no fever or chills  Respiratory: +dyspnea  Cardiovascular: no chest pain  Gastrointestinal : no abdominal pain     Remainder of review of systems is otherwise reviewed as negative. PAST MEDICAL HISTORY    has a past medical history of Allergic rhinitis; Anxiety; Bilateral hydronephrosis; Bladder cancer ([address] Blood circulation, collateral; Broken leg; CHF (congestive heart failure) ([address] Constipation; Coronary artery disease involving native coronary artery of native heart s/p cath 10/14/15-LM-P, LAD MID 90%, ANUERYSMAL, % PROX, RCA MID 60% DISTLA 70%, EDP 20 , EF 30%-NEED REVASCULARIZATION; GERD (gastroesophageal reflux disease); History of blood transfusion; [place] (hard of hearing); Hyperglycemia;  Hyperlipidemia; TROPONIN   URINALYSIS WITH MICROSCOPIC   AMMONIA   TARIQ SCREEN WITH REFLEX   ANTI-NEUTROPHILIC CYTOPLASMIC ANTIBODY   F-ACTIN (SMOOTH MUSCLE) ANTIBODY W/ REFLEX TO TITER   MITOCHONDRIAL ANTIBODIES, M2   HEPATITIS C ANTIBODY   HEPATITIS B SURFACE ANTIGEN   HEPATITIS B SURFACE ANTIBODY   HEPATITIS B CORE ANTIBODY, TOTAL   HEPATITIS A ANTIBODY, TOTAL   ALPHA-1-ANTITRYPSIN   CERULOPLASMIN   IGG   HEPATITIS A ANTIBODY, IGM   HEPATITIS B CORE ANTIBODY, IGM   CK   ACETAMINOPHEN LEVEL       EMERGENCY DEPARTMENT COURSE:   Vitals:    Vitals:    10/07/18 1141 10/07/18 1322 10/07/18 1452 10/07/18 1613   BP: (!) 156/83 (!) 155/75 134/75 (!) 142/66   Pulse: 86 87 76 86   Resp: 20 21 17 20   Temp: 98.7 °F (37.1 °C)   98.7 °F (37.1 °C)   TempSrc: Oral   Oral   SpO2: 100% 100% 100% 98%   Weight: 136 lb (61.7 kg)   140 lb 12.8 oz (63.9 kg)   Height: 4' 9\" (1.448 m)   4' 9\" (1.448 m)     This   looking like this is an exacerbation of CHF. We've ordered some IV Lasix. I discussed the case with the hospitalist and we will arrange for admission. She was reportedly hypoxic at home and is not currently on home oxygen from what I am told. CRITICAL CARE:   10 min    CONSULTS:  Dr José Miguel Tao:  None    FINAL IMPRESSION      1.  Congestive heart failure, unspecified HF chronicity, unspecified heart failure type [facility])          DISPOSITION/PLAN   Admitted    DISCHARGE MEDICATIONS:  Current Discharge Medication List          (Please note that portions of this note were completed with a voice recognition program.  Efforts were made to edit the dictations but occasionally words are mis-transcribed.)    Ileana Momin DO    Scribe:  Yassine Severe   10/7/18 11:59 AM Scribing for and in the presence of Ileana Momin DO.    [unfilled]    Provider:  I personally performed the services described in the documentation, reviewed and edited the documentation which was dictated to the scribe in my presence, and it accurately records my

## 2018-10-07 NOTE — H&P
(ZYRTEC) 10 MG tablet Take 10 mg by mouth daily   Yes Historical Provider, MD   Handicap Placard MISC by Does not apply route. 2/28/14  Yes Tg Jaimes MD         Allergies     Adhesive tape; Cleocin [clindamycin hcl]; Metronidazole; Penicillins; and Septra [bactrim]      Family History       Family History   Problem Relation Age of Onset    Arthritis Mother     Asthma Mother    Peyton Moll Stroke Mother     Arthritis Father     Heart Disease Father 68        enlarged heart    Heart Disease Sister         CHF    Cancer Brother         metatstatic    Diabetes Brother     Parkinsonism Brother     Diabetes Sister     Stroke Sister     Diabetes Brother     Heart Disease Brother        Social History       Social History     Social History    Marital status:      Spouse name: N/A    Number of children: N/A    Years of education: N/A     Social History Main Topics    Smoking status: Never Smoker    Smokeless tobacco: Never Used    Alcohol use No    Drug use: No    Sexual activity: Not on file     Other Topics Concern    Not on file     Social History Narrative    No narrative on file         TOBACCO:   reports that she has never smoked. She has never used smokeless tobacco.  ETOH:   reports that she does not drink alcohol. Review of systems     Pertinent positives as noted in the HPI. All other systems reviewed and negative. ROS      Physical examination     BP (!) 142/66   Pulse 86   Temp 98.7 °F (37.1 °C) (Oral)   Resp 20   Ht 4' 9\" (1.448 m)   Wt 140 lb 12.8 oz (63.9 kg)   LMP  (Exact Date)   SpO2 98%   BMI 30.47 kg/m²     General appearance:  No apparent distress. Appears comfortable with family at bedside. Obese. Very hard of hearing  HEENT:  Normocephalic. Extraocular motion intact. Oral mucosa moist without erythema or exudate. Neck: Supple. No JVD. No carotid bruits. No thyromegaly. Cardiovascular:  Regular rate and rhythm. 3/6 ROSIE   Respiratory:  Decreased breath sounds. [x] SCDs                                 [] SQ Heparin                                 [] Encourage ambulation           [] Already on Anticoagulation    Disposition:    [x] Home       [] TCU       [] Rehab       [] Psych       [] SNF       [x] [facility]       [] Other-    PT/OT Eval Status: PT/OT ordered    Code Status: Full Code    Admitted to Inpatient with expected LOS greater than two midnights due to medical therapy. Thank you Alisia Castro MD for the opportunity to be involved in this patient's care.     Electronically signed by Chantelle Romero MD on 10/7/2018 at 6:29 PM

## 2018-10-07 NOTE — PROGRESS NOTES
Hospitalist note    Spoke to daughter and patient about urinary tract infection, and they stated that the patient had mouth sores and diarrhea while on linezolid and she is very scared about going back on linezolid.  Will order vancomycin pending urine cultures    Electronically signed by Javier Jamison MD on 10/7/18 at 7:57 PM

## 2018-10-08 NOTE — PROGRESS NOTES
Spoke with family extensively throughout the day regarding her current course of treatment and explanation of procedures that are ordered. Family was argumentative with this nurse throughout the day regarding her medication, timing of meds and testings that were ordered from the physicians. They were unpleased throughout the day with certain process and procedures such as NPO status and not being able to eat d/t her liver US not being cancelled by the physician right away.

## 2018-10-08 NOTE — FLOWSHEET NOTE
Stopped to see this patient. She was sleeping. Talked with granddaughter about advance directive. Gave her a brochure. She stated that Brinda's poa would be here tomorrow. I suggested that she have the staff call the 's phone when the poa arrives.  It will be after 4 PM.

## 2018-10-08 NOTE — PLAN OF CARE
Zone management tool for CHF Diagnosis given to the patient as an education reference. Patient verbalizes understanding that the care team will be referencing this tool throughout their hospital stay and again on discharge. Nurse Eric notified of the reference tool being received by the patient.

## 2018-10-08 NOTE — CONSULTS
Z85.51    Generalized OA M15.9    Vertigo R42    Osteopenia M85.80    Anxiety, situational F41.9    Sinusitis J32.9    Osteoarthritis M19.90    Cancer ([facility]) C80.1    Restless legs syndrome (RLS) G25.81    Constipation K59.00    Acute bronchitis J20.9    Medication monitoring encounter Z51.81    Local reaction to bee sting T63.441A    ETD (eustachian tube dysfunction) H69.80    Hyponatremia E87.1    SIADH (syndrome of inappropriate ADH production) ([facility]) E22.2    Dizziness R42    Congestive heart failure ([facility]) I50.9    Essential hypertension I10    BPV (benign positional vertigo) H81.10    Urinary tract infection associated with indwelling urethral catheter ([facility]) T83.511A, N39.0    RLS (restless legs syndrome) G25.81    Chronic diastolic heart failure ([facility]) L36.20    Acute systolic congestive heart failure ([facility]) I50.21    Presence of stent in LAD coronary artery Z95.5    Acute GI bleeding K92.2    Duodenal ulcer K26.9    Gastritis K29.70    Sigmoid ulcer K63.3    Esophagitis K20.9    Ischemic colon ([facility]) K55.9    Physical deconditioning R53.81    S/P angioplasty with stent TINA in 10/2015 Z95.9    Dyslipidemia E78.5    SOB (shortness of breath) on exertion R06.02    Insomnia due to anxiety and fear F51.05, F40.9    Chest pain, atypical R07.89    Chronic right hydronephrosis N13.30    Coronary artery disease of native artery of native heart with stable angina pectoris ([facility]) I25.118    Gross hematuria R31.0    Acute blood loss anemia D62    Hypotension due to drugs I95.2    Coronary artery disease involving native heart without angina pectoris I25.10    Gross hematuria R31.0    Acute cystitis with hematuria N30.01    Non morbid obesity E66.9    Encounter for chemotherapy management Z51.11    Chronic diastolic CHF (congestive heart failure) ([facility]) I50.32    Macrocytic anemia D53.9    Lesion of bladder N32.9    Ureteral stenosis Q62.10    Iron deficiency anemia due to
Nose: Nose normal.   Dry mucous membranes   Eyes: Conjunctivae are normal. Right eye exhibits no discharge. Left eye exhibits no discharge. No scleral icterus. Neck: Normal range of motion. Neck supple. No JVD present. No thyromegaly present. Cardiovascular: Normal rate, regular rhythm and normal heart sounds. No murmur heard. Pulmonary/Chest: Effort normal. No stridor. No respiratory distress. She has rales (mild). She exhibits no tenderness. Abdominal: Soft. Bowel sounds are normal. She exhibits no distension. There is no tenderness. Musculoskeletal: She exhibits no edema or tenderness. Neurological: She is alert and oriented to person, place, and time. Skin: Skin is warm and dry. No rash noted. She is not diaphoretic. No erythema. Psychiatric: Mood, memory and affect normal.   Vitals reviewed. Vitals:    10/08/18 0530   BP:    Pulse:    Resp:    Temp:    SpO2: 96%     Labs, Radiology and Tests       Recent Labs      10/07/18   1221  10/08/18   0552   WBC  10.6  8.5   RBC  2.76*  2.75*   HGB  8.8*  8.7*   HCT  28.3*  28.3*   MCV  102.5*  102.9*   MCH  31.9  31.6   MCHC  31.1*  30.7*   PLT  330  313     Recent Labs      10/07/18   1313  10/07/18   1849  10/08/18   0552   NA  139  140  140   K  5.0  4.4  4.4   CL  102  101  103   CO2  26  26  25   BUN  19  20  20   CREATININE  1.7*  1.7*  1.9*   CALCIUM  7.6*  8.6  8.2*   PROT  6.8   --    --    LABALBU  3.9   --    --    BILITOT  0.3   --    --    ALKPHOS  163*   --    --    AST  >7,000*   --    --    ALT  8*   --    --        Radiology : CXR reviewed by me shows mild congetsion    Other : old lab data have been reviewed and noted that the patients baseline creatinine runs ~ 1.6 - 1.7 for the most part    Assessment    Renal - mild acute kidney injury most likely prerenal to some degree patient has always known to have very poor oral intake.   She also received a dose of diuretics which may have caused the renal function to worsen
History:   No GI malignancies   Family History   Problem Relation Age of Onset    Arthritis Mother     Asthma Mother     Stroke Mother     Arthritis Father     Heart Disease Father 68        enlarged heart    Heart Disease Sister         CHF    Cancer Brother         metatstatic    Diabetes Brother     Parkinsonism Brother     Diabetes Sister     Stroke Sister     Diabetes Brother     Heart Disease Brother        ROS:  General : no fever chills or weight loss +confusion  Eyes: No loss of vision  ENT: No  vocal hoarseness   Cardiovascular: No chest pain. Respiratory: No cough, SOB  GI:+poor appetite, vomited times 2. No melena, hematochezia, constipation, or diarrhea. : No dysuria  GYN: No abnormal menstrual bleeding  Musculoskeletal: No new painful or swollen joints  Skin: No rashes, jaundice   Neurologic: no frequent headaches, migraines  Emotional: No anxiety or depression  Endocrine:  No polyuria, polydipsia, polyphagia. Blood: No anemia, blood product or blood product transfusion   Allergic/Immunologic: No lupus or HIV  Cancer: +hx bladder cancer    Physical Exam:  BP (!) 144/98   Pulse 78   Temp 98.6 °F (37 °C) (Oral)   Resp 18   Ht 4' 9\" (1.448 m)   Wt 137 lb 4.8 oz (62.3 kg)   LMP  (Exact Date)   SpO2 96%   BMI 29.71 kg/m²     The patient is a  80 y.o.  female in no acute distress. HEAD: Normal cephalic/atraumatic. Extra-occular motions intact bilaterally. NECK: No lymphadenopathy or bruits. Trachea is midline. CHEST: Rises equally on inspiration. Clear to auscultation bilaterally. CARDIOVASCULAR: Regular rate and rhythm without murmurs, rubs or gallops. ABDOMEN: Soft and nondistended with normal bowel sounds. No Hepatosplemomegaly or masses noted   Tender:  +mild tenderness RUQ, left upper abd  EXTREMITIES: no cyanosis, clubbing, or edema. DERM: no rash or jaundice.   NEURO: alert and oriented times four with appropriate affect    Results for Cydney Fernandez (MRN [id number])

## 2018-10-08 NOTE — PROGRESS NOTES
admission. Breathing has improved. Weaning down on supplemental O2. Subjective: no acute events overnight. Improvement in breathing. Plan is to exchange forman catheter today. No fevers or chills. Medications:  Reviewed    Infusion Medications   Scheduled Medications    cetirizine  10 mg Oral Daily    ferrous sulfate  325 mg Oral BID WC    fluticasone  2 spray Nasal BID    pantoprazole  40 mg Oral QAM AC    rOPINIRole  4 mg Oral BID    metoprolol succinate  50 mg Oral Daily    isosorbide mononitrate  60 mg Oral Daily    zafirlukast  10 mg Oral BID AC    escitalopram  10 mg Oral Daily    sodium chloride flush  10 mL Intravenous 2 times per day    cefTRIAXone (ROCEPHIN) IV  1 g Intravenous Q24H    bumetanide  1 mg Intravenous Daily    lactobacillus  1 capsule Oral BID WC    vancomycin  1,000 mg Intravenous Q48H    vancomycin (VANCOCIN) intermittent dosing (placeholder)   Other RX Placeholder     PRN Meds: sodium chloride, ondansetron, meclizine, LORazepam, lidocaine, oxyCODONE, sodium chloride flush, magnesium hydroxide, ondansetron, potassium chloride **OR** potassium chloride **OR** potassium chloride, potassium chloride, magnesium sulfate      Intake/Output Summary (Last 24 hours) at 10/08/18 1421  Last data filed at 10/08/18 1141   Gross per 24 hour   Intake              315 ml   Output             2150 ml   Net            -1835 ml       Diet:  Diet NPO Effective Now    Exam:  BP (!) 149/67   Pulse 89   Temp 99.6 °F (37.6 °C) (Oral)   Resp 20   Ht 4' 9\" (1.448 m)   Wt 137 lb 4.8 oz (62.3 kg)   LMP  (Exact Date)   SpO2 93%   BMI 29.71 kg/m²     General appearance: frail thin elderly. HEENT: Pupils equal, round, and reactive to light. Conjunctivae/corneas clear. Neck: Supple, with full range of motion. No jugular venous distention. Trachea midline.   Respiratory:  Bilateral basilar crackles   Cardiovascular: Regular rate and rhythm with normal S1/S2 without murmurs, rubs or [] SQ Heparin                                 [] Encourage ambulation           [] Already on Anticoagulation     Disposition:    [x] Home       [] TCU       [] Rehab        [] Psych       [] SNF       [] [facility]       [] Other-    Code Status: Full Code    PT/OT Eval Status: pending     Assessment/Plan:    Anticipated Discharge in : 2-3 days     Active Hospital Problems    Diagnosis Date Noted    Acute respiratory failure with hypoxia ([facility] [address] [J96.01] 10/07/2018    Gastroesophageal reflux disease [K21.9]     Anasarca [R60.1]     Chronic indwelling Glez catheter [Z92.89]     Hearing loss [H91.90]     Acute on chronic diastolic (congestive) heart failure (HCC) [I50.33]     Chronic kidney disease, stage 4 (severe) ([address] [N18.4]        Assessment:  1. Acute on Chronic Diastolic CHF   2. Acute on Chronic Hypoxic Resp. Failure   3. Chronic Glez related UTI    4. CKD Stage 4, Chronic   5. GERD  6. Hypertension   7. Hx of Bladder Cancer   8. Osteoarthritis   9. Macrocytic Anemia       Plan:  1. Acute on chronic diastolic congestive heart failure with anasarca. Last echocardiogram in May 2018 showed EF of 55% with grade 1 diastolic dysfunction, may be precipitated by stopping diuretics during last admission. Elevated BNP. CXR pulmonary vascular congestion. IV Bumex 1 mg qd. Strict I/Os. Monitor lytes daily. Consult cardiology and nephrology. 2. Acute hypoxic Resp. Failure 2/2 to #1. IV bumex 1 mg qd. Plan per above. 3. Catheter related UTI. Grown MRSA in past in [place]. On Vanc and Rocephin. Consult ID. Exchange Glez catheter today (10/8/18). 4. CKD stage 4, follows up with nephrology. Appreciate nephrology recs. Strict I/Os. Avoid nephrotoxic agents. 5. Resume Protonix 40 qd.   6. Resume home BP medications.  Monitor BP daily         Electronically signed by Galindo Lambert MD on 10/8/2018 at 2:21 PM

## 2018-10-08 NOTE — CARE COORDINATION
10/8/18, 10:54 AM      Toby Burch       Admitted from: ER 10/7/2018/ [address] [facility] day: 1   Location: [facility]14/014-A Reason for admit: Acute respiratory failure with hypoxia (HCC) [J96.01]  Acute on chronic diastolic (congestive) heart failure (HCC) [I50.33] Status: IP   Admit order signed?: yes  PMH:  has a past medical history of Allergic rhinitis; Anxiety; Bilateral hydronephrosis; Bladder cancer ([facility] [address] Blood circulation, collateral; Broken leg; CHF (congestive heart failure) ([facility] [address] Constipation; Coronary artery disease involving native coronary artery of native heart s/p cath 10/14/15-LM-P, LAD MID 90%, ANUERYSMAL, % PROX, RCA MID 60% DISTLA 70%, EDP 20 , EF 30%-NEED REVASCULARIZATION; GERD (gastroesophageal reflux disease); History of blood transfusion; Platinum (hard of hearing); Hyperglycemia; Hyperlipidemia; Hypertension; Obesity (BMI 30-39.9); Osteoarthritis; Osteopenia; Pneumonia; and 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 .    Medications:  Scheduled Meds:   cetirizine  10 mg Oral Daily    ferrous sulfate  325 mg Oral BID WC    fluticasone  2 spray Nasal BID    pantoprazole  40 mg Oral QAM AC    rOPINIRole  4 mg Oral BID    metoprolol succinate  50 mg Oral Daily    isosorbide mononitrate  60 mg Oral Daily    sodium bicarbonate  650 mg Oral BID    zafirlukast  10 mg Oral BID AC    escitalopram  10 mg Oral Daily    sodium chloride flush  10 mL Intravenous 2 times per day    cefTRIAXone (ROCEPHIN) IV  1 g Intravenous Q24H    bumetanide  1 mg Intravenous Daily    lactobacillus  1 capsule Oral BID WC    vancomycin  1,000 mg Intravenous Q48H    vancomycin (VANCOCIN) intermittent dosing (placeholder)   Other RX Placeholder     Continuous Infusions:   Pertinent Info/Orders/Treatment Plan:  Trops elev. Creat 1.9, Hgb 8.7, BNP 39470, UA abn. IV bumex daily, IV ATBs. Cardiology following, echo ordered. Nephrology and GI consults pending. PT/OT evals.     Diet: Diet NPO Effective Now Smoking status:  reports that she has never smoked. She has never used smokeless tobacco.   PCP: Kristopher Green MD  Readmission: Patient has been readmitted within 21 days. Patient went to f/u appointment? yes  If yes, was it within 7 days? yes  Patient was able to fill prescriptions? yes  Patient is taking medications as prescribed? yes  Cause for readmission? Heart failure  Readmission Risk Score: 45%    Discharge Planning  Current Residence:  Private Residence  Living Arrangements:  Family Members   Support Systems:  Children, Family Members  Current Services PTA:     Potential Assistance Needed:  N/A  Potential Assistance Purchasing Medications:  No  Does patient want to participate in local refill/ meds to beds program?  No  Type of Home Care Services:  Aide Services, Nursing Services, [facility]  Patient expects to be discharged to:  home with family  Expected Discharge date: Follow Up Appointment: Best Day/ Time: Tuesday AM    Discharge Plan: Spoke with Jose Martin Sidhu, she plans return home with her family at discharge. She is current with [facility]. Denies further needs at this time.      Evaluation: yes

## 2018-10-09 NOTE — PROGRESS NOTES
non-tender, no guarding  Skin: Pale warm dry intact  Musculo: moves all extremities  Neuro: no deficits apparent  Psychiatric: Awake, alert, Oriented         Last 3 CBC   Recent Labs      10/07/18   1221  10/08/18   0552  10/09/18   0541   WBC  10.6  8.5  9.3   RBC  2.76*  2.75*  2.63*   HGB  8.8*  8.7*  8.2*   HCT  28.3*  28.3*  26.2*   PLT  330  313  358     Last 3 CMP  Recent Labs      10/07/18   1313  10/07/18   1849  10/08/18   0552  10/09/18   0541   NA  139  140  140  136   K  5.0  4.4  4.4  4.1   CL  102  101  103  98   CO2  26  26  25  25   BUN  19  20  20  18   CREATININE  1.7*  1.7*  1.9*  1.9*   CALCIUM  7.6*  8.6  8.2*  8.4*   LABALBU  3.9   --   3.3*  3.3*   BILITOT  0.3   --   0.3  0.3         Results for Mayra Guzman (MRN [id number]) as of 10/9/2018 08:27   Ref. Range 10/9/2018 06:25   Chloride, Urine Latest Units: meq/l 46.0   Creatinine, Urine Latest Units: mg/dl 36.8   Osmolality, Ur Latest Ref Range: 250 - 750 mosmol/kg 248 (L)   Potassium, Urine Latest Units: meq/l 15.4   Sodium, Ur Latest Units: meq/l 75   Urea Nitrogen, Ur Latest Units: mg/dl 187     Results for Mayra Guzman (MRN [id number]) as of 10/9/2018 08:27   Ref. Range 10/7/2018 18:49   Folate Latest Ref Range: 4.8 - 24.2 ng/mL 13.9   Vitamin B-12 Latest Ref Range: 211 - 911 pg/mL 657        Assessment    1. BEREKET/CKD, likely prerenal due to poor oral intake, in the setting of diuretics, stable with crt 1.9, close to baseline  - Lytes and acid/base balance stable. - Urine lytes unclear in light of diuretics administration  - Renal function is stable on Bumex 1 mg daily.   - Good DYR1820 mL/24 hours. - Wt down approximately 6 pounds overall.   - Avoid nephrotoxic agents including NSAID's, non-emergent IV contrast dye, fleets phospho enema, ACE, ARB for now. - BMP in am.         2. CHF, chronic diastolic, cardiology following, currently on Bumex 1 mg daily.   - Mag 1.6.     3. Acidosis, resolved  4.  UTI, klebsiella, on

## 2018-10-09 NOTE — PROGRESS NOTES
 Hypertension     Obesity (BMI 30-39. 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  4/26/2017     Past Surgical History:   Procedure Laterality Date    ABDOMEN SURGERY      ABDOMINAL EXPLORATION SURGERY  1954    BLADDER SURGERY  10/2016    stent placed and removed a blood clot    CARDIAC SURGERY      stents    COLONOSCOPY      CYSTOSCOPY  11-    TUR and fulg BT  superficial noninvasive bladder ca    CYSTOSCOPY N/A 8/23/2017    CYSTOSCOPY, RIGHT URETEROSCOPY, RIGHT STENT EXCHANGE retrograde right and right ureteral dilitation [facility] Patrick [facility] [address] [facility]    CYSTOSCOPY EVACUATION OF CLOTS performed by Kinjal Patrick MD at [place] [address] EKG 12-LEAD  10/14/2015         EKG 12-LEAD  10/17/2015         ENDOSCOPY, COLON, DIAGNOSTIC      EYE SURGERY  04/2018    JOINT REPLACEMENT      hip    MOUTH SURGERY      cheek; had benign lump removed    OTHER SURGICAL HISTORY Right 04/19/2017    Cystoscopy, Attempted Right Ureteral Access     NE CYSTOURETHROSCOPY,URETER CATHETER Right 1/31/2018    CYSTOSCOPY RIGHT RETROGRADE PYELOGRAM RIGHT URETEROSCOPY AND REPLACEMENT OF RIGHT URETERAL STENT performed by Kinjal Patrick MD at [address] [facility] Right 8/3/2018    CYSTOSCOPY WITH RIGHT RETROGRADE PYELOGRAM, RIGHT URETEROSCOPY AND REPLACEMENT OF RIGHT URETERAL STENT performed by Kinjal Patrick MD at [address] [facility] OFFICE/OUTPT [facility] Left 6/11/2018    IM NAIL HENRY INSERTION, LEFT performed by Rene Bermudez MD at [place] [address]  03/2017    TOTAL HIP ARTHROPLASTY  3-9-12    Rt           Subjective  Chart Reviewed: Yes (Internal medicine note; order review)  Response to previous treatment: Patient with no complaints from previous session  Family / Caregiver Present: Yes (Daughter present and supportive)    Subjective: Pleasant and cooperative  Comments: RN approved session. Pt was sitting in the chair. She had a test scheduled but was cancelled. She had been NPO for most of the day. She was finishing her supper when therapist entered her room. Pt reported L sided back pain that is moderate. General:  Overall Orientation Status: Within Normal Limits    Vision: Impaired  Vision Exceptions: Wears glasses at all times    Hearing: Exceptions to [facility]  Hearing Exceptions: Hard of hearing/hearing concerns         Pain:  Pain Assessment  Patient Currently in Pain: Yes  Pain Assessment: 0-10  Pain Level: 5  Pain Type: Acute pain  Pain Location: Rib cage  Pain Orientation: Left  Pain Descriptors: Sharp  Pain Frequency: Continuous  Clinical Progression: Not changed  Patient's Stated Pain Goal: No pain  Pain Intervention(s): Repositioned; Rest  Response to Pain Intervention: Patient Satisfied  Multiple Pain Sites: No       Social/Functional History:  Lives With: Family  Type of Home: House  Home Access: Level entry  Home Equipment: Rolling walker     Bathroom Equipment: Grab bars around toilet  Bathroom Accessibility: Accessible  IADL Comments: Pt had aide 2x/wk help with her self care bathing, a visiting nurse 2x/wk and PT and OT 2x/wk. 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 She presents with decreased endurance and limited functional mobility. She was walking to/from the bathroom prior to admission with a rolling walker. She also was not using any O2 at home. She was needing 1L of O2 at his time. She walked 3 ft with MIN A using a rolling walker at this time. She was needing rest breaks between tasks. She sat at the edge of the bed for 5 minutes before requesting to lay down. Performance deficits / Impairments: Decreased functional mobility , Decreased ADL status, Decreased endurance, Decreased ROM, Decreased strength, Decreased cognition, Decreased balance  Prognosis: Fair    Clinical Decision Making: Clinical Decision making was of High Complexity as the result of analysis of data from a comprehensive assessment, the presence of comorbidities affecting the plan of care and the need for signficant modifications or assistance required to complete the evaluation. Discharge Recommendations:  Discharge Recommendations: Continue to assess pending progress, Patient would benefit from continued therapy after discharge    Patient Education:  Patient Education: OT POC; pt's goal; safety awareness while doing transfers. Equipment Recommendations:  Equipment Needed: No    Safety:  Safety Devices in place: Yes  Type of devices: Left in bed, Bed alarm in place, Call light within reach, Gait belt, Nurse notified, Patient at risk for falls    Plan:  Times per week: 3-5x  Current Treatment Recommendations: Strengthening, ROM, Balance Training, Safety Education & Training, Self-Care / ADL  Plan Comment: Pt would benefit from continued skilled OT when medically stable and discharged from Acute. Specific instructions for Next Treatment: Functional mobility; ADLs and standing tolerance; upper body exercises as able    Goals:  Patient goals : \"I want to get stronger and back to walking and doing things while standing. \" pt

## 2018-10-09 NOTE — PROGRESS NOTES
Cardiology Progress Note      Patient:  Harley Kelly  YOB: 1930  MRN: [id number]   Acct: [de-identified]  Admit Date:  10/7/2018  Primary Cardiologist: Krissy Castñaeda MD  Seen by dr [place]: chf  hpi per dr Pradip Real:                PATIENT THINKS it is her sinus problem. \"    Subjective (Events in last 24 hours): pt awake and alert. NAD. No cp.   Sob has improved    Net I/o -2.9 L    Objective:   BP (!) 138/94   Pulse 89   Temp 98.6 °F (37 °C) (Oral)   Resp 16   Ht 4' 9\" (1.448 m)   Wt 130 lb (59 kg)   LMP  (Exact Date)   SpO2 98%   BMI 28.13 kg/m²        TELEMETRY: nsr    Physical Exam:  General Appearance: alert and oriented to person, place and time, in no acute distress  Cardiovascular: normal rate, regular rhythm, normal S1 and S2, no murmurs, rubs, clicks, or gallops, distal pulses intact, no carotid bruits, no JVD  Pulmonary/Chest: bibasilar crackles  Abdomen: soft, non-tender, non-distended, normal bowel sounds, no masses Extremities: no cyanosis, clubbing or edema, pulse   Skin: warm and dry, no rash or erythema  Head: normocephalic and atraumatic  Eyes: pupils equal, round, and reactive to light  Neck: supple and non-tender without mass, no thyromegaly   Musculoskeletal: normal range of motion, no joint swelling, deformity or tenderness  Neurological: alert, oriented, normal speech, no focal findings or movement disorder noted    Medications:    cetirizine  10 mg Oral Daily    ferrous sulfate  325 mg Oral BID WC    fluticasone  2 spray Nasal BID    pantoprazole  40 mg Oral QAM AC    rOPINIRole  4 mg Oral BID    metoprolol succinate  50 mg Oral Daily    isosorbide mononitrate  60 mg Oral Daily    zafirlukast  10 mg Oral BID AC    escitalopram  10 mg Oral Daily    sodium chloride flush  10 mL Intravenous 2 times per day    bumetanide  1 mg Intravenous Daily    lactobacillus  1 capsule Oral BID WC         sodium chloride 1 spray PRN bb/imdur  · Restart home statin if ok with primary  · Not on asa due to recent admission with hematuria  · cxr and bnp in am         Electronically signed by Jose Luis Tovar PA-C on 10/9/2018 at 11:13 AM

## 2018-10-09 NOTE — PROGRESS NOTES
Moderate assistance (with HOB slightly raised)    Transfers  Sit to Stand: Minimal Assistance (verbal cues for hand placement; pt completed from the bed and the toilet)  Stand to sit: Minimal Assistance (verbal cues for safety when going to sit down)       Ambulation 1  Surface: level tile  Device: Rolling Walker  Other Apparatus: O2  Assistance: Minimal assistance  Quality of Gait: pt ambulates with a very slow gait speed, decreased B step length and foot clearance noted, unsteady, kyphotic posture  Distance: 10', 10'  Comments: verbal cues for safety with the walker    Activity Tolerance:  Activity Tolerance: Patient Tolerated treatment well;Patient limited by endurance    Treatment Initiated: PT eval completed. Also completed gait training with walker, see above. Assessment: Body structures, Functions, Activity limitations: Decreased functional mobility , Decreased strength, Decreased balance, Decreased endurance  Assessment: Patient tolerated PT session fairly well. She currently requires min-mod A to complete mobility due to generalized weakness, decreased balance and decreased endurance. She will benefit from continued PT in order to maximize her safety and functional independence and decrease her risk for falls. Prognosis: Good    Clinical Presentation: Moderate - Evolving with Changing Characteristics: . Due to an analysis of data from a detailed assessment, a consideration of several treatment options, the presence of co morbidities that affect the POC, and the need for minimal to moderate modifications or assistance needed to complete the evaluation. Decision Making:  Moderate Complexitybased on patient assessment and decision making process of determining plan of care and establishing reasonable expectations for measurable functional outcomes    REQUIRES PT FOLLOW UP: Yes    Discharge Recommendations:  Discharge Recommendations: Continue to assess pending progress, Patient would benefit from

## 2018-10-09 NOTE — PROGRESS NOTES
has improved. Weaning down on supplemental O2. Subjective: no acute events overnight. Sob improving with diuresis. Denies any dizziness, lightheadedness or chest pain. Continues to have back pain. Medications:  Reviewed    Infusion Medications   Scheduled Medications    diclofenac sodium  2 g Topical 4x Daily    atorvastatin  20 mg Oral Nightly    cetirizine  10 mg Oral Daily    ferrous sulfate  325 mg Oral BID WC    fluticasone  2 spray Nasal BID    pantoprazole  40 mg Oral QAM AC    rOPINIRole  4 mg Oral BID    metoprolol succinate  50 mg Oral Daily    isosorbide mononitrate  60 mg Oral Daily    zafirlukast  10 mg Oral BID AC    escitalopram  10 mg Oral Daily    sodium chloride flush  10 mL Intravenous 2 times per day    bumetanide  1 mg Intravenous Daily    lactobacillus  1 capsule Oral BID      PRN Meds: flavoxate, sodium chloride, ondansetron, meclizine, LORazepam, lidocaine, oxyCODONE, sodium chloride flush, magnesium hydroxide, ondansetron, potassium chloride **OR** potassium chloride **OR** potassium chloride, potassium chloride, magnesium sulfate      Intake/Output Summary (Last 24 hours) at 10/09/18 1914  Last data filed at 10/09/18 1553   Gross per 24 hour   Intake              420 ml   Output             1450 ml   Net            -1030 ml       Diet:  DIET CARDIAC; Daily Fluid Restriction: 1500 ml    Exam:  BP (!) 110/52   Pulse 74   Temp 98.6 °F (37 °C) (Oral)   Resp 16   Ht 4' 9\" (1.448 m)   Wt 130 lb (59 kg)   LMP  (Exact Date)   SpO2 92%   BMI 28.13 kg/m²     General appearance: No apparent distress, appears stated age and cooperative. Frail, elderly. HEENT: Pupils equal, round, and reactive to light. Conjunctivae/corneas clear. Neck: Supple, with full range of motion. No jugular venous distention. Trachea midline. Respiratory:  Normal respiratory effort. Minimal basilar crackles.    Cardiovascular: Regular rate and rhythm with normal S1/S2 without murmurs, rubs showed EF of 55% with grade 1 diastolic dysfunction, may be precipitated by stopping diuretics during last admission. Elevated BNP. CXR pulmonary vascular congestion. IV Bumex 1 mg qd. UOP neg 3.8L. Strict I/Os. Monitor lytes daily. [address] cardiology and nephrology. 2. Acute hypoxic Resp. Failure 2/2 to #1. IV bumex 1 mg qd. Weaned to [employer]. Maintain SpO2>90%. Plan per above. 3. Chronic Colonizer Catheter related UTI. Grown MRSA in past in [place]. Started on IV Vanc and Rocephin. Exchanged Glez catheter yesterday (10/8/18). ID recommending discontinuing Abx as patient is a chronic colonizer. 4. CKD stage 4, follows up with nephrology. Appreciate nephrology recs. Strict I/Os. Avoid nephrotoxic agents. 5. Resume Protonix 40 qd.   6. Resume home BP medications.  Monitor BP daily         Electronically signed by Annalisa Walker MD on 10/9/2018 at 7:14 PM

## 2018-10-10 NOTE — PLAN OF CARE
Problem: Pain:  Goal: Pain level will decrease  Pain level will decrease   Outcome: Ongoing  Chronic pain from arthritis. PRN medication given as tolerated. Pain-medicated gel applied to pain sites 4 times daily. Heating pad also utilized for pain relief. Problem: DISCHARGE BARRIERS  Goal: Patient's continuum of care needs are met  Outcome: Ongoing  To be discharged to home with family when medically stable. Problem: Cardiac:  Goal: Hemodynamic stability will improve  Hemodynamic stability will improve   Outcome: Ongoing  VS monitored every 4 hours and PRN. Cardiology following for CHF, consult placed to CHF coordinator today. Problem: Fluid Volume:  Goal: Risk for excess fluid volume will decrease  Risk for excess fluid volume will decrease   Outcome: Ongoing  Intake and output monitored every 8 hours. Glez catheter in place, draining clear yellow urine. Chronic. Daily weights. IV bumex for fluid retention. Goal: Will show no signs and symptoms of electrolyte imbalance  Will show no signs and symptoms of electrolyte imbalance   Outcome: Ongoing  Labs monitored daily and replaced as needed. Comments: Care plan reviewed with patient and family. Patient and family verbalize understanding of the plan of care and contribute to goal setting.

## 2018-10-10 NOTE — PROGRESS NOTES
[facility]    Renal inpatient Progress Note  10/10/2018 5:51 PM      Pt Name:   Viky Res:   9/9/1930  Attending:   Walter Negrete MD    Chief Complaint:   Josef Prieto is a 80 y.o. female being followed by nephrology for BEREKET     Interval History : patient seen and examined by me. Feels well. no cp or SOB. No other complaints. Scheduled Medications :   diclofenac sodium  2 g Topical 4x Daily    atorvastatin  20 mg Oral Nightly    cetirizine  10 mg Oral Daily    ferrous sulfate  325 mg Oral BID WC    fluticasone  2 spray Nasal BID    pantoprazole  40 mg Oral QAM AC    rOPINIRole  4 mg Oral BID    metoprolol succinate  50 mg Oral Daily    isosorbide mononitrate  60 mg Oral Daily    zafirlukast  10 mg Oral BID AC    escitalopram  10 mg Oral Daily    sodium chloride flush  10 mL Intravenous 2 times per day    bumetanide  1 mg Intravenous Daily    lactobacillus  1 capsule Oral BID WC          Vitals :  BP (!) 112/56   Pulse 78   Temp 98.3 °F (36.8 °C) (Oral)   Resp 18   Ht 4' 9\" (1.448 m)   Wt 132 lb 12.8 oz (60.2 kg)   LMP  (Exact Date)   SpO2 95%   BMI 28.74 kg/m²     24HR INTAKE/OUTPUT:      Intake/Output Summary (Last 24 hours) at 10/10/18 1751  Last data filed at 10/10/18 0826   Gross per 24 hour   Intake              130 ml   Output              550 ml   Net             -420 ml     Last 3 weights  Wt Readings from Last 3 Encounters:   10/10/18 132 lb 12.8 oz (60.2 kg)   09/26/18 136 lb 12.8 oz (62.1 kg)   09/24/18 136 lb (61.7 kg)        Physical Exam :  General Appearance:  Well developed.  No distress  Mouth/Throat:  Oral mucosa moist  Neck:  Supple, no JVD  Lungs:  Breath sounds: clear but diminished  Heart[de-identified]  S1,S2 heard  Abdomen:  Soft, non - tender  Musculoskeletal:  Edema - none     Last 3 CBC  Recent Labs      10/08/18   0552  10/09/18   0541  10/10/18   0512   WBC  8.5  9.3  8.1   RBC  2.75*  2.63*  2.58*   HGB  8.7*  8.2*  8.0*   HCT

## 2018-10-10 NOTE — PLAN OF CARE
Problem: Pain:  Goal: Pain level will decrease  Pain level will decrease   Outcome: Ongoing  Pain level will decrease   Outcome: Ongoing  PRN pain medication given, with effect. Goal: Control of acute pain  Control of acute pain   Outcome: Ongoing  PRN pain medication given, with effect. Goal: Control of chronic pain  Control of chronic pain   Outcome: Ongoing  PRN pain medication given, with effect.     Comments: Care plan reviewed with patient. Patient verbalize understanding of the plan of care and contribute to goal setting.

## 2018-10-10 NOTE — PROGRESS NOTES
Breathing has improved. Weaned down to room air. Repeat CXR showing improvement.         Subjective: no acute events overnight. Denies any lightheadedness, dizziness, chest pain, sob. Pt tolerating PO intake. Medications:  Reviewed    Infusion Medications   Scheduled Medications    diclofenac sodium  2 g Topical 4x Daily    atorvastatin  20 mg Oral Nightly    cetirizine  10 mg Oral Daily    ferrous sulfate  325 mg Oral BID WC    fluticasone  2 spray Nasal BID    pantoprazole  40 mg Oral QAM AC    rOPINIRole  4 mg Oral BID    metoprolol succinate  50 mg Oral Daily    isosorbide mononitrate  60 mg Oral Daily    zafirlukast  10 mg Oral BID AC    escitalopram  10 mg Oral Daily    sodium chloride flush  10 mL Intravenous 2 times per day    bumetanide  1 mg Intravenous Daily    lactobacillus  1 capsule Oral BID WC     PRN Meds: flavoxate, sodium chloride, ondansetron, meclizine, LORazepam, lidocaine, oxyCODONE, sodium chloride flush, magnesium hydroxide, ondansetron, potassium chloride **OR** potassium chloride **OR** potassium chloride, potassium chloride, magnesium sulfate      Intake/Output Summary (Last 24 hours) at 10/10/18 1014  Last data filed at 10/10/18 0826   Gross per 24 hour   Intake              130 ml   Output             1425 ml   Net            -1295 ml       Diet:  DIET CARDIAC; Daily Fluid Restriction: 1500 ml    Exam:  BP (!) 155/65   Pulse 68   Temp 97.9 °F (36.6 °C) (Oral)   Resp 18   Ht 4' 9\" (1.448 m)   Wt 132 lb 12.8 oz (60.2 kg)   LMP  (Exact Date)   SpO2 96%   BMI 28.74 kg/m²     General appearance: No apparent distress, appears stated age and cooperative. HEENT: Pupils equal, round, and reactive to light. Conjunctivae/corneas clear. Neck: Supple, with full range of motion. No jugular venous distention. Trachea midline. Respiratory:  Minimal basilar crackles   Cardiovascular: Regular rate and rhythm with normal S1/S2 without murmurs, rubs or gallops.   Abdomen: Soft,

## 2018-10-11 NOTE — CARE COORDINATION
10/11/18 7:52 AM     Creat down to 1.5. IV Bumex daily, to change to po this a.m. Cardio and nephrology following. Plans home [place], possibly today or tomorrow.

## 2018-10-11 NOTE — PROGRESS NOTES
errors which are inherent in voice recognition technology. **      Final report electronically signed by Dr. Hayden Deal on 10/10/2018 8:01 AM      XR CHEST STANDARD (2 VW)   Final Result   1. Poor inflation of lungs. Mild cardiomegaly. Cephalad migration of both humeral heads, consistent with chronic rotator cuff tear/degeneration. 2. Moderately increased interstitial markings throughout both lungs, consistent with interstitial pneumonia/edema. Small bladder pleural effusions. Cannot exclude heart failure. 3. Overall appearance of chest has worsened since prior. **This report has been created using voice recognition software. It may contain minor errors which are inherent in voice recognition technology. **      Final report electronically signed by Dr. Hayden Deal on 10/8/2018 3:43 PM      XR CHEST PORTABLE   Final Result   Cardiomegaly bilateral pleural effusions and interstitial edema suggesting congestive failure or possibly fluid overload. **This report has been created using voice recognition software. It may contain minor errors which are inherent in voice recognition technology. **      Final report electronically signed by Dr. Carlotta Grifftihs on 10/7/2018 12:32 PM          Diet: DIET CARDIAC; Daily Fluid Restriction: 1500 ml    DVT prophylaxis: [] Lovenox                                 [x] SCDs                                 [] SQ Heparin                                 [] Encourage ambulation           [] Already on Anticoagulation     Disposition:    [x] Home       [] TCU       [] Rehab       [] Psych       [] SNF       [] [place]       [] Other-    Code Status: Full Code    PT/OT Eval Status: home     Assessment/Plan:    Anticipated Discharge in : 10/12     ROCKY/Latoya Verduzco 1106 Problems    Diagnosis Date Noted    [facility] with [facility] ([facility] [J96.01] 10/07/2018    Gastroesophageal reflux disease [K21.9]     Anasarca [R60.1]     Chronic indwelling

## 2018-10-11 NOTE — PROGRESS NOTES
Recommendations:  Discharge Recommendations: Continue to assess pending progress, Patient would benefit from continued therapy after discharge, 24 hour supervision or assist, Home with Home health PT    Patient Education:  Patient Education: Bed mobility. Transfers. POC    Equipment Recommendations:  Equipment Needed: No    Safety:  Type of devices: All fall risk precautions in place, Left in chair, Call light within reach, Chair alarm in place, Gait belt, Nurse notified    Plan:  Times per week: 3-5x GM  Times per day: Daily  Current Treatment Recommendations: Strengthening, Balance Training, Functional Mobility Training, Transfer Training, Gait Training, Equipment Evaluation, Education, & procurement, Endurance Training, Patient/Caregiver Education & Training, Safety Education & Training    Goals:  Patient goals : Go home    Short term goals  Time Frame for Short term goals: 2 weeks  Short term goal 1: Patient will transfer supine <--> sit with SBA in order to get into/out of bed. Short term goal 2: Patient will transfer sit <--> stand with SBA in order to get up to ambulate. Short term goal 3: Patient will ambulate 48' with SBA and a RW for household navigation.     Long term goals  Time Frame for Long term goals : No LTGs due to short ELOS

## 2018-10-12 NOTE — PROGRESS NOTES
CLINICAL PHARMACY: DISCHARGE MED RECONCILIATION/REVIEW    [facility] Select Patient?: Yes  Total # of Interventions Recommended: 2 -   - Discontinued Medication #: 1  - Updated Order #: 1   -   Total # Interventions Accepted: 2  Intervention Severity:   - Level 1 Intervention Present?: No   - Level 2 #: 0   - Level 3 #: 1   Time Spent (min): 30    Additional Documentation:  AVS reviewed for discharge. Medication section completed.     RX/BMP order given to family

## 2018-10-12 NOTE — CARE COORDINATION
10/12/18, 10:55 AM    Discharge plan discussed by  and . Discharge plan reviewed with patient/ family. Patient/ family verbalize understanding of discharge plan and are in agreement with plan. Understanding was demonstrated using the teach back method. IMM Letter  IMM Letter given to Patient/Family/Significant other/Guardian/POA/by[de-identified] Maggie Cordoba CM   IMM Letter date given[de-identified] 10/12/18  IMM Letter time given[de-identified] 1488     Patient discharge home with referral completed to [facility]. SW updated Robin, [facility], regarding discharge today.

## 2018-10-12 NOTE — DISCHARGE SUMMARY
daily             sodium bicarbonate 650 MG tablet  Take 1 tablet by mouth 2 times daily             sodium chloride (OCEAN) 0.65 % nasal spray  1 spray by Nasal route as needed for Congestion             zafirlukast (ACCOLATE) 10 MG tablet  Take 1 tablet by mouth 2 times daily (before meals)               Scheduled Meds: Scheduled Meds:   cetirizine  5 mg Oral Daily    bumetanide  1 mg Oral Daily    lidocaine  1 patch Transdermal Daily    LORazepam  1 mg Oral BID    diclofenac sodium  2 g Topical BID    atorvastatin  20 mg Oral Nightly    ferrous sulfate  325 mg Oral BID WC    fluticasone  2 spray Nasal BID    pantoprazole  40 mg Oral QAM AC    rOPINIRole  4 mg Oral BID    metoprolol succinate  50 mg Oral Daily    isosorbide mononitrate  60 mg Oral Daily    zafirlukast  10 mg Oral BID AC    escitalopram  10 mg Oral Daily    sodium chloride flush  10 mL Intravenous 2 times per day    lactobacillus  1 capsule Oral BID WC     Continuous Infusions:  PRN Meds:.flavoxate, sodium chloride, ondansetron, meclizine, lidocaine, oxyCODONE, sodium chloride flush, magnesium hydroxide, ondansetron, potassium chloride **OR** potassium chloride **OR** potassium chloride, potassium chloride, magnesium sulfate  Continuous Infusions:    Intake/Output Summary (Last 24 hours) at 10/12/18 1335  Last data filed at 10/12/18 0925   Gross per 24 hour   Intake              860 ml   Output             1400 ml   Net             -540 ml       Diet:  DIET CARDIAC; Daily Fluid Restriction: 1800 ml     dictated    status:[id number]::\"Alert, oriented, thought content appropriate\"}    Disposition: home    Condition: Stable        Jamin Ford MD     Copy: Primary Care Physician: Arley Mcarthur MD  Internal Medicine

## 2018-10-12 NOTE — PROGRESS NOTES
Hypertension     Obesity (BMI 30-39. 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  4/26/2017     Past Surgical History:   Procedure Laterality Date    ABDOMEN SURGERY      ABDOMINAL EXPLORATION SURGERY  1954    BLADDER SURGERY  10/2016    stent placed and removed a blood clot    CARDIAC SURGERY      stents    COLONOSCOPY      CYSTOSCOPY  11-    TUR and fulg BT  superficial noninvasive bladder ca    CYSTOSCOPY N/A 8/23/2017    CYSTOSCOPY, RIGHT URETEROSCOPY, RIGHT STENT EXCHANGE retrograde right and right ureteral dilitation performed by Manny Pressley MD [facility] [address] [facility] 1/31/2018    CYSTOSCOPY EVACUATION OF CLOTS performed by Manny Pressley MD at [place] [address] EKG 12-LEAD  10/14/2015         EKG 12-LEAD  10/17/2015         ENDOSCOPY, COLON, DIAGNOSTIC      EYE SURGERY  04/2018    JOINT REPLACEMENT      hip    MOUTH SURGERY      cheek; had benign lump removed    OTHER SURGICAL HISTORY Right 04/19/2017    Cystoscopy, Attempted Right Ureteral Access     [place] CYSTOURETHROSCOPY,URETER CATHETER Right 1/31/2018    CYSTOSCOPY RIGHT RETROGRADE PYELOGRAM RIGHT URETEROSCOPY AND REPLACEMENT OF RIGHT URETERAL STENT performed by Manny Pressley MD at [address] [facility] Right 8/3/2018    CYSTOSCOPY WITH RIGHT RETROGRADE PYELOGRAM, RIGHT URETEROSCOPY AND REPLACEMENT OF RIGHT URETERAL STENT performed by Manny Pressley MD at [address] OFFICE/OUTPT [address] [facility] Left 6/11/2018    IM NAIL HENRY INSERTION, LEFT performed by Slick Floyd MD at [place] [address]  03/2017    TOTAL HIP ARTHROPLASTY  3-9-12    Rt       Restrictions/Precautions:  Fall Risk       Prior Level of Function:  ADL Assistance: Needs assistance  Homemaking Assistance: Needs assistance  Ambulation Assistance: Needs assistance  Transfer Assistance: Needs

## 2018-10-13 NOTE — DISCHARGE SUMMARY
[address] [place] [address]                              DISCHARGE SUMMARY    PATIENT NAME: Wilfred Medina                    :         1930  MED REC NO:   [id number]                           ROOM:       [id number]  ACCOUNT NO:   [de-identified]                           ADMIT DATE:  10/07/2018  PROVIDER:     Nicholas De Los Santos. Ames Oppenheim, M.D.                 Dilcia Anand:  10/12/2018    HOSPITAL COURSE:  This is an 80year-old patient, who initially  presented to the hospital on . The patient was admitted by the  hospitalist with history of complaining of some worsening shortness of  breath two days prior with some back pain scaled about a 7/10 on a  scale of 1 to 10. The patient's post evaluation was diagnosed with  acute on chronic diastolic congestive heart failure with chronic  kidney disease. The patient also known to have a chronic urinary  catheter that was in place, which was changed at this admission as  well. Initial evaluation advised congestive heart failure based on  the treatment protocol including diuretic. The patient was seen by  multiple consultants due to the other medical issues including the  nephrologist for the chronic kidney disease with acute onset and also  by the gastroenterologist due to complaint then of the pain and  elevated liver enzymes. Followup evaluation was done for the liver,  suspected liver disease. The patient had known to the cardiologist as  well, was consulted to be seen for the congestive failure. Post  evaluation was heart medication adjusted. The patient continued to  improve with therapies as well, but due to the increased demand by  family members, the initial physician transferred care to ours.     Upon evaluation while the patient clinically was stable and after  extensive discussion with the patient and daughter at the bedside, we  thought it was safe enough for the patient to be discharged home ASHWINI Díaz M.D.    D: 10/12/2018 13:48:18       T: 10/13/2018 8:44:33     [id number]  Job#: [id number]     Doc#: [id number]    CC:  Lori Hathaway MD

## 2018-10-14 NOTE — CARE COORDINATION
[facility] Transitions Initial Follow Up Call    Call within 2 business days of discharge: Yes    Patient: Barbra Carvalho Patient : 1930   MRN: [id number]  Reason for Admission: There are no discharge diagnoses documented for the most recent discharge. Discharge Date: 10/12/18 RARS: Readmission Risk Score: [address]: Psychiatric    Non-face-to-face services provided:  Establishment or re-establishment of [facility] of discharge to home    First attempt to reach pt for the care transition initial follow up call.   Unable to leave message the \"voicemail is full & can not accept messages at this time\"    Follow Up  Future Appointments  Date Time Provider Kobi Turpin   10/18/2018 3:00 PM Bebeto Bloom MD [facility] [place]   10/19/2018 9:45 AM KALYN Combs - [place]   2018 1:00 PM KALYN Stock - CNP [facility] [place]   2018 2:20 PM Kit Antonio MD [facility] [place]   2019 2:40 PM Kit Antonio MD [facility] [place]   2019 1:45 PM Valerio Jeffers APRN - [address] Urology [facility] [place]   2019 10:15 AM Miriam Schneider MD [address] Transition Coordinator  [phone]
to discuss pt pain meds, needs a new RX  Reviewed meds with Howie Dancer    Scheduled TCM appt for tomorrow to discuss concerns.  Will refer back to the [facility] to follow    Follow Up  Future Appointments  Date Time Provider Kobi Careyi   10/15/2018 1:15 PM KALYN Hodge   10/18/2018 3:00 PM [facility]   10/19/2018 9:45 AM KALYN Hodge   11/1/2018 1:00 PM [facility]rera [facility]   11/26/2018 2:20 PM Reba Black MD [facility]   1/21/2019 2:40 PM Reba Black MD [facility]   2/6/2019 1:45 PM KALYN Leung - [address] Urology [facility]   4/5/2019 10:15 AM Smooth Mcelroy MD [id number] Transition Coordinator  [phone]

## 2018-10-15 NOTE — CARE COORDINATION
Ambulatory Care Coordination Note  10/15/2018  CM Risk Score: 12  Debbie Mortality Risk Score: 82    ACC: Cj Beckman RN    Summary Note: met with Luis Angel Gonzalez and grand daughter Alyssa Arguello today at PCP appt. Pt here today for hospital f/u. Was admitted 10/7-10/12 for CHF. D/c home with [place]. [address] daughter reports that [place] is supposed to be out tomorrow. Continues to struggle with drinking fluids. [address] daughter encourages her. Has chronic forman. Urine draining clear yellow today. Alyssa Arguello reports that urine output has been between 150-200mls for 8 hr period since d/c. Appetite fair. Started on megace at d/c. Alyssa Arguello states that she eats better at home than in hospital.  Discussing pain meds at visit as pt was d/c with roxicodone, but had to stretch pain meds out to get them to last.  Most of pt's pain is in back and left leg. Has appt with [facility]ARDIO 10/18. Referred to CHF clinic. Has appt 11/1.  appt with DR. Lucy Mg on 11/26. Breathing at baseline. Reviewed CHF zone mgmt. Reviewed importance of early symptom recognition and reporting. Encouraged to call with any new questions or concerns.         Congestive Heart Failure Assessment    Are you currently restricting fluids?:  No Restriction  Do you understand a low sodium diet?:  Yes  Do you understand how to read food labels?:  Yes  How many restaurant meals do you eat per week?:  0  Do you salt your food before tasting it?:  No         Symptoms:   CHF associated angina: Neg, CHF associated dyspnea on exertion: Pos, CHF associated fatigue: Pos, CHF associated leg swelling: Neg, CHF associated orthostatic hypotension: Neg, CHF associated PND: Neg, CHF associated shortness of breath: Neg, CHF associated weakness: Neg      Symptom course:  improving  Weight trend:  stable  Salt intake watch compared to last visit:  stable      and   COPD Assessment    Does the patient understand envrionmental exposure?:  Yes  Is the patient able to verbalize Rescue vs. Long

## 2018-10-17 NOTE — TELEPHONE ENCOUNTER
The patient's granddaughter Jc Ortiz called back. She said that her grandma has her ringer off. I let her know Dr. Coppola Cea response. She voiced understanding. The patient's medication in Epic was noted as the new dose.

## 2018-10-18 NOTE — PROGRESS NOTES
 Cancer Brother         metatstatic    Diabetes Brother     Parkinsonism Brother     Diabetes Sister     Stroke Sister     Diabetes Brother     Heart Disease Brother         Social History     Social History    Marital status:      Spouse name: N/A    Number of children: N/A    Years of education: N/A     Occupational History    Not on file. Social History Main Topics    Smoking status: Never Smoker    Smokeless tobacco: Never Used    Alcohol use No    Drug use: No    Sexual activity: Not on file     Other Topics Concern    Not on file     Social History Narrative    No narrative on file       Current Outpatient Prescriptions   Medication Sig Dispense Refill    bumetanide (BUMEX) 0.5 MG tablet Take 1 tablet by mouth daily 60 tablet 3    spironolactone (ALDACTONE) 25 MG tablet Take 0.5 tablets by mouth daily 30 tablet 3    zafirlukast (ACCOLATE) 20 MG tablet Take 1 tablet by mouth 2 times daily (before meals) 60 tablet 0    flavoxate (URISPAS) 100 MG tablet Take 1 tablet by mouth 3 times daily 90 tablet 0    megestrol (MEGACE) 40 MG tablet Take 1 tablet by mouth daily 30 tablet 0    metoprolol succinate (TOPROL XL) 50 MG extended release tablet TAKE 1 TABLET BY MOUTH DAILY 90 tablet 3    oxyCODONE-acetaminophen (PERCOCET) 5-325 MG per tablet Take 1 tablet by mouth every 8 hours as needed for Pain for up to 30 days. . 90 tablet 0    LORazepam (ATIVAN) 1 MG tablet Take 1 tab AM and 1-2 tabs HS.  90 tablet 0    isosorbide mononitrate (IMDUR) 60 MG extended release tablet Take 1 tablet by mouth daily 30 tablet 3    escitalopram (LEXAPRO) 10 MG tablet Take 1 tablet by mouth daily 30 tablet 3    lactobacillus (CULTURELLE) capsule Take 1 capsule by mouth 2 times daily (with meals) 30 capsule 0    Lidocaine 2 % GEL Apply 1 drop topically 4 times daily as needed (forman catheter irritation) 1 Tube 3    atorvastatin (LIPITOR) 20 MG tablet Take 1 tablet by mouth daily 90 tablet 3    appearance - alert, well appearing, and in no distress  Mental status - alert, oriented to person, place, and time  Neck - supple, no significant adenopathy, no JVD, or carotid bruits  Chest - clear to auscultation, no wheezes, rales or rhonchi, symmetric air entry  Heart - normal rate, regular rhythm, normal S1, S2, no murmurs, rubs, clicks or gallops  Abdomen - soft, nontender, nondistended, no masses or organomegaly  Neurological - alert, oriented, normal speech, no focal findings or movement disorder noted  Musculoskeletal - no joint tenderness, deformity or swelling  Extremities - peripheral pulses normal, no pedal edema, no clubbing or cyanosis  Skin - normal coloration and turgor, no rashes, no suspicious skin lesions noted    Lab  No results for input(s): CKTOTAL, CKMB, CKMBINDEX, TROPONINI in the last 72 hours.   CBC:   Lab Results   Component Value Date    WBC 8.5 10/12/2018    RBC 2.74 10/12/2018    HGB 8.7 10/12/2018    HCT 27.0 10/12/2018    MCV 98.5 10/12/2018    MCH 31.8 10/12/2018    MCHC 32.2 10/12/2018    RDW 16.6 06/17/2018     10/12/2018    MPV 8.8 10/12/2018     BMP:    Lab Results   Component Value Date     10/12/2018    K 3.9 10/12/2018    K 4.4 10/08/2018    CL 95 10/12/2018    CO2 26 10/12/2018    BUN 21 10/12/2018    LABALBU 3.3 10/09/2018    CREATININE 1.7 10/12/2018    CALCIUM 8.6 10/12/2018    LABGLOM 28 10/12/2018    GLUCOSE 93 10/12/2018     Hepatic Function Panel:    Lab Results   Component Value Date    ALKPHOS 141 10/09/2018    ALT 7 10/09/2018    AST 11 10/09/2018    PROT 6.0 10/09/2018    BILITOT 0.3 10/09/2018    BILIDIR <0.2 10/09/2018    LABALBU 3.3 10/09/2018     Magnesium:    Lab Results   Component Value Date    MG 1.7 10/12/2018     Warfarin PT/INR:  No components found for: PTPATWAR, PTINRWAR  HgBA1c:  No results found for: LABA1C  FLP:    Lab Results   Component Value Date    TRIG 79 04/12/2018    HDL 51 04/12/2018    HDL 58 12/14/2011    LDLCALC 41 04/12/2018 TSH:    Lab Results   Component Value Date    TSH 1.860 04/28/2018           Assessment     Diagnosis Orders   1. Chronic diastolic congestive heart failure (HCC)  Basic Metabolic Panel    Magnesium   2. Coronary artery disease involving native coronary artery of native heart without angina pectoris  Basic Metabolic Panel    Magnesium   3. S/P angioplasty with stent TINA in 56/6373  Basic Metabolic Panel    Magnesium   4. SOB (shortness of breath) on exertion  Basic Metabolic Panel    Magnesium          Previous admission DX    Admission with gross hematuria n=now on CBI  Hx bladder cancer - on chemotherapy  Acute blood loss anemia - s/p 1 unit PRBC  S/p cystoscopy with clot evacuation and bilateral ureteral stent placement 10/25/16  CAD - hx triple vessel CAD - refused for surgery  s/p TINA LAD 10/14/15  ICMP - ef was 27 and now 39 per echo 2/12/16  HTN  HLP      Cont bb/ace/nitrate/statin/bumex  Off asa and brilinta now  Restart asa once ok with urology  Pt appears stable from cardiac standpoint at this time  S\p cystoscopy with extensive clot evacuation along with TURBT largeHx bladder cancer      Hx TVD: refused for surgery  CAD-s\p LAD PCI with  2.75x38 mm drug-eluting stent postdilated up to 3.0 mm. On 10/16/15  - RCA / CX-- OMT      HTN  HLP  ICDMP EF 40%  Was 30%         S/p egd and colonscopy  GI bleeding - subsided - H/H remained stable s/p EGD and colonoscopy- finding is gastritis and 1.5 cm duodenal ulcer with clear base . Also sigmoid ulcer ( ischemic )- no complication - tolerated PO intake . On PPI - follow Bx result   GI Bleed probably lower-rectal or anal  Small blood noted on the stool surface today ( stool color normal per pat and attendant)  This suggest the bleed is from lower rectum or anal area- hemmorrhiods, versus rectal ulcer versus ischemia.  Probably more of [id number]-[id number] will leave to eval of GI   S/p Angioplasty with stent of the LAD  Triple vessel CAD  Admission for

## 2018-10-19 NOTE — TELEPHONE ENCOUNTER
A note per Marcell Goes in our office the patient needs a refill on the sodium bicarb. Is the patient suppose to be taking this because it was held by you when she was inpatient? Dr. Yamilka Badillo did send in a prescription for the patient today for bid #60 R-0 to [facility] on  [facility]. Please advise.

## 2018-10-23 NOTE — TELEPHONE ENCOUNTER
Trino couch notified and would like referral to Dr Guero Perera and is aware they will call her to schedule.

## 2018-11-05 NOTE — PROGRESS NOTES
RIGHT URETERAL STENT performed by Yuli Dodson MD at [address] [facility] Right 8/3/2018    CYSTOSCOPY WITH RIGHT RETROGRADE PYELOGRAM, RIGHT URETEROSCOPY AND REPLACEMENT OF RIGHT URETERAL STENT performed by Yuli Dodson MD at [address] OFFICE/OUTPT [address] [facility] Left 6/11/2018    IM NAIL HENRY INSERTION, LEFT performed by Clary Urbano MD at [facility] 76  03/2017    TOTAL HIP ARTHROPLASTY  3-9-12    Rt     Family History   Problem Relation Age of Onset    Arthritis Mother     Asthma Mother     Stroke Mother     Arthritis Father     Heart Disease Father 68        enlarged heart    Heart Disease Sister         CHF    Cancer Brother         metatstatic    Diabetes Brother     Parkinsonism Brother     Diabetes Sister     Stroke Sister     Diabetes Brother     Heart Disease Brother      Social History   Substance Use Topics    Smoking status: Never Smoker    Smokeless tobacco: Never Used    Alcohol use No     Current Outpatient Prescriptions   Medication Sig Dispense Refill    LORazepam (ATIVAN) 1 MG tablet Take 1 tab AM and 2 tabs HS. 90 tablet 5    flavoxate (URISPAS) 100 MG tablet TAKE 1 TABLET BY MOUTH 3 TIMES DAILY AS NEEDED (BLADDER SPASMS) 90 tablet 0    bumetanide (BUMEX) 0.5 MG tablet Take 1 tablet by mouth daily 60 tablet 3    spironolactone (ALDACTONE) 25 MG tablet Take 0.5 tablets by mouth daily 30 tablet 3    zafirlukast (ACCOLATE) 20 MG tablet Take 1 tablet by mouth 2 times daily (before meals) 60 tablet 0    flavoxate (URISPAS) 100 MG tablet Take 1 tablet by mouth 3 times daily 90 tablet 0    megestrol (MEGACE) 40 MG tablet Take 1 tablet by mouth daily 30 tablet 0    metoprolol succinate (TOPROL XL) 50 MG extended release tablet TAKE 1 TABLET BY MOUTH DAILY 90 tablet 3    oxyCODONE-acetaminophen (PERCOCET) 5-325 MG per tablet Take 1 tablet by mouth every 8 hours as needed for Pain for up to 30 days. . 90 tablet 0 Comments)     Very sleepy    Septra [Bactrim] Other (See Comments)     Too big to swallow       SUBJECTIVE:   Review of Systems   Constitutional: Positive for fatigue. Negative for activity change and appetite change. Respiratory: Negative for cough and shortness of breath. Cardiovascular: Negative for chest pain, palpitations and leg swelling. Gastrointestinal: Negative for abdominal distention. Musculoskeletal: Positive for gait problem (wheelchair/walker). Neurological: Negative for weakness, light-headedness and headaches. Hematological: Negative for adenopathy. Psychiatric/Behavioral: Negative for sleep disturbance. OBJECTIVE:   Today's Vitals:  BP (!) 110/58   Pulse 84   Ht 4' 9\" (1.448 m)   Wt 126 lb (57.2 kg)   LMP  (Exact Date)   SpO2 98%   BMI 27.27 kg/m²     Physical Exam   Constitutional: She is oriented to person, place, and time. She appears well-developed and well-nourished. No distress. Sleepy, Very [place]   HENT:   Head: Normocephalic and atraumatic. Eyes: Conjunctivae are normal.   Neck: Normal range of motion. Neck supple. No JVD   Cardiovascular: Normal rate, regular rhythm and normal heart sounds. No murmur heard. Pulmonary/Chest: Effort normal and breath sounds normal. No respiratory distress. She has no wheezes. She has no rales. Abdominal: Soft. Bowel sounds are normal. She exhibits no distension. There is no tenderness. Genitourinary:   Genitourinary Comments: Glez present with clear, yellow urine   Musculoskeletal: Normal range of motion. She exhibits no edema. Significant Kyphosis   Neurological: She is alert and oriented to person, place, and time. Coordination normal.   Skin: Skin is warm and dry. She is not diaphoretic. Psychiatric: She has a normal mood and affect. Her behavior is normal.   Vitals reviewed.       Wt Readings from Last 3 Encounters:   11/05/18 126 lb (57.2 kg)   10/18/18 123 lb 14.4 oz (56.2 kg)   10/15/18 124 lb (56.2 kg) BP Readings from Last 3 Encounters:   11/05/18 (!) 110/58   10/18/18 108/60   10/15/18 108/64     Pulse Readings from Last 3 Encounters:   11/05/18 84   10/18/18 78   10/15/18 68     Body mass index is 27.27 kg/m². ECHO:    Summary   Normal left ventricle size and systolic function. Ejection fraction was   estimated at 55%. There were no regional left ventricular wall motion   abnormalities ,and wall thickness was mild LVH.   Left atrial size was moderately dilated.   The aortic valve was trileaflet with moderate calcium and mild reduction   in cuspal separation. DOPPLER: Transaortic velocity was c/w mild aortic   stenosis.   Doppler parameters were consistent with abnormal left ventricular   relaxation (grade 1 diastolic dysfunction).   Previous 4/2017 no sig.  change      Signature      ----------------------------------------------------------------   Electronically signed by Freda Noel MD (Interpreting   [id number]) on 05/01/2018 at 05:54 PM    Results reviewed:  BNP: No results found for: BNP  CBC:   Lab Results   Component Value Date    WBC 8.5 10/12/2018    RBC 2.74 10/12/2018    HGB 8.7 10/12/2018    HCT 27.0 10/12/2018     10/12/2018     CMP:    Lab Results   Component Value Date     11/05/2018    K 4.8 11/05/2018    K 4.4 10/08/2018     11/05/2018    CO2 15 11/05/2018    BUN 56 11/05/2018    CREATININE 2.3 11/05/2018    LABGLOM 20 11/05/2018    GLUCOSE 99 11/05/2018    CALCIUM 9.0 11/05/2018     Hepatic Function Panel:    Lab Results   Component Value Date    ALKPHOS 141 10/09/2018    ALT 7 10/09/2018    AST 11 10/09/2018    PROT 6.0 10/09/2018    BILITOT 0.3 10/09/2018    BILIDIR <0.2 10/09/2018    LABALBU 3.3 10/09/2018     Magnesium:    Lab Results   Component Value Date    MG 2.0 11/05/2018     PT/INR:    Lab Results   Component Value Date    INR 1.13 09/11/2018     Lipids:    Lab Results   Component Value Date    TRIG 79 04/12/2018    HDL 51 04/12/2018    HDL 58 12/14/2011 [address] 41 04/12/2018       ASSESSMENT AND PLAN:   The patient's condition/symptoms are Stable: No clinical evidence of fluid overload today. Continue current medical regimen without changes at present time.      Diagnosis Orders   1. CHF (congestive heart failure), NYHA class III, chronic, diastolic (HCC)         Continue:  GDMT (Guideline directed medical therapy)    ACE/ARB/ARNI - None d/t dizziness   BB - Toprol 50/day   Diuretic - Bumex 0.5/day     Hydralazine/Isos. - Imdur 60/day   Aldosterone- Spironolactone 12.5/day   Continue Current medications  Check blood work today - question if getting to dry given granddtr observations/see HPI  - will call today or tomorrow if labs abnormal.    Educated granddaughter on signs of CHF and call if symptoms. F/U with Dr Jimmie Cespedes next week. · Daily weights  · Fluid restriction of 2 Liters per day  · Limit sodium in diet to around 0937-4903 mg/day  · Monitor BP  · Activity as tolerated     Patient was instructed to call the [facility] Landeros for any changes in symptoms as noted in AVS.   New HF Pamphlet given and ZONE sheet reviewed, patient voices understanding. Questions answered. Return in about 2 months (around 1/5/2019). or sooner if needed     Patient given educational materials - see patient instructions. We discussed the importance of weighing oneself and recording daily. We alsodiscussed the importance of a low sodium diet, higher sodium foods to avoid and better low sodium food options. Patient verbalizes understanding of plan of care using teach back method, and is agreeable to the treatmentplan. Electronically signed by Laverta Libman, APRN - CNP on 11/5/2018 at 3:05 PM    Addendum 1500:   Labs resulted showing increasing creat to 2.3 (1.9) and BUN 56 (36). Spoke to Dr Jimmie Cespedes would like to hold Bumex and Spironolactone x 2 days and then resume at same dose and recheck BMP next Wednesday prior to appt on Friday.

## 2018-11-06 PROBLEM — R77.8 ELEVATED TROPONIN: Status: RESOLVED | Noted: 2018-01-01 | Resolved: 2018-01-01

## 2018-11-07 NOTE — CARE COORDINATION
tablet by mouth 2 times daily 3/21/18  Yes Miriam Schneider MD   pantoprazole (PROTONIX) 40 MG tablet TAKE 1 TABLET BY MOUTH 2 TIMES DAILY (BEFORE MEALS) 3/20/18  Yes Miriam Schneider MD   fluticasone [facility]) 50 MCG/ACT nasal spray 2 sprays by Nasal route 2 times daily   Yes Historical Provider, MD   ferrous sulfate 325 (65 Fe) MG tablet TAKE 1 TABLET BY MOUTH 2 TIMES DAILY (WITH MEALS) 11/2/17  Yes Miriam Schneider MD   ondansetron (ZOFRAN) 4 MG tablet TAKE 1 TABLET BY MOUTH EVERY 8 HOURS AS NEEDED FOR NAUSEA OR VOMITING 8/22/17  Yes KALYN Combs CNP   sodium chloride (OCEAN) 0.65 % nasal spray 1 spray by Nasal route as needed for Congestion   Yes Historical Provider, MD   cetirizine (ZYRTEC) 10 MG tablet Take 10 mg by mouth daily   Yes Historical Provider, MD   flavoxate (URISPAS) 100 MG tablet Take 100 mg by mouth 3 times daily    Historical Provider, MD   bumetanide (BUMEX) 0.5 MG tablet Take 1 tablet by mouth daily 10/18/18   Bebeto Bloom MD   spironolactone (ALDACTONE) 25 MG tablet Take 0.5 tablets by mouth daily 10/18/18   Bebeto Bloom MD   Lidocaine 2 % GEL Apply 1 drop topically 4 times daily as needed (forman catheter irritation) 10/5/18   Miriam Schneider MD   Incontinence Supplies (BEDSIDE DRAINAGE BAG) [address] [place] [address] Large 2000 cc bedside drainage bag 2/28/18   [facility], KALYN Anglin CNP   BIOTIN FORTE PO Take by mouth daily    Historical Provider, MD   Handicap Placard MISC by Does not apply route.  2/28/14   Miriam Schneider MD       Future Appointments  Date Time Provider Kobi Turpin   11/15/2018 10:30 AM Jasmina Ambrosio MD SRPX Pain [facility]   11/16/2018 9:00 AM Bebeto Bloom MD [facility] Heart [facility]   11/26/2018 2:20 PM Kit Antonio MD [facility]ney P [facility]   1/21/2019 1:30 PM KALYN Stock CNP [facility] Heart [facility]   1/21/2019 2:40 PM MD SLIME NealA KIDNEY [facility]   2/6/2019 1:45 PM Chad Henao Urology [facility]   4/5/2019 10:15 AM Miriam Schneider,

## 2018-11-08 PROBLEM — N17.9 AKI (ACUTE KIDNEY INJURY) (HCC): Status: ACTIVE | Noted: 2018-01-01

## 2018-11-08 NOTE — ED PROVIDER NOTES
swelling and neck stiffness. Skin: Negative for pallor and rash. Neurological: Negative for dizziness, syncope, weakness, light-headedness, numbness and headaches. Hematological: Negative for adenopathy. Psychiatric/Behavioral: Negative for confusion and suicidal ideas. The patient is not nervous/anxious. PAST MEDICAL HISTORY    has a past medical history of Allergic rhinitis; Anxiety; Bilateral hydronephrosis; Bladder cancer ([facility] [address] Blood circulation, collateral; Broken leg; CHF (congestive heart failure) ([facility] [address] Constipation; Coronary artery disease involving native coronary artery of native heart s/p cath 10/14/15-LM-P, LAD MID 90%, ANUERYSMAL, % PROX, RCA MID 60% DISTLA 70%, EDP 20 , EF 30%-NEED REVASCULARIZATION; GERD (gastroesophageal reflux disease); History of blood transfusion; [place] (hard of hearing); Hyperglycemia; Hyperlipidemia; Hypertension; Obesity (BMI 30-39.9); Osteoarthritis; Osteopenia; Pneumonia; and 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 .    SURGICAL HISTORY    has a past surgical history that includes Cystoscopy (11-); Dilation and curettage of uterus (1999); Abdominal exploration surgery (1954); Total hip arthroplasty (3-9-12); Mouth surgery; EKG 12 Lead (10/14/2015); EKG 12 Lead (10/17/2015); Colonoscopy; Abdomen surgery; Endoscopy, colon, diagnostic; joint replacement; Cardiac surgery; Bladder surgery (10/2016); other surgical history (Right, 04/19/2017); Cystoscopy (N/A, 8/23/2017); Tooth Extraction (03/2017); pr cystourethroscopy,ureter catheter (Right, 1/31/2018); Cystoscopy (N/A, 1/31/2018); eye surgery (04/2018); pr office/outpt visit,procedure only (Left, 6/11/2018); and pr cystourethroscopy,ureter catheter (Right, 8/3/2018). CURRENT MEDICATIONS       Current Discharge Medication List      CONTINUE these medications which have NOT CHANGED    Details   LORazepam (ATIVAN) 1 MG tablet Take 1 tab AM and 2 tabs HS.   Qty: 90 tablet, Refills: 5 in voice recognition technology. **      Final report electronically signed by Dr. Zuhair West on 11/8/2018 3:24 PM      CT THORACIC RECONSTRUCTION WO POST PROCESS   Final Result   Acute/subacute posterior left ninth rib fracture. Correlate for point tenderness. Please see dedicated chest CT for additional findings. **This report has been created using voice recognition software. It may contain minor errors which are inherent in voice recognition technology. **      Final report electronically signed by Dr. Zuhair West on 11/8/2018 3:17 PM      CT ABDOMEN PELVIS WO CONTRAST Additional Contrast? None   Final Result      1. Moderate amount of stool throughout the colon. Correlate for constipation. 2. Marked left hydroureteronephrosis without obstructing ureteral stone. 3. Right ureteral stent with hydronephrosis and air within the proximal renal pelvis. Contracted bladder with Glez catheter. Apparent wall thickening. Correlation with urinalysis is advised. Cystitis is not excluded. 4. Multiple foci of bony sclerosis concerning for metastatic disease correlation with clinical symptoms and history is advised. Bone scan may be helpful for further evaluation. Incompletely healed posterior ninth left rib fracture. **This report has been created using voice recognition software. It may contain minor errors which are inherent in voice recognition technology. **      Final report electronically signed by Dr. Zuhair West on 11/8/2018 3:10 PM      CT LUMBAR RECONSTRUCTION WO POST PROCESS   Final Result      1. Advanced degenerative changes and osteopenia. Sclerosis involving the posterior sacrum, and coccyx. Correlate for prior treatment. Infiltrative or neoplastic process is not excluded. Consider bone scan for further evaluation. 2. No definite acute fracture. 3. Left hydroureteronephrosis. Right ureteral stent with hydronephrosis and air within the proximal right collecting system.

## 2018-11-08 NOTE — H&P
LORazepam (ATIVAN) 1 MG tablet Take 1 tab AM and 2 tabs HS. 10/25/18 11/24/18  Vy Michel MD   flavoxate (URISPAS) 100 MG tablet TAKE 1 TABLET BY MOUTH 3 TIMES DAILY AS NEEDED (BLADDER SPASMS) 10/23/18   KALYN Rcihards CNP   bumetanide (BUMEX) 0.5 MG tablet Take 1 tablet by mouth daily 10/18/18   Sher Kimble MD   spironolactone (ALDACTONE) 25 MG tablet Take 0.5 tablets by mouth daily 10/18/18   Sher Kimble MD   zafirlukast (ACCOLATE) 20 MG tablet Take 1 tablet by mouth 2 times daily (before meals) 10/15/18   KALYN Simons CNP   megestrol (MEGACE) 40 MG tablet Take 1 tablet by mouth daily 10/15/18 11/14/18  KALYN Simons CNP   metoprolol succinate (TOPROL XL) 50 MG extended release tablet TAKE 1 TABLET BY MOUTH DAILY 10/15/18   KALYN Simons CNP   oxyCODONE-acetaminophen (PERCOCET) 5-325 MG per tablet Take 1 tablet by mouth every 8 hours as needed for Pain for up to 30 days. . 10/15/18 11/14/18  KALYN Simons CNP   isosorbide mononitrate (IMDUR) 60 MG extended release tablet Take 1 tablet by mouth daily 10/13/18   Stacy Pyle MD   escitalopram (LEXAPRO) 10 MG tablet Take 1 tablet by mouth daily 10/13/18   Stacy Pyle MD   lactobacillus (CULTURELLE) capsule Take 1 capsule by mouth 2 times daily (with meals) 10/12/18   Stacy Pyle MD   Lidocaine 2 % GEL Apply 1 drop topically 4 times daily as needed (forman catheter irritation) 10/5/18   Vy Michel MD   atorvastatin (LIPITOR) 20 MG tablet Take 1 tablet by mouth daily 10/5/18   Vy Michel MD   phenazopyridine (PYRIDIUM) 100 MG tablet Take 100 mg by mouth 3 times daily as needed for Pain (with meal)    Historical Provider, MD   diclofenac sodium 1 % GEL Apply 2 g topically 4 times daily  Patient taking differently: Apply 2 g topically 2 times daily  8/16/18   Vy Michel MD   meclizine (ANTIVERT) 25 MG tablet Take 1 tablet by mouth 3 times daily as needed for Dizziness 4/10/18   Vy Michel MD negative. PHYSICAL EXAM:    /85   Pulse 97   Temp 97.6 °F (36.4 °C) (Oral)   Resp 20   Ht 4' 9\" (1.448 m)   Wt 126 lb (57.2 kg)   LMP  (Exact Date)   SpO2 97%   BMI 27.27 kg/m²     General appearance:  Alert, no apparent distress, appears stated age and cooperative. HEENT:  Left occipital scalp laceration without active bleeding noted. Pupils equal, round, and reactive to light. Extra ocular muscles intact. Conjunctivae/corneas clear. Very dry oral mucosa noted. Neck: Supple, with full range of motion. No jugular venous distention. Trachea midline. Respiratory:  Normal respiratory effort. Clear to auscultation, bilaterally without Rales/Wheezes/Rhonchi. Cardiovascular:  Regular rate and rhythm with normal S1/S2 without murmurs, rubs or gallops. Abdomen: Soft, non-tender, non-distended with normal bowel sounds. Musculoskeletal:  No clubbing, cyanosis or edema bilaterally. Full range of motion without deformity. Skin: Skin color, texture normal. Decreased turgor+. No rashes. Skin abrasions on left knee and left hand noted. Neurologic:  Neurovascularly intact without any focal sensory/motor deficits. Cranial nerves: II-XII intact, grossly non-focal.  Psychiatric:  Alert and oriented, thought content appropriate, normal insight  Capillary Refill: Brisk,< 3 seconds   Peripheral Pulses: +2 palpable, equal bilaterally   : Glez catheter in place with active drainage of dark urine      Labs:     Recent Labs      11/08/18   1350   WBC  12.7*   HGB  10.9*   HCT  36.4*   PLT  349     Recent Labs      11/08/18   1350  11/08/18   1638   NA  142   --    K  6.1*  4.8   CL  114*   --    CO2  13*   --    BUN  48*   --    CREATININE  2.1*   --    CALCIUM  9.2   --      Recent Labs      11/08/18   1350   AST  15   ALT  9*   BILIDIR  <0.2   BILITOT  0.3   ALKPHOS  183*     No results for input(s): INR in the last 72 hours.   Recent Labs      11/08/18   1638   CKTOTAL  124       Urinalysis:      Lab Results   Component Value Date    NITRU POSITIVE 11/08/2018    WBCUA > 200 11/08/2018    BACTERIA NONE 11/08/2018    RBCUA > 100 11/08/2018    BLOODU LARGE 11/08/2018    SPECGRAV <1.005 10/07/2018    GLUCOSEU NEGATIVE 11/08/2018       Intake & Output:  No intake/output data recorded. No intake/output data recorded. Radiology:     EKG:  I have reviewed the EKG with the following interpretation: Sinus rhythm with rates at 87 by minute    CT KNEE LEFT WO CONTRAST   Final Result   1. Moderate osteoarthritis as described. No acute fracture. **This report has been created using voice recognition software. It may contain minor errors which are inherent in voice recognition technology. **      Final report electronically signed by Dr. Isabella Leonard on 11/8/2018 3:47 PM      CT CHEST WO CONTRAST   Final Result      1. Multiple subcentimeter nodules scattered throughout the lungs which are not appreciably changed from prior study dated 9/11/2018 and 12/28/2016. Findings could represent postinflammatory nodules such as noncalcified granulomas. 2. Acute subacute posterior left ninth rib fracture. 3. Old posterior rib fractures on the left. **This report has been created using voice recognition software. It may contain minor errors which are inherent in voice recognition technology. **      Final report electronically signed by Dr. Isabella Leonard on 11/8/2018 3:24 PM      CT THORACIC RECONSTRUCTION WO POST PROCESS   Final Result   Acute/subacute posterior left ninth rib fracture. Correlate for point tenderness. Please see dedicated chest CT for additional findings. **This report has been created using voice recognition software. It may contain minor errors which are inherent in voice recognition technology. **      Final report electronically signed by Dr. Isabella Leonard on 11/8/2018 3:17 PM      CT ABDOMEN PELVIS WO CONTRAST Additional Contrast? None   Final Result      1.  Moderate amount of Final Result   Advanced generalized volume loss and small vessel ischemic changes. No acute intracranial findings. **This report has been created using voice recognition software. It may contain minor errors which are inherent in voice recognition technology. **      Final report electronically signed by Dr. Manoj Gandhi on 11/8/2018 2:45 PM               DVT prophylaxis: [] Lovenox                                 [] SCDs                                 [x] SQ Heparin                                 [] Encourage ambulation           [] Already on Anticoagulation    Code Status: Full      PT/OT Eval Status: Pending    Disposition:    [] Home       [] TCU       [] Rehab       [] Psych       [x] SNF       [] [place]       [] Other-    ASSESSMENT/PLAN:     Mechanical Fall. Fall precautions. Consult PT for evaluation and management    Hyperkalemia w/o significant ekg changes. Resolved after IV D50 and IV insulin administration. Present serum potassium level at 4.8. BEREKET on CKD 3, likely prerenal from dehydration. IV rehydration. Avoid nephro toxins. Monitor renal parameters. Nephrology consulted from EGD. Acute on Chronic Metabolic acidosis, possibly due to dehydration. Gentle IV rehydration. Hyperchloremia, likely due to volume contraction. IVF rehydration. .    Dehydration, likely due to bumetanide diuresis, inadequate water intake. Gentle IV fluid rehydration. Hold bumetanide for now. Elevated troponin. No chest pain no significant S-T, T changes. Serial troponin, EKG. Leukocytosis, possibly reactive. No fever. Monitor trend. Left occipital scalp laceration. Analgesics    Chronic Macrocytic anemia, likely due to chronic disease. Resume ferrous sulfate. HTN. Resume metoprolol, isosorbide mononitrate    Chronic indwelling forman catheterization    H/O Bladder cancer in remission with possible bony mets. Consult oncology.     Marked left

## 2018-11-09 NOTE — PLAN OF CARE
Problem: DISCHARGE BARRIERS  Goal: Patient's continuum of care needs are met  Outcome: Ongoing  See SW note, current with [facility]

## 2018-11-09 NOTE — PROGRESS NOTES
Hospitalist Progress Note    Patient:  Barbra Carvalho      Unit/Bed:4K-01/001-A    YOB: 1930    MRN: [id number]       Acct: [de-identified]     PCP: Miriam Schneider MD    Date of Admission: 11/8/2018    Assessment/Plan:    Mechanical Fall: Consult PT/OT. CT head w/o bleed    Acute Encephalopathy: Per family has been \"mentally off\" past week or so. Working up metabolic etiologies. Also consider possible brain met's if it turns out that she has metastatic disease. Doesn't appear to have any active infections. History of bacterial colonization of urine.   - Consider MRI brain if bone scan suggests metastatic disease. Bone Lesions concerning for Metastatic Disease: Previous history of bladder cancer. Now with CT evidence of possible metastatic disease in spine and pelvic bones. - Oncology consulted  - Bone scan pending. BEREKET on CKD: Likely Pre-renal. Getting fluids w/ bicarb for her metabolic acidosis. Nephrology consulted. Hyperkalemia: Likely due to BEREKET and being on K sparing diuretics. Monitor. Treat PRN. On bicarb gtt. Leukocytosis: Likely from acute stress, trending down. Monitor. Hx of CHF: monitor fluid status closely. Bumex currently held. Monitor closely. Expected discharge date:  2-3 days    Disposition:    [] Home       [] TCU       [] Rehab       [] Psych       [] SNF       [] [facility]       [] Other-    Chief Complaint: [facility] Course: Came in with mechanical fall. Found to have likely bony met's on CT. Also had BEREKET and severe metabolic acidosis. Nephrology consulted, placed on bicarb gtt. Subjective (past 24 hours): Feels better today. Spoke with daughter as well. States that she has been somewhat confused over the past week. Answered all other questions,.        Medications:  Reviewed    Infusion Medications    sodium bicarbonate infusion 60 mL/hr at 11/09/18 1442    dextrose       Scheduled Medications    atorvastatin  20 mg Oral Nightly    sodium chloride flush  10 mL Intravenous 2 times per day    heparin (porcine)  5,000 Units Subcutaneous Q8H    rOPINIRole  4 mg Oral BID    pantoprazole  40 mg Oral BID AC    metoprolol succinate  50 mg Oral Daily    megestrol  40 mg Oral Daily    LORazepam  1 mg Oral BID    lactobacillus  1 capsule Oral BID WC    isosorbide mononitrate  60 mg Oral Daily    ferrous sulfate  325 mg Oral BID WC    cetirizine  10 mg Oral Daily    escitalopram  10 mg Oral Daily     PRN Meds: diclofenac sodium, glucose, dextrose, glucagon (rDNA), dextrose, sodium chloride flush, magnesium hydroxide, ondansetron, oxyCODONE-acetaminophen, meclizine, lidocaine, flavoxate      Intake/Output Summary (Last 24 hours) at 11/09/18 1834  Last data filed at 11/09/18 1309   Gross per 24 hour   Intake          1239.96 ml   Output             1450 ml   Net          -210.04 ml       Diet:  DIET CARDIAC; Exam:  BP (!) 147/67   Pulse 97   Temp 98.3 °F (36.8 °C) (Oral)   Resp 16   Ht 4' 9\" (1.448 m)   Wt 111 lb 8 oz (50.6 kg)   LMP  (Exact Date)   SpO2 98%   BMI 24.13 kg/m²     General appearance:  Alert, no apparent distress, appears stated age and cooperative. HEENT:  Left occipital scalp laceration no active bleed. Pupils equal, round, and reactive to light. Extra ocular muscles intact. Conjunctivae/corneas clear. Very dry oral mucosa noted. Neck: Supple, with full range of motion. No jugular venous distention. Trachea midline. Respiratory:  Normal respiratory effort. Clear to auscultation, bilaterally without Rales/Wheezes/Rhonchi. Cardiovascular:  Regular rate and rhythm with normal S1/S2 without murmurs, rubs or gallops. Abdomen: Soft, non-tender, non-distended with normal bowel sounds. Musculoskeletal:  No clubbing, cyanosis or edema bilaterally. Full range of motion without deformity. Skin: Skin color, texture normal. Decreased turgor+. No rashes.   Skin abrasions on left knee and left hand

## 2018-11-09 NOTE — ED NOTES
Pt is resting quietly on cart with family and admitting provider at bedside. Pt has been updated on POC and pending admission. Will continue to monitor the patient.      Santo Pretty RN  11/08/18 1974

## 2018-11-09 NOTE — CONSULTS
status: Never Smoker    Smokeless tobacco: Never Used    Alcohol use No    Drug use: No    Sexual activity: Not on file     Other Topics Concern    Not on file     Social History Narrative    No narrative on file     Family History   Problem Relation Age of Onset    Arthritis Mother     Asthma Mother    Gilmer Contreras Stroke Mother     Arthritis Father     Heart Disease Father 68        enlarged heart    Heart Disease Sister         CHF    Cancer Brother         metatstatic    Diabetes Brother    Gilmer Contreras Parkinsonism Brother     Diabetes Sister     Stroke Sister     Diabetes Brother     Heart Disease Brother      Medications & Allergies      Prior to Admission medications    Medication Sig Start Date End Date Taking? Authorizing Provider   LORazepam (ATIVAN) 1 MG tablet Take 1 tab AM and 2 tabs HS. 10/25/18 11/24/18 Yes Smooth Mcelroy MD   bumetanide (BUMEX) 0.5 MG tablet Take 1 tablet by mouth daily 10/18/18  Yes Mir Zhu MD   spironolactone (ALDACTONE) 25 MG tablet Take 0.5 tablets by mouth daily 10/18/18  Yes Mir Zhu MD   zafirlukast (ACCOLATE) 20 MG tablet Take 1 tablet by mouth 2 times daily (before meals) 10/15/18  Yes KALYN Hodge CNP   metoprolol succinate (TOPROL XL) 50 MG extended release tablet TAKE 1 TABLET BY MOUTH DAILY 10/15/18  Yes KALYN Hodge CNP   oxyCODONE-acetaminophen (PERCOCET) 5-325 MG per tablet Take 1 tablet by mouth every 8 hours as needed for Pain for up to 30 days. . 10/15/18 11/14/18 Yes KALYN Hodge CNP   isosorbide mononitrate (IMDUR) 60 MG extended release tablet Take 1 tablet by mouth daily 10/13/18  Yes Pauline Watts MD   escitalopram (LEXAPRO) 10 MG tablet Take 1 tablet by mouth daily 10/13/18  Yes Pauline Watts MD   lactobacillus (CULTURELLE) capsule Take 1 capsule by mouth 2 times daily (with meals) 10/12/18  Yes Pauline Watts MD   atorvastatin (LIPITOR) 20 MG tablet Take 1 tablet by mouth daily 10/5/18  Yes Smooth Mcelroy MD   diclofenac place, and time. Skin: Skin is warm and dry. No rash noted. She is not diaphoretic. No erythema. Vitals reviewed. Vitals:    11/08/18 2020   BP:    Pulse:    Resp:    Temp:    SpO2: 99%     Labs, Radiology and Tests       Recent Labs      11/08/18   1350   WBC  12.7*   RBC  3.52*   HGB  10.9*   HCT  36.4*   MCV  103.4*   MCH  31.0   MCHC  29.9*   PLT  349     Recent Labs      11/08/18   1350  11/08/18   1638   NA  142   --    K  6.1*  4.8   CL  114*   --    CO2  13*   --    BUN  48*   --    CREATININE  2.1*   --    CALCIUM  9.2   --    PROT  7.5   --    LABALBU  3.7   --    BILITOT  0.3   --    ALKPHOS  183*   --    AST  15   --    ALT  9*   --      Radiology : CT ABDOMEN / PELVIS  1. Moderate amount of stool throughout the colon. Correlate for constipation. 2. Marked left hydroureteronephrosis without obstructing ureteral stone. 3. Right ureteral stent with hydronephrosis and air within the proximal renal pelvis. Contracted bladder with Glez catheter. Apparent wall thickening. Correlation with urinalysis is advised. Cystitis is not excluded. 4. Multiple foci of bony sclerosis concerning for metastatic disease correlation with clinical symptoms and history is advised. Bone scan may be helpful for further evaluation. Incompletely healed posterior ninth left rib fracture. Other : OLD LAB DATA HAVE BEEN REVIEWED AND NOTED THAT THE PATIENTS BASELINE CREATININE NORMALLY RUNS ~ 1.9    Assessment    Renal - Mild BEREKET on CKD stage 4 - mostly pre-renal etiology, clinically looks dry and she is also on aldactone and bumex  - She has been known to have poor oral intake in the past  - Continue IVF for now and monitor renal FX    Hyperkalemia - again unclear etiology, she was on aldactone, has B/L hydro which may or may not have contributed  - received insulin and dextrose, repeat sodium is ok.  It might still rebound, follow for now    Acid base status - patient has combined metabolic acidosis and also

## 2018-11-10 NOTE — PROGRESS NOTES
(URISPAS) 100 MG tablet, TAKE 1 TABLET BY MOUTH 3 TIMES DAILY AS NEEDED (BLADDER SPASMS)  megestrol (MEGACE) 40 MG tablet, Take 1 tablet by mouth daily  Lidocaine 2 % GEL, Apply 1 drop topically 4 times daily as needed (forman catheter irritation)  phenazopyridine (PYRIDIUM) 100 MG tablet, Take 100 mg by mouth 3 times daily as needed for Pain (with meal)  meclizine (ANTIVERT) 25 MG tablet, Take 1 tablet by mouth 3 times daily as needed for Dizziness  Incontinence Supplies (BEDSIDE DRAINAGE BAG) MISC, Large 2000 cc bedside drainage bag  fluticasone (FLONASE) 50 MCG/ACT nasal spray, 2 sprays by Nasal route 2 times daily  ondansetron (ZOFRAN) 4 MG tablet, TAKE 1 TABLET BY MOUTH EVERY 8 HOURS AS NEEDED FOR NAUSEA OR VOMITING  BIOTIN FORTE PO, Take by mouth daily  sodium chloride (OCEAN) 0.65 % nasal spray, 1 spray by Nasal route as needed for Congestion  Handicap Placard MISC, by Does not apply route. Current Medications:     Scheduled Meds:    atorvastatin  20 mg Oral Nightly    sodium chloride flush  10 mL Intravenous 2 times per day    heparin (porcine)  5,000 Units Subcutaneous Q8H    rOPINIRole  4 mg Oral BID    pantoprazole  40 mg Oral BID AC    metoprolol succinate  50 mg Oral Daily    megestrol  40 mg Oral Daily    LORazepam  1 mg Oral BID    lactobacillus  1 capsule Oral BID WC    isosorbide mononitrate  60 mg Oral Daily    ferrous sulfate  325 mg Oral BID WC    cetirizine  10 mg Oral Daily    escitalopram  10 mg Oral Daily     Continuous Infusions:    sodium bicarbonate infusion 60 mL/hr at 11/09/18 1442    dextrose       PRN Meds:  diclofenac sodium, glucose, dextrose, glucagon (rDNA), dextrose, sodium chloride flush, magnesium hydroxide, ondansetron, oxyCODONE-acetaminophen, meclizine, lidocaine, flavoxate    Input/Output:       I/O last 3 completed shifts: In: 2030.4 [P.O.:360; I.V.:1670.4]  Out: 1550 [ZUZYE:[id number]].     Patient Vitals for the past 96 hrs (Last 3 readings):   Weight and oncology recs        Please don't hesitate to call with any questions.   Electronically signed by Fred Ramirez DO on 11/10/2018 at 8:05 AM

## 2018-11-10 NOTE — PROGRESS NOTES
as noncalcified granulomas. 2. Acute subacute posterior left ninth rib fracture. 3. Old posterior rib fractures on the left. **This report has been created using voice recognition software. It may contain minor errors which are inherent in voice recognition technology. **      Final report electronically signed by Dr. Philip Yoon on 11/8/2018 3:24 PM      CT THORACIC RECONSTRUCTION WO POST PROCESS   Final Result   Acute/subacute posterior left ninth rib fracture. Correlate for point tenderness. Please see dedicated chest CT for additional findings. **This report has been created using voice recognition software. It may contain minor errors which are inherent in voice recognition technology. **      Final report electronically signed by Dr. Philip Yoon on 11/8/2018 3:17 PM      CT ABDOMEN PELVIS WO CONTRAST Additional Contrast? None   Final Result      1. Moderate amount of stool throughout the colon. Correlate for constipation. 2. Marked left hydroureteronephrosis without obstructing ureteral stone. 3. Right ureteral stent with hydronephrosis and air within the proximal renal pelvis. Contracted bladder with Glez catheter. Apparent wall thickening. Correlation with urinalysis is advised. Cystitis is not excluded. 4. Multiple foci of bony sclerosis concerning for metastatic disease correlation with clinical symptoms and history is advised. Bone scan may be helpful for further evaluation. Incompletely healed posterior ninth left rib fracture. **This report has been created using voice recognition software. It may contain minor errors which are inherent in voice recognition technology. **      Final report electronically signed by Dr. Philip Yoon on 11/8/2018 3:10 PM      CT LUMBAR RECONSTRUCTION WO POST PROCESS   Final Result      1. Advanced degenerative changes and osteopenia. Sclerosis involving the posterior sacrum, and coccyx. Correlate for prior treatment.  Infiltrative or neoplastic process is not excluded. Consider bone scan for further evaluation. 2. No definite acute fracture. 3. Left hydroureteronephrosis. Right ureteral stent with hydronephrosis and air within the proximal right collecting system. Correlate with intervention. .      **This report has been created using voice recognition software. It may contain minor errors which are inherent in voice recognition technology. **         Final report electronically signed by Dr. Roger Almeida on 11/8/2018 3:00 PM      CT Cervical Spine WO Contrast   Final Result   No definite acute fracture. Advanced degenerative changes and osteopenia as described. **This report has been created using voice recognition software. It may contain minor errors which are inherent in voice recognition technology. **      Final report electronically signed by Dr. Roger Almeida on 11/8/2018 2:48 PM      CT HEAD WO CONTRAST   Final Result   Advanced generalized volume loss and small vessel ischemic changes. No acute intracranial findings. **This report has been created using voice recognition software. It may contain minor errors which are inherent in voice recognition technology. **      Final report electronically signed by Dr. Roger Almeida on 11/8/2018 2:45 PM      NM BONE SCAN WHOLE BODY    (Results Pending)       DVT prophylaxis: [] Lovenox                                 [] SCDs                                 [x] SQ Heparin                                 [] Encourage ambulation           [] Already on Anticoagulation     Code Status: Full Code    PT/OT Eval Status: pend  Tele:   [] yes             [x] no    Active Hospital Problems    Diagnosis Date Noted    Abnormal CT scan [R93.89]     BEREKET (acute kidney injury) ([facility] [address] [N17.9] 11/08/2018    Fall [X42. XXXA]        Electronically signed by Cesar Conroy MD on 11/10/2018 at 9:02 AM

## 2018-11-11 NOTE — PROGRESS NOTES
failure (HCC)     Acute systolic congestive heart failure (HCC)     Presence of stent in LAD coronary artery     Acute GI bleeding     Duodenal ulcer     Gastritis     Sigmoid ulcer     Esophagitis     Ischemic colon (HCC)     Physical deconditioning     S/P angioplasty with stent TINA in 10/2015     Dyslipidemia     SOB (shortness of breath) on exertion     Insomnia due to anxiety and fear     Chest pain, atypical     Chronic right hydronephrosis     Coronary artery disease of native artery of native heart with stable angina pectoris ([facility])     Gross hematuria     Acute blood loss anemia     Hypotension due to drugs     Coronary artery disease involving native heart without angina pectoris     Gross hematuria     Acute cystitis with hematuria     Non morbid obesity     Encounter for chemotherapy management     Chronic diastolic CHF (congestive heart failure) ([facility])     Macrocytic anemia     Lesion of bladder     Ureteral stenosis     Iron deficiency anemia due to chronic blood loss     Closed fracture of right wrist     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 Bladder prolapse, female, acquired     Prerenal azotemia     Malignant neoplasm of urinary bladder ([facility])     Tinnitus of right ear     Pain in metatarsus of right foot     Mixed conductive and sensorineural hearing loss of both ears     Chronic kidney disease, stage 3 ([facility])     Hydroureter on left     History of right ureter stent     Abdominal pain     Major depressive disorder, single episode, in full remission ([address]     Comminuted fracture of hip, left, closed, initial encounter ([address]     Closed displaced fracture of greater trochanter of left femur ([address]     LAD stenosis     History of placement of stent in LAD coronary artery     Overweight (BMI 25.0-29. 9)     Thrush, oral     Idiopathic hypotension     Recurrent cystitis     Acute kidney injury superimposed on chronic kidney disease (HCC)     Chronic kidney disease, stage 4 (severe) ([address] Metabolic acidosis     Acute on chronic diastolic (congestive) heart failure (HCC)     CKD (chronic kidney disease) stage 3, GFR 30-59 ml/min (HCC)     Benign essential microscopic hematuria     Chronic diarrhea     Acute respiratory failure with hypoxia (HCC)     Gastroesophageal reflux disease     Anasarca     Chronic indwelling Glez catheter     Hearing loss     BEREKET (acute kidney injury) ([facility] [address]     Fall     Abnormal CT scan  Bladder cancer Dxd. In 2008    Plan  Will follow bone scan results. Labs, cultures, and radiographs reviewed. Changes made as necessary. D/w Patient's R.N. and specific instructions given and issues addressed. Will follow the patient closely with you. Also please see the orders for updated patient care. Thank you for involving us in this patient's care. I will be discussing this case with the treating physicians. Please don't hesitate to call me if any questions, issues  or concerns    Please see orders for updated patient care orders written today.   ESTEPHANIE Gayle  11/11/2018   3:21 PM

## 2018-11-11 NOTE — PROGRESS NOTES
Hospitalist Progress Note    Patient:  Randa Gudino      Unit/Bed:4K-01/001-A    YOB: 1930    MRN: [id number]       Acct: [de-identified]     PCP: Kyle Hinkle MD    Date of Admission: 11/8/2018    Assessment/Plan:    Mechanical Fall: Consult PT/OT. CT head w/o bleed    Acute Encephalopathy: Per family has been \"mentally off\" past week or so. Working up metabolic etiologies. Also consider possible brain met's if it turns out that she has metastatic disease. Doesn't appear to have any active infections. History of bacterial colonization of urine.   - Consider MRI brain if bone scan suggests metastatic disease.   - Will hold off until GFR hopefully improves high enough as BEREKET resolves so that she can get gadolinium.   - Hold Megace for now. (3% rate of confusion per insert, and was started around time when encephalopathy began)    Bone Lesions concerning for Metastatic Disease: Previous history of bladder cancer. Now with CT evidence of possible metastatic disease in spine and pelvic bones. - Oncology consulted  - Bone scan pending. BEREKET on CKD: Likely Pre-renal. Getting fluids w/ bicarb for her metabolic acidosis. Nephrology consulted. Improving. Hyperkalemia: Likely due to BEREKET and being on K sparing diuretics. Monitor. Treat PRN. On bicarb gtt. Leukocytosis: Likely from acute stress, trending down. Monitor. Hx of CHF: monitor fluid status closely. Bumex currently held. Monitor closely. Expected discharge date:  2-3 days    Disposition:    [] Home       [] TCU       [] Rehab       [] Psych       [] SNF       [] [facility]       [] Other-    Chief Complaint: [facility] Course: Came in with mechanical fall. Found to have likely bony met's on CT. Also had BEREKET and severe metabolic acidosis. Nephrology consulted, placed on bicarb gtt. Subjective (past 24 hours): Not much change today. Labs improving. Appears more alert today.        Medications: Reviewed    Infusion Medications    dextrose       Scheduled Medications    sodium bicarbonate  650 mg Oral BID    atorvastatin  20 mg Oral Nightly    sodium chloride flush  10 mL Intravenous 2 times per day    heparin (porcine)  5,000 Units Subcutaneous Q8H    rOPINIRole  4 mg Oral BID    pantoprazole  40 mg Oral BID AC    metoprolol succinate  50 mg Oral Daily    LORazepam  1 mg Oral BID    lactobacillus  1 capsule Oral BID WC    isosorbide mononitrate  60 mg Oral Daily    ferrous sulfate  325 mg Oral BID WC    cetirizine  10 mg Oral Daily    escitalopram  10 mg Oral Daily     PRN Meds: diclofenac sodium, glucose, dextrose, glucagon (rDNA), dextrose, sodium chloride flush, magnesium hydroxide, ondansetron, oxyCODONE-acetaminophen, meclizine, lidocaine, flavoxate      Intake/Output Summary (Last 24 hours) at 11/11/18 0802  Last data filed at 11/11/18 0315   Gross per 24 hour   Intake          1466.82 ml   Output             1800 ml   Net          -333.18 ml       Diet:  DIET CARDIAC; Low Potassium    Exam:  BP (!) 170/70   Pulse 88   Temp 99.1 °F (37.3 °C) (Oral)   Resp 18   Ht 4' 9\" (1.448 m)   Wt 125 lb 8 oz (56.9 kg)   LMP  (Exact Date)   SpO2 98%   BMI 27.16 kg/m²     General appearance:  Alert, no apparent distress, appears stated age and cooperative. HEENT:  Left occipital scalp laceration no active bleed. Pupils equal, round, and reactive to light. Extra ocular muscles intact. Conjunctivae/corneas clear. Very dry oral mucosa noted. Neck: Supple, with full range of motion. No jugular venous distention. Trachea midline. Respiratory:  Normal respiratory effort. Clear to auscultation, bilaterally without Rales/Wheezes/Rhonchi. Cardiovascular:  Regular rate and rhythm with normal S1/S2 without murmurs, rubs or gallops. Abdomen: Soft, non-tender, non-distended with normal bowel sounds. Musculoskeletal:  No clubbing, cyanosis or edema bilaterally.   Full range of motion without postinflammatory nodules such as noncalcified granulomas. 2. Acute subacute posterior left ninth rib fracture. 3. Old posterior rib fractures on the left. **This report has been created using voice recognition software. It may contain minor errors which are inherent in voice recognition technology. **      Final report electronically signed by Dr. Brooke Garcia on 11/8/2018 3:24 PM      CT THORACIC RECONSTRUCTION WO POST PROCESS   Final Result   Acute/subacute posterior left ninth rib fracture. Correlate for point tenderness. Please see dedicated chest CT for additional findings. **This report has been created using voice recognition software. It may contain minor errors which are inherent in voice recognition technology. **      Final report electronically signed by Dr. Brooke Garcia on 11/8/2018 3:17 PM      CT ABDOMEN PELVIS WO CONTRAST Additional Contrast? None   Final Result      1. Moderate amount of stool throughout the colon. Correlate for constipation. 2. Marked left hydroureteronephrosis without obstructing ureteral stone. 3. Right ureteral stent with hydronephrosis and air within the proximal renal pelvis. Contracted bladder with Glez catheter. Apparent wall thickening. Correlation with urinalysis is advised. Cystitis is not excluded. 4. Multiple foci of bony sclerosis concerning for metastatic disease correlation with clinical symptoms and history is advised. Bone scan may be helpful for further evaluation. Incompletely healed posterior ninth left rib fracture. **This report has been created using voice recognition software. It may contain minor errors which are inherent in voice recognition technology. **      Final report electronically signed by Dr. Brooke Garcia on 11/8/2018 3:10 PM      CT LUMBAR RECONSTRUCTION WO POST PROCESS   Final Result      1. Advanced degenerative changes and osteopenia. Sclerosis involving the posterior sacrum, and coccyx.  Correlate for prior

## 2018-11-12 NOTE — PROGRESS NOTES
bilaterally without Rales/Wheezes/Rhonchi. Cardiovascular:  Regular rate and rhythm with normal S1/S2 without murmurs, rubs or gallops. Abdomen: Soft, non-tender, non-distended with normal bowel sounds. Musculoskeletal:  No clubbing, cyanosis or edema bilaterally. Full range of motion without deformity. Skin: Skin color, texture normal. Decreased turgor+. No rashes. Skin abrasions on left knee and left hand noted. Neurologic:  Neurovascularly intact without any focal sensory/motor deficits. Cranial nerves: II-XII intact, grossly non-focal.  Psychiatric:  Alert and oriented, thought content appropriate, normal insight  Capillary Refill: Brisk,< 3 seconds   Peripheral Pulses: +2 palpable, equal bilaterally       Labs:   Recent Labs      11/10/18   0416  11/11/18   0351  11/12/18   0553   WBC  10.1  9.0  10.6   HGB  9.2*  9.3*  9.4*   HCT  29.1*  30.3*  29.1*   PLT  303  308  286     Recent Labs      11/10/18   0416  11/11/18   0351  11/12/18   0553   NA  142  141  140   K  4.5  4.4  4.3   CL  113*  109  107   CO2  17*  20*  22*   BUN  33*  25*  29*   CREATININE  1.8*  1.7*  1.9*   CALCIUM  8.6  8.7  8.6     No results for input(s): AST, ALT, BILIDIR, BILITOT, ALKPHOS in the last 72 hours. No results for input(s): INR in the last 72 hours. No results for input(s): Andrez Diggs in the last 72 hours. Microbiology:      Urinalysis:      Lab Results   Component Value Date    NITRU POSITIVE 11/08/2018    WBCUA > 200 11/08/2018    BACTERIA NONE 11/08/2018    RBCUA > 100 11/08/2018    BLOODU LARGE 11/08/2018    SPECGRAV <1.005 10/07/2018    GLUCOSEU NEGATIVE 11/08/2018       Radiology:  CT KNEE LEFT WO CONTRAST   Final Result   1. Moderate osteoarthritis as described. No acute fracture. **This report has been created using voice recognition software. It may contain minor errors which are inherent in voice recognition technology. **      Final report electronically signed by Dr. Chelsey Lopez on

## 2018-11-12 NOTE — PROGRESS NOTES
Izzy Rangel 60  INPATIENT OCCUPATIONAL THERAPY  [facility] ICU STEPDOWN TELEMETRY 4K  EVALUATION    Time:  Time In: 1100  Time Out: 1115  Timed Code Treatment Minutes: 0 Minutes  Minutes: 15          Date: 2018  Patient Name: More Pacheco,   Gender: female      MRN: [id number]  : 1930  (80 y.o.)  Referring Practitioner: Cesar Conroy MD  Diagnosis: BEREKET  Additional Pertinent Hx: More Pacheco is a 80 y.o. female who presents to the Emergency Department via EMS transport for the evaluation of a fall. Patient has a history of CHF, CAD, hyperlipidemia, dementia, CKD, chronic UTIs, cardiac stent placements, and forman placement. Per granddaughter, the patient fell to the ground this morning where she may have hit her head on a bed frame. patient has history of bladder cancer in remission with bilateral hydronephrosis requiring chronic urethral Forman catheterization. Restrictions/Precautions:  General Precautions, Fall Risk                            Past Medical History:   Diagnosis Date    Allergic rhinitis     Anxiety     Bilateral hydronephrosis 8/10/2016    Bladder cancer ([facility] [address]     Dr. Andrés Weiner Blood circulation, collateral     Broken leg     left     CHF (congestive heart failure) (HCC)     Constipation     attributed to Ultram    Coronary artery disease involving native coronary artery of native heart s/p cath 10/14/15-LM-P, LAD MID 90%, ANUERYSMAL, % PROX, RCA MID 60% DISTLA 70%, EDP 20 , EF 30%-NEED REVASCULARIZATION 10/14/2015    GERD (gastroesophageal reflux disease)     History of blood transfusion     [place] (hard of hearing)     Hyperglycemia     Hyperlipidemia     Hypertension     Obesity (BMI 30-39. 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  2017     Past Surgical History:   Procedure Laterality Date    ABDOMEN SURGERY      ABDOMINAL EXPLORATION SURGERY      BLADDER SURGERY Strength Comment: general weakness; unable to formally assess due to transport arriving                RUE Strength  Gross RUE Strength: Exceptions to [facility]  RUE Strength Comment: general weakness; unable to formally assess due to transport arriving                     RUE Tone: Normotonic  LUE Tone: Normotonic    Movements Are Fluid And Coordinated: Yes               ADL  LE Dressing: Maximum assistance (doffing shoes)     Bed mobility  Sit to Supine: Maximum assistance (for BLEs, trunk)  Scooting: Moderate assistance (X2 to scoot to middle of bed)    Transfers  Sit to stand: Moderate assistance (X1 from bedside chair; assist for lift at hips)  Stand to sit: Minimal assistance (X1; to EOB)    Balance  Sitting Balance: Stand by assistance (at EOB)  Standing Balance: Minimal assistance (X1; SBA X1)     Time: X30 seconds  Activity: prep for mobility  Comment: Moderate cues for upright posture, anterior weight shifting with fair carryover     Functional Mobility  Functional - Mobility Device: Rolling Walker  Activity: Other (bedside chair>EOB)  Assist Level: Minimal assistance (min X1; CGA X1)  Functional Mobility Comments: Continues to demo further forward flexed posture with decreased carryover to correct despite cues. Unsteady, short shuffling steps noted, occasional assist for maneuvering RW. Activity Tolerance:  Activity Tolerance: Patient limited by fatigue  Activity Tolerance: Treatment shortened due to transport arriving to take pt to nuclear medicine. Assessment:  Assessment: Pt presents with decreased strength/activity tolerance impacting ability to complete ADLs, mobility, and transfers. Pt will continue to benefit from OT services to increase independence with these tasks to facilitate return to [facility].    Performance deficits / Impairments: Decreased functional mobility , Decreased ADL status, Decreased ROM, Decreased strength, Decreased

## 2018-11-12 NOTE — PROGRESS NOTES
discharge (TCU)    Patient Education:  Patient Education: POC, education on recommendation for TCU prior to DC home, RW vs rollator    Equipment Recommendations:  Equipment Needed: No    Safety:  Type of devices: All fall risk precautions in place, Bed alarm in place, Call light within reach, Gait belt, Patient at risk for falls, Left in chair, Chair alarm in place  Restraints  Initially in place: No    Plan:  Times per week: 5 X GM  Times per day: Daily  Current Treatment Recommendations: Strengthening, Neuromuscular Re-education, Home Exercise Program, ROM, Safety Education & Training, Balance Training, Endurance Training, Patient/Caregiver Education & Training, Functional Mobility Training, Transfer Training, Gait Training, Stair training, Equipment Evaluation, Education, & procurement    Goals:  Patient goals : To get better. Short term goals  Time Frame for Short term goals: 2 weeks  Short term goal 1: Pt to transfer supine <--> sit SBA to enable pt to get in/out of bed. Short term goal 2: Pt to transfer sit <--> stand SBA for increased functional mobility. Short term goal 3: Pt to ambulate 25 feet with RW SBA to enable pt to amb to the bathroom. Short term goal 4: Pt to ascend/descend 4 steps with B HR CGA for home entry. Long term goals  Time Frame for Long term goals : NA due to short length of stay. Evaluation Complexity: Based on the findings of patient history, examination, clinical presentation, and decision making during this evaluation, the evaluation of Kandy Garenr  is of low complexity. PT G-Codes  Functional Limitation: Mobility: Walking and moving around  Mobility: Walking and Moving Around Current Status (): At least 60 percent but less than 80 percent impaired, limited or restricted  Mobility: Walking and Moving Around Goal Status ():  At least 40 percent but less than 60 percent impaired, limited or restricted       AM-PAC Inpatient Mobility without Stair Climbing Raw

## 2018-11-13 PROBLEM — E87.5 HYPERKALEMIA: Status: RESOLVED | Noted: 2018-01-01 | Resolved: 2018-01-01

## 2018-11-13 PROBLEM — G93.41 METABOLIC ENCEPHALOPATHY: Status: ACTIVE | Noted: 2018-01-01

## 2018-11-13 PROBLEM — E87.5 HYPERKALEMIA: Status: ACTIVE | Noted: 2018-01-01

## 2018-11-13 NOTE — PROGRESS NOTES
Patient admitted to 9K34. Patient oriented to room and call light. No peripheral IV access. Patient rating pain 5/10. Bed alarm on. Call light within reach. Family at bedside.

## 2018-11-13 NOTE — PROGRESS NOTES
related to megace, which was stopped. She had hyperkalemia and mild BEREKET on presentation, both resolved hydration - we have stopped home dose of aldactone. Metabolic acidosis that was present on admission has improved. She had leukocytosis without evidence of infection, resolved. She was evaluated by heme/onc re: metastatic disease. Patient and family have indicated on conservative management and heme/onc has recommended monthly injection of xgeva for management of bone metastasis. Assessment:  Principal Problem:    Acute kidney injury superimposed on chronic kidney disease ([address]  Active Problems:    Metabolic acidosis    Fall at home, initial encounter    Abnormal CT scan    Metabolic encephalopathy    Restless legs syndrome (RLS)    Chronic right hydronephrosis    Hydroureter on left    History of bladder cancer    Essential hypertension    Chronic diastolic heart failure (HCC)    Coronary artery disease involving native heart without angina pectoris    Chronic indwelling Forman catheter  Resolved Problems:    Hyperkalemia        Plan:  1. BEREKET has resolved. 2. Hyperkalemia resolved. 3. Stopped aldactone in light of hyperkalemia. 4. Monthly injection of xgeva for treatment of bone mets - per heme/onc. 5. Megace discontinued per concern it might have contributed to metabolic encephalopathy. 6. Pt has chronic bilateral hydronephrosis. She has chronic indwelling forman catheter in place. 7. She is stable for discharge; currently awaiting precert for placement. Transfer to [facility], no tele, pending precert. Consult palliative. Diet: DIET CARDIAC; Low Potassium    Code status: Full Code     ----------  Subjective  No new complaints. Denies any needs. Objective  Physical exam:  Vitals: /60   Pulse 86   Temp 98.1 °F (36.7 °C) (Oral)   Resp 16   Ht 4' 9\" (1.448 m)   Wt 127 lb 3.2 oz (57.7 kg)   LMP  (Exact Date)   SpO2 98%   BMI 27.53 kg/m²   Gen/overall appearance: Not in acute distress. Alert. CO2  20*  22*  21*   BUN  25*  29*  27*   CREATININE  1.7*  1.9*  1.7*   GLUCOSE  95  103  103         Hepatic: Recent Labs      11/13/18   0441   AST  14   ALT  7*   BILITOT  0.2*   ALKPHOS  153*       Monroe Herrera MD  -------------------------------  [facility] Hospitalist

## 2018-11-13 NOTE — DISCHARGE INSTR - COC
Continuity of Care Form    Patient Name: Valentin Webster   :  1930  MRN:  [id number]    Admit date:  2018  Discharge date:  11.15.2018    Code Status Order: Full Code   Advance Directives:   Advance Care Flowsheet Documentation     Date/Time Healthcare Directive Type of Healthcare Directive Copy in [address] Agent's Name Healthcare Agent's Phone Number    18 2031  No, patient does not have an advance directive for healthcare treatment -- -- --  [facility]   [phone]          Admitting Physician:  Rocio Loo MD  PCP: Katlin Miguel MD    Discharging Nurse: [facility] Unit/Room#: 4K-01/001-A  Discharging Unit Phone Number: [phone]    Emergency Contact:   Extended Emergency Contact Information  Primary Emergency Contact: [address] Phone: [phone]  Relation: Child  Secondary Emergency Contact: [address] Phone: [phone]  Relation: Child    Past Surgical History:  Past Surgical History:   Procedure Laterality Date   [address]  10/2016    stent placed and removed a blood clot    CARDIAC SURGERY      stents    COLONOSCOPY      CYSTOSCOPY  2008    TUR and fulg BT  superficial noninvasive bladder ca    CYSTOSCOPY N/A 2017    CYSTOSCOPY, RIGHT URETEROSCOPY, RIGHT STENT EXCHANGE retrograde right and right ureteral dilitation performed by Damaris Justin MD at [address] 2018    CYSTOSCOPY EVACUATION OF CLOTS performed by Damaris Justin MD at [address] EKG 12-LEAD  10/14/2015         EKG 12-LEAD  10/17/2015         ENDOSCOPY, COLON, DIAGNOSTIC      EYE SURGERY  2018    JOINT REPLACEMENT      hip    MOUTH SURGERY      cheek; had benign lump removed    OTHER SURGICAL HISTORY Right 2017 None    Nursing Mobility/ADLs:  Walking   Assisted  Transfer  Assisted  Bathing  Assisted  Dressing  Assisted  Toileting  Assisted  Feeding  Independent  Med Admin  Assisted  Med Delivery   whole    Wound Care Documentation and Therapy:        Elimination:  Continence:   · Bowel: Yes  · Bladder: Yes  Urinary Catheter: Insertion Date: 10.8.2018 (Chronic)  Colostomy/Ileostomy/Ileal Conduit: No       Date of Last BM: 11.14.2018    Intake/Output Summary (Last 24 hours) at 11/13/18 1411  Last data filed at 11/13/18 0907   Gross per 24 hour   Intake              420 ml   Output             1200 ml   Net             -780 ml     I/O last 3 completed shifts: In: 840 [P.O.:820; I.V.:20]  Out: 1850 [Urine:1850]    Safety Concerns: At Risk for Falls    Impairments/Disabilities:      Vision and Hearing    Nutrition Therapy:  Current Nutrition Therapy:   - Oral Diet:  Cardiac and Low Potassium. Routes of Feeding: Oral  Liquids: Thin Liquids  Daily Fluid Restriction: yes - amount 2000  Last Modified Barium Swallow with Video (Video Swallowing Test): not done    Treatments at the Time of Hospital Discharge:   Respiratory Treatments: Incentive Spirometry every 4 hours. Oxygen Therapy:  is not on home oxygen therapy.   Ventilator:    - No ventilator support    Rehab Therapies: Physical Therapy and Occupational Therapy  Weight Bearing Status/Restrictions: No weight bearing restirctions  Other Medical Equipment (for information only, NOT a DME order):  wheelchair, walker, bedside commode and hospital bed  Other Treatments: Evaluate and Treat    Patient's personal belongings (please select all that are sent with patient):  None    RN SIGNATURE:  Electronically signed by Hallie Barker RN on 11/15/18 at 9:51 AM    CASE MANAGEMENT/SOCIAL WORK SECTION    Inpatient Status Date: 11.8.2018    Readmission Risk Assessment Score:  Readmission Risk              Risk of Unplanned Readmission:        50           Discharging to Facility/ Agency   · Name: [facility]   · Address: [address] [place] [address] [address] [place] [place], [address]  · Phone: [phone]  · Fax: [phone]    Dialysis Facility (if applicable)   · Name:  · Address:  · Dialysis Schedule:  · Phone:  · Fax:    / signature: Electronically signed by TANVIR Watt on 11/15/18 at 11:19 AM    PHYSICIAN SECTION    Prognosis: Fair    Condition at Discharge: Stable    Rehab Potential (if transferring to Rehab): Fair    Recommended Labs or Other Treatments After Discharge: BMP in 1 week. Result to Dr. Romy Carrington. Physician Certification: I certify the above information and transfer of Valentin Webster  is necessary for the continuing treatment of the diagnosis listed and that she requires [facility] for less 30 days.      Update Admission H&P: No change in H&P    PHYSICIAN SIGNATURE:  Electronically signed by Amada Bolivar MD on 11/13/18 at 2:12 PM

## 2018-11-13 NOTE — PROGRESS NOTES
0441   WBC  9.0  10.6  8.5   RBC  2.96*  2.99*  2.96*   HGB  9.3*  9.4*  9.3*   HCT  30.3*  29.1*  30.7*   PLT  308  286  291     Last 3 CMP  Recent Labs      11/11/18   0351  11/12/18   0553  11/13/18   0441   NA  141  140  140   K  4.4  4.3  4.9   CL  109  107  109   CO2  20*  22*  21*   BUN  25*  29*  27*   CREATININE  1.7*  1.9*  1.7*   CALCIUM  8.7  8.6  8.5   LABALBU   --    --   2.8*   BILITOT   --    --   0.2*             Assessment / Plan   Renal - Mild BEREKET on CKD stage 4 - mostly pre-renal etiology,combined with use of aldactone and bumex - currently resolved. Renal fx back to baseline  · Encourage oral intake of fluids. BMP in AM     Electrolytes - WNL     metabolic acidosis - continue oral bicarb     B/L hydronephrosis - This is chronic  - Chronic forman catheter in place     Essential Hypertension - stable, continue current medications     Bony lesions CT scan - concerning for metastasis, oncology on board and no active treatment     meds reviewed and D/W patients daughter    RONALDO Worley D.  [facility].

## 2018-11-14 NOTE — PLAN OF CARE
Problem: Falls - Risk of:  Goal: Will remain free from falls  Will remain free from falls   Outcome: Ongoing  Bed alarm set and fall prevention in place    Problem: Risk for Impaired Skin Integrity  Goal: Tissue integrity - skin and mucous membranes  Structural intactness and normal physiological function of skin and  mucous membranes. Outcome: Ongoing  Reposition often    Problem: DISCHARGE BARRIERS  Goal: Patient's continuum of care needs are met  Outcome: Ongoing  Plan to discharge to rehab facility when stable    Comments: Care plan reviewed with patient . Patient and family verbalize understanding of the plan of care and contribute to goal setting.

## 2018-11-14 NOTE — PROGRESS NOTES
Hospital Medicine Progress Note     Date:  11/14/2018    PCP: Zaira Yanez MD (Tel: [phone])    Date of Admission: 11/8/2018    Chief complaint:   Chief Complaint   Patient presents with    Fall       Brief hospital course: Pleasant 88F with hx of bladder cancer, CKD, hx of recurrent hematuria, chronic indwelling forman catheter, who was admitted following mechanical fall at home. She had CT-thorax which demonstrates acute/subacute posterior left 9th rib fracture. CT-chest demonstrates multiple sub-centimeter nodules scattered throughout the lungs which were similar to those from study dated 9/11/2018 as well as evidence of old posterior rib fractures on the left. CT-lumbar spine demonstrates advanced degenerative changes and osteopenia, sclerosis involving the posterior sacrum and coccyx, for which infiltrative or neoplastic process was not excluded. CT-abd/pelvis demonstrates moderate amount of stool throughout the colon, multiple foci of bony sclerosis concerning for metastatic disease as well as evidence of bilateral hydronephrosis which is chronic. CT-cervical spine did not demonstrate evidence of acute fracture. CT-head was unremarkable for acute findings. CT-left knee demonstrates moderate osteoarthritis without fracture. Nuclear bone scan demonstrates multiple foci of abnormal activity within the thoracic and lumbar spines, left ribs, pelvis all concerning for metastatic disease, increased activity in the left femur at the lesser trochanter which may be metastatic, activity in the bilateral upper sternum joints and left knee likely on the basis of degenerative disease, photopenia in the region of right hip compatible with right hip replacement. We obtained MRI-brain which did not demonstrate evidence of mass lesions, albeit done without contrast (given underlying renal failure). There was report of confusion, per family.  No clear etiology noted, although there is consideration that this may be complaints. Denies any needs. Objective  Physical exam:  Vitals: BP (!) 151/68   Pulse 86   Temp 100.2 °F (37.9 °C) (Oral)   Resp 16   Ht 4' 9\" (1.448 m)   Wt 127 lb 3.2 oz (57.7 kg)   LMP  (Exact Date)   SpO2 97%   BMI 27.53 kg/m²   Gen/overall appearance: Not in acute distress. Alert. Oriented. Head: Normocephalic, atraumatic  Eyes: EOMI, good acuity  ENT: Oral mucosa moist  Neck: No JVD, thyromegaly  CVS: Nml S1S2, no MRG, RRR  Pulm: Clear bilaterally. No crackles/wheezes  Gastrointestinal: Soft, NT/ND, +BS  Musculoskeletal: No edema. Warm  Neuro: No focal deficit. Moves extremity spontaneously. Psychiatry: Appropriate affect. Not agitated. Skin: Warm, dry with normal turgor. No rash  Capillary refill: Brisk,< 3 seconds   Peripheral Pulses: +2 palpable, equal bilaterally       24HR INTAKE/OUTPUT:      Intake/Output Summary (Last 24 hours) at 11/14/18 1044  Last data filed at 11/14/18 1033   Gross per 24 hour   Intake              600 ml   Output              550 ml   Net               50 ml     I/O last 3 completed shifts:   In: 600 [P.O.:600]  Out: 550 [Urine:550]  I/O this shift:  In: 240 [P.O.:240]  Out: -       Meds:    LORazepam  1 mg Oral Daily    LORazepam  2 mg Oral Nightly    sodium bicarbonate  650 mg Oral BID    atorvastatin  20 mg Oral Nightly    sodium chloride flush  10 mL Intravenous 2 times per day    heparin (porcine)  5,000 Units Subcutaneous Q8H    rOPINIRole  4 mg Oral BID    pantoprazole  40 mg Oral BID AC    metoprolol succinate  50 mg Oral Daily    lactobacillus  1 capsule Oral BID WC    isosorbide mononitrate  60 mg Oral Daily    ferrous sulfate  325 mg Oral BID WC    cetirizine  10 mg Oral Daily    escitalopram  10 mg Oral Daily       Infusions:    dextrose           PRN Meds: oxyCODONE-acetaminophen, diclofenac sodium, glucose, dextrose, glucagon (rDNA), dextrose, sodium chloride flush, magnesium hydroxide, ondansetron, meclizine, lidocaine,

## 2018-11-14 NOTE — PROGRESS NOTES
Physical Therapy   [facility] 5K - 5G-01/680-I    Time In: 7702  Time Out: 1109  Timed Code Treatment Minutes: 27 Minutes  Minutes: 27          Date: 2018  Patient Name: Edilma Hogue,  Gender:  female        MRN: [id number]  : 1930  (80 y.o.)     Referring Practitioner: Xiomy Byrne MD  Diagnosis: BEREKET  Additional Pertinent Hx: 80 y.o. female who presented to [address] [place] [address] with fall. Patient while ambulating without walker again today, lost balance and fell, hitting the head on the ground in the process. No dizziness or loss of consciousness. No chest pain, SOB palpitations. No fever all confusion reported. Off note, patient has history of bladder cancer in remission with bilateral hydronephrosis requiring chronic urethral Glez catheterization. ED evaluation remarkable for potassium 6.1, chloride 114, bicarb 13, BUN 48, creatinine 2.1.,  Troponin 0.041, WBC 12.7, hemoglobin 10.9, .4,  CT chest w/o remarkable for Acute subacute posterior left ninth rib fracture. Was given IV D50 in water and IV insulin for hyperkalemia. Concern for possible bone mets, bone scan pending. CT of L knee negative for fracture. Past Medical History:   Diagnosis Date    Allergic rhinitis     Anxiety     Bilateral hydronephrosis 8/10/2016    Bladder cancer ([facility] [address]     Dr. Terry Davidson Blood circulation, collateral     Broken leg     left     CHF (congestive heart failure) (HCC)     Constipation     attributed to Ultram    Coronary artery disease involving native coronary artery of native heart s/p cath 10/14/15-LM-P, LAD MID 90%, ANUERYSMAL, % PROX, RCA MID 60% DISTLA 70%, EDP 20 , EF 30%-NEED REVASCULARIZATION 10/14/2015    GERD (gastroesophageal reflux disease)     History of blood transfusion     [place] (hard of hearing)     Hyperglycemia     Hyperlipidemia     Hypertension     Obesity (BMI 30-39. 9)

## 2018-11-14 NOTE — PROGRESS NOTES
systolic congestive heart failure (HCC)     Presence of stent in LAD coronary artery     Acute GI bleeding     Duodenal ulcer     Gastritis     Sigmoid ulcer     Esophagitis     Ischemic colon ([facility])     Physical deconditioning     S/P angioplasty with stent TINA in 10/2015     Dyslipidemia     SOB (shortness of breath) on exertion     Insomnia due to anxiety and fear     Chest pain, atypical     Chronic right hydronephrosis     Coronary artery disease of native artery of native heart with stable angina pectoris ([facility])     Gross hematuria     Acute blood loss anemia     Hypotension due to drugs     Coronary artery disease involving native heart without angina pectoris     Gross hematuria     Acute cystitis with hematuria     Non morbid obesity     Encounter for chemotherapy management     Chronic diastolic CHF (congestive heart failure) ([facility])     Macrocytic anemia     Lesion of bladder     Ureteral stenosis     Iron deficiency anemia due to chronic blood loss     Closed fracture of right wrist     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 Bladder prolapse, female, acquired     Prerenal azotemia     Malignant neoplasm of urinary bladder ([facility])     Tinnitus of right ear     Pain in metatarsus of right foot     Mixed conductive and sensorineural hearing loss of both ears     Chronic kidney disease, stage 3 ([facility])     Hydroureter on left     History of right ureter stent     Abdominal pain     Major depressive disorder, single episode, in full remission ([address]     Comminuted fracture of hip, left, closed, initial encounter ([address]     Closed displaced fracture of greater trochanter of left femur ([address]     LAD stenosis     History of placement of stent in LAD coronary artery     Overweight (BMI 25.0-29. 9)     Thrush, oral     Idiopathic hypotension     Recurrent cystitis     Acute kidney injury superimposed on chronic kidney disease (HCC)     Chronic kidney disease, stage 4 (severe) ([facility])     Metabolic acidosis Acute on chronic diastolic (congestive) heart failure (HCC)     CKD (chronic kidney disease) stage 3, GFR 30-59 ml/min (HCC)     Benign essential microscopic hematuria     Chronic diarrhea     Acute respiratory failure with hypoxia (HCC)     Gastroesophageal reflux disease     Anasarca     Chronic indwelling Glez catheter     Hearing loss     Fall at home, initial encounter     Abnormal CT scan     Metabolic encephalopathy     Fall     Closed fracture of one rib of left side  Multiple Bone mets    Plan  Pharmacy informs [facility] can't be given as in pt. Treatment. Will try Xgeva after the pt. Is discharged from the hospital.  D/w pt's grand daughter-care taker. Labs, cultures, and radiographs reviewed. Changes made as necessary. D/w Patient's R.N. and specific instructions given and issues addressed. Will follow the patient closely with you. Also please see the orders for updated patient care. Thank you for involving us in this patient's care. I will be discussing this case with the treating physicians. Please don't hesitate to call me if any questions, issues  or concerns    Please see orders for updated patient care orders written today.   ESTEPHANIE Gayle  11/14/2018   12:15 AM

## 2018-11-15 PROBLEM — E44.0 MODERATE MALNUTRITION (HCC): Status: ACTIVE | Noted: 2018-01-01

## 2018-11-15 NOTE — PROGRESS NOTES
SAQ. Patient required demostration and mod verbal/tactile cues on proper technique for max contraction to facilitate increased BLE strength and ROM required for increased improvement with functional mobility. Activity Tolerance:  Activity Tolerance: Patient limited by endurance; Patient limited by fatigue    Assessment: Body structures, Functions, Activity limitations: Decreased functional mobility , Decreased endurance, Decreased ROM, Decreased balance, Decreased strength, Decreased safe awareness  Assessment: Patient limitied this date due to decreased endurance. Patient refused further ambulation due to increased fatigue. Patient would benefit from continued therapy to increase strength, balance, endurance, and functional mobility. Prognosis: Good     REQUIRES PT FOLLOW UP: Yes    Discharge Recommendations:  Discharge Recommendations: Patient would benefit from continued therapy after discharge, Subacute/Skilled Nursing Facility    Patient Education:  Patient Education: transfers, gait    Equipment Recommendations:  Equipment Needed: No    Safety:  Type of devices: All fall risk precautions in place, Call light within reach, Gait belt, Patient at risk for falls, Nurse notified, Left in chair, Chair alarm in place  Restraints  Initially in place: No    Plan:  Times per week: 5 X GM  Times per day: Daily  Current Treatment Recommendations: Strengthening, Neuromuscular Re-education, Home Exercise Program, ROM, Safety Education & Training, Balance Training, Endurance Training, Patient/Caregiver Education & Training, Functional Mobility Training, Transfer Training, Gait Training, Stair training, Equipment Evaluation, Education, & procurement    Goals:  Patient goals : To get better. Short term goals  Time Frame for Short term goals: 2 weeks  Short term goal 1: Pt to transfer supine <--> sit SBA to enable pt to get in/out of bed.   Short term goal 2: Pt to transfer sit <--> stand SBA for increased functional mobility. Short term goal 3: Pt to ambulate 25 feet with RW SBA to enable pt to amb to the bathroom. Short term goal 4: Pt to ascend/descend 4 steps with B HR CGA for home entry. Long term goals  Time Frame for Long term goals : NA due to short length of stay.             AM-PAC Inpatient Mobility without Stair Climbing Raw Score : 12  AM-PAC Inpatient without Stair Climbing T-Scale Score : 37.26  Mobility Inpatient CMS 0-100% Score: 63.03  Mobility Inpatient without Stair CMS G-Code Modifier : CL

## 2018-11-15 NOTE — DISCHARGE SUMMARY
obtained MRI-brain which did not demonstrate evidence of mass lesions, albeit done without contrast (given underlying renal failure). There was report of confusion, per family. No clear etiology noted, although there is consideration that this may be related to megace, which was stopped. She had hyperkalemia and mild BEREKET on presentation, both resolved hydration - we have stopped home dose of aldactone. Metabolic acidosis that was present on admission has improved. She had leukocytosis without evidence of infection, resolved. She was evaluated by heme/onc re: metastatic disease. Patient and family have indicated on conservative management and heme/onc has recommended monthly injection of xgeva for management of bone metastasis; however, this could neither be done in hospital nor at SNF. I discussed with Dr. Anju Davis on 11/14 and he has indicated he will order medication once patient is out of rehab. Patient has chronic indwelling forman catheter; had foul-smelling urine and we changed forman catheter. Urinalysis abnormal and we administered a dose of fosfomycin on 11/14/2018. She has history of intermittent hematuria, secondary to suspected bladder cancer. She had bladder irrigation done on 11/16 and 11/17; she typically gets this done at home by home nurse as well. Overall, Hb is stable. She remains stable and she is being discharged home. Invasive procedures:  None.     Discharge Diagnoses:   Principal Problem:    Acute kidney injury superimposed on chronic kidney disease ([address]  Active Problems:    Metabolic acidosis    Fall at home, initial encounter    Abnormal CT scan    Metabolic encephalopathy    Restless legs syndrome (RLS)    Chronic right hydronephrosis    Hydroureter on left    History of bladder cancer    Essential hypertension    Chronic diastolic heart failure (HCC)    Coronary artery disease involving native heart without angina pectoris    Chronic indwelling Forman catheter    Closed fracture of Large amount of stool throughout the colon. Correlate for constipation. No bowel wall thickening or evidence for obstruction. There are multiple foci of sclerosis in the sacrum coccyx and pubic symphysis concerning for metastatic disease. Possible sclerosis of the 11th and 12th ribs on the right this might be posttraumatic or infiltrative. Com[facility] posterior left ninth rib fracture. Mild sclerosis of the left ninth rib laterally and 10th rib laterally which could represent old or healing fractures. Correlation advised. Degenerative changes lumbar spine with osteopenia and multilevel disc space. 1. Moderate amount of stool throughout the colon. Correlate for constipation. 2. Marked left hydroureteronephrosis without obstructing ureteral stone. 3. Right ureteral stent with hydronephrosis and air within the proximal renal pelvis. Contracted bladder with Glez catheter. Apparent wall thickening. Correlation with urinalysis is advised. Cystitis is not excluded. 4. Multiple foci of bony sclerosis concerning for metastatic disease correlation with clinical symptoms and history is advised. Bone scan may be helpful for further evaluation. Incompletely healed posterior ninth left rib fracture. **This report has been created using voice recognition software. It may contain minor errors which are inherent in voice recognition technology. ** Final report electronically signed by Dr. Manoj Gandhi on 11/8/2018 3:10 PM    Ct Head Wo Contrast    Result Date: 11/8/2018  PROCEDURE: CT HEAD WO CONTRAST CLINICAL INFORMATION: fall, left occpital abrasion. COMPARISON: No prior study. TECHNIQUE: Noncontrast 5 mm axial images were obtained through the brain. All CT scans at this facility use dose modulation, iterative reconstruction, and/or weight-based dosing when appropriate to reduce radiation dose to as low as reasonably achievable. FINDINGS: No acute intracranial findings. Generalized volume loss and small vessel ischemic changes. voice recognition software. It may contain minor errors which are inherent in voice recognition technology. ** Final report electronically signed by Dr. Catie Ramos on 11/13/2018 11:42 AM            Treatments: As above. Discharge Medications:     Medication List      START taking these medications    sodium bicarbonate 650 MG tablet  Take 1 tablet by mouth 2 times daily        CHANGE how you take these medications    diclofenac sodium 1 % Gel  Apply 2 g topically 4 times daily  What changed:  when to take this     LORazepam 1 MG tablet  Commonly known as:  ATIVAN  Take 1 tab AM and 2 tabs HS. Ordered for SNF purposes. Was taking prior to admission. .  What changed:  additional instructions     oxyCODONE-acetaminophen 5-325 MG per tablet  Commonly known as:  PERCOCET  Take 1 tablet by mouth every 8 hours as needed for Pain for up to 5 days. Ordered for SNF purposes. Was taking prior to admission. .  What changed:  additional instructions        CONTINUE taking these medications    atorvastatin 20 MG tablet  Commonly known as:  LIPITOR  Take 1 tablet by mouth daily     Bedside Drainage Bag Misc  Large 2000 cc bedside drainage bag     BIOTIN FORTE PO     bumetanide 0.5 MG tablet  Commonly known as:  BUMEX  Take 1 tablet by mouth daily     cetirizine 10 MG tablet  Commonly known as:  ZYRTEC     escitalopram 10 MG tablet  Commonly known as:  LEXAPRO  Take 1 tablet by mouth daily     ferrous sulfate 325 (65 Fe) MG tablet  TAKE 1 TABLET BY MOUTH 2 TIMES DAILY (WITH MEALS)     * flavoxate 100 MG tablet  Commonly known as:  URISPAS  TAKE 1 TABLET BY MOUTH 3 TIMES DAILY AS NEEDED (BLADDER SPASMS)     * flavoxate 100 MG tablet  Commonly known as:  URISPAS     fluticasone 50 MCG/ACT nasal spray  Commonly known as:  FLONASE     Foly Catheter Lubricious Misc  Change forman catheter every 4 weeks. Handicap Placard Misc  by Does not apply route.      isosorbide mononitrate 60 MG extended release tablet  Commonly known as:

## 2018-11-15 NOTE — PROGRESS NOTES
Nutrition Assessment    Type and Reason for Visit: Initial (LOS)    Nutrition Recommendations: Recommend Folbee Plus (Renal Multivitamin) daily. Started Magic Cups TID. Continue current diet as ordered. Nutrition Assessment: Pt admitted d/t fall now with BEREKET. Hx bladder cancer dx in 2008 w/bone metastases. Pt moderately malnourished as evidence by pt report of consuming less than 75% of energy intake over the past 7 days and mild muscle loss. Pt at risk for further compromise due to cancer and ongoing poor po intake. Will started Magic Cups TID (chocolate or vanilla only). Pt dislikes all milky Ensure Products. Last BM x1 on 11/14. Labs: Cr. 1.8, BUN 30, K+ 4.7. Hg 9.3 on 11/13. Pt continues on Low-Potassium diet restriction. Rx includes: Bumex, Iron, & Probiotic. Malnutrition Assessment:  · Malnutrition Status: Meets the criteria for moderate malnutrition  · Context: Acute illness or injury  · Findings of the 6 clinical characteristics of malnutrition (Minimum of 2 out of 6 clinical characteristics is required to make the diagnosis of moderate or severe Protein Calorie Malnutrition based on AND/ASPEN Guidelines):  1. Energy Intake-Less than 75% of estimated energy requirement for greater than 7 days   2. Muscle Loss-Mild muscle mass loss, Temples (temporalis muscle)    Nutrition Risk Level: Moderate    Nutrient Needs:  · Estimated Daily Total Kcal: 1440 kcal/day (25 kcal/kg - 57.7 kg)  · Estimated Daily Protein (g): 30-34 g/day (.7-.8 g/kg IBW 43 kg) - monitoring renal function    Nutrition Diagnosis:   · Problem:  Moderate malnutrition, In context of acute illness or injury  · Etiology: related to Insufficient energy/nutrient consumption     Signs and symptoms:  as evidenced by Patient report of (consuming less than 75% of energy intake over the past 7 days; Mild Muscle Loss)    Objective Information:  · Nutrition-Focused Physical Findings: weakness, last BM x1 on 11/4  · Wound Type: None  · Current

## 2018-11-16 NOTE — PROGRESS NOTES
[P.O.:480]  Out: 1400 [Urine:1400]  No intake/output data recorded. Meds:    lidocaine  1 patch Transdermal Daily    bumetanide  0.5 mg Oral Daily    LORazepam  1 mg Oral Daily    LORazepam  2 mg Oral Nightly    sodium bicarbonate  650 mg Oral BID    atorvastatin  20 mg Oral Nightly    sodium chloride flush  10 mL Intravenous 2 times per day    heparin (porcine)  5,000 Units Subcutaneous Q8H    rOPINIRole  4 mg Oral BID    pantoprazole  40 mg Oral BID AC    metoprolol succinate  50 mg Oral Daily    lactobacillus  1 capsule Oral BID WC    isosorbide mononitrate  60 mg Oral Daily    ferrous sulfate  325 mg Oral BID WC    cetirizine  10 mg Oral Daily    escitalopram  10 mg Oral Daily       Infusions:    sodium chloride      dextrose           PRN Meds: cloNIDine, oxyCODONE-acetaminophen, diclofenac sodium, glucose, dextrose, glucagon (rDNA), dextrose, sodium chloride flush, magnesium hydroxide, ondansetron, meclizine, lidocaine, flavoxate    Labs/imaging:  CBC:   Recent Labs      11/16/18   0710   WBC  9.4   HGB  10.1*   PLT  345         BMP:    Recent Labs      11/14/18   0624  11/16/18   0710   NA  139  139   K  4.7  4.5   CL  106  106   CO2  21*  20*   BUN  30*  30*   CREATININE  1.8*  2.0*   GLUCOSE  94  94         Hepatic:   No results for input(s): AST, ALT, ALB, BILITOT, ALKPHOS in the last 72 hours.     Jerome Parson MD  -------------------------------  Rounding hospitalist

## 2018-11-17 PROBLEM — Z87.448 HISTORY OF HEMATURIA: Status: ACTIVE | Noted: 2018-01-01

## 2018-11-18 NOTE — PROGRESS NOTES
CYSTOURETHROSCOPY,URETER CATHETER Right 8/3/2018    CYSTOSCOPY WITH RIGHT RETROGRADE PYELOGRAM, RIGHT URETEROSCOPY AND REPLACEMENT OF RIGHT URETERAL STENT performed by Jocelyn Winchester MD at [address] OFFICE/OUTPT [address] Left 6/11/2018    IM NAIL HENRY INSERTION, LEFT performed by Julius Holm MD at [facility]  03/2017    TOTAL HIP ARTHROPLASTY  3-9-12    Rt       Allergies   Allergen Reactions    Adhesive Tape Rash     USE PAPER TAPE    Cleocin [Clindamycin Hcl] Diarrhea    Metronidazole Diarrhea    Penicillins Other (See Comments)     Very sleepy    Septra [Bactrim] Other (See Comments)     Too big to swallow       Social History     Social History    Marital status:      Spouse name: N/A    Number of children: N/A    Years of education: N/A     Occupational History    Not on file. Social History Main Topics    Smoking status: Never Smoker    Smokeless tobacco: Never Used    Alcohol use No    Drug use: No    Sexual activity: Not on file     Other Topics Concern    Not on file     Social History Narrative    No narrative on file       Family History   Problem Relation Age of Onset    Arthritis Mother     Asthma Mother     Stroke Mother     Arthritis Father     Heart Disease Father 68        enlarged heart    Heart Disease Sister         CHF    Cancer Brother         metatstatic    Diabetes Brother     Parkinsonism Brother     Diabetes Sister     Stroke Sister     Diabetes Brother     Heart Disease Brother          I have reviewed the patient's past medical history, past surgical history, allergies, medications, social and family history and I have made updates where appropriate.       Review of Systems  Positive responses are highlighted in bold    Constitutional:  Fever, Chills, Night Sweats, Fatigue, Unexpected changes in weight  Eyes:  Eye discharge, Eye pain, Eye redness, Visual disturbances   HENT:  Ear pain, Tinnitus, Nosebleeds, bowel sounds physiologic,  no rebound or guarding, no masses or hernias noted. Liver and spleen without enlargement. Extremities: no cyanosis, clubbing or edema of the lower extremities  Musculoskeletal: No joint swelling or gross deformity   Neuro:  Alert, 2+ patellar reflexes b/l,  normal speech, no focal findings or movement disorder noted  Psych:  Normal affect without evidence of depression or anxiety, insight and judgement are impaired, memory appears impaired as well  Skin: warm and dry, no rash or erythema  Lymph:  No cervical, auricular or supraclavicular lymph nodes palpated      LABS/IMAGING    Recent Labs      11/16/18   0710  11/13/18   0441  11/12/18   0553   WBC  9.4  8.5  10.6   HGB  10.1*  9.3*  9.4*   HCT  32.4*  30.7*  29.1*   MCV  100.3*  103.7*  97.3   PLT  345  291  286       Lab Results   Component Value Date     11/16/2018    K 4.5 11/16/2018     11/16/2018    CO2 20 (L) 11/16/2018    BUN 30 (H) 11/16/2018    CREATININE 2.0 (H) 11/16/2018    GLUCOSE 94 11/16/2018    CALCIUM 8.9 11/16/2018    PROT 6.1 11/13/2018    LABALBU 3.4 (L) 11/16/2018    BILITOT 0.2 (L) 11/13/2018    ALKPHOS 153 (H) 11/13/2018    AST 14 11/13/2018    ALT 7 (L) 11/13/2018    LABGLOM 24 (A) 11/16/2018       Narrative   PROCEDURE: MRI BRAIN WO CONTRAST       CLINICAL INFORMATION Evaluate for brain mets.  History of bladder cancer. Abnormal bone scan. Renal insufficiency.       COMPARISON: Head CT 11/8/2018.       TECHNIQUE: Multiplanar and multiple spin echo MRI images were obtained of the brain without contrast. Contrast was not given due to the patient's reduced GFR.       FINDINGS:       Motion artifact does degrade the quality of some of these images. These are the best images possible at this time.       The diffusion-weighted images are normal.  The brain volume is moderately reduced.  There is a moderate amount of abnormal signal in the white matter of the brain seen on the FLAIR and T2-weighted activity both shoulders, both elbows, both hands. Increased activity lateral aspect left tibial plateau increased activity both shoulder joints   Patient is kyphotic and shows dextroscoliosis of the thoracolumbar spine           Impression   Multiple foci of abnormal activity in the thoracic spine and lumbar spine and left RIBS, and pelvis all concerning for metastatic disease. Activity in the left femur at the lesser trochanter may be metastatic, degenerative changes not entirely excluded. Activity in the bilateral upper sternum the joints and left knee is likely on the basis of degenerative disease. Photopenia in the region of the right hip compatible with right hip replacement               **This report has been created using voice recognition software.  It may contain minor errors which are inherent in voice recognition technology. **       Final report electronically signed by Dr. Singh Getting on 11/12/2018 2:29 PM       Narrative   PROCEDURE: CT ABDOMEN PELVIS WO CONTRAST       CLINICAL INFORMATION: unwitnessed fall .       COMPARISON: 4/28/2018 TECHNIQUE: Axial 5 mm CT images were obtained through the abdomen and pelvis. No contrast was given. Coronal reconstructions were obtained.       All CT scans at this facility use dose modulation, iterative reconstruction, and/or weight-based dosing when appropriate to reduce radiation dose to as low as reasonably achievable.       FINDINGS:       Multiple subcentimeter nodules throughout the lung bases nonspecific. Liver, spleen, pancreas and adrenal glands and gallbladder are unremarkable. There is marked left hydroureteronephrosis. No obstructing stone. Right ureteral stent with hydronephrosis    on the right and air within the collecting system. Normal caliber aorta with atherosclerosis. Glez catheter in the bladder is compressed bladder. Apparent wall thickening cystitis is not excluded. Correlation advised. Large amount of stool throughout the colon.

## 2018-11-19 NOTE — CARE COORDINATION
11/13/18, 9:41 AM    DISCHARGE BARRIERS      RAYMOND spoke to patient along with her granddaughter that she lives with, Teresita Gotti, and another family member who did not identify herself. SW explained that TCU has no beds and that she requires a pre-cert. Teresita Gotti states that she really wants to go there. RAYMOND advised that she also agreed to an ECF, [facility] but they will not have any beds for a week. Teresita Gotti agreed that this would have been her choice. RAYMOND provided a list of ECF in the area for them to choose in case TCU can not take her  11/13/18  11:16 AM  SW spoke to Teresita Gotti again and they have chosen 3 ECF--[facility], [facility] and [facility]. SW advised that this will be dependent on if they are in network with her Josy Acosta (she felt that [facility] took her in the past with this insurance). SW spoke to admissions with [facility] and they no longer have this contract. RAYMOND then spoke to Tracy Arana with admission to [facility] and they will review. Await return call. RAYMOND updated the patient and granddaughter  11/13/18  12:12 PM  SW received a call from Teresita Ana María. She states the family had discussion and they now do not want [facility]. Message was left with Tracy Arana to cancel the referral.  Teresita Gotti prefers [facility] although another family member wanted the [place] [facility]. We discussed this ECF earlier and she told SW they did not want this one. SW spoke to their admissions (name of patient withheld) and they are not in network but would verify if the insurance would pay the same (in or out of network). RAYMOND did make the referral to [facility] as directed by Teresita Gotti. SW spoke to Nadya Spain in admissions. They will review.   RAYMOND advised that discharge will happen with pre-cert has been approved
11/19/18, 8:22 AM    DISCHARGE BARRIERS    Patient discharged to [facility] on 11/17/18. Patient was approved for skilled care by her [facility]. 11/19/18, 8:24 AM    Discharge plan discussed by  and . Discharge plan reviewed with patient/ family. Patient/ family verbalize understanding of discharge plan and are in agreement with plan. Understanding was demonstrated using the teach back method.    Services After Discharge  Services At/After Discharge: Skilled Therapy, Transport, In ambulance [facility])   IMM Letter  IMM Letter given to Patient/Family/Significant other/Guardian/POA/by[de-identified] Updated  IMM Letter date given[de-identified] 11/15/18  IMM Letter time given[de-identified] [phone]
Meds:   atorvastatin  20 mg Oral Nightly    sodium chloride flush  10 mL Intravenous 2 times per day    heparin (porcine)  5,000 Units Subcutaneous Q8H    rOPINIRole  4 mg Oral BID    pantoprazole  40 mg Oral BID AC    metoprolol succinate  50 mg Oral Daily    megestrol  40 mg Oral Daily    LORazepam  1 mg Oral BID    lactobacillus  1 capsule Oral BID WC    isosorbide mononitrate  60 mg Oral Daily    ferrous sulfate  325 mg Oral BID WC    cetirizine  10 mg Oral Daily    escitalopram  10 mg Oral Daily     Continuous Infusions:   dextrose      sodium chloride 75 mL/hr at 11/08/18 2303      Pertinent Info/Orders/Treatment Plan:  IVF continued. Creatinine 1.9, (+) UA: Nitrites; monitor. Oncology (Bladder Cancer) following  Diet: DIET CARDIAC;   Smoking status:  reports that she has never smoked.  She has never used smokeless tobacco.   PCP: Janine Dan MD  Readmission: none  Readmission Risk Score: 52%    Discharge Planning  Current Residence:  Private Residence  Living Arrangements:  Family Members   Support Systems:  Children, Family Members  Current Services PTA:     Potential Assistance Needed:  N/A  Potential Assistance Purchasing Medications:  No  Does patient want to participate in local refill/ meds to beds program? yes  Type of Home Care Services:  [facility], Nursing Services, [place], PT  Patient expects to be discharged to:  home with family  Expected Discharge date:  11/12/18  Follow Up Appointment: Best Day/ Time: Tuesday AM    Discharge Plan: met with client and grand-daughter Reba Rahman; current with [facility], home PT and OTdickson at discharge; will ask physician for PT/OT, current with [facility], has nebulizer     Evaluation: yes
return call from [facility] staff    Electronically signed by TANVIR Eaton on 11/9/2018 at 10:30 AM

## 2018-12-11 NOTE — CARE COORDINATION
Rachel 45 Transitions Initial Follow Up Call    Call within 2 business days of discharge: Yes    Patient: Shellie Sacuedo Patient : 1930   MRN: <[id number]>    Discharge Date: 18 RARS: Readmission Risk Score: 64     Spoke with: [address] [place] [address] Avenue: [facility]    Non-face-to-face services provided:    1st attempt to make contact for a follow up call. Called several numbers, either the VM was full or the number was incorrect. Call 1st contact listed, LVM introducing self, role, and nature of the call, contact information provided. Called 2nd contact number listed; spoke with Trina Neela. She provided a different home number [phone] to reach out to patient. Called suggested number; line busy. Will attempt later.  VITALIY Galicia RN  Care Transition Coordinator  [phone]          Follow Up  Future Appointments  Date Time Provider Kobi Turpin   2018 10:45 AM KALYN Jacobs - [address] [place], [place] [address]P - [place]   2018 11:20 AM MD FRANCES [facility] - [place]   2019 2:40 PM MD FR[facility] - [place]   2019 1:45 PM KALYN Urias - [address] Urology P - [place]   2019 10:15 AM Natalia Hampton MD [address] [place] [address] - Terrence Ramirez RN

## 2018-12-12 NOTE — TELEPHONE ENCOUNTER
Attempted to reach [place] at [phone], mail box is full  Left voice mail message on nurse intake at [facility] regarding patient and ok for home health aide.

## 2018-12-13 PROBLEM — G89.3 PAIN DUE TO MALIGNANT NEOPLASM METASTATIC TO BONE (HCC): Status: ACTIVE | Noted: 2018-01-01

## 2018-12-13 PROBLEM — C79.51 PAIN DUE TO MALIGNANT NEOPLASM METASTATIC TO BONE (HCC): Status: ACTIVE | Noted: 2018-01-01

## 2018-12-13 NOTE — PROGRESS NOTES
recently found out metastatic bone disease. Medication that ONC can try is only to strengthen bones and prevent metatsatic bone disease injury. Seen ONC in 1 week. Megace was stopped in hospital due to confusion. Appetite poor. Pain medication was increase to QID related to chronic arthritis and bone pain. Tolerating well. Denies increase confusion or drowsiness. Perocet is chronic pain medication. Vitals:    12/13/18 1045   BP: 112/68   Pulse: 88   Resp: 16   Temp: 98.7 °F (37.1 °C)   SpO2: 98%       Patient Active Problem List   Diagnosis    Other hyperlipidemia    AR (allergic rhinitis)    RAD (reactive airway disease)    History of bladder cancer    Generalized OA    Vertigo    Osteopenia    Anxiety, situational    Osteoarthritis, multiple joints with pain.     Cancer ([address]    Restless legs syndrome (RLS)    Constipation    Acute bronchitis    Medication monitoring encounter    Local reaction to bee sting    ETD (eustachian tube dysfunction)    Hyponatremia    SIADH (syndrome of inappropriate ADH production) (HCC)    Dizziness    Congestive heart failure (HCC)    Essential hypertension    BPV (benign positional vertigo)    Urinary tract infection associated with indwelling urethral catheter (HCC)    RLS (restless legs syndrome)    Chronic diastolic heart failure (HCC)    Acute systolic congestive heart failure (HCC)    Presence of stent in LAD coronary artery    Acute GI bleeding    Duodenal ulcer    Gastritis    Sigmoid ulcer    Esophagitis    Ischemic colon (HCC)    Physical deconditioning    S/P angioplasty with stent TINA in 10/2015    Dyslipidemia    SOB (shortness of breath) on exertion    Insomnia due to anxiety and fear    Chest pain, atypical    Chronic right hydronephrosis    Coronary artery disease of native artery of native heart with stable angina pectoris (HCC)    Gross hematuria    Acute blood loss anemia    Hypotension due to drugs    Documentation Possible medication side effects, risk of tolerance/dependence & alternative treatments discussed. ;No signs of potential drug abuse or diversion identified. Chronic Pain -   Medication Contracts Existing medication contract.                    KALYN Hodge - CNP

## 2018-12-17 PROBLEM — R31.9 HEMATURIA: Status: ACTIVE | Noted: 2018-01-01

## 2018-12-17 NOTE — ED PROVIDER NOTES
Refills: 3      BIOTIN FORTE PO Take 1 tablet by mouth daily       cetirizine (ZYRTEC) 10 MG tablet Take 10 mg by mouth daily      phenazopyridine (PYRIDIUM) 100 MG tablet Take 1 tablet by mouth 3 times daily as needed for Pain (with meal)  Qty: 90 tablet, Refills: 1    Associated Diagnoses: Dysuria      ondansetron (ZOFRAN) 4 MG tablet TAKE 1 TABLET BY MOUTH EVERY 8 HOURS AS NEEDED FOR NAUSEA OR VOMITING  Qty: 30 tablet, Refills: 3      Catheters (FOLY CATHETER LUBRICIOUS) MISC Change forman catheter every 4 weeks. Irrigate catheter with 60 cc saline weekly and PRN. Qty: 12 each, Refills: 0      Incontinence Supplies (BEDSIDE DRAINAGE BAG) MISC Large 2000 cc bedside drainage bag  Qty: 1 each, Refills: 11      fluticasone (FLONASE) 50 MCG/ACT nasal spray 2 sprays by Nasal route 2 times daily      sodium chloride (OCEAN) 0.65 % nasal spray 1 spray by Nasal route as needed for Congestion      Handicap Placard MISC by Does not apply route. Qty: 1 each, Refills: 0    Associated Diagnoses: Osteoarthrosis, hip; Osteoarthritis       ! ! - Potential duplicate medications found. Please discuss with provider. ALLERGIES     is allergic to adhesive tape; cleocin [clindamycin hcl]; metronidazole; penicillins; and septra [bactrim]. FAMILY HISTORY     indicated that her mother is . She indicated that her father is . family history includes Arthritis in her father and mother; Asthma in her mother; Cancer in her brother; Diabetes in her brother, brother, and sister; Heart Disease in her brother and sister; Heart Disease (age of onset: 68) in her father; Parkinsonism in her brother; Stroke in her mother and sister. SOCIAL HISTORY      reports that she has never smoked. She has never used smokeless tobacco. She reports that she does not drink alcohol or use drugs. PHYSICAL EXAM     INITIAL VITALS:  height is 4' 9\" (1.448 m) and weight is 128 lb (58.1 kg).  Her oral temperature is 99.3 °F (37.4

## 2018-12-17 NOTE — H&P
Obesity (BMI 30-39. 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 4/26/2017       Past Surgical History:          Procedure Laterality Date    ABDOMEN SURGERY      ABDOMINAL EXPLORATION SURGERY  1954    BLADDER SURGERY  10/2016    stent placed and removed a blood clot    CARDIAC SURGERY      stents    COLONOSCOPY      CYSTOSCOPY  11-    TUR and fulg BT  superficial noninvasive bladder ca    CYSTOSCOPY N/A 8/23/2017    CYSTOSCOPY, RIGHT URETEROSCOPY, RIGHT STENT EXCHANGE retrograde right and right ureteral dilitation performed by Kinjal Patrick MD at [address] [facility] 1/31/2018    CYSTOSCOPY EVACUATION OF CLOTS performed by Kinjal Patrick MD at [place] [address] EKG 12-LEAD  10/14/2015         EKG 12-LEAD  10/17/2015         ENDOSCOPY, COLON, DIAGNOSTIC      EYE SURGERY  04/2018    JOINT REPLACEMENT      hip    MOUTH SURGERY      cheek; had benign lump removed    OTHER SURGICAL HISTORY Right 04/19/2017    Cystoscopy, Attempted Right Ureteral Access     [place] CYSTOURETHROSCOPY,URETER CATHETER Right 1/31/2018    CYSTOSCOPY RIGHT RETROGRADE PYELOGRAM RIGHT URETEROSCOPY AND REPLACEMENT OF RIGHT URETERAL STENT performed by Kinjal Patrick MD at [address] [facility] Right 8/3/2018    CYSTOSCOPY WITH RIGHT RETROGRADE PYELOGRAM, RIGHT URETEROSCOPY AND REPLACEMENT OF RIGHT URETERAL STENT performed by Kinjal Patrick MD at [address] OFFICE/OUTPT [address] [facility] Left 6/11/2018    IM NAIL HENRY INSERTION, LEFT performed by Rene Bermudez MD at [place] [address]  03/2017    TOTAL HIP ARTHROPLASTY  3-9-12    Rt       Medications Prior to Admission:      Prior to Admission medications    Medication Sig Start Date End Date Taking? Authorizing Provider   LORazepam (ATIVAN) 1 MG tablet Take 1 mg by mouth every morning. .   Yes Historical Provider, Cesario Siegel APRN - CNP   sodium bicarbonate 650 MG tablet Take 1 tablet by mouth 2 times daily 11/15/18  Yes Amada Bolivar MD   zafirlukast (ACCOLATE) 20 MG tablet Take 1 tablet by mouth 2 times daily (before meals) 10/15/18  Yes KALYN De Souza - CNP   lactobacillus (CULTURELLE) capsule Take 1 capsule by mouth 2 times daily (with meals) 10/12/18  Yes Gene Garcia MD   Lidocaine 2 % GEL Apply 1 drop topically 4 times daily as needed (forman catheter irritation) 10/5/18  Yes Katlin Miguel MD   atorvastatin (LIPITOR) 20 MG tablet Take 1 tablet by mouth daily 10/5/18  Yes Katlin Miguel MD   Incontinence Supplies (BEDSIDE DRAINAGE BAG) [address] [facility] Large 2000 cc bedside drainage bag 2/28/18  Yes KALYN Maria - CNP   fluticasone (FLONASE) 50 MCG/ACT nasal spray 2 sprays by Nasal route 2 times daily   Yes Historical Provider, MD   ferrous sulfate 325 (65 Fe) MG tablet TAKE 1 TABLET BY MOUTH 2 TIMES DAILY (WITH MEALS) 11/2/17  Yes Katlin Miguel MD   BIOTIN FORTE PO Take 1 tablet by mouth daily    Yes Historical Provider, MD   sodium chloride (OCEAN) 0.65 % nasal spray 1 spray by Nasal route as needed for Congestion   Yes Historical Provider, MD   cetirizine (ZYRTEC) 10 MG tablet Take 10 mg by mouth daily   Yes Historical Provider, MD   Handicap Placard MISC by Does not apply route. 2/28/14  Yes Katlin Miguel MD       Allergies:  Adhesive tape; Cleocin [clindamycin hcl]; Metronidazole; Penicillins; and Septra [bactrim]    Social History:      The patient currently lives at home and has palliative care in place    TOBACCO:   reports that she has never smoked. She has never used smokeless tobacco.  ETOH:   reports that she does not drink alcohol. Family History:    Reviewed in detail and negative for DM, CAD, Cancer, CVA.  Positive as follows:    Family History   Problem Relation Age of Onset    Arthritis Mother     Asthma Mother     Stroke Mother     Arthritis Father     Heart Disease Father 68 Facility       [] Other-    ASSESSMENT/PLAN:  1) Recurrent Hematuria in setting of bladder cancer with bony mets  - Urology consulted for bladder irrigation  - Check H/H Q 8 hrs  - Follow up Dr Marylene Sheer for oncology care was due for Friday, but pt is now hospitaliized again.  - I had a long discussion with pt and her family regarding her prognosis. Pt does have palliative care in place. I offered hospice care, but pt is not ready for that yet  - Chronic forman changed in the ED today    2) UTI  - Pt has been started on CTX  - UC pending  - Has chronic Forman    3) HLD  - Continue pt on Lipitor    4) Depression  - Continue pt on Lexapro    5) Chronic Diastolic CHF/CAD/HTN  - Continue pt on her home medications which include Imdur, Metoprolol, and Lipitor  - BP meds will be placed on holding parameters. 6) Chronic pain 2/2 to bone mets  - Continue pt on percocet and baclofen    7) Anxiety  - Continue pt on Lorazepam    8) RLS  - Continue pt on Requip    Thank you Janay Stein MD for the opportunity to be involved in this patient's care.     Electronically signed by Cristóbal Langston MD on 12/17/2018 at 4:41 PM

## 2018-12-18 PROBLEM — N39.0 UTI (URINARY TRACT INFECTION): Status: ACTIVE | Noted: 2018-01-01

## 2018-12-18 NOTE — CONSULTS
 Bilateral hydronephrosis 8/10/2016    Bladder cancer ([facility] [address] [facility] 2008    Dr. Sarah Padilla Blood circulation, collateral     Bone cancer ([facility] [address] 11/2018    Broken leg     left     CHF (congestive heart failure) (HCC)     Constipation     attributed to Ultram    Coronary artery disease involving native coronary artery of native heart s/p cath 10/14/15-LM-P, LAD MID 90%, ANUERYSMAL, % PROX, RCA MID 60% DISTLA 70%, EDP 20 , EF 30%-NEED REVASCULARIZATION 10/14/2015    GERD (gastroesophageal reflux disease)     History of blood transfusion     [place] ([place]     Hyperglycemia     Hyperlipidemia     Hypertension     Obesity (BMI 30-39. 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  4/26/2017     Past Surgical History:        Procedure Laterality Date    ABDOMEN SURGERY      ABDOMINAL EXPLORATION SURGERY  1954    BLADDER SURGERY  10/2016    stent placed and removed a blood clot    CARDIAC SURGERY      stents    COLONOSCOPY      CYSTOSCOPY  11-    TUR and fulg BT  superficial noninvasive bladder ca    CYSTOSCOPY N/A 8/23/2017    CYSTOSCOPY, RIGHT URETEROSCOPY, RIGHT STENT EXCHANGE retrograde right and right ureteral dilitation performed by Chantel Ely MD [facility] [address] [facility] 1/31/2018    CYSTOSCOPY EVACUATION OF CLOTS performed by Chantel Ely MD at [facility] EKG 12-LEAD  10/14/2015         EKG 12-LEAD  10/17/2015         ENDOSCOPY, COLON, DIAGNOSTIC      EYE SURGERY  04/2018    JOINT REPLACEMENT      hip    MOUTH SURGERY      cheek; had benign lump removed    OTHER SURGICAL HISTORY Right 04/19/2017    Cystoscopy, Attempted Right Ureteral Access     [place] CYSTOURETHROSCOPY,URETER CATHETER Right 1/31/2018    CYSTOSCOPY RIGHT RETROGRADE PYELOGRAM RIGHT URETEROSCOPY AND REPLACEMENT OF RIGHT URETERAL STENT performed by Chantel Ely MD at [address] [place] [address] CYSTOURETHROSCOPY,URETER CATHETER Right 8/3/2018    CYSTOSCOPY WITH RIGHT RETROGRADE PYELOGRAM, RIGHT URETEROSCOPY AND REPLACEMENT OF RIGHT URETERAL STENT performed by Will Nolan MD at [address] OFFICE/OUTPT [facility] [facility] Left 6/11/2018    IM NAIL HENRY INSERTION, LEFT performed by Carson Mars MD at [facility]  03/2017    TOTAL HIP ARTHROPLASTY  3-9-12    Rt       Social History     Social History    Marital status:      Spouse name: N/A    Number of children: N/A    Years of education: N/A     Occupational History    Not on file. Social History Main Topics    Smoking status: Never Smoker    Smokeless tobacco: Never Used    Alcohol use No    Drug use: No    Sexual activity: Not on file     Other Topics Concern    Not on file     Social History Narrative    No narrative on file       AL  Family History   Problem Relation Age of Onset    Arthritis Mother     Asthma Mother    Ramirez Stroke Mother     Arthritis Father     Heart Disease Father 68        enlarged heart    Heart Disease Sister         CHF   Ramirez Cancer Brother         metatstatic   Ramirez Diabetes Brother    Ramirez Parkinsonism Brother     Diabetes Sister     Stroke Sister     Diabetes Brother     Heart Disease Brother        Allergies: Allergies   Allergen Reactions    Adhesive Tape Rash     USE PAPER TAPE    Cleocin [Clindamycin Hcl] Diarrhea    Metronidazole Diarrhea    Penicillins Other (See Comments)     Very sleepy    Septra [Bactrim] Other (See Comments)     Too big to swallow       ROS:  Constitutional: Negative for chills, fatigue, fever, or weight loss. Eyes: Denies reported visual changes. ENT: Denies headache, difficulty swallowing, nose bleeds, ringing in ears, or earaches. Cardiovascular: Negative for chest pain, palpitations, tachycardia or edema. Respiratory: Denies cough or SOB. GI:The patient denies abdominal or flank pain, anorexia, nausea or vomiting.   : See

## 2018-12-18 NOTE — PROGRESS NOTES
hour   Intake              440 ml   Output              720 ml   Net             -280 ml       CBC:   Recent Labs      12/17/18   1443  12/18/18   0356   WBC  11.8*   --    HGB  9.4*  8.5*   HCT  29.2*  27.9*   PLT  382   --      BMP:    Recent Labs      12/17/18   1443   NA  143   K  4.8   CL  110   CO2  19*   BUN  27*   CREATININE  2.1*   GLUCOSE  100     Hepatic:   Recent Labs      12/17/18   1443   AST  13   ALT  7*   BILITOT  0.3   ALKPHOS  185*     Urine: urine culture  ABGs: No results found for: PHART, PO2ART, KMS3LIS  INR:   Recent Labs      12/17/18   1443   INR  1.20*     -----------------------------------------------------------------  Data Review   Recent Labs      12/17/18   1443  12/18/18   0356   WBC  11.8*   --    HGB  9.4*  8.5*   HCT  29.2*  27.9*   PLT  382   --      Recent Labs      12/17/18   1443   NA  143   CL  110   K  4.8   CO2  19*   BUN  27*   CREATININE  2.1*   GLUCOSE  100   CALCIUM  8.1*   LIPASE  20.4   PROT  6.7   BILIDIR  <0.2   BILITOT  0.3   ALKPHOS  185*     No results for input(s): CKTOTAL, CKMB, CKMBINDEX, CKMBCALCMETH, TROPONINI in the last 72 hours. No results for input(s): HDL, LDLDIRECT, TRIG in the last 72 hours. Invalid input(s): TOTALCHLTL, LDLCHLC  No results for input(s): BNP, TSH, FT4, ACETONE in the last 72 hours. Assessment   Active Problems:    Hematuria    UTI (urinary tract infection)  Resolved Problems:    * No resolved hospital problems. *  uti due to chronic catether. poa      Patient Active Problem List   Diagnosis    Other hyperlipidemia    AR (allergic rhinitis)    RAD (reactive airway disease)    History of bladder cancer    Generalized OA    Vertigo    Osteopenia    Anxiety, situational    Osteoarthritis, multiple joints with pain.     Cancer (HCC)    Restless legs syndrome (RLS)    Constipation    Acute bronchitis    Medication monitoring encounter    Local reaction to bee sting    ETD (eustachian tube dysfunction)   

## 2018-12-18 NOTE — PLAN OF CARE
Took care of this patient during prior hospitalization, of which family \"fired me\". In this situation, patient will be unable to get taken care of by our hospitalitis team here at [facility]. Pt from now on will need to assume care from one of private doctors here. Will have Dr. Jenni Dennis team assume care of patient from now on if that is ok with them. Nurse updated about plan of care. Please feel free to contact me with any questions. Of note, spoke with her oncologist (Dr. Sun Kearney) regarding her current hospitalization, given her age and decreased functional status with metastatic bladder cancer chemotherapy is not an option currently. Recommending hospice care.          Dr. Jeffrey Teixeira M.D.

## 2018-12-18 NOTE — CARE COORDINATION
12/18/18, 9:36 AM      Mignon Dixon       Admitted from: ER, patient presented hematuria and blood clots in her forman catheter. 12/17/2018/ Hector Acuna day: 1   Location: -04/004-A Reason for admit: Hematuria [R31.9] Status: Inpt. Admit order signed?: yes  PMH:  has a past medical history of Allergic rhinitis; Anxiety; Bilateral hydronephrosis; Bladder cancer ([facility] Blood circulation, collateral; Bone cancer ([facility] [facility] Broken leg; CHF (congestive heart failure) ([facility] [facility] Constipation; Coronary artery disease involving native coronary artery of native heart s/p cath 10/14/15-LM-P, LAD MID 90%, ANUERYSMAL, % PROX, RCA MID 60% DISTLA 70%, EDP 20 , EF 30%-NEED REVASCULARIZATION; GERD (gastroesophageal reflux disease); History of blood transfusion; [place] (hard of hearing); Hyperglycemia; Hyperlipidemia; Hypertension; Obesity (BMI 30-39.9); Osteoarthritis; Osteopenia; Pneumonia; and 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 .  Procedure: N/A  Pertinent abnormal Imaging:CT of abdomen and chest x-ray. Medications:  Scheduled Meds:   atorvastatin  20 mg Oral Nightly    diclofenac sodium  2 g Topical BID    escitalopram  10 mg Oral Daily    ferrous sulfate  325 mg Oral Daily with breakfast    isosorbide mononitrate  60 mg Oral Daily    LORazepam  1 mg Oral QAM    LORazepam  2 mg Oral Nightly    metoprolol succinate  50 mg Oral Daily    rOPINIRole  4 mg Oral BID    pantoprazole  40 mg Oral QAM AC    sodium bicarbonate  650 mg Oral BID    tiZANidine  2 mg Oral Nightly    zafirlukast  20 mg Oral BID AC     Continuous Infusions:   Pertinent Info/Orders/Treatment Plan:  Blood cultures pending. Consults to Urology, Oncology, and Palliative Care, PT to evaluate and treat, prn Percocet, SCD's. Diet: DIET CARDIAC;   Smoking status:  reports that she has never smoked. She has never used smokeless tobacco.   PCP: Lori Hathaway MD  Readmission: Yes  Patient has been readmitted within 30 days.    Patient went

## 2018-12-18 NOTE — PROGRESS NOTES
endurance    Treatment Initiated: see  There ex   Assessment: Body structures, Functions, Activity limitations: Decreased endurance, Decreased functional mobility , Decreased balance, Decreased strength, Decreased ROM  Assessment: Pt with generalized weakness, pain epsecially in right LE with movement( pt states is from trying to lift it in the bed a lot yesterday)Pt with limited DF ROM so causes her to lose balance posteriorly. Pt unsteady with gait. Rec continued therapy to improve strength/ROM and safety with mobility. Prognosis: Good    Clinical Presentation: Moderate - Evolving with Changing Characteristics: Pt with generalized weakness, pain epsecially in right LE with movement( pt states is from trying to lift it in the bed a lot yesterday)Pt with limited DF ROM so causes her to lose balance posteriorly. Pt unsteady with gait. Rec continued therapy to improve strength/ROM and safety with mobility. Decision Making: Moderate Complexitybased on patient assessment and decision making process of determining plan of care and establishing reasonable expectations for measurable functional outcomes    REQUIRES PT FOLLOW UP: Yes    Discharge Recommendations:  Discharge Recommendations: Continue to assess pending progress, Patient would benefit from continued therapy after discharge (rec physiatry consult)    Patient Education:  Patient Education: POC,ther ex    Equipment Recommendations:  Equipment Needed: No    Safety:  Type of devices:  All fall risk precautions in place, Call light within reach, Chair alarm in place, Nurse notified, Left in chair, Gait belt  Restraints  Initially in place: No    Plan:  Times per week: 5 X GM  Times per day: Daily  Specific instructions for Next Treatment: ther ex, mobility, gait, ROM heelcords, ther ex , balance   Current Treatment Recommendations: Strengthening, Balance Training, Endurance Training, Functional Mobility Training, Transfer Training, Gait Training, Home Exercise

## 2018-12-19 PROBLEM — A04.72 C. DIFFICILE COLITIS: Status: ACTIVE | Noted: 2018-01-01

## 2018-12-19 NOTE — PROGRESS NOTES
725 ml   Net             -605 ml       CBC:   Recent Labs      12/17/18   1443  12/18/18   0356  12/19/18   0502   WBC  11.8*   --   9.8   HGB  9.4*  8.5*  8.1*   HCT  29.2*  27.9*  25.0*   PLT  382   --   325     BMP:    Recent Labs      12/17/18   1443  12/19/18   0502   NA  143  139   K  4.8  4.3   CL  110  107   CO2  19*  19*   BUN  27*  25*   CREATININE  2.1*  1.9*   GLUCOSE  100  102     Hepatic:   Recent Labs      12/17/18   1443   AST  13   ALT  7*   BILITOT  0.3   ALKPHOS  185*     Urine: urine culture  ABGs: No results found for: PHART, PO2ART, LWT9GQO  INR:   Recent Labs      12/17/18   1443   INR  1.20*     -----------------------------------------------------------------  Data Review   Recent Labs      12/17/18   1443  12/18/18   0356  12/19/18   0502   WBC  11.8*   --   9.8   HGB  9.4*  8.5*  8.1*   HCT  29.2*  27.9*  25.0*   PLT  382   --   325     Recent Labs      12/17/18   1443  12/19/18   0502   NA  143  139   CL  110  107   K  4.8  4.3   CO2  19*  19*   BUN  27*  25*   CREATININE  2.1*  1.9*   GLUCOSE  100  102   CALCIUM  8.1*  7.8*   LIPASE  20.4   --    PROT  6.7   --    BILIDIR  <0.2   --    BILITOT  0.3   --    ALKPHOS  185*   --      No results for input(s): CKTOTAL, CKMB, CKMBINDEX, CKMBCALCMETH, TROPONINI in the last 72 hours. No results for input(s): HDL, LDLDIRECT, TRIG in the last 72 hours. Invalid input(s): TOTALCHLTL, LDLCHLC  No results for input(s): BNP, TSH, FT4, ACETONE in the last 72 hours. Assessment   Active Problems:    Hematuria    UTI (urinary tract infection)    C. difficile colitis  Resolved Problems:    * No resolved hospital problems. *  uti due to chronic catether. poa      Patient Active Problem List   Diagnosis    Other hyperlipidemia    AR (allergic rhinitis)    RAD (reactive airway disease)    History of bladder cancer    Generalized OA    Vertigo    Osteopenia    Anxiety, situational    Osteoarthritis, multiple joints with pain.     Cancer

## 2018-12-19 NOTE — PROGRESS NOTES
ex, mobility, gait, ROM heelcords, ther ex , balance   Current Treatment Recommendations: Strengthening, Balance Training, Endurance Training, Functional Mobility Training, Transfer Training, Gait Training, Home Exercise Program, Stair training    Goals:  Patient goals : Go home with grand daughter    Short term goals  Time Frame for Short term goals: 1 week  Short term goal 1: supine to sit and retur with SBA to get in andout of bed   Short term goal 2: sit to stand with SBA to get on and off various surfaces  Short term goal 3: ambulate with RW 50 feet with SBA to walk hosuehold distances   Short term goal 4: ascend/descend 4 steps with 2 HRs  and CGA to enter home    Long term goals  Time Frame for Long term goals : NA due to short ELOS

## 2018-12-19 NOTE — PLAN OF CARE
Problem: Falls - Risk of:  Goal: Will remain free from falls  Will remain free from falls   Outcome: Ongoing  Generalized weakness. Bed in low position with bed alarm on. Call light within reach. Hourly rounding. Fall sign posted. Problem: Risk for Impaired Skin Integrity  Goal: Tissue integrity - skin and mucous membranes  Structural intactness and normal physiological function of skin and  mucous membranes.    Outcome: Ongoing

## 2018-12-20 NOTE — PROGRESS NOTES
[address] [address]  INPATIENT PHYSICAL THERAPY  DAILYNOTE  [facility] ONC MED 5K - 5K-04/004-A    Time In: 0933  Time Out: 1012  Timed Code Treatment Minutes: 44 Minutes  Minutes: 39          Date: 2018  Patient Name: Kourtney Carballo,  Gender:  female        MRN: [id number]  : 1930  (80 y.o.)     Referring Practitioner: Dr Livier Harden  Diagnosis: Hematuria  Additional Pertinent Hx: Pt admitted - with bloody urine. Pt with h/o bladder CA. 2-3 weeks ago pt was here after a fall  and found CA in lower spine. Past Medical History:   Diagnosis Date    Allergic rhinitis     Anxiety     Bilateral hydronephrosis 8/10/2016    Bladder cancer ([facility] [address] [facility]     Dr. White Service Blood circulation, collateral     Bone cancer ([facility] [address] 2018    Broken leg     left     CHF (congestive heart failure) (HCC)     Constipation     attributed to Ultram    Coronary artery disease involving native coronary artery of native heart s/p cath 10/14/15-LM-P, LAD MID 90%, ANUERYSMAL, % PROX, RCA MID 60% DISTLA 70%, EDP 20 , EF 30%-NEED REVASCULARIZATION 10/14/2015    GERD (gastroesophageal reflux disease)     History of blood transfusion     [place] (hard of hearing)     Hyperglycemia     Hyperlipidemia     Hypertension     Obesity (BMI 30-39. 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  2017     Past Surgical History:   Procedure Laterality Date    ABDOMEN SURGERY      ABDOMINAL EXPLORATION SURGERY      BLADDER SURGERY  10/2016    stent placed and removed a blood clot    CARDIAC SURGERY      stents    COLONOSCOPY      CYSTOSCOPY  2008    TUR and fulg BT  superficial noninvasive bladder ca    CYSTOSCOPY N/A 2017    CYSTOSCOPY, RIGHT URETEROSCOPY, RIGHT STENT EXCHANGE retrograde right and right ureteral dilitation performed by Westley Judd MD at [address] 2018    CYSTOSCOPY EVACUATION OF CLOTS performed by Echo Abrams MD at [address] [place] [address] EKG 12-LEAD  10/14/2015         EKG 12-LEAD  10/17/2015         ENDOSCOPY, COLON, DIAGNOSTIC      EYE SURGERY  04/2018    JOINT REPLACEMENT      hip    MOUTH SURGERY      cheek; had benign lump removed    OTHER SURGICAL HISTORY Right 04/19/2017    Cystoscopy, Attempted Right Ureteral Access     MN CYSTOURETHROSCOPY,URETER CATHETER Right 1/31/2018    CYSTOSCOPY RIGHT RETROGRADE PYELOGRAM RIGHT URETEROSCOPY AND REPLACEMENT OF RIGHT URETERAL STENT performed by Echo Abrams MD at [address] [facility] Right 8/3/2018    CYSTOSCOPY WITH RIGHT RETROGRADE PYELOGRAM, RIGHT URETEROSCOPY AND REPLACEMENT OF RIGHT URETERAL STENT performed by Echo Abrams MD at [address] [facility] OFFICE/OUTPT [facility] Left 6/11/2018    IM NAIL HENRY INSERTION, LEFT performed by Darius Acosta MD at [address] [place] [address]  03/2017    TOTAL HIP ARTHROPLASTY  3-9-12    Rt       Restrictions/Precautions:  Fall Risk, General Precautions         Other position/activity restrictions: [place]       Prior Level of Function:  ADL Assistance: Needs assistance  Ambulation Assistance: Independent  Transfer Assistance: Independent  Additional Comments: Pt uses RW at all times    Subjective:     Subjective: pt agreeable for therapy she requested to use bathroom during sessio took extra time to complete activity, pt requested to return to bed vs up in chair    Pain:   .  Pain Assessment  Pain Level:  (no number given pt reported she always has pain in her LEs )       Social/Functional:  Lives With:  (grand daughter lives with her. Pt is never alone)  Type of Home: House  Home Layout: One level  Home Access: Stairs to enter with rails  Entrance Stairs - Number of Steps: 4 keri  Entrance Stairs - Rails: Both  Home Equipment: Rolling walker     Objective:  Sit to Supine:  Moderate assistance (at LEs after several

## 2018-12-20 NOTE — DISCHARGE SUMMARY
Dr. Toni Lizarraga Discharge Summary  12/20/2018  1:17 PM    Patient:  Venu Clinton  YOB: 1930    MRN: [id number]   Acct: [de-identified]   5K-04/004-A   Primary Care Physician: Gala Austin MD    Admit date:  12/17/2018    Discharge date:  12/20/2018    Discharge Diagnoses:    Patient Active Problem List   Diagnosis    Other hyperlipidemia    AR (allergic rhinitis)    RAD (reactive airway disease)    History of bladder cancer    Generalized OA    Vertigo    Osteopenia    Anxiety, situational    Osteoarthritis, multiple joints with pain.     Cancer ([address]    Restless legs syndrome (RLS)    Constipation    Acute bronchitis    Medication monitoring encounter    Local reaction to bee sting    ETD (eustachian tube dysfunction)    Hyponatremia    SIADH (syndrome of inappropriate ADH production) (HCC)    Dizziness    Congestive heart failure (HCC)    Essential hypertension    BPV (benign positional vertigo)    Urinary tract infection associated with indwelling urethral catheter (HCC)    RLS (restless legs syndrome)    Chronic diastolic heart failure (HCC)    Acute systolic congestive heart failure (HCC)    Presence of stent in LAD coronary artery    Acute GI bleeding    Duodenal ulcer    Gastritis    Sigmoid ulcer    Esophagitis    Ischemic colon (HCC)    Physical deconditioning    S/P angioplasty with stent TINA in 10/2015    Dyslipidemia    SOB (shortness of breath) on exertion    Insomnia due to anxiety and fear    Chest pain, atypical    Chronic right hydronephrosis    Coronary artery disease of native artery of native heart with stable angina pectoris (HCC)    Gross hematuria    Acute blood loss anemia    Hypotension due to drugs    Coronary artery disease involving native heart without angina pectoris    Gross hematuria    Acute cystitis with hematuria    Non morbid obesity    Encounter for chemotherapy management    Chronic diastolic CHF (congestive heart failure) ([facility]    Macrocytic anemia    Lesion of bladder    Ureteral stenosis    Iron deficiency anemia due to chronic blood loss    Closed fracture of right wrist    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  Bladder prolapse, female, acquired    Prerenal azotemia    Malignant neoplasm of urinary bladder ([facility])    Tinnitus of right ear    Pain in metatarsus of right foot    Mixed conductive and sensorineural hearing loss of both ears    Chronic kidney disease, stage 3 ([facility])    Hydroureter on left    History of right ureter stent    Abdominal pain    Major depressive disorder, single episode, in full remission ([facility]    Comminuted fracture of [facility], left, closed, [facility]    Closed displaced fracture of greater trochanter of left femur ([facility]    LAD stenosis    History of placement of stent in LAD coronary artery    Overweight (BMI 25.0-29. 9)    Thrush, oral    Idiopathic hypotension    Recurrent cystitis    Acute kidney injury superimposed on chronic kidney disease (HCC)    CKD (chronic kidney disease) stage 4, GFR 15-29 ml/min ([facility])    Metabolic acidosis    BEREKET (acute kidney injury) ([facility])    Acute on chronic diastolic (congestive) heart failure (HCC)    CKD (chronic kidney disease) stage 3, GFR 30-59 ml/min ([facility])    Benign essential microscopic hematuria    Chronic diarrhea    Acute respiratory failure with hypoxia ([facility])    Gastroesophageal reflux disease    Anasarca    Chronic indwelling Glez catheter    Hearing loss    Fall at home, initial encounter    Abnormal CT scan    Metabolic encephalopathy    Closed fracture of one rib of left side    Mixed acid base balance disorder    Moderate malnutrition (HCC)    History of hematuria    Pain due to malignant neoplasm metastatic to bone (HCC)    Hematuria    UTI (urinary tract infection)    C. difficile colitis       Discharge Medications:     Luann Kunz   Home Medication Instructions [facility]:[id number] 5-325 MG per tablet  Take 1 tablet by mouth every 6 hours as needed for Pain for up to 30 days. .             pantoprazole (PROTONIX) 40 MG tablet  TAKE 1 TABLET BY MOUTH 2 TIMES DAILY (BEFORE MEALS)             phenazopyridine (PYRIDIUM) 100 MG tablet  Take 1 tablet by mouth 3 times daily as needed for Pain (with meal)             rOPINIRole (REQUIP) 4 MG tablet  Take 1 tablet by mouth 2 times daily             sodium bicarbonate 650 MG tablet  Take 1 tablet by mouth 2 times daily             sodium chloride (OCEAN) 0.65 % nasal spray  1 spray by Nasal route as needed for Congestion             tiZANidine (ZANAFLEX) 2 MG tablet  Take 1 tablet by mouth nightly             zafirlukast (ACCOLATE) 20 MG tablet  Take 1 tablet by mouth 2 times daily (before meals)               Scheduled Meds: Scheduled Meds:   vancomycin  125 mg Oral 4 times per day    ferrous sulfate  325 mg Oral BID WC    pantoprazole  40 mg Oral BID AC    atorvastatin  20 mg Oral Nightly    diclofenac sodium  2 g Topical BID    escitalopram  10 mg Oral Daily    isosorbide mononitrate  60 mg Oral Daily    LORazepam  1 mg Oral QAM    LORazepam  2 mg Oral Nightly    metoprolol succinate  50 mg Oral Daily    rOPINIRole  4 mg Oral BID    sodium bicarbonate  650 mg Oral BID    tiZANidine  2 mg Oral Nightly    zafirlukast  20 mg Oral BID AC     Continuous Infusions:  PRN Meds:.flavoxate, lidocaine, oxyCODONE-acetaminophen, phenazopyridine  Continuous Infusions:    Intake/Output Summary (Last 24 hours) at 12/20/18 1317  Last data filed at 12/20/18 0600   Gross per 24 hour   Intake           461.65 ml   Output             1450 ml   Net          -988.35 ml       Diet:  DIET CARDIAC;     Activity:  Activity as tolerated (Patient may move about with assist as indicated or with supervision.)    Follow-up:  in the next few weeks with Talisha Berman MD,       Consultants:  urology    Procedures:  Bladder irrigation    Diagnostic Test:    Objective:  Lab

## 2018-12-21 NOTE — CARE COORDINATION
[facility] Transitions Initial Follow Up Call    Call within 2 business days of discharge: Yes    Patient: Harley Kelly Patient : 1930   MRN: [id number]  Reason for Admission: There are no discharge diagnoses documented for the most recent discharge. Discharge Date: 18 RARS: Readmission Risk Score: 52     Non-face-to-face services provided:  Establishment or re-establishment of [facility] of discharge to home    First attempt to reach pt for the care transition initial follow up call. Left message on home phone, the cell phone voicemail is full, to call transition care coordinator from [id number] [place] [address] Road @ [phone].     Follow Up  Future Appointments  Date Time Provider Kobi Turpin   2018 10:45 AM Bakari Whitehead APRN - [address]   2019 2:40 PM Gene Dunlap MD [facility] [address]   2019 1:45 PM Nellie Macdonald APRN - [address] Urology [address]   2019 10:15 AM Alisia Castro MD [address] Transition Coordinator  [phone]

## 2018-12-23 NOTE — CARE COORDINATION
[facility] Transitions Initial Follow Up Call    Call within 2 business days of discharge: Yes    Patient: Mignon Dixon Patient : 1930   MRN: [id number]  Reason for Admission: There are no discharge diagnoses documented for the most recent discharge. Discharge Date: 18 RARS: Readmission Risk Score: 52         Follow Up:  CTC second and third attempts with no answer @ [facility]'s phone numbers given. Unable to leave VM as states, \"mailbox full\".   Future Appointments  Date Time Provider Kobi Turpin   2018 10:45 AM KALYN Jimenez   2019 2:40 PM Moi Leong MD [facility] [address]   2019 1:45 PM KALYN Peñaloza - [address] Urology [facility] [address]   2019 10:15 AM MD Radha Lei E [place], [place]

## 2019-01-01 ENCOUNTER — APPOINTMENT (OUTPATIENT)
Dept: GENERAL RADIOLOGY | Age: 84
DRG: 659 | End: 2019-01-01
Payer: MEDICARE

## 2019-01-01 ENCOUNTER — TELEPHONE (OUTPATIENT)
Dept: UROLOGY | Age: 84
End: 2019-01-01

## 2019-01-01 ENCOUNTER — CARE COORDINATION (OUTPATIENT)
Dept: CARE COORDINATION | Age: 84
End: 2019-01-01

## 2019-01-01 ENCOUNTER — APPOINTMENT (OUTPATIENT)
Dept: ULTRASOUND IMAGING | Age: 84
DRG: 659 | End: 2019-01-01
Payer: MEDICARE

## 2019-01-01 ENCOUNTER — APPOINTMENT (OUTPATIENT)
Dept: GENERAL RADIOLOGY | Age: 84
DRG: 871 | End: 2019-01-01
Payer: MEDICARE

## 2019-01-01 ENCOUNTER — APPOINTMENT (OUTPATIENT)
Dept: CT IMAGING | Age: 84
DRG: 659 | End: 2019-01-01
Payer: MEDICARE

## 2019-01-01 ENCOUNTER — HOSPITAL ENCOUNTER (INPATIENT)
Age: 84
LOS: 11 days | Discharge: HOSPICE | DRG: 871 | End: 2019-02-07
Attending: FAMILY MEDICINE | Admitting: INTERNAL MEDICINE
Payer: MEDICARE

## 2019-01-01 ENCOUNTER — HOSPITAL ENCOUNTER (INPATIENT)
Age: 84
LOS: 12 days | Discharge: NURSING, REHAB OR LONG-TERM CARE | DRG: 659 | End: 2019-01-17
Attending: INTERNAL MEDICINE | Admitting: INTERNAL MEDICINE
Payer: MEDICARE

## 2019-01-01 ENCOUNTER — TELEPHONE (OUTPATIENT)
Dept: ADMINISTRATIVE | Age: 84
End: 2019-01-01

## 2019-01-01 ENCOUNTER — APPOINTMENT (OUTPATIENT)
Dept: CT IMAGING | Age: 84
DRG: 871 | End: 2019-01-01
Payer: MEDICARE

## 2019-01-01 ENCOUNTER — ANESTHESIA EVENT (OUTPATIENT)
Dept: OPERATING ROOM | Age: 84
DRG: 659 | End: 2019-01-01
Payer: MEDICARE

## 2019-01-01 ENCOUNTER — ANESTHESIA (OUTPATIENT)
Dept: OPERATING ROOM | Age: 84
DRG: 659 | End: 2019-01-01
Payer: MEDICARE

## 2019-01-01 ENCOUNTER — APPOINTMENT (OUTPATIENT)
Dept: INTERVENTIONAL RADIOLOGY/VASCULAR | Age: 84
DRG: 659 | End: 2019-01-01
Payer: MEDICARE

## 2019-01-01 ENCOUNTER — TELEPHONE (OUTPATIENT)
Dept: CARDIOLOGY CLINIC | Age: 84
End: 2019-01-01

## 2019-01-01 ENCOUNTER — CLINICAL DOCUMENTATION (OUTPATIENT)
Dept: FAMILY MEDICINE CLINIC | Age: 84
End: 2019-01-01

## 2019-01-01 VITALS
DIASTOLIC BLOOD PRESSURE: 77 MMHG | TEMPERATURE: 97.7 F | SYSTOLIC BLOOD PRESSURE: 175 MMHG | OXYGEN SATURATION: 83 % | RESPIRATION RATE: 20 BRPM

## 2019-01-01 VITALS
WEIGHT: 145 LBS | SYSTOLIC BLOOD PRESSURE: 106 MMHG | HEART RATE: 80 BPM | TEMPERATURE: 98.1 F | OXYGEN SATURATION: 96 % | RESPIRATION RATE: 17 BRPM | BODY MASS INDEX: 29.23 KG/M2 | DIASTOLIC BLOOD PRESSURE: 60 MMHG | HEIGHT: 59 IN

## 2019-01-01 VITALS
HEIGHT: 57 IN | BODY MASS INDEX: 28.09 KG/M2 | SYSTOLIC BLOOD PRESSURE: 105 MMHG | WEIGHT: 130.2 LBS | HEART RATE: 77 BPM | RESPIRATION RATE: 20 BRPM | DIASTOLIC BLOOD PRESSURE: 67 MMHG | TEMPERATURE: 97.8 F

## 2019-01-01 VITALS
BODY MASS INDEX: 26.25 KG/M2 | HEART RATE: 86 BPM | TEMPERATURE: 98.4 F | WEIGHT: 121.69 LBS | DIASTOLIC BLOOD PRESSURE: 60 MMHG | HEIGHT: 57 IN | OXYGEN SATURATION: 98 % | RESPIRATION RATE: 16 BRPM | SYSTOLIC BLOOD PRESSURE: 146 MMHG

## 2019-01-01 DIAGNOSIS — N13.30 HYDRONEPHROSIS, UNSPECIFIED HYDRONEPHROSIS TYPE: ICD-10-CM

## 2019-01-01 DIAGNOSIS — G25.81 RLS (RESTLESS LEGS SYNDROME): ICD-10-CM

## 2019-01-01 DIAGNOSIS — E87.5 HYPERKALEMIA: ICD-10-CM

## 2019-01-01 DIAGNOSIS — I50.30 HEART FAILURE WITH PRESERVED EJECTION FRACTION (HCC): ICD-10-CM

## 2019-01-01 DIAGNOSIS — R65.21 SEPTIC SHOCK (HCC): Primary | ICD-10-CM

## 2019-01-01 DIAGNOSIS — C79.51 BLADDER CANCER METASTASIZED TO BONE (HCC): ICD-10-CM

## 2019-01-01 DIAGNOSIS — J30.89 NON-SEASONAL ALLERGIC RHINITIS, UNSPECIFIED TRIGGER: ICD-10-CM

## 2019-01-01 DIAGNOSIS — D64.9 ANEMIA, UNSPECIFIED TYPE: ICD-10-CM

## 2019-01-01 DIAGNOSIS — I25.10 ASCVD (ARTERIOSCLEROTIC CARDIOVASCULAR DISEASE): ICD-10-CM

## 2019-01-01 DIAGNOSIS — E83.51 HYPOCALCEMIA: ICD-10-CM

## 2019-01-01 DIAGNOSIS — R41.0 CONFUSION: ICD-10-CM

## 2019-01-01 DIAGNOSIS — J40 BRONCHITIS: Primary | ICD-10-CM

## 2019-01-01 DIAGNOSIS — I10 ESSENTIAL HYPERTENSION: ICD-10-CM

## 2019-01-01 DIAGNOSIS — R31.9 HEMATURIA, UNSPECIFIED TYPE: ICD-10-CM

## 2019-01-01 DIAGNOSIS — Z97.8 FOLEY CATHETER IN PLACE ON ADMISSION: ICD-10-CM

## 2019-01-01 DIAGNOSIS — C67.9 BLADDER CANCER METASTASIZED TO LIVER (HCC): ICD-10-CM

## 2019-01-01 DIAGNOSIS — C78.7 BLADDER CANCER METASTASIZED TO LIVER (HCC): ICD-10-CM

## 2019-01-01 DIAGNOSIS — N17.9 AKI (ACUTE KIDNEY INJURY) (HCC): ICD-10-CM

## 2019-01-01 DIAGNOSIS — M15.9 PRIMARY OSTEOARTHRITIS INVOLVING MULTIPLE JOINTS: ICD-10-CM

## 2019-01-01 DIAGNOSIS — D64.9 NORMOCYTIC ANEMIA: ICD-10-CM

## 2019-01-01 DIAGNOSIS — D72.829 LEUKOCYTOSIS, UNSPECIFIED TYPE: ICD-10-CM

## 2019-01-01 DIAGNOSIS — Z96.0 STATUS POST PLACEMENT OF URETERAL STENT: ICD-10-CM

## 2019-01-01 DIAGNOSIS — N18.4 CKD (CHRONIC KIDNEY DISEASE) STAGE 4, GFR 15-29 ML/MIN (HCC): ICD-10-CM

## 2019-01-01 DIAGNOSIS — C41.9 METASTATIC BONE CANCER (HCC): ICD-10-CM

## 2019-01-01 DIAGNOSIS — A41.9 SEPTIC SHOCK (HCC): Primary | ICD-10-CM

## 2019-01-01 DIAGNOSIS — F41.9 ANXIETY: ICD-10-CM

## 2019-01-01 DIAGNOSIS — R31.0 GROSS HEMATURIA: ICD-10-CM

## 2019-01-01 DIAGNOSIS — R53.81 PHYSICAL DECONDITIONING: Primary | ICD-10-CM

## 2019-01-01 DIAGNOSIS — K21.9 GASTROESOPHAGEAL REFLUX DISEASE, ESOPHAGITIS PRESENCE NOT SPECIFIED: ICD-10-CM

## 2019-01-01 DIAGNOSIS — J18.9 PNEUMONIA DUE TO ORGANISM: ICD-10-CM

## 2019-01-01 DIAGNOSIS — C67.9 BLADDER CANCER METASTASIZED TO BONE (HCC): ICD-10-CM

## 2019-01-01 DIAGNOSIS — F41.8 DEPRESSION WITH ANXIETY: ICD-10-CM

## 2019-01-01 DIAGNOSIS — E78.5 DYSLIPIDEMIA: ICD-10-CM

## 2019-01-01 LAB
ABO: NORMAL
ABO: NORMAL
ACINETOBACTER BAUMANNII FILM ARRAY: NOT DETECTED
ALBUMIN SERPL-MCNC: 2.6 G/DL (ref 3.5–5.1)
ALBUMIN SERPL-MCNC: 2.8 G/DL (ref 3.5–5.1)
ALBUMIN SERPL-MCNC: 3.3 G/DL (ref 3.5–5.1)
ALLEN TEST: POSITIVE
ALP BLD-CCNC: 157 U/L (ref 38–126)
ALP BLD-CCNC: 164 U/L (ref 38–126)
ALP BLD-CCNC: 170 U/L (ref 38–126)
ALT SERPL-CCNC: 10 U/L (ref 11–66)
ALT SERPL-CCNC: 8 U/L (ref 11–66)
ALT SERPL-CCNC: 8 U/L (ref 11–66)
AMMONIA: 44 UMOL/L (ref 11–60)
AMORPHOUS: ABNORMAL
AMORPHOUS: ABNORMAL
ANION GAP SERPL CALCULATED.3IONS-SCNC: 10 MEQ/L (ref 8–16)
ANION GAP SERPL CALCULATED.3IONS-SCNC: 11 MEQ/L (ref 8–16)
ANION GAP SERPL CALCULATED.3IONS-SCNC: 12 MEQ/L (ref 8–16)
ANION GAP SERPL CALCULATED.3IONS-SCNC: 14 MEQ/L (ref 8–16)
ANION GAP SERPL CALCULATED.3IONS-SCNC: 14 MEQ/L (ref 8–16)
ANION GAP SERPL CALCULATED.3IONS-SCNC: 5 MEQ/L (ref 8–16)
ANION GAP SERPL CALCULATED.3IONS-SCNC: 7 MEQ/L (ref 8–16)
ANION GAP SERPL CALCULATED.3IONS-SCNC: 7 MEQ/L (ref 8–16)
ANION GAP SERPL CALCULATED.3IONS-SCNC: 8 MEQ/L (ref 8–16)
ANION GAP SERPL CALCULATED.3IONS-SCNC: 8 MEQ/L (ref 8–16)
ANION GAP SERPL CALCULATED.3IONS-SCNC: 9 MEQ/L (ref 8–16)
ANISOCYTOSIS: PRESENT
ANISOCYTOSIS: PRESENT
ANTIBODY SCREEN: NORMAL
ANTIBODY SCREEN: NORMAL
AST SERPL-CCNC: 14 U/L (ref 5–40)
AST SERPL-CCNC: 16 U/L (ref 5–40)
AST SERPL-CCNC: 19 U/L (ref 5–40)
BACTERIA: ABNORMAL
BACTERIA: ABNORMAL /HPF
BACTERIA: ABNORMAL /HPF
BASE EXCESS (CALCULATED): -3.1 MMOL/L (ref -2.5–2.5)
BASOPHILS # BLD: 0.2 %
BASOPHILS # BLD: 0.3 %
BASOPHILS # BLD: 0.3 %
BASOPHILS # BLD: 0.4 %
BASOPHILS # BLD: 0.4 %
BASOPHILS ABSOLUTE: 0 THOU/MM3 (ref 0–0.1)
BASOPHILS ABSOLUTE: 0 THOU/MM3 (ref 0–0.1)
BASOPHILS ABSOLUTE: 0.1 /ΜL
BASOPHILS ABSOLUTE: 0.1 THOU/MM3 (ref 0–0.1)
BASOPHILS RELATIVE PERCENT: 1.2 %
BILIRUB SERPL-MCNC: 0.2 MG/DL (ref 0.3–1.2)
BILIRUB SERPL-MCNC: 0.3 MG/DL (ref 0.3–1.2)
BILIRUB SERPL-MCNC: 0.6 MG/DL (ref 0.3–1.2)
BILIRUBIN DIRECT: < 0.2 MG/DL (ref 0–0.3)
BILIRUBIN URINE: ABNORMAL
BILIRUBIN URINE: NEGATIVE
BILIRUBIN URINE: NEGATIVE
BLOOD CULTURE, ROUTINE: ABNORMAL
BLOOD CULTURE, ROUTINE: ABNORMAL
BLOOD CULTURE, ROUTINE: NORMAL
BLOOD, URINE: ABNORMAL
BOTTLE TYPE: ABNORMAL
BUN BLDV-MCNC: 18 MG/DL (ref 7–22)
BUN BLDV-MCNC: 20 MG/DL (ref 7–22)
BUN BLDV-MCNC: 21 MG/DL (ref 7–22)
BUN BLDV-MCNC: 22 MG/DL (ref 7–22)
BUN BLDV-MCNC: 23 MG/DL (ref 7–22)
BUN BLDV-MCNC: 25 MG/DL
BUN BLDV-MCNC: 25 MG/DL (ref 7–22)
BUN BLDV-MCNC: 26 MG/DL (ref 7–22)
BUN BLDV-MCNC: 30 MG/DL (ref 7–22)
CALCIUM IONIZED: 0.74 MMOL/L (ref 1.12–1.32)
CALCIUM IONIZED: 0.79 MMOL/L (ref 1.12–1.32)
CALCIUM IONIZED: 0.81 MMOL/L (ref 1.12–1.32)
CALCIUM IONIZED: 0.82 MMOL/L (ref 1.12–1.32)
CALCIUM IONIZED: 0.89 MMOL/L (ref 1.12–1.32)
CALCIUM IONIZED: 0.9 MMOL/L (ref 1.12–1.32)
CALCIUM IONIZED: 0.94 MMOL/L (ref 1.12–1.32)
CALCIUM IONIZED: 0.95 MMOL/L (ref 1.12–1.32)
CALCIUM IONIZED: 0.96 MMOL/L (ref 1.12–1.32)
CALCIUM IONIZED: 0.97 MMOL/L (ref 1.12–1.32)
CALCIUM IONIZED: 0.99 MMOL/L (ref 1.12–1.32)
CALCIUM IONIZED: 1.01 MMOL/L (ref 1.12–1.32)
CALCIUM IONIZED: 1.07 MMOL/L (ref 1.12–1.32)
CALCIUM SERPL-MCNC: 5.6 MG/DL (ref 8.5–10.5)
CALCIUM SERPL-MCNC: 5.7 MG/DL (ref 8.5–10.5)
CALCIUM SERPL-MCNC: 5.8 MG/DL (ref 8.5–10.5)
CALCIUM SERPL-MCNC: 6 MG/DL (ref 8.5–10.5)
CALCIUM SERPL-MCNC: 6.1 MG/DL (ref 8.5–10.5)
CALCIUM SERPL-MCNC: 6.2 MG/DL (ref 8.5–10.5)
CALCIUM SERPL-MCNC: 6.3 MG/DL (ref 8.5–10.5)
CALCIUM SERPL-MCNC: 6.5 MG/DL
CALCIUM SERPL-MCNC: 6.6 MG/DL (ref 8.5–10.5)
CALCIUM SERPL-MCNC: 6.8 MG/DL (ref 8.5–10.5)
CALCIUM SERPL-MCNC: 6.9 MG/DL (ref 8.5–10.5)
CALCIUM SERPL-MCNC: 7 MG/DL (ref 8.5–10.5)
CALCIUM SERPL-MCNC: 7.1 MG/DL (ref 8.5–10.5)
CALCIUM SERPL-MCNC: 7.2 MG/DL (ref 8.5–10.5)
CALCIUM SERPL-MCNC: 7.2 MG/DL (ref 8.5–10.5)
CALCIUM SERPL-MCNC: 7.4 MG/DL (ref 8.5–10.5)
CALCIUM SERPL-MCNC: 7.4 MG/DL (ref 8.5–10.5)
CALCIUM SERPL-MCNC: 7.5 MG/DL (ref 8.5–10.5)
CALCIUM SERPL-MCNC: 7.9 MG/DL (ref 8.5–10.5)
CALCIUM SERPL-MCNC: 8.1 MG/DL (ref 8.5–10.5)
CANDIDA ALBICANS FILM ARRAY: NOT DETECTED
CANDIDA GLABRATA FILM ARRAY: NOT DETECTED
CANDIDA KRUSEI FILM ARRAY: NOT DETECTED
CANDIDA PARAPSILOSIS FILM ARRAY: NOT DETECTED
CANDIDA TROPICALIS FILM ARRAY: NOT DETECTED
CARBAPENEM RESITANT FILM ARRAY: DETECTED
CASTS 2: ABNORMAL /LPF
CASTS 2: ABNORMAL /LPF
CASTS UA: ABNORMAL /LPF
CASTS UA: ABNORMAL /LPF
CASTS: ABNORMAL /LPF
CHARACTER, URINE: ABNORMAL
CHLORIDE BLD-SCNC: 102 MEQ/L (ref 98–111)
CHLORIDE BLD-SCNC: 103 MEQ/L (ref 98–111)
CHLORIDE BLD-SCNC: 103 MEQ/L (ref 98–111)
CHLORIDE BLD-SCNC: 104 MEQ/L (ref 98–111)
CHLORIDE BLD-SCNC: 105 MEQ/L (ref 98–111)
CHLORIDE BLD-SCNC: 106 MMOL/L
CHLORIDE BLD-SCNC: 107 MEQ/L (ref 98–111)
CHLORIDE BLD-SCNC: 108 MEQ/L (ref 98–111)
CHLORIDE BLD-SCNC: 108 MEQ/L (ref 98–111)
CHLORIDE BLD-SCNC: 109 MEQ/L (ref 98–111)
CHLORIDE BLD-SCNC: 110 MEQ/L (ref 98–111)
CHLORIDE BLD-SCNC: 111 MEQ/L (ref 98–111)
CHLORIDE BLD-SCNC: 111 MEQ/L (ref 98–111)
CHLORIDE BLD-SCNC: 112 MEQ/L (ref 98–111)
CHLORIDE BLD-SCNC: 113 MEQ/L (ref 98–111)
CO2: 15 MEQ/L (ref 23–33)
CO2: 16 MEQ/L (ref 23–33)
CO2: 16 MEQ/L (ref 23–33)
CO2: 17 MEQ/L (ref 23–33)
CO2: 18 MEQ/L (ref 23–33)
CO2: 19 MEQ/L (ref 23–33)
CO2: 20 MEQ/L (ref 23–33)
CO2: 21 MEQ/L (ref 23–33)
CO2: 22 MEQ/L (ref 23–33)
CO2: 22 MMOL/L
CO2: 23 MEQ/L (ref 23–33)
CO2: 23 MEQ/L (ref 23–33)
CO2: 24 MEQ/L (ref 23–33)
CO2: 25 MEQ/L (ref 23–33)
COLLECTED BY:: ABNORMAL
COLOR: ABNORMAL
CREAT SERPL-MCNC: 1.4 MG/DL (ref 0.4–1.2)
CREAT SERPL-MCNC: 1.5 MG/DL (ref 0.4–1.2)
CREAT SERPL-MCNC: 1.5 MG/DL (ref 0.4–1.2)
CREAT SERPL-MCNC: 1.6 MG/DL (ref 0.4–1.2)
CREAT SERPL-MCNC: 1.6 MG/DL (ref 0.4–1.2)
CREAT SERPL-MCNC: 1.7 MG/DL (ref 0.4–1.2)
CREAT SERPL-MCNC: 1.8 MG/DL
CREAT SERPL-MCNC: 1.8 MG/DL (ref 0.4–1.2)
CREAT SERPL-MCNC: 1.8 MG/DL (ref 0.4–1.2)
CREAT SERPL-MCNC: 1.9 MG/DL (ref 0.4–1.2)
CREAT SERPL-MCNC: 2 MG/DL (ref 0.4–1.2)
CREAT SERPL-MCNC: 2.1 MG/DL (ref 0.4–1.2)
CREAT SERPL-MCNC: 2.2 MG/DL (ref 0.4–1.2)
CREAT SERPL-MCNC: 2.2 MG/DL (ref 0.4–1.2)
CREAT SERPL-MCNC: 2.3 MG/DL (ref 0.4–1.2)
CRYSTALS, UA: ABNORMAL
CRYSTALS, UA: ABNORMAL
CRYSTALS: ABNORMAL
D-DIMER QUANTITATIVE: ABNORMAL NG/ML FEU (ref 0–500)
DEVICE: ABNORMAL
DOHLE BODIES: PRESENT
EKG ATRIAL RATE: 112 BPM
EKG ATRIAL RATE: 85 BPM
EKG ATRIAL RATE: 86 BPM
EKG ATRIAL RATE: 88 BPM
EKG P AXIS: 58 DEGREES
EKG P AXIS: 67 DEGREES
EKG P AXIS: 67 DEGREES
EKG P AXIS: 70 DEGREES
EKG P-R INTERVAL: 164 MS
EKG P-R INTERVAL: 170 MS
EKG P-R INTERVAL: 180 MS
EKG P-R INTERVAL: 190 MS
EKG Q-T INTERVAL: 350 MS
EKG Q-T INTERVAL: 382 MS
EKG Q-T INTERVAL: 404 MS
EKG Q-T INTERVAL: 424 MS
EKG QRS DURATION: 74 MS
EKG QRS DURATION: 82 MS
EKG QRS DURATION: 84 MS
EKG QRS DURATION: 84 MS
EKG QTC CALCULATION (BAZETT): 454 MS
EKG QTC CALCULATION (BAZETT): 477 MS
EKG QTC CALCULATION (BAZETT): 488 MS
EKG QTC CALCULATION (BAZETT): 507 MS
EKG R AXIS: 20 DEGREES
EKG R AXIS: 27 DEGREES
EKG R AXIS: 29 DEGREES
EKG R AXIS: 43 DEGREES
EKG T AXIS: 44 DEGREES
EKG T AXIS: 49 DEGREES
EKG T AXIS: 64 DEGREES
EKG T AXIS: 71 DEGREES
EKG VENTRICULAR RATE: 112 BPM
EKG VENTRICULAR RATE: 85 BPM
EKG VENTRICULAR RATE: 86 BPM
EKG VENTRICULAR RATE: 88 BPM
ENTERBACTER CLOACAE FILM ARRAY: NOT DETECTED
ENTERBACTERIACEAE FILM ARRAY: DETECTED
ENTEROCOCCUS FILM ARRAY: NOT DETECTED
EOSINOPHIL # BLD: 0 %
EOSINOPHIL # BLD: 0.8 %
EOSINOPHIL # BLD: 1.8 %
EOSINOPHIL # BLD: 2.1 %
EOSINOPHIL # BLD: 5.5 %
EOSINOPHILS ABSOLUTE: 0 THOU/MM3 (ref 0–0.4)
EOSINOPHILS ABSOLUTE: 0.1 THOU/MM3 (ref 0–0.4)
EOSINOPHILS ABSOLUTE: 0.2 THOU/MM3 (ref 0–0.4)
EOSINOPHILS ABSOLUTE: 0.3 /ΜL
EOSINOPHILS ABSOLUTE: 0.5 THOU/MM3 (ref 0–0.4)
EOSINOPHILS ABSOLUTE: 0.6 THOU/MM3 (ref 0–0.4)
EOSINOPHILS RELATIVE PERCENT: 2.6 %
EPITHELIAL CELLS, UA: ABNORMAL /HPF
ERYTHROCYTE [DISTWIDTH] IN BLOOD BY AUTOMATED COUNT: 15.3 % (ref 11.5–14.5)
ERYTHROCYTE [DISTWIDTH] IN BLOOD BY AUTOMATED COUNT: 15.4 % (ref 11.5–14.5)
ERYTHROCYTE [DISTWIDTH] IN BLOOD BY AUTOMATED COUNT: 15.4 % (ref 11.5–14.5)
ERYTHROCYTE [DISTWIDTH] IN BLOOD BY AUTOMATED COUNT: 15.5 % (ref 11.5–14.5)
ERYTHROCYTE [DISTWIDTH] IN BLOOD BY AUTOMATED COUNT: 15.6 % (ref 11.5–14.5)
ERYTHROCYTE [DISTWIDTH] IN BLOOD BY AUTOMATED COUNT: 15.7 % (ref 11.5–14.5)
ERYTHROCYTE [DISTWIDTH] IN BLOOD BY AUTOMATED COUNT: 15.9 % (ref 11.5–14.5)
ERYTHROCYTE [DISTWIDTH] IN BLOOD BY AUTOMATED COUNT: 15.9 % (ref 11.5–14.5)
ERYTHROCYTE [DISTWIDTH] IN BLOOD BY AUTOMATED COUNT: 16.4 % (ref 11.5–14.5)
ERYTHROCYTE [DISTWIDTH] IN BLOOD BY AUTOMATED COUNT: 16.5 % (ref 11.5–14.5)
ERYTHROCYTE [DISTWIDTH] IN BLOOD BY AUTOMATED COUNT: 16.7 % (ref 11.5–14.5)
ERYTHROCYTE [DISTWIDTH] IN BLOOD BY AUTOMATED COUNT: 17 % (ref 11.5–14.5)
ERYTHROCYTE [DISTWIDTH] IN BLOOD BY AUTOMATED COUNT: 17.2 % (ref 11.5–14.5)
ERYTHROCYTE [DISTWIDTH] IN BLOOD BY AUTOMATED COUNT: 17.5 % (ref 11.5–14.5)
ERYTHROCYTE [DISTWIDTH] IN BLOOD BY AUTOMATED COUNT: 17.9 % (ref 11.5–14.5)
ERYTHROCYTE [DISTWIDTH] IN BLOOD BY AUTOMATED COUNT: 57.1 FL (ref 35–45)
ERYTHROCYTE [DISTWIDTH] IN BLOOD BY AUTOMATED COUNT: 57.2 FL (ref 35–45)
ERYTHROCYTE [DISTWIDTH] IN BLOOD BY AUTOMATED COUNT: 58 FL (ref 35–45)
ERYTHROCYTE [DISTWIDTH] IN BLOOD BY AUTOMATED COUNT: 58.4 FL (ref 35–45)
ERYTHROCYTE [DISTWIDTH] IN BLOOD BY AUTOMATED COUNT: 58.8 FL (ref 35–45)
ERYTHROCYTE [DISTWIDTH] IN BLOOD BY AUTOMATED COUNT: 58.8 FL (ref 35–45)
ERYTHROCYTE [DISTWIDTH] IN BLOOD BY AUTOMATED COUNT: 59.1 FL (ref 35–45)
ERYTHROCYTE [DISTWIDTH] IN BLOOD BY AUTOMATED COUNT: 59.7 FL (ref 35–45)
ERYTHROCYTE [DISTWIDTH] IN BLOOD BY AUTOMATED COUNT: 60.8 FL (ref 35–45)
ERYTHROCYTE [DISTWIDTH] IN BLOOD BY AUTOMATED COUNT: 60.9 FL (ref 35–45)
ERYTHROCYTE [DISTWIDTH] IN BLOOD BY AUTOMATED COUNT: 61.1 FL (ref 35–45)
ERYTHROCYTE [DISTWIDTH] IN BLOOD BY AUTOMATED COUNT: 61.5 FL (ref 35–45)
ERYTHROCYTE [DISTWIDTH] IN BLOOD BY AUTOMATED COUNT: 62 FL (ref 35–45)
ERYTHROCYTE [DISTWIDTH] IN BLOOD BY AUTOMATED COUNT: 62.3 FL (ref 35–45)
ERYTHROCYTE [DISTWIDTH] IN BLOOD BY AUTOMATED COUNT: 62.5 FL (ref 35–45)
ERYTHROCYTE [DISTWIDTH] IN BLOOD BY AUTOMATED COUNT: 62.6 FL (ref 35–45)
ERYTHROCYTE [DISTWIDTH] IN BLOOD BY AUTOMATED COUNT: 63.1 FL (ref 35–45)
ERYTHROCYTE [DISTWIDTH] IN BLOOD BY AUTOMATED COUNT: 64 FL (ref 35–45)
ERYTHROCYTE [DISTWIDTH] IN BLOOD BY AUTOMATED COUNT: 64.2 FL (ref 35–45)
ESCHERICHIA COLI FILM ARRAY: NOT DETECTED
FLU A ANTIGEN: NEGATIVE
FLU B ANTIGEN: NEGATIVE
FOLATE: 7 NG/ML (ref 4.8–24.2)
GFR CALCULATED: NORMAL
GFR SERPL CREATININE-BSD FRML MDRD: 20 ML/MIN/1.73M2
GFR SERPL CREATININE-BSD FRML MDRD: 21 ML/MIN/1.73M2
GFR SERPL CREATININE-BSD FRML MDRD: 21 ML/MIN/1.73M2
GFR SERPL CREATININE-BSD FRML MDRD: 22 ML/MIN/1.73M2
GFR SERPL CREATININE-BSD FRML MDRD: 23 ML/MIN/1.73M2
GFR SERPL CREATININE-BSD FRML MDRD: 25 ML/MIN/1.73M2
GFR SERPL CREATININE-BSD FRML MDRD: 27 ML/MIN/1.73M2
GFR SERPL CREATININE-BSD FRML MDRD: 27 ML/MIN/1.73M2
GFR SERPL CREATININE-BSD FRML MDRD: 28 ML/MIN/1.73M2
GFR SERPL CREATININE-BSD FRML MDRD: 30 ML/MIN/1.73M2
GFR SERPL CREATININE-BSD FRML MDRD: 30 ML/MIN/1.73M2
GFR SERPL CREATININE-BSD FRML MDRD: 33 ML/MIN/1.73M2
GFR SERPL CREATININE-BSD FRML MDRD: 33 ML/MIN/1.73M2
GFR SERPL CREATININE-BSD FRML MDRD: 35 ML/MIN/1.73M2
GLUCOSE BLD-MCNC: 100 MG/DL (ref 70–108)
GLUCOSE BLD-MCNC: 102 MG/DL (ref 70–108)
GLUCOSE BLD-MCNC: 105 MG/DL (ref 70–108)
GLUCOSE BLD-MCNC: 107 MG/DL (ref 70–108)
GLUCOSE BLD-MCNC: 111 MG/DL (ref 70–108)
GLUCOSE BLD-MCNC: 113 MG/DL (ref 70–108)
GLUCOSE BLD-MCNC: 123 MG/DL (ref 70–108)
GLUCOSE BLD-MCNC: 127 MG/DL (ref 70–108)
GLUCOSE BLD-MCNC: 133 MG/DL (ref 70–108)
GLUCOSE BLD-MCNC: 155 MG/DL (ref 70–108)
GLUCOSE BLD-MCNC: 80 MG/DL (ref 70–108)
GLUCOSE BLD-MCNC: 81 MG/DL (ref 70–108)
GLUCOSE BLD-MCNC: 82 MG/DL
GLUCOSE BLD-MCNC: 82 MG/DL (ref 70–108)
GLUCOSE BLD-MCNC: 85 MG/DL (ref 70–108)
GLUCOSE BLD-MCNC: 85 MG/DL (ref 70–108)
GLUCOSE BLD-MCNC: 87 MG/DL (ref 70–108)
GLUCOSE BLD-MCNC: 88 MG/DL (ref 70–108)
GLUCOSE BLD-MCNC: 89 MG/DL (ref 70–108)
GLUCOSE BLD-MCNC: 90 MG/DL (ref 70–108)
GLUCOSE BLD-MCNC: 91 MG/DL (ref 70–108)
GLUCOSE BLD-MCNC: 93 MG/DL (ref 70–108)
GLUCOSE BLD-MCNC: 95 MG/DL (ref 70–108)
GLUCOSE BLD-MCNC: 96 MG/DL (ref 70–108)
GLUCOSE URINE: NEGATIVE MG/DL
GLUCOSE URINE: NEGATIVE MG/DL
GLUCOSE, URINE: NEGATIVE MG/DL
HAEMOPHILUS INFLUENZA FILM ARRAY: NOT DETECTED
HCO3: 21 MMOL/L (ref 23–28)
HCT VFR BLD CALC: 21.5 % (ref 37–47)
HCT VFR BLD CALC: 23.2 % (ref 37–47)
HCT VFR BLD CALC: 25.9 % (ref 36–46)
HCT VFR BLD CALC: 26 % (ref 37–47)
HCT VFR BLD CALC: 27.5 % (ref 37–47)
HCT VFR BLD CALC: 28.5 % (ref 37–47)
HCT VFR BLD CALC: 29.4 % (ref 37–47)
HCT VFR BLD CALC: 29.7 % (ref 37–47)
HCT VFR BLD CALC: 30 % (ref 37–47)
HCT VFR BLD CALC: 30 % (ref 37–47)
HCT VFR BLD CALC: 30.2 % (ref 37–47)
HCT VFR BLD CALC: 30.3 % (ref 37–47)
HCT VFR BLD CALC: 30.5 % (ref 37–47)
HCT VFR BLD CALC: 30.6 % (ref 37–47)
HCT VFR BLD CALC: 31.1 % (ref 37–47)
HCT VFR BLD CALC: 31.1 % (ref 37–47)
HCT VFR BLD CALC: 31.3 % (ref 37–47)
HCT VFR BLD CALC: 31.7 % (ref 37–47)
HCT VFR BLD CALC: 31.9 % (ref 37–47)
HCT VFR BLD CALC: 32.5 % (ref 37–47)
HCT VFR BLD CALC: 33.2 % (ref 37–47)
HCT VFR BLD CALC: 35.2 % (ref 37–47)
HEMOCCULT STL QL: NEGATIVE
HEMOGLOBIN: 10 GM/DL (ref 12–16)
HEMOGLOBIN: 10.3 GM/DL (ref 12–16)
HEMOGLOBIN: 10.3 GM/DL (ref 12–16)
HEMOGLOBIN: 10.9 GM/DL (ref 12–16)
HEMOGLOBIN: 6.5 GM/DL (ref 12–16)
HEMOGLOBIN: 7.5 GM/DL (ref 12–16)
HEMOGLOBIN: 7.7 GM/DL (ref 12–16)
HEMOGLOBIN: 8.5 G/DL (ref 12–16)
HEMOGLOBIN: 8.5 GM/DL (ref 12–16)
HEMOGLOBIN: 8.6 GM/DL (ref 12–16)
HEMOGLOBIN: 9 GM/DL (ref 12–16)
HEMOGLOBIN: 9.1 GM/DL (ref 12–16)
HEMOGLOBIN: 9.2 GM/DL (ref 12–16)
HEMOGLOBIN: 9.2 GM/DL (ref 12–16)
HEMOGLOBIN: 9.3 GM/DL (ref 12–16)
HEMOGLOBIN: 9.4 GM/DL (ref 12–16)
HEMOGLOBIN: 9.5 GM/DL (ref 12–16)
HEMOGLOBIN: 9.6 GM/DL (ref 12–16)
HEMOGLOBIN: 9.6 GM/DL (ref 12–16)
HEMOGLOBIN: 9.7 GM/DL (ref 12–16)
HEMOGLOBIN: 9.7 GM/DL (ref 12–16)
HEMOGLOBIN: 9.9 GM/DL (ref 12–16)
ICTOTEST: NEGATIVE
IFIO2: 21
IMMATURE GRANS (ABS): 0.05 THOU/MM3 (ref 0–0.07)
IMMATURE GRANS (ABS): 0.07 THOU/MM3 (ref 0–0.07)
IMMATURE GRANS (ABS): 0.19 THOU/MM3 (ref 0–0.07)
IMMATURE GRANS (ABS): 0.2 THOU/MM3 (ref 0–0.07)
IMMATURE GRANS (ABS): 0.36 THOU/MM3 (ref 0–0.07)
IMMATURE GRANULOCYTES: 0.4 %
IMMATURE GRANULOCYTES: 0.4 %
IMMATURE GRANULOCYTES: 0.8 %
IMMATURE GRANULOCYTES: 1 %
IMMATURE GRANULOCYTES: 1.8 %
INR BLD: 1.41 (ref 0.85–1.13)
KETONES, URINE: ABNORMAL
KETONES, URINE: NEGATIVE
KETONES, URINE: NEGATIVE
KLEBSIELLA OXYTOCA FILM ARRAY: NOT DETECTED
KLEBSIELLA PNEUMONIAE FILM ARRAY: DETECTED
LACTIC ACID: 1.6 MMOL/L (ref 0.5–2.2)
LACTIC ACID: 1.6 MMOL/L (ref 0.5–2.2)
LACTIC ACID: 1.8 MMOL/L (ref 0.5–2.2)
LACTIC ACID: 2.5 MMOL/L (ref 0.5–2.2)
LEUKOCYTE ESTERASE, URINE: ABNORMAL
LISTERIA MONOCYTOGENES FILM ARRAY: NOT DETECTED
LYMPHOCYTES # BLD: 1.2 %
LYMPHOCYTES # BLD: 16.8 %
LYMPHOCYTES # BLD: 5.1 %
LYMPHOCYTES # BLD: 8.9 %
LYMPHOCYTES # BLD: 9.3 %
LYMPHOCYTES ABSOLUTE: 0.4 THOU/MM3 (ref 1–4.8)
LYMPHOCYTES ABSOLUTE: 1.2 THOU/MM3 (ref 1–4.8)
LYMPHOCYTES ABSOLUTE: 1.2 THOU/MM3 (ref 1–4.8)
LYMPHOCYTES ABSOLUTE: 1.3 /ΜL
LYMPHOCYTES ABSOLUTE: 1.5 THOU/MM3 (ref 1–4.8)
LYMPHOCYTES ABSOLUTE: 1.8 THOU/MM3 (ref 1–4.8)
LYMPHOCYTES RELATIVE PERCENT: 11.2 %
MAGNESIUM: 1.4 MG/DL (ref 1.6–2.4)
MAGNESIUM: 1.8 MG/DL (ref 1.6–2.4)
MCH RBC QN AUTO: 30.6 PG (ref 26–33)
MCH RBC QN AUTO: 30.7 PG (ref 26–33)
MCH RBC QN AUTO: 30.9 PG (ref 26–33)
MCH RBC QN AUTO: 30.9 PG (ref 26–33)
MCH RBC QN AUTO: 31 PG (ref 26–33)
MCH RBC QN AUTO: 31.2 PG (ref 26–33)
MCH RBC QN AUTO: 31.3 PG (ref 26–33)
MCH RBC QN AUTO: 31.3 PG (ref 26–33)
MCH RBC QN AUTO: 31.4 PG (ref 26–33)
MCH RBC QN AUTO: 31.5 PG (ref 26–33)
MCH RBC QN AUTO: 31.6 PG (ref 26–33)
MCH RBC QN AUTO: 31.7 PG (ref 26–33)
MCH RBC QN AUTO: 31.7 PG (ref 26–33)
MCH RBC QN AUTO: 31.8 PG (ref 26–33)
MCH RBC QN AUTO: 32 PG (ref 26–33)
MCH RBC QN AUTO: 32.2 PG (ref 26–33)
MCH RBC QN AUTO: 32.7 PG
MCH RBC QN AUTO: 32.8 PG (ref 26–33)
MCHC RBC AUTO-ENTMCNC: 29.5 GM/DL (ref 32.2–35.5)
MCHC RBC AUTO-ENTMCNC: 29.6 GM/DL (ref 32.2–35.5)
MCHC RBC AUTO-ENTMCNC: 30.1 GM/DL (ref 32.2–35.5)
MCHC RBC AUTO-ENTMCNC: 30.2 GM/DL (ref 32.2–35.5)
MCHC RBC AUTO-ENTMCNC: 30.6 GM/DL (ref 32.2–35.5)
MCHC RBC AUTO-ENTMCNC: 30.7 GM/DL (ref 32.2–35.5)
MCHC RBC AUTO-ENTMCNC: 30.7 GM/DL (ref 32.2–35.5)
MCHC RBC AUTO-ENTMCNC: 30.9 GM/DL (ref 32.2–35.5)
MCHC RBC AUTO-ENTMCNC: 31 GM/DL (ref 32.2–35.5)
MCHC RBC AUTO-ENTMCNC: 31 GM/DL (ref 32.2–35.5)
MCHC RBC AUTO-ENTMCNC: 31.2 GM/DL (ref 32.2–35.5)
MCHC RBC AUTO-ENTMCNC: 31.3 GM/DL (ref 32.2–35.5)
MCHC RBC AUTO-ENTMCNC: 31.4 GM/DL (ref 32.2–35.5)
MCHC RBC AUTO-ENTMCNC: 31.4 GM/DL (ref 32.2–35.5)
MCHC RBC AUTO-ENTMCNC: 31.5 GM/DL (ref 32.2–35.5)
MCHC RBC AUTO-ENTMCNC: 31.7 GM/DL (ref 32.2–35.5)
MCHC RBC AUTO-ENTMCNC: 32.3 GM/DL (ref 32.2–35.5)
MCHC RBC AUTO-ENTMCNC: 32.8 G/DL
MCV RBC AUTO: 101 FL (ref 81–99)
MCV RBC AUTO: 101.3 FL (ref 81–99)
MCV RBC AUTO: 101.3 FL (ref 81–99)
MCV RBC AUTO: 101.6 FL (ref 81–99)
MCV RBC AUTO: 102 FL (ref 81–99)
MCV RBC AUTO: 102.7 FL (ref 81–99)
MCV RBC AUTO: 102.8 FL (ref 81–99)
MCV RBC AUTO: 103.2 FL (ref 81–99)
MCV RBC AUTO: 103.4 FL (ref 81–99)
MCV RBC AUTO: 103.9 FL (ref 81–99)
MCV RBC AUTO: 104 FL (ref 81–99)
MCV RBC AUTO: 104.5 FL (ref 81–99)
MCV RBC AUTO: 107 FL (ref 81–99)
MCV RBC AUTO: 98.5 FL (ref 81–99)
MCV RBC AUTO: 98.7 FL (ref 81–99)
MCV RBC AUTO: 99.1 FL (ref 81–99)
MCV RBC AUTO: 99.2 FL (ref 81–99)
MCV RBC AUTO: 99.3 FL (ref 81–99)
MCV RBC AUTO: 99.7 FL (ref 81–99)
MCV RBC AUTO: 99.8 FL
METHICILLIN RESISTANT FILM ARRAY: ABNORMAL
MISCELLANEOUS 2: ABNORMAL
MISCELLANEOUS 2: ABNORMAL
MONOCYTES # BLD: 2.4 %
MONOCYTES # BLD: 3.7 %
MONOCYTES # BLD: 6.4 %
MONOCYTES # BLD: 7.8 %
MONOCYTES # BLD: 8.3 %
MONOCYTES ABSOLUTE: 0.9 THOU/MM3 (ref 0.4–1.3)
MONOCYTES ABSOLUTE: 1 /ΜL
MONOCYTES ABSOLUTE: 1 THOU/MM3 (ref 0.4–1.3)
MONOCYTES ABSOLUTE: 1.1 THOU/MM3 (ref 0.4–1.3)
MONOCYTES RELATIVE PERCENT: 9 %
MRSA SCREEN RT-PCR: POSITIVE
MRSA SCREEN RT-PCR: POSITIVE
MRSA SCREEN: ABNORMAL
MRSA SCREEN: NORMAL
MUCUS: ABNORMAL
NEISSERIA MENIGITIDIS FILM ARRAY: NOT DETECTED
NEUTROPHILS ABSOLUTE: 8.7 /ΜL
NEUTROPHILS RELATIVE PERCENT: 76 %
NITRITE, URINE: NEGATIVE
NITRITE, URINE: NEGATIVE
NITRITE, URINE: POSITIVE
NUCLEATED RED BLOOD CELLS: 0 /100 WBC
O2 SATURATION: 94 %
ORGANISM: ABNORMAL
OSMOLALITY CALCULATION: 267 MOSMOL/KG (ref 275–300)
OSMOLALITY CALCULATION: 276.2 MOSMOL/KG (ref 275–300)
PATHOLOGIST REVIEW: ABNORMAL
PCO2: 34 MMHG (ref 35–45)
PDW BLD-RTO: 16.5 %
PH BLOOD GAS: 7.4 (ref 7.35–7.45)
PH UA: 6
PH UA: 6.5
PH UA: 7
PHOSPHORUS: 2 MG/DL (ref 2.4–4.7)
PHOSPHORUS: 2.7 MG/DL (ref 2.4–4.7)
PLATELET # BLD: 290 THOU/MM3 (ref 130–400)
PLATELET # BLD: 293 THOU/MM3 (ref 130–400)
PLATELET # BLD: 315 THOU/MM3 (ref 130–400)
PLATELET # BLD: 318 THOU/MM3 (ref 130–400)
PLATELET # BLD: 328 THOU/MM3 (ref 130–400)
PLATELET # BLD: 335 THOU/MM3 (ref 130–400)
PLATELET # BLD: 335 THOU/MM3 (ref 130–400)
PLATELET # BLD: 339 THOU/MM3 (ref 130–400)
PLATELET # BLD: 346 THOU/MM3 (ref 130–400)
PLATELET # BLD: 353 THOU/MM3 (ref 130–400)
PLATELET # BLD: 370 THOU/MM3 (ref 130–400)
PLATELET # BLD: 370 THOU/MM3 (ref 130–400)
PLATELET # BLD: 394 THOU/MM3 (ref 130–400)
PLATELET # BLD: 408 THOU/MM3 (ref 130–400)
PLATELET # BLD: 412 THOU/MM3 (ref 130–400)
PLATELET # BLD: 418 THOU/MM3 (ref 130–400)
PLATELET # BLD: 427 THOU/MM3 (ref 130–400)
PLATELET # BLD: 431 THOU/MM3 (ref 130–400)
PLATELET # BLD: 592 THOU/MM3 (ref 130–400)
PLATELET # BLD: 611 K/ΜL
PLATELET ESTIMATE: ABNORMAL
PLATELET ESTIMATE: ADEQUATE
PMV BLD AUTO: 7.4 FL
PMV BLD AUTO: 8.6 FL (ref 9.4–12.4)
PMV BLD AUTO: 8.6 FL (ref 9.4–12.4)
PMV BLD AUTO: 8.7 FL (ref 9.4–12.4)
PMV BLD AUTO: 8.8 FL (ref 9.4–12.4)
PMV BLD AUTO: 8.9 FL (ref 9.4–12.4)
PMV BLD AUTO: 9 FL (ref 9.4–12.4)
PMV BLD AUTO: 9 FL (ref 9.4–12.4)
PMV BLD AUTO: 9.1 FL (ref 9.4–12.4)
PMV BLD AUTO: 9.1 FL (ref 9.4–12.4)
PMV BLD AUTO: 9.4 FL (ref 9.4–12.4)
PO2: 69 MMHG (ref 71–104)
POIKILOCYTES: ABNORMAL
POTASSIUM REFLEX MAGNESIUM: 4.6 MEQ/L (ref 3.5–5.2)
POTASSIUM REFLEX MAGNESIUM: 4.8 MEQ/L (ref 3.5–5.2)
POTASSIUM REFLEX MAGNESIUM: 4.8 MEQ/L (ref 3.5–5.2)
POTASSIUM REFLEX MAGNESIUM: 5 MEQ/L (ref 3.5–5.2)
POTASSIUM REFLEX MAGNESIUM: 5.2 MEQ/L (ref 3.5–5.2)
POTASSIUM REFLEX MAGNESIUM: 5.2 MEQ/L (ref 3.5–5.2)
POTASSIUM REFLEX MAGNESIUM: 5.5 MEQ/L (ref 3.5–5.2)
POTASSIUM REFLEX MAGNESIUM: 5.7 MEQ/L (ref 3.5–5.2)
POTASSIUM SERPL-SCNC: 4.3 MEQ/L (ref 3.5–5.2)
POTASSIUM SERPL-SCNC: 4.8 MEQ/L (ref 3.5–5.2)
POTASSIUM SERPL-SCNC: 4.8 MEQ/L (ref 3.5–5.2)
POTASSIUM SERPL-SCNC: 4.9 MEQ/L (ref 3.5–5.2)
POTASSIUM SERPL-SCNC: 5 MEQ/L (ref 3.5–5.2)
POTASSIUM SERPL-SCNC: 5 MEQ/L (ref 3.5–5.2)
POTASSIUM SERPL-SCNC: 5.1 MEQ/L (ref 3.5–5.2)
POTASSIUM SERPL-SCNC: 5.3 MEQ/L (ref 3.5–5.2)
POTASSIUM SERPL-SCNC: 5.5 MEQ/L (ref 3.5–5.2)
POTASSIUM SERPL-SCNC: 5.5 MEQ/L (ref 3.5–5.2)
POTASSIUM SERPL-SCNC: 5.5 MMOL/L
POTASSIUM SERPL-SCNC: 5.7 MEQ/L (ref 3.5–5.2)
PRO-BNP: ABNORMAL PG/ML (ref 0–1800)
PRO-BNP: ABNORMAL PG/ML (ref 0–1800)
PROCALCITONIN: 0.21 NG/ML (ref 0.01–0.09)
PROCALCITONIN: 1.69 NG/ML (ref 0.01–0.09)
PROCALCITONIN: 11.6 NG/ML (ref 0.01–0.09)
PROCALCITONIN: 27.89 NG/ML (ref 0.01–0.09)
PROCALCITONIN: 68.3 NG/ML (ref 0.01–0.09)
PROCALCITONIN: > 100 NG/ML (ref 0.01–0.09)
PROTEIN UA: 100
PROTEIN UA: >= 1000
PROTEIN UA: NEGATIVE MG/DL
PROTEUS FILM ARRAY: NOT DETECTED
PSEUDOMONAS AERUGINOSA FILM ARRAY: NOT DETECTED
RBC # BLD: 2.29 MILL/MM3 (ref 4.2–5.4)
RBC # BLD: 2.43 MILL/MM3 (ref 4.2–5.4)
RBC # BLD: 2.6 10^6/ΜL
RBC # BLD: 2.66 MILL/MM3 (ref 4.2–5.4)
RBC # BLD: 2.74 MILL/MM3 (ref 4.2–5.4)
RBC # BLD: 2.85 MILL/MM3 (ref 4.2–5.4)
RBC # BLD: 2.89 MILL/MM3 (ref 4.2–5.4)
RBC # BLD: 2.89 MILL/MM3 (ref 4.2–5.4)
RBC # BLD: 2.92 MILL/MM3 (ref 4.2–5.4)
RBC # BLD: 2.94 MILL/MM3 (ref 4.2–5.4)
RBC # BLD: 2.99 MILL/MM3 (ref 4.2–5.4)
RBC # BLD: 3.06 MILL/MM3 (ref 4.2–5.4)
RBC # BLD: 3.07 MILL/MM3 (ref 4.2–5.4)
RBC # BLD: 3.07 MILL/MM3 (ref 4.2–5.4)
RBC # BLD: 3.08 MILL/MM3 (ref 4.2–5.4)
RBC # BLD: 3.1 MILL/MM3 (ref 4.2–5.4)
RBC # BLD: 3.2 MILL/MM3 (ref 4.2–5.4)
RBC # BLD: 3.3 MILL/MM3 (ref 4.2–5.4)
RBC # BLD: 3.33 MILL/MM3 (ref 4.2–5.4)
RBC # BLD: 3.55 MILL/MM3 (ref 4.2–5.4)
RBC URINE: > 200 /HPF
RBC URINE: > 200 /HPF
RBC URINE: ABNORMAL /HPF
REASON FOR REJECTION: NORMAL
REJECTED TEST: NORMAL
RENAL EPITHELIAL, UA: ABNORMAL
RH FACTOR: NORMAL
RH FACTOR: NORMAL
SCAN OF BLOOD SMEAR: NORMAL
SEG NEUTROPHILS: 67.2 %
SEG NEUTROPHILS: 80.4 %
SEG NEUTROPHILS: 83.1 %
SEG NEUTROPHILS: 88 %
SEG NEUTROPHILS: 95.2 %
SEGMENTED NEUTROPHILS ABSOLUTE COUNT: 10.1 THOU/MM3 (ref 1.8–7.7)
SEGMENTED NEUTROPHILS ABSOLUTE COUNT: 13.9 THOU/MM3 (ref 1.8–7.7)
SEGMENTED NEUTROPHILS ABSOLUTE COUNT: 21.3 THOU/MM3 (ref 1.8–7.7)
SEGMENTED NEUTROPHILS ABSOLUTE COUNT: 34.3 THOU/MM3 (ref 1.8–7.7)
SEGMENTED NEUTROPHILS ABSOLUTE COUNT: 7.2 THOU/MM3 (ref 1.8–7.7)
SERRATIA MARCESCENS FILM ARRAY: NOT DETECTED
SODIUM BLD-SCNC: 129 MEQ/L (ref 135–145)
SODIUM BLD-SCNC: 134 MEQ/L (ref 135–145)
SODIUM BLD-SCNC: 134 MEQ/L (ref 135–145)
SODIUM BLD-SCNC: 135 MEQ/L (ref 135–145)
SODIUM BLD-SCNC: 135 MEQ/L (ref 135–145)
SODIUM BLD-SCNC: 136 MEQ/L (ref 135–145)
SODIUM BLD-SCNC: 136 MEQ/L (ref 135–145)
SODIUM BLD-SCNC: 136 MMOL/L
SODIUM BLD-SCNC: 137 MEQ/L (ref 135–145)
SODIUM BLD-SCNC: 138 MEQ/L (ref 135–145)
SODIUM BLD-SCNC: 139 MEQ/L (ref 135–145)
SODIUM BLD-SCNC: 140 MEQ/L (ref 135–145)
SODIUM BLD-SCNC: 140 MEQ/L (ref 135–145)
SODIUM BLD-SCNC: 141 MEQ/L (ref 135–145)
SODIUM BLD-SCNC: 141 MEQ/L (ref 135–145)
SOURCE OF BLOOD CULTURE: ABNORMAL
SOURCE, BLOOD GAS: ABNORMAL
SPECIFIC GRAVITY UA: < 1.005 (ref 1–1.03)
SPECIFIC GRAVITY, URINE: 1.01 (ref 1–1.03)
SPECIFIC GRAVITY, URINE: 1.02 (ref 1–1.03)
STAPH AUREUS FILM ARRAY: NOT DETECTED
STAPHYLOCOCCUS FILM ARRAY: NOT DETECTED
STREP AGALACTIAE FILM ARRAY: NOT DETECTED
STREP PNEUMONIAE FILM ARRAY: NOT DETECTED
STREP PYOCGENES FILM ARRAY: NOT DETECTED
STREPTOCOCCUS FILM ARRAY: NOT DETECTED
TOTAL CK: 153 U/L (ref 30–135)
TOTAL CK: 60 U/L (ref 30–135)
TOTAL PROTEIN: 5.4 G/DL (ref 6.1–8)
TOTAL PROTEIN: 6.1 G/DL (ref 6.1–8)
TOTAL PROTEIN: 6.3 G/DL (ref 6.1–8)
TOXIC GRANULATION: PRESENT
TROPONIN T: 0.04 NG/ML
TROPONIN T: 0.05 NG/ML
TSH SERPL DL<=0.05 MIU/L-ACNC: 0.62 UIU/ML (ref 0.4–4.2)
URINE CULTURE REFLEX: ABNORMAL
URINE CULTURE, ROUTINE: ABNORMAL
UROBILINOGEN, URINE: 0.2 EU/DL
VANCOMYCIN RANDOM: 9.2 UG/ML (ref 0.1–39.9)
VANCOMYCIN RESISTANT ENTEROCOCCUS: NEGATIVE
VANCOMYCIN RESISTANT ENTEROCOCCUS: POSITIVE
VANCOMYCIN RESISTANT FILM ARRAY: ABNORMAL
VITAMIN B-12: 500 PG/ML (ref 211–911)
VITAMIN D 25-HYDROXY: 13 NG/ML (ref 30–100)
VITAMIN D 25-HYDROXY: 19
VITAMIN D2, 25 HYDROXY: NORMAL
VITAMIN D3,25 HYDROXY: NORMAL
WBC # BLD: 10.1 THOU/MM3 (ref 4.8–10.8)
WBC # BLD: 10.4 THOU/MM3 (ref 4.8–10.8)
WBC # BLD: 10.7 THOU/MM3 (ref 4.8–10.8)
WBC # BLD: 10.7 THOU/MM3 (ref 4.8–10.8)
WBC # BLD: 11.4 10^3/ML
WBC # BLD: 11.6 THOU/MM3 (ref 4.8–10.8)
WBC # BLD: 11.7 THOU/MM3 (ref 4.8–10.8)
WBC # BLD: 12.1 THOU/MM3 (ref 4.8–10.8)
WBC # BLD: 12.1 THOU/MM3 (ref 4.8–10.8)
WBC # BLD: 12.2 THOU/MM3 (ref 4.8–10.8)
WBC # BLD: 12.5 THOU/MM3 (ref 4.8–10.8)
WBC # BLD: 14.3 THOU/MM3 (ref 4.8–10.8)
WBC # BLD: 16.1 THOU/MM3 (ref 4.8–10.8)
WBC # BLD: 16.3 THOU/MM3 (ref 4.8–10.8)
WBC # BLD: 16.7 THOU/MM3 (ref 4.8–10.8)
WBC # BLD: 24.2 THOU/MM3 (ref 4.8–10.8)
WBC # BLD: 26.3 THOU/MM3 (ref 4.8–10.8)
WBC # BLD: 36 THOU/MM3 (ref 4.8–10.8)
WBC # BLD: 52 THOU/MM3 (ref 4.8–10.8)
WBC # BLD: 9.4 THOU/MM3 (ref 4.8–10.8)
WBC UA: > 100 /HPF
WBC UA: > 200 /HPF
WBC UA: ABNORMAL /HPF
YEAST: ABNORMAL

## 2019-01-01 PROCEDURE — 80048 BASIC METABOLIC PNL TOTAL CA: CPT

## 2019-01-01 PROCEDURE — 2140000000 HC CCU INTERMEDIATE R&B

## 2019-01-01 PROCEDURE — 6370000000 HC RX 637 (ALT 250 FOR IP): Performed by: INTERNAL MEDICINE

## 2019-01-01 PROCEDURE — 51702 INSERT TEMP BLADDER CATH: CPT

## 2019-01-01 PROCEDURE — 6360000002 HC RX W HCPCS: Performed by: INTERNAL MEDICINE

## 2019-01-01 PROCEDURE — 93010 ELECTROCARDIOGRAM REPORT: CPT | Performed by: INTERNAL MEDICINE

## 2019-01-01 PROCEDURE — 6370000000 HC RX 637 (ALT 250 FOR IP): Performed by: FAMILY MEDICINE

## 2019-01-01 PROCEDURE — 2709999900 HC NON-CHARGEABLE SUPPLY

## 2019-01-01 PROCEDURE — 99223 1ST HOSP IP/OBS HIGH 75: CPT | Performed by: INTERNAL MEDICINE

## 2019-01-01 PROCEDURE — 86901 BLOOD TYPING SEROLOGIC RH(D): CPT

## 2019-01-01 PROCEDURE — 6370000000 HC RX 637 (ALT 250 FOR IP): Performed by: PHYSICIAN ASSISTANT

## 2019-01-01 PROCEDURE — 2580000003 HC RX 258: Performed by: INTERNAL MEDICINE

## 2019-01-01 PROCEDURE — 99291 CRITICAL CARE FIRST HOUR: CPT | Performed by: INTERNAL MEDICINE

## 2019-01-01 PROCEDURE — 87500 VANOMYCIN DNA AMP PROBE: CPT

## 2019-01-01 PROCEDURE — 97535 SELF CARE MNGMENT TRAINING: CPT

## 2019-01-01 PROCEDURE — 6370000000 HC RX 637 (ALT 250 FOR IP): Performed by: NURSE PRACTITIONER

## 2019-01-01 PROCEDURE — 99232 SBSQ HOSP IP/OBS MODERATE 35: CPT | Performed by: NURSE PRACTITIONER

## 2019-01-01 PROCEDURE — G8988 SELF CARE GOAL STATUS: HCPCS

## 2019-01-01 PROCEDURE — 87086 URINE CULTURE/COLONY COUNT: CPT

## 2019-01-01 PROCEDURE — 99231 SBSQ HOSP IP/OBS SF/LOW 25: CPT | Performed by: INTERNAL MEDICINE

## 2019-01-01 PROCEDURE — 6360000002 HC RX W HCPCS: Performed by: FAMILY MEDICINE

## 2019-01-01 PROCEDURE — 80053 COMPREHEN METABOLIC PANEL: CPT

## 2019-01-01 PROCEDURE — 36415 COLL VENOUS BLD VENIPUNCTURE: CPT

## 2019-01-01 PROCEDURE — 83605 ASSAY OF LACTIC ACID: CPT

## 2019-01-01 PROCEDURE — 0T768DZ DILATION OF RIGHT URETER WITH INTRALUMINAL DEVICE, VIA NATURAL OR ARTIFICIAL OPENING ENDOSCOPIC: ICD-10-PCS | Performed by: UROLOGY

## 2019-01-01 PROCEDURE — 93010 ELECTROCARDIOGRAM REPORT: CPT | Performed by: NUCLEAR MEDICINE

## 2019-01-01 PROCEDURE — 99239 HOSP IP/OBS DSCHRG MGMT >30: CPT | Performed by: FAMILY MEDICINE

## 2019-01-01 PROCEDURE — 06HY33Z INSERTION OF INFUSION DEVICE INTO LOWER VEIN, PERCUTANEOUS APPROACH: ICD-10-PCS | Performed by: EMERGENCY MEDICINE

## 2019-01-01 PROCEDURE — 82948 REAGENT STRIP/BLOOD GLUCOSE: CPT

## 2019-01-01 PROCEDURE — 99233 SBSQ HOSP IP/OBS HIGH 50: CPT | Performed by: INTERNAL MEDICINE

## 2019-01-01 PROCEDURE — 97530 THERAPEUTIC ACTIVITIES: CPT

## 2019-01-01 PROCEDURE — 84145 PROCALCITONIN (PCT): CPT

## 2019-01-01 PROCEDURE — 86850 RBC ANTIBODY SCREEN: CPT

## 2019-01-01 PROCEDURE — 99232 SBSQ HOSP IP/OBS MODERATE 35: CPT | Performed by: INTERNAL MEDICINE

## 2019-01-01 PROCEDURE — 82330 ASSAY OF CALCIUM: CPT

## 2019-01-01 PROCEDURE — 94668 MNPJ CHEST WALL SBSQ: CPT

## 2019-01-01 PROCEDURE — 71045 X-RAY EXAM CHEST 1 VIEW: CPT

## 2019-01-01 PROCEDURE — 85018 HEMOGLOBIN: CPT

## 2019-01-01 PROCEDURE — 87077 CULTURE AEROBIC IDENTIFY: CPT

## 2019-01-01 PROCEDURE — 36430 TRANSFUSION BLD/BLD COMPNT: CPT

## 2019-01-01 PROCEDURE — 85027 COMPLETE CBC AUTOMATED: CPT

## 2019-01-01 PROCEDURE — 97110 THERAPEUTIC EXERCISES: CPT

## 2019-01-01 PROCEDURE — 2580000003 HC RX 258: Performed by: NURSE PRACTITIONER

## 2019-01-01 PROCEDURE — 85025 COMPLETE CBC W/AUTO DIFF WBC: CPT

## 2019-01-01 PROCEDURE — 6370000000 HC RX 637 (ALT 250 FOR IP): Performed by: STUDENT IN AN ORGANIZED HEALTH CARE EDUCATION/TRAINING PROGRAM

## 2019-01-01 PROCEDURE — 2000000000 HC ICU R&B

## 2019-01-01 PROCEDURE — 1200000000 HC SEMI PRIVATE

## 2019-01-01 PROCEDURE — 36556 INSERT NON-TUNNEL CV CATH: CPT

## 2019-01-01 PROCEDURE — 3700000001 HC ADD 15 MINUTES (ANESTHESIA): Performed by: UROLOGY

## 2019-01-01 PROCEDURE — 71046 X-RAY EXAM CHEST 2 VIEWS: CPT

## 2019-01-01 PROCEDURE — 2500000003 HC RX 250 WO HCPCS: Performed by: NURSE PRACTITIONER

## 2019-01-01 PROCEDURE — P9016 RBC LEUKOCYTES REDUCED: HCPCS

## 2019-01-01 PROCEDURE — 2580000003 HC RX 258: Performed by: FAMILY MEDICINE

## 2019-01-01 PROCEDURE — APPSS60 APP SPLIT SHARED TIME 46-60 MINUTES: Performed by: NURSE PRACTITIONER

## 2019-01-01 PROCEDURE — 82803 BLOOD GASES ANY COMBINATION: CPT

## 2019-01-01 PROCEDURE — 87186 SC STD MICRODIL/AGAR DIL: CPT

## 2019-01-01 PROCEDURE — 87804 INFLUENZA ASSAY W/OPTIC: CPT

## 2019-01-01 PROCEDURE — 84100 ASSAY OF PHOSPHORUS: CPT

## 2019-01-01 PROCEDURE — 82746 ASSAY OF FOLIC ACID SERUM: CPT

## 2019-01-01 PROCEDURE — 84484 ASSAY OF TROPONIN QUANT: CPT

## 2019-01-01 PROCEDURE — 81001 URINALYSIS AUTO W/SCOPE: CPT

## 2019-01-01 PROCEDURE — 80076 HEPATIC FUNCTION PANEL: CPT

## 2019-01-01 PROCEDURE — 3600000013 HC SURGERY LEVEL 3 ADDTL 15MIN: Performed by: UROLOGY

## 2019-01-01 PROCEDURE — 99285 EMERGENCY DEPT VISIT HI MDM: CPT

## 2019-01-01 PROCEDURE — 87147 CULTURE TYPE IMMUNOLOGIC: CPT

## 2019-01-01 PROCEDURE — 99222 1ST HOSP IP/OBS MODERATE 55: CPT | Performed by: NURSE PRACTITIONER

## 2019-01-01 PROCEDURE — 37799 UNLISTED PX VASCULAR SURGERY: CPT

## 2019-01-01 PROCEDURE — 2500000003 HC RX 250 WO HCPCS: Performed by: INTERNAL MEDICINE

## 2019-01-01 PROCEDURE — 52344 CYSTO/URETERO STRICTURE TX: CPT | Performed by: UROLOGY

## 2019-01-01 PROCEDURE — 6360000004 HC RX CONTRAST MEDICATION: Performed by: UROLOGY

## 2019-01-01 PROCEDURE — 6360000002 HC RX W HCPCS

## 2019-01-01 PROCEDURE — 76770 US EXAM ABDO BACK WALL COMP: CPT

## 2019-01-01 PROCEDURE — 99233 SBSQ HOSP IP/OBS HIGH 50: CPT | Performed by: FAMILY MEDICINE

## 2019-01-01 PROCEDURE — 6360000002 HC RX W HCPCS: Performed by: NURSE PRACTITIONER

## 2019-01-01 PROCEDURE — 97167 OT EVAL HIGH COMPLEX 60 MIN: CPT

## 2019-01-01 PROCEDURE — 74176 CT ABD & PELVIS W/O CONTRAST: CPT

## 2019-01-01 PROCEDURE — C1726 CATH, BAL DIL, NON-VASCULAR: HCPCS | Performed by: UROLOGY

## 2019-01-01 PROCEDURE — 82607 VITAMIN B-12: CPT

## 2019-01-01 PROCEDURE — 99223 1ST HOSP IP/OBS HIGH 75: CPT | Performed by: UROLOGY

## 2019-01-01 PROCEDURE — 87040 BLOOD CULTURE FOR BACTERIA: CPT

## 2019-01-01 PROCEDURE — 7100000001 HC PACU RECOVERY - ADDTL 15 MIN: Performed by: UROLOGY

## 2019-01-01 PROCEDURE — 76775 US EXAM ABDO BACK WALL LIM: CPT

## 2019-01-01 PROCEDURE — 6360000002 HC RX W HCPCS: Performed by: NURSE ANESTHETIST, CERTIFIED REGISTERED

## 2019-01-01 PROCEDURE — 86900 BLOOD TYPING SEROLOGIC ABO: CPT

## 2019-01-01 PROCEDURE — 97162 PT EVAL MOD COMPLEX 30 MIN: CPT

## 2019-01-01 PROCEDURE — 36592 COLLECT BLOOD FROM PICC: CPT

## 2019-01-01 PROCEDURE — 87081 CULTURE SCREEN ONLY: CPT

## 2019-01-01 PROCEDURE — 3600000003 HC SURGERY LEVEL 3 BASE: Performed by: UROLOGY

## 2019-01-01 PROCEDURE — 94640 AIRWAY INHALATION TREATMENT: CPT

## 2019-01-01 PROCEDURE — 85014 HEMATOCRIT: CPT

## 2019-01-01 PROCEDURE — 83880 ASSAY OF NATRIURETIC PEPTIDE: CPT

## 2019-01-01 PROCEDURE — 2580000003 HC RX 258: Performed by: PHYSICIAN ASSISTANT

## 2019-01-01 PROCEDURE — 71250 CT THORAX DX C-: CPT

## 2019-01-01 PROCEDURE — 6360000002 HC RX W HCPCS: Performed by: PHYSICIAN ASSISTANT

## 2019-01-01 PROCEDURE — 93005 ELECTROCARDIOGRAM TRACING: CPT | Performed by: INTERNAL MEDICINE

## 2019-01-01 PROCEDURE — 85379 FIBRIN DEGRADATION QUANT: CPT

## 2019-01-01 PROCEDURE — 99215 OFFICE O/P EST HI 40 MIN: CPT

## 2019-01-01 PROCEDURE — 52332 CYSTOSCOPY AND TREATMENT: CPT | Performed by: UROLOGY

## 2019-01-01 PROCEDURE — G8978 MOBILITY CURRENT STATUS: HCPCS

## 2019-01-01 PROCEDURE — 93005 ELECTROCARDIOGRAM TRACING: CPT | Performed by: NURSE PRACTITIONER

## 2019-01-01 PROCEDURE — 3209999900 FLUORO FOR SURGICAL PROCEDURES

## 2019-01-01 PROCEDURE — 99231 SBSQ HOSP IP/OBS SF/LOW 25: CPT | Performed by: NURSE PRACTITIONER

## 2019-01-01 PROCEDURE — 51702 INSERT TEMP BLADDER CATH: CPT | Performed by: UROLOGY

## 2019-01-01 PROCEDURE — 97163 PT EVAL HIGH COMPLEX 45 MIN: CPT

## 2019-01-01 PROCEDURE — 6360000002 HC RX W HCPCS: Performed by: HOSPITALIST

## 2019-01-01 PROCEDURE — 93970 EXTREMITY STUDY: CPT

## 2019-01-01 PROCEDURE — 82272 OCCULT BLD FECES 1-3 TESTS: CPT

## 2019-01-01 PROCEDURE — 36600 WITHDRAWAL OF ARTERIAL BLOOD: CPT

## 2019-01-01 PROCEDURE — 84132 ASSAY OF SERUM POTASSIUM: CPT

## 2019-01-01 PROCEDURE — 80202 ASSAY OF VANCOMYCIN: CPT

## 2019-01-01 PROCEDURE — 3700000000 HC ANESTHESIA ATTENDED CARE: Performed by: UROLOGY

## 2019-01-01 PROCEDURE — 83735 ASSAY OF MAGNESIUM: CPT

## 2019-01-01 PROCEDURE — 86923 COMPATIBILITY TEST ELECTRIC: CPT

## 2019-01-01 PROCEDURE — C1751 CATH, INF, PER/CENT/MIDLINE: HCPCS

## 2019-01-01 PROCEDURE — 36591 DRAW BLOOD OFF VENOUS DEVICE: CPT

## 2019-01-01 PROCEDURE — 0TP98DZ REMOVAL OF INTRALUMINAL DEVICE FROM URETER, VIA NATURAL OR ARTIFICIAL OPENING ENDOSCOPIC: ICD-10-PCS | Performed by: UROLOGY

## 2019-01-01 PROCEDURE — C2617 STENT, NON-COR, TEM W/O DEL: HCPCS | Performed by: UROLOGY

## 2019-01-01 PROCEDURE — 87641 MR-STAPH DNA AMP PROBE: CPT

## 2019-01-01 PROCEDURE — 99214 OFFICE O/P EST MOD 30 MIN: CPT | Performed by: NURSE PRACTITIONER

## 2019-01-01 PROCEDURE — 87184 SC STD DISK METHOD PER PLATE: CPT

## 2019-01-01 PROCEDURE — G8979 MOBILITY GOAL STATUS: HCPCS

## 2019-01-01 PROCEDURE — C1769 GUIDE WIRE: HCPCS | Performed by: UROLOGY

## 2019-01-01 PROCEDURE — 94667 MNPJ CHEST WALL 1ST: CPT

## 2019-01-01 PROCEDURE — C1894 INTRO/SHEATH, NON-LASER: HCPCS

## 2019-01-01 PROCEDURE — 97166 OT EVAL MOD COMPLEX 45 MIN: CPT

## 2019-01-01 PROCEDURE — 06HY33Z INSERTION OF INFUSION DEVICE INTO LOWER VEIN, PERCUTANEOUS APPROACH: ICD-10-PCS | Performed by: FAMILY MEDICINE

## 2019-01-01 PROCEDURE — 87801 DETECT AGNT MULT DNA AMPLI: CPT

## 2019-01-01 PROCEDURE — 2709999900 HC NON-CHARGEABLE SUPPLY: Performed by: UROLOGY

## 2019-01-01 PROCEDURE — 82140 ASSAY OF AMMONIA: CPT

## 2019-01-01 PROCEDURE — 2580000003 HC RX 258: Performed by: NURSE ANESTHETIST, CERTIFIED REGISTERED

## 2019-01-01 PROCEDURE — 99238 HOSP IP/OBS DSCHRG MGMT 30/<: CPT | Performed by: INTERNAL MEDICINE

## 2019-01-01 PROCEDURE — 6360000002 HC RX W HCPCS: Performed by: STUDENT IN AN ORGANIZED HEALTH CARE EDUCATION/TRAINING PROGRAM

## 2019-01-01 PROCEDURE — 72170 X-RAY EXAM OF PELVIS: CPT

## 2019-01-01 PROCEDURE — 7100000000 HC PACU RECOVERY - FIRST 15 MIN: Performed by: UROLOGY

## 2019-01-01 PROCEDURE — 2580000003 HC RX 258: Performed by: STUDENT IN AN ORGANIZED HEALTH CARE EDUCATION/TRAINING PROGRAM

## 2019-01-01 PROCEDURE — 82306 VITAMIN D 25 HYDROXY: CPT

## 2019-01-01 PROCEDURE — 2500000003 HC RX 250 WO HCPCS: Performed by: PHYSICIAN ASSISTANT

## 2019-01-01 PROCEDURE — 85610 PROTHROMBIN TIME: CPT

## 2019-01-01 PROCEDURE — 84443 ASSAY THYROID STIM HORMONE: CPT

## 2019-01-01 PROCEDURE — 94760 N-INVAS EAR/PLS OXIMETRY 1: CPT

## 2019-01-01 PROCEDURE — 82550 ASSAY OF CK (CPK): CPT

## 2019-01-01 PROCEDURE — APPSS180 APP SPLIT SHARED TIME > 60 MINUTES: Performed by: NURSE PRACTITIONER

## 2019-01-01 PROCEDURE — 74018 RADEX ABDOMEN 1 VIEW: CPT

## 2019-01-01 PROCEDURE — G8987 SELF CARE CURRENT STATUS: HCPCS

## 2019-01-01 PROCEDURE — 2500000003 HC RX 250 WO HCPCS: Performed by: NURSE ANESTHETIST, CERTIFIED REGISTERED

## 2019-01-01 PROCEDURE — 93005 ELECTROCARDIOGRAM TRACING: CPT | Performed by: FAMILY MEDICINE

## 2019-01-01 PROCEDURE — 99999 PR OFFICE/OUTPT VISIT,PROCEDURE ONLY: CPT | Performed by: NURSE PRACTITIONER

## 2019-01-01 PROCEDURE — 6360000002 HC RX W HCPCS: Performed by: ANESTHESIOLOGY

## 2019-01-01 DEVICE — URETERAL STENT
Type: IMPLANTABLE DEVICE | Site: URETER | Status: FUNCTIONAL
Brand: CONTOUR™

## 2019-01-01 RX ORDER — TIZANIDINE 4 MG/1
2 TABLET ORAL NIGHTLY
Status: DISCONTINUED | OUTPATIENT
Start: 2019-01-01 | End: 2019-01-01 | Stop reason: HOSPADM

## 2019-01-01 RX ORDER — ALPRAZOLAM 0.5 MG/1
0.5 TABLET ORAL 3 TIMES DAILY PRN
Status: DISCONTINUED | OUTPATIENT
Start: 2019-01-01 | End: 2019-01-01

## 2019-01-01 RX ORDER — LIDOCAINE HYDROCHLORIDE 20 MG/ML
INJECTION, SOLUTION EPIDURAL; INFILTRATION; INTRACAUDAL; PERINEURAL PRN
Status: DISCONTINUED | OUTPATIENT
Start: 2019-01-01 | End: 2019-01-01 | Stop reason: SDUPTHER

## 2019-01-01 RX ORDER — BENZONATATE 100 MG/1
100 CAPSULE ORAL 3 TIMES DAILY PRN
Status: DISCONTINUED | OUTPATIENT
Start: 2019-01-01 | End: 2019-01-01 | Stop reason: HOSPADM

## 2019-01-01 RX ORDER — OXYCODONE HYDROCHLORIDE 5 MG/1
5 TABLET ORAL
Status: DISCONTINUED | OUTPATIENT
Start: 2019-01-01 | End: 2019-01-01 | Stop reason: HOSPADM

## 2019-01-01 RX ORDER — SENNA PLUS 8.6 MG/1
1 TABLET ORAL 2 TIMES DAILY PRN
Status: DISCONTINUED | OUTPATIENT
Start: 2019-01-01 | End: 2019-01-01 | Stop reason: HOSPADM

## 2019-01-01 RX ORDER — SODIUM POLYSTYRENE SULFONATE 4.1 MEQ/G
30 POWDER, FOR SUSPENSION ORAL; RECTAL ONCE
Status: COMPLETED | OUTPATIENT
Start: 2019-01-01 | End: 2019-01-01

## 2019-01-01 RX ORDER — BUMETANIDE 0.25 MG/ML
1 INJECTION, SOLUTION INTRAMUSCULAR; INTRAVENOUS ONCE
Status: COMPLETED | OUTPATIENT
Start: 2019-01-01 | End: 2019-01-01

## 2019-01-01 RX ORDER — LIDOCAINE HYDROCHLORIDE 10 MG/ML
5 INJECTION, SOLUTION EPIDURAL; INFILTRATION; INTRACAUDAL; PERINEURAL ONCE
Status: DISCONTINUED | OUTPATIENT
Start: 2019-01-01 | End: 2019-01-01

## 2019-01-01 RX ORDER — MIDODRINE HYDROCHLORIDE 5 MG/1
5 TABLET ORAL
Status: DISCONTINUED | OUTPATIENT
Start: 2019-01-01 | End: 2019-01-01

## 2019-01-01 RX ORDER — ISOSORBIDE MONONITRATE 30 MG/1
30 TABLET, EXTENDED RELEASE ORAL DAILY
Status: DISCONTINUED | OUTPATIENT
Start: 2019-01-01 | End: 2019-01-01

## 2019-01-01 RX ORDER — ONDANSETRON 2 MG/ML
4 INJECTION INTRAMUSCULAR; INTRAVENOUS
Status: COMPLETED | OUTPATIENT
Start: 2019-01-01 | End: 2019-01-01

## 2019-01-01 RX ORDER — FLAVOXATE HYDROCHLORIDE 100 MG/1
100 TABLET ORAL 3 TIMES DAILY PRN
Status: DISCONTINUED | OUTPATIENT
Start: 2019-01-01 | End: 2019-01-01 | Stop reason: HOSPADM

## 2019-01-01 RX ORDER — 0.9 % SODIUM CHLORIDE 0.9 %
1000 INTRAVENOUS SOLUTION INTRAVENOUS ONCE
Status: COMPLETED | OUTPATIENT
Start: 2019-01-01 | End: 2019-01-01

## 2019-01-01 RX ORDER — SODIUM CHLORIDE 0.9 % (FLUSH) 0.9 %
10 SYRINGE (ML) INJECTION PRN
Status: DISCONTINUED | OUTPATIENT
Start: 2019-01-01 | End: 2019-01-01 | Stop reason: HOSPADM

## 2019-01-01 RX ORDER — 0.9 % SODIUM CHLORIDE 0.9 %
1000 INTRAVENOUS SOLUTION INTRAVENOUS ONCE
Status: DISCONTINUED | OUTPATIENT
Start: 2019-01-01 | End: 2019-01-01

## 2019-01-01 RX ORDER — NICOTINE POLACRILEX 4 MG
15 LOZENGE BUCCAL PRN
Status: DISCONTINUED | OUTPATIENT
Start: 2019-01-01 | End: 2019-01-01 | Stop reason: HOSPADM

## 2019-01-01 RX ORDER — PANTOPRAZOLE SODIUM 40 MG/1
40 TABLET, DELAYED RELEASE ORAL
Status: DISCONTINUED | OUTPATIENT
Start: 2019-01-01 | End: 2019-01-01 | Stop reason: HOSPADM

## 2019-01-01 RX ORDER — CETIRIZINE HYDROCHLORIDE 5 MG/1
5 TABLET ORAL DAILY
COMMUNITY

## 2019-01-01 RX ORDER — ONDANSETRON 4 MG/1
4 TABLET, FILM COATED ORAL EVERY 8 HOURS PRN
Status: DISCONTINUED | OUTPATIENT
Start: 2019-01-01 | End: 2019-01-01 | Stop reason: HOSPADM

## 2019-01-01 RX ORDER — PROMETHAZINE HYDROCHLORIDE 25 MG/ML
6.25 INJECTION, SOLUTION INTRAMUSCULAR; INTRAVENOUS ONCE
Status: COMPLETED | OUTPATIENT
Start: 2019-01-01 | End: 2019-01-01

## 2019-01-01 RX ORDER — ESCITALOPRAM OXALATE 10 MG/1
10 TABLET ORAL DAILY
Status: DISCONTINUED | OUTPATIENT
Start: 2019-01-01 | End: 2019-01-01 | Stop reason: HOSPADM

## 2019-01-01 RX ORDER — SENNA PLUS 8.6 MG/1
1 TABLET ORAL 2 TIMES DAILY
Status: DISCONTINUED | OUTPATIENT
Start: 2019-01-01 | End: 2019-01-01

## 2019-01-01 RX ORDER — MORPHINE SULFATE 2 MG/ML
2 INJECTION, SOLUTION INTRAMUSCULAR; INTRAVENOUS ONCE
Status: COMPLETED | OUTPATIENT
Start: 2019-01-01 | End: 2019-01-01

## 2019-01-01 RX ORDER — OXYCODONE HYDROCHLORIDE AND ACETAMINOPHEN 5; 325 MG/1; MG/1
1 TABLET ORAL EVERY 6 HOURS PRN
Status: DISCONTINUED | OUTPATIENT
Start: 2019-01-01 | End: 2019-01-01 | Stop reason: HOSPADM

## 2019-01-01 RX ORDER — 0.9 % SODIUM CHLORIDE 0.9 %
250 INTRAVENOUS SOLUTION INTRAVENOUS ONCE
Status: COMPLETED | OUTPATIENT
Start: 2019-01-01 | End: 2019-01-01

## 2019-01-01 RX ORDER — GUAIFENESIN 600 MG/1
600 TABLET, EXTENDED RELEASE ORAL 2 TIMES DAILY
DISCHARGE
Start: 2019-01-01

## 2019-01-01 RX ORDER — IPRATROPIUM BROMIDE AND ALBUTEROL SULFATE 2.5; .5 MG/3ML; MG/3ML
1 SOLUTION RESPIRATORY (INHALATION) EVERY 4 HOURS PRN
Status: DISCONTINUED | OUTPATIENT
Start: 2019-01-01 | End: 2019-01-01 | Stop reason: HOSPADM

## 2019-01-01 RX ORDER — DOCUSATE SODIUM 100 MG/1
100 CAPSULE, LIQUID FILLED ORAL DAILY
Status: DISCONTINUED | OUTPATIENT
Start: 2019-01-01 | End: 2019-01-01

## 2019-01-01 RX ORDER — POLYETHYLENE GLYCOL 3350 17 G/17G
17 POWDER, FOR SOLUTION ORAL DAILY PRN
Status: DISCONTINUED | OUTPATIENT
Start: 2019-01-01 | End: 2019-01-01 | Stop reason: HOSPADM

## 2019-01-01 RX ORDER — FLAVOXATE HYDROCHLORIDE 100 MG/1
100 TABLET ORAL 3 TIMES DAILY PRN
Qty: 90 TABLET | Refills: 0 | Status: SHIPPED | OUTPATIENT
Start: 2019-01-01

## 2019-01-01 RX ORDER — SODIUM BICARBONATE 650 MG/1
1300 TABLET ORAL 3 TIMES DAILY
Qty: 180 TABLET | Refills: 0 | Status: SHIPPED | OUTPATIENT
Start: 2019-01-01 | End: 2019-03-09

## 2019-01-01 RX ORDER — FLUTICASONE PROPIONATE 50 MCG
1 SPRAY, SUSPENSION (ML) NASAL 2 TIMES DAILY
Status: DISCONTINUED | OUTPATIENT
Start: 2019-01-01 | End: 2019-01-01 | Stop reason: HOSPADM

## 2019-01-01 RX ORDER — DEXTROSE MONOHYDRATE 50 MG/ML
100 INJECTION, SOLUTION INTRAVENOUS PRN
Status: DISCONTINUED | OUTPATIENT
Start: 2019-01-01 | End: 2019-01-01 | Stop reason: HOSPADM

## 2019-01-01 RX ORDER — FENTANYL CITRATE 50 UG/ML
INJECTION, SOLUTION INTRAMUSCULAR; INTRAVENOUS PRN
Status: DISCONTINUED | OUTPATIENT
Start: 2019-01-01 | End: 2019-01-01 | Stop reason: SDUPTHER

## 2019-01-01 RX ORDER — IPRATROPIUM BROMIDE AND ALBUTEROL SULFATE 2.5; .5 MG/3ML; MG/3ML
1 SOLUTION RESPIRATORY (INHALATION) 4 TIMES DAILY
Status: DISCONTINUED | OUTPATIENT
Start: 2019-01-01 | End: 2019-01-01

## 2019-01-01 RX ORDER — ATROPINE SULFATE 0.1 MG/ML
INJECTION INTRAVENOUS
Status: COMPLETED
Start: 2019-01-01 | End: 2019-01-01

## 2019-01-01 RX ORDER — METOPROLOL SUCCINATE 50 MG/1
50 TABLET, EXTENDED RELEASE ORAL DAILY
Status: DISCONTINUED | OUTPATIENT
Start: 2019-01-01 | End: 2019-01-01

## 2019-01-01 RX ORDER — CEFDINIR 300 MG/1
300 CAPSULE ORAL ONCE
Status: COMPLETED | OUTPATIENT
Start: 2019-01-01 | End: 2019-01-01

## 2019-01-01 RX ORDER — IPRATROPIUM BROMIDE AND ALBUTEROL SULFATE 2.5; .5 MG/3ML; MG/3ML
3 SOLUTION RESPIRATORY (INHALATION) EVERY 4 HOURS PRN
Qty: 360 ML | DISCHARGE
Start: 2019-01-01

## 2019-01-01 RX ORDER — HEPARIN SODIUM 5000 [USP'U]/ML
5000 INJECTION, SOLUTION INTRAVENOUS; SUBCUTANEOUS 2 TIMES DAILY
Status: DISCONTINUED | OUTPATIENT
Start: 2019-01-01 | End: 2019-01-01

## 2019-01-01 RX ORDER — FLUTICASONE PROPIONATE 50 MCG
2 SPRAY, SUSPENSION (ML) NASAL 2 TIMES DAILY
Status: DISCONTINUED | OUTPATIENT
Start: 2019-01-01 | End: 2019-01-01 | Stop reason: ALTCHOICE

## 2019-01-01 RX ORDER — ROPINIROLE 1 MG/1
4 TABLET, FILM COATED ORAL 2 TIMES DAILY
Status: DISCONTINUED | OUTPATIENT
Start: 2019-01-01 | End: 2019-01-01 | Stop reason: HOSPADM

## 2019-01-01 RX ORDER — SODIUM POLYSTYRENE SULFONATE 4.1 MEQ/G
15 POWDER, FOR SUSPENSION ORAL; RECTAL ONCE
Status: COMPLETED | OUTPATIENT
Start: 2019-01-01 | End: 2019-01-01

## 2019-01-01 RX ORDER — HYDRALAZINE HYDROCHLORIDE 20 MG/ML
5 INJECTION INTRAMUSCULAR; INTRAVENOUS EVERY 10 MIN PRN
Status: DISCONTINUED | OUTPATIENT
Start: 2019-01-01 | End: 2019-01-01 | Stop reason: HOSPADM

## 2019-01-01 RX ORDER — TIZANIDINE 4 MG/1
2 TABLET ORAL NIGHTLY
Status: DISCONTINUED | OUTPATIENT
Start: 2019-01-01 | End: 2019-01-01

## 2019-01-01 RX ORDER — SODIUM CHLORIDE 0.9 % (FLUSH) 0.9 %
10 SYRINGE (ML) INJECTION EVERY 12 HOURS SCHEDULED
Status: DISCONTINUED | OUTPATIENT
Start: 2019-01-01 | End: 2019-01-01

## 2019-01-01 RX ORDER — ATORVASTATIN CALCIUM 20 MG/1
20 TABLET, FILM COATED ORAL DAILY
Status: DISCONTINUED | OUTPATIENT
Start: 2019-01-01 | End: 2019-01-01

## 2019-01-01 RX ORDER — POLYETHYLENE GLYCOL 3350 17 G/17G
17 POWDER, FOR SOLUTION ORAL DAILY PRN
COMMUNITY

## 2019-01-01 RX ORDER — PROPOFOL 10 MG/ML
INJECTION, EMULSION INTRAVENOUS PRN
Status: DISCONTINUED | OUTPATIENT
Start: 2019-01-01 | End: 2019-01-01 | Stop reason: SDUPTHER

## 2019-01-01 RX ORDER — LORAZEPAM 2 MG/1
2 TABLET ORAL NIGHTLY
Status: DISCONTINUED | OUTPATIENT
Start: 2019-01-01 | End: 2019-01-01

## 2019-01-01 RX ORDER — CIPROFLOXACIN 2 MG/ML
INJECTION, SOLUTION INTRAVENOUS PRN
Status: DISCONTINUED | OUTPATIENT
Start: 2019-01-01 | End: 2019-01-01 | Stop reason: SDUPTHER

## 2019-01-01 RX ORDER — OXYCODONE HYDROCHLORIDE AND ACETAMINOPHEN 5; 325 MG/1; MG/1
1 TABLET ORAL ONCE
Status: COMPLETED | OUTPATIENT
Start: 2019-01-01 | End: 2019-01-01

## 2019-01-01 RX ORDER — BENZONATATE 100 MG/1
100 CAPSULE ORAL EVERY 8 HOURS PRN
COMMUNITY

## 2019-01-01 RX ORDER — SODIUM CHLORIDE 0.9 % (FLUSH) 0.9 %
10 SYRINGE (ML) INJECTION EVERY 12 HOURS SCHEDULED
Status: DISCONTINUED | OUTPATIENT
Start: 2019-01-01 | End: 2019-01-01 | Stop reason: HOSPADM

## 2019-01-01 RX ORDER — ALPRAZOLAM 0.5 MG/1
0.5 TABLET ORAL 3 TIMES DAILY
Status: DISCONTINUED | OUTPATIENT
Start: 2019-01-01 | End: 2019-01-01 | Stop reason: HOSPADM

## 2019-01-01 RX ORDER — ALPRAZOLAM 0.5 MG/1
0.5 TABLET ORAL 3 TIMES DAILY
COMMUNITY

## 2019-01-01 RX ORDER — GUAIFENESIN 600 MG/1
600 TABLET, EXTENDED RELEASE ORAL 2 TIMES DAILY
Status: DISCONTINUED | OUTPATIENT
Start: 2019-01-01 | End: 2019-01-01 | Stop reason: HOSPADM

## 2019-01-01 RX ORDER — EPHEDRINE SULFATE 50 MG/ML
INJECTION INTRAVENOUS PRN
Status: DISCONTINUED | OUTPATIENT
Start: 2019-01-01 | End: 2019-01-01 | Stop reason: SDUPTHER

## 2019-01-01 RX ORDER — ISOSORBIDE MONONITRATE 60 MG/1
60 TABLET, EXTENDED RELEASE ORAL DAILY
Status: DISCONTINUED | OUTPATIENT
Start: 2019-01-01 | End: 2019-01-01

## 2019-01-01 RX ORDER — CALCIUM CARBONATE 200(500)MG
2000 TABLET,CHEWABLE ORAL
Status: DISCONTINUED | OUTPATIENT
Start: 2019-01-01 | End: 2019-01-01 | Stop reason: SDUPTHER

## 2019-01-01 RX ORDER — OXYCODONE HYDROCHLORIDE AND ACETAMINOPHEN 5; 325 MG/1; MG/1
1 TABLET ORAL EVERY 6 HOURS PRN
Qty: 12 TABLET | Refills: 0 | Status: SHIPPED | OUTPATIENT
Start: 2019-01-01 | End: 2019-01-01

## 2019-01-01 RX ORDER — METOPROLOL SUCCINATE 25 MG/1
25 TABLET, EXTENDED RELEASE ORAL DAILY
Qty: 30 TABLET | Refills: 3 | Status: ON HOLD | DISCHARGE
Start: 2019-01-01 | End: 2019-01-01 | Stop reason: HOSPADM

## 2019-01-01 RX ORDER — OYSTER SHELL CALCIUM WITH VITAMIN D 500; 200 MG/1; [IU]/1
2 TABLET, FILM COATED ORAL 2 TIMES DAILY
Status: DISCONTINUED | OUTPATIENT
Start: 2019-01-01 | End: 2019-01-01 | Stop reason: HOSPADM

## 2019-01-01 RX ORDER — ZAFIRLUKAST 20 MG/1
20 TABLET, FILM COATED ORAL
Status: DISCONTINUED | OUTPATIENT
Start: 2019-01-01 | End: 2019-01-01

## 2019-01-01 RX ORDER — MIDODRINE HYDROCHLORIDE 2.5 MG/1
2.5 TABLET ORAL
Status: DISCONTINUED | OUTPATIENT
Start: 2019-01-01 | End: 2019-01-01

## 2019-01-01 RX ORDER — SODIUM BICARBONATE 650 MG/1
650 TABLET ORAL 2 TIMES DAILY
Status: DISCONTINUED | OUTPATIENT
Start: 2019-01-01 | End: 2019-01-01 | Stop reason: HOSPADM

## 2019-01-01 RX ORDER — ASPIRIN 81 MG/1
81 TABLET, CHEWABLE ORAL DAILY
Status: DISCONTINUED | OUTPATIENT
Start: 2019-01-01 | End: 2019-01-01

## 2019-01-01 RX ORDER — BUMETANIDE 1 MG/1
0.5 TABLET ORAL DAILY
Status: DISCONTINUED | OUTPATIENT
Start: 2019-01-01 | End: 2019-01-01

## 2019-01-01 RX ORDER — HEPARIN SODIUM 10000 [USP'U]/ML
5000 INJECTION, SOLUTION INTRAVENOUS; SUBCUTANEOUS EVERY 12 HOURS
Status: DISCONTINUED | OUTPATIENT
Start: 2019-01-01 | End: 2019-01-01 | Stop reason: CLARIF

## 2019-01-01 RX ORDER — METOPROLOL SUCCINATE 25 MG/1
25 TABLET, EXTENDED RELEASE ORAL DAILY
Status: DISCONTINUED | OUTPATIENT
Start: 2019-01-01 | End: 2019-01-01 | Stop reason: HOSPADM

## 2019-01-01 RX ORDER — OXYCODONE HYDROCHLORIDE AND ACETAMINOPHEN 5; 325 MG/1; MG/1
1 TABLET ORAL EVERY 6 HOURS
Status: DISCONTINUED | OUTPATIENT
Start: 2019-01-01 | End: 2019-01-01 | Stop reason: HOSPADM

## 2019-01-01 RX ORDER — SODIUM CHLORIDE 9 MG/ML
INJECTION, SOLUTION INTRAVENOUS CONTINUOUS
Status: DISCONTINUED | OUTPATIENT
Start: 2019-01-01 | End: 2019-01-01

## 2019-01-01 RX ORDER — SODIUM BICARBONATE 650 MG/1
1300 TABLET ORAL 3 TIMES DAILY
Status: DISCONTINUED | OUTPATIENT
Start: 2019-01-01 | End: 2019-01-01 | Stop reason: HOSPADM

## 2019-01-01 RX ORDER — MECLIZINE HCL 12.5 MG/1
25 TABLET ORAL 3 TIMES DAILY PRN
Status: DISCONTINUED | OUTPATIENT
Start: 2019-01-01 | End: 2019-01-01 | Stop reason: HOSPADM

## 2019-01-01 RX ORDER — FLUTICASONE PROPIONATE 50 MCG
2 SPRAY, SUSPENSION (ML) NASAL 2 TIMES DAILY
Status: DISCONTINUED | OUTPATIENT
Start: 2019-01-01 | End: 2019-01-01 | Stop reason: HOSPADM

## 2019-01-01 RX ORDER — TIZANIDINE 4 MG/1
4 TABLET ORAL NIGHTLY
Status: DISCONTINUED | OUTPATIENT
Start: 2019-01-01 | End: 2019-01-01 | Stop reason: HOSPADM

## 2019-01-01 RX ORDER — LACTOBACILLUS RHAMNOSUS GG 10B CELL
1 CAPSULE ORAL 2 TIMES DAILY WITH MEALS
Status: DISCONTINUED | OUTPATIENT
Start: 2019-01-01 | End: 2019-01-01

## 2019-01-01 RX ORDER — LACTOBACILLUS RHAMNOSUS GG 10B CELL
1 CAPSULE ORAL 2 TIMES DAILY WITH MEALS
Status: DISCONTINUED | OUTPATIENT
Start: 2019-01-01 | End: 2019-01-01 | Stop reason: HOSPADM

## 2019-01-01 RX ORDER — ASPIRIN 81 MG/1
81 TABLET, CHEWABLE ORAL DAILY
Qty: 30 TABLET | Refills: 3 | Status: ON HOLD | DISCHARGE
Start: 2019-01-01 | End: 2019-01-01 | Stop reason: HOSPADM

## 2019-01-01 RX ORDER — ACETYLCYSTEINE 200 MG/ML
600 SOLUTION ORAL; RESPIRATORY (INHALATION) 2 TIMES DAILY
Status: DISCONTINUED | OUTPATIENT
Start: 2019-01-01 | End: 2019-01-01

## 2019-01-01 RX ORDER — LORAZEPAM 1 MG/1
1 TABLET ORAL EVERY MORNING
Status: DISCONTINUED | OUTPATIENT
Start: 2019-01-01 | End: 2019-01-01

## 2019-01-01 RX ORDER — ACETAMINOPHEN 650 MG/1
650 SUPPOSITORY RECTAL EVERY 4 HOURS PRN
Status: DISCONTINUED | OUTPATIENT
Start: 2019-01-01 | End: 2019-01-01 | Stop reason: HOSPADM

## 2019-01-01 RX ORDER — DEXTROSE MONOHYDRATE 25 G/50ML
25 INJECTION, SOLUTION INTRAVENOUS ONCE
Status: COMPLETED | OUTPATIENT
Start: 2019-01-01 | End: 2019-01-01

## 2019-01-01 RX ORDER — BENZONATATE 100 MG/1
100 CAPSULE ORAL 3 TIMES DAILY PRN
DISCHARGE
Start: 2019-01-01 | End: 2019-01-01

## 2019-01-01 RX ORDER — FERROUS SULFATE 325(65) MG
325 TABLET ORAL
Status: DISCONTINUED | OUTPATIENT
Start: 2019-01-01 | End: 2019-01-01

## 2019-01-01 RX ORDER — OXYCODONE HYDROCHLORIDE AND ACETAMINOPHEN 5; 325 MG/1; MG/1
1 TABLET ORAL EVERY 6 HOURS
Status: ON HOLD | COMMUNITY
End: 2019-01-01 | Stop reason: HOSPADM

## 2019-01-01 RX ORDER — ASPIRIN 81 MG/1
81 TABLET, CHEWABLE ORAL DAILY
Status: DISCONTINUED | OUTPATIENT
Start: 2019-01-01 | End: 2019-01-01 | Stop reason: HOSPADM

## 2019-01-01 RX ORDER — ISOSORBIDE MONONITRATE 30 MG/1
30 TABLET, EXTENDED RELEASE ORAL DAILY
Status: DISCONTINUED | OUTPATIENT
Start: 2019-01-01 | End: 2019-01-01 | Stop reason: HOSPADM

## 2019-01-01 RX ORDER — OYSTER SHELL CALCIUM WITH VITAMIN D 500; 200 MG/1; [IU]/1
1 TABLET, FILM COATED ORAL DAILY
Status: DISCONTINUED | OUTPATIENT
Start: 2019-01-01 | End: 2019-01-01

## 2019-01-01 RX ORDER — MEPERIDINE HYDROCHLORIDE 25 MG/ML
12.5 INJECTION INTRAMUSCULAR; INTRAVENOUS; SUBCUTANEOUS EVERY 5 MIN PRN
Status: DISCONTINUED | OUTPATIENT
Start: 2019-01-01 | End: 2019-01-01 | Stop reason: HOSPADM

## 2019-01-01 RX ORDER — ALPRAZOLAM 0.5 MG/1
0.5 TABLET ORAL 3 TIMES DAILY
Qty: 15 TABLET | Refills: 0 | Status: SHIPPED | OUTPATIENT
Start: 2019-01-01 | End: 2019-01-01

## 2019-01-01 RX ORDER — LEVOFLOXACIN 5 MG/ML
500 INJECTION, SOLUTION INTRAVENOUS EVERY 24 HOURS
Status: DISCONTINUED | OUTPATIENT
Start: 2019-01-01 | End: 2019-01-01 | Stop reason: ALTCHOICE

## 2019-01-01 RX ORDER — OXYCODONE HYDROCHLORIDE 5 MG/1
10 TABLET ORAL
Status: DISCONTINUED | OUTPATIENT
Start: 2019-01-01 | End: 2019-01-01 | Stop reason: HOSPADM

## 2019-01-01 RX ORDER — CIPROFLOXACIN 250 MG/1
250 TABLET, FILM COATED ORAL 2 TIMES DAILY
Qty: 10 TABLET | Refills: 0 | DISCHARGE
Start: 2019-01-01 | End: 2019-01-01

## 2019-01-01 RX ORDER — 0.9 % SODIUM CHLORIDE 0.9 %
500 INTRAVENOUS SOLUTION INTRAVENOUS ONCE
Status: COMPLETED | OUTPATIENT
Start: 2019-01-01 | End: 2019-01-01

## 2019-01-01 RX ORDER — IPRATROPIUM BROMIDE AND ALBUTEROL SULFATE 2.5; .5 MG/3ML; MG/3ML
3 SOLUTION RESPIRATORY (INHALATION) EVERY 4 HOURS PRN
Status: DISCONTINUED | OUTPATIENT
Start: 2019-01-01 | End: 2019-01-01 | Stop reason: HOSPADM

## 2019-01-01 RX ORDER — OYSTER SHELL CALCIUM WITH VITAMIN D 500; 200 MG/1; [IU]/1
2 TABLET, FILM COATED ORAL 2 TIMES DAILY
Status: DISCONTINUED | OUTPATIENT
Start: 2019-01-01 | End: 2019-01-01

## 2019-01-01 RX ORDER — OYSTER SHELL CALCIUM WITH VITAMIN D 500; 200 MG/1; [IU]/1
2 TABLET, FILM COATED ORAL 2 TIMES DAILY
Qty: 30 TABLET | Refills: 3 | Status: ON HOLD | DISCHARGE
Start: 2019-01-01 | End: 2019-01-01 | Stop reason: HOSPADM

## 2019-01-01 RX ORDER — IPRATROPIUM BROMIDE AND ALBUTEROL SULFATE 2.5; .5 MG/3ML; MG/3ML
1 SOLUTION RESPIRATORY (INHALATION) ONCE
Status: COMPLETED | OUTPATIENT
Start: 2019-01-01 | End: 2019-01-01

## 2019-01-01 RX ORDER — POTASSIUM CHLORIDE 29.8 MG/ML
20 INJECTION INTRAVENOUS PRN
Status: DISCONTINUED | OUTPATIENT
Start: 2019-01-01 | End: 2019-01-01 | Stop reason: HOSPADM

## 2019-01-01 RX ORDER — LIDOCAINE 50 MG/G
OINTMENT TOPICAL 4 TIMES DAILY PRN
Status: DISCONTINUED | OUTPATIENT
Start: 2019-01-01 | End: 2019-01-01 | Stop reason: HOSPADM

## 2019-01-01 RX ORDER — SODIUM BICARBONATE 650 MG/1
650 TABLET ORAL 3 TIMES DAILY
Status: DISCONTINUED | OUTPATIENT
Start: 2019-01-01 | End: 2019-01-01

## 2019-01-01 RX ORDER — SODIUM CHLORIDE 9 MG/ML
INJECTION, SOLUTION INTRAVENOUS CONTINUOUS PRN
Status: DISCONTINUED | OUTPATIENT
Start: 2019-01-01 | End: 2019-01-01 | Stop reason: SDUPTHER

## 2019-01-01 RX ORDER — PROMETHAZINE HYDROCHLORIDE 25 MG/ML
INJECTION, SOLUTION INTRAMUSCULAR; INTRAVENOUS
Status: COMPLETED
Start: 2019-01-01 | End: 2019-01-01

## 2019-01-01 RX ORDER — SODIUM BICARBONATE 650 MG/1
650 TABLET ORAL 2 TIMES DAILY
Status: DISCONTINUED | OUTPATIENT
Start: 2019-01-01 | End: 2019-01-01

## 2019-01-01 RX ORDER — METOPROLOL SUCCINATE 25 MG/1
25 TABLET, EXTENDED RELEASE ORAL DAILY
Status: DISCONTINUED | OUTPATIENT
Start: 2019-01-01 | End: 2019-01-01

## 2019-01-01 RX ORDER — LOPERAMIDE HYDROCHLORIDE 2 MG/1
2 CAPSULE ORAL 4 TIMES DAILY PRN
Status: DISCONTINUED | OUTPATIENT
Start: 2019-01-01 | End: 2019-01-01 | Stop reason: HOSPADM

## 2019-01-01 RX ORDER — CETIRIZINE HYDROCHLORIDE 10 MG/1
5 TABLET ORAL DAILY
Status: DISCONTINUED | OUTPATIENT
Start: 2019-01-01 | End: 2019-01-01 | Stop reason: HOSPADM

## 2019-01-01 RX ORDER — LORAZEPAM 2 MG/ML
0.5 INJECTION INTRAMUSCULAR ONCE
Status: DISCONTINUED | OUTPATIENT
Start: 2019-01-01 | End: 2019-01-01

## 2019-01-01 RX ORDER — ISOSORBIDE MONONITRATE 30 MG/1
30 TABLET, EXTENDED RELEASE ORAL DAILY
Qty: 30 TABLET | Refills: 3 | Status: ON HOLD | DISCHARGE
Start: 2019-01-01 | End: 2019-01-01 | Stop reason: HOSPADM

## 2019-01-01 RX ORDER — ROPINIROLE 1 MG/1
4 TABLET, FILM COATED ORAL DAILY
Status: DISCONTINUED | OUTPATIENT
Start: 2019-01-01 | End: 2019-01-01 | Stop reason: HOSPADM

## 2019-01-01 RX ORDER — BIOTIN 5 MG
3 TABLET ORAL DAILY
Status: DISCONTINUED | OUTPATIENT
Start: 2019-01-01 | End: 2019-01-01 | Stop reason: RX

## 2019-01-01 RX ORDER — ACETAMINOPHEN 325 MG/1
650 TABLET ORAL EVERY 4 HOURS PRN
Status: DISCONTINUED | OUTPATIENT
Start: 2019-01-01 | End: 2019-01-01 | Stop reason: HOSPADM

## 2019-01-01 RX ORDER — FENTANYL CITRATE 50 UG/ML
50 INJECTION, SOLUTION INTRAMUSCULAR; INTRAVENOUS EVERY 5 MIN PRN
Status: DISCONTINUED | OUTPATIENT
Start: 2019-01-01 | End: 2019-01-01 | Stop reason: HOSPADM

## 2019-01-01 RX ORDER — DEXTROSE MONOHYDRATE 25 G/50ML
12.5 INJECTION, SOLUTION INTRAVENOUS PRN
Status: DISCONTINUED | OUTPATIENT
Start: 2019-01-01 | End: 2019-01-01 | Stop reason: HOSPADM

## 2019-01-01 RX ORDER — CIPROFLOXACIN 500 MG/1
500 TABLET, FILM COATED ORAL
Status: ON HOLD | COMMUNITY
End: 2019-01-01 | Stop reason: HOSPADM

## 2019-01-01 RX ADMIN — ALPRAZOLAM 0.5 MG: 0.5 TABLET ORAL at 08:46

## 2019-01-01 RX ADMIN — LOPERAMIDE HYDROCHLORIDE 2 MG: 2 CAPSULE ORAL at 06:20

## 2019-01-01 RX ADMIN — Medication 1 CAPSULE: at 10:29

## 2019-01-01 RX ADMIN — CALCIUM CARBONATE-VITAMIN D TAB 500 MG-200 UNIT 2 TABLET: 500-200 TAB at 20:39

## 2019-01-01 RX ADMIN — TIZANIDINE 4 MG: 4 TABLET ORAL at 21:44

## 2019-01-01 RX ADMIN — CETIRIZINE HYDROCHLORIDE 5 MG: 10 TABLET, FILM COATED ORAL at 10:37

## 2019-01-01 RX ADMIN — SODIUM BICARBONATE 1300 MG: 650 TABLET ORAL at 14:31

## 2019-01-01 RX ADMIN — CALCIUM CARBONATE-VITAMIN D TAB 500 MG-200 UNIT 2 TABLET: 500-200 TAB at 20:23

## 2019-01-01 RX ADMIN — SODIUM CHLORIDE 1000 ML: 9 INJECTION, SOLUTION INTRAVENOUS at 23:00

## 2019-01-01 RX ADMIN — Medication 1 CAPSULE: at 10:00

## 2019-01-01 RX ADMIN — ESCITALOPRAM OXALATE 10 MG: 10 TABLET, FILM COATED ORAL at 10:00

## 2019-01-01 RX ADMIN — SODIUM BICARBONATE 650 MG: 650 TABLET ORAL at 14:22

## 2019-01-01 RX ADMIN — PANTOPRAZOLE SODIUM 40 MG: 40 TABLET, DELAYED RELEASE ORAL at 07:00

## 2019-01-01 RX ADMIN — Medication 1 CAPSULE: at 18:24

## 2019-01-01 RX ADMIN — IPRATROPIUM BROMIDE AND ALBUTEROL SULFATE 1 AMPULE: .5; 3 SOLUTION RESPIRATORY (INHALATION) at 09:44

## 2019-01-01 RX ADMIN — Medication 10 ML: at 11:00

## 2019-01-01 RX ADMIN — OXYCODONE AND ACETAMINOPHEN 1 TABLET: 5; 325 TABLET ORAL at 09:12

## 2019-01-01 RX ADMIN — IPRATROPIUM BROMIDE AND ALBUTEROL SULFATE 1 AMPULE: .5; 3 SOLUTION RESPIRATORY (INHALATION) at 17:08

## 2019-01-01 RX ADMIN — ALPRAZOLAM 0.5 MG: 0.5 TABLET ORAL at 17:29

## 2019-01-01 RX ADMIN — OXYCODONE AND ACETAMINOPHEN 1 TABLET: 5; 325 TABLET ORAL at 06:30

## 2019-01-01 RX ADMIN — GUAIFENESIN 600 MG: 600 TABLET, EXTENDED RELEASE ORAL at 20:30

## 2019-01-01 RX ADMIN — GUAIFENESIN 600 MG: 600 TABLET, EXTENDED RELEASE ORAL at 21:35

## 2019-01-01 RX ADMIN — Medication 1 CAPSULE: at 16:36

## 2019-01-01 RX ADMIN — Medication 10 ML: at 20:08

## 2019-01-01 RX ADMIN — MIDODRINE HYDROCHLORIDE 5 MG: 5 TABLET ORAL at 21:20

## 2019-01-01 RX ADMIN — SODIUM CHLORIDE 1000 ML: 9 INJECTION, SOLUTION INTRAVENOUS at 11:27

## 2019-01-01 RX ADMIN — ALPRAZOLAM 0.5 MG: 0.5 TABLET ORAL at 21:30

## 2019-01-01 RX ADMIN — OXYCODONE AND ACETAMINOPHEN 1 TABLET: 5; 325 TABLET ORAL at 23:31

## 2019-01-01 RX ADMIN — IPRATROPIUM BROMIDE AND ALBUTEROL SULFATE 1 AMPULE: .5; 3 SOLUTION RESPIRATORY (INHALATION) at 16:00

## 2019-01-01 RX ADMIN — DICLOFENAC 2 G: 10 GEL TOPICAL at 10:14

## 2019-01-01 RX ADMIN — FLUTICASONE PROPIONATE 2 SPRAY: 50 SPRAY, METERED NASAL at 09:05

## 2019-01-01 RX ADMIN — VITAMIN D, TAB 1000IU (100/BT) 2000 UNITS: 25 TAB at 08:20

## 2019-01-01 RX ADMIN — Medication 10 ML: at 09:25

## 2019-01-01 RX ADMIN — GUAIFENESIN 600 MG: 600 TABLET, EXTENDED RELEASE ORAL at 09:12

## 2019-01-01 RX ADMIN — ISOSORBIDE MONONITRATE 30 MG: 60 TABLET ORAL at 10:29

## 2019-01-01 RX ADMIN — BUMETANIDE 0.5 MG: 1 TABLET ORAL at 22:25

## 2019-01-01 RX ADMIN — OXYCODONE AND ACETAMINOPHEN 1 TABLET: 5; 325 TABLET ORAL at 11:24

## 2019-01-01 RX ADMIN — ESCITALOPRAM OXALATE 10 MG: 10 TABLET, FILM COATED ORAL at 09:25

## 2019-01-01 RX ADMIN — GUAIFENESIN 600 MG: 600 TABLET, EXTENDED RELEASE ORAL at 22:13

## 2019-01-01 RX ADMIN — OXYCODONE AND ACETAMINOPHEN 1 TABLET: 5; 325 TABLET ORAL at 12:18

## 2019-01-01 RX ADMIN — ISOSORBIDE MONONITRATE 30 MG: 30 TABLET ORAL at 09:25

## 2019-01-01 RX ADMIN — ACETAMINOPHEN 650 MG: 325 TABLET ORAL at 09:32

## 2019-01-01 RX ADMIN — GUAIFENESIN 600 MG: 600 TABLET, EXTENDED RELEASE ORAL at 09:34

## 2019-01-01 RX ADMIN — HEPARIN SODIUM 5000 UNITS: 5000 INJECTION INTRAVENOUS; SUBCUTANEOUS at 12:47

## 2019-01-01 RX ADMIN — ALPRAZOLAM 0.5 MG: 0.5 TABLET ORAL at 14:32

## 2019-01-01 RX ADMIN — LORAZEPAM 1 MG: 1 TABLET ORAL at 09:12

## 2019-01-01 RX ADMIN — Medication 10 ML: at 10:04

## 2019-01-01 RX ADMIN — ALPRAZOLAM 0.5 MG: 0.5 TABLET ORAL at 11:00

## 2019-01-01 RX ADMIN — OXYCODONE AND ACETAMINOPHEN 1 TABLET: 5; 325 TABLET ORAL at 06:38

## 2019-01-01 RX ADMIN — BENZONATATE 100 MG: 100 CAPSULE ORAL at 09:13

## 2019-01-01 RX ADMIN — ROPINIROLE HYDROCHLORIDE 4 MG: 1 TABLET, FILM COATED ORAL at 17:23

## 2019-01-01 RX ADMIN — Medication: at 20:45

## 2019-01-01 RX ADMIN — Medication 1 CAPSULE: at 16:56

## 2019-01-01 RX ADMIN — CETIRIZINE HYDROCHLORIDE 5 MG: 10 TABLET, FILM COATED ORAL at 12:47

## 2019-01-01 RX ADMIN — Medication 1 CAPSULE: at 08:46

## 2019-01-01 RX ADMIN — FERROUS SULFATE TAB 325 MG (65 MG ELEMENTAL FE) 325 MG: 325 (65 FE) TAB at 09:26

## 2019-01-01 RX ADMIN — SODIUM CHLORIDE: 9 INJECTION, SOLUTION INTRAVENOUS at 06:06

## 2019-01-01 RX ADMIN — ALPRAZOLAM 0.5 MG: 0.5 TABLET ORAL at 10:13

## 2019-01-01 RX ADMIN — OXYCODONE AND ACETAMINOPHEN 1 TABLET: 5; 325 TABLET ORAL at 22:30

## 2019-01-01 RX ADMIN — SODIUM POLYSTYRENE SULFONATE 30 G: 1 POWDER ORAL; RECTAL at 09:59

## 2019-01-01 RX ADMIN — OXYCODONE AND ACETAMINOPHEN 1 TABLET: 5; 325 TABLET ORAL at 05:18

## 2019-01-01 RX ADMIN — IPRATROPIUM BROMIDE AND ALBUTEROL SULFATE 1 AMPULE: .5; 3 SOLUTION RESPIRATORY (INHALATION) at 21:01

## 2019-01-01 RX ADMIN — MIDODRINE HYDROCHLORIDE 5 MG: 5 TABLET ORAL at 16:31

## 2019-01-01 RX ADMIN — OXYCODONE AND ACETAMINOPHEN 1 TABLET: 5; 325 TABLET ORAL at 12:17

## 2019-01-01 RX ADMIN — OXYCODONE AND ACETAMINOPHEN 1 TABLET: 5; 325 TABLET ORAL at 11:00

## 2019-01-01 RX ADMIN — METOPROLOL SUCCINATE 25 MG: 25 TABLET, FILM COATED, EXTENDED RELEASE ORAL at 08:21

## 2019-01-01 RX ADMIN — BUMETANIDE 0.5 MG: 1 TABLET ORAL at 10:04

## 2019-01-01 RX ADMIN — OXYCODONE AND ACETAMINOPHEN 1 TABLET: 5; 325 TABLET ORAL at 19:02

## 2019-01-01 RX ADMIN — CEFTAZIDIME, AVIBACTAM 0.94 G: 2; .5 POWDER, FOR SOLUTION INTRAVENOUS at 02:20

## 2019-01-01 RX ADMIN — MEROPENEM 500 MG: 500 INJECTION, POWDER, FOR SOLUTION INTRAVENOUS at 13:57

## 2019-01-01 RX ADMIN — SODIUM BICARBONATE 650 MG: 650 TABLET ORAL at 10:30

## 2019-01-01 RX ADMIN — HEPARIN SODIUM 5000 UNITS: 5000 INJECTION INTRAVENOUS; SUBCUTANEOUS at 10:10

## 2019-01-01 RX ADMIN — ALPRAZOLAM 0.5 MG: 0.5 TABLET ORAL at 08:29

## 2019-01-01 RX ADMIN — NOREPINEPHRINE BITARTRATE 2 MCG/MIN: 1 INJECTION INTRAVENOUS at 00:53

## 2019-01-01 RX ADMIN — ASPIRIN 81 MG 81 MG: 81 TABLET ORAL at 20:38

## 2019-01-01 RX ADMIN — PANTOPRAZOLE SODIUM 40 MG: 40 TABLET, DELAYED RELEASE ORAL at 06:30

## 2019-01-01 RX ADMIN — FLUTICASONE PROPIONATE 2 SPRAY: 50 SPRAY, METERED NASAL at 20:38

## 2019-01-01 RX ADMIN — FLUTICASONE PROPIONATE 2 SPRAY: 50 SPRAY, METERED NASAL at 10:17

## 2019-01-01 RX ADMIN — GUAIFENESIN 600 MG: 600 TABLET, EXTENDED RELEASE ORAL at 10:10

## 2019-01-01 RX ADMIN — ZAFIRLUKAST 20 MG: 20 TABLET, COATED ORAL at 17:30

## 2019-01-01 RX ADMIN — Medication 10 ML: at 16:28

## 2019-01-01 RX ADMIN — BENZONATATE 100 MG: 100 CAPSULE ORAL at 09:18

## 2019-01-01 RX ADMIN — HEPARIN SODIUM 5000 UNITS: 5000 INJECTION INTRAVENOUS; SUBCUTANEOUS at 22:14

## 2019-01-01 RX ADMIN — SODIUM BICARBONATE 650 MG: 650 TABLET ORAL at 08:20

## 2019-01-01 RX ADMIN — MEROPENEM 500 MG: 500 INJECTION, POWDER, FOR SOLUTION INTRAVENOUS at 04:01

## 2019-01-01 RX ADMIN — FLAVOXATE HYDROCHLORIDE 100 MG: 100 TABLET, FILM COATED ORAL at 01:51

## 2019-01-01 RX ADMIN — ATORVASTATIN CALCIUM 20 MG: 20 TABLET, FILM COATED ORAL at 09:58

## 2019-01-01 RX ADMIN — ESCITALOPRAM OXALATE 10 MG: 10 TABLET, FILM COATED ORAL at 08:20

## 2019-01-01 RX ADMIN — SODIUM BICARBONATE 650 MG: 650 TABLET ORAL at 16:23

## 2019-01-01 RX ADMIN — GUAIFENESIN 600 MG: 600 TABLET, EXTENDED RELEASE ORAL at 21:33

## 2019-01-01 RX ADMIN — Medication: at 10:02

## 2019-01-01 RX ADMIN — FLUTICASONE PROPIONATE 1 SPRAY: 50 SPRAY, METERED NASAL at 09:18

## 2019-01-01 RX ADMIN — FLAVOXATE HYDROCHLORIDE 100 MG: 100 TABLET, FILM COATED ORAL at 14:27

## 2019-01-01 RX ADMIN — ROPINIROLE HYDROCHLORIDE 4 MG: 1 TABLET, FILM COATED ORAL at 10:04

## 2019-01-01 RX ADMIN — ZAFIRLUKAST 20 MG: 20 TABLET, FILM COATED ORAL at 10:39

## 2019-01-01 RX ADMIN — VITAMIN D, TAB 1000IU (100/BT) 2000 UNITS: 25 TAB at 08:31

## 2019-01-01 RX ADMIN — OXYCODONE AND ACETAMINOPHEN 1 TABLET: 5; 325 TABLET ORAL at 09:32

## 2019-01-01 RX ADMIN — ROPINIROLE HYDROCHLORIDE 4 MG: 1 TABLET, FILM COATED ORAL at 10:29

## 2019-01-01 RX ADMIN — ROPINIROLE HYDROCHLORIDE 4 MG: 1 TABLET, FILM COATED ORAL at 09:09

## 2019-01-01 RX ADMIN — Medication: at 10:33

## 2019-01-01 RX ADMIN — ZAFIRLUKAST 20 MG: 20 TABLET, COATED ORAL at 17:13

## 2019-01-01 RX ADMIN — PROMETHAZINE HYDROCHLORIDE 6.25 MG: 25 INJECTION, SOLUTION INTRAMUSCULAR; INTRAVENOUS at 18:59

## 2019-01-01 RX ADMIN — ACETAMINOPHEN 650 MG: 325 TABLET ORAL at 14:39

## 2019-01-01 RX ADMIN — DICLOFENAC 4 G: 10 GEL TOPICAL at 09:16

## 2019-01-01 RX ADMIN — Medication 10 ML: at 10:39

## 2019-01-01 RX ADMIN — CALCIUM CARBONATE-VITAMIN D TAB 500 MG-200 UNIT 2 TABLET: 500-200 TAB at 20:29

## 2019-01-01 RX ADMIN — ALPRAZOLAM 0.5 MG: 0.5 TABLET ORAL at 20:38

## 2019-01-01 RX ADMIN — SODIUM BICARBONATE 1300 MG: 650 TABLET ORAL at 10:01

## 2019-01-01 RX ADMIN — ESCITALOPRAM OXALATE 10 MG: 10 TABLET, FILM COATED ORAL at 12:47

## 2019-01-01 RX ADMIN — ALPRAZOLAM 0.5 MG: 0.5 TABLET ORAL at 09:28

## 2019-01-01 RX ADMIN — FLUTICASONE PROPIONATE 1 SPRAY: 50 SPRAY, METERED NASAL at 21:29

## 2019-01-01 RX ADMIN — ALPRAZOLAM 0.5 MG: 0.5 TABLET ORAL at 10:49

## 2019-01-01 RX ADMIN — Medication 1 CAPSULE: at 18:12

## 2019-01-01 RX ADMIN — Medication 10 ML: at 21:31

## 2019-01-01 RX ADMIN — ALPRAZOLAM 0.5 MG: 0.5 TABLET ORAL at 21:33

## 2019-01-01 RX ADMIN — Medication: at 10:58

## 2019-01-01 RX ADMIN — ISOSORBIDE MONONITRATE 30 MG: 60 TABLET ORAL at 09:13

## 2019-01-01 RX ADMIN — BENZONATATE 100 MG: 100 CAPSULE ORAL at 16:46

## 2019-01-01 RX ADMIN — ALPRAZOLAM 0.5 MG: 0.5 TABLET ORAL at 10:04

## 2019-01-01 RX ADMIN — Medication 1 CAPSULE: at 09:13

## 2019-01-01 RX ADMIN — HEPARIN SODIUM 5000 UNITS: 5000 INJECTION INTRAVENOUS; SUBCUTANEOUS at 09:28

## 2019-01-01 RX ADMIN — MEROPENEM 500 MG: 500 INJECTION, POWDER, FOR SOLUTION INTRAVENOUS at 16:20

## 2019-01-01 RX ADMIN — ROPINIROLE HYDROCHLORIDE 4 MG: 1 TABLET, FILM COATED ORAL at 09:39

## 2019-01-01 RX ADMIN — METOPROLOL SUCCINATE 25 MG: 25 TABLET, FILM COATED, EXTENDED RELEASE ORAL at 09:09

## 2019-01-01 RX ADMIN — OXYCODONE AND ACETAMINOPHEN 1 TABLET: 5; 325 TABLET ORAL at 12:11

## 2019-01-01 RX ADMIN — Medication 1 CAPSULE: at 09:37

## 2019-01-01 RX ADMIN — SODIUM CHLORIDE: 9 INJECTION, SOLUTION INTRAVENOUS at 15:51

## 2019-01-01 RX ADMIN — FLUTICASONE PROPIONATE 1 SPRAY: 50 SPRAY, METERED NASAL at 09:50

## 2019-01-01 RX ADMIN — OXYCODONE AND ACETAMINOPHEN 1 TABLET: 5; 325 TABLET ORAL at 04:15

## 2019-01-01 RX ADMIN — METOPROLOL SUCCINATE 50 MG: 50 TABLET, FILM COATED, EXTENDED RELEASE ORAL at 10:29

## 2019-01-01 RX ADMIN — FLUTICASONE PROPIONATE 2 SPRAY: 50 SPRAY, METERED NASAL at 21:06

## 2019-01-01 RX ADMIN — Medication 1 CAPSULE: at 16:53

## 2019-01-01 RX ADMIN — ALPRAZOLAM 0.5 MG: 0.5 TABLET ORAL at 15:14

## 2019-01-01 RX ADMIN — ROPINIROLE HYDROCHLORIDE 4 MG: 1 TABLET, FILM COATED ORAL at 17:31

## 2019-01-01 RX ADMIN — IPRATROPIUM BROMIDE AND ALBUTEROL SULFATE 1 AMPULE: .5; 3 SOLUTION RESPIRATORY (INHALATION) at 12:02

## 2019-01-01 RX ADMIN — GUAIFENESIN 600 MG: 600 TABLET, EXTENDED RELEASE ORAL at 21:55

## 2019-01-01 RX ADMIN — FLUTICASONE PROPIONATE 2 SPRAY: 50 SPRAY, METERED NASAL at 21:34

## 2019-01-01 RX ADMIN — SODIUM BICARBONATE 650 MG: 650 TABLET ORAL at 10:38

## 2019-01-01 RX ADMIN — ZAFIRLUKAST 20 MG: 20 TABLET, FILM COATED ORAL at 17:23

## 2019-01-01 RX ADMIN — TIZANIDINE 2 MG: 4 TABLET ORAL at 22:24

## 2019-01-01 RX ADMIN — HEPARIN SODIUM 5000 UNITS: 5000 INJECTION INTRAVENOUS; SUBCUTANEOUS at 21:00

## 2019-01-01 RX ADMIN — ASPIRIN 81 MG 81 MG: 81 TABLET ORAL at 10:41

## 2019-01-01 RX ADMIN — GUAIFENESIN 600 MG: 600 TABLET, EXTENDED RELEASE ORAL at 10:14

## 2019-01-01 RX ADMIN — FLUTICASONE PROPIONATE 1 SPRAY: 50 SPRAY, METERED NASAL at 20:40

## 2019-01-01 RX ADMIN — Medication: at 14:32

## 2019-01-01 RX ADMIN — Medication 10 ML: at 08:51

## 2019-01-01 RX ADMIN — VANCOMYCIN HYDROCHLORIDE 1000 MG: 1 INJECTION, POWDER, LYOPHILIZED, FOR SOLUTION INTRAVENOUS at 17:13

## 2019-01-01 RX ADMIN — FENTANYL CITRATE 50 MCG: 50 INJECTION INTRAMUSCULAR; INTRAVENOUS at 17:02

## 2019-01-01 RX ADMIN — Medication 10 ML: at 10:47

## 2019-01-01 RX ADMIN — ALPRAZOLAM 0.5 MG: 0.5 TABLET ORAL at 21:29

## 2019-01-01 RX ADMIN — Medication 1 CAPSULE: at 09:21

## 2019-01-01 RX ADMIN — ZAFIRLUKAST 20 MG: 20 TABLET, COATED ORAL at 20:39

## 2019-01-01 RX ADMIN — FLUTICASONE PROPIONATE 1 SPRAY: 50 SPRAY, METERED NASAL at 20:16

## 2019-01-01 RX ADMIN — PANTOPRAZOLE SODIUM 40 MG: 40 TABLET, DELAYED RELEASE ORAL at 10:01

## 2019-01-01 RX ADMIN — SODIUM BICARBONATE 650 MG: 650 TABLET ORAL at 20:30

## 2019-01-01 RX ADMIN — CALCIUM GLUCONATE 1 G: 98 INJECTION, SOLUTION INTRAVENOUS at 04:45

## 2019-01-01 RX ADMIN — CALCIUM CARBONATE-VITAMIN D TAB 500 MG-200 UNIT 2 TABLET: 500-200 TAB at 08:47

## 2019-01-01 RX ADMIN — ROPINIROLE HYDROCHLORIDE 4 MG: 1 TABLET, FILM COATED ORAL at 17:38

## 2019-01-01 RX ADMIN — Medication 15 G: at 11:07

## 2019-01-01 RX ADMIN — DEXTROSE MONOHYDRATE 25 G: 25 INJECTION, SOLUTION INTRAVENOUS at 11:10

## 2019-01-01 RX ADMIN — ZAFIRLUKAST 20 MG: 20 TABLET, COATED ORAL at 16:46

## 2019-01-01 RX ADMIN — TIZANIDINE 4 MG: 4 TABLET ORAL at 20:22

## 2019-01-01 RX ADMIN — GUAIFENESIN 600 MG: 600 TABLET, EXTENDED RELEASE ORAL at 09:25

## 2019-01-01 RX ADMIN — SODIUM BICARBONATE 1300 MG: 650 TABLET ORAL at 20:40

## 2019-01-01 RX ADMIN — DICLOFENAC 2 G: 10 GEL TOPICAL at 23:15

## 2019-01-01 RX ADMIN — ASPIRIN 81 MG 81 MG: 81 TABLET ORAL at 09:25

## 2019-01-01 RX ADMIN — SODIUM BICARBONATE 650 MG: 650 TABLET ORAL at 09:24

## 2019-01-01 RX ADMIN — ISOSORBIDE MONONITRATE 30 MG: 30 TABLET ORAL at 10:17

## 2019-01-01 RX ADMIN — OXYCODONE AND ACETAMINOPHEN 1 TABLET: 5; 325 TABLET ORAL at 19:06

## 2019-01-01 RX ADMIN — ZAFIRLUKAST 20 MG: 20 TABLET, FILM COATED ORAL at 19:06

## 2019-01-01 RX ADMIN — METOPROLOL SUCCINATE 25 MG: 25 TABLET, FILM COATED, EXTENDED RELEASE ORAL at 10:37

## 2019-01-01 RX ADMIN — CALCIUM CARBONATE-VITAMIN D TAB 500 MG-200 UNIT 2 TABLET: 500-200 TAB at 10:10

## 2019-01-01 RX ADMIN — CALCIUM GLUCONATE 1 G: 98 INJECTION, SOLUTION INTRAVENOUS at 19:24

## 2019-01-01 RX ADMIN — OXYCODONE AND ACETAMINOPHEN 1 TABLET: 5; 325 TABLET ORAL at 12:39

## 2019-01-01 RX ADMIN — OXYCODONE AND ACETAMINOPHEN 1 TABLET: 5; 325 TABLET ORAL at 19:31

## 2019-01-01 RX ADMIN — CALCIUM CARBONATE-VITAMIN D TAB 500 MG-200 UNIT 2 TABLET: 500-200 TAB at 09:20

## 2019-01-01 RX ADMIN — CHLORASEPTIC 1 SPRAY: 1.5 LIQUID ORAL at 21:32

## 2019-01-01 RX ADMIN — Medication 1 CAPSULE: at 17:30

## 2019-01-01 RX ADMIN — METOPROLOL SUCCINATE 25 MG: 25 TABLET, FILM COATED, EXTENDED RELEASE ORAL at 09:24

## 2019-01-01 RX ADMIN — OXYCODONE AND ACETAMINOPHEN 1 TABLET: 5; 325 TABLET ORAL at 06:15

## 2019-01-01 RX ADMIN — DICLOFENAC 2 G: 10 GEL TOPICAL at 11:32

## 2019-01-01 RX ADMIN — PANTOPRAZOLE SODIUM 40 MG: 40 TABLET, DELAYED RELEASE ORAL at 06:38

## 2019-01-01 RX ADMIN — CIPROFLOXACIN 200 MG: 2 INJECTION, SOLUTION INTRAVENOUS at 17:19

## 2019-01-01 RX ADMIN — ALPRAZOLAM 0.5 MG: 0.5 TABLET ORAL at 09:26

## 2019-01-01 RX ADMIN — OXYCODONE AND ACETAMINOPHEN 1 TABLET: 5; 325 TABLET ORAL at 06:29

## 2019-01-01 RX ADMIN — SODIUM BICARBONATE 650 MG: 650 TABLET ORAL at 09:09

## 2019-01-01 RX ADMIN — ALPRAZOLAM 0.5 MG: 0.5 TABLET ORAL at 19:02

## 2019-01-01 RX ADMIN — OXYCODONE AND ACETAMINOPHEN 1 TABLET: 5; 325 TABLET ORAL at 04:32

## 2019-01-01 RX ADMIN — LIDOCAINE HYDROCHLORIDE 1 APPLICATOR: 20 JELLY TOPICAL at 10:00

## 2019-01-01 RX ADMIN — Medication 1 CAPSULE: at 17:32

## 2019-01-01 RX ADMIN — OXYCODONE AND ACETAMINOPHEN 1 TABLET: 5; 325 TABLET ORAL at 14:59

## 2019-01-01 RX ADMIN — DICLOFENAC 2 G: 10 GEL TOPICAL at 20:45

## 2019-01-01 RX ADMIN — MECLIZINE 25 MG: 12.5 TABLET ORAL at 19:09

## 2019-01-01 RX ADMIN — DICLOFENAC 2 G: 10 GEL TOPICAL at 21:54

## 2019-01-01 RX ADMIN — OXYCODONE AND ACETAMINOPHEN 1 TABLET: 5; 325 TABLET ORAL at 17:58

## 2019-01-01 RX ADMIN — OXYCODONE AND ACETAMINOPHEN 1 TABLET: 5; 325 TABLET ORAL at 15:35

## 2019-01-01 RX ADMIN — PROMETHAZINE HYDROCHLORIDE 6.25 MG: 25 INJECTION INTRAMUSCULAR; INTRAVENOUS at 18:59

## 2019-01-01 RX ADMIN — GUAIFENESIN 600 MG: 600 TABLET, EXTENDED RELEASE ORAL at 10:00

## 2019-01-01 RX ADMIN — OXYCODONE AND ACETAMINOPHEN 1 TABLET: 5; 325 TABLET ORAL at 02:05

## 2019-01-01 RX ADMIN — PROPOFOL 50 MG: 10 INJECTION, EMULSION INTRAVENOUS at 17:02

## 2019-01-01 RX ADMIN — MECLIZINE 25 MG: 12.5 TABLET ORAL at 18:24

## 2019-01-01 RX ADMIN — VANCOMYCIN HYDROCHLORIDE 1000 MG: 1 INJECTION, POWDER, LYOPHILIZED, FOR SOLUTION INTRAVENOUS at 15:10

## 2019-01-01 RX ADMIN — Medication 1 CAPSULE: at 09:24

## 2019-01-01 RX ADMIN — VITAMIN D, TAB 1000IU (100/BT) 2000 UNITS: 25 TAB at 20:22

## 2019-01-01 RX ADMIN — ISOSORBIDE MONONITRATE 30 MG: 60 TABLET ORAL at 08:30

## 2019-01-01 RX ADMIN — Medication 10 ML: at 20:23

## 2019-01-01 RX ADMIN — AMIKACIN SULFATE 500 MG: 250 INJECTION, SOLUTION INTRAMUSCULAR; INTRAVENOUS at 18:32

## 2019-01-01 RX ADMIN — ZAFIRLUKAST 20 MG: 20 TABLET, FILM COATED ORAL at 10:01

## 2019-01-01 RX ADMIN — ALPRAZOLAM 0.5 MG: 0.5 TABLET ORAL at 20:44

## 2019-01-01 RX ADMIN — CALCIUM CARBONATE-VITAMIN D TAB 500 MG-200 UNIT 2 TABLET: 500-200 TAB at 10:29

## 2019-01-01 RX ADMIN — Medication 1 CAPSULE: at 10:10

## 2019-01-01 RX ADMIN — SODIUM CHLORIDE 500 ML: 9 INJECTION, SOLUTION INTRAVENOUS at 00:59

## 2019-01-01 RX ADMIN — GUAIFENESIN 600 MG: 600 TABLET, EXTENDED RELEASE ORAL at 21:27

## 2019-01-01 RX ADMIN — SODIUM BICARBONATE 650 MG: 650 TABLET ORAL at 10:04

## 2019-01-01 RX ADMIN — SODIUM CHLORIDE 250 ML: 9 INJECTION, SOLUTION INTRAVENOUS at 19:00

## 2019-01-01 RX ADMIN — ATORVASTATIN CALCIUM 20 MG: 20 TABLET, FILM COATED ORAL at 09:25

## 2019-01-01 RX ADMIN — DICLOFENAC 2 G: 10 GEL TOPICAL at 21:36

## 2019-01-01 RX ADMIN — ISOSORBIDE MONONITRATE 30 MG: 60 TABLET ORAL at 10:04

## 2019-01-01 RX ADMIN — FLUTICASONE PROPIONATE 2 SPRAY: 50 SPRAY, METERED NASAL at 20:40

## 2019-01-01 RX ADMIN — SODIUM BICARBONATE 1300 MG: 650 TABLET ORAL at 14:10

## 2019-01-01 RX ADMIN — ALPRAZOLAM 0.5 MG: 0.5 TABLET ORAL at 10:29

## 2019-01-01 RX ADMIN — Medication: at 10:38

## 2019-01-01 RX ADMIN — ALPRAZOLAM 0.5 MG: 0.5 TABLET ORAL at 16:27

## 2019-01-01 RX ADMIN — TIZANIDINE 2 MG: 4 TABLET ORAL at 22:13

## 2019-01-01 RX ADMIN — GUAIFENESIN 600 MG: 600 TABLET, EXTENDED RELEASE ORAL at 21:31

## 2019-01-01 RX ADMIN — ATORVASTATIN CALCIUM 20 MG: 20 TABLET, FILM COATED ORAL at 09:29

## 2019-01-01 RX ADMIN — ZAFIRLUKAST 20 MG: 20 TABLET, COATED ORAL at 09:29

## 2019-01-01 RX ADMIN — SODIUM CHLORIDE: 9 INJECTION, SOLUTION INTRAVENOUS at 10:27

## 2019-01-01 RX ADMIN — ROPINIROLE HYDROCHLORIDE 4 MG: 1 TABLET, FILM COATED ORAL at 10:11

## 2019-01-01 RX ADMIN — OXYCODONE HYDROCHLORIDE 5 MG: 5 TABLET ORAL at 10:13

## 2019-01-01 RX ADMIN — ISOSORBIDE MONONITRATE 30 MG: 60 TABLET ORAL at 08:20

## 2019-01-01 RX ADMIN — Medication: at 20:07

## 2019-01-01 RX ADMIN — ESCITALOPRAM OXALATE 10 MG: 10 TABLET, FILM COATED ORAL at 10:37

## 2019-01-01 RX ADMIN — MORPHINE SULFATE 2 MG: 2 INJECTION, SOLUTION INTRAMUSCULAR; INTRAVENOUS at 04:07

## 2019-01-01 RX ADMIN — PANTOPRAZOLE SODIUM 40 MG: 40 TABLET, DELAYED RELEASE ORAL at 08:46

## 2019-01-01 RX ADMIN — ESCITALOPRAM OXALATE 10 MG: 10 TABLET, FILM COATED ORAL at 09:37

## 2019-01-01 RX ADMIN — IPRATROPIUM BROMIDE AND ALBUTEROL SULFATE 1 AMPULE: .5; 3 SOLUTION RESPIRATORY (INHALATION) at 13:51

## 2019-01-01 RX ADMIN — ZAFIRLUKAST 20 MG: 20 TABLET, FILM COATED ORAL at 17:42

## 2019-01-01 RX ADMIN — OXYCODONE AND ACETAMINOPHEN 1 TABLET: 5; 325 TABLET ORAL at 12:48

## 2019-01-01 RX ADMIN — ALPRAZOLAM 0.5 MG: 0.5 TABLET ORAL at 08:30

## 2019-01-01 RX ADMIN — PANTOPRAZOLE SODIUM 40 MG: 40 TABLET, DELAYED RELEASE ORAL at 06:19

## 2019-01-01 RX ADMIN — TIZANIDINE 2 MG: 4 TABLET ORAL at 22:08

## 2019-01-01 RX ADMIN — ASPIRIN 81 MG 81 MG: 81 TABLET ORAL at 08:30

## 2019-01-01 RX ADMIN — CALCIUM CARBONATE-VITAMIN D TAB 500 MG-200 UNIT 1 TABLET: 500-200 TAB at 09:45

## 2019-01-01 RX ADMIN — ROPINIROLE HYDROCHLORIDE 4 MG: 1 TABLET, FILM COATED ORAL at 09:28

## 2019-01-01 RX ADMIN — ALPRAZOLAM 0.5 MG: 0.5 TABLET ORAL at 23:17

## 2019-01-01 RX ADMIN — DICLOFENAC 4 G: 10 GEL TOPICAL at 05:01

## 2019-01-01 RX ADMIN — OXYCODONE AND ACETAMINOPHEN 1 TABLET: 5; 325 TABLET ORAL at 11:56

## 2019-01-01 RX ADMIN — FLUTICASONE PROPIONATE 1 SPRAY: 50 SPRAY, METERED NASAL at 20:45

## 2019-01-01 RX ADMIN — Medication 10 ML: at 10:35

## 2019-01-01 RX ADMIN — Medication: at 22:09

## 2019-01-01 RX ADMIN — ATORVASTATIN CALCIUM 20 MG: 20 TABLET, FILM COATED ORAL at 10:17

## 2019-01-01 RX ADMIN — DICLOFENAC 2 G: 10 GEL TOPICAL at 22:14

## 2019-01-01 RX ADMIN — CETIRIZINE HYDROCHLORIDE 5 MG: 10 TABLET, FILM COATED ORAL at 09:23

## 2019-01-01 RX ADMIN — GUAIFENESIN 600 MG: 600 TABLET, EXTENDED RELEASE ORAL at 09:10

## 2019-01-01 RX ADMIN — SODIUM BICARBONATE 650 MG: 650 TABLET ORAL at 14:56

## 2019-01-01 RX ADMIN — ATORVASTATIN CALCIUM 20 MG: 20 TABLET, FILM COATED ORAL at 09:04

## 2019-01-01 RX ADMIN — ZAFIRLUKAST 20 MG: 20 TABLET, FILM COATED ORAL at 06:15

## 2019-01-01 RX ADMIN — SODIUM BICARBONATE 650 MG: 650 TABLET ORAL at 14:45

## 2019-01-01 RX ADMIN — OXYCODONE AND ACETAMINOPHEN 1 TABLET: 5; 325 TABLET ORAL at 06:19

## 2019-01-01 RX ADMIN — ALPRAZOLAM 0.5 MG: 0.5 TABLET ORAL at 20:40

## 2019-01-01 RX ADMIN — FLUTICASONE PROPIONATE 1 SPRAY: 50 SPRAY, METERED NASAL at 20:44

## 2019-01-01 RX ADMIN — ZAFIRLUKAST 20 MG: 20 TABLET, COATED ORAL at 06:14

## 2019-01-01 RX ADMIN — ALPRAZOLAM 0.5 MG: 0.5 TABLET ORAL at 16:35

## 2019-01-01 RX ADMIN — ALTEPLASE 1 MG: 2.2 INJECTION, POWDER, LYOPHILIZED, FOR SOLUTION INTRAVENOUS at 01:11

## 2019-01-01 RX ADMIN — ZAFIRLUKAST 20 MG: 20 TABLET, COATED ORAL at 17:49

## 2019-01-01 RX ADMIN — TIZANIDINE 2 MG: 4 TABLET ORAL at 20:07

## 2019-01-01 RX ADMIN — MECLIZINE 25 MG: 12.5 TABLET ORAL at 20:22

## 2019-01-01 RX ADMIN — SODIUM BICARBONATE 1300 MG: 650 TABLET ORAL at 15:19

## 2019-01-01 RX ADMIN — FLUTICASONE PROPIONATE 1 SPRAY: 50 SPRAY, METERED NASAL at 10:16

## 2019-01-01 RX ADMIN — CETIRIZINE HYDROCHLORIDE 5 MG: 10 TABLET, FILM COATED ORAL at 10:14

## 2019-01-01 RX ADMIN — SODIUM BICARBONATE 650 MG: 650 TABLET ORAL at 08:46

## 2019-01-01 RX ADMIN — CALCIUM CARBONATE-VITAMIN D TAB 500 MG-200 UNIT 2 TABLET: 500-200 TAB at 12:47

## 2019-01-01 RX ADMIN — ZAFIRLUKAST 20 MG: 20 TABLET, COATED ORAL at 15:44

## 2019-01-01 RX ADMIN — LIDOCAINE HYDROCHLORIDE 1 DROP: 20 JELLY TOPICAL at 19:11

## 2019-01-01 RX ADMIN — CEFTAZIDIME, AVIBACTAM 0.94 G: 2; .5 POWDER, FOR SOLUTION INTRAVENOUS at 14:57

## 2019-01-01 RX ADMIN — Medication: at 13:15

## 2019-01-01 RX ADMIN — METOPROLOL SUCCINATE 25 MG: 25 TABLET, FILM COATED, EXTENDED RELEASE ORAL at 08:46

## 2019-01-01 RX ADMIN — ALPRAZOLAM 0.5 MG: 0.5 TABLET ORAL at 02:43

## 2019-01-01 RX ADMIN — ALPRAZOLAM 0.5 MG: 0.5 TABLET ORAL at 16:22

## 2019-01-01 RX ADMIN — ALPRAZOLAM 0.5 MG: 0.5 TABLET ORAL at 20:32

## 2019-01-01 RX ADMIN — SODIUM BICARBONATE 650 MG: 650 TABLET ORAL at 09:04

## 2019-01-01 RX ADMIN — CHLORASEPTIC 1 SPRAY: 1.5 LIQUID ORAL at 19:58

## 2019-01-01 RX ADMIN — ALPRAZOLAM 0.5 MG: 0.5 TABLET ORAL at 09:29

## 2019-01-01 RX ADMIN — VITAMIN D, TAB 1000IU (100/BT) 2000 UNITS: 25 TAB at 09:29

## 2019-01-01 RX ADMIN — ASPIRIN 81 MG 81 MG: 81 TABLET ORAL at 10:29

## 2019-01-01 RX ADMIN — ONDANSETRON HYDROCHLORIDE 4 MG: 4 TABLET, FILM COATED ORAL at 00:46

## 2019-01-01 RX ADMIN — OXYCODONE AND ACETAMINOPHEN 1 TABLET: 5; 325 TABLET ORAL at 06:10

## 2019-01-01 RX ADMIN — Medication 1 CAPSULE: at 10:38

## 2019-01-01 RX ADMIN — CALCIUM CARBONATE-VITAMIN D TAB 500 MG-200 UNIT 1 TABLET: 500-200 TAB at 09:20

## 2019-01-01 RX ADMIN — TIZANIDINE 4 MG: 4 TABLET ORAL at 21:31

## 2019-01-01 RX ADMIN — SODIUM BICARBONATE 650 MG: 650 TABLET ORAL at 21:34

## 2019-01-01 RX ADMIN — DOCUSATE SODIUM 100 MG: 100 CAPSULE, LIQUID FILLED ORAL at 09:29

## 2019-01-01 RX ADMIN — BUMETANIDE 0.5 MG: 1 TABLET ORAL at 10:30

## 2019-01-01 RX ADMIN — METOPROLOL SUCCINATE 25 MG: 25 TABLET, FILM COATED, EXTENDED RELEASE ORAL at 10:18

## 2019-01-01 RX ADMIN — GUAIFENESIN 600 MG: 600 TABLET, EXTENDED RELEASE ORAL at 21:44

## 2019-01-01 RX ADMIN — ONDANSETRON 4 MG: 2 INJECTION INTRAMUSCULAR; INTRAVENOUS at 18:40

## 2019-01-01 RX ADMIN — SODIUM BICARBONATE 650 MG: 650 TABLET ORAL at 09:41

## 2019-01-01 RX ADMIN — CETIRIZINE HYDROCHLORIDE 5 MG: 10 TABLET, FILM COATED ORAL at 09:25

## 2019-01-01 RX ADMIN — Medication 10 ML: at 08:29

## 2019-01-01 RX ADMIN — OXYCODONE AND ACETAMINOPHEN 1 TABLET: 5; 325 TABLET ORAL at 17:56

## 2019-01-01 RX ADMIN — GUAIFENESIN 600 MG: 600 TABLET, EXTENDED RELEASE ORAL at 20:16

## 2019-01-01 RX ADMIN — ROPINIROLE HYDROCHLORIDE 4 MG: 1 TABLET, FILM COATED ORAL at 11:25

## 2019-01-01 RX ADMIN — ACETAMINOPHEN 650 MG: 325 TABLET ORAL at 00:35

## 2019-01-01 RX ADMIN — Medication 1 CAPSULE: at 18:46

## 2019-01-01 RX ADMIN — SODIUM BICARBONATE 1300 MG: 650 TABLET ORAL at 15:36

## 2019-01-01 RX ADMIN — FLUTICASONE PROPIONATE 2 SPRAY: 50 SPRAY, METERED NASAL at 21:30

## 2019-01-01 RX ADMIN — SODIUM CHLORIDE: 9 INJECTION, SOLUTION INTRAVENOUS at 09:21

## 2019-01-01 RX ADMIN — ROPINIROLE HYDROCHLORIDE 4 MG: 1 TABLET, FILM COATED ORAL at 10:37

## 2019-01-01 RX ADMIN — OXYCODONE AND ACETAMINOPHEN 1 TABLET: 5; 325 TABLET ORAL at 03:52

## 2019-01-01 RX ADMIN — Medication 10 ML: at 12:11

## 2019-01-01 RX ADMIN — ASPIRIN 81 MG 81 MG: 81 TABLET ORAL at 10:38

## 2019-01-01 RX ADMIN — ROPINIROLE HYDROCHLORIDE 4 MG: 1 TABLET, FILM COATED ORAL at 20:04

## 2019-01-01 RX ADMIN — FLUTICASONE PROPIONATE 2 SPRAY: 50 SPRAY, METERED NASAL at 20:57

## 2019-01-01 RX ADMIN — OXYCODONE HYDROCHLORIDE AND ACETAMINOPHEN 1 TABLET: 5; 325 TABLET ORAL at 16:08

## 2019-01-01 RX ADMIN — ASPIRIN 81 MG 81 MG: 81 TABLET ORAL at 08:47

## 2019-01-01 RX ADMIN — DICLOFENAC 2 G: 10 GEL TOPICAL at 22:10

## 2019-01-01 RX ADMIN — METOPROLOL SUCCINATE 25 MG: 25 TABLET, FILM COATED, EXTENDED RELEASE ORAL at 10:00

## 2019-01-01 RX ADMIN — ISOSORBIDE MONONITRATE 30 MG: 60 TABLET ORAL at 09:09

## 2019-01-01 RX ADMIN — GUAIFENESIN 600 MG: 600 TABLET, EXTENDED RELEASE ORAL at 08:46

## 2019-01-01 RX ADMIN — TIZANIDINE 4 MG: 4 TABLET ORAL at 20:41

## 2019-01-01 RX ADMIN — ALPRAZOLAM 0.5 MG: 0.5 TABLET ORAL at 11:19

## 2019-01-01 RX ADMIN — TIZANIDINE 2 MG: 4 TABLET ORAL at 21:36

## 2019-01-01 RX ADMIN — FLAVOXATE HYDROCHLORIDE 100 MG: 100 TABLET, FILM COATED ORAL at 19:14

## 2019-01-01 RX ADMIN — TIZANIDINE 4 MG: 4 TABLET ORAL at 21:33

## 2019-01-01 RX ADMIN — Medication 2 MCG/MIN: at 16:33

## 2019-01-01 RX ADMIN — FERROUS SULFATE TAB 325 MG (65 MG ELEMENTAL FE) 325 MG: 325 (65 FE) TAB at 10:36

## 2019-01-01 RX ADMIN — OXYCODONE AND ACETAMINOPHEN 1 TABLET: 5; 325 TABLET ORAL at 20:40

## 2019-01-01 RX ADMIN — SODIUM CHLORIDE 1000 ML: 9 INJECTION, SOLUTION INTRAVENOUS at 12:41

## 2019-01-01 RX ADMIN — ATORVASTATIN CALCIUM 20 MG: 20 TABLET, FILM COATED ORAL at 18:12

## 2019-01-01 RX ADMIN — Medication 10 ML: at 20:45

## 2019-01-01 RX ADMIN — ISOSORBIDE MONONITRATE 30 MG: 60 TABLET ORAL at 09:04

## 2019-01-01 RX ADMIN — ROPINIROLE HYDROCHLORIDE 4 MG: 1 TABLET, FILM COATED ORAL at 09:33

## 2019-01-01 RX ADMIN — OXYCODONE AND ACETAMINOPHEN 1 TABLET: 5; 325 TABLET ORAL at 15:44

## 2019-01-01 RX ADMIN — ESCITALOPRAM OXALATE 10 MG: 10 TABLET, FILM COATED ORAL at 10:30

## 2019-01-01 RX ADMIN — SODIUM BICARBONATE 650 MG: 650 TABLET ORAL at 21:44

## 2019-01-01 RX ADMIN — MEROPENEM 500 MG: 500 INJECTION, POWDER, FOR SOLUTION INTRAVENOUS at 02:26

## 2019-01-01 RX ADMIN — SODIUM BICARBONATE 650 MG: 650 TABLET ORAL at 20:45

## 2019-01-01 RX ADMIN — FLUTICASONE PROPIONATE 1 SPRAY: 50 SPRAY, METERED NASAL at 09:34

## 2019-01-01 RX ADMIN — MEROPENEM 500 MG: 500 INJECTION, POWDER, FOR SOLUTION INTRAVENOUS at 03:20

## 2019-01-01 RX ADMIN — ATORVASTATIN CALCIUM 20 MG: 20 TABLET, FILM COATED ORAL at 08:47

## 2019-01-01 RX ADMIN — ZAFIRLUKAST 20 MG: 20 TABLET, COATED ORAL at 05:18

## 2019-01-01 RX ADMIN — CALCIUM CARBONATE-VITAMIN D TAB 500 MG-200 UNIT 2 TABLET: 500-200 TAB at 20:07

## 2019-01-01 RX ADMIN — Medication: at 11:27

## 2019-01-01 RX ADMIN — CALCIUM CARBONATE-VITAMIN D TAB 500 MG-200 UNIT 2 TABLET: 500-200 TAB at 22:13

## 2019-01-01 RX ADMIN — Medication 10 ML: at 21:55

## 2019-01-01 RX ADMIN — IPRATROPIUM BROMIDE AND ALBUTEROL SULFATE 1 AMPULE: .5; 3 SOLUTION RESPIRATORY (INHALATION) at 12:14

## 2019-01-01 RX ADMIN — Medication 1 CAPSULE: at 10:17

## 2019-01-01 RX ADMIN — ASPIRIN 81 MG 81 MG: 81 TABLET ORAL at 10:11

## 2019-01-01 RX ADMIN — SODIUM BICARBONATE 1300 MG: 650 TABLET ORAL at 21:35

## 2019-01-01 RX ADMIN — HEPARIN SODIUM 5000 UNITS: 5000 INJECTION INTRAVENOUS; SUBCUTANEOUS at 20:32

## 2019-01-01 RX ADMIN — Medication 30 G: at 11:03

## 2019-01-01 RX ADMIN — ESCITALOPRAM OXALATE 10 MG: 10 TABLET, FILM COATED ORAL at 08:30

## 2019-01-01 RX ADMIN — METOPROLOL SUCCINATE 50 MG: 50 TABLET, FILM COATED, EXTENDED RELEASE ORAL at 09:20

## 2019-01-01 RX ADMIN — Medication 1 CAPSULE: at 10:39

## 2019-01-01 RX ADMIN — BENZONATATE 100 MG: 100 CAPSULE ORAL at 11:23

## 2019-01-01 RX ADMIN — GUAIFENESIN 600 MG: 600 TABLET, EXTENDED RELEASE ORAL at 10:39

## 2019-01-01 RX ADMIN — OXYCODONE AND ACETAMINOPHEN 1 TABLET: 5; 325 TABLET ORAL at 16:29

## 2019-01-01 RX ADMIN — OXYCODONE AND ACETAMINOPHEN 1 TABLET: 5; 325 TABLET ORAL at 17:00

## 2019-01-01 RX ADMIN — ESCITALOPRAM OXALATE 10 MG: 10 TABLET, FILM COATED ORAL at 10:36

## 2019-01-01 RX ADMIN — LIDOCAINE HYDROCHLORIDE 1 DROP: 20 JELLY TOPICAL at 23:43

## 2019-01-01 RX ADMIN — OXYCODONE AND ACETAMINOPHEN 1 TABLET: 5; 325 TABLET ORAL at 09:44

## 2019-01-01 RX ADMIN — SODIUM CHLORIDE: 9 INJECTION, SOLUTION INTRAVENOUS at 09:33

## 2019-01-01 RX ADMIN — IPRATROPIUM BROMIDE AND ALBUTEROL SULFATE 1 AMPULE: .5; 3 SOLUTION RESPIRATORY (INHALATION) at 14:24

## 2019-01-01 RX ADMIN — ZAFIRLUKAST 20 MG: 20 TABLET, COATED ORAL at 17:47

## 2019-01-01 RX ADMIN — Medication 1 CAPSULE: at 08:21

## 2019-01-01 RX ADMIN — GUAIFENESIN 600 MG: 600 TABLET, EXTENDED RELEASE ORAL at 10:37

## 2019-01-01 RX ADMIN — Medication: at 21:54

## 2019-01-01 RX ADMIN — OXYCODONE AND ACETAMINOPHEN 1 TABLET: 5; 325 TABLET ORAL at 11:51

## 2019-01-01 RX ADMIN — ALPRAZOLAM 0.5 MG: 0.5 TABLET ORAL at 14:47

## 2019-01-01 RX ADMIN — SODIUM BICARBONATE 650 MG: 650 TABLET ORAL at 08:30

## 2019-01-01 RX ADMIN — FLUTICASONE PROPIONATE 1 SPRAY: 50 SPRAY, METERED NASAL at 10:33

## 2019-01-01 RX ADMIN — ZAFIRLUKAST 20 MG: 20 TABLET, FILM COATED ORAL at 06:38

## 2019-01-01 RX ADMIN — SODIUM BICARBONATE 650 MG: 650 TABLET ORAL at 21:30

## 2019-01-01 RX ADMIN — Medication: at 08:50

## 2019-01-01 RX ADMIN — GUAIFENESIN 600 MG: 600 TABLET, EXTENDED RELEASE ORAL at 08:20

## 2019-01-01 RX ADMIN — ESCITALOPRAM OXALATE 10 MG: 10 TABLET, FILM COATED ORAL at 08:47

## 2019-01-01 RX ADMIN — ALPRAZOLAM 0.5 MG: 0.5 TABLET ORAL at 23:49

## 2019-01-01 RX ADMIN — ACETYLCYSTEINE 600 MG: 200 SOLUTION ORAL; RESPIRATORY (INHALATION) at 09:15

## 2019-01-01 RX ADMIN — SODIUM CHLORIDE: 9 INJECTION, SOLUTION INTRAVENOUS at 02:36

## 2019-01-01 RX ADMIN — Medication 1 CAPSULE: at 09:08

## 2019-01-01 RX ADMIN — GUAIFENESIN 600 MG: 600 TABLET, EXTENDED RELEASE ORAL at 20:29

## 2019-01-01 RX ADMIN — FLUTICASONE PROPIONATE 1 SPRAY: 50 SPRAY, METERED NASAL at 10:01

## 2019-01-01 RX ADMIN — OXYCODONE AND ACETAMINOPHEN 1 TABLET: 5; 325 TABLET ORAL at 06:36

## 2019-01-01 RX ADMIN — HEPARIN SODIUM 5000 UNITS: 5000 INJECTION INTRAVENOUS; SUBCUTANEOUS at 22:16

## 2019-01-01 RX ADMIN — SODIUM BICARBONATE 650 MG: 650 TABLET ORAL at 21:55

## 2019-01-01 RX ADMIN — OXYCODONE AND ACETAMINOPHEN 1 TABLET: 5; 325 TABLET ORAL at 02:43

## 2019-01-01 RX ADMIN — ESCITALOPRAM OXALATE 10 MG: 10 TABLET, FILM COATED ORAL at 09:04

## 2019-01-01 RX ADMIN — FLUTICASONE PROPIONATE 2 SPRAY: 50 SPRAY, METERED NASAL at 08:29

## 2019-01-01 RX ADMIN — FLAVOXATE HYDROCHLORIDE 100 MG: 100 TABLET, FILM COATED ORAL at 08:46

## 2019-01-01 RX ADMIN — ISOSORBIDE MONONITRATE 30 MG: 30 TABLET ORAL at 10:37

## 2019-01-01 RX ADMIN — IPRATROPIUM BROMIDE AND ALBUTEROL SULFATE 1 AMPULE: .5; 3 SOLUTION RESPIRATORY (INHALATION) at 08:56

## 2019-01-01 RX ADMIN — SODIUM BICARBONATE 650 MG: 650 TABLET ORAL at 09:20

## 2019-01-01 RX ADMIN — CALCIUM CARBONATE-VITAMIN D TAB 500 MG-200 UNIT 2 TABLET: 500-200 TAB at 20:31

## 2019-01-01 RX ADMIN — ROPINIROLE HYDROCHLORIDE 4 MG: 1 TABLET, FILM COATED ORAL at 09:13

## 2019-01-01 RX ADMIN — CEFTAZIDIME, AVIBACTAM 0.94 G: 2; .5 POWDER, FOR SOLUTION INTRAVENOUS at 14:22

## 2019-01-01 RX ADMIN — ACETYLCYSTEINE 600 MG: 200 SOLUTION ORAL; RESPIRATORY (INHALATION) at 21:10

## 2019-01-01 RX ADMIN — ISOSORBIDE MONONITRATE 30 MG: 60 TABLET ORAL at 09:20

## 2019-01-01 RX ADMIN — ATORVASTATIN CALCIUM 20 MG: 20 TABLET, FILM COATED ORAL at 08:31

## 2019-01-01 RX ADMIN — Medication 1 CAPSULE: at 16:46

## 2019-01-01 RX ADMIN — IPRATROPIUM BROMIDE AND ALBUTEROL SULFATE 1 AMPULE: .5; 3 SOLUTION RESPIRATORY (INHALATION) at 20:07

## 2019-01-01 RX ADMIN — ALPRAZOLAM 0.5 MG: 0.5 TABLET ORAL at 20:39

## 2019-01-01 RX ADMIN — BUMETANIDE 0.5 MG: 1 TABLET ORAL at 09:04

## 2019-01-01 RX ADMIN — HYDRALAZINE HYDROCHLORIDE 5 MG: 20 INJECTION INTRAMUSCULAR; INTRAVENOUS at 18:40

## 2019-01-01 RX ADMIN — ISOSORBIDE MONONITRATE 30 MG: 30 TABLET ORAL at 10:00

## 2019-01-01 RX ADMIN — Medication: at 14:57

## 2019-01-01 RX ADMIN — OXYCODONE HYDROCHLORIDE 5 MG: 5 TABLET ORAL at 23:04

## 2019-01-01 RX ADMIN — SODIUM CHLORIDE: 9 INJECTION, SOLUTION INTRAVENOUS at 06:29

## 2019-01-01 RX ADMIN — BENZONATATE 100 MG: 100 CAPSULE ORAL at 09:04

## 2019-01-01 RX ADMIN — FLUTICASONE PROPIONATE 2 SPRAY: 50 SPRAY, METERED NASAL at 21:31

## 2019-01-01 RX ADMIN — Medication 10 ML: at 19:53

## 2019-01-01 RX ADMIN — OXYCODONE AND ACETAMINOPHEN 1 TABLET: 5; 325 TABLET ORAL at 12:38

## 2019-01-01 RX ADMIN — SODIUM BICARBONATE 650 MG: 650 TABLET ORAL at 09:29

## 2019-01-01 RX ADMIN — Medication 10 ML: at 08:30

## 2019-01-01 RX ADMIN — MEROPENEM 500 MG: 500 INJECTION, POWDER, FOR SOLUTION INTRAVENOUS at 15:18

## 2019-01-01 RX ADMIN — DICLOFENAC 2 G: 10 GEL TOPICAL at 20:07

## 2019-01-01 RX ADMIN — ROPINIROLE HYDROCHLORIDE 4 MG: 1 TABLET, FILM COATED ORAL at 08:46

## 2019-01-01 RX ADMIN — ZAFIRLUKAST 20 MG: 20 TABLET, FILM COATED ORAL at 06:19

## 2019-01-01 RX ADMIN — BUMETANIDE 0.5 MG: 1 TABLET ORAL at 09:38

## 2019-01-01 RX ADMIN — TIZANIDINE 4 MG: 4 TABLET ORAL at 20:38

## 2019-01-01 RX ADMIN — MEROPENEM 1 G: 1 INJECTION, POWDER, FOR SOLUTION INTRAVENOUS at 14:07

## 2019-01-01 RX ADMIN — ALPRAZOLAM 0.5 MG: 0.5 TABLET ORAL at 18:24

## 2019-01-01 RX ADMIN — ALPRAZOLAM 0.5 MG: 0.5 TABLET ORAL at 13:52

## 2019-01-01 RX ADMIN — ESCITALOPRAM OXALATE 10 MG: 10 TABLET, FILM COATED ORAL at 09:09

## 2019-01-01 RX ADMIN — ALPRAZOLAM 0.5 MG: 0.5 TABLET ORAL at 10:54

## 2019-01-01 RX ADMIN — FLUTICASONE PROPIONATE 1 SPRAY: 50 SPRAY, METERED NASAL at 08:47

## 2019-01-01 RX ADMIN — CEFTAZIDIME, AVIBACTAM 0.94 G: 2; .5 POWDER, FOR SOLUTION INTRAVENOUS at 14:32

## 2019-01-01 RX ADMIN — GUAIFENESIN 600 MG: 600 TABLET, EXTENDED RELEASE ORAL at 19:53

## 2019-01-01 RX ADMIN — DICLOFENAC 4 G: 10 GEL TOPICAL at 21:58

## 2019-01-01 RX ADMIN — ALPRAZOLAM 0.5 MG: 0.5 TABLET ORAL at 14:56

## 2019-01-01 RX ADMIN — TIZANIDINE 2 MG: 4 TABLET ORAL at 20:04

## 2019-01-01 RX ADMIN — METOPROLOL SUCCINATE 50 MG: 50 TABLET, FILM COATED, EXTENDED RELEASE ORAL at 09:29

## 2019-01-01 RX ADMIN — ROPINIROLE HYDROCHLORIDE 4 MG: 1 TABLET, FILM COATED ORAL at 18:34

## 2019-01-01 RX ADMIN — OXYCODONE AND ACETAMINOPHEN 1 TABLET: 5; 325 TABLET ORAL at 20:08

## 2019-01-01 RX ADMIN — ATORVASTATIN CALCIUM 20 MG: 20 TABLET, FILM COATED ORAL at 10:36

## 2019-01-01 RX ADMIN — OXYCODONE AND ACETAMINOPHEN 1 TABLET: 5; 325 TABLET ORAL at 00:57

## 2019-01-01 RX ADMIN — ZAFIRLUKAST 20 MG: 20 TABLET, FILM COATED ORAL at 16:43

## 2019-01-01 RX ADMIN — HEPARIN SODIUM 5000 UNITS: 5000 INJECTION INTRAVENOUS; SUBCUTANEOUS at 08:46

## 2019-01-01 RX ADMIN — GUAIFENESIN 600 MG: 600 TABLET, EXTENDED RELEASE ORAL at 09:22

## 2019-01-01 RX ADMIN — OXYCODONE AND ACETAMINOPHEN 1 TABLET: 5; 325 TABLET ORAL at 11:11

## 2019-01-01 RX ADMIN — ROPINIROLE HYDROCHLORIDE 4 MG: 1 TABLET, FILM COATED ORAL at 10:15

## 2019-01-01 RX ADMIN — ESCITALOPRAM OXALATE 10 MG: 10 TABLET, FILM COATED ORAL at 08:46

## 2019-01-01 RX ADMIN — OXYCODONE AND ACETAMINOPHEN 1 TABLET: 5; 325 TABLET ORAL at 00:55

## 2019-01-01 RX ADMIN — SODIUM CHLORIDE 250 ML: 9 INJECTION, SOLUTION INTRAVENOUS at 22:23

## 2019-01-01 RX ADMIN — FLUTICASONE PROPIONATE 1 SPRAY: 50 SPRAY, METERED NASAL at 22:12

## 2019-01-01 RX ADMIN — VANCOMYCIN HYDROCHLORIDE 1000 MG: 1 INJECTION, POWDER, LYOPHILIZED, FOR SOLUTION INTRAVENOUS at 05:39

## 2019-01-01 RX ADMIN — CALCIUM CARBONATE-VITAMIN D TAB 500 MG-200 UNIT 2 TABLET: 500-200 TAB at 20:04

## 2019-01-01 RX ADMIN — ZAFIRLUKAST 20 MG: 20 TABLET, COATED ORAL at 05:20

## 2019-01-01 RX ADMIN — CALCIUM CARBONATE-VITAMIN D TAB 500 MG-200 UNIT 2 TABLET: 500-200 TAB at 23:59

## 2019-01-01 RX ADMIN — BUMETANIDE 1 MG: 0.25 INJECTION INTRAMUSCULAR; INTRAVENOUS at 22:35

## 2019-01-01 RX ADMIN — SODIUM BICARBONATE 650 MG: 650 TABLET ORAL at 20:22

## 2019-01-01 RX ADMIN — FERROUS SULFATE TAB 325 MG (65 MG ELEMENTAL FE) 325 MG: 325 (65 FE) TAB at 10:00

## 2019-01-01 RX ADMIN — ALPRAZOLAM 0.5 MG: 0.5 TABLET ORAL at 13:37

## 2019-01-01 RX ADMIN — ALPRAZOLAM 0.5 MG: 0.5 TABLET ORAL at 10:01

## 2019-01-01 RX ADMIN — CALCIUM GLUCONATE 1 G: 98 INJECTION, SOLUTION INTRAVENOUS at 09:18

## 2019-01-01 RX ADMIN — OXYCODONE AND ACETAMINOPHEN 1 TABLET: 5; 325 TABLET ORAL at 17:42

## 2019-01-01 RX ADMIN — FLAVOXATE HYDROCHLORIDE 100 MG: 100 TABLET, FILM COATED ORAL at 20:39

## 2019-01-01 RX ADMIN — ALPRAZOLAM 0.5 MG: 0.5 TABLET ORAL at 22:20

## 2019-01-01 RX ADMIN — CETIRIZINE HYDROCHLORIDE 5 MG: 10 TABLET, FILM COATED ORAL at 08:46

## 2019-01-01 RX ADMIN — DICLOFENAC 4 G: 10 GEL TOPICAL at 20:22

## 2019-01-01 RX ADMIN — MEROPENEM 500 MG: 500 INJECTION, POWDER, FOR SOLUTION INTRAVENOUS at 01:32

## 2019-01-01 RX ADMIN — ESCITALOPRAM OXALATE 10 MG: 10 TABLET, FILM COATED ORAL at 09:34

## 2019-01-01 RX ADMIN — Medication 10 ML: at 09:36

## 2019-01-01 RX ADMIN — MECLIZINE 25 MG: 12.5 TABLET ORAL at 08:49

## 2019-01-01 RX ADMIN — ZAFIRLUKAST 20 MG: 20 TABLET, COATED ORAL at 05:17

## 2019-01-01 RX ADMIN — SODIUM BICARBONATE 1300 MG: 650 TABLET ORAL at 09:25

## 2019-01-01 RX ADMIN — CALCIUM GLUCONATE 1 G: 98 INJECTION, SOLUTION INTRAVENOUS at 15:00

## 2019-01-01 RX ADMIN — MECLIZINE HYDROCHLORIDE 25 MG: 12.5 TABLET, FILM COATED ORAL at 14:10

## 2019-01-01 RX ADMIN — SODIUM BICARBONATE 1300 MG: 650 TABLET ORAL at 20:07

## 2019-01-01 RX ADMIN — Medication 1 CAPSULE: at 09:27

## 2019-01-01 RX ADMIN — CALCIUM CARBONATE-VITAMIN D TAB 500 MG-200 UNIT 2 TABLET: 500-200 TAB at 08:30

## 2019-01-01 RX ADMIN — OXYCODONE AND ACETAMINOPHEN 1 TABLET: 5; 325 TABLET ORAL at 21:58

## 2019-01-01 RX ADMIN — GUAIFENESIN 600 MG: 600 TABLET, EXTENDED RELEASE ORAL at 09:14

## 2019-01-01 RX ADMIN — ROPINIROLE HYDROCHLORIDE 4 MG: 1 TABLET, FILM COATED ORAL at 09:04

## 2019-01-01 RX ADMIN — OXYCODONE AND ACETAMINOPHEN 1 TABLET: 5; 325 TABLET ORAL at 05:49

## 2019-01-01 RX ADMIN — DICLOFENAC 4 G: 10 GEL TOPICAL at 21:31

## 2019-01-01 RX ADMIN — ROPINIROLE HYDROCHLORIDE 4 MG: 1 TABLET, FILM COATED ORAL at 18:11

## 2019-01-01 RX ADMIN — FLUTICASONE PROPIONATE 1 SPRAY: 50 SPRAY, METERED NASAL at 20:07

## 2019-01-01 RX ADMIN — ZAFIRLUKAST 20 MG: 20 TABLET, FILM COATED ORAL at 16:18

## 2019-01-01 RX ADMIN — ROPINIROLE HYDROCHLORIDE 4 MG: 1 TABLET, FILM COATED ORAL at 10:38

## 2019-01-01 RX ADMIN — VITAMIN D, TAB 1000IU (100/BT) 2000 UNITS: 25 TAB at 09:09

## 2019-01-01 RX ADMIN — MAGNESIUM HYDROXIDE 30 ML: 400 SUSPENSION ORAL at 08:30

## 2019-01-01 RX ADMIN — CEFEPIME HYDROCHLORIDE 2 G: 2 INJECTION, POWDER, FOR SOLUTION INTRAVENOUS at 04:50

## 2019-01-01 RX ADMIN — CHLORASEPTIC 1 SPRAY: 1.5 LIQUID ORAL at 15:01

## 2019-01-01 RX ADMIN — GUAIFENESIN 600 MG: 600 TABLET, EXTENDED RELEASE ORAL at 20:39

## 2019-01-01 RX ADMIN — IPRATROPIUM BROMIDE AND ALBUTEROL SULFATE 1 AMPULE: .5; 3 SOLUTION RESPIRATORY (INHALATION) at 20:55

## 2019-01-01 RX ADMIN — FLAVOXATE HYDROCHLORIDE 100 MG: 100 TABLET, FILM COATED ORAL at 09:18

## 2019-01-01 RX ADMIN — ZAFIRLUKAST 20 MG: 20 TABLET, COATED ORAL at 06:00

## 2019-01-01 RX ADMIN — Medication 1 CAPSULE: at 17:13

## 2019-01-01 RX ADMIN — DICLOFENAC 4 G: 10 GEL TOPICAL at 06:27

## 2019-01-01 RX ADMIN — Medication 1 CAPSULE: at 17:38

## 2019-01-01 RX ADMIN — ROPINIROLE HYDROCHLORIDE 4 MG: 1 TABLET, FILM COATED ORAL at 17:44

## 2019-01-01 RX ADMIN — IPRATROPIUM BROMIDE AND ALBUTEROL SULFATE 1 AMPULE: .5; 3 SOLUTION RESPIRATORY (INHALATION) at 11:42

## 2019-01-01 RX ADMIN — CALCIUM GLUCONATE 1 G: 98 INJECTION, SOLUTION INTRAVENOUS at 12:11

## 2019-01-01 RX ADMIN — DICLOFENAC 2 G: 10 GEL TOPICAL at 08:49

## 2019-01-01 RX ADMIN — ALPRAZOLAM 0.5 MG: 0.5 TABLET ORAL at 10:37

## 2019-01-01 RX ADMIN — LIDOCAINE HYDROCHLORIDE 50 MG: 20 INJECTION, SOLUTION EPIDURAL; INFILTRATION; INTRACAUDAL; PERINEURAL at 17:02

## 2019-01-01 RX ADMIN — Medication 1 CAPSULE: at 18:34

## 2019-01-01 RX ADMIN — ESCITALOPRAM OXALATE 10 MG: 10 TABLET, FILM COATED ORAL at 09:21

## 2019-01-01 RX ADMIN — SODIUM BICARBONATE 650 MG: 650 TABLET ORAL at 20:38

## 2019-01-01 RX ADMIN — FLUTICASONE PROPIONATE 1 SPRAY: 50 SPRAY, METERED NASAL at 11:34

## 2019-01-01 RX ADMIN — OXYCODONE AND ACETAMINOPHEN 1 TABLET: 5; 325 TABLET ORAL at 01:38

## 2019-01-01 RX ADMIN — OXYCODONE AND ACETAMINOPHEN 1 TABLET: 5; 325 TABLET ORAL at 15:19

## 2019-01-01 RX ADMIN — CALCIUM GLUCONATE 1 G: 98 INJECTION, SOLUTION INTRAVENOUS at 15:18

## 2019-01-01 RX ADMIN — Medication 1 CAPSULE: at 09:20

## 2019-01-01 RX ADMIN — ROPINIROLE HYDROCHLORIDE 4 MG: 1 TABLET, FILM COATED ORAL at 09:21

## 2019-01-01 RX ADMIN — BUMETANIDE 0.5 MG: 1 TABLET ORAL at 09:29

## 2019-01-01 RX ADMIN — FERROUS SULFATE TAB 325 MG (65 MG ELEMENTAL FE) 325 MG: 325 (65 FE) TAB at 11:56

## 2019-01-01 RX ADMIN — CETIRIZINE HYDROCHLORIDE 5 MG: 10 TABLET, FILM COATED ORAL at 10:39

## 2019-01-01 RX ADMIN — SODIUM CHLORIDE: 9 INJECTION, SOLUTION INTRAVENOUS at 09:35

## 2019-01-01 RX ADMIN — GUAIFENESIN 600 MG: 600 TABLET, EXTENDED RELEASE ORAL at 10:29

## 2019-01-01 RX ADMIN — FLUTICASONE PROPIONATE 2 SPRAY: 50 SPRAY, METERED NASAL at 10:04

## 2019-01-01 RX ADMIN — OXYCODONE AND ACETAMINOPHEN 1 TABLET: 5; 325 TABLET ORAL at 21:35

## 2019-01-01 RX ADMIN — CETIRIZINE HYDROCHLORIDE 5 MG: 10 TABLET, FILM COATED ORAL at 08:47

## 2019-01-01 RX ADMIN — SODIUM BICARBONATE 650 MG: 650 TABLET ORAL at 12:08

## 2019-01-01 RX ADMIN — Medication: at 20:37

## 2019-01-01 RX ADMIN — FLAVOXATE HYDROCHLORIDE 100 MG: 100 TABLET, FILM COATED ORAL at 10:57

## 2019-01-01 RX ADMIN — TIZANIDINE 2 MG: 4 TABLET ORAL at 21:54

## 2019-01-01 RX ADMIN — GUAIFENESIN 600 MG: 600 TABLET, EXTENDED RELEASE ORAL at 20:41

## 2019-01-01 RX ADMIN — GUAIFENESIN 600 MG: 600 TABLET, EXTENDED RELEASE ORAL at 21:08

## 2019-01-01 RX ADMIN — TIZANIDINE 2 MG: 4 TABLET ORAL at 20:45

## 2019-01-01 RX ADMIN — TIZANIDINE 4 MG: 4 TABLET ORAL at 19:53

## 2019-01-01 RX ADMIN — DICLOFENAC 2 G: 10 GEL TOPICAL at 10:02

## 2019-01-01 RX ADMIN — ZAFIRLUKAST 20 MG: 20 TABLET, COATED ORAL at 15:05

## 2019-01-01 RX ADMIN — OXYCODONE AND ACETAMINOPHEN 1 TABLET: 5; 325 TABLET ORAL at 11:59

## 2019-01-01 RX ADMIN — OXYCODONE AND ACETAMINOPHEN 1 TABLET: 5; 325 TABLET ORAL at 17:37

## 2019-01-01 RX ADMIN — Medication 1 CAPSULE: at 09:29

## 2019-01-01 RX ADMIN — SODIUM BICARBONATE 1300 MG: 650 TABLET ORAL at 10:15

## 2019-01-01 RX ADMIN — OXYCODONE AND ACETAMINOPHEN 1 TABLET: 5; 325 TABLET ORAL at 04:19

## 2019-01-01 RX ADMIN — CALCIUM CARBONATE-VITAMIN D TAB 500 MG-200 UNIT 2 TABLET: 500-200 TAB at 21:54

## 2019-01-01 RX ADMIN — Medication 10 ML: at 09:28

## 2019-01-01 RX ADMIN — ALPRAZOLAM 0.5 MG: 0.5 TABLET ORAL at 09:32

## 2019-01-01 RX ADMIN — VITAMIN D, TAB 1000IU (100/BT) 2000 UNITS: 25 TAB at 09:21

## 2019-01-01 RX ADMIN — ALPRAZOLAM 0.5 MG: 0.5 TABLET ORAL at 09:25

## 2019-01-01 RX ADMIN — OXYCODONE AND ACETAMINOPHEN 1 TABLET: 5; 325 TABLET ORAL at 04:22

## 2019-01-01 RX ADMIN — ALPRAZOLAM 0.5 MG: 0.5 TABLET ORAL at 21:54

## 2019-01-01 RX ADMIN — Medication 1 CAPSULE: at 16:43

## 2019-01-01 RX ADMIN — DICLOFENAC 2 G: 10 GEL TOPICAL at 10:11

## 2019-01-01 RX ADMIN — ATORVASTATIN CALCIUM 20 MG: 20 TABLET, FILM COATED ORAL at 09:34

## 2019-01-01 RX ADMIN — ROPINIROLE HYDROCHLORIDE 4 MG: 1 TABLET, FILM COATED ORAL at 09:19

## 2019-01-01 RX ADMIN — CEFTRIAXONE SODIUM 1 G: 1 INJECTION, POWDER, FOR SOLUTION INTRAMUSCULAR; INTRAVENOUS at 00:53

## 2019-01-01 RX ADMIN — ACETAMINOPHEN 650 MG: 325 TABLET ORAL at 12:34

## 2019-01-01 RX ADMIN — Medication: at 16:00

## 2019-01-01 RX ADMIN — DOCUSATE SODIUM 100 MG: 100 CAPSULE, LIQUID FILLED ORAL at 08:30

## 2019-01-01 RX ADMIN — Medication: at 22:14

## 2019-01-01 RX ADMIN — DICLOFENAC 2 G: 10 GEL TOPICAL at 09:26

## 2019-01-01 RX ADMIN — FLUTICASONE PROPIONATE 2 SPRAY: 50 SPRAY, METERED NASAL at 22:30

## 2019-01-01 RX ADMIN — ALPRAZOLAM 0.5 MG: 0.5 TABLET ORAL at 09:10

## 2019-01-01 RX ADMIN — CETIRIZINE HYDROCHLORIDE 5 MG: 10 TABLET, FILM COATED ORAL at 09:59

## 2019-01-01 RX ADMIN — FLUTICASONE PROPIONATE 2 SPRAY: 50 SPRAY, METERED NASAL at 20:28

## 2019-01-01 RX ADMIN — EPHEDRINE SULFATE 10 MG: 50 INJECTION, SOLUTION INTRAVENOUS at 17:15

## 2019-01-01 RX ADMIN — CHLORASEPTIC 1 SPRAY: 1.5 LIQUID ORAL at 04:54

## 2019-01-01 RX ADMIN — CALCIUM CARBONATE-VITAMIN D TAB 500 MG-200 UNIT 2 TABLET: 500-200 TAB at 19:54

## 2019-01-01 RX ADMIN — DICLOFENAC 2 G: 10 GEL TOPICAL at 12:48

## 2019-01-01 RX ADMIN — Medication: at 14:11

## 2019-01-01 RX ADMIN — EPHEDRINE SULFATE 10 MG: 50 INJECTION, SOLUTION INTRAVENOUS at 17:13

## 2019-01-01 RX ADMIN — GUAIFENESIN 600 MG: 600 TABLET, EXTENDED RELEASE ORAL at 20:40

## 2019-01-01 RX ADMIN — METOPROLOL SUCCINATE 50 MG: 50 TABLET, FILM COATED, EXTENDED RELEASE ORAL at 10:04

## 2019-01-01 RX ADMIN — FLUTICASONE PROPIONATE 2 SPRAY: 50 SPRAY, METERED NASAL at 19:52

## 2019-01-01 RX ADMIN — IPRATROPIUM BROMIDE AND ALBUTEROL SULFATE 1 AMPULE: .5; 3 SOLUTION RESPIRATORY (INHALATION) at 09:53

## 2019-01-01 RX ADMIN — Medication 10 ML: at 09:32

## 2019-01-01 RX ADMIN — Medication 10 ML: at 21:04

## 2019-01-01 RX ADMIN — SODIUM BICARBONATE 1300 MG: 650 TABLET ORAL at 10:37

## 2019-01-01 RX ADMIN — ALPRAZOLAM 0.5 MG: 0.5 TABLET ORAL at 13:58

## 2019-01-01 RX ADMIN — Medication: at 08:09

## 2019-01-01 RX ADMIN — CALCIUM CARBONATE-VITAMIN D TAB 500 MG-200 UNIT 2 TABLET: 500-200 TAB at 10:36

## 2019-01-01 RX ADMIN — SODIUM BICARBONATE 650 MG: 650 TABLET ORAL at 10:10

## 2019-01-01 RX ADMIN — PANTOPRAZOLE SODIUM 40 MG: 40 TABLET, DELAYED RELEASE ORAL at 06:15

## 2019-01-01 RX ADMIN — Medication 1 CAPSULE: at 09:05

## 2019-01-01 RX ADMIN — ATORVASTATIN CALCIUM 20 MG: 20 TABLET, FILM COATED ORAL at 09:37

## 2019-01-01 RX ADMIN — ZAFIRLUKAST 20 MG: 20 TABLET, COATED ORAL at 05:50

## 2019-01-01 RX ADMIN — DICLOFENAC 4 G: 10 GEL TOPICAL at 20:30

## 2019-01-01 RX ADMIN — ALPRAZOLAM 0.5 MG: 0.5 TABLET ORAL at 08:47

## 2019-01-01 RX ADMIN — MEROPENEM 500 MG: 500 INJECTION, POWDER, FOR SOLUTION INTRAVENOUS at 02:10

## 2019-01-01 RX ADMIN — FLUTICASONE PROPIONATE 2 SPRAY: 50 SPRAY, METERED NASAL at 20:16

## 2019-01-01 RX ADMIN — Medication 1 CAPSULE: at 17:49

## 2019-01-01 RX ADMIN — CALCIUM CARBONATE-VITAMIN D TAB 500 MG-200 UNIT 2 TABLET: 500-200 TAB at 09:58

## 2019-01-01 RX ADMIN — IPRATROPIUM BROMIDE AND ALBUTEROL SULFATE 1 AMPULE: .5; 3 SOLUTION RESPIRATORY (INHALATION) at 10:12

## 2019-01-01 RX ADMIN — GUAIFENESIN 600 MG: 600 TABLET, EXTENDED RELEASE ORAL at 20:07

## 2019-01-01 RX ADMIN — CALCIUM GLUCONATE 1 G: 98 INJECTION, SOLUTION INTRAVENOUS at 11:07

## 2019-01-01 RX ADMIN — ALPRAZOLAM 0.5 MG: 0.5 TABLET ORAL at 14:02

## 2019-01-01 RX ADMIN — OXYCODONE AND ACETAMINOPHEN 1 TABLET: 5; 325 TABLET ORAL at 15:14

## 2019-01-01 RX ADMIN — CALCIUM CARBONATE-VITAMIN D TAB 500 MG-200 UNIT 2 TABLET: 500-200 TAB at 08:20

## 2019-01-01 RX ADMIN — ESCITALOPRAM OXALATE 10 MG: 10 TABLET, FILM COATED ORAL at 10:10

## 2019-01-01 RX ADMIN — SODIUM BICARBONATE 650 MG: 650 TABLET ORAL at 09:21

## 2019-01-01 RX ADMIN — SODIUM BICARBONATE 650 MG: 650 TABLET ORAL at 20:39

## 2019-01-01 RX ADMIN — ROPINIROLE HYDROCHLORIDE 4 MG: 1 TABLET, FILM COATED ORAL at 08:30

## 2019-01-01 RX ADMIN — MECLIZINE 25 MG: 12.5 TABLET ORAL at 12:02

## 2019-01-01 RX ADMIN — GUAIFENESIN 600 MG: 600 TABLET, EXTENDED RELEASE ORAL at 20:38

## 2019-01-01 RX ADMIN — ATORVASTATIN CALCIUM 20 MG: 20 TABLET, FILM COATED ORAL at 10:10

## 2019-01-01 RX ADMIN — Medication 10 ML: at 10:02

## 2019-01-01 RX ADMIN — TIZANIDINE 2 MG: 4 TABLET ORAL at 20:31

## 2019-01-01 RX ADMIN — Medication 10 ML: at 20:47

## 2019-01-01 RX ADMIN — METOPROLOL SUCCINATE 50 MG: 50 TABLET, FILM COATED, EXTENDED RELEASE ORAL at 09:37

## 2019-01-01 RX ADMIN — ALPRAZOLAM 0.5 MG: 0.5 TABLET ORAL at 10:10

## 2019-01-01 RX ADMIN — FLUTICASONE PROPIONATE 2 SPRAY: 50 SPRAY, METERED NASAL at 21:44

## 2019-01-01 RX ADMIN — Medication: at 14:23

## 2019-01-01 RX ADMIN — GUAIFENESIN 600 MG: 600 TABLET, EXTENDED RELEASE ORAL at 10:04

## 2019-01-01 RX ADMIN — DOCUSATE SODIUM 100 MG: 100 CAPSULE, LIQUID FILLED ORAL at 09:36

## 2019-01-01 RX ADMIN — ALPRAZOLAM 0.5 MG: 0.5 TABLET ORAL at 22:13

## 2019-01-01 RX ADMIN — GUAIFENESIN 600 MG: 600 TABLET, EXTENDED RELEASE ORAL at 09:21

## 2019-01-01 RX ADMIN — GUAIFENESIN 600 MG: 600 TABLET, EXTENDED RELEASE ORAL at 09:29

## 2019-01-01 RX ADMIN — DICLOFENAC 2 G: 10 GEL TOPICAL at 10:33

## 2019-01-01 RX ADMIN — FLUTICASONE PROPIONATE 1 SPRAY: 50 SPRAY, METERED NASAL at 20:33

## 2019-01-01 RX ADMIN — CALCIUM GLUCONATE 1 G: 98 INJECTION, SOLUTION INTRAVENOUS at 02:26

## 2019-01-01 RX ADMIN — Medication 10 ML: at 10:00

## 2019-01-01 RX ADMIN — OXYCODONE AND ACETAMINOPHEN 1 TABLET: 5; 325 TABLET ORAL at 01:13

## 2019-01-01 RX ADMIN — CALCIUM CARBONATE-VITAMIN D TAB 500 MG-200 UNIT 2 TABLET: 500-200 TAB at 20:38

## 2019-01-01 RX ADMIN — Medication 10 ML: at 20:39

## 2019-01-01 RX ADMIN — OXYCODONE AND ACETAMINOPHEN 1 TABLET: 5; 325 TABLET ORAL at 08:46

## 2019-01-01 RX ADMIN — GUAIFENESIN 600 MG: 600 TABLET, EXTENDED RELEASE ORAL at 20:22

## 2019-01-01 RX ADMIN — PANTOPRAZOLE SODIUM 40 MG: 40 TABLET, DELAYED RELEASE ORAL at 06:29

## 2019-01-01 RX ADMIN — SODIUM BICARBONATE 650 MG: 650 TABLET ORAL at 22:24

## 2019-01-01 RX ADMIN — FLAVOXATE HYDROCHLORIDE 100 MG: 100 TABLET, FILM COATED ORAL at 00:46

## 2019-01-01 RX ADMIN — ESCITALOPRAM OXALATE 10 MG: 10 TABLET, FILM COATED ORAL at 10:15

## 2019-01-01 RX ADMIN — ROPINIROLE HYDROCHLORIDE 4 MG: 1 TABLET, FILM COATED ORAL at 21:55

## 2019-01-01 RX ADMIN — FLUTICASONE PROPIONATE 1 SPRAY: 50 SPRAY, METERED NASAL at 21:54

## 2019-01-01 RX ADMIN — GUAIFENESIN 600 MG: 600 TABLET, EXTENDED RELEASE ORAL at 09:24

## 2019-01-01 RX ADMIN — CALCIUM CARBONATE-VITAMIN D TAB 500 MG-200 UNIT 2 TABLET: 500-200 TAB at 22:09

## 2019-01-01 RX ADMIN — ESCITALOPRAM OXALATE 10 MG: 10 TABLET, FILM COATED ORAL at 09:26

## 2019-01-01 RX ADMIN — MEROPENEM 500 MG: 500 INJECTION, POWDER, FOR SOLUTION INTRAVENOUS at 14:00

## 2019-01-01 RX ADMIN — ROPINIROLE HYDROCHLORIDE 4 MG: 1 TABLET, FILM COATED ORAL at 09:25

## 2019-01-01 RX ADMIN — CALCIUM CARBONATE-VITAMIN D TAB 500 MG-200 UNIT 2 TABLET: 500-200 TAB at 20:44

## 2019-01-01 RX ADMIN — MEROPENEM 500 MG: 500 INJECTION, POWDER, FOR SOLUTION INTRAVENOUS at 02:04

## 2019-01-01 RX ADMIN — MEROPENEM 500 MG: 500 INJECTION, POWDER, FOR SOLUTION INTRAVENOUS at 04:51

## 2019-01-01 RX ADMIN — IPRATROPIUM BROMIDE AND ALBUTEROL SULFATE 1 AMPULE: .5; 3 SOLUTION RESPIRATORY (INHALATION) at 12:10

## 2019-01-01 RX ADMIN — FLAVOXATE HYDROCHLORIDE 100 MG: 100 TABLET, FILM COATED ORAL at 22:18

## 2019-01-01 RX ADMIN — LIDOCAINE: 50 OINTMENT TOPICAL at 10:18

## 2019-01-01 RX ADMIN — ROPINIROLE HYDROCHLORIDE 4 MG: 1 TABLET, FILM COATED ORAL at 09:58

## 2019-01-01 RX ADMIN — Medication 10 ML: at 21:44

## 2019-01-01 RX ADMIN — SODIUM BICARBONATE 650 MG: 650 TABLET ORAL at 20:04

## 2019-01-01 RX ADMIN — OXYCODONE AND ACETAMINOPHEN 1 TABLET: 5; 325 TABLET ORAL at 10:25

## 2019-01-01 RX ADMIN — ISOSORBIDE MONONITRATE 30 MG: 60 TABLET ORAL at 09:21

## 2019-01-01 RX ADMIN — FLUTICASONE PROPIONATE 2 SPRAY: 50 SPRAY, METERED NASAL at 09:32

## 2019-01-01 RX ADMIN — CEFEPIME HYDROCHLORIDE 2 G: 2 INJECTION, POWDER, FOR SOLUTION INTRAVENOUS at 17:14

## 2019-01-01 RX ADMIN — Medication: at 15:21

## 2019-01-01 RX ADMIN — Medication 1 CAPSULE: at 17:22

## 2019-01-01 RX ADMIN — SODIUM BICARBONATE 650 MG: 650 TABLET ORAL at 09:34

## 2019-01-01 RX ADMIN — GUAIFENESIN 600 MG: 600 TABLET, EXTENDED RELEASE ORAL at 08:31

## 2019-01-01 RX ADMIN — OXYCODONE AND ACETAMINOPHEN 1 TABLET: 5; 325 TABLET ORAL at 11:12

## 2019-01-01 RX ADMIN — FERROUS SULFATE TAB 325 MG (65 MG ELEMENTAL FE) 325 MG: 325 (65 FE) TAB at 08:46

## 2019-01-01 RX ADMIN — SODIUM BICARBONATE 650 MG: 650 TABLET ORAL at 21:53

## 2019-01-01 RX ADMIN — Medication 10 ML: at 21:08

## 2019-01-01 RX ADMIN — OXYCODONE AND ACETAMINOPHEN 1 TABLET: 5; 325 TABLET ORAL at 17:22

## 2019-01-01 RX ADMIN — IPRATROPIUM BROMIDE AND ALBUTEROL SULFATE 1 AMPULE: .5; 3 SOLUTION RESPIRATORY (INHALATION) at 09:15

## 2019-01-01 RX ADMIN — OXYCODONE AND ACETAMINOPHEN 1 TABLET: 5; 325 TABLET ORAL at 03:53

## 2019-01-01 RX ADMIN — GUAIFENESIN 600 MG: 600 TABLET, EXTENDED RELEASE ORAL at 20:45

## 2019-01-01 RX ADMIN — DOCUSATE SODIUM 100 MG: 100 CAPSULE, LIQUID FILLED ORAL at 10:29

## 2019-01-01 RX ADMIN — METOPROLOL SUCCINATE 50 MG: 50 TABLET, FILM COATED, EXTENDED RELEASE ORAL at 09:04

## 2019-01-01 RX ADMIN — ROPINIROLE HYDROCHLORIDE 4 MG: 1 TABLET, FILM COATED ORAL at 09:24

## 2019-01-01 RX ADMIN — ESCITALOPRAM OXALATE 10 MG: 10 TABLET, FILM COATED ORAL at 10:04

## 2019-01-01 RX ADMIN — CEFDINIR 300 MG: 300 CAPSULE ORAL at 23:22

## 2019-01-01 RX ADMIN — SODIUM CHLORIDE: 9 INJECTION, SOLUTION INTRAVENOUS at 15:15

## 2019-01-01 RX ADMIN — TIZANIDINE 4 MG: 4 TABLET ORAL at 21:08

## 2019-01-01 RX ADMIN — LIDOCAINE: 50 OINTMENT TOPICAL at 04:54

## 2019-01-01 RX ADMIN — ROPINIROLE HYDROCHLORIDE 4 MG: 1 TABLET, FILM COATED ORAL at 21:35

## 2019-01-01 RX ADMIN — TIZANIDINE 2 MG: 4 TABLET ORAL at 22:06

## 2019-01-01 RX ADMIN — OXYCODONE AND ACETAMINOPHEN 1 TABLET: 5; 325 TABLET ORAL at 17:44

## 2019-01-01 RX ADMIN — LIDOCAINE HYDROCHLORIDE 1 DROP: 20 JELLY TOPICAL at 05:18

## 2019-01-01 RX ADMIN — ZAFIRLUKAST 20 MG: 20 TABLET, FILM COATED ORAL at 18:53

## 2019-01-01 RX ADMIN — IRON SUCROSE 200 MG: 20 INJECTION, SOLUTION INTRAVENOUS at 11:07

## 2019-01-01 RX ADMIN — TIZANIDINE 2 MG: 4 TABLET ORAL at 21:04

## 2019-01-01 RX ADMIN — MEROPENEM 500 MG: 500 INJECTION, POWDER, FOR SOLUTION INTRAVENOUS at 01:48

## 2019-01-01 RX ADMIN — Medication 1 CAPSULE: at 08:30

## 2019-01-01 RX ADMIN — Medication 1 CAPSULE: at 22:24

## 2019-01-01 RX ADMIN — Medication: at 20:33

## 2019-01-01 RX ADMIN — TIZANIDINE 4 MG: 4 TABLET ORAL at 20:16

## 2019-01-01 RX ADMIN — TIZANIDINE 2 MG: 4 TABLET ORAL at 20:29

## 2019-01-01 RX ADMIN — MEROPENEM 500 MG: 500 INJECTION, POWDER, FOR SOLUTION INTRAVENOUS at 15:23

## 2019-01-01 RX ADMIN — ATORVASTATIN CALCIUM 20 MG: 20 TABLET, FILM COATED ORAL at 09:21

## 2019-01-01 RX ADMIN — METOPROLOL SUCCINATE 25 MG: 25 TABLET, FILM COATED, EXTENDED RELEASE ORAL at 13:53

## 2019-01-01 RX ADMIN — ISOSORBIDE MONONITRATE 30 MG: 60 TABLET ORAL at 09:29

## 2019-01-01 RX ADMIN — DICLOFENAC 4 G: 10 GEL TOPICAL at 09:21

## 2019-01-01 RX ADMIN — SODIUM BICARBONATE 1300 MG: 650 TABLET ORAL at 21:27

## 2019-01-01 RX ADMIN — DICLOFENAC 2 G: 10 GEL TOPICAL at 20:40

## 2019-01-01 RX ADMIN — MIDODRINE HYDROCHLORIDE 5 MG: 5 TABLET ORAL at 08:30

## 2019-01-01 RX ADMIN — SODIUM BICARBONATE 650 MG: 650 TABLET ORAL at 09:13

## 2019-01-01 RX ADMIN — OXYCODONE AND ACETAMINOPHEN 1 TABLET: 5; 325 TABLET ORAL at 17:13

## 2019-01-01 RX ADMIN — MEROPENEM 500 MG: 500 INJECTION, POWDER, FOR SOLUTION INTRAVENOUS at 15:12

## 2019-01-01 RX ADMIN — CEFTAZIDIME, AVIBACTAM 0.94 G: 2; .5 POWDER, FOR SOLUTION INTRAVENOUS at 04:22

## 2019-01-01 RX ADMIN — OXYCODONE AND ACETAMINOPHEN 1 TABLET: 5; 325 TABLET ORAL at 03:35

## 2019-01-01 RX ADMIN — ALPRAZOLAM 0.5 MG: 0.5 TABLET ORAL at 19:24

## 2019-01-01 RX ADMIN — GUAIFENESIN 600 MG: 600 TABLET, EXTENDED RELEASE ORAL at 09:37

## 2019-01-01 RX ADMIN — Medication 10 ML: at 22:15

## 2019-01-01 RX ADMIN — FLUTICASONE PROPIONATE 2 SPRAY: 50 SPRAY, METERED NASAL at 09:21

## 2019-01-01 RX ADMIN — OXYCODONE AND ACETAMINOPHEN 1 TABLET: 5; 325 TABLET ORAL at 18:35

## 2019-01-01 RX ADMIN — CALCIUM CARBONATE-VITAMIN D TAB 500 MG-200 UNIT 2 TABLET: 500-200 TAB at 21:44

## 2019-01-01 RX ADMIN — Medication 1 CAPSULE: at 16:22

## 2019-01-01 RX ADMIN — CALCIUM CARBONATE-VITAMIN D TAB 500 MG-200 UNIT 2 TABLET: 500-200 TAB at 21:33

## 2019-01-01 RX ADMIN — MEROPENEM 500 MG: 500 INJECTION, POWDER, FOR SOLUTION INTRAVENOUS at 14:39

## 2019-01-01 RX ADMIN — ALPRAZOLAM 0.5 MG: 0.5 TABLET ORAL at 15:41

## 2019-01-01 RX ADMIN — FLUTICASONE PROPIONATE 1 SPRAY: 50 SPRAY, METERED NASAL at 21:36

## 2019-01-01 RX ADMIN — DICLOFENAC 4 G: 10 GEL TOPICAL at 05:46

## 2019-01-01 RX ADMIN — SODIUM CHLORIDE: 9 INJECTION, SOLUTION INTRAVENOUS at 16:55

## 2019-01-01 RX ADMIN — ROPINIROLE HYDROCHLORIDE 4 MG: 1 TABLET, FILM COATED ORAL at 08:21

## 2019-01-01 RX ADMIN — Medication 10 ML: at 20:33

## 2019-01-01 RX ADMIN — SODIUM BICARBONATE 650 MG: 650 TABLET ORAL at 20:16

## 2019-01-01 RX ADMIN — ESCITALOPRAM OXALATE 10 MG: 10 TABLET, FILM COATED ORAL at 09:29

## 2019-01-01 RX ADMIN — LOPERAMIDE HYDROCHLORIDE 2 MG: 2 CAPSULE ORAL at 16:32

## 2019-01-01 RX ADMIN — OXYCODONE AND ACETAMINOPHEN 1 TABLET: 5; 325 TABLET ORAL at 21:27

## 2019-01-01 RX ADMIN — ATORVASTATIN CALCIUM 20 MG: 20 TABLET, FILM COATED ORAL at 09:22

## 2019-01-01 RX ADMIN — ESCITALOPRAM OXALATE 10 MG: 10 TABLET, FILM COATED ORAL at 09:13

## 2019-01-01 RX ADMIN — Medication 10 ML: at 20:07

## 2019-01-01 RX ADMIN — METOPROLOL SUCCINATE 25 MG: 25 TABLET, FILM COATED, EXTENDED RELEASE ORAL at 10:35

## 2019-01-01 RX ADMIN — CALCIUM CARBONATE-VITAMIN D TAB 500 MG-200 UNIT 2 TABLET: 500-200 TAB at 21:30

## 2019-01-01 RX ADMIN — ATORVASTATIN CALCIUM 20 MG: 20 TABLET, FILM COATED ORAL at 10:30

## 2019-01-01 RX ADMIN — DICLOFENAC 4 G: 10 GEL TOPICAL at 10:34

## 2019-01-01 RX ADMIN — SODIUM BICARBONATE 1300 MG: 650 TABLET ORAL at 08:46

## 2019-01-01 RX ADMIN — Medication 10 ML: at 20:38

## 2019-01-01 RX ADMIN — SODIUM BICARBONATE 1300 MG: 650 TABLET ORAL at 22:09

## 2019-01-01 RX ADMIN — OXYCODONE AND ACETAMINOPHEN 1 TABLET: 5; 325 TABLET ORAL at 17:29

## 2019-01-01 RX ADMIN — IPRATROPIUM BROMIDE AND ALBUTEROL SULFATE 1 AMPULE: .5; 3 SOLUTION RESPIRATORY (INHALATION) at 19:43

## 2019-01-01 RX ADMIN — OXYCODONE AND ACETAMINOPHEN 1 TABLET: 5; 325 TABLET ORAL at 06:32

## 2019-01-01 RX ADMIN — ROPINIROLE HYDROCHLORIDE 4 MG: 1 TABLET, FILM COATED ORAL at 12:48

## 2019-01-01 RX ADMIN — SODIUM BICARBONATE 650 MG: 650 TABLET ORAL at 22:13

## 2019-01-01 RX ADMIN — LORAZEPAM 2 MG: 2 TABLET ORAL at 20:56

## 2019-01-01 RX ADMIN — MEROPENEM 500 MG: 500 INJECTION, POWDER, FOR SOLUTION INTRAVENOUS at 04:02

## 2019-01-01 RX ADMIN — ALPRAZOLAM 0.5 MG: 0.5 TABLET ORAL at 15:35

## 2019-01-01 RX ADMIN — LIDOCAINE HYDROCHLORIDE 1 DROP: 20 JELLY TOPICAL at 21:54

## 2019-01-01 RX ADMIN — ZAFIRLUKAST 20 MG: 20 TABLET, FILM COATED ORAL at 15:30

## 2019-01-01 RX ADMIN — DOCUSATE SODIUM 100 MG: 100 CAPSULE, LIQUID FILLED ORAL at 10:04

## 2019-01-01 RX ADMIN — Medication: at 16:09

## 2019-01-01 RX ADMIN — Medication 10 ML: at 22:18

## 2019-01-01 RX ADMIN — ATORVASTATIN CALCIUM 20 MG: 20 TABLET, FILM COATED ORAL at 10:37

## 2019-01-01 RX ADMIN — VITAMIN D, TAB 1000IU (100/BT) 2000 UNITS: 25 TAB at 09:13

## 2019-01-01 RX ADMIN — OXYCODONE AND ACETAMINOPHEN 1 TABLET: 5; 325 TABLET ORAL at 00:48

## 2019-01-01 RX ADMIN — HEPARIN SODIUM 5000 UNITS: 5000 INJECTION INTRAVENOUS; SUBCUTANEOUS at 23:15

## 2019-01-01 RX ADMIN — Medication 1 CAPSULE: at 17:47

## 2019-01-01 RX ADMIN — IPRATROPIUM BROMIDE AND ALBUTEROL SULFATE 1 AMPULE: .5; 3 SOLUTION RESPIRATORY (INHALATION) at 15:44

## 2019-01-01 RX ADMIN — CALCIUM CARBONATE-VITAMIN D TAB 500 MG-200 UNIT 2 TABLET: 500-200 TAB at 10:17

## 2019-01-01 RX ADMIN — INSULIN HUMAN 6 UNITS: 100 INJECTION, SOLUTION PARENTERAL at 11:08

## 2019-01-01 RX ADMIN — OXYCODONE AND ACETAMINOPHEN 1 TABLET: 5; 325 TABLET ORAL at 00:56

## 2019-01-01 RX ADMIN — DICLOFENAC 2 G: 10 GEL TOPICAL at 21:29

## 2019-01-01 RX ADMIN — OXYCODONE HYDROCHLORIDE 5 MG: 5 TABLET ORAL at 23:49

## 2019-01-01 RX ADMIN — ZAFIRLUKAST 20 MG: 20 TABLET, FILM COATED ORAL at 16:36

## 2019-01-01 RX ADMIN — VITAMIN D, TAB 1000IU (100/BT) 2000 UNITS: 25 TAB at 10:29

## 2019-01-01 RX ADMIN — ROPINIROLE HYDROCHLORIDE 4 MG: 1 TABLET, FILM COATED ORAL at 20:40

## 2019-01-01 RX ADMIN — ALPRAZOLAM 0.5 MG: 0.5 TABLET ORAL at 15:21

## 2019-01-01 RX ADMIN — PANTOPRAZOLE SODIUM 40 MG: 40 TABLET, DELAYED RELEASE ORAL at 10:37

## 2019-01-01 RX ADMIN — FERROUS SULFATE TAB 325 MG (65 MG ELEMENTAL FE) 325 MG: 325 (65 FE) TAB at 10:37

## 2019-01-01 RX ADMIN — CALCIUM CARBONATE-VITAMIN D TAB 500 MG-200 UNIT 2 TABLET: 500-200 TAB at 09:24

## 2019-01-01 RX ADMIN — FLUTICASONE PROPIONATE 1 SPRAY: 50 SPRAY, METERED NASAL at 22:13

## 2019-01-01 RX ADMIN — CALCIUM CARBONATE-VITAMIN D TAB 500 MG-200 UNIT 2 TABLET: 500-200 TAB at 21:31

## 2019-01-01 RX ADMIN — IPRATROPIUM BROMIDE AND ALBUTEROL SULFATE 1 AMPULE: .5; 3 SOLUTION RESPIRATORY (INHALATION) at 21:42

## 2019-01-01 RX ADMIN — DICLOFENAC 2 G: 10 GEL TOPICAL at 10:39

## 2019-01-01 RX ADMIN — FLUTICASONE PROPIONATE 2 SPRAY: 50 SPRAY, METERED NASAL at 09:37

## 2019-01-01 RX ADMIN — CALCIUM CARBONATE-VITAMIN D TAB 500 MG-200 UNIT 2 TABLET: 500-200 TAB at 10:37

## 2019-01-01 RX ADMIN — DICLOFENAC 2 G: 10 GEL TOPICAL at 20:32

## 2019-01-01 RX ADMIN — OXYCODONE AND ACETAMINOPHEN 1 TABLET: 5; 325 TABLET ORAL at 11:41

## 2019-01-01 RX ADMIN — GUAIFENESIN 600 MG: 600 TABLET, EXTENDED RELEASE ORAL at 21:30

## 2019-01-01 RX ADMIN — BUMETANIDE 0.5 MG: 1 TABLET ORAL at 09:22

## 2019-01-01 RX ADMIN — FLUTICASONE PROPIONATE 1 SPRAY: 50 SPRAY, METERED NASAL at 10:11

## 2019-01-01 RX ADMIN — GUAIFENESIN 600 MG: 600 TABLET, EXTENDED RELEASE ORAL at 20:04

## 2019-01-01 RX ADMIN — ALPRAZOLAM 0.5 MG: 0.5 TABLET ORAL at 21:35

## 2019-01-01 RX ADMIN — SODIUM BICARBONATE 650 MG: 650 TABLET ORAL at 20:41

## 2019-01-01 RX ADMIN — FLUTICASONE PROPIONATE 2 SPRAY: 50 SPRAY, METERED NASAL at 09:29

## 2019-01-01 RX ADMIN — ROPINIROLE HYDROCHLORIDE 4 MG: 1 TABLET, FILM COATED ORAL at 17:58

## 2019-01-01 RX ADMIN — CALCIUM GLUCONATE 1 G: 98 INJECTION, SOLUTION INTRAVENOUS at 17:42

## 2019-01-01 RX ADMIN — GUAIFENESIN 600 MG: 600 TABLET, EXTENDED RELEASE ORAL at 22:09

## 2019-01-01 RX ADMIN — ALPRAZOLAM 0.5 MG: 0.5 TABLET ORAL at 20:28

## 2019-01-01 RX ADMIN — ZAFIRLUKAST 20 MG: 20 TABLET, FILM COATED ORAL at 06:30

## 2019-01-01 RX ADMIN — CETIRIZINE HYDROCHLORIDE 5 MG: 10 TABLET, FILM COATED ORAL at 10:10

## 2019-01-01 RX ADMIN — ISOSORBIDE MONONITRATE 30 MG: 30 TABLET ORAL at 08:46

## 2019-01-01 RX ADMIN — TIZANIDINE 4 MG: 4 TABLET ORAL at 21:30

## 2019-01-01 RX ADMIN — ALPRAZOLAM 0.5 MG: 0.5 TABLET ORAL at 18:46

## 2019-01-01 RX ADMIN — DICLOFENAC 2 G: 10 GEL TOPICAL at 09:17

## 2019-01-01 RX ADMIN — ZAFIRLUKAST 20 MG: 20 TABLET, FILM COATED ORAL at 10:57

## 2019-01-01 RX ADMIN — Medication 1 CAPSULE: at 16:31

## 2019-01-01 RX ADMIN — DICLOFENAC 2 G: 10 GEL TOPICAL at 08:47

## 2019-01-01 RX ADMIN — FLUTICASONE PROPIONATE 2 SPRAY: 50 SPRAY, METERED NASAL at 09:16

## 2019-01-01 RX ADMIN — SODIUM BICARBONATE 650 MG: 650 TABLET ORAL at 19:53

## 2019-01-01 RX ADMIN — SODIUM CHLORIDE 250 ML: 9 INJECTION, SOLUTION INTRAVENOUS at 19:24

## 2019-01-01 RX ADMIN — FLUTICASONE PROPIONATE 2 SPRAY: 50 SPRAY, METERED NASAL at 10:34

## 2019-01-01 RX ADMIN — FLAVOXATE HYDROCHLORIDE 100 MG: 100 TABLET, FILM COATED ORAL at 11:20

## 2019-01-01 RX ADMIN — ISOSORBIDE MONONITRATE 30 MG: 60 TABLET ORAL at 09:37

## 2019-01-01 RX ADMIN — ASPIRIN 81 MG 81 MG: 81 TABLET ORAL at 10:01

## 2019-01-01 RX ADMIN — DOCUSATE SODIUM 100 MG: 100 CAPSULE, LIQUID FILLED ORAL at 21:27

## 2019-01-01 RX ADMIN — FLAVOXATE HYDROCHLORIDE 100 MG: 100 TABLET, FILM COATED ORAL at 22:09

## 2019-01-01 RX ADMIN — Medication 10 ML: at 09:22

## 2019-01-01 RX ADMIN — CEFTAZIDIME, AVIBACTAM 0.94 G: 2; .5 POWDER, FOR SOLUTION INTRAVENOUS at 02:05

## 2019-01-01 ASSESSMENT — PAIN DESCRIPTION - FREQUENCY
FREQUENCY: CONTINUOUS
FREQUENCY: INTERMITTENT
FREQUENCY: CONTINUOUS
FREQUENCY: CONTINUOUS
FREQUENCY: INTERMITTENT
FREQUENCY: CONTINUOUS
FREQUENCY: INTERMITTENT
FREQUENCY: CONTINUOUS

## 2019-01-01 ASSESSMENT — PAIN SCALES - GENERAL
PAINLEVEL_OUTOF10: 7
PAINLEVEL_OUTOF10: 7
PAINLEVEL_OUTOF10: 5
PAINLEVEL_OUTOF10: 7
PAINLEVEL_OUTOF10: 5
PAINLEVEL_OUTOF10: 8
PAINLEVEL_OUTOF10: 8
PAINLEVEL_OUTOF10: 5
PAINLEVEL_OUTOF10: 6
PAINLEVEL_OUTOF10: 9
PAINLEVEL_OUTOF10: 7
PAINLEVEL_OUTOF10: 7
PAINLEVEL_OUTOF10: 6
PAINLEVEL_OUTOF10: 8
PAINLEVEL_OUTOF10: 6
PAINLEVEL_OUTOF10: 7
PAINLEVEL_OUTOF10: 5
PAINLEVEL_OUTOF10: 7
PAINLEVEL_OUTOF10: 6
PAINLEVEL_OUTOF10: 0
PAINLEVEL_OUTOF10: 0
PAINLEVEL_OUTOF10: 7
PAINLEVEL_OUTOF10: 8
PAINLEVEL_OUTOF10: 0
PAINLEVEL_OUTOF10: 7
PAINLEVEL_OUTOF10: 5
PAINLEVEL_OUTOF10: 6
PAINLEVEL_OUTOF10: 5
PAINLEVEL_OUTOF10: 5
PAINLEVEL_OUTOF10: 6
PAINLEVEL_OUTOF10: 0
PAINLEVEL_OUTOF10: 6
PAINLEVEL_OUTOF10: 0
PAINLEVEL_OUTOF10: 8
PAINLEVEL_OUTOF10: 6
PAINLEVEL_OUTOF10: 6
PAINLEVEL_OUTOF10: 8
PAINLEVEL_OUTOF10: 6
PAINLEVEL_OUTOF10: 2
PAINLEVEL_OUTOF10: 7
PAINLEVEL_OUTOF10: 5
PAINLEVEL_OUTOF10: 6
PAINLEVEL_OUTOF10: 6
PAINLEVEL_OUTOF10: 5
PAINLEVEL_OUTOF10: 6
PAINLEVEL_OUTOF10: 5
PAINLEVEL_OUTOF10: 6
PAINLEVEL_OUTOF10: 6
PAINLEVEL_OUTOF10: 0
PAINLEVEL_OUTOF10: 0
PAINLEVEL_OUTOF10: 6
PAINLEVEL_OUTOF10: 7
PAINLEVEL_OUTOF10: 0
PAINLEVEL_OUTOF10: 6
PAINLEVEL_OUTOF10: 3
PAINLEVEL_OUTOF10: 3
PAINLEVEL_OUTOF10: 5
PAINLEVEL_OUTOF10: 3
PAINLEVEL_OUTOF10: 7
PAINLEVEL_OUTOF10: 6
PAINLEVEL_OUTOF10: 6
PAINLEVEL_OUTOF10: 0
PAINLEVEL_OUTOF10: 0
PAINLEVEL_OUTOF10: 7
PAINLEVEL_OUTOF10: 6
PAINLEVEL_OUTOF10: 3
PAINLEVEL_OUTOF10: 4
PAINLEVEL_OUTOF10: 5
PAINLEVEL_OUTOF10: 7
PAINLEVEL_OUTOF10: 3
PAINLEVEL_OUTOF10: 5
PAINLEVEL_OUTOF10: 2
PAINLEVEL_OUTOF10: 0
PAINLEVEL_OUTOF10: 6
PAINLEVEL_OUTOF10: 8
PAINLEVEL_OUTOF10: 5
PAINLEVEL_OUTOF10: 8
PAINLEVEL_OUTOF10: 9
PAINLEVEL_OUTOF10: 7
PAINLEVEL_OUTOF10: 7
PAINLEVEL_OUTOF10: 0
PAINLEVEL_OUTOF10: 5
PAINLEVEL_OUTOF10: 8
PAINLEVEL_OUTOF10: 5
PAINLEVEL_OUTOF10: 7
PAINLEVEL_OUTOF10: 5
PAINLEVEL_OUTOF10: 6
PAINLEVEL_OUTOF10: 5
PAINLEVEL_OUTOF10: 7
PAINLEVEL_OUTOF10: 5
PAINLEVEL_OUTOF10: 0
PAINLEVEL_OUTOF10: 3
PAINLEVEL_OUTOF10: 4
PAINLEVEL_OUTOF10: 2
PAINLEVEL_OUTOF10: 7
PAINLEVEL_OUTOF10: 6
PAINLEVEL_OUTOF10: 7
PAINLEVEL_OUTOF10: 3
PAINLEVEL_OUTOF10: 3
PAINLEVEL_OUTOF10: 6
PAINLEVEL_OUTOF10: 6
PAINLEVEL_OUTOF10: 0
PAINLEVEL_OUTOF10: 6
PAINLEVEL_OUTOF10: 7
PAINLEVEL_OUTOF10: 4
PAINLEVEL_OUTOF10: 0
PAINLEVEL_OUTOF10: 0
PAINLEVEL_OUTOF10: 6
PAINLEVEL_OUTOF10: 7
PAINLEVEL_OUTOF10: 7
PAINLEVEL_OUTOF10: 2
PAINLEVEL_OUTOF10: 7
PAINLEVEL_OUTOF10: 8
PAINLEVEL_OUTOF10: 4
PAINLEVEL_OUTOF10: 6
PAINLEVEL_OUTOF10: 3
PAINLEVEL_OUTOF10: 8
PAINLEVEL_OUTOF10: 6
PAINLEVEL_OUTOF10: 7
PAINLEVEL_OUTOF10: 9
PAINLEVEL_OUTOF10: 0
PAINLEVEL_OUTOF10: 5
PAINLEVEL_OUTOF10: 6
PAINLEVEL_OUTOF10: 6
PAINLEVEL_OUTOF10: 5
PAINLEVEL_OUTOF10: 7
PAINLEVEL_OUTOF10: 6
PAINLEVEL_OUTOF10: 6
PAINLEVEL_OUTOF10: 7
PAINLEVEL_OUTOF10: 6
PAINLEVEL_OUTOF10: 7
PAINLEVEL_OUTOF10: 7
PAINLEVEL_OUTOF10: 5
PAINLEVEL_OUTOF10: 7
PAINLEVEL_OUTOF10: 0
PAINLEVEL_OUTOF10: 4
PAINLEVEL_OUTOF10: 7
PAINLEVEL_OUTOF10: 2
PAINLEVEL_OUTOF10: 5
PAINLEVEL_OUTOF10: 0
PAINLEVEL_OUTOF10: 7
PAINLEVEL_OUTOF10: 6
PAINLEVEL_OUTOF10: 8
PAINLEVEL_OUTOF10: 4
PAINLEVEL_OUTOF10: 5
PAINLEVEL_OUTOF10: 7
PAINLEVEL_OUTOF10: 5
PAINLEVEL_OUTOF10: 6
PAINLEVEL_OUTOF10: 4
PAINLEVEL_OUTOF10: 3
PAINLEVEL_OUTOF10: 6
PAINLEVEL_OUTOF10: 7
PAINLEVEL_OUTOF10: 7

## 2019-01-01 ASSESSMENT — PAIN DESCRIPTION - DIRECTION
RADIATING_TOWARDS: HIPS
RADIATING_TOWARDS: HIPS
RADIATING_TOWARDS: HIPS AND LEGS

## 2019-01-01 ASSESSMENT — PAIN DESCRIPTION - PROGRESSION
CLINICAL_PROGRESSION: NOT CHANGED
CLINICAL_PROGRESSION: GRADUALLY IMPROVING
CLINICAL_PROGRESSION: NOT CHANGED
CLINICAL_PROGRESSION: GRADUALLY IMPROVING
CLINICAL_PROGRESSION: NOT CHANGED

## 2019-01-01 ASSESSMENT — PAIN DESCRIPTION - LOCATION
LOCATION: LEG
LOCATION: BACK;LEG
LOCATION: LEG
LOCATION: BACK
LOCATION: GENERALIZED
LOCATION: LEG
LOCATION: LEG
LOCATION: HAND
LOCATION: LEG
LOCATION: NECK
LOCATION: GENERALIZED;BACK
LOCATION: GENERALIZED
LOCATION: LEG
LOCATION: HEAD
LOCATION: LEG
LOCATION: GENERALIZED
LOCATION: LEG
LOCATION: GENERALIZED
LOCATION: GENERALIZED;BACK
LOCATION: HEAD
LOCATION: LEG
LOCATION: GENERALIZED
LOCATION: BACK
LOCATION: GENERALIZED
LOCATION: PERINEUM
LOCATION: BACK
LOCATION: LEG;BACK
LOCATION: BACK
LOCATION: LEG
LOCATION: GENERALIZED
LOCATION: LEG
LOCATION: BACK
LOCATION: LEG;NECK

## 2019-01-01 ASSESSMENT — PAIN DESCRIPTION - ORIENTATION
ORIENTATION: RIGHT;LEFT
ORIENTATION: RIGHT;LEFT
ORIENTATION: LOWER
ORIENTATION: RIGHT;LEFT
ORIENTATION: RIGHT;LEFT;POSTERIOR
ORIENTATION: LOWER
ORIENTATION: RIGHT;LEFT
ORIENTATION: RIGHT;LEFT;DISTAL
ORIENTATION: RIGHT;LEFT
ORIENTATION: RIGHT;LEFT
ORIENTATION: POSTERIOR;LEFT;RIGHT
ORIENTATION: RIGHT;LEFT;LOWER
ORIENTATION: RIGHT;LEFT;DISTAL
ORIENTATION: RIGHT;LEFT
ORIENTATION: RIGHT;LEFT
ORIENTATION: MID
ORIENTATION: RIGHT;LEFT
ORIENTATION: RIGHT;LEFT
ORIENTATION: RIGHT;LEFT;LOWER
ORIENTATION: LOWER
ORIENTATION: RIGHT;LEFT
ORIENTATION: POSTERIOR
ORIENTATION: LOWER;RIGHT;LEFT
ORIENTATION: MID;LOWER
ORIENTATION: LOWER
ORIENTATION: MID

## 2019-01-01 ASSESSMENT — PULMONARY FUNCTION TESTS
PIF_VALUE: 3
PIF_VALUE: 11
PIF_VALUE: 0
PIF_VALUE: 1
PIF_VALUE: 12
PIF_VALUE: 4
PIF_VALUE: 2
PIF_VALUE: 10
PIF_VALUE: 3
PIF_VALUE: 1
PIF_VALUE: 3
PIF_VALUE: 0
PIF_VALUE: 8
PIF_VALUE: 0
PIF_VALUE: 12
PIF_VALUE: 3
PIF_VALUE: 12
PIF_VALUE: 2
PIF_VALUE: 3
PIF_VALUE: 4
PIF_VALUE: 3
PIF_VALUE: 13
PIF_VALUE: 12
PIF_VALUE: 0
PIF_VALUE: 6
PIF_VALUE: 3
PIF_VALUE: 10
PIF_VALUE: 4
PIF_VALUE: 3
PIF_VALUE: 4
PIF_VALUE: 3
PIF_VALUE: 12
PIF_VALUE: 4
PIF_VALUE: 3
PIF_VALUE: 35
PIF_VALUE: 3
PIF_VALUE: 4
PIF_VALUE: 4
PIF_VALUE: 12
PIF_VALUE: 3
PIF_VALUE: 4
PIF_VALUE: 12
PIF_VALUE: 3
PIF_VALUE: 4
PIF_VALUE: 5
PIF_VALUE: 12
PIF_VALUE: 2
PIF_VALUE: 3
PIF_VALUE: 4
PIF_VALUE: 11
PIF_VALUE: 11
PIF_VALUE: 3
PIF_VALUE: 11
PIF_VALUE: 11
PIF_VALUE: 13
PIF_VALUE: 2
PIF_VALUE: 3
PIF_VALUE: 10
PIF_VALUE: 3
PIF_VALUE: 3
PIF_VALUE: 12
PIF_VALUE: 8
PIF_VALUE: 5

## 2019-01-01 ASSESSMENT — PAIN DESCRIPTION - DESCRIPTORS
DESCRIPTORS: ACHING
DESCRIPTORS: ACHING;CRAMPING
DESCRIPTORS: ACHING;DULL;DISCOMFORT
DESCRIPTORS: ACHING
DESCRIPTORS: ACHING;DISCOMFORT
DESCRIPTORS: DULL;ACHING
DESCRIPTORS: ACHING

## 2019-01-01 ASSESSMENT — PAIN DESCRIPTION - PAIN TYPE
TYPE: CHRONIC PAIN
TYPE: ACUTE PAIN
TYPE: CHRONIC PAIN
TYPE: ACUTE PAIN
TYPE: CHRONIC PAIN
TYPE: ACUTE PAIN
TYPE: CHRONIC PAIN
TYPE: ACUTE PAIN
TYPE: ACUTE PAIN
TYPE: CHRONIC PAIN
TYPE: ACUTE PAIN
TYPE: CHRONIC PAIN
TYPE: ACUTE PAIN
TYPE: ACUTE PAIN
TYPE: CHRONIC PAIN;ACUTE PAIN
TYPE: CHRONIC PAIN
TYPE: ACUTE PAIN
TYPE: ACUTE PAIN
TYPE: CHRONIC PAIN
TYPE: CHRONIC PAIN
TYPE: ACUTE PAIN
TYPE: ACUTE PAIN
TYPE: CHRONIC PAIN
TYPE: ACUTE PAIN
TYPE: CHRONIC PAIN
TYPE: ACUTE PAIN
TYPE: CHRONIC PAIN

## 2019-01-01 ASSESSMENT — ENCOUNTER SYMPTOMS
ABDOMINAL PAIN: 0
NAUSEA: 0
WHEEZING: 1
SORE THROAT: 0
CHEST TIGHTNESS: 1
WHEEZING: 0
VOICE CHANGE: 1
COUGH: 1
EYE PAIN: 0
SHORTNESS OF BREATH: 1
SINUS PRESSURE: 0
DIARRHEA: 0
EYE DISCHARGE: 0
SHORTNESS OF BREATH: 1
BACK PAIN: 0
RHINORRHEA: 0
VOMITING: 0

## 2019-01-01 ASSESSMENT — PAIN DESCRIPTION - ONSET
ONSET: ON-GOING

## 2019-01-01 ASSESSMENT — PAIN SCALES - WONG BAKER
WONGBAKER_NUMERICALRESPONSE: 6
WONGBAKER_NUMERICALRESPONSE: 4
WONGBAKER_NUMERICALRESPONSE: 6
WONGBAKER_NUMERICALRESPONSE: 6

## 2019-01-01 ASSESSMENT — PAIN - FUNCTIONAL ASSESSMENT
PAIN_FUNCTIONAL_ASSESSMENT: PREVENTS OR INTERFERES SOME ACTIVE ACTIVITIES AND ADLS
PAIN_FUNCTIONAL_ASSESSMENT: PREVENTS OR INTERFERES WITH MANY ACTIVE NOT PASSIVE ACTIVITIES

## 2019-01-05 PROBLEM — E83.51 HYPOCALCEMIA: Status: ACTIVE | Noted: 2019-01-01

## 2019-01-05 PROBLEM — D64.9 ANEMIA: Status: ACTIVE | Noted: 2019-01-01

## 2019-01-11 PROBLEM — E44.0 MODERATE MALNUTRITION (HCC): Status: ACTIVE | Noted: 2019-01-01

## 2019-01-12 PROBLEM — N30.90 RECURRENT CYSTITIS: Status: RESOLVED | Noted: 2018-01-01 | Resolved: 2019-01-01

## 2019-01-12 PROBLEM — N18.30 CHRONIC KIDNEY DISEASE, STAGE 3 (HCC): Status: RESOLVED | Noted: 2018-01-01 | Resolved: 2019-01-01

## 2019-01-12 PROBLEM — I95.0 IDIOPATHIC HYPOTENSION: Status: RESOLVED | Noted: 2018-01-01 | Resolved: 2019-01-01

## 2019-01-12 PROBLEM — H90.6 MIXED CONDUCTIVE AND SENSORINEURAL HEARING LOSS OF BOTH EARS: Status: RESOLVED | Noted: 2017-10-25 | Resolved: 2019-01-01

## 2019-01-12 PROBLEM — F32.5 MAJOR DEPRESSIVE DISORDER, SINGLE EPISODE, IN FULL REMISSION (HCC): Status: RESOLVED | Noted: 2018-01-01 | Resolved: 2019-01-01

## 2019-01-12 PROBLEM — J96.01 ACUTE RESPIRATORY FAILURE WITH HYPOXIA (HCC): Status: RESOLVED | Noted: 2018-01-01 | Resolved: 2019-01-01

## 2019-01-12 PROBLEM — K52.9 CHRONIC DIARRHEA: Status: RESOLVED | Noted: 2018-01-01 | Resolved: 2019-01-01

## 2019-01-12 PROBLEM — N39.0 UTI (URINARY TRACT INFECTION): Status: RESOLVED | Noted: 2018-01-01 | Resolved: 2019-01-01

## 2019-01-12 PROBLEM — E87.20 METABOLIC ACIDOSIS: Status: RESOLVED | Noted: 2018-01-01 | Resolved: 2019-01-01

## 2019-01-12 PROBLEM — A41.9 SEPTIC SHOCK (HCC): Status: ACTIVE | Noted: 2019-01-01

## 2019-01-12 PROBLEM — H93.11 TINNITUS OF RIGHT EAR: Status: RESOLVED | Noted: 2017-10-25 | Resolved: 2019-01-01

## 2019-01-12 PROBLEM — N81.10 BLADDER PROLAPSE, FEMALE, ACQUIRED: Chronic | Status: RESOLVED | Noted: 2017-06-23 | Resolved: 2019-01-01

## 2019-01-12 PROBLEM — B37.0 THRUSH, ORAL: Status: RESOLVED | Noted: 2018-01-01 | Resolved: 2019-01-01

## 2019-01-12 PROBLEM — R79.89 PRERENAL AZOTEMIA: Status: RESOLVED | Noted: 2017-06-23 | Resolved: 2019-01-01

## 2019-01-12 PROBLEM — R65.21 SEPTIC SHOCK (HCC): Status: ACTIVE | Noted: 2019-01-01

## 2019-01-12 PROBLEM — M89.8X7 PAIN IN METATARSUS OF RIGHT FOOT: Status: RESOLVED | Noted: 2017-10-25 | Resolved: 2019-01-01

## 2019-01-12 PROBLEM — S62.101A CLOSED FRACTURE OF RIGHT WRIST: Status: RESOLVED | Noted: 2017-01-23 | Resolved: 2019-01-01

## 2019-01-12 PROBLEM — Z87.448 HISTORY OF HEMATURIA: Status: RESOLVED | Noted: 2018-01-01 | Resolved: 2019-01-01

## 2019-01-12 PROBLEM — F32.9 REACTIVE DEPRESSION: Status: RESOLVED | Noted: 2017-04-26 | Resolved: 2019-01-01

## 2019-01-12 PROBLEM — A04.72 C. DIFFICILE COLITIS: Status: RESOLVED | Noted: 2018-01-01 | Resolved: 2019-01-01

## 2019-01-12 PROBLEM — G93.41 METABOLIC ENCEPHALOPATHY: Status: RESOLVED | Noted: 2018-01-01 | Resolved: 2019-01-01

## 2019-01-12 PROBLEM — S72.092A COMMINUTED FRACTURE OF HIP, LEFT, CLOSED, INITIAL ENCOUNTER (HCC): Status: RESOLVED | Noted: 2018-01-01 | Resolved: 2019-01-01

## 2019-01-24 PROBLEM — I95.9 HYPOTENSION: Status: ACTIVE | Noted: 2018-01-01

## 2019-02-07 PROBLEM — Z66 DNR (DO NOT RESUSCITATE): Status: ACTIVE | Noted: 2019-01-01

## 2019-03-12 LAB
ORGANISM: ABNORMAL
URINE CULTURE, ROUTINE: ABNORMAL
URINE CULTURE, ROUTINE: ABNORMAL

## 2019-04-03 NOTE — PROGRESS NOTES
[address] [address]  INPATIENT PHYSICAL THERAPY  DAILY NOTE  [facility] 8E - 8E-68/068-A    Time In: 6443  Time Out: 0142  Timed Code Treatment Minutes: 64 Minutes  Minutes: 56          Date: 2018  Patient Name: Harrison Michaels,  Gender:  female        MRN: [id number]  : 1930  (80 y.o.)  Referral Date : 06/15/18  Referring Practitioner: Trish Bush MD  Diagnosis: Comminuted fracture of hip, left, closed  Additional Pertinent Hx: Per ED note, pt is a 80 y.o. female who has a past medical history of osteoarthritis, osteopenia, and a right total hip arthroplasty. Patient presents to the Emergency Department on 6/10/18 for the evaluation of left hip pain. Patient reports that she was walking to the bathroom early this morning when she reports that she thinks fell asleep while walking back to bed. Found to have a left intertrochanteric hip fx. OR: s/p open treatment  with cephalomedullary nail on . Past Medical History:   Diagnosis Date    Allergic rhinitis     Anxiety     Bilateral hydronephrosis 8/10/2016    Bladder cancer ([facility] [address]     Dr. Brianna Haider Blood circulation, collateral     CHF (congestive heart failure) (HCC)     Constipation     attributed to Ultram    Coronary artery disease involving native coronary artery of native heart s/p cath 10/14/15-LM-P, LAD MID 90%, ANUERYSMAL, % PROX, RCA MID 60% DISTLA 70%, EDP 20 , EF 30%-NEED REVASCULARIZATION 10/14/2015    GERD (gastroesophageal reflux disease)     History of blood transfusion     [place] (hard of hearing)     Hyperglycemia     Hyperlipidemia     Hypertension     Obesity (BMI 30-39. 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  2017     Past Surgical History:   Procedure Laterality Date    ABDOMEN SURGERY      ABDOMINAL EXPLORATION SURGERY      BLADDER SURGERY  10/2016    stent placed and removed a blood clot    CARDIAC SURGERY Occupational Therapy Daily Treatment    Visit Count: 4    Plan of Care: 6/12/19  Insurance Information: 20 visits per year  Precautions: standard cuff protocol per discussion with referring physician.    SUBJECTIVE   Patient relates increased pain over the last couple days.   Current Pain (0-10 scale): 0   Functional Change: Patient relates upping CPM chair numbers.    OBJECTIVE   None    Treatment   Manual Therapy:   Passive motion - no catching, minimal guarding - completed through elevation and rotations, progressing motion    Oscillations to increase relaxation    Arm resting on pillow ER - good tolerance - progressing motion    Therapeutic Activity:  Pulley's - scaption - 90 degrees with minimal hiking, cueing to correct.     Skilled input: verbal instruction/cues, tactile instruction/cues, facilitation, inhibition    Home Program:   Exercise: Date issued Date DC Comments   Same as madalyn 3/22/19                               Writer verbally educated the patient and received verbal consent from the patient on hand placement, positioning of patient, and techniques to be performed today including clothing adjustments for techniques, therapist position for techniques, hand placement and palpation for techniques as described above and how they are pertinent to the patient's plan of care.       Suggestions for next session as indicated: progress per plan of care, progress guidelines, progress motion    ASSESSMENT   Patient tolerates session well today. Patient is concerned about increasing ache but has great improvement in PROM. Patient has regained approximately 75% of passive motion with no increase in symptoms.   Pain after treatment (patient reported, 0-10 scale): 0  Result of above outlined education: Verbalizes understanding, Demonstrates understanding and Needs reinforcement    THERAPY DAILY BILLING   Insurance: [employer].     Evaluation Procedures:  No evaluation codes were used on this date of  service    Timed Procedures:  Manual Therapy, 30 minutes  Therapeutic Activity, 5 minutes    Untimed Procedures:  No untimed codes were used on this date of service    Total Treatment Time: 35 minutes   Previous Treatment: Patient with no complaints from previous session. Family / Caregiver Present: No  Subjective: pt. in bed, very pleasant, requesting to go to the bathroom to try to have a BM,    Pain:   .  Pain Assessment  Pain Level: 8  Pain Type: Surgical pain;Acute pain  Pain Location: Hip; Neck  Pain Orientation: Left;Posterior  Pain Descriptors: Aching;Sore;Constant  Pain Frequency: Continuous  Pain Onset: On-going  Clinical Progression: Gradually improving       Social/Functional:  Lives With: Family (vivienne Pyle)  Type of Home: House  Home Layout: One level  Home Access: Stairs to enter with rails  Entrance Stairs - Number of Steps: 4  Entrance Stairs - Rails: Both  Home Equipment: Cane, Rolling walker, Sock aid, Long-handled shoehorn (pt was using cane and went back to RW)     Objective:  Rolling to Right: Stand by assistance  Supine to Sit: Minimal assistance (min a with trunk, hob up ~20 degrees with use of rail)  Sit to Supine: Unable to assess  Scooting: Contact guard assistance (scoot to edge of sitting surfaces)    Transfers  Sit to Stand: Minimal Assistance (min a from EOB with ht. elevated and from ETS)  Stand to sit: Contact guard assistance  Min A with toilet transfer(sit to stand) with rolling walker, ETS.  Assist needed with hygiene and clothing management(CGA with stand to sit)       Ambulation 1  Surface: level tile  Device: Rolling Walker  Other Apparatus: Wheelchair follow  Assistance: Contact guard assistance (Zohreh Peaches today , no posterior leaning present  the entire distances this am)  Quality of Gait:  kyphotic, short step length, downward gaze, increased reliance on RW, very slow moving, short shuffled steps, much improved foot clearance today, improved B LE heel strike and push off. pt. reported her arms were getting tired from increased reliance on walker, however nothing was mentioned about pain or fear of falling., very good progress shown this am vs. past sessions  Distance:

## 2019-05-10 NOTE — PLAN OF CARE
Problem: Nutrition  Goal: Optimal nutrition therapy  Outcome: Ongoing  Nutrition Problem: Increased nutrient needs  Intervention: Food and/or Nutrient Delivery: Continue current diet, Modify current ONS  Nutritional Goals: Pt. will consume 75% or more of meals to promote wound healing during LOS. Detail Level: Generalized

## 2022-05-04 NOTE — TELEPHONE ENCOUNTER
Daughter Simone Garg (on HIPAA) called wanting to schedule the patient for her stent exchange now, secondary to blood in her urine. Per the daughter \"Dr. Sun Kearney wants the stent exchange done now with cauterization to control/stop the bleeding in the bladder. \" (I will attempt to obtain  note)  Patient now has bone metastasis. Is it okay to proceed with surgery and do you need to see her prior? Please advise. Thank you. Azelaic Acid Counseling: Patient counseled that medicine may cause skin irritation and to avoid applying near the eyes.  In the event of skin irritation, the patient was advised to reduce the amount of the drug applied or use it less frequently.   The patient verbalized understanding of the proper use and possible adverse effects of azelaic acid.  All of the patient's questions and concerns were addressed.

## 2023-03-02 NOTE — TELEPHONE ENCOUNTER
Detail Level: Detailed Spoke with Kenney Butcher, (granddaughter on HIPAA), she states patient has blood in her urine today. Per Ritchie Garcia, I informed Kenney Butcher the blood is most likely due to irritation from the catheter. Patient is not in any pain, nothing more can be done at this point until her surgery with Dr. Dru Heimlich on 01/31/2018 for CYSTOSCOPY RIGHT RETROGRADE PYELOGRAM RIGHT URETEROSCOPY AND REPLACEMENT OF RIGHT URETERAL STENT. Detail Level: Simple Detail Level: Generalized Detail Level: Zone

## 2024-05-13 NOTE — TELEPHONE ENCOUNTER
09/17/2018 . Transition of Care visit scheduled. 9/21/2018  Patient is being discharged to home. da Yes

## 2025-06-12 NOTE — PROGRESS NOTES
Reason for visit:   Chief Complaint   Patient presents with    Insect Bite       Red Bullseye ring       HPI  is a 70 y.o. female     HPI    Here to discuss rash.     She noted a small, flat, erythematous, flaking rash on the back of her calf a few weeks ago. It is not tender, nor itchy. She has had similar spots a number of times in the past on various parts of her body so she was not particularly concerned, and felt it may actually be improving, but in the last couple days the center of the rash seems more deeply pigmented and her chiropractor became concerned for possible lyme. She feels completely well. She has a f/u appt with her derm in 2 weeks.      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    Headache due to intracranial disease 02/22/2019    Osteopenia 05/26/2017    Atrophic vaginitis 11/23/2010    Allergic rhinitis 12/04/2007       Surgical History:  Past Surgical History   Procedure Laterality Date    Bone marrow biopsy      Craniectomy / craniotomy for excision of brain tumor      Sinus surgery         Social History:  Social History     Social History Narrative    Not on file       Family History:  Family History   Problem Relation Name Age of Onset    Heart failure Biological Mother      Melanoma Biological Father         Allergies:  Sudafed [pseudoephedrine hcl] and Wheat    Current Medications:  Current Outpatient Medications   Medication Sig Dispense Refill    calcium carbonate-vitamin D3 600 mg-10 mcg (400 unit) capsule Take by mouth.      clotrimazole-betamethasone (LOTRISONE) 1-0.05 % cream Apply topically 2 (two) times a day. 45 g 0    FISH OIL-omega-3 fatty acids (FISH OIL) 340-1,000 mg capsule Take by mouth 2 (two) times a day.      magnesium oxide 200 mg magnesium tablet Take by mouth.      psyllium seed, with sugar, (FIBER ORAL) Take by  TCU BALANCE TEST:    Balance during to/from sit to stand PT: Minimal Assistance;Contact guard assistance (Min x1 and CGA x1, posterior lean with initial stand) (06/26/18 0805)  OT: Moderate assistance (to Tammie from bedside chair, w/c, RTS) (06/26/18 1313)   Balance during walking PT: Minimal assistance;Contact guard assistance (Min x1 and CGA x1 to CGA x2) (06/26/18 0809)  OT: Minimal assistance (Tammie-CGA) (06/26/18 1315)   Balance during turn-around and while walking PT: Minimal assistance;Contact guard assistance (Min x1 and CGA x1 to CGA x2) (06/26/18 0809)  OT: Minimal assistance (Tammie-CGA) (06/26/18 1315)   Balance moving on and off toilet Moderate assistance (modA-Tammie ) (06/26/18 1313)   Balance during surface to/from surface transfers     Independent = 0  Modified Independent, Supervision, SBA = 1  CGA, Min Assist, Mod Assist, Max Assist, Dependent = 2  Not Tested/No Response = 8 mouth.       No current facility-administered medications for this visit.         Review of Systems:  Review of Systems   Constitutional:  Negative for chills, fatigue and fever.   Respiratory:  Negative for cough, shortness of breath and wheezing.    Cardiovascular:  Negative for chest pain and palpitations.   Gastrointestinal:  Negative for diarrhea, nausea and vomiting.   Musculoskeletal:  Negative for arthralgias and myalgias.   Skin:  Positive for rash.       Objective     Vital Signs:  Vitals:    06/12/25 1700   BP: 130/82   Pulse: 88   Resp: 18   Temp: 36.6 °C (97.8 °F)   SpO2: 100%       BMI:  Body mass index is 20.37 kg/m².     Physical Exam  Constitutional:       Appearance: Normal appearance.   Cardiovascular:      Rate and Rhythm: Normal rate and regular rhythm.   Pulmonary:      Effort: Pulmonary effort is normal.      Breath sounds: Normal breath sounds.   Skin:     Comments: Calf with flat, flaking, erythematous, annular rash with slightly more deeply pigmented center.    Neurological:      Mental Status: She is alert and oriented to person, place, and time.         Recent labs before today:     Lab Results   Component Value Date    WBC 2.83 (L) 11/20/2024    HGB 14.4 11/20/2024    HCT 44.1 11/20/2024     11/20/2024    CHOL 193 11/20/2024    TRIG 49 11/20/2024    HDL 89 11/20/2024    ALT 34 11/20/2024    AST 34 11/20/2024     11/20/2024    K 4.6 11/20/2024     11/20/2024    CREATININE 0.8 11/20/2024    BUN 17 11/20/2024    CO2 29 11/20/2024    TSH 2.740 09/09/2021    GLUC Negative 09/15/2016       There are no Patient Instructions on file for this visit.      Problem List Items Addressed This Visit       Low grade B-cell lymphoma (CMS/HCC)    In remission, continues to follow with onc, has surveillance MRI scheduled in near future         Rash - Primary    Does not look like classic erythema migrans to me, but I do understand her concern  Photo uploaded to media  She has no  worrisome symptoms  Offered lyme testing- she declines  She is comfortable monitoring for now  Sent rx for lotrisone to try  If develops fever/chills/joint pain/headaches/unusual fatigue/worsening of rash she is to notify me immediately  She already has derm appt in place in 2 weeks for if rash does not resolve  F/u as needed         Relevant Medications    clotrimazole-betamethasone (LOTRISONE) 1-0.05 % cream            ARMIN Samuel  6/12/2025

## (undated) DEVICE — BLADE LARYNSCP SZ 3 ENH DIR INTUB GLIDESCOPE MCGRATH MAC

## (undated) DEVICE — 4.0MM SHORT PILOT DRILL WITH AO CONNECTOR: Brand: TRIGEN

## (undated) DEVICE — NITINOL STONE RETRIEVAL BASKET: Brand: ZERO TIP

## (undated) DEVICE — PREP SOL PVP IODINE 4%  4 OZ/BTL

## (undated) DEVICE — SPONGE LAP W18XL18IN WHT COT 4 PLY FLD STRUNG RADPQ DISP ST

## (undated) DEVICE — GLOVE ORANGE PI 7   MSG9070

## (undated) DEVICE — CHLORAPREP 26ML ORANGE

## (undated) DEVICE — GAUZE,SPONGE,3"X3",12PLY,STERILE,LF,2'S: Brand: MEDLINE

## (undated) DEVICE — URETERAL ACCESS SHEATH SET: Brand: NAVIGATOR HD

## (undated) DEVICE — CATHETER,FOLEY,SILI-ELAST,LTX,18FR,10ML: Brand: MEDLINE

## (undated) DEVICE — BASIC SINGLE BASIN BTC-LF: Brand: MEDLINE INDUSTRIES, INC.

## (undated) DEVICE — MEDI-VAC NON-CONDUCTIVE SUCTION TUBING 6MM X 6.1M (20 FT.) L: Brand: CARDINAL HEALTH

## (undated) DEVICE — SYSTEM SKIN CLSR 22CM DERMBND PRINEO

## (undated) DEVICE — GLOVE SURG SZ 65 THK91MIL LTX FREE SYN POLYISOPRENE

## (undated) DEVICE — PADDING,UNDERCAST,COTTON, 4"X4YD STERILE: Brand: MEDLINE

## (undated) DEVICE — DUAL CUT SAGITTAL BLADE

## (undated) DEVICE — Z DUP USE 2565107 PACK SURG PROC LEG CYSTO T-DRAPE REINF TBL CVR HND TWL

## (undated) DEVICE — SOLUTION IV IRRIG WATER 1000ML POUR BRL 2F7114

## (undated) DEVICE — Device

## (undated) DEVICE — POSITIONER HD W8XH4XL8.5IN RASPBERRY FOAM SLT

## (undated) DEVICE — STRIP,CLOSURE,WOUND,MEDI-STRIP,1/2X4: Brand: MEDLINE

## (undated) DEVICE — CONTAINER,SPECIMEN,PNEU TUBE,4OZ,OR STRL: Brand: MEDLINE

## (undated) DEVICE — POUCH DRNGE FLX BND INTEGR RAIL CLMP DISP EZ CTCH

## (undated) DEVICE — 3M™ TEGADERM™ TRANSPARENT FILM DRESSING FRAME STYLE, 1624W, 2-3/8 IN X 2-3/4 IN (6 CM X 7 CM), 100/CT 4CT/CASE: Brand: 3M™ TEGADERM™

## (undated) DEVICE — 3M™ TEGADERM™ TRANSPARENT FILM DRESSING FRAME STYLE, 1626W, 4 IN X 4-3/4 IN (10 CM X 12 CM), 50/CT 4CT/CASE: Brand: 3M™ TEGADERM™

## (undated) DEVICE — CONTRAST IOTHALAMATE MEGLUMINE 60% 50 ML INJ CONRAY 60

## (undated) DEVICE — GUIDEWIRE ENDOSCP L150CM DIA0.035IN TIP 3CM PTFE NIT

## (undated) DEVICE — CATHETER ANGIO 18FR L4CM RBP 20ATM HYDR+ ULT HI PRSS SHT

## (undated) DEVICE — YANKAUER,BULB TIP,W/O VENT,RIGID,STERILE: Brand: MEDLINE

## (undated) DEVICE — GAUZE,SPONGE,2"X2",8PLY,STERILE,LF,2'S: Brand: MEDLINE

## (undated) DEVICE — FLEXI-TIP, DUAL LUMEN URETERAL ACCESS CATHETER: Brand: FLEXI-TIP

## (undated) DEVICE — DRAINBAG,ANTI-REFLUX TOWER,L/F,2000ML,LL: Brand: MEDLINE

## (undated) DEVICE — 3M™ WARMING BLANKET, UPPER BODY, 10 PER CASE, 42268: Brand: BAIR HUGGER™

## (undated) DEVICE — SKIN AFFIX SURG ADHESIVE 72/CS 0.55ML: Brand: MEDLINE

## (undated) DEVICE — SYRINGE IRRIG 60ML SFT PLIABLE BLB EZ TO GRP 1 HND USE W/

## (undated) DEVICE — GLOVE ORANGE PI 7 1/2   MSG9075

## (undated) DEVICE — GLOVE ORANGE PI 8   MSG9080

## (undated) DEVICE — Y-TYPE TUR/BLADDER IRRIGATION SET, REGULATING CLAMP

## (undated) DEVICE — GUIDE PIN 3.2MM X 343MM: Brand: TRIGEN

## (undated) DEVICE — SOLUTION IV IRRIG POUR BRL 0.9% SODIUM CHL 2F7124

## (undated) DEVICE — CYSTO PACK: Brand: MEDLINE INDUSTRIES, INC.

## (undated) DEVICE — SINGLE-USE DIGITAL FLEXIBLE URETEROSCOPE: Brand: LITHOVUE

## (undated) DEVICE — DRAPE C ARM W36XL30IN RECTANG BND BG AND TAPE

## (undated) DEVICE — COVER ARMBRD W13XL28.5IN IMPERV BLU FOR OP RM

## (undated) DEVICE — OPEN-END FLEXI-TIP URETERAL CATHETER: Brand: FLEXI-TIP

## (undated) DEVICE — GAUZE,SPONGE,8"X4",12PLY,XRAY,STRL,LF: Brand: MEDLINE

## (undated) DEVICE — ULTRACLEAN ACCESSORY ELECTRODE 6" (15.24 CM) COATED BLADE: Brand: ULTRACLEAN

## (undated) DEVICE — SPONGE GZ W4XL4IN COT 12 PLY TYP VII WVN C FLD DSGN

## (undated) DEVICE — TUBING, SUCTION, 1/4" X 20', STRAIGHT: Brand: MEDLINE INDUSTRIES, INC.

## (undated) DEVICE — SUTURE ABSORBABLE MONOFILAMENT 1-0 OS8 14 IN STRATAFIX SPRL SXPD2B202

## (undated) DEVICE — VORTEX VACUUM MIXING SYSTEM: Brand: VORTEX

## (undated) DEVICE — MEDI-VAC YANKAUER SUCTION HANDLE W/BULBOUS TIP: Brand: CARDINAL HEALTH

## (undated) DEVICE — JELLY,LUBE,STERILE,FLIP TOP,TUBE,2-OZ: Brand: MEDLINE

## (undated) DEVICE — FLEXIBLE ADHESIVE BANDAGE: Brand: CURITY